# Patient Record
Sex: FEMALE | Race: WHITE | Employment: OTHER | URBAN - METROPOLITAN AREA
[De-identification: names, ages, dates, MRNs, and addresses within clinical notes are randomized per-mention and may not be internally consistent; named-entity substitution may affect disease eponyms.]

---

## 2017-01-17 ENCOUNTER — HOSPITAL ENCOUNTER (OUTPATIENT)
Age: 47
Discharge: HOME | End: 2017-01-17
Attending: FAMILY MEDICINE
Payer: MEDICAID

## 2017-01-17 ENCOUNTER — APPOINTMENT (OUTPATIENT)
Dept: GENERAL RADIOLOGY | Age: 47
End: 2017-01-17
Attending: NURSE PRACTITIONER
Payer: MEDICAID

## 2017-01-17 VITALS
TEMPERATURE: 98 F | DIASTOLIC BLOOD PRESSURE: 84 MMHG | HEART RATE: 88 BPM | OXYGEN SATURATION: 96 % | SYSTOLIC BLOOD PRESSURE: 123 MMHG | RESPIRATION RATE: 16 BRPM

## 2017-01-17 DIAGNOSIS — S20.211A RIB CONTUSION, RIGHT, INITIAL ENCOUNTER: Primary | ICD-10-CM

## 2017-01-17 PROCEDURE — 99214 OFFICE O/P EST MOD 30 MIN: CPT

## 2017-01-17 PROCEDURE — 99213 OFFICE O/P EST LOW 20 MIN: CPT

## 2017-01-17 PROCEDURE — 71101 X-RAY EXAM UNILAT RIBS/CHEST: CPT

## 2017-01-17 RX ORDER — HYDROCODONE BITARTRATE AND IBUPROFEN 7.5; 2 MG/1; MG/1
1 TABLET, FILM COATED ORAL EVERY 8 HOURS PRN
Qty: 15 TABLET | Refills: 0 | Status: SHIPPED | OUTPATIENT
Start: 2017-01-17 | End: 2017-04-04

## 2017-01-17 NOTE — ED PROVIDER NOTES
Patient Seen in: [facility]    History   Patient presents with:  Fall    Stated Complaint: 5 DAYS RIB PAIN S/P FALL    HPI  40 female who presents to the IC with complaints of right rib pain after falling 5 days ago on the ice. OTHER SURGICAL HISTORY      Comment bunions bilateral    OTHER SURGICAL HISTORY      Comment nodule lymph nodes neck    COLPOSCOPY, CERVIX W/UPPER ADJACENT VAGINA; W/BIOPSY(S), CERVIX  2011    CRYOCAUTERY OF CERVIX            Comment X2 OVARIAN CA   • Cancer Maternal Aunt      LUNG CA 46   • Other[other] [OTHER] Son      anxiety   • Other[other] [OTHER] Son      behavioral problems   • Other[other] [OTHER] Sister      Back problems         Smoking Status: Former Smoker Mouth/Throat: Oropharynx is clear and moist. No oropharyngeal exudate. Eyes: Conjunctivae and EOM are normal. Pupils are equal, round, and reactive to light. Neck: Normal range of motion. Neck supple.    Cardiovascular: Normal rate, regular rhythm, norm I discussed the radiology results with the patient. I discussed the diagnosis and need for followup with their primary care physician for further evaluation and care.  Patient states they understand diagnosis, followup plan and agree with and understand  di

## 2017-01-17 NOTE — ED INITIAL ASSESSMENT (HPI)
The patient has right rib pain from a fall that occurred on 1/13/17 when she slipped on ice. The patient has been using ice PRN and yesterday was also alternating with heat. She has been using ibuprofen and Aleve.   Last dose of ibuprofen 800mg was last n

## 2017-01-26 ENCOUNTER — HOSPITAL ENCOUNTER (OUTPATIENT)
Dept: MRI IMAGING | Age: 47
Discharge: HOME | End: 2017-01-26
Attending: ORTHOPAEDIC SURGERY
Payer: MEDICAID

## 2017-01-26 DIAGNOSIS — M25.531 PAIN IN RIGHT WRIST: ICD-10-CM

## 2017-01-26 PROCEDURE — 73221 MRI JOINT UPR EXTREM W/O DYE: CPT

## 2017-02-04 PROBLEM — M18.11 PRIMARY OSTEOARTHRITIS OF FIRST CARPOMETACARPAL JOINT OF RIGHT HAND: Status: ACTIVE | Noted: 2017-02-04

## 2017-04-26 PROCEDURE — 87591 N.GONORRHOEAE DNA AMP PROB: CPT | Performed by: FAMILY MEDICINE

## 2017-04-26 PROCEDURE — 84146 ASSAY OF PROLACTIN: CPT | Performed by: OBSTETRICS & GYNECOLOGY

## 2017-04-26 PROCEDURE — 81003 URINALYSIS AUTO W/O SCOPE: CPT | Performed by: FAMILY MEDICINE

## 2017-04-26 PROCEDURE — 87086 URINE CULTURE/COLONY COUNT: CPT | Performed by: FAMILY MEDICINE

## 2017-04-26 PROCEDURE — 82670 ASSAY OF TOTAL ESTRADIOL: CPT | Performed by: OBSTETRICS & GYNECOLOGY

## 2017-04-26 PROCEDURE — 83001 ASSAY OF GONADOTROPIN (FSH): CPT | Performed by: OBSTETRICS & GYNECOLOGY

## 2017-04-26 PROCEDURE — 83002 ASSAY OF GONADOTROPIN (LH): CPT | Performed by: OBSTETRICS & GYNECOLOGY

## 2017-04-26 PROCEDURE — 87491 CHLMYD TRACH DNA AMP PROBE: CPT | Performed by: FAMILY MEDICINE

## 2017-04-26 PROCEDURE — 84144 ASSAY OF PROGESTERONE: CPT | Performed by: OBSTETRICS & GYNECOLOGY

## 2017-04-27 ENCOUNTER — HOSPITAL ENCOUNTER (OUTPATIENT)
Dept: CT IMAGING | Age: 47
End: 2017-04-27
Attending: FAMILY MEDICINE
Payer: MEDICAID

## 2017-04-27 ENCOUNTER — HOSPITAL ENCOUNTER (OUTPATIENT)
Dept: CT IMAGING | Age: 47
Discharge: HOME | End: 2017-04-27
Attending: FAMILY MEDICINE
Payer: MEDICAID

## 2017-04-27 DIAGNOSIS — R10.32 LLQ PAIN: ICD-10-CM

## 2017-04-27 PROCEDURE — 74177 CT ABD & PELVIS W/CONTRAST: CPT

## 2017-04-27 NOTE — PROGRESS NOTES
Quick Note:    Informed patient of abnormal results and MD's recommendations regarding results to follow up with gastro.  Patient verbalized understanding, however stated that she wants to follow up with her gyne first because she believes that the pain is

## 2017-05-03 PROCEDURE — 88305 TISSUE EXAM BY PATHOLOGIST: CPT | Performed by: OBSTETRICS & GYNECOLOGY

## 2017-05-12 PROCEDURE — 82105 ALPHA-FETOPROTEIN SERUM: CPT | Performed by: INTERNAL MEDICINE

## 2017-05-12 PROCEDURE — 82378 CARCINOEMBRYONIC ANTIGEN: CPT | Performed by: INTERNAL MEDICINE

## 2017-05-12 PROCEDURE — 86301 IMMUNOASSAY TUMOR CA 19-9: CPT | Performed by: INTERNAL MEDICINE

## 2017-05-12 PROCEDURE — 36415 COLL VENOUS BLD VENIPUNCTURE: CPT | Performed by: INTERNAL MEDICINE

## 2017-05-22 ENCOUNTER — HOSPITAL ENCOUNTER (OUTPATIENT)
Dept: MRI IMAGING | Age: 47
Discharge: HOME | End: 2017-05-22
Attending: INTERNAL MEDICINE
Payer: MEDICAID

## 2017-05-22 DIAGNOSIS — R93.2 ABNORMAL LIVER DIAGNOSTIC IMAGING: ICD-10-CM

## 2017-05-22 PROCEDURE — 74183 MRI ABD W/O CNTR FLWD CNTR: CPT | Performed by: INTERNAL MEDICINE

## 2017-05-22 PROCEDURE — A9581 GADOXETATE DISODIUM INJ: HCPCS | Performed by: INTERNAL MEDICINE

## 2017-05-24 NOTE — PROGRESS NOTES
Quick Note:    5/24/2017  Keeley Andres  [address] [address]    Dear Lissy Myers,       Here are your results from your recent visit with Gastroenterology. MRI of liver reveals benign lesions, likely focal nodular hyperplasia.  This i

## 2017-06-26 ENCOUNTER — OFFICE VISIT (OUTPATIENT)
Dept: SLEEP CENTER | Facility: HOSPITAL | Age: 47
End: 2017-06-26
Attending: INTERNAL MEDICINE
Payer: MEDICAID

## 2017-06-26 PROCEDURE — 95810 POLYSOM 6/> YRS 4/> PARAM: CPT

## 2017-07-05 NOTE — PROCEDURES
[address]       Accredited by the [facility] ([facility])    PATIENT'S NAME:        Joellen Pleitez  ATTENDING PHYSICIAN:   Candy Powers M.D.   REFERRING PHYSICIAN:   CLIFFORD Leahy significant obstructive apneas or hypopneas or any central apneas. The total apnea-hypopnea index was 1 event per hour. Throughout the study, the patient maintained an oxyhemoglobin saturation above 90%. Supine REM sleep was witnessed during this study.

## 2017-08-11 ENCOUNTER — OFFICE VISIT (OUTPATIENT)
Dept: FAMILY MEDICINE CLINIC | Facility: CLINIC | Age: 47
End: 2017-08-11

## 2017-08-11 VITALS
WEIGHT: 190 LBS | SYSTOLIC BLOOD PRESSURE: 128 MMHG | OXYGEN SATURATION: 98 % | HEART RATE: 104 BPM | RESPIRATION RATE: 16 BRPM | DIASTOLIC BLOOD PRESSURE: 82 MMHG | BODY MASS INDEX: 30.53 KG/M2 | HEIGHT: 66 IN | TEMPERATURE: 99 F

## 2017-08-11 DIAGNOSIS — J02.9 SORE THROAT: Primary | ICD-10-CM

## 2017-08-11 DIAGNOSIS — J01.40 ACUTE PANSINUSITIS, RECURRENCE NOT SPECIFIED: ICD-10-CM

## 2017-08-11 LAB — CONTROL LINE PRESENT WITH A CLEAR BACKGROUND (YES/NO): YES YES/NO

## 2017-08-11 PROCEDURE — 99213 OFFICE O/P EST LOW 20 MIN: CPT | Performed by: NURSE PRACTITIONER

## 2017-08-11 PROCEDURE — 87880 STREP A ASSAY W/OPTIC: CPT | Performed by: NURSE PRACTITIONER

## 2017-08-11 RX ORDER — AMOXICILLIN AND CLAVULANATE POTASSIUM 875; 125 MG/1; MG/1
1 TABLET, FILM COATED ORAL 2 TIMES DAILY
Qty: 20 TABLET | Refills: 0 | Status: SHIPPED | OUTPATIENT
Start: 2017-08-11 | End: 2017-08-21

## 2017-08-11 RX ORDER — FENTANYL 75 UG/H
PATCH TRANSDERMAL
Refills: 0 | COMMUNITY
Start: 2017-06-05 | End: 2017-09-19 | Stop reason: ALTCHOICE

## 2017-08-11 RX ORDER — FENTANYL 50 UG/H
PATCH TRANSDERMAL
Refills: 0 | COMMUNITY
Start: 2017-07-31 | End: 2018-01-30

## 2017-08-11 NOTE — PROGRESS NOTES
CHIEF COMPLAINT:   Patient presents with:  Sinus Problem: sweaty, fevers, sore throat, congestion, pain when breathing, body aches      HPI:   Marsha Alaniz is a 56year old female who presents for cold symptoms for  2  days.  Symptoms have progressed int • Visual impairment       Past Surgical History:  1980: APPENDECTOMY  2009: BACK SURGERY      Comment: fusion L5-S1  03/21/11: COLPOSCOPY,BX CERVIX/ENDOCERV CURR      Comment: MARCIO 1  2000: LAPAROSCOPY PROCEDURE UNLISTED      Comment: Ovarian cyst removed SKIN: no rashes,no suspicious lesions  HEAD: atraumatic, normocephalic, + tenderness on palpation of maxillary sinuses  EYES: conjunctiva clear, EOM intact  EARS: TM's grey, no bulging, no retraction, no fluid, bony landmarks visible  NOSE: nostrils patent Drinking extra fluids helps thin your mucus. This lets it drain from your sinuses more easily. Have a glass of water every hour or two. A humidifier helps in much the same way. Fluids can also offset the drying effects of certain medicines.  If you use a hu

## 2018-01-11 ENCOUNTER — HOSPITAL ENCOUNTER (EMERGENCY)
Facility: HOSPITAL | Age: 48
Discharge: HOME | End: 2018-01-11
Attending: EMERGENCY MEDICINE
Payer: MEDICAID

## 2018-01-11 ENCOUNTER — APPOINTMENT (OUTPATIENT)
Dept: MRI IMAGING | Facility: HOSPITAL | Age: 48
End: 2018-01-11
Attending: EMERGENCY MEDICINE
Payer: MEDICAID

## 2018-01-11 VITALS
BODY MASS INDEX: 28.32 KG/M2 | SYSTOLIC BLOOD PRESSURE: 104 MMHG | TEMPERATURE: 97 F | WEIGHT: 170 LBS | OXYGEN SATURATION: 96 % | DIASTOLIC BLOOD PRESSURE: 74 MMHG | RESPIRATION RATE: 14 BRPM | HEIGHT: 65 IN | HEART RATE: 80 BPM

## 2018-01-11 DIAGNOSIS — R27.0 ATAXIA: ICD-10-CM

## 2018-01-11 DIAGNOSIS — T50.905A ADVERSE EFFECT OF DRUG, INITIAL ENCOUNTER: Primary | ICD-10-CM

## 2018-01-11 LAB
ALBUMIN SERPL-MCNC: 3.5 G/DL (ref 3.5–4.8)
ALP LIVER SERPL-CCNC: 61 U/L (ref 39–100)
ALT SERPL-CCNC: 24 U/L (ref 14–54)
AST SERPL-CCNC: 22 U/L (ref 15–41)
ATRIAL RATE: 74 BPM
BASOPHILS # BLD AUTO: 0.03 X10(3) UL (ref 0–0.1)
BASOPHILS NFR BLD AUTO: 0.6 %
BILIRUB SERPL-MCNC: 0.3 MG/DL (ref 0.1–2)
BILIRUB UR QL STRIP.AUTO: NEGATIVE
BUN BLD-MCNC: 10 MG/DL (ref 8–20)
CALCIUM BLD-MCNC: 8.8 MG/DL (ref 8.3–10.3)
CHLORIDE: 108 MMOL/L (ref 101–111)
CO2: 24 MMOL/L (ref 22–32)
COLOR UR AUTO: YELLOW
CREAT BLD-MCNC: 0.49 MG/DL (ref 0.55–1.02)
EOSINOPHIL # BLD AUTO: 0.14 X10(3) UL (ref 0–0.3)
EOSINOPHIL NFR BLD AUTO: 3 %
ERYTHROCYTE [DISTWIDTH] IN BLOOD BY AUTOMATED COUNT: 12.5 % (ref 11.5–16)
GLUCOSE BLD-MCNC: 99 MG/DL (ref 70–99)
GLUCOSE UR STRIP.AUTO-MCNC: NEGATIVE MG/DL
HCT VFR BLD AUTO: 38.3 % (ref 34–50)
HGB BLD-MCNC: 13.1 G/DL (ref 12–16)
IMMATURE GRANULOCYTE COUNT: 0.01 X10(3) UL (ref 0–1)
IMMATURE GRANULOCYTE RATIO %: 0.2 %
LYMPHOCYTES # BLD AUTO: 1.67 X10(3) UL (ref 0.9–4)
LYMPHOCYTES NFR BLD AUTO: 35.6 %
M PROTEIN MFR SERPL ELPH: 6.5 G/DL (ref 6.1–8.3)
MCH RBC QN AUTO: 29.7 PG (ref 27–33.2)
MCHC RBC AUTO-ENTMCNC: 34.2 G/DL (ref 31–37)
MCV RBC AUTO: 86.8 FL (ref 81–100)
MONOCYTES # BLD AUTO: 0.55 X10(3) UL (ref 0.1–0.6)
MONOCYTES NFR BLD AUTO: 11.7 %
NEUTROPHIL ABS PRELIM: 2.29 X10 (3) UL (ref 1.3–6.7)
NEUTROPHILS # BLD AUTO: 2.29 X10(3) UL (ref 1.3–6.7)
NEUTROPHILS NFR BLD AUTO: 48.9 %
NITRITE UR QL STRIP.AUTO: NEGATIVE
P AXIS: 72 DEGREES
P-R INTERVAL: 166 MS
PH UR STRIP.AUTO: 5 [PH] (ref 4.5–8)
PLATELET # BLD AUTO: 281 10(3)UL (ref 150–450)
POCT URINE PREGNANCY: NEGATIVE
POTASSIUM SERPL-SCNC: 4 MMOL/L (ref 3.6–5.1)
PROT UR STRIP.AUTO-MCNC: NEGATIVE MG/DL
Q-T INTERVAL: 406 MS
QRS DURATION: 90 MS
QTC CALCULATION (BEZET): 450 MS
R AXIS: 46 DEGREES
RBC # BLD AUTO: 4.41 X10(6)UL (ref 3.8–5.1)
RBC UR QL AUTO: NEGATIVE
RED CELL DISTRIBUTION WIDTH-SD: 39.8 FL (ref 35.1–46.3)
SODIUM SERPL-SCNC: 139 MMOL/L (ref 136–144)
SP GR UR STRIP.AUTO: 1.01 (ref 1–1.03)
T AXIS: 45 DEGREES
UROBILINOGEN UR STRIP.AUTO-MCNC: <2 MG/DL
VENTRICULAR RATE: 74 BPM
WBC # BLD AUTO: 4.7 X10(3) UL (ref 4–13)

## 2018-01-11 PROCEDURE — 81001 URINALYSIS AUTO W/SCOPE: CPT | Performed by: EMERGENCY MEDICINE

## 2018-01-11 PROCEDURE — 93005 ELECTROCARDIOGRAM TRACING: CPT

## 2018-01-11 PROCEDURE — 87086 URINE CULTURE/COLONY COUNT: CPT | Performed by: EMERGENCY MEDICINE

## 2018-01-11 PROCEDURE — 99285 EMERGENCY DEPT VISIT HI MDM: CPT

## 2018-01-11 PROCEDURE — 81025 URINE PREGNANCY TEST: CPT

## 2018-01-11 PROCEDURE — 93010 ELECTROCARDIOGRAM REPORT: CPT

## 2018-01-11 PROCEDURE — A9575 INJ GADOTERATE MEGLUMI 0.1ML: HCPCS | Performed by: EMERGENCY MEDICINE

## 2018-01-11 PROCEDURE — 80053 COMPREHEN METABOLIC PANEL: CPT | Performed by: EMERGENCY MEDICINE

## 2018-01-11 PROCEDURE — 36415 COLL VENOUS BLD VENIPUNCTURE: CPT

## 2018-01-11 PROCEDURE — 85025 COMPLETE CBC W/AUTO DIFF WBC: CPT | Performed by: EMERGENCY MEDICINE

## 2018-01-11 PROCEDURE — 70553 MRI BRAIN STEM W/O & W/DYE: CPT | Performed by: EMERGENCY MEDICINE

## 2018-01-11 RX ORDER — GABAPENTIN 300 MG/1
300 CAPSULE ORAL 2 TIMES DAILY
COMMUNITY
End: 2018-01-15

## 2018-01-11 NOTE — ED PROVIDER NOTES
Patient Seen in: [facility] Emergency Department    History   Patient presents with:  Medication Reaction  Dizziness (neurologic)    Stated Complaint: medication reaction    HPI    Patient is a 43-year-old female presents with multiple complaints. SURGICAL HISTORY      Comment: Bladder sling        Smoking status: Former Smoker                                                              Packs/day: 0.00      Years: 20.00        Quit date: 5/12/2011  Smokeless tobacco: Current User 0.49 (*)     All other components within normal limits   CBC WITH DIFFERENTIAL WITH PLATELET    Narrative: The following orders were created for panel order CBC WITH DIFFERENTIAL WITH PLATELET.   Procedure                               Abnormality pm    Follow-up:  Jovana Haque [place] [id number] [phone]    In 2 days          Medications Prescribed:  Current Discharge Medication List

## 2018-01-11 NOTE — ED INITIAL ASSESSMENT (HPI)
Complaint of lightheadedness and tingling to the fingers of the left hand since yesterday. Patient said she started taking gabapentin 5 days ago for her chronic back pain that radiates down her left leg. Denies cough or fevers.

## 2018-01-15 PROBLEM — N80.03 ADENOMYOSIS: Status: RESOLVED | Noted: 2018-01-15 | Resolved: 2018-01-15

## 2018-01-15 PROBLEM — N80.0 ADENOMYOSIS: Status: RESOLVED | Noted: 2018-01-15 | Resolved: 2018-01-15

## 2018-01-15 PROBLEM — N80.0 ADENOMYOSIS: Status: ACTIVE | Noted: 2018-01-15

## 2018-01-15 PROBLEM — N80.03 ADENOMYOSIS: Status: ACTIVE | Noted: 2018-01-15

## 2018-01-15 PROCEDURE — 88175 CYTOPATH C/V AUTO FLUID REDO: CPT | Performed by: FAMILY MEDICINE

## 2018-01-15 PROCEDURE — 87624 HPV HI-RISK TYP POOLED RSLT: CPT | Performed by: FAMILY MEDICINE

## 2018-01-28 ENCOUNTER — HOSPITAL ENCOUNTER (EMERGENCY)
Facility: HOSPITAL | Age: 48
Discharge: HOME | End: 2018-01-28
Attending: EMERGENCY MEDICINE
Payer: MEDICAID

## 2018-01-28 ENCOUNTER — APPOINTMENT (OUTPATIENT)
Dept: MRI IMAGING | Facility: HOSPITAL | Age: 48
End: 2018-01-28
Attending: EMERGENCY MEDICINE
Payer: MEDICAID

## 2018-01-28 VITALS
TEMPERATURE: 97 F | HEART RATE: 72 BPM | WEIGHT: 175 LBS | DIASTOLIC BLOOD PRESSURE: 92 MMHG | RESPIRATION RATE: 16 BRPM | OXYGEN SATURATION: 98 % | HEIGHT: 65 IN | BODY MASS INDEX: 29.16 KG/M2 | SYSTOLIC BLOOD PRESSURE: 121 MMHG

## 2018-01-28 DIAGNOSIS — M54.12 CERVICAL RADICULOPATHY: Primary | ICD-10-CM

## 2018-01-28 LAB
BUN BLD-MCNC: 12 MG/DL (ref 8–20)
CALCIUM BLD-MCNC: 8.4 MG/DL (ref 8.3–10.3)
CHLORIDE: 109 MMOL/L (ref 101–111)
CO2: 27 MMOL/L (ref 22–32)
CREAT BLD-MCNC: 0.58 MG/DL (ref 0.55–1.02)
GLUCOSE BLD-MCNC: 82 MG/DL (ref 70–99)
POTASSIUM SERPL-SCNC: 3.9 MMOL/L (ref 3.6–5.1)
SODIUM SERPL-SCNC: 141 MMOL/L (ref 136–144)

## 2018-01-28 PROCEDURE — 96374 THER/PROPH/DIAG INJ IV PUSH: CPT

## 2018-01-28 PROCEDURE — 80048 BASIC METABOLIC PNL TOTAL CA: CPT | Performed by: EMERGENCY MEDICINE

## 2018-01-28 PROCEDURE — 96375 TX/PRO/DX INJ NEW DRUG ADDON: CPT

## 2018-01-28 PROCEDURE — 99285 EMERGENCY DEPT VISIT HI MDM: CPT

## 2018-01-28 PROCEDURE — 72141 MRI NECK SPINE W/O DYE: CPT | Performed by: EMERGENCY MEDICINE

## 2018-01-28 RX ORDER — HYDROMORPHONE HYDROCHLORIDE 1 MG/ML
1 INJECTION, SOLUTION INTRAMUSCULAR; INTRAVENOUS; SUBCUTANEOUS ONCE
Status: COMPLETED | OUTPATIENT
Start: 2018-01-28 | End: 2018-01-28

## 2018-01-28 RX ORDER — PREDNISONE 20 MG/1
40 TABLET ORAL DAILY
Qty: 10 TABLET | Refills: 0 | Status: SHIPPED | OUTPATIENT
Start: 2018-01-28 | End: 2018-02-02

## 2018-01-28 RX ORDER — ONDANSETRON 2 MG/ML
4 INJECTION INTRAMUSCULAR; INTRAVENOUS ONCE
Status: COMPLETED | OUTPATIENT
Start: 2018-01-28 | End: 2018-01-28

## 2018-01-28 NOTE — ED INITIAL ASSESSMENT (HPI)
Pt with chronic neck issues c/o pain she woke up with at 4AM today that radiates down her left arm with numbness of the hand.

## 2018-01-28 NOTE — ED PROVIDER NOTES
Patient Seen in: [facility] Emergency Department    History   Patient presents with:  Pain (neurologic)    Stated Complaint: pain     HPI    50-year-old white female who presents emergency room today for complaint of pain.   Patient states she woke u APPENDECTOMY  2009: BACK SURGERY      Comment: fusion L5-S1  11: COLPOSCOPY,BX CERVIX/ENDOCERV CURR      Comment: MARCIO 1  : LAPAROSCOPY PROCEDURE UNLISTED      Comment: Ovarian cyst removed  2011: LEEP      Comment: MARCIO 2  No date:       Com extremity motor strength biceps triceps and wrist extension are 5/5 on the right. Patient has some weak hand grasp on the left. Biceps and triceps are intact on the left.   Patient complained of pain in her left hand when we were doing muscle strength melissa canal stenosis with minimal flattening of the ventral spinal cord.    There is moderate bilateral neural foraminal stenosis.  No significant interval change.     C5-6: There is a posterior disk osteophyte complex with mild uncovertebral and facet joint dege in to see the neurosurgeons as well. After long discussion about MRI patient was sent for MRI of the cervical spine. MRI of the cervical spine shows no significant interval change from previous. Patient is feeling better after pain medications.   I spoke

## 2018-01-28 NOTE — ED NOTES
DC instructions and RX handed to pt. No distress noted. Pt denies any needs prior to DC. Pt thanks staff for care. Friend at bedside. No distress noted.

## 2018-01-28 NOTE — ED NOTES
Round on pt from MRI. Pt sleeping. Pt requests water. Informed to remain NPO. Pt choose to drink water in front of RN. Friend at bedside. Pt updated on eta for results.

## 2018-01-29 ENCOUNTER — TELEPHONE (OUTPATIENT)
Dept: SURGERY | Facility: CLINIC | Age: 48
End: 2018-01-29

## 2018-01-30 ENCOUNTER — OFFICE VISIT (OUTPATIENT)
Dept: SURGERY | Facility: CLINIC | Age: 48
End: 2018-01-30

## 2018-01-30 ENCOUNTER — APPOINTMENT (OUTPATIENT)
Dept: LAB | Age: 48
End: 2018-01-30
Attending: ANESTHESIOLOGY
Payer: MEDICAID

## 2018-01-30 VITALS
DIASTOLIC BLOOD PRESSURE: 82 MMHG | HEART RATE: 72 BPM | BODY MASS INDEX: 29.66 KG/M2 | WEIGHT: 178 LBS | SYSTOLIC BLOOD PRESSURE: 138 MMHG | HEIGHT: 65 IN

## 2018-01-30 VITALS
DIASTOLIC BLOOD PRESSURE: 86 MMHG | SYSTOLIC BLOOD PRESSURE: 138 MMHG | WEIGHT: 178 LBS | BODY MASS INDEX: 29.66 KG/M2 | HEIGHT: 65 IN | HEART RATE: 68 BPM

## 2018-01-30 DIAGNOSIS — M54.12 CERVICAL RADICULOPATHY: ICD-10-CM

## 2018-01-30 DIAGNOSIS — M54.12 CERVICAL RADICULOPATHY: Primary | ICD-10-CM

## 2018-01-30 DIAGNOSIS — R20.0 NUMBNESS AND TINGLING IN LEFT HAND: ICD-10-CM

## 2018-01-30 DIAGNOSIS — R20.0 NUMBNESS OF RIGHT HAND: ICD-10-CM

## 2018-01-30 DIAGNOSIS — Z98.1 S/P LUMBAR FUSION: ICD-10-CM

## 2018-01-30 DIAGNOSIS — F11.90 CHRONIC NARCOTIC USE: ICD-10-CM

## 2018-01-30 DIAGNOSIS — R20.2 NUMBNESS AND TINGLING IN LEFT HAND: ICD-10-CM

## 2018-01-30 DIAGNOSIS — M79.604 BILATERAL LEG PAIN: ICD-10-CM

## 2018-01-30 DIAGNOSIS — M79.605 BILATERAL LEG PAIN: ICD-10-CM

## 2018-01-30 DIAGNOSIS — M47.22 OSTEOARTHRITIS OF SPINE WITH RADICULOPATHY, CERVICAL REGION: Primary | ICD-10-CM

## 2018-01-30 DIAGNOSIS — G89.29 CHRONIC BILATERAL LOW BACK PAIN WITHOUT SCIATICA: ICD-10-CM

## 2018-01-30 DIAGNOSIS — M54.50 CHRONIC BILATERAL LOW BACK PAIN WITHOUT SCIATICA: ICD-10-CM

## 2018-01-30 PROCEDURE — 80307 DRUG TEST PRSMV CHEM ANLYZR: CPT

## 2018-01-30 PROCEDURE — 99204 OFFICE O/P NEW MOD 45 MIN: CPT | Performed by: PHYSICIAN ASSISTANT

## 2018-01-30 PROCEDURE — 99205 OFFICE O/P NEW HI 60 MIN: CPT | Performed by: ANESTHESIOLOGY

## 2018-01-30 RX ORDER — ALPRAZOLAM 0.5 MG/1
0.5 TABLET ORAL AS NEEDED
Refills: 2 | COMMUNITY
Start: 2018-01-19 | End: 2019-07-24

## 2018-01-30 RX ORDER — MEDROXYPROGESTERONE ACETATE 10 MG/1
TABLET ORAL
Refills: 6 | COMMUNITY
Start: 2017-12-01 | End: 2018-05-04

## 2018-01-30 RX ORDER — FENTANYL 75 UG/H
1 PATCH TRANSDERMAL
Refills: 0 | COMMUNITY
Start: 2018-01-25 | End: 2018-02-15

## 2018-01-30 RX ORDER — IBUPROFEN 800 MG/1
TABLET ORAL
Refills: 0 | COMMUNITY
Start: 2018-01-28 | End: 2018-04-16

## 2018-01-30 RX ORDER — SERTRALINE HYDROCHLORIDE 100 MG/1
TABLET, FILM COATED ORAL
Refills: 0 | COMMUNITY
Start: 2017-11-07 | End: 2018-01-30 | Stop reason: ALTCHOICE

## 2018-01-30 NOTE — PROGRESS NOTES
HPI:    Patient ID: Kiran Schmitt is a 56year old female.     HPI    Review of Systems         Current Outpatient Prescriptions:  ALPRAZolam 0.5 MG Oral Tab  Disp:  Rfl: 2   fentaNYL 75 MCG/HR Transdermal Patch 72 Hr Place 1 patch onto the skin every thi Past Treatments for Current Pain Condition:   NSAIDS, Surgery and Other injections    Prior diagnostic testing for your pain:  MRI

## 2018-01-30 NOTE — PATIENT INSTRUCTIONS
Refill policies:    • Allow 2-3 business days for refills; controlled substances may take longer.   • Contact your pharmacy at least 5 days prior to running out of medication and have them send an electronic request or submit request through the [facility] recommended that you have a procedure or additional testing performed. [facility]) will contact your insurance carrier to obtain pre-certification or prior authorization.     Unfortunately, [facility] has seen an increase in denial of paym

## 2018-01-30 NOTE — H&P
Name: Gideon Gosselin   : 1970   DOS: 2018     Chief complaint: Low back  and neck pain    History of present illness:  Gideon Gosselin is a 56year old right-hand-dominant female with a history of anxiety, hypertension, hypercholesterolemia wh Substance abuse [phone]    cocaine   • Urinary incontinence    • Visual impairment         Current Outpatient Prescriptions:  ALPRAZolam 0.5 MG Oral Tab  Disp:  Rfl: 2   fentaNYL 75 MCG/HR Transdermal Patch 72 Hr Place 1 patch onto the skin every third d Packs/day: 0.00      Years: 20.00        Quit date: 5/12/2011  Smokeless tobacco: Never Used                      Comment: USES E-CIG  Alcohol use:  No                Review of  other systems:  A 10 point ROS was conducte diagnosis). Plan[de-identified]    The patient is a 60-year-old female with a history of lumbar fusion and chronic pain. She is currently being treated by a pain specialist in [facility]. She is here to establish care given changing her insurance situation.   Neno Marsh

## 2018-01-30 NOTE — PATIENT INSTRUCTIONS
Refill policies:    • Allow 2-3 business days for refills; controlled substances may take longer.   • Contact your pharmacy at least 5 days prior to running out of medication and have them send an electronic request or submit request through the [facility] recommended that you have a procedure or additional testing performed. [facility]) will contact your insurance carrier to obtain pre-certification or prior authorization.     Unfortunately, [facility] has seen an increase in denial of paym day of procedure. If you require a refill of medications, please contact the office 48 hours prior to your procedure.   • If you have an implanted Spinal Cord or Peripheral Nerve Stimulator: Please remember to turn device off for procedure      Medication: date   Please call our office with any questions or concerns before or after your procedure at [phone].   If you are a diabetic, please increase the frequency of your glucose monitoring after the procedure as this may cause a temporary increase in your

## 2018-01-30 NOTE — H&P
Neurosurgery Clinic Visit  2018    Svenwilliam Asencio PCP:  Luz Long MD    1970 MRN [id number]       CHIEF COMPLAINT:  Patient presents with:  New Patient  Neck Pain      HISTORY OF PRESENT ILLNESS:  Sven Asencio is a(n) 56year old had an EMG. She has not done any recent PT and has never done PT for her neck. She is here today for neurosurgical evaluation. REVIEW OF SYSTEMS:  The 12 point checklist was reviewed. Pertinent positives and negatives are noted in HPI.      ALLERGIE removed  2011: LEEP      Comment: MARCIO 2  No date:       Comment: X2  : OTHER SURGICAL HISTORY      Comment: bunions bilateral  2006: OTHER SURGICAL HISTORY      Comment: nodule lymph nodes neck  2016 : OTHER SURGICAL HISTORY      Comment: Bladder place, and situation. Memory, attention span, language findings, and knowledge and insight into the problem appear intact and appropriate. Negative Tinel's. Negative Phalen's. Cranial nerve Exam:  The pupils are equal, round, and reactive to light.   Rosanne Her vs carpal tunnel syndrome. 3.  Chronic low back pain with bilateral leg pain. 4.  S/p L5-S1 fusion in 2009 with Dr. Kaley Carrasco    Reviewed imaging with the patient. She has some degenerative changes in her cervical spine.   There is righ sided disc bulge at

## 2018-01-30 NOTE — PROGRESS NOTES
Location of Pain: Neck pain with left shoulder pain, with radiating left arm pain. Has N/T to all fingers, progressively getting worse. Subjectively feels weak in left arm.      Date Pain Began: About 1 year ago          Work Related:   No        Receiving

## 2018-01-31 ENCOUNTER — TELEPHONE (OUTPATIENT)
Dept: NEUROLOGY | Facility: CLINIC | Age: 48
End: 2018-01-31

## 2018-02-01 LAB
6-ACETYLMORHINE CUTOFF 20 NG/ML: NOT DETECTED
7-AMINOCLONAZEPAM 40 NG/ML: NOT DETECTED
A-OH-ALPRAZOLM CUTOFF 20 NG/ML: NOT DETECTED
ALPRAZOLAM CUTOFF 40 NG/ML: NOT DETECTED
AMPHETAMINE CUTOFF 10 NG/ML: PRESENT
BARBITURATES CUTOFF 200 NG/ML: NOT DETECTED
BENZOYLECGONINE  150 NG/ML: NOT DETECTED
BUPRENORPHINE CUTOFF 5 NG/ML: NOT DETECTED
CARISOPRODOL CUTOFF 100 NG/ML: NOT DETECTED
CODINE CUTOFF 40 NG/ML: NOT DETECTED
COLNAZEPAM CUTOFF 20 NG/ML: NOT DETECTED
CREATININE,URINE: 239.1 MG/DL
DIAZEPAM CUTOFF 50 NG/ML: NOT DETECTED
ETHYL-GLUCURONIDE 500 NG/ML: NOT DETECTED
FENTANYL CUTOFF 2 NG/ML: PRESENT
HYDROCODONE CUTOFF 40 NG/ML: NOT DETECTED
HYDROMORPHONE CUTOFF 40 NG/ML: NOT DETECTED
LORAZEPAM CUTOFF 60 NG/ML: NOT DETECTED
MARIJUANA CUTOFF 20 NG/ML: NOT DETECTED
MDA CUTOFF 200 NG/ML: NOT DETECTED
MDEA EVE CUTOFF 200 NG/ML: NOT DETECTED
MDMA-ECSTASY CUTOFF 200 NG/ML: NOT DETECTED
MEPERIDINE MET CUTOFF 50 NG/ML: NOT DETECTED
METHADONE CUTOFF 150 NG/ML: NOT DETECTED
METHAMPHETMN CUTOFF 400 NG/ML: NOT DETECTED
METHYLPHENIDATE (CUTOFF 100 NG/ML): NOT DETECTED
MIDAZOLAM CUTOFF 20 NG/ML: NOT DETECTED
MORHINE CUTOFF 20 NG/ML: NOT DETECTED
NORBUPRENORPHINE  20 NG/ML: NOT DETECTED
NORDIAZEPAM CUTOFF 50 NG/ML: NOT DETECTED
NORFENTANYL CUTOFF 2 NG/ML: PRESENT
NORHYDRCODONE CUTOFF 100 NG/ML: NOT DETECTED
NOROXYCODONE CUTOFF 100 NG/ML: NOT DETECTED
NOROXYMORPHNE CUTOFF 100 NG/ML: NOT DETECTED
OXAZEPAM CUTOFF 50 NG/ML: NOT DETECTED
OXYCODONE CUTOFF 40 NG/ML: NOT DETECTED
OXYMORPHONE CUTOFF 40 NG/ML: NOT DETECTED
PCP CUTOFF 25 NG/ML: NOT DETECTED
PHENTERMINE CUTOFF 100 NG/ML: NOT DETECTED
PROPOXYPHENE CUTOFF 300 NG/ML: NOT DETECTED
TAPENTADOL CUTOFF 100 NG/ML: NOT DETECTED
TAPENTADOL-O SULF 200 NG/ML: NOT DETECTED
TEMAZEPAM CUTOFF 50 NG/ML: NOT DETECTED
TRAMADOL CUTOFF 200 NG/ML: NOT DETECTED
ZOLPIDEM CUTOFF 20 NG/ML: NOT DETECTED

## 2018-02-05 ENCOUNTER — TELEPHONE (OUTPATIENT)
Dept: NEUROLOGY | Facility: CLINIC | Age: 48
End: 2018-02-05

## 2018-02-05 NOTE — TELEPHONE ENCOUNTER
Spoke w/ pt and reviewed instructions, procedure date 2/7/18 and arrival time 8:30am.  Pt verbalized understanding and appreciation; pt confirmed understanding of holding Ibuprofen and any other NSAID for 24 hrs prior procedure.   Pt denied s/s of infection ? Trental 7 days  ? Eliquis (Apixaban) 3 days  ? Xarelto (Rivaroxaban) 3 days  ? Lovenox (Enoxaparin) 24 hours  ? Aspirin  ? 81mg 24 hours  ? Greater than 81 mg (325mg) 7 days  ? Coumadin Procedure may be cancelled if INR is elevated. ?  Epidural ____ - 7 It is normal to have increased pain at injection site for up to 3-5 days after procedure, you can use heat for the first 24 hours (20 minutes on 20 minutes off) after the first 24 hours you can use heat or cold.

## 2018-02-07 ENCOUNTER — HOSPITAL ENCOUNTER (OUTPATIENT)
Facility: HOSPITAL | Age: 48
Setting detail: HOSPITAL OUTPATIENT SURGERY
Discharge: HOME | End: 2018-02-07
Attending: ANESTHESIOLOGY | Admitting: ANESTHESIOLOGY
Payer: MEDICAID

## 2018-02-07 ENCOUNTER — APPOINTMENT (OUTPATIENT)
Dept: GENERAL RADIOLOGY | Facility: HOSPITAL | Age: 48
End: 2018-02-07
Attending: ANESTHESIOLOGY
Payer: MEDICAID

## 2018-02-07 ENCOUNTER — SURGERY (OUTPATIENT)
Age: 48
End: 2018-02-07

## 2018-02-07 VITALS
BODY MASS INDEX: 29.16 KG/M2 | WEIGHT: 175 LBS | RESPIRATION RATE: 22 BRPM | HEIGHT: 65 IN | TEMPERATURE: 99 F | DIASTOLIC BLOOD PRESSURE: 75 MMHG | OXYGEN SATURATION: 97 % | SYSTOLIC BLOOD PRESSURE: 109 MMHG | HEART RATE: 88 BPM

## 2018-02-07 DIAGNOSIS — M54.12 CERVICAL RADICULOPATHY: ICD-10-CM

## 2018-02-07 LAB
POCT LOT NUMBER: NORMAL
POCT URINE PREGNANCY: NEGATIVE

## 2018-02-07 PROCEDURE — B01B1ZZ FLUOROSCOPY OF SPINAL CORD USING LOW OSMOLAR CONTRAST: ICD-10-PCS | Performed by: ANESTHESIOLOGY

## 2018-02-07 PROCEDURE — 81025 URINE PREGNANCY TEST: CPT | Performed by: ANESTHESIOLOGY

## 2018-02-07 PROCEDURE — 3E0R33Z INTRODUCTION OF ANTI-INFLAMMATORY INTO SPINAL CANAL, PERCUTANEOUS APPROACH: ICD-10-PCS | Performed by: ANESTHESIOLOGY

## 2018-02-07 PROCEDURE — 99152 MOD SED SAME PHYS/QHP 5/>YRS: CPT | Performed by: ANESTHESIOLOGY

## 2018-02-07 RX ORDER — MIDAZOLAM HYDROCHLORIDE 1 MG/ML
INJECTION INTRAMUSCULAR; INTRAVENOUS AS NEEDED
Status: DISCONTINUED | OUTPATIENT
Start: 2018-02-07 | End: 2018-02-07 | Stop reason: HOSPADM

## 2018-02-07 RX ORDER — 0.9 % SODIUM CHLORIDE 0.9 %
VIAL (ML) INJECTION AS NEEDED
Status: DISCONTINUED | OUTPATIENT
Start: 2018-02-07 | End: 2018-02-07 | Stop reason: HOSPADM

## 2018-02-07 RX ORDER — DEXAMETHASONE SODIUM PHOSPHATE 10 MG/ML
INJECTION, SOLUTION INTRAMUSCULAR; INTRAVENOUS AS NEEDED
Status: DISCONTINUED | OUTPATIENT
Start: 2018-02-07 | End: 2018-02-07 | Stop reason: HOSPADM

## 2018-02-07 RX ORDER — ONDANSETRON 2 MG/ML
4 INJECTION INTRAMUSCULAR; INTRAVENOUS ONCE AS NEEDED
Status: CANCELLED | OUTPATIENT
Start: 2018-02-07 | End: 2018-02-07

## 2018-02-07 RX ORDER — LIDOCAINE HYDROCHLORIDE 10 MG/ML
INJECTION, SOLUTION EPIDURAL; INFILTRATION; INTRACAUDAL; PERINEURAL AS NEEDED
Status: DISCONTINUED | OUTPATIENT
Start: 2018-02-07 | End: 2018-02-07 | Stop reason: HOSPADM

## 2018-02-07 RX ORDER — SODIUM CHLORIDE, SODIUM LACTATE, POTASSIUM CHLORIDE, CALCIUM CHLORIDE 600; 310; 30; 20 MG/100ML; MG/100ML; MG/100ML; MG/100ML
100 INJECTION, SOLUTION INTRAVENOUS CONTINUOUS
Status: DISCONTINUED | OUTPATIENT
Start: 2018-02-07 | End: 2018-02-07

## 2018-02-07 NOTE — H&P
History & Physical Examination    Patient Name: Alexx Coyle  MRN: [id number]  CSN: [id number]  YOB: 1970    Pre-Operative Diagnosis:  Cervical radiculopathy [M54.12]    Present Illness: A 53year old female with neck pain is here for cervic cocaine   • Urinary incontinence    • Visual impairment      Past Surgical History:  1980: APPENDECTOMY  2009: BACK SURGERY      Comment: fusion L5-S1  03/21/11: COLPOSCOPY,BX CERVIX/ENDOCERV CURR      Comment: MARCIO 1  2000: LAPAROSCOPY PROCEDURE UNLISTED

## 2018-02-07 NOTE — OPERATIVE REPORT
[facility]  Operative Report  2018     Sin Cathy Patient Status:  Hospital Outpatient Surgery    1970 MRN [id number]   [facility] SURGERY Attending Lance Saldana MD   Hosp Day # 0 PCP Yolande Humphries MD     Indication: by injecting Omnipaque 180 1 mL, a combination of normal saline and dexamethasone 10 mg in total volume of 4 mL was injected. The needle was then flushed with normal saline 1 mL. The stylet re-applied. The needle was withdrawn with the tip intact.   The

## 2018-02-08 ENCOUNTER — HOSPITAL ENCOUNTER (OUTPATIENT)
Dept: MAMMOGRAPHY | Age: 48
Discharge: HOME | End: 2018-02-08
Attending: FAMILY MEDICINE
Payer: MEDICAID

## 2018-02-08 ENCOUNTER — HOSPITAL ENCOUNTER (OUTPATIENT)
Dept: GENERAL RADIOLOGY | Age: 48
Discharge: HOME | End: 2018-02-08
Attending: PHYSICIAN ASSISTANT
Payer: MEDICAID

## 2018-02-08 DIAGNOSIS — R20.0 NUMBNESS AND TINGLING IN LEFT HAND: ICD-10-CM

## 2018-02-08 DIAGNOSIS — G89.29 CHRONIC BILATERAL LOW BACK PAIN WITHOUT SCIATICA: ICD-10-CM

## 2018-02-08 DIAGNOSIS — Z12.31 ENCOUNTER FOR SCREENING MAMMOGRAM FOR MALIGNANT NEOPLASM OF BREAST: ICD-10-CM

## 2018-02-08 DIAGNOSIS — M54.50 CHRONIC BILATERAL LOW BACK PAIN WITHOUT SCIATICA: ICD-10-CM

## 2018-02-08 DIAGNOSIS — R20.2 NUMBNESS AND TINGLING IN LEFT HAND: ICD-10-CM

## 2018-02-08 DIAGNOSIS — R20.0 NUMBNESS OF RIGHT HAND: ICD-10-CM

## 2018-02-08 DIAGNOSIS — Z98.1 S/P LUMBAR FUSION: ICD-10-CM

## 2018-02-08 DIAGNOSIS — M47.22 OSTEOARTHRITIS OF SPINE WITH RADICULOPATHY, CERVICAL REGION: ICD-10-CM

## 2018-02-08 PROCEDURE — 77063 BREAST TOMOSYNTHESIS BI: CPT | Performed by: FAMILY MEDICINE

## 2018-02-08 PROCEDURE — 77067 SCR MAMMO BI INCL CAD: CPT | Performed by: FAMILY MEDICINE

## 2018-02-08 PROCEDURE — 72052 X-RAY EXAM NECK SPINE 6/>VWS: CPT | Performed by: PHYSICIAN ASSISTANT

## 2018-02-13 ENCOUNTER — OFFICE VISIT (OUTPATIENT)
Dept: FAMILY MEDICINE CLINIC | Facility: CLINIC | Age: 48
End: 2018-02-13

## 2018-02-13 VITALS
HEIGHT: 65 IN | BODY MASS INDEX: 29.82 KG/M2 | OXYGEN SATURATION: 98 % | WEIGHT: 179 LBS | SYSTOLIC BLOOD PRESSURE: 132 MMHG | RESPIRATION RATE: 16 BRPM | TEMPERATURE: 99 F | DIASTOLIC BLOOD PRESSURE: 84 MMHG | HEART RATE: 84 BPM

## 2018-02-13 DIAGNOSIS — J02.9 SORE THROAT: Primary | ICD-10-CM

## 2018-02-13 DIAGNOSIS — J01.00 ACUTE MAXILLARY SINUSITIS, RECURRENCE NOT SPECIFIED: ICD-10-CM

## 2018-02-13 LAB
CONTROL LINE PRESENT WITH A CLEAR BACKGROUND (YES/NO): YES YES/NO
STREP GRP A CUL-SCR: NEGATIVE

## 2018-02-13 PROCEDURE — 99213 OFFICE O/P EST LOW 20 MIN: CPT | Performed by: PHYSICIAN ASSISTANT

## 2018-02-13 PROCEDURE — 87880 STREP A ASSAY W/OPTIC: CPT | Performed by: PHYSICIAN ASSISTANT

## 2018-02-13 RX ORDER — AMOXICILLIN AND CLAVULANATE POTASSIUM 875; 125 MG/1; MG/1
1 TABLET, FILM COATED ORAL 2 TIMES DAILY
Qty: 20 TABLET | Refills: 0 | Status: SHIPPED | OUTPATIENT
Start: 2018-02-13 | End: 2018-02-23

## 2018-02-13 NOTE — PATIENT INSTRUCTIONS
Sinusitis (Antibiotic Treatment)    The sinuses are air-filled spaces within the bones of the face. They connect to the inside of the nose. Sinusitis is an inflammation of the tissue lining the sinus cavity. Sinus inflammation can occur during a cold.  It · Do not use nasal rinses or irrigation during an acute sinus infection, unless told to by your health care provider. Rinsing may spread the infection to other sinuses.   · Use acetaminophen or ibuprofen to control pain, unless another pain medicine was pre Drinking extra fluids helps thin your mucus. This lets it drain from your sinuses more easily. Have a glass of water every hour or two. A humidifier helps in much the same way. Fluids can also offset the drying effects of certain medicines.  If you use a hu When traveling on an airplane, use saline nasal spray to keep your sinuses moist. Drink plenty of fluids. You may also want to take a decongestant before you get on the plane. Prevent colds  Do what you can to avoid being exposed to colds and flu.  When p © 1027-1737 [employer] [address]. [address] [facility], [facility] [place]. All rights reserved. This information is not intended as a substitute for professional medical care. Always follow your healthcare professional's instructions.         When to · Most ear infections. An ear infection may be caused by a virus or bacteria. It causes pain in the ear. Antibiotics usually don’t help, and the infection goes away on its own. · Most sinus infections (sinusitis).  This kind of infection causes sinus pain If your infection can’t be treated with antibiotics, you can take other steps to feel better. Try the remedies below. In general:   · Rest and sleep as much as needed. · Drink water and other clear fluids.   · Don’t smoke, and avoid smoke from other people

## 2018-02-13 NOTE — PROGRESS NOTES
CHIEF COMPLAINT:   Patient presents with:  Cold: cough,congestion,loss of voice,sore throat and post nasal drip sx x 3-4 days. HPI:   Nellie Ma is a 53year old female who presents for sinus congestion for  4  days.  Symptoms have been worsening s pt states normal   • Papanicolaou smear of cervix with high grade squamous intraepithelial lesion (HGSIL) 03/21/11   • Sexually transmitted disease     HPV   • Substance abuse [phone]    cocaine   • Urinary incontinence    • Visual impairment       Past LUNGS: denies shortness of breath or wheezing, See HPI  CARDIOVASCULAR: denies chest pain or palpitations   GI: denies constipation or abdominal pain  NEURO: No numbness or tingling in face.     EXAM:   /84 (BP Location: Right arm, Patient Position: S Patient Instructions       Sinusitis (Antibiotic Treatment)    The sinuses are air-filled spaces within the bones of the face. They connect to the inside of the nose. Sinusitis is an inflammation of the tissue lining the sinus cavity.  Sinus inflammation ca · Do not use nasal rinses or irrigation during an acute sinus infection, unless told to by your health care provider. Rinsing may spread the infection to other sinuses.   · Use acetaminophen or ibuprofen to control pain, unless another pain medicine was pre Drinking extra fluids helps thin your mucus. This lets it drain from your sinuses more easily. Have a glass of water every hour or two. A humidifier helps in much the same way. Fluids can also offset the drying effects of certain medicines.  If you use a hu When traveling on an airplane, use saline nasal spray to keep your sinuses moist. Drink plenty of fluids. You may also want to take a decongestant before you get on the plane. Prevent colds  Do what you can to avoid being exposed to colds and flu.  When p © 5236-5510 [employer] [address]. [address] [facility], [facility] [place]. All rights reserved. This information is not intended as a substitute for professional medical care. Always follow your healthcare professional's instructions.         When to · Most ear infections. An ear infection may be caused by a virus or bacteria. It causes pain in the ear. Antibiotics usually don’t help, and the infection goes away on its own. · Most sinus infections (sinusitis).  This kind of infection causes sinus pain If your infection can’t be treated with antibiotics, you can take other steps to feel better. Try the remedies below. In general:   · Rest and sleep as much as needed. · Drink water and other clear fluids.   · Don’t smoke, and avoid smoke from other people

## 2018-02-15 ENCOUNTER — TELEPHONE (OUTPATIENT)
Dept: SURGERY | Facility: CLINIC | Age: 48
End: 2018-02-15

## 2018-02-15 ENCOUNTER — APPOINTMENT (OUTPATIENT)
Dept: PHYSICAL THERAPY | Age: 48
End: 2018-02-15
Attending: PHYSICIAN ASSISTANT
Payer: MEDICAID

## 2018-02-15 ENCOUNTER — OFFICE VISIT (OUTPATIENT)
Dept: SURGERY | Facility: CLINIC | Age: 48
End: 2018-02-15

## 2018-02-15 VITALS — SYSTOLIC BLOOD PRESSURE: 132 MMHG | RESPIRATION RATE: 16 BRPM | DIASTOLIC BLOOD PRESSURE: 72 MMHG | HEART RATE: 82 BPM

## 2018-02-15 DIAGNOSIS — F11.90 CHRONIC NARCOTIC USE: ICD-10-CM

## 2018-02-15 DIAGNOSIS — M54.12 CERVICAL RADICULOPATHY: Primary | ICD-10-CM

## 2018-02-15 DIAGNOSIS — M54.2 NECK PAIN: ICD-10-CM

## 2018-02-15 PROCEDURE — 99214 OFFICE O/P EST MOD 30 MIN: CPT | Performed by: ANESTHESIOLOGY

## 2018-02-15 RX ORDER — NALOXONE HYDROCHLORIDE 4 MG/.1ML
4 SPRAY, METERED NASAL AS NEEDED
Qty: 1 EACH | Refills: 0 | Status: SHIPPED | OUTPATIENT
Start: 2018-02-15 | End: 2019-07-24

## 2018-02-15 RX ORDER — FENTANYL 50 UG/H
1 PATCH TRANSDERMAL
Qty: 10 PATCH | Refills: 0 | Status: SHIPPED | OUTPATIENT
Start: 2018-02-15 | End: 2018-03-15

## 2018-02-15 NOTE — TELEPHONE ENCOUNTER
Reviewed pain agreement with pt. Pt verbalized understanding and signed. Copy provided. Document sent to scanning. Snapshot and FYI updated.

## 2018-02-15 NOTE — PROGRESS NOTES
Name: Sin Morgan   : 1970   DOS: 2/15/2018     Pain Clinic Follow Up Visit:   Patient presents with:   Other: follow up after cervical epidural       Sin Morgan is a 53year old female with a history of cervical degenerative disc disease an Oral Tab Take 1 tablet by mouth 2 (two) times daily.  Disp: 20 tablet Rfl: 0   ALPRAZolam 0.5 MG Oral Tab  Disp:  Rfl: 2   ibuprofen 800 MG Oral Tab TK 1 T PO TID WF Disp:  Rfl: 0   MedroxyPROGESTERone Acetate 10 MG Oral Tab TK 1 T PO D FOR 10 DAYS EACH MON naloxone as a rescue medication given her chronic opiate use. She will follow-up in clinic in 2 months time.     Medications filled today:  Signed Prescriptions Disp Refills    fentaNYL 50 MCG/HR Transdermal Patch 72 Hr 10 patch 0      Sig: Place 1 patch o

## 2018-02-15 NOTE — PATIENT INSTRUCTIONS
Refill policies:    • Allow 2-3 business days for refills; controlled substances may take longer.   • Contact your pharmacy at least 5 days prior to running out of medication and have them send an electronic request or submit request through the [facility] recommended that you have a procedure or additional testing performed. [facility]) will contact your insurance carrier to obtain pre-certification or prior authorization.     Unfortunately, [facility] has seen an increase in denial of paym

## 2018-02-21 ENCOUNTER — HOSPITAL ENCOUNTER (OUTPATIENT)
Dept: MAMMOGRAPHY | Facility: HOSPITAL | Age: 48
Discharge: HOME | End: 2018-02-21
Attending: FAMILY MEDICINE
Payer: MEDICAID

## 2018-02-21 ENCOUNTER — APPOINTMENT (OUTPATIENT)
Dept: PHYSICAL THERAPY | Age: 48
End: 2018-02-21
Attending: PHYSICIAN ASSISTANT
Payer: MEDICAID

## 2018-02-21 DIAGNOSIS — R92.2 INCONCLUSIVE MAMMOGRAM: ICD-10-CM

## 2018-02-21 PROCEDURE — 77061 BREAST TOMOSYNTHESIS UNI: CPT | Performed by: FAMILY MEDICINE

## 2018-02-21 PROCEDURE — 77065 DX MAMMO INCL CAD UNI: CPT | Performed by: FAMILY MEDICINE

## 2018-02-21 PROCEDURE — 76642 ULTRASOUND BREAST LIMITED: CPT | Performed by: FAMILY MEDICINE

## 2018-02-23 ENCOUNTER — APPOINTMENT (OUTPATIENT)
Dept: PHYSICAL THERAPY | Age: 48
End: 2018-02-23
Attending: PHYSICIAN ASSISTANT
Payer: MEDICAID

## 2018-02-23 ENCOUNTER — TELEPHONE (OUTPATIENT)
Dept: SURGERY | Facility: CLINIC | Age: 48
End: 2018-02-23

## 2018-02-23 DIAGNOSIS — M54.12 CERVICAL RADICULOPATHY: Primary | ICD-10-CM

## 2018-02-27 NOTE — TELEPHONE ENCOUNTER
Emely Ceja MD   You 7 minutes ago (10:42 AM)      She has a PMH of fibromyalgia for which Lyrica is an approved medication     Prior authorization request resubmitted with updated diagnosis of Fibromyalgia.  Pending Cover My Meds key: [id number]

## 2018-02-27 NOTE — TELEPHONE ENCOUNTER
Letter received from [employer], coverage for Lyrica has been denied, patient does not have a coverable diagnosis. Alternative medications include: Amitriptyline, Duloxetine, Venlafaxine IR or ER, Cyclobenzaprine or Tramadol.

## 2018-02-28 ENCOUNTER — OFFICE VISIT (OUTPATIENT)
Dept: ELECTROPHYSIOLOGY | Facility: HOSPITAL | Age: 48
End: 2018-02-28
Attending: PHYSICIAN ASSISTANT
Payer: MEDICAID

## 2018-02-28 ENCOUNTER — APPOINTMENT (OUTPATIENT)
Dept: PHYSICAL THERAPY | Age: 48
End: 2018-02-28
Attending: PHYSICIAN ASSISTANT
Payer: MEDICAID

## 2018-02-28 DIAGNOSIS — M47.22 CERVICAL SPONDYLOSIS WITH RADICULOPATHY: ICD-10-CM

## 2018-02-28 PROCEDURE — 95913 NRV CNDJ TEST 13/> STUDIES: CPT | Performed by: OTHER

## 2018-02-28 PROCEDURE — 95886 MUSC TEST DONE W/N TEST COMP: CPT | Performed by: OTHER

## 2018-02-28 NOTE — TELEPHONE ENCOUNTER
Per Dr. Iraida Lin, will discuss alternative medication options at patient's next office visit. Left detailed message on confidential Atlee Ana will not be covered.

## 2018-02-28 NOTE — PROCEDURES
[facility], [address] [place] [address]      PATIENT'S NAME: Zach Sargent EKATERINA   REFERRING PHYSICIAN:  Alphonso Caldwell   PATIENT ACCOUNT #: [de-identified] LOCATION: [facility]   MEDICAL RECORD #: [id number] DATE OF BIRTH: [birth year] extremities, axonal in nature. The underlying etiology needs to be explored in this patient. There is no active evidence of bilateral cervical radiculopathy. The lumbar paraspinal muscles are not able to be sampled due to fibrotic change.   Clinical brea

## 2018-02-28 NOTE — TELEPHONE ENCOUNTER
2nd denial received from [facility], Lyrica 100mg not covered. Per patient's health plan, patient must have documented trial and failure of 5 medications, including: Amitriptyline, Cyclobenzaprine 5mg or 10mg, or duloxetine 20mg 30mg or 60mg.

## 2018-03-02 ENCOUNTER — APPOINTMENT (OUTPATIENT)
Dept: PHYSICAL THERAPY | Age: 48
End: 2018-03-02
Attending: PHYSICIAN ASSISTANT
Payer: MEDICAID

## 2018-03-07 ENCOUNTER — APPOINTMENT (OUTPATIENT)
Dept: PHYSICAL THERAPY | Age: 48
End: 2018-03-07
Attending: PHYSICIAN ASSISTANT
Payer: MEDICAID

## 2018-03-09 ENCOUNTER — APPOINTMENT (OUTPATIENT)
Dept: PHYSICAL THERAPY | Age: 48
End: 2018-03-09
Attending: PHYSICIAN ASSISTANT
Payer: MEDICAID

## 2018-03-14 ENCOUNTER — APPOINTMENT (OUTPATIENT)
Dept: PHYSICAL THERAPY | Age: 48
End: 2018-03-14
Attending: PHYSICIAN ASSISTANT
Payer: MEDICAID

## 2018-03-15 ENCOUNTER — OFFICE VISIT (OUTPATIENT)
Dept: SURGERY | Facility: CLINIC | Age: 48
End: 2018-03-15

## 2018-03-15 VITALS — SYSTOLIC BLOOD PRESSURE: 146 MMHG | DIASTOLIC BLOOD PRESSURE: 90 MMHG | RESPIRATION RATE: 18 BRPM | HEART RATE: 92 BPM

## 2018-03-15 DIAGNOSIS — M54.50 CHRONIC BILATERAL LOW BACK PAIN WITHOUT SCIATICA: ICD-10-CM

## 2018-03-15 DIAGNOSIS — G62.9 NEUROPATHY: ICD-10-CM

## 2018-03-15 DIAGNOSIS — M54.12 CERVICAL RADICULOPATHY: ICD-10-CM

## 2018-03-15 DIAGNOSIS — Z98.1 S/P LUMBAR FUSION: ICD-10-CM

## 2018-03-15 DIAGNOSIS — M47.22 OSTEOARTHRITIS OF SPINE WITH RADICULOPATHY, CERVICAL REGION: Primary | ICD-10-CM

## 2018-03-15 DIAGNOSIS — G89.29 CHRONIC BILATERAL LOW BACK PAIN WITHOUT SCIATICA: ICD-10-CM

## 2018-03-15 PROCEDURE — 99213 OFFICE O/P EST LOW 20 MIN: CPT | Performed by: PHYSICIAN ASSISTANT

## 2018-03-15 RX ORDER — FENTANYL 50 UG/H
1 PATCH TRANSDERMAL
Qty: 10 PATCH | Refills: 0 | Status: SHIPPED | OUTPATIENT
Start: 2018-03-22 | End: 2018-04-16

## 2018-03-15 NOTE — TELEPHONE ENCOUNTER
Medication: Fentanyl 50 mcg    Date of last refill: 2-15-18  [place] 7[place]50-[place]    Last office visit: 2-15-18  Due back to clinic per last office note:  2 months from 2-15-18  Date next office visit scheduled:  4-12-18    L encounter.           Radiology orders and consultations:None  The patient indicates understanding of these issues and agrees to the plan.   No Follow-up on file.     yTson Appiah MD, 2/15/2018, 9:04 AM

## 2018-03-15 NOTE — PROGRESS NOTES
Neurosurgery Clinic Visit  3/15/2018    Bambi Edwins PCP:  Jane Mancilla MD    1970 MRN [id number]     HISTORY OF PRESENT ILLNESS:  Bambi Reed is a(n) 53year old female who is here for follow-up for cervical radiculopathy.   Since her advised that she should try PT, as I think she would benefit from this. She will RTC in 2 months. Pt very appreciative. Total time spent: 15 Minutes  Greater than 50% of the time was spent on counseling/coordination of care.   Marah Bourgeois of education

## 2018-03-15 NOTE — PATIENT INSTRUCTIONS
Refill policies:    • Allow 2-3 business days for refills; controlled substances may take longer.   • Contact your pharmacy at least 5 days prior to running out of medication and have them send an electronic request or submit request through the [facility] for the entire amount billed. Precertification and Prior Authorizations  If your physician has recommended that you have a procedure or additional testing performed.   [employer] will contact your insurance carrier to obtain pr

## 2018-03-20 ENCOUNTER — TELEPHONE (OUTPATIENT)
Dept: SURGERY | Facility: CLINIC | Age: 48
End: 2018-03-20

## 2018-03-20 NOTE — TELEPHONE ENCOUNTER
Left message for pt to notify her that per Dr. Judye Duverney she doesn't need to come in today since she has an OV tomorrow. Dr. Judye Duverney will discuss medication with her then and provide her with the additional Fentanyl 12 mcg Rx.

## 2018-03-21 ENCOUNTER — OFFICE VISIT (OUTPATIENT)
Dept: SURGERY | Facility: CLINIC | Age: 48
End: 2018-03-21

## 2018-03-21 VITALS
HEIGHT: 65 IN | SYSTOLIC BLOOD PRESSURE: 120 MMHG | BODY MASS INDEX: 29.82 KG/M2 | HEART RATE: 88 BPM | WEIGHT: 179 LBS | DIASTOLIC BLOOD PRESSURE: 88 MMHG

## 2018-03-21 DIAGNOSIS — M54.16 LUMBAR RADICULOPATHY: Primary | ICD-10-CM

## 2018-03-21 DIAGNOSIS — F11.90 CHRONIC NARCOTIC USE: ICD-10-CM

## 2018-03-21 DIAGNOSIS — M54.12 CERVICAL RADICULOPATHY: ICD-10-CM

## 2018-03-21 PROCEDURE — 99214 OFFICE O/P EST MOD 30 MIN: CPT | Performed by: ANESTHESIOLOGY

## 2018-03-21 RX ORDER — FENTANYL 12 UG/H
1 PATCH TRANSDERMAL
Qty: 10 PATCH | Refills: 0 | Status: SHIPPED | OUTPATIENT
Start: 2018-03-21 | End: 2018-04-16

## 2018-03-21 NOTE — PROGRESS NOTES
Name: Dylan Garcia   : 1970   DOS: 3/21/2018     Pain Clinic Follow Up Visit:   Patient presents with: Follow - Up:  Would like lyrica, discuss injection      Dylan Garcia is a 53year old female with a history of lumbar fusion and cervical ra Rfl: 2   ibuprofen 800 MG Oral Tab TK 1 T PO TID WF Disp:  Rfl: 0   MedroxyPROGESTERone Acetate 10 MG Oral Tab TK 1 T PO D FOR 10 DAYS EACH MONTH Disp:  Rfl: 6   FLUoxetine HCl 20 MG Oral Cap Take 1 capsule (20 mg total) by mouth daily.  Disp: 90 capsule Rf pain was controlled with 75 mcg fentanyl Duragesic. When she was seen by me in the clinic we discussed reducing the dose to 50 mcg. Currently, her pain is not optimally controlled. I will add a 12 mcg an hour patch for total dose of 62 mcg/hr.       Pain

## 2018-03-21 NOTE — PATIENT INSTRUCTIONS
Refill policies:    • Allow 2-3 business days for refills; controlled substances may take longer.   • Contact your pharmacy at least 5 days prior to running out of medication and have them send an electronic request or submit request through the [facility] for the entire amount billed. Precertification and Prior Authorizations  If your physician has recommended that you have a procedure or additional testing performed.   [facility] ([facility]) will contact your insurance carrier to obtain pr days  • Others 5 days  • Excedrin (with aspirin) 7 days  • Plavix (Clopidogrel)  • Epidural ____ - 10 days  • Others 7 days   NSAIDs: 24 hours    • Ibuprofen (Motrin, Advil, Vicoprofen), Naproxen (Naprosyn, Aleve), Piroxcam  (Feldene), Meloxicam (Mobic), O

## 2018-03-22 ENCOUNTER — TELEPHONE (OUTPATIENT)
Dept: SURGERY | Facility: CLINIC | Age: 48
End: 2018-03-22

## 2018-03-22 NOTE — TELEPHONE ENCOUNTER
Berenice from [facility] contacted office, request for wrist injection approved, authorization #[id number], authorization valid 3/22/18-5/22/18.

## 2018-03-22 NOTE — TELEPHONE ENCOUNTER
Spoke to Leila at [facility], initiated prior authorization request for in-office Wrist Injection ([id number]). Provided physician and procedure information.  Requesting pending clinical review, requests notes be faxed to [phone], call reference #[id number]

## 2018-03-26 ENCOUNTER — TELEPHONE (OUTPATIENT)
Dept: NEUROLOGY | Facility: CLINIC | Age: 48
End: 2018-03-26

## 2018-03-26 DIAGNOSIS — M54.16 LUMBAR RADICULOPATHY: Primary | ICD-10-CM

## 2018-03-26 NOTE — TELEPHONE ENCOUNTER
Per insurance denial below, insurance will approval MRI Lumbar Spine with and without contrast. Dr. Angelia Velarde in agreement, please update insurance.

## 2018-03-26 NOTE — TELEPHONE ENCOUNTER
Received a fax from [phone] [employer] MRI Lumbar it has been denied      Reason for decision and Criteria/guidelines used to make our decision: Based on [employer] Guidelines, we cannot approve this request. Your records show that your doctor requested im

## 2018-03-26 NOTE — TELEPHONE ENCOUNTER
Spoke to Mónica at [facility] and explained to her Dr has agreed to the change of the MRI to w/wo. She went ahead and approved it. Call time 33:15      [facility]#[id number] 3.26.18-5.10.18    Called patient and left a detailed message.     Please

## 2018-03-27 ENCOUNTER — OFFICE VISIT (OUTPATIENT)
Dept: SURGERY | Facility: CLINIC | Age: 48
End: 2018-03-27

## 2018-03-27 VITALS — WEIGHT: 179 LBS | HEART RATE: 88 BPM | BODY MASS INDEX: 29.82 KG/M2 | HEIGHT: 65 IN | RESPIRATION RATE: 16 BRPM

## 2018-03-27 DIAGNOSIS — M25.531 RIGHT WRIST PAIN: Primary | ICD-10-CM

## 2018-03-27 PROCEDURE — 20605 DRAIN/INJ JOINT/BURSA W/O US: CPT | Performed by: ANESTHESIOLOGY

## 2018-03-27 RX ORDER — METHYLPREDNISOLONE ACETATE 40 MG/ML
40 INJECTION, SUSPENSION INTRA-ARTICULAR; INTRALESIONAL; INTRAMUSCULAR; SOFT TISSUE ONCE
Status: COMPLETED | OUTPATIENT
Start: 2018-03-27 | End: 2018-03-27

## 2018-03-27 RX ORDER — BUPIVACAINE HYDROCHLORIDE 5 MG/ML
10 INJECTION, SOLUTION EPIDURAL; INTRACAUDAL ONCE
Status: COMPLETED | OUTPATIENT
Start: 2018-03-27 | End: 2018-03-27

## 2018-03-27 RX ORDER — LIDOCAINE HYDROCHLORIDE 10 MG/ML
5 INJECTION, SOLUTION INFILTRATION; PERINEURAL ONCE
Status: COMPLETED | OUTPATIENT
Start: 2018-03-27 | End: 2018-03-27

## 2018-03-27 RX ORDER — DULOXETIN HYDROCHLORIDE 30 MG/1
30 CAPSULE, DELAYED RELEASE ORAL DAILY
COMMUNITY
Start: 2018-01-21 | End: 2018-05-04

## 2018-03-27 NOTE — PROCEDURES
Date of procedure: March 27, 2018    Preop diagnosis: Right wrist pain    Postop diagnosis: Same next    Procedure: Right wrist injection    Surgeon: Sandra Caldwell    Anesthesia: Local    EBL: 0    Indication: The patient is a 28-year-old female with right-si

## 2018-03-27 NOTE — PATIENT INSTRUCTIONS
Refill policies:    • Allow 2-3 business days for refills; controlled substances may take longer.   • Contact your pharmacy at least 5 days prior to running out of medication and have them send an electronic request or submit request through the [facility] for the entire amount billed. Precertification and Prior Authorizations  If your physician has recommended that you have a procedure or additional testing performed.   [facility]) will contact your insurance carrier to obtain pr

## 2018-04-04 ENCOUNTER — OFFICE VISIT (OUTPATIENT)
Dept: NEUROLOGY | Facility: CLINIC | Age: 48
End: 2018-04-04

## 2018-04-04 VITALS
WEIGHT: 178 LBS | SYSTOLIC BLOOD PRESSURE: 130 MMHG | DIASTOLIC BLOOD PRESSURE: 80 MMHG | BODY MASS INDEX: 30 KG/M2 | HEART RATE: 80 BPM

## 2018-04-04 DIAGNOSIS — M79.605 BILATERAL LEG PAIN: ICD-10-CM

## 2018-04-04 DIAGNOSIS — M79.604 BILATERAL LEG PAIN: ICD-10-CM

## 2018-04-04 DIAGNOSIS — M47.812 DEGENERATIVE JOINT DISEASE OF CERVICAL AND LUMBAR SPINE: ICD-10-CM

## 2018-04-04 DIAGNOSIS — R94.131 ABNORMAL EMG: Primary | ICD-10-CM

## 2018-04-04 DIAGNOSIS — M47.816 DEGENERATIVE JOINT DISEASE OF CERVICAL AND LUMBAR SPINE: ICD-10-CM

## 2018-04-04 PROCEDURE — 99215 OFFICE O/P EST HI 40 MIN: CPT | Performed by: OTHER

## 2018-04-04 NOTE — PATIENT INSTRUCTIONS
Refill policies:    • Allow 2-3 business days for refills; controlled substances may take longer.   • Contact your pharmacy at least 5 days prior to running out of medication and have them send an electronic request or submit request through the [facility] for the entire amount billed. Precertification and Prior Authorizations  If your physician has recommended that you have a procedure or additional testing performed.   [employer] [facility] will contact your insurance carrier to obtain pr

## 2018-04-04 NOTE — PROGRESS NOTES
HPI:    Patient ID: Chava Munroe is a 53year old female.     HPI    Clarissa Montez is a 53year old female with history of lumbar fusion surgery, cervical spondylosis with left cervical radiculopathy, fibromyalgia who presented for evaluation of peripheral neurop SURGICAL HISTORY      Comment: Bladder sling   Family History   Problem Relation Age of Onset   • Heart Disease Father    • lung cancer [OTHER] Father      Lung Cancer   • Hypertension Mother    • Diabetes Mother    • Heart Disease Mother    • pancreatic c Transdermal Patch 72 Hr Place 1 patch onto the skin every third day. DO NOT FILL UNTIL 3-22-18 Disp: 10 patch Rfl: 0   Naloxone HCl 4 MG/0.1ML Nasal Liquid 4 mg by Nasal route as needed.  (Patient taking differently: 4 mg by Nasal route as needed.  ) Disp: vibration and proprioception  Motor: Normal tone and bulk in all extremities. Strength is 5/5 in all muscle groups  Reflexes: brisk throughout, patellar 3-4+ and achilles 3+. No clonus seen.   Coordination: Intact finger to nose test. Normal rapid alternati

## 2018-04-04 NOTE — PROGRESS NOTES
Patient here to evaluated for Neuropathy, neck pain started 14 months ago, left arm pain started two months ago, Chronic low back pain with bilateral leg pain. S/p L5-S1 fusion in 2009 with Dr. Hua Marie  Referred by Neurosurgery, [place], [place].

## 2018-04-12 ENCOUNTER — TELEPHONE (OUTPATIENT)
Dept: SURGERY | Facility: CLINIC | Age: 48
End: 2018-04-12

## 2018-04-14 ENCOUNTER — LABORATORY ENCOUNTER (OUTPATIENT)
Dept: LAB | Age: 48
End: 2018-04-14
Attending: Other
Payer: MEDICAID

## 2018-04-14 DIAGNOSIS — M79.605 BILATERAL LEG PAIN: ICD-10-CM

## 2018-04-14 DIAGNOSIS — R94.131 ABNORMAL EMG: ICD-10-CM

## 2018-04-14 DIAGNOSIS — E78.2 MIXED HYPERLIPIDEMIA: ICD-10-CM

## 2018-04-14 DIAGNOSIS — M79.604 BILATERAL LEG PAIN: ICD-10-CM

## 2018-04-14 PROCEDURE — 36415 COLL VENOUS BLD VENIPUNCTURE: CPT | Performed by: OTHER

## 2018-04-14 PROCEDURE — 84443 ASSAY THYROID STIM HORMONE: CPT | Performed by: OTHER

## 2018-04-14 PROCEDURE — 84425 ASSAY OF VITAMIN B-1: CPT | Performed by: OTHER

## 2018-04-14 PROCEDURE — 82607 VITAMIN B-12: CPT | Performed by: OTHER

## 2018-04-14 PROCEDURE — 86038 ANTINUCLEAR ANTIBODIES: CPT | Performed by: OTHER

## 2018-04-14 PROCEDURE — 84446 ASSAY OF VITAMIN E: CPT | Performed by: OTHER

## 2018-04-14 PROCEDURE — 83883 ASSAY NEPHELOMETRY NOT SPEC: CPT | Performed by: OTHER

## 2018-04-14 PROCEDURE — 83036 HEMOGLOBIN GLYCOSYLATED A1C: CPT | Performed by: OTHER

## 2018-04-14 PROCEDURE — 86334 IMMUNOFIX E-PHORESIS SERUM: CPT | Performed by: OTHER

## 2018-04-14 PROCEDURE — 84165 PROTEIN E-PHORESIS SERUM: CPT | Performed by: OTHER

## 2018-04-16 DIAGNOSIS — Z79.891 LONG TERM (CURRENT) USE OF OPIATE ANALGESIC: Primary | ICD-10-CM

## 2018-04-16 NOTE — TELEPHONE ENCOUNTER
Medication: Fentanyl 50, Fentanyl 12, ibuprofen 800    Date of last refill: 3/22/18- fentanyl 50, 3/21/18 fentanyl 12, 1/28/18 ibuprofen  [place] 2/99/17 fentanyl 50, 3/21/18 fentanyl 12    Last office visit: 3/21/18  Du mixing narcotic with alcohol.  Pt verbalized understanding.      Medications filled today:  Signed Prescriptions Disp Refills    fentaNYL 12 MCG/HR Transdermal Patch 72 Hr 10 patch 0      Sig: Place 1 patch onto the skin every third day.           Orders:No

## 2018-04-17 ENCOUNTER — HOSPITAL ENCOUNTER (OUTPATIENT)
Dept: MRI IMAGING | Facility: HOSPITAL | Age: 48
Discharge: HOME | End: 2018-04-17
Attending: ANESTHESIOLOGY
Payer: MEDICAID

## 2018-04-17 DIAGNOSIS — M54.16 LUMBAR RADICULOPATHY: ICD-10-CM

## 2018-04-17 PROCEDURE — 72158 MRI LUMBAR SPINE W/O & W/DYE: CPT | Performed by: ANESTHESIOLOGY

## 2018-04-17 PROCEDURE — A9576 INJ PROHANCE MULTIPACK: HCPCS

## 2018-04-18 ENCOUNTER — APPOINTMENT (OUTPATIENT)
Dept: LAB | Age: 48
End: 2018-04-18
Attending: NURSE PRACTITIONER
Payer: MEDICAID

## 2018-04-18 DIAGNOSIS — Z79.891 LONG TERM (CURRENT) USE OF OPIATE ANALGESIC: ICD-10-CM

## 2018-04-18 PROCEDURE — 80307 DRUG TEST PRSMV CHEM ANLYZR: CPT | Performed by: NURSE PRACTITIONER

## 2018-04-18 RX ORDER — IBUPROFEN 800 MG/1
TABLET ORAL
Qty: 90 TABLET | Refills: 0 | Status: SHIPPED | OUTPATIENT
Start: 2018-04-18 | End: 2018-05-10

## 2018-04-18 RX ORDER — FENTANYL 12 UG/H
1 PATCH TRANSDERMAL
Qty: 10 PATCH | Refills: 0 | Status: SHIPPED | OUTPATIENT
Start: 2018-04-19 | End: 2018-05-10

## 2018-04-18 RX ORDER — FENTANYL 50 UG/H
1 PATCH TRANSDERMAL
Qty: 10 PATCH | Refills: 0 | Status: SHIPPED | OUTPATIENT
Start: 2018-04-20 | End: 2018-04-19

## 2018-04-18 NOTE — TELEPHONE ENCOUNTER
Pt here in office for Rx pickup. Narcotic agreement already on file. States last Fentanyl patch is due to be changed tomorrow, pt changes every three days. Pt given Rx and sent to lab for urine specimen.  Pt verbalized understanding she must stop in the l

## 2018-04-19 RX ORDER — FENTANYL 50 UG/H
1 PATCH TRANSDERMAL
Qty: 10 PATCH | Refills: 0 | Status: SHIPPED | OUTPATIENT
Start: 2018-04-19 | End: 2018-05-10

## 2018-05-02 NOTE — PROGRESS NOTES
Your lab results are normal.  Please email/call for follow up with us if you have any other concerns.    Thank you,    Dr. Sana Porras

## 2018-05-04 ENCOUNTER — OFFICE VISIT (OUTPATIENT)
Dept: NEUROLOGY | Facility: CLINIC | Age: 48
End: 2018-05-04

## 2018-05-04 ENCOUNTER — TELEPHONE (OUTPATIENT)
Dept: NEUROLOGY | Facility: CLINIC | Age: 48
End: 2018-05-04

## 2018-05-04 VITALS
BODY MASS INDEX: 30 KG/M2 | DIASTOLIC BLOOD PRESSURE: 70 MMHG | WEIGHT: 179 LBS | SYSTOLIC BLOOD PRESSURE: 120 MMHG | HEART RATE: 72 BPM

## 2018-05-04 DIAGNOSIS — G62.9 PERIPHERAL POLYNEUROPATHY: Primary | ICD-10-CM

## 2018-05-04 DIAGNOSIS — M79.604 BILATERAL LEG PAIN: ICD-10-CM

## 2018-05-04 DIAGNOSIS — M48.062 SPINAL STENOSIS, LUMBAR REGION WITH NEUROGENIC CLAUDICATION: ICD-10-CM

## 2018-05-04 DIAGNOSIS — M79.605 BILATERAL LEG PAIN: ICD-10-CM

## 2018-05-04 PROCEDURE — 99213 OFFICE O/P EST LOW 20 MIN: CPT | Performed by: OTHER

## 2018-05-04 RX ORDER — DULOXETIN HYDROCHLORIDE 30 MG/1
30 CAPSULE, DELAYED RELEASE ORAL 2 TIMES DAILY
Qty: 60 CAPSULE | Refills: 2 | Status: SHIPPED | OUTPATIENT
Start: 2018-05-04 | End: 2019-12-17 | Stop reason: ALTCHOICE

## 2018-05-04 NOTE — PATIENT INSTRUCTIONS
Refill policies:    • Allow 2-3 business days for refills; controlled substances may take longer.   • Contact your pharmacy at least 5 days prior to running out of medication and have them send an electronic request or submit request through the “request re entire amount billed. Precertification and Prior Authorizations: If your physician has recommended that you have a procedure or additional testing performed.   [facility]) will contact your insurance carrier to obtain pre-certi

## 2018-05-04 NOTE — PROGRESS NOTES
HPI:    Patient ID: Patrick French is a 53year old female. HPI    Patient presents for follow up. States continues to have bilateral anterior thigh pain and worsening back pain with prolonged walking.  She had MRI L spine done which showed moderate to SURGERY      Comment: fusion L5-S1  11: COLPOSCOPY,BX CERVIX/ENDOCERV CURR      Comment: MARCIO 1  : LAPAROSCOPY PROCEDURE UNLISTED      Comment: Ovarian cyst removed  2011: LEEP      Comment: MARCIO 2  No date:       Comment: X2  : OTHER CRISTINA systems reviewed and are negative. Current Outpatient Prescriptions:  DULoxetine HCl (CYMBALTA) 30 MG Oral Cap DR Particles Take 1 capsule (30 mg total) by mouth 2 (two) times daily.  Disp: 60 capsule Rfl: 2   fentaNYL 50 MCG/HR Transdermal Patc intact  Sensory : Intact to all modalities including light touch, pinprick, vibration and proprioception  Motor: Normal tone and bulk in all extremities. Strength is 5/5 in all muscle groups  Reflexes: brisk throughout, patellar 3-4+ and achilles 3+.  No cl total) by mouth 2 (two) times daily.            Imaging & Referrals:  None     IT#[id number]

## 2018-05-04 NOTE — TELEPHONE ENCOUNTER
Referral placed under Dr. Colten Martinez. Per TE on 02/23/18, it was mentioned Dr. Colten Martinez will discuss alternative medication options with patient.     Per Dr. Balaji Zuñiga at office visit today:  Clinically no clear evidence of peripheral neuropathy on examination or

## 2018-05-10 ENCOUNTER — OFFICE VISIT (OUTPATIENT)
Dept: SURGERY | Facility: CLINIC | Age: 48
End: 2018-05-10

## 2018-05-10 VITALS
HEIGHT: 66 IN | WEIGHT: 178 LBS | BODY MASS INDEX: 28.61 KG/M2 | SYSTOLIC BLOOD PRESSURE: 142 MMHG | HEART RATE: 96 BPM | DIASTOLIC BLOOD PRESSURE: 84 MMHG

## 2018-05-10 VITALS
DIASTOLIC BLOOD PRESSURE: 88 MMHG | HEIGHT: 66 IN | SYSTOLIC BLOOD PRESSURE: 124 MMHG | HEART RATE: 76 BPM | WEIGHT: 178 LBS | BODY MASS INDEX: 28.61 KG/M2

## 2018-05-10 DIAGNOSIS — M47.22 CERVICAL SPONDYLOSIS WITH RADICULOPATHY: ICD-10-CM

## 2018-05-10 DIAGNOSIS — M54.12 CERVICAL RADICULOPATHY: Primary | ICD-10-CM

## 2018-05-10 DIAGNOSIS — M51.36 DDD (DEGENERATIVE DISC DISEASE), LUMBAR: ICD-10-CM

## 2018-05-10 DIAGNOSIS — M54.16 LUMBAR RADICULOPATHY: Primary | ICD-10-CM

## 2018-05-10 PROBLEM — M51.369 DDD (DEGENERATIVE DISC DISEASE), LUMBAR: Status: ACTIVE | Noted: 2018-05-10

## 2018-05-10 PROCEDURE — 99214 OFFICE O/P EST MOD 30 MIN: CPT | Performed by: ANESTHESIOLOGY

## 2018-05-10 PROCEDURE — 99213 OFFICE O/P EST LOW 20 MIN: CPT | Performed by: NEUROLOGICAL SURGERY

## 2018-05-10 RX ORDER — FENTANYL 12 UG/H
1 PATCH TRANSDERMAL
Qty: 10 PATCH | Refills: 0 | Status: SHIPPED | OUTPATIENT
Start: 2018-05-16 | End: 2018-06-05 | Stop reason: DRUGHIGH

## 2018-05-10 RX ORDER — FENTANYL 50 UG/H
1 PATCH TRANSDERMAL
Qty: 10 PATCH | Refills: 0 | Status: SHIPPED | OUTPATIENT
Start: 2018-05-16 | End: 2018-06-05 | Stop reason: DRUGHIGH

## 2018-05-10 RX ORDER — IBUPROFEN 800 MG/1
TABLET ORAL
Qty: 90 TABLET | Refills: 0 | Status: SHIPPED | OUTPATIENT
Start: 2018-05-10 | End: 2018-07-17

## 2018-05-10 NOTE — PROGRESS NOTES
Neurosurgery Clinic Visit  5/10/2018    Sven Asencio PCP:  Luz Long MD    1970 MRN [id number]     HISTORY OF PRESENT ILLNESS:  Sven Asencio is a(n) 53year old female who we have previously seen for cervical radiculopathy.  She is h upper and lower extremities, axonal in nature, no evidence of bilateral cervical radiculopathy, lumbar paraspinal muscles are not able to be sampled due to fibrotic change.   MRI lumbar spine 4/2018 - post-operative changes at L5-S1, stenosis at L3-4 (moder

## 2018-05-10 NOTE — PATIENT INSTRUCTIONS
Refill policies:    • Allow 2-3 business days for refills; controlled substances may take longer.   • Contact your pharmacy at least 5 days prior to running out of medication and have them send an electronic request or submit request through the “request re entire amount billed. Precertification and Prior Authorizations: If your physician has recommended that you have a procedure or additional testing performed.   [facility]) will contact your insurance carrier to obtain pre-certi

## 2018-05-10 NOTE — PATIENT INSTRUCTIONS
Refill policies:    • Allow 2-3 business days for refills; controlled substances may take longer.   • Contact your pharmacy at least 5 days prior to running out of medication and have them send an electronic request or submit request through the “request re entire amount billed. Precertification and Prior Authorizations: If your physician has recommended that you have a procedure or additional testing performed.   [facility]) will contact your insurance carrier to obtain pre-certi procedure if you are experiencing any symptoms of infection such as cough, fever, chills, urinary symptoms, or have recently been prescribed antibiotics, have open wounds, have recently had surgery or dental procedures.         Day of Procedure:   Do not ea days  • Procedure may be cancelled if INR is elevated.    • Excedrin (with aspirin) 7 days  • Plavix (Clopidogrel)  • Epidural ____ - 10 days  • Others 7 days   NSAIDs: 24 hours    • Ibuprofen (Motrin, Advil, Vicoprofen), Naproxen (Naprosyn, Aleve), Piroxca PROCEDURE**

## 2018-05-10 NOTE — PROGRESS NOTES
Patient here today to go over MRI lumbar results. Patient saw Dr. Mildred Montana and needs to repeat her EMG. Patient hasn't started the PT yet.

## 2018-05-10 NOTE — PROGRESS NOTES
Name: Belen Hall   : 1970   DOS: 5/10/2018     Pain Clinic Follow Up Visit:   Patient presents with:   Follow - Up: discuss injection      Belen Hall is a 53year old female with a history of cervical radiculopathy and chronic narcotic use (Patient taking differently: 4 mg by Nasal route as needed.  ) Disp: 1 each Rfl: 0   ALPRAZolam 0.5 MG Oral Tab 0.5 mg as needed. Disp:  Rfl: 2   FLUoxetine HCl 20 MG Oral Cap Take 1 capsule (20 mg total) by mouth daily. (Patient taking differently:  Take has also been somewhat depressed lately. Her 2 children are having difficulties at school. This prevents her from working and has financial stress. We talked about all these factors and how the psychosocial factor placed in with chronic pain.   She is sc

## 2018-05-11 ENCOUNTER — TELEPHONE (OUTPATIENT)
Dept: NEUROLOGY | Facility: CLINIC | Age: 48
End: 2018-05-11

## 2018-05-15 ENCOUNTER — TELEPHONE (OUTPATIENT)
Dept: SURGERY | Facility: CLINIC | Age: 48
End: 2018-05-15

## 2018-05-15 NOTE — TELEPHONE ENCOUNTER
Spoke with Meng Marin at [facility] and informed that per Dr. Matias Primrose, ok to release Rx today.

## 2018-05-15 NOTE — TELEPHONE ENCOUNTER
To provider for review. Pts patch script was written for 4 days out but it is usually written for 3 days out. Can the Pharmacy move the current date up by one day?

## 2018-05-16 ENCOUNTER — TELEPHONE (OUTPATIENT)
Dept: SURGERY | Facility: CLINIC | Age: 48
End: 2018-05-16

## 2018-05-16 NOTE — TELEPHONE ENCOUNTER
Left message for patient, confirmed procedure date of 5/21/18 and to be checked in at outpatient registration at 9:15 am. Patient instructed to call pre-procedure line before procedure at [phone].  Patient instructed to call office if there are additio

## 2018-05-21 ENCOUNTER — SURGERY (OUTPATIENT)
Age: 48
End: 2018-05-21

## 2018-05-21 ENCOUNTER — HOSPITAL ENCOUNTER (OUTPATIENT)
Facility: HOSPITAL | Age: 48
Setting detail: HOSPITAL OUTPATIENT SURGERY
Discharge: HOME | End: 2018-05-21
Attending: ANESTHESIOLOGY | Admitting: ANESTHESIOLOGY
Payer: MEDICAID

## 2018-05-21 ENCOUNTER — APPOINTMENT (OUTPATIENT)
Dept: GENERAL RADIOLOGY | Facility: HOSPITAL | Age: 48
End: 2018-05-21
Attending: ANESTHESIOLOGY
Payer: MEDICAID

## 2018-05-21 VITALS
DIASTOLIC BLOOD PRESSURE: 92 MMHG | HEART RATE: 82 BPM | TEMPERATURE: 98 F | RESPIRATION RATE: 16 BRPM | OXYGEN SATURATION: 97 % | SYSTOLIC BLOOD PRESSURE: 132 MMHG

## 2018-05-21 DIAGNOSIS — M51.36 DDD (DEGENERATIVE DISC DISEASE), LUMBAR: ICD-10-CM

## 2018-05-21 PROCEDURE — 81025 URINE PREGNANCY TEST: CPT | Performed by: ANESTHESIOLOGY

## 2018-05-21 PROCEDURE — 3E0R3BZ INTRODUCTION OF ANESTHETIC AGENT INTO SPINAL CANAL, PERCUTANEOUS APPROACH: ICD-10-PCS | Performed by: ANESTHESIOLOGY

## 2018-05-21 PROCEDURE — 99152 MOD SED SAME PHYS/QHP 5/>YRS: CPT | Performed by: ANESTHESIOLOGY

## 2018-05-21 PROCEDURE — 3E0R33Z INTRODUCTION OF ANTI-INFLAMMATORY INTO SPINAL CANAL, PERCUTANEOUS APPROACH: ICD-10-PCS | Performed by: ANESTHESIOLOGY

## 2018-05-21 RX ORDER — ONDANSETRON 2 MG/ML
4 INJECTION INTRAMUSCULAR; INTRAVENOUS ONCE AS NEEDED
Status: DISCONTINUED | OUTPATIENT
Start: 2018-05-21 | End: 2018-05-21

## 2018-05-21 RX ORDER — DEXAMETHASONE SODIUM PHOSPHATE 10 MG/ML
INJECTION, SOLUTION INTRAMUSCULAR; INTRAVENOUS AS NEEDED
Status: DISCONTINUED | OUTPATIENT
Start: 2018-05-21 | End: 2018-05-21 | Stop reason: HOSPADM

## 2018-05-21 RX ORDER — 0.9 % SODIUM CHLORIDE 0.9 %
VIAL (ML) INJECTION AS NEEDED
Status: DISCONTINUED | OUTPATIENT
Start: 2018-05-21 | End: 2018-05-21 | Stop reason: HOSPADM

## 2018-05-21 RX ORDER — MIDAZOLAM HYDROCHLORIDE 1 MG/ML
INJECTION INTRAMUSCULAR; INTRAVENOUS AS NEEDED
Status: DISCONTINUED | OUTPATIENT
Start: 2018-05-21 | End: 2018-05-21 | Stop reason: HOSPADM

## 2018-05-21 RX ORDER — SODIUM CHLORIDE, SODIUM LACTATE, POTASSIUM CHLORIDE, CALCIUM CHLORIDE 600; 310; 30; 20 MG/100ML; MG/100ML; MG/100ML; MG/100ML
100 INJECTION, SOLUTION INTRAVENOUS CONTINUOUS
Status: DISCONTINUED | OUTPATIENT
Start: 2018-05-21 | End: 2018-05-21

## 2018-05-21 NOTE — OPERATIVE REPORT
[facility]  Operative Report  2018     Sin Jonathantigre Patient Status:  Hospital Outpatient Surgery    1970 MRN [id number]   [facility] SURGERY Attending Lance Saldana MD   Hosp Day # 0 PCP Yolande Humphries MD     Indication 6 mL was injected through the Tuohy needle. The needle was flushed with 1 mL lidocaine. The needle was withdrawn with the stylet intact in situ. The needle's tip was intact.   The patient tolerated the procedure very well without significant immediate co

## 2018-05-21 NOTE — H&P
History & Physical Examination    Patient Name: Dylan Garcia  MRN: [id number]  CSN: [id number]  YOB: 1970    Pre-Operative Diagnosis:  DDD (degenerative disc disease), lumbar [M51.36]    Present Illness: She would lumbar degenerative disc di deficit hyperactivity disorder (ADHD)    • BACK PAIN    • Back problem    • MARCIO II (cervical intraepithelial neoplasia II) 4/2011   • Depression    • Dyspareunia    • Fibromyalgia    • Genital warts    • High blood pressure    • High cholesterol    • Hyper Quit date: 5/12/2011    Smokeless tobacco: Never Used    Comment: USES [employer]    Alcohol use Yes  1.8 oz/week    3 Cans of beer per week         Comment: rare       SYSTEM Check if Review is Normal Check if Physical Exam is Normal If not normal, please e

## 2018-05-27 ENCOUNTER — HOSPITAL ENCOUNTER (EMERGENCY)
Facility: HOSPITAL | Age: 48
Discharge: HOME | End: 2018-05-27
Attending: EMERGENCY MEDICINE
Payer: MEDICAID

## 2018-05-27 ENCOUNTER — APPOINTMENT (OUTPATIENT)
Dept: GENERAL RADIOLOGY | Facility: HOSPITAL | Age: 48
End: 2018-05-27
Attending: EMERGENCY MEDICINE
Payer: MEDICAID

## 2018-05-27 ENCOUNTER — APPOINTMENT (OUTPATIENT)
Dept: MRI IMAGING | Facility: HOSPITAL | Age: 48
End: 2018-05-27
Attending: EMERGENCY MEDICINE
Payer: MEDICAID

## 2018-05-27 VITALS
SYSTOLIC BLOOD PRESSURE: 116 MMHG | TEMPERATURE: 98 F | BODY MASS INDEX: 29.16 KG/M2 | OXYGEN SATURATION: 95 % | HEIGHT: 65 IN | HEART RATE: 78 BPM | WEIGHT: 175 LBS | DIASTOLIC BLOOD PRESSURE: 80 MMHG | RESPIRATION RATE: 18 BRPM

## 2018-05-27 DIAGNOSIS — M79.10 MYALGIA: Primary | ICD-10-CM

## 2018-05-27 DIAGNOSIS — G89.29 OTHER CHRONIC PAIN: ICD-10-CM

## 2018-05-27 DIAGNOSIS — R53.1 WEAKNESS GENERALIZED: ICD-10-CM

## 2018-05-27 PROCEDURE — 87081 CULTURE SCREEN ONLY: CPT | Performed by: EMERGENCY MEDICINE

## 2018-05-27 PROCEDURE — 71045 X-RAY EXAM CHEST 1 VIEW: CPT | Performed by: EMERGENCY MEDICINE

## 2018-05-27 PROCEDURE — 96376 TX/PRO/DX INJ SAME DRUG ADON: CPT

## 2018-05-27 PROCEDURE — 81001 URINALYSIS AUTO W/SCOPE: CPT | Performed by: EMERGENCY MEDICINE

## 2018-05-27 PROCEDURE — 72158 MRI LUMBAR SPINE W/O & W/DYE: CPT | Performed by: EMERGENCY MEDICINE

## 2018-05-27 PROCEDURE — 96361 HYDRATE IV INFUSION ADD-ON: CPT

## 2018-05-27 PROCEDURE — 99285 EMERGENCY DEPT VISIT HI MDM: CPT

## 2018-05-27 PROCEDURE — 85730 THROMBOPLASTIN TIME PARTIAL: CPT | Performed by: EMERGENCY MEDICINE

## 2018-05-27 PROCEDURE — 81025 URINE PREGNANCY TEST: CPT

## 2018-05-27 PROCEDURE — 87086 URINE CULTURE/COLONY COUNT: CPT | Performed by: EMERGENCY MEDICINE

## 2018-05-27 PROCEDURE — 83605 ASSAY OF LACTIC ACID: CPT | Performed by: EMERGENCY MEDICINE

## 2018-05-27 PROCEDURE — 85610 PROTHROMBIN TIME: CPT | Performed by: EMERGENCY MEDICINE

## 2018-05-27 PROCEDURE — 36415 COLL VENOUS BLD VENIPUNCTURE: CPT

## 2018-05-27 PROCEDURE — 87430 STREP A AG IA: CPT | Performed by: EMERGENCY MEDICINE

## 2018-05-27 PROCEDURE — A9576 INJ PROHANCE MULTIPACK: HCPCS | Performed by: EMERGENCY MEDICINE

## 2018-05-27 PROCEDURE — 72157 MRI CHEST SPINE W/O & W/DYE: CPT | Performed by: EMERGENCY MEDICINE

## 2018-05-27 PROCEDURE — 85025 COMPLETE CBC W/AUTO DIFF WBC: CPT | Performed by: EMERGENCY MEDICINE

## 2018-05-27 PROCEDURE — 99284 EMERGENCY DEPT VISIT MOD MDM: CPT

## 2018-05-27 PROCEDURE — 80053 COMPREHEN METABOLIC PANEL: CPT | Performed by: EMERGENCY MEDICINE

## 2018-05-27 PROCEDURE — 96374 THER/PROPH/DIAG INJ IV PUSH: CPT

## 2018-05-27 PROCEDURE — 85652 RBC SED RATE AUTOMATED: CPT | Performed by: EMERGENCY MEDICINE

## 2018-05-27 PROCEDURE — 87040 BLOOD CULTURE FOR BACTERIA: CPT | Performed by: EMERGENCY MEDICINE

## 2018-05-27 RX ORDER — PREDNISONE 20 MG/1
40 TABLET ORAL DAILY
Qty: 10 TABLET | Refills: 0 | Status: SHIPPED | OUTPATIENT
Start: 2018-05-27 | End: 2018-06-01

## 2018-05-27 RX ORDER — HYDROMORPHONE HYDROCHLORIDE 1 MG/ML
1 INJECTION, SOLUTION INTRAMUSCULAR; INTRAVENOUS; SUBCUTANEOUS EVERY 30 MIN PRN
Status: DISCONTINUED | OUTPATIENT
Start: 2018-05-27 | End: 2018-05-27

## 2018-05-27 RX ORDER — DIPHENHYDRAMINE HYDROCHLORIDE 50 MG/ML
50 INJECTION INTRAMUSCULAR; INTRAVENOUS ONCE
Status: DISCONTINUED | OUTPATIENT
Start: 2018-05-27 | End: 2018-05-27

## 2018-05-27 NOTE — ED PROVIDER NOTES
Patient Seen in: [facility] Emergency Department    History   Patient presents with:  Pain (neurologic)    Stated Complaint: body aches/ pain all over    Steroid shot on wednesday    HPI    Patient presents with multiple complaints.   Patient had a s HPV   • Substance abuse [phone]    cocaine   • Urinary incontinence    • Visual impairment        Past Surgical History:  1980: APPENDECTOMY  2009: BACK SURGERY      Comment: fusion L5-S1  03/21/11: COLPOSCOPY,BX CERVIX/ENDOCERV CURR      Comment: MARCIO 1 the puncture sites in the lumbar area, mildly tender in that area to touch. Abdomen: Soft, mild diffuse tenderness to palpation, no rebound or guarding, normal active bowel sounds, no CVA tenderness.   Extremities: No CCE generalized muscular tenderness in Abnormality         Status                     ---------                               -----------         ------                     CBC W/ DIFFERENTIAL[[id number]]          Abnormal            Final result                 Please vi narrowing with bilateral neural foraminal stenosis unchanged. PARASPINAL AREA:  Atelectasis in the lung bases. LUMBAR DISC LEVELS L1-L2:  Mild diffuse disc bulge. Bilateral facet arthropathy with mild to moderate neural foraminal narrowing bilaterally. 7.8 mm secondary to left paracentral disc bulge and facet arthropathy and ligamentous thickening. Stable bilateral neural foraminal stenosis at this level. Stable broad-based disc bulge at L4-5 with ligamentum thickening and facet arthropathy.   AP diamet a normal temperature and white blood cell count. Her inflammatory markers are negative which I think makes an autoimmune pathology also less likely. She may have a viral illness and a reactive arthritis due to that.   I will give her a prescription for pr

## 2018-06-05 ENCOUNTER — TELEPHONE (OUTPATIENT)
Dept: SURGERY | Facility: CLINIC | Age: 48
End: 2018-06-05

## 2018-06-05 NOTE — PROGRESS NOTES
Name: Kiran Schmitt   : 1970   DOS: 2018     Pain Clinic Follow Up Visit:   Patient presents with: Follow - Up: hospital follow up      Kiran Schmitt is a 53year old female presents for follow-up.   She recently had a lumbar epidural steroi mg total) by mouth 2 (two) times daily. Disp: 60 capsule Rfl: 2   MEDROXYPROGESTERONE ACETATE 10 MG Oral Tab TAKE 1 TABLET BY MOUTH DAILY FOR 10 DAYS EACH MONTH Disp: 30 tablet Rfl: 0   B Complex Vitamins (VITAMIN B COMPLEX OR) Take by mouth daily.  Disp: regarding management of pain and post fusion. She is on decent amount of narcotic medications and this would need to be weaned down prior to any surgical consideration to maximize her ability of getting good pain relief after the operation.   She voiced go

## 2018-06-05 NOTE — PATIENT INSTRUCTIONS
Refill policies:    • Allow 2-3 business days for refills; controlled substances may take longer.   • Contact your pharmacy at least 5 days prior to running out of medication and have them send an electronic request or submit request through the “request re entire amount billed. Precertification and Prior Authorizations: If your physician has recommended that you have a procedure or additional testing performed.   [facility]) will contact your insurance carrier to obtain pre-certi

## 2018-06-05 NOTE — TELEPHONE ENCOUNTER
[facility] calling with possible interactions of Cyclobenzaprine which was recently prescribed by Dr. Alex Wild. Pt currently takes Duloxetine and Fluoxetine. Spoke with Dr. Alex Wild who states it is ok for pt to have the Cyclobenzaprine.  Pharmacy notified

## 2018-06-11 ENCOUNTER — OFFICE VISIT (OUTPATIENT)
Dept: ELECTROPHYSIOLOGY | Facility: HOSPITAL | Age: 48
End: 2018-06-11
Attending: Other
Payer: MEDICAID

## 2018-06-11 DIAGNOSIS — G62.9 PERIPHERAL POLYNEUROPATHY: ICD-10-CM

## 2018-06-11 PROCEDURE — 95886 MUSC TEST DONE W/N TEST COMP: CPT | Performed by: OTHER

## 2018-06-11 PROCEDURE — 95913 NRV CNDJ TEST 13/> STUDIES: CPT | Performed by: OTHER

## 2018-06-11 NOTE — PROCEDURES
[address] [place] [address]  [place], [address] [place] [address]  [phone]            Patient: Neeraj Lai YOB: 1970  Patient ID: [id number] Age: 53 Years 4 Months  Sex: Female Referred By: [id number]  Chief Com Wrist APB 3.65 ?4.40 8.3 ?4.0 100  83.6 ?50.00 Wrist - APB 7        Elbow APB 7.71  8.2  99.3 ?115 81.7  Elbow - Wrist   ?49   R ULNAR - ADM      Wrist ADM 2.92 ?3.60 11.2 ?5.0 100  94.4 ?50.00 Wrist - ADM 7        B. Elbow ADM 5.31  10.4  92.1 ?70 96.2 L. VAST MEDIALIS N None None None None N N N N   L. LUMB PSP L3-L4 1+ None 1+ None None       L.  LUMB PSP L4-L5 N None None None None             Summary:   Bilateral sural, bilateral ulnar, bilateral median and bilateral radial SNAPs were normal.    Bilat

## 2018-06-29 NOTE — PROGRESS NOTES
Name: Taty Almaguer   : 1970   DOS: 2018     Pain Clinic Follow Up Visit:   Patient presents with: Follow - Up: 50 % improvement reported from increased Fentanyl patch . 3/10 current pain.        Taty Almaguer is a 53year old female with a mouth daily. Disp: 30 tablet Rfl: 0   Fexofenadine HCl 60 MG Oral Tab Take 1 tablet (60 mg total) by mouth daily. Disp: 30 tablet Rfl: 6   Cyclobenzaprine HCl 10 MG Oral Tab Take 1 tablet (10 mg total) by mouth 3 (three) times daily.  Disp: 90 tablet Rfl: 0 20-year-old female with a history of lumbar fusion and fibromyalgia. She has chronic opiate dependence and was recently increased from 50 mcg to 75 mcg fentanyl Duragesic. This is proven to be effective in controlling her pain.   She is a stable on this d

## 2018-06-29 NOTE — PATIENT INSTRUCTIONS
Refill policies:    • Allow 2-3 business days for refills; controlled substances may take longer.   • Contact your pharmacy at least 5 days prior to running out of medication and have them send an electronic request or submit request through the “request re entire amount billed. Precertification and Prior Authorizations: If your physician has recommended that you have a procedure or additional testing performed.   [facility]) will contact your insurance carrier to obtain pre-certi

## 2018-07-17 RX ORDER — IBUPROFEN 800 MG/1
TABLET ORAL
Qty: 90 TABLET | Refills: 0 | Status: SHIPPED | OUTPATIENT
Start: 2018-07-17 | End: 2018-08-20

## 2018-07-23 ENCOUNTER — TELEPHONE (OUTPATIENT)
Dept: SURGERY | Facility: CLINIC | Age: 48
End: 2018-07-23

## 2018-07-23 NOTE — TELEPHONE ENCOUNTER
fentaNYL 75 MCG/HR Transdermal Patch 72 Hr 10 patch 0 7/1/2018 7/31/2018   Sig :  Place 1 patch onto the skin every third day.      Route:   Transdermal     Class:   Print Script         Spoke with patient, reports she is missing 1-2 patches of above medica Sergio Lockwood. I placed a referral today. The patient can follow-up in 2 months time.     Total length of office visit was 25 minutes.   I spent greater than 50% of this time in face-to-face consultation with the patient discussing her medications, symptoms, an Home

## 2018-07-24 RX ORDER — FENTANYL 75 UG/H
1 PATCH TRANSDERMAL
Qty: 10 PATCH | Refills: 0 | Status: SHIPPED | OUTPATIENT
Start: 2018-07-24 | End: 2018-08-21

## 2018-07-24 NOTE — TELEPHONE ENCOUNTER
Pt called back regarding Rx for Fentanyl. Pt is asking if she can get a refill on 7-30-18, as she has talked with her pharmacy and they told her she could refill it one day early.   Explained that Dr. Brionna Pacheco is out of town, and a new Rx can't be obtained at t

## 2018-07-24 NOTE — TELEPHONE ENCOUNTER
Unfortunately our office does not approve early refill for lost medication. The patient is responsible for the narcotic medication once the prescription has been picked up. Please have pt complete Narcotic agreement. Thank you.

## 2018-07-24 NOTE — TELEPHONE ENCOUNTER
LMTCB with questions. Pt was informed via voicemail that the Rx is ready for pickup, but it has do not fill date of 7-31-18. Referred to message below. Phone number provided for patient call back.

## 2018-08-07 ENCOUNTER — APPOINTMENT (OUTPATIENT)
Dept: GENERAL RADIOLOGY | Age: 48
End: 2018-08-07
Attending: EMERGENCY MEDICINE
Payer: MEDICAID

## 2018-08-07 ENCOUNTER — HOSPITAL ENCOUNTER (OUTPATIENT)
Age: 48
Discharge: HOME | End: 2018-08-07
Attending: EMERGENCY MEDICINE
Payer: MEDICAID

## 2018-08-07 VITALS
OXYGEN SATURATION: 95 % | HEART RATE: 94 BPM | SYSTOLIC BLOOD PRESSURE: 147 MMHG | DIASTOLIC BLOOD PRESSURE: 98 MMHG | TEMPERATURE: 99 F | RESPIRATION RATE: 16 BRPM

## 2018-08-07 DIAGNOSIS — S40.022A CONTUSION OF LEFT UPPER EXTREMITY, INITIAL ENCOUNTER: Primary | ICD-10-CM

## 2018-08-07 PROCEDURE — 73060 X-RAY EXAM OF HUMERUS: CPT | Performed by: EMERGENCY MEDICINE

## 2018-08-07 PROCEDURE — 99213 OFFICE O/P EST LOW 20 MIN: CPT

## 2018-08-08 NOTE — ED PROVIDER NOTES
Patient Seen in: [facility]    History   Patient presents with:  Fall (musculoskeletal, neurologic)  Upper Extremity Injury (musculoskeletal)    Stated Complaint: left arm pain x fell    HPI    Patient had a fall around noontime Smoker                                                              Packs/day: 0.00      Years: 20.00        Quit date: 5/12/2011  Smokeless tobacco: Current User                    Comment: USES E-CIG  Alcohol use: Yes           1.8 oz/week     Cans of be pm    Follow-up:  Nita Arguello, [address] [id number]              Medications Prescribed:  Current Discharge Medication List

## 2018-08-08 NOTE — ED INITIAL ASSESSMENT (HPI)
Patient states at 12 pm today was standing on the toilet reaching in a cabinet when she slipped and fell onto her left hand side. Patient denies hitting her head or any LOC. Complains of left arm pain. Ecchymosis noted to the left upper arm .

## 2018-08-19 ENCOUNTER — APPOINTMENT (OUTPATIENT)
Dept: CT IMAGING | Facility: HOSPITAL | Age: 48
End: 2018-08-19
Attending: EMERGENCY MEDICINE
Payer: MEDICAID

## 2018-08-19 ENCOUNTER — HOSPITAL ENCOUNTER (EMERGENCY)
Facility: HOSPITAL | Age: 48
Discharge: HOME | End: 2018-08-19
Attending: EMERGENCY MEDICINE
Payer: MEDICAID

## 2018-08-19 VITALS
SYSTOLIC BLOOD PRESSURE: 137 MMHG | TEMPERATURE: 97 F | WEIGHT: 184.94 LBS | RESPIRATION RATE: 16 BRPM | HEART RATE: 75 BPM | HEIGHT: 65 IN | DIASTOLIC BLOOD PRESSURE: 86 MMHG | BODY MASS INDEX: 30.81 KG/M2 | OXYGEN SATURATION: 100 %

## 2018-08-19 DIAGNOSIS — M54.12 CERVICAL RADICULOPATHY: Primary | ICD-10-CM

## 2018-08-19 PROCEDURE — 99284 EMERGENCY DEPT VISIT MOD MDM: CPT

## 2018-08-19 PROCEDURE — 72125 CT NECK SPINE W/O DYE: CPT | Performed by: EMERGENCY MEDICINE

## 2018-08-19 RX ORDER — ACETAMINOPHEN 500 MG
500 TABLET ORAL ONCE
Status: COMPLETED | OUTPATIENT
Start: 2018-08-19 | End: 2018-08-19

## 2018-08-19 RX ORDER — DIAZEPAM 5 MG/1
5 TABLET ORAL ONCE
Status: COMPLETED | OUTPATIENT
Start: 2018-08-19 | End: 2018-08-19

## 2018-08-19 RX ORDER — CYCLOBENZAPRINE HCL 10 MG
10 TABLET ORAL 3 TIMES DAILY PRN
Qty: 20 TABLET | Refills: 0 | Status: SHIPPED | OUTPATIENT
Start: 2018-08-19 | End: 2018-08-26

## 2018-08-19 RX ORDER — IBUPROFEN 200 MG
200 TABLET ORAL ONCE
Status: COMPLETED | OUTPATIENT
Start: 2018-08-19 | End: 2018-08-19

## 2018-08-19 RX ORDER — METHYLPREDNISOLONE 4 MG/1
TABLET ORAL
Qty: 1 PACKAGE | Refills: 0 | Status: SHIPPED | OUTPATIENT
Start: 2018-08-19 | End: 2018-08-24

## 2018-08-19 NOTE — ED INITIAL ASSESSMENT (HPI)
Pt complaining of neck pain and stiff neck that is worst this last 2 days. States she fell last august 7, 2018. was complaining of neck back then but states she thinks it was just a contusion from the fall. Pt wants MRI. Pt has fentanyl patch.  Pt states pa

## 2018-08-19 NOTE — ED NOTES
More warm packs provided. Notified relief will be provided more once doctor is able to see pt and give medications. Pt voice understanding. pts son does not appear to understand and is upset that there is not unlimited supply of warm packs at this time.  Wa

## 2018-08-19 NOTE — ED NOTES
Pt presents to ER with sudden onset shooting pain down neck, right arm, and right side of back. Pt is unable to keep her neck straight since. No trauma today. Pt had original accident on aug 7th where she fell of toilet. Pt is a&ox3. Speaking clearly.  Skin

## 2018-08-19 NOTE — ED PROVIDER NOTES
Patient Seen in: [facility] Emergency Department    History   Patient presents with:  Neck Pain (musculoskeletal, neurologic)    Stated Complaint: fall 2 weeks ago and has neck pain now    HPI    45-year-old with a history of chronic back and neck p X2  1983: OTHER SURGICAL HISTORY      Comment: bunions bilateral  2006: OTHER SURGICAL HISTORY      Comment: nodule lymph nodes neck  2016 : OTHER SURGICAL HISTORY      Comment: Bladder sling        Smoking status: Former Smoker 1548  ------------------------------------------------------------    Valium ibuprofen and Tylenol were ordered  MDM       53year-old with cervical radiculopathy symptoms no overt muscle weakness or significant loss of sensation.   She can try a Medrol Dos

## 2018-08-21 RX ORDER — FENTANYL 75 UG/1
1 PATCH TRANSDERMAL
Qty: 10 PATCH | Refills: 0 | Status: SHIPPED | OUTPATIENT
Start: 2018-08-28 | End: 2018-08-23

## 2018-08-21 RX ORDER — IBUPROFEN 800 MG/1
TABLET ORAL
Qty: 90 TABLET | Refills: 0 | Status: SHIPPED | OUTPATIENT
Start: 2018-08-21 | End: 2018-09-20

## 2018-08-21 NOTE — TELEPHONE ENCOUNTER
From: Ruthy Degree  Sent: 8/21/2018 7:13 AM CDT  Subject: Medication Renewal Request    Hanna Buckner would like a refill of the following medications:     fentaNYL 75 MCG/HR Transdermal Patch [facility]]    Preferred pharmacy: [facility] [place] [address], [address] [place] [address] AT [address] [place] [address], [phone], [phone]

## 2018-08-22 NOTE — TELEPHONE ENCOUNTER
Pt here today to  Rx, stating it has the wrong \"Do Not Fill Date\" on it. Pt is anxious, energetic with bodily movements, but polite. [place] on 7-30-18. Pt given time to verbalize situation and states that she is always short, as they fall off. Pt states that with the do not fill date, her pharmacy will dispense it 3 days early. Attempted to explain to pt that if she consistently picks up the Rx early, she will be short patches. Pt states she has a f/u appointment with Dr. Armani Jackman tomorrow and will discuss with him at that time. Pt did not take the Rx with her today.

## 2018-08-22 NOTE — TELEPHONE ENCOUNTER
Pt viewed script and declined taking it with her due to the fill date being on 8/28/18 instead of 8/24/18

## 2018-08-23 ENCOUNTER — TELEPHONE (OUTPATIENT)
Dept: PAIN CLINIC | Facility: CLINIC | Age: 48
End: 2018-08-23

## 2018-08-23 ENCOUNTER — OFFICE VISIT (OUTPATIENT)
Dept: PAIN CLINIC | Facility: CLINIC | Age: 48
End: 2018-08-23
Payer: MEDICAID

## 2018-08-23 VITALS
BODY MASS INDEX: 28.64 KG/M2 | WEIGHT: 174 LBS | SYSTOLIC BLOOD PRESSURE: 134 MMHG | OXYGEN SATURATION: 96 % | HEIGHT: 65.5 IN | HEART RATE: 86 BPM | DIASTOLIC BLOOD PRESSURE: 88 MMHG

## 2018-08-23 DIAGNOSIS — M47.22 CERVICAL SPONDYLOSIS WITH RADICULOPATHY: Primary | ICD-10-CM

## 2018-08-23 DIAGNOSIS — M51.34 DDD (DEGENERATIVE DISC DISEASE), THORACIC: ICD-10-CM

## 2018-08-23 PROCEDURE — 99214 OFFICE O/P EST MOD 30 MIN: CPT | Performed by: ANESTHESIOLOGY

## 2018-08-23 RX ORDER — FENTANYL 75 UG/H
1 PATCH TRANSDERMAL
Qty: 10 PATCH | Refills: 0 | Status: SHIPPED | OUTPATIENT
Start: 2018-08-27 | End: 2018-08-23

## 2018-08-23 RX ORDER — FENTANYL 50 UG/H
1 PATCH TRANSDERMAL
Qty: 10 PATCH | Refills: 0 | Status: SHIPPED | OUTPATIENT
Start: 2018-08-23 | End: 2018-09-18 | Stop reason: ALTCHOICE

## 2018-08-23 NOTE — TELEPHONE ENCOUNTER
Started PA for code [id number] on [employer] online  Uploaded clinical notes  Case sent to pending   Case number [id number]

## 2018-08-23 NOTE — TELEPHONE ENCOUNTER
Pt is asking to be weaned off of her Fentanyl patches. Pt states that she is very sick from withdrawal and has decided she would like to stop using them.  Pt is willing to return the 75mcg Rx in exchange for a 50 mcg patch and would like to pick them up tod

## 2018-08-23 NOTE — TELEPHONE ENCOUNTER
Pt seen in [address] today by Dr. Yahaira Klein and recommended for thoracic epidural steroid injection (X 1). Please begin PA process for procedure(s).      Laterality: n/a  Level(s): n/a    Pt informed callback will be given when PA feedback received and procedure date

## 2018-08-23 NOTE — TELEPHONE ENCOUNTER
Pt's son here to exchange Rx. Fentanyl 75mcg patch destroyed and new Rx given to pt's son. Witnessed by Dr. Gonzalo Brown.

## 2018-08-23 NOTE — PROGRESS NOTES
Name: iSn Morgan   : 1970   DOS: 2018     Pain Clinic Follow Up Visit:   Patient presents with: Follow - Up: Neck pain due to a fall at the beginning of the month, right sided 3/10. Using heat to help.  Pt states that she is \"missing\" so Take 1 tablet (60 mg total) by mouth daily. Disp: 30 tablet Rfl: 6   Cyclobenzaprine HCl 10 MG Oral Tab Take 1 tablet (10 mg total) by mouth 3 (three) times daily.  Disp: 90 tablet Rfl: 0   DULoxetine HCl (CYMBALTA) 30 MG Oral Cap DR Particles Take 1 capsul along the rest of the neck is secondary to cervical myofascial strain. I reassured her that this should improve with time. She also complains of right-sided rib pain. She does have history of remote fractures which are healed based on her imaging.   He

## 2018-08-24 NOTE — TELEPHONE ENCOUNTER
Attempted to call pt. To check how they are doing. Unable to leave a message on either line. [phone] is not in service at this time.

## 2018-08-27 NOTE — TELEPHONE ENCOUNTER
Spoke with Amina Winters from [employer] she states appeal needs to be faxed over   Call time 9:29

## 2018-08-27 NOTE — TELEPHONE ENCOUNTER
Spoke with pt who states she is feeling much better today. Pt states she can't wait to be off of the Fentanyl patches and looks forward to her injections. No additional needs at this time.

## 2018-08-27 NOTE — TELEPHONE ENCOUNTER
Received fax from [place]- [id number]  CASE [id number]        Reason for decision and Criteria/guidelines used to make our decision: Based on [employer] Comprehensive Musculoskeletal Management Guideline (), we cannot approve this request. The requested ser

## 2018-09-06 NOTE — TELEPHONE ENCOUNTER
LM for patient to come in to complete the appt of representative form left by [facility].

## 2018-09-18 ENCOUNTER — OFFICE VISIT (OUTPATIENT)
Dept: PAIN CLINIC | Facility: CLINIC | Age: 48
End: 2018-09-18
Payer: MEDICAID

## 2018-09-18 VITALS
WEIGHT: 177 LBS | SYSTOLIC BLOOD PRESSURE: 118 MMHG | BODY MASS INDEX: 29.14 KG/M2 | HEIGHT: 65.5 IN | OXYGEN SATURATION: 98 % | HEART RATE: 85 BPM | DIASTOLIC BLOOD PRESSURE: 80 MMHG

## 2018-09-18 DIAGNOSIS — M51.36 DDD (DEGENERATIVE DISC DISEASE), LUMBAR: ICD-10-CM

## 2018-09-18 DIAGNOSIS — G62.9 PERIPHERAL POLYNEUROPATHY: ICD-10-CM

## 2018-09-18 DIAGNOSIS — M54.16 CHRONIC LUMBAR RADICULOPATHY: Primary | ICD-10-CM

## 2018-09-18 DIAGNOSIS — F11.90 CHRONIC NARCOTIC USE: ICD-10-CM

## 2018-09-18 PROCEDURE — 99214 OFFICE O/P EST MOD 30 MIN: CPT | Performed by: ANESTHESIOLOGY

## 2018-09-18 RX ORDER — LIDOCAINE 4 G/G
1 PATCH TOPICAL DAILY
Qty: 30 PATCH | Refills: 3 | Status: SHIPPED | OUTPATIENT
Start: 2018-09-18 | End: 2018-10-05

## 2018-09-18 RX ORDER — FENTANYL 75 UG/H
1 PATCH TRANSDERMAL
Qty: 15 PATCH | Refills: 0 | Status: SHIPPED | OUTPATIENT
Start: 2018-09-18 | End: 2018-10-11

## 2018-09-18 NOTE — PROGRESS NOTES
Name: Harvey Soler   : 1970   DOS: 2018     Pain Clinic Follow Up Visit:   Patient presents with: Follow - Up: medication discussion. 8/10 current pain. Bilateral legs burning and neck pain.        Harvey Soler is a 53year old female wit 0.5 mg as needed. Disp:  Rfl: 2   FLUoxetine HCl 20 MG Oral Cap Take 1 capsule (20 mg total) by mouth daily.  (Patient taking differently: Take 40 mg by mouth daily.  ) Disp: 90 capsule Rfl: 1   amphetamine-dextroamphetamine (ADDERALL) 20 MG Oral Tab Take polyneuropathy in the past.  It seems that her lower extremity polyneuropathy is not her #1 pain complaint. We discussed during this office visit possible spinal cord stimulation.   I had an extensive discussion with her regarding other medication manageme

## 2018-09-21 RX ORDER — IBUPROFEN 800 MG/1
TABLET ORAL
Qty: 90 TABLET | Refills: 0 | Status: SHIPPED | OUTPATIENT
Start: 2018-09-21 | End: 2018-10-11

## 2018-09-21 NOTE — TELEPHONE ENCOUNTER
Medication: IBUPROFEN 800 MG Oral Tab    Date of last refill: 8/21/18   [place]    Last office visit: 9/18/18  Due back to clinic per last office note:  Not indicated  Date next office visit scheduled:  none    Last including dependence, abuse, and death from overdose and mixing narcotic with alcohol.  Pt verbalized understanding.      Medications filled today:  Requested Prescriptions             Signed Prescriptions Disp Refills   • Lidocaine 4 % External Patch 30 pa

## 2018-10-05 ENCOUNTER — OFFICE VISIT (OUTPATIENT)
Dept: FAMILY MEDICINE CLINIC | Facility: CLINIC | Age: 48
End: 2018-10-05
Payer: MEDICAID

## 2018-10-05 VITALS
DIASTOLIC BLOOD PRESSURE: 76 MMHG | TEMPERATURE: 98 F | HEART RATE: 91 BPM | WEIGHT: 174 LBS | BODY MASS INDEX: 29 KG/M2 | SYSTOLIC BLOOD PRESSURE: 112 MMHG | RESPIRATION RATE: 20 BRPM | OXYGEN SATURATION: 97 %

## 2018-10-05 DIAGNOSIS — R35.0 FREQUENT URINATION: Primary | ICD-10-CM

## 2018-10-05 DIAGNOSIS — N30.01 ACUTE CYSTITIS WITH HEMATURIA: ICD-10-CM

## 2018-10-05 PROCEDURE — 87086 URINE CULTURE/COLONY COUNT: CPT | Performed by: NURSE PRACTITIONER

## 2018-10-05 PROCEDURE — 99213 OFFICE O/P EST LOW 20 MIN: CPT | Performed by: NURSE PRACTITIONER

## 2018-10-05 PROCEDURE — 87088 URINE BACTERIA CULTURE: CPT | Performed by: NURSE PRACTITIONER

## 2018-10-05 PROCEDURE — 81003 URINALYSIS AUTO W/O SCOPE: CPT | Performed by: NURSE PRACTITIONER

## 2018-10-05 PROCEDURE — 87186 SC STD MICRODIL/AGAR DIL: CPT | Performed by: NURSE PRACTITIONER

## 2018-10-05 RX ORDER — NITROFURANTOIN 25; 75 MG/1; MG/1
100 CAPSULE ORAL 2 TIMES DAILY
Qty: 10 CAPSULE | Refills: 0 | Status: SHIPPED | OUTPATIENT
Start: 2018-10-05 | End: 2018-10-10

## 2018-10-05 RX ORDER — PHENAZOPYRIDINE HYDROCHLORIDE 200 MG/1
200 TABLET, FILM COATED ORAL 3 TIMES DAILY PRN
Qty: 9 TABLET | Refills: 0 | Status: SHIPPED | OUTPATIENT
Start: 2018-10-05 | End: 2018-10-08

## 2018-10-05 NOTE — PROGRESS NOTES
CHIEF COMPLAINT:   Patient presents with:  UTI: burning, bleeding, frequent urination x1day      HPI:   Aissatou Prince is a 53year old female who presents with symptoms of UTI. Complaining of urinary frequency, urgency, dysuria for 1 days.   Symptoms ha amphetamine-dextroamphetamine (ADDERALL) 20 MG Oral Tab Take by mouth daily.    Disp:  Rfl: 0      Past Medical History:   Diagnosis Date   • Abnormal uterine bleeding     bleeds every 2 weeks   • Anxiety state    • Attention deficit hyperactivity disorder URINALYSIS, AUTO, W/O SCOPE    Collection Time: 10/05/18  6:34 PM   Result Value Ref Range    GLUCOSE (URINE DIPSTICK) negative mg/dL    BILIRUBIN negative Negative    KETONES (URINE DIPSTICK) negative Negative mg/dL    SPECIFIC GRAVITY 1.025 1.005 - 1.030 Urine is normally doesn't have any bacteria in it. But bacteria can get into the urinary tract from the skin around the rectum. Or they can travel in the blood from elsewhere in the body.  Once they are in your urinary tract, they can cause infection in the · Procedures such as having a catheter inserted  · Older age  · Not emptying your bladder. This can allow bacteria a chance to grow in your urine.   · Dehydration  · Constipation  · Sex  · Use of a diaphragm for birth control   Treatment  Bladder infections · Urinate right after intercourse to flush out your bladder. · If you use birth control pills and have frequent bladder infections, discuss it with your doctor.   Follow-up care  Call your healthcare provider if all symptoms are not gone after 3 days of tr

## 2018-10-09 ENCOUNTER — PATIENT MESSAGE (OUTPATIENT)
Dept: PAIN CLINIC | Facility: CLINIC | Age: 48
End: 2018-10-09

## 2018-10-09 DIAGNOSIS — M50.30 DDD (DEGENERATIVE DISC DISEASE), CERVICAL: Primary | ICD-10-CM

## 2018-10-09 DIAGNOSIS — M54.12 CERVICAL RADICULOPATHY: ICD-10-CM

## 2018-10-11 ENCOUNTER — TELEPHONE (OUTPATIENT)
Dept: PAIN CLINIC | Facility: CLINIC | Age: 48
End: 2018-10-11

## 2018-10-11 RX ORDER — FENTANYL 75 UG/H
1 PATCH TRANSDERMAL
Qty: 15 PATCH | Refills: 0 | Status: SHIPPED | OUTPATIENT
Start: 2018-10-11 | End: 2018-11-08

## 2018-10-11 RX ORDER — IBUPROFEN 800 MG/1
TABLET ORAL
Qty: 90 TABLET | Refills: 0 | Status: SHIPPED | OUTPATIENT
Start: 2018-10-11 | End: 2018-11-08

## 2018-10-11 NOTE — TELEPHONE ENCOUNTER
Dr. Tyson Romero, please review patient message below regarding request for injection. Would you like to see her first?  She is also requesting refills of her Fentanyl Patch, Ibuprofen, and Diclofenac Gel.  Do you have any routine suggestions on how to keep the pa discussed multiple times weaning this medication. So far her efforts to wean have been unsuccessful. From a interventional standpoint she does benefit from cervical and lumbar epidural steroid injections.   She has a history of peripheral neuropathy with options for chronic radiculopathy and addressing all her questions.   We also discussed management from a medication perspective.     Tanner Dougherty MD, 9/18/2018, 9:18 AM

## 2018-10-11 NOTE — TELEPHONE ENCOUNTER
Case request for cervical epidural steroid injection placed. Please start prior authorization process.

## 2018-10-11 NOTE — TELEPHONE ENCOUNTER
Surgical case request placed by Dr. Yahaira Klein in 10/9/18 Patient E-mail encounter.       Routed to Prior Authorization to begin approval process as follows for cervical epidural steroid injection    Lance Saldana MD   You 1 hour ago (10:30 AM)         Case reques

## 2018-10-11 NOTE — TELEPHONE ENCOUNTER
From: Kelby Menendez  To: Dru Blake MD  Sent: 10/9/2018 4:56 PM CDT  Subject: Prescription Question    Hello,    I was going to make an appointment with Dr. Priyanka Alvarez but I am sure he starting to get sick of my face!    Last month he had suggested we move fo

## 2018-10-12 NOTE — TELEPHONE ENCOUNTER
Dr. Shekhar Warren recommended SUZANNA (X 1). Please begin PA process for procedure(s).      Laterality: n/a  Level(s): n/a    Pt informed callback will be given when PA feedback received and procedure date to be scheduled after approval.  All procedural instructions r

## 2018-10-17 NOTE — TELEPHONE ENCOUNTER
Pt returning our call to schedule injection, please call back @ [phone] first, then try pt's cell

## 2018-10-18 NOTE — TELEPHONE ENCOUNTER
Spoke with pt and scheduled injection. Reviewed pre-procedure instructions with pt. Scheduled pt for injection on 10-22-18 with a 8:15 check in time. Case placed on schedule.

## 2018-10-22 ENCOUNTER — APPOINTMENT (OUTPATIENT)
Dept: GENERAL RADIOLOGY | Facility: HOSPITAL | Age: 48
End: 2018-10-22
Attending: ANESTHESIOLOGY
Payer: MEDICAID

## 2018-10-22 ENCOUNTER — HOSPITAL ENCOUNTER (OUTPATIENT)
Facility: HOSPITAL | Age: 48
Setting detail: HOSPITAL OUTPATIENT SURGERY
Discharge: HOME | End: 2018-10-22
Attending: ANESTHESIOLOGY | Admitting: ANESTHESIOLOGY
Payer: MEDICAID

## 2018-10-22 VITALS
HEART RATE: 87 BPM | RESPIRATION RATE: 18 BRPM | SYSTOLIC BLOOD PRESSURE: 120 MMHG | OXYGEN SATURATION: 99 % | TEMPERATURE: 97 F | DIASTOLIC BLOOD PRESSURE: 69 MMHG

## 2018-10-22 DIAGNOSIS — M54.12 CERVICAL RADICULOPATHY: ICD-10-CM

## 2018-10-22 DIAGNOSIS — M50.30 DDD (DEGENERATIVE DISC DISEASE), CERVICAL: ICD-10-CM

## 2018-10-22 PROCEDURE — 99152 MOD SED SAME PHYS/QHP 5/>YRS: CPT | Performed by: ANESTHESIOLOGY

## 2018-10-22 PROCEDURE — B01BYZZ FLUOROSCOPY OF SPINAL CORD USING OTHER CONTRAST: ICD-10-PCS | Performed by: ANESTHESIOLOGY

## 2018-10-22 PROCEDURE — 3E0R33Z INTRODUCTION OF ANTI-INFLAMMATORY INTO SPINAL CANAL, PERCUTANEOUS APPROACH: ICD-10-PCS | Performed by: ANESTHESIOLOGY

## 2018-10-22 RX ORDER — MIDAZOLAM HYDROCHLORIDE 1 MG/ML
INJECTION INTRAMUSCULAR; INTRAVENOUS AS NEEDED
Status: DISCONTINUED | OUTPATIENT
Start: 2018-10-22 | End: 2018-10-22 | Stop reason: HOSPADM

## 2018-10-22 RX ORDER — DEXAMETHASONE SODIUM PHOSPHATE 10 MG/ML
INJECTION, SOLUTION INTRAMUSCULAR; INTRAVENOUS AS NEEDED
Status: DISCONTINUED | OUTPATIENT
Start: 2018-10-22 | End: 2018-10-22 | Stop reason: HOSPADM

## 2018-10-22 RX ORDER — 0.9 % SODIUM CHLORIDE 0.9 %
VIAL (ML) INJECTION AS NEEDED
Status: DISCONTINUED | OUTPATIENT
Start: 2018-10-22 | End: 2018-10-22 | Stop reason: HOSPADM

## 2018-10-22 RX ORDER — ONDANSETRON 2 MG/ML
4 INJECTION INTRAMUSCULAR; INTRAVENOUS ONCE AS NEEDED
Status: DISCONTINUED | OUTPATIENT
Start: 2018-10-22 | End: 2018-10-22

## 2018-10-22 RX ORDER — ONDANSETRON 2 MG/ML
4 INJECTION INTRAMUSCULAR; INTRAVENOUS ONCE AS NEEDED
Status: DISCONTINUED | OUTPATIENT
Start: 2018-10-22 | End: 2018-10-22 | Stop reason: HOSPADM

## 2018-10-22 RX ORDER — SODIUM CHLORIDE, SODIUM LACTATE, POTASSIUM CHLORIDE, CALCIUM CHLORIDE 600; 310; 30; 20 MG/100ML; MG/100ML; MG/100ML; MG/100ML
100 INJECTION, SOLUTION INTRAVENOUS CONTINUOUS
Status: DISCONTINUED | OUTPATIENT
Start: 2018-10-22 | End: 2018-10-22

## 2018-10-22 RX ORDER — DIPHENHYDRAMINE HYDROCHLORIDE 50 MG/ML
50 INJECTION INTRAMUSCULAR; INTRAVENOUS ONCE AS NEEDED
Status: DISCONTINUED | OUTPATIENT
Start: 2018-10-22 | End: 2018-10-22

## 2018-10-22 RX ORDER — LIDOCAINE HYDROCHLORIDE 10 MG/ML
INJECTION, SOLUTION EPIDURAL; INFILTRATION; INTRACAUDAL; PERINEURAL AS NEEDED
Status: DISCONTINUED | OUTPATIENT
Start: 2018-10-22 | End: 2018-10-22 | Stop reason: HOSPADM

## 2018-10-22 NOTE — OPERATIVE REPORT
[facility]  Operative Report  10/22/2018     Aissatou Prince Patient Status:  Hospital Outpatient Surgery    1970 MRN [id number]   [facility] SURGERY Attending Marcello Day MD   Hosp Day # 0 PCP Dann Chauhan MD     Indicatio technique. There was no C. S.F. or blood through the needle. After obtaining a good epidurogram by injecting Omnipaque 180 1 mL, a combination of normal saline and dexamethasone 10 mg in total volume of 4 mL was injected.   The needle was then flushed with n

## 2018-10-22 NOTE — H&P
History & Physical Examination    Patient Name: Lois Borden  MRN: [id number]  CSN: [id number]  YOB: 1970    Pre-Operative Diagnosis:  DDD (degenerative disc disease), cervical [M50.30]  Cervical radiculopathy [M54.12]    Present Illness: Pa TREES, POLLEN, DUST MITES    Past Medical History:   Diagnosis Date   • Abnormal uterine bleeding     bleeds every 2 weeks   • Anxiety state    • Attention deficit hyperactivity disorder (ADHD)    • BACK PAIN    • Back problem    • MARCIO II (cervical intraep Grandmother 48        In her 52's.    • Heart Disorder Paternal Grandfather    • Cancer Maternal Cousin Female         OVARIAN CA   • Ovarian Cancer Maternal Cousin Female 45         of ovarian ca   • Cancer Maternal Aunt         LUNG CA 51   • Breast C

## 2018-10-22 NOTE — OR NURSING
PATIENT WAS INSTRUCTED TO OPEN BAY NILO WHEN DRESSED AND THE TRANSPORTER WOULD BRING THEN TO THE GARAGE. WHEN THE NURSE WENT IN TO CHECK IF THEY WERE READY THEY HAD LEFT ON THEIR OWN.

## 2018-11-08 DIAGNOSIS — M54.16 CHRONIC LUMBAR RADICULOPATHY: Primary | ICD-10-CM

## 2018-11-08 RX ORDER — FENTANYL 75 UG/H
1 PATCH TRANSDERMAL
Qty: 10 PATCH | Refills: 0 | Status: SHIPPED | OUTPATIENT
Start: 2018-11-08 | End: 2018-12-06

## 2018-11-08 RX ORDER — IBUPROFEN 800 MG/1
TABLET ORAL
Qty: 90 TABLET | Refills: 0 | Status: SHIPPED | OUTPATIENT
Start: 2018-11-08 | End: 2019-01-02

## 2018-11-08 NOTE — TELEPHONE ENCOUNTER
Regarding: FW: Prescription Question  Contact: [phone]      ----- Message -----  From: Catrachita Claire  Sent: 11/8/2018   6:25 AM  To: [facility]  Subject: Prescription Question                            ----- Message from Generic, Mychart

## 2018-11-08 NOTE — TELEPHONE ENCOUNTER
Medication: Fentanyl 75 mcg patch and Ibuprofen     Date of last refill: 10/11/18  [place] Fentanyl patch- 10/15/18    Last office visit: 9/18/18  Due back to clinic per last office note: None   Date next office visit sc medications, activity modification, and  PT. 1.Risks of long term narcotic use d/w pt including dependence, abuse, and death from overdose and mixing narcotic with alcohol.  Pt verbalized understanding.      Medications filled today:  Requested Prescriptio

## 2018-12-06 ENCOUNTER — OFFICE VISIT (OUTPATIENT)
Dept: SURGERY | Facility: CLINIC | Age: 48
End: 2018-12-06
Payer: MEDICAID

## 2018-12-06 ENCOUNTER — TELEPHONE (OUTPATIENT)
Dept: SURGERY | Facility: CLINIC | Age: 48
End: 2018-12-06

## 2018-12-06 ENCOUNTER — OFFICE VISIT (OUTPATIENT)
Dept: PAIN CLINIC | Facility: CLINIC | Age: 48
End: 2018-12-06
Payer: MEDICAID

## 2018-12-06 VITALS
HEART RATE: 80 BPM | WEIGHT: 165 LBS | BODY MASS INDEX: 26.52 KG/M2 | OXYGEN SATURATION: 98 % | SYSTOLIC BLOOD PRESSURE: 118 MMHG | DIASTOLIC BLOOD PRESSURE: 72 MMHG | HEIGHT: 66 IN

## 2018-12-06 VITALS — SYSTOLIC BLOOD PRESSURE: 116 MMHG | HEART RATE: 70 BPM | DIASTOLIC BLOOD PRESSURE: 68 MMHG | RESPIRATION RATE: 18 BRPM

## 2018-12-06 DIAGNOSIS — M54.16 CHRONIC LUMBAR RADICULOPATHY: ICD-10-CM

## 2018-12-06 DIAGNOSIS — F11.90 CHRONIC NARCOTIC USE: ICD-10-CM

## 2018-12-06 DIAGNOSIS — M79.18 CERVICAL MYOFASCIAL PAIN SYNDROME: Primary | ICD-10-CM

## 2018-12-06 DIAGNOSIS — M47.22 CERVICAL SPONDYLOSIS WITH RADICULOPATHY: ICD-10-CM

## 2018-12-06 DIAGNOSIS — M54.12 CERVICAL RADICULOPATHY: Primary | ICD-10-CM

## 2018-12-06 PROCEDURE — 99213 OFFICE O/P EST LOW 20 MIN: CPT | Performed by: ANESTHESIOLOGY

## 2018-12-06 PROCEDURE — 99213 OFFICE O/P EST LOW 20 MIN: CPT | Performed by: NEUROLOGICAL SURGERY

## 2018-12-06 RX ORDER — METHYLPREDNISOLONE 4 MG/1
TABLET ORAL
Qty: 1 PACKAGE | Refills: 0 | Status: SHIPPED | OUTPATIENT
Start: 2018-12-06 | End: 2019-01-08 | Stop reason: ALTCHOICE

## 2018-12-06 RX ORDER — FENTANYL 75 UG/H
1 PATCH TRANSDERMAL
Qty: 10 PATCH | Refills: 0 | Status: SHIPPED | OUTPATIENT
Start: 2018-12-09 | End: 2019-01-02

## 2018-12-06 RX ORDER — CARISOPRODOL 350 MG/1
350 TABLET ORAL 4 TIMES DAILY PRN
Qty: 60 TABLET | Refills: 1 | Status: SHIPPED | OUTPATIENT
Start: 2018-12-06 | End: 2020-02-25

## 2018-12-06 NOTE — PROGRESS NOTES
Name: Jonatan Martinez   : 1970   DOS: 2018     Pain Clinic Follow Up Visit:   Patient presents with: Follow - Up: post injection relief reported = 0%, Right sided neck pain, radiating into right arm to the elbow. Would like Fentanyl refill. by mouth as needed.  ) Disp: 30 tablet Rfl: 6   DULoxetine HCl (CYMBALTA) 30 MG Oral Cap DR Particles Take 1 capsule (30 mg total) by mouth 2 (two) times daily.  (Patient taking differently: Take 90 mg by mouth 2 (two) times daily.  ) Disp: 60 capsule Rfl: after MRI is completed. Pain is  limiting functional status. Failed conservative treatment consisting of medications, activity modification, and  PT.   1.Risks of long term narcotic use d/w pt including dependence, abuse, and death from overdose and mixi

## 2018-12-06 NOTE — PROGRESS NOTES
Neurosurgery Clinic Visit  2018    Joey Brannon PCP:  Marina Ortega MD    1970 MRN [id number]     HISTORY OF PRESENT ILLNESS:  Joey Brannon is a(n) 53year old female here status post a fall in August  Be seen in the past for back o

## 2018-12-06 NOTE — PATIENT INSTRUCTIONS
Refill policies:    • Allow 2-3 business days for refills; controlled substances may take longer.   • Contact your pharmacy at least 5 days prior to running out of medication and have them send an electronic request or submit request through the “request re entire amount billed. Precertification and Prior Authorizations: If your physician has recommended that you have a procedure or additional testing performed.   [facility]) will contact your insurance carrier to obtain pre-certi

## 2018-12-06 NOTE — PATIENT INSTRUCTIONS
Refill policies:    • Allow 2-3 business days for refills; controlled substances may take longer.   • Contact your pharmacy at least 5 days prior to running out of medication and have them send an electronic request or submit request through the “request re entire amount billed. Precertification and Prior Authorizations: If your physician has recommended that you have a procedure or additional testing performed.   [facility]) will contact your insurance carrier to obtain pre-certi [address] [facility], [address] [facility] [address], [address]      Prior to the procedure:  Please update us prior to the procedure if you are experiencing any symptoms of infection such as fever, chills, cough, and urinary symptoms.         Medication before or after your procedure at  [phone]. If you are a diabetic, please increase the frequency of your glucose monitoring after the procedure as this may cause a temporary increase in your blood sugar.   Contact your primary care physician if your b

## 2018-12-06 NOTE — PROGRESS NOTES
Patient here to follow up regarding neck pain. ahs been experiencing worsening right sided neck/arm pain. Had a fall 3 months ago, has had progressively worsening pain.

## 2018-12-06 NOTE — PROGRESS NOTES
Pt is here for follow up for the lumbar.  Pt had epidural injection for the cervical with Dr Sarah Plascencia 10/22/18   Pt was last seen in 5/10/18

## 2018-12-11 ENCOUNTER — TELEPHONE (OUTPATIENT)
Dept: PAIN CLINIC | Facility: CLINIC | Age: 48
End: 2018-12-11

## 2018-12-11 ENCOUNTER — TELEPHONE (OUTPATIENT)
Dept: NEUROLOGY | Facility: CLINIC | Age: 48
End: 2018-12-11

## 2018-12-11 NOTE — TELEPHONE ENCOUNTER
Spoke to Lesia Sinha at [employer], initiated prior authorization request for [employer]. Requesting coverage of procedure codes 47904+71151 for 1 unit each, provided dx: M79.18.  Request pending clinical review, pending reference #[id number]

## 2018-12-11 NOTE — TELEPHONE ENCOUNTER
Cervical MRI Denied     Denial Reason:    Based on [employer] Spine Imaging Guidelines, we cannot approve this request. Your records show that you have back and/or neck pain. They also show a request for a magnetic resonance imaging (MRI) scan of your spine. This is a detailed picture study. An MRI scan is supported for your type of pain if one of the following applies to you. One, you failed to improve following a recent (within 3 months) 6 week trial of doctor prescribed treatment and/or observation, and you had follow up contact with your doctor to look at your progress after the 6 weeks. Follow up contact may be done by phone, mail, or messaging. Two, you have severe weakness. Three, you had a tissue sample taken for lab testing (biopsy) result that was not normal. Four, you had an infection. Five, you have had cancer in the past. Six, you have damage to a bundle of nerve roots at the lower end of the spinal cord (cauda equina) causing loss of function of the nerve roots at the bottom of your spine. Your records do not show that one or more of these apply to you. No Peer to Peer can be done for [facility]/Medicaid. Case can be resubmitted within 45 days from denial letter.

## 2018-12-12 NOTE — TELEPHONE ENCOUNTER
Berenice from [facility] returned call, request for TPI approved, authorization #[id number], authorization covers 1 unit each procedure code [id number], authorization valid 12/13/18-1/13/19. Patient scheduled for procedure 12/13.

## 2018-12-13 ENCOUNTER — TELEPHONE (OUTPATIENT)
Dept: SURGERY | Facility: CLINIC | Age: 48
End: 2018-12-13

## 2018-12-13 NOTE — TELEPHONE ENCOUNTER
Sometimes the insurance companies allow for submitting additional information. This is separate from doing a peer to peer or reconsideration.

## 2018-12-13 NOTE — TELEPHONE ENCOUNTER
Pt was seen on 8/23/2018 by Dr. Anish Luna for neck pain and cervical issues following a fall. She underwent cervical SRAVANTHI on 10/22/2018. She then followed up with Dr. Rachael Sams on 12/6/2018. This involves conservative treatment with follow-up within the past 3 months. [facility]

## 2018-12-14 ENCOUNTER — TELEPHONE (OUTPATIENT)
Dept: SURGERY | Facility: CLINIC | Age: 48
End: 2018-12-14

## 2018-12-18 ENCOUNTER — TELEPHONE (OUTPATIENT)
Dept: SURGERY | Facility: CLINIC | Age: 48
End: 2018-12-18

## 2018-12-18 ENCOUNTER — OFFICE VISIT (OUTPATIENT)
Dept: PAIN CLINIC | Facility: CLINIC | Age: 48
End: 2018-12-18
Payer: MEDICAID

## 2018-12-18 VITALS
WEIGHT: 175 LBS | DIASTOLIC BLOOD PRESSURE: 74 MMHG | SYSTOLIC BLOOD PRESSURE: 126 MMHG | BODY MASS INDEX: 28.13 KG/M2 | HEIGHT: 66 IN

## 2018-12-18 DIAGNOSIS — M54.12 CERVICAL RADICULOPATHY: Primary | ICD-10-CM

## 2018-12-18 DIAGNOSIS — F11.90 CHRONIC NARCOTIC USE: ICD-10-CM

## 2018-12-18 PROCEDURE — 20553 NJX 1/MLT TRIGGER POINTS 3/>: CPT | Performed by: ANESTHESIOLOGY

## 2018-12-18 RX ORDER — BUPIVACAINE HYDROCHLORIDE 5 MG/ML
8 INJECTION, SOLUTION EPIDURAL; INTRACAUDAL ONCE
Status: COMPLETED | OUTPATIENT
Start: 2018-12-18 | End: 2018-12-18

## 2018-12-18 RX ORDER — METHYLPREDNISOLONE ACETATE 40 MG/ML
80 INJECTION, SUSPENSION INTRA-ARTICULAR; INTRALESIONAL; INTRAMUSCULAR; SOFT TISSUE ONCE
Status: COMPLETED | OUTPATIENT
Start: 2018-12-18 | End: 2018-12-18

## 2018-12-18 NOTE — PROCEDURES
Date of procedure: December 18, 2018    Preop diagnosis: Cervical myofascial pain syndrome    Postop diagnosis: Same    Procedure: Right cervical trigger point injection    Surgeon: Isaiah Amos    Anesthesia: Local    EBL: 0    Indication: The patient is a

## 2018-12-19 ENCOUNTER — TELEPHONE (OUTPATIENT)
Dept: SURGERY | Facility: CLINIC | Age: 48
End: 2018-12-19

## 2018-12-19 DIAGNOSIS — M54.12 CERVICAL RADICULOPATHY: Primary | ICD-10-CM

## 2018-12-19 RX ORDER — PREGABALIN 75 MG/1
75 CAPSULE ORAL 2 TIMES DAILY
Qty: 60 CAPSULE | Refills: 2 | Status: SHIPPED
Start: 2018-12-19 | End: 2018-12-31

## 2018-12-19 NOTE — TELEPHONE ENCOUNTER
Spoke with patient to answer questions regarding MRI denial.     Advised her of denial response and that we can re-submit with update regarding condition after next OV scheduled 1/8/2019 with Dr. Sakina Rodriguez.      Patient states she spoke with insurance and the

## 2018-12-19 NOTE — TELEPHONE ENCOUNTER
Spoke with patient and notified her of Leela's message below. Patient verbalized understanding. Will call with any further questions or concerns. Advised to keep scheduled appointment as below.      Future Appointments   Date Time Provider Department C

## 2018-12-19 NOTE — TELEPHONE ENCOUNTER
We need MRI prior to any interventions. We can try a nerve pain medication. She has been unable to take gabapentin in the past due to hand swelling. She has gotten excellent relief from Lyrica in the past.  Rx given for this.   She has noted difficulty w

## 2018-12-27 ENCOUNTER — TELEPHONE (OUTPATIENT)
Dept: SURGERY | Facility: CLINIC | Age: 48
End: 2018-12-27

## 2018-12-27 DIAGNOSIS — M54.12 CERVICAL RADICULOPATHY: Primary | ICD-10-CM

## 2018-12-31 NOTE — TELEPHONE ENCOUNTER
PA denied for Lyrice. Per insurance patient needs to try and fail 5 medications and current has failed 4. Per insurance covered drug is Amitriptyline. Please advise.

## 2019-01-02 DIAGNOSIS — M54.16 CHRONIC LUMBAR RADICULOPATHY: ICD-10-CM

## 2019-01-02 RX ORDER — AMITRIPTYLINE HYDROCHLORIDE 25 MG/1
25 TABLET, FILM COATED ORAL NIGHTLY
Qty: 30 TABLET | Refills: 0 | Status: SHIPPED | OUTPATIENT
Start: 2019-01-02 | End: 2019-01-21

## 2019-01-02 NOTE — TELEPHONE ENCOUNTER
2 RX REQUESTS:    Medication: fentaNYL 75 MCG/HR Transdermal Patch 72 Hr    And    ibuprofen 800 MG Oral Tab    Date of last refill:     Medication: fentaNYL 75 MCG/HR Transdermal Patch 72 Hr (12/9/18)    And    ibuprofen 800 MG Oral Tab (11/8/18)    Date placed in this encounter.           Radiology orders and consultations:OP REFERRAL TO [facility]  The patient indicates understanding of these issues and agrees to the plan.   No Follow-up on file.     Cammie Blanco MD, 12/6/2018, 11:52 A

## 2019-01-02 NOTE — TELEPHONE ENCOUNTER
Fentynol 75mg   also need Ibuprohen called into [facility]      Patient informed of 48 hour refill policy excluding weekends and holidays. Informed patient only patient can  the prescription effective April 1, 2017.   Further explained patient will no

## 2019-01-03 RX ORDER — FENTANYL 75 UG/H
1 PATCH TRANSDERMAL
Qty: 10 PATCH | Refills: 0 | Status: SHIPPED | OUTPATIENT
Start: 2019-01-07 | End: 2019-01-29

## 2019-01-03 RX ORDER — IBUPROFEN 800 MG/1
TABLET ORAL
Qty: 90 TABLET | Refills: 0 | Status: SHIPPED | OUTPATIENT
Start: 2019-01-03 | End: 2019-01-29

## 2019-01-08 ENCOUNTER — OFFICE VISIT (OUTPATIENT)
Dept: SURGERY | Facility: CLINIC | Age: 49
End: 2019-01-08
Payer: MEDICAID

## 2019-01-08 VITALS — DIASTOLIC BLOOD PRESSURE: 78 MMHG | HEART RATE: 80 BPM | SYSTOLIC BLOOD PRESSURE: 124 MMHG

## 2019-01-08 DIAGNOSIS — M54.12 CERVICAL RADICULOPATHY: Primary | ICD-10-CM

## 2019-01-08 PROCEDURE — 99213 OFFICE O/P EST LOW 20 MIN: CPT | Performed by: PHYSICIAN ASSISTANT

## 2019-01-08 RX ORDER — PREGABALIN 75 MG/1
75 CAPSULE ORAL 2 TIMES DAILY
Qty: 60 CAPSULE | Refills: 0 | Status: SHIPPED | OUTPATIENT
Start: 2019-01-08 | End: 2019-02-21

## 2019-01-08 NOTE — PROGRESS NOTES
Neurosurgery Clinic Visit  2019    Gali Barrientos PCP:  Kaylan Mathur MD    1970 MRN [id number]     HISTORY OF PRESENT ILLNESS:  Gali Barrientos is a(n) 53year old female who is here for follow-up for cervical radiculopathy.   She continu L5-S1, stenosis at L3-4 (moderate) and L4-5 (mild) with bilateral neural foraminal stenosis.       ASSESSMENT and PLAN:  1.  Chronic low back pain with bilateral leg pain. 2.  S/p L5-S1 fusion in 2009 with Dr. Willy Doss  3.  Neuropathy.   4.  Neck pain due to

## 2019-01-08 NOTE — PROGRESS NOTES
Pt is here for follow up to review imaging MRI of the cervical.     Pt states that since LOV the pain is going down the right arm. Pt states that she is having trouble using the bathroom.      Pain scale: 8.5/10

## 2019-01-09 NOTE — TELEPHONE ENCOUNTER
A new PA for the MRI cervical can not be started until 45 days after the denial which was 12/11/18 so it can not be started until after 1/25/19. At that time the new notes from the 1/8/19 visit will be included.     This is the insurance criteria and with t

## 2019-01-29 NOTE — PROGRESS NOTES
Patient here for follow-up, med check. Pain reported in neck, shoulder, back. Placed Fentanyl patch yesterday. Would like refills on Fentanyl patch and ibuprofen 800mg. 4/10 verbally reported pain.

## 2019-01-31 ENCOUNTER — TELEPHONE (OUTPATIENT)
Dept: SURGERY | Facility: CLINIC | Age: 49
End: 2019-01-31

## 2019-01-31 DIAGNOSIS — M54.12 CERVICAL RADICULOPATHY: Primary | ICD-10-CM

## 2019-01-31 NOTE — TELEPHONE ENCOUNTER
Spoke with pharmacy who stated part of the hard copy script was missing and wanted to verify the Lyrica 1/8/19 rx. Verified Rx. Pharmacy also stated PA will be needed and they will fax information to our office.

## 2019-01-31 NOTE — TELEPHONE ENCOUNTER
Michael Peabody calling regarding patient's fentanyl patch refill     Michael Peabody stated patient called pharmacy to state she is going out of town today and would like to  her script 4 days early.      Discussed with Dr Brionna Pacheco and ok to dispense Fentanyl patch 4 days

## 2019-01-31 NOTE — TELEPHONE ENCOUNTER
Denied     Denial Reason:    Cent[employer] for Medicare & Medicaid services (CMS) rules require you must have FDA approved condition for this drug. If not, use must be supported by CMS drug references.  A review of your records show you are using this for radicul

## 2019-02-04 ENCOUNTER — HOSPITAL ENCOUNTER (OUTPATIENT)
Dept: MRI IMAGING | Age: 49
Discharge: HOME | End: 2019-02-04
Attending: PHYSICIAN ASSISTANT
Payer: MEDICAID

## 2019-02-04 DIAGNOSIS — M54.12 CERVICAL RADICULOPATHY: ICD-10-CM

## 2019-02-04 PROCEDURE — 72141 MRI NECK SPINE W/O DYE: CPT | Performed by: NEUROLOGICAL SURGERY

## 2019-02-04 NOTE — TELEPHONE ENCOUNTER
Since insurance does not cover this, she can try to apply for a copay card by going to [facility].

## 2019-02-05 NOTE — TELEPHONE ENCOUNTER
Spoke with patient, She states her insurance should now cover Lyrica as she has tried and failed 5 of the medications she needed too. Patient will call back with list of medications she failed and number to send PA.

## 2019-02-06 ENCOUNTER — PATIENT MESSAGE (OUTPATIENT)
Dept: SURGERY | Facility: CLINIC | Age: 49
End: 2019-02-06

## 2019-02-06 NOTE — TELEPHONE ENCOUNTER
From: Marquise Hernández  To: Adán Hathaway MD  Sent: 2/6/2019 3:56 PM CST  Subject: Test Results Question    Hello,    I am sending this letter for two reasons.  First I got an email from Zara Bonner she is still waiting for my letter explaining what medic

## 2019-02-14 ENCOUNTER — TELEPHONE (OUTPATIENT)
Dept: SURGERY | Facility: CLINIC | Age: 49
End: 2019-02-14

## 2019-02-14 ENCOUNTER — OFFICE VISIT (OUTPATIENT)
Dept: SURGERY | Facility: CLINIC | Age: 49
End: 2019-02-14
Payer: MEDICAID

## 2019-02-14 VITALS — SYSTOLIC BLOOD PRESSURE: 114 MMHG | DIASTOLIC BLOOD PRESSURE: 76 MMHG | HEART RATE: 80 BPM

## 2019-02-14 DIAGNOSIS — M47.22 CERVICAL SPONDYLOSIS WITH RADICULOPATHY: Primary | ICD-10-CM

## 2019-02-14 PROCEDURE — 99213 OFFICE O/P EST LOW 20 MIN: CPT | Performed by: PHYSICIAN ASSISTANT

## 2019-02-14 NOTE — H&P
Neurosurgery Clinic Visit  2019    Gee Marie PCP:  Ian Zhao MD    1970 MRN [id number]     HISTORY OF PRESENT ILLNESS:  Gee Marie is a(n) 51year old female who is here for follow-up for cervical radiculopathy.   Since her Kasey  3.  Neuropathy. Reviewed imaging with the patient. She has not gotten significant improvement with conservative treatments. Her next step would be surgical intervention. She would need a 3 level ACDF. We discussed C4-5, C5-6, and C6-7 ACDF.

## 2019-02-14 NOTE — TELEPHONE ENCOUNTER
You are scheduled for CERVICAL 4 - CERVICAL 5, CERVICAL 5 - CERVICAL 6, CERVICAL 6 - CERVICAL 7 ANTERIOR CERVICAL DISCECTOMY AND FUSION WITH ALLOGRAFT AND ALL INDICATED PROCEDURES. on 3/20/2019 with .     Pre-op instructions discussed with safia office for appointment. · You may need an Aspen cervical collar. If ordered for your surgery, the vendors are [address] [place] [address], you will need to contact them for a fitting. We will provide you with the contact information for these vendors.   · You may nee

## 2019-02-14 NOTE — PATIENT INSTRUCTIONS
Refill policies:    • Allow 2-3 business days for refills; controlled substances may take longer.   • Contact your pharmacy at least 5 days prior to running out of medication and have them send an electronic request or submit request through the “request re been approved by your insurer. Depending on your insurance carrier, approval may take 3-10 days. It is highly recommended patients contact their insurance carrier directly to determine coverage.   If test is done without insurance authorization, patient ma instructions discussed with patient and surgical packet provided:    · You will need to contact the Pre-admission department at [phone] to schedule your pre-op testing. They will get you scheduled for all the blood work, chest xray and EKG if needed. for these vendors. · You may need an external bone growth stimulator. If ordered for your surgery the vendor, someone from [employer] or [employer] will contact you either before or after your surgery.  (If appropriate)  · If you were on blood thinners (such as Cou

## 2019-02-14 NOTE — PROGRESS NOTES
Pt states symptoms are getting worse. States she is getting numbness, tingling and weakness down both arms and into the hands. Pt had steroid injections from Dr. Aysha Rojas in December. Pt states she got zero relief from this.          Pain 7  Best 4  Worst 1

## 2019-02-21 ENCOUNTER — HOSPITAL ENCOUNTER (OUTPATIENT)
Dept: MAMMOGRAPHY | Age: 49
End: 2019-02-21
Attending: FAMILY MEDICINE
Payer: MEDICAID

## 2019-02-21 ENCOUNTER — HOSPITAL ENCOUNTER (OUTPATIENT)
Dept: MAMMOGRAPHY | Facility: HOSPITAL | Age: 49
Discharge: HOME | End: 2019-02-21
Attending: FAMILY MEDICINE
Payer: MEDICAID

## 2019-02-21 ENCOUNTER — OFFICE VISIT (OUTPATIENT)
Dept: PAIN CLINIC | Facility: CLINIC | Age: 49
End: 2019-02-21
Payer: MEDICAID

## 2019-02-21 ENCOUNTER — TELEPHONE (OUTPATIENT)
Dept: PAIN CLINIC | Facility: CLINIC | Age: 49
End: 2019-02-21

## 2019-02-21 VITALS
DIASTOLIC BLOOD PRESSURE: 72 MMHG | SYSTOLIC BLOOD PRESSURE: 118 MMHG | BODY MASS INDEX: 27.8 KG/M2 | OXYGEN SATURATION: 95 % | HEART RATE: 80 BPM | WEIGHT: 173 LBS | HEIGHT: 66 IN

## 2019-02-21 DIAGNOSIS — M51.36 DDD (DEGENERATIVE DISC DISEASE), LUMBAR: ICD-10-CM

## 2019-02-21 DIAGNOSIS — M50.30 DDD (DEGENERATIVE DISC DISEASE), CERVICAL: Primary | ICD-10-CM

## 2019-02-21 DIAGNOSIS — M54.16 CHRONIC LUMBAR RADICULOPATHY: ICD-10-CM

## 2019-02-21 DIAGNOSIS — F11.90 CHRONIC NARCOTIC USE: ICD-10-CM

## 2019-02-21 DIAGNOSIS — Z12.31 VISIT FOR SCREENING MAMMOGRAM: ICD-10-CM

## 2019-02-21 DIAGNOSIS — M47.22 CERVICAL SPONDYLOSIS WITH RADICULOPATHY: ICD-10-CM

## 2019-02-21 DIAGNOSIS — M54.12 CERVICAL RADICULOPATHY: Primary | ICD-10-CM

## 2019-02-21 PROCEDURE — 77063 BREAST TOMOSYNTHESIS BI: CPT | Performed by: FAMILY MEDICINE

## 2019-02-21 PROCEDURE — 77067 SCR MAMMO BI INCL CAD: CPT | Performed by: FAMILY MEDICINE

## 2019-02-21 PROCEDURE — 99214 OFFICE O/P EST MOD 30 MIN: CPT | Performed by: ANESTHESIOLOGY

## 2019-02-21 RX ORDER — FENTANYL 75 UG/H
1 PATCH TRANSDERMAL
Qty: 10 PATCH | Refills: 0 | Status: SHIPPED | OUTPATIENT
Start: 2019-03-01 | End: 2019-03-21

## 2019-02-21 RX ORDER — IBUPROFEN 800 MG/1
TABLET ORAL
Qty: 90 TABLET | Refills: 0 | Status: SHIPPED | OUTPATIENT
Start: 2019-02-21 | End: 2019-12-17

## 2019-02-21 NOTE — PROGRESS NOTES
Name: Misael Swan   : 1970   DOS: 2019     Pain Clinic Follow Up Visit:   Patient presents with: Follow - Up: med check, MRI review, has two fentanyl patches left. States she has felt unstable on her legs recently x 2 weeks. Neck pain = 7.5 20 MG Oral Cap Take 1 capsule (20 mg total) by mouth daily. Disp: 90 capsule Rfl: 1   amphetamine-dextroamphetamine (ADDERALL) 20 MG Oral Tab Take by mouth daily.    Disp:  Rfl: 0         EXAM:   /72 (BP Location: Right arm, Patient Position: Sitting, be scheduled for trigger point injections in the near future. She also had some subjective gait instability. I referred her to physical therapy for evaluation and gait training. Total length of office visit was greater than 25 minutes.   I spent 100% t

## 2019-02-21 NOTE — PATIENT INSTRUCTIONS
Refill policies:    • Allow 2-3 business days for refills; controlled substances may take longer.   • Contact your pharmacy at least 5 days prior to running out of medication and have them send an electronic request or submit request through the “request re been approved by your insurer. Depending on your insurance carrier, approval may take 3-10 days. It is highly recommended patients contact their insurance carrier directly to determine coverage.   If test is done without insurance authorization, patient ma PROCEDURES    Appointment Date: 2/28/19  Appointment Time: 9:00 am  Physician: Sarah Plascencia    ** TO AVOID CANCELLATION AND/OR RESCHEDULING: PLEASE CALL [facility] PRE-PROCEDURE LINE -[phone] FOR DETAILED INSTRUCTIONS FIVE TO SEVEN DAYS PRIOR TO PROCEDURE**      L the event you need to cancel or reschedule your appointment, you must notify the office 24 hours prior.     Post-procedure instructions:        Please schedule a follow up visit within 2 to 4 weeks after your last procedure date   Please call our office wit

## 2019-02-21 NOTE — TELEPHONE ENCOUNTER
Faxed Rx for compound cream with face sheet to [phone] [facility]. Confirmation received. CMPD Diclofenac 0.15%, Lidocaine 2.25%, Prilocaine 2.25%    Apply 4 grams to affected area three to four times daily for treatment of pain.   Dispense one 480 gram tu

## 2019-02-25 ENCOUNTER — TELEPHONE (OUTPATIENT)
Dept: PAIN CLINIC | Facility: CLINIC | Age: 49
End: 2019-02-25

## 2019-02-25 NOTE — TELEPHONE ENCOUNTER
Spoke to Eliane Trevino at [facility] to initiate prior authorization request for [employer]. Requesting coverage starting on 2/28 for 30 days, coverage of 1 unit of procedure code .  Request pending review, Eliane Trevino requests clinical notes

## 2019-02-28 ENCOUNTER — OFFICE VISIT (OUTPATIENT)
Dept: PAIN CLINIC | Facility: CLINIC | Age: 49
End: 2019-02-28
Payer: MEDICAID

## 2019-02-28 VITALS
DIASTOLIC BLOOD PRESSURE: 74 MMHG | SYSTOLIC BLOOD PRESSURE: 110 MMHG | OXYGEN SATURATION: 98 % | WEIGHT: 170 LBS | BODY MASS INDEX: 27.32 KG/M2 | HEART RATE: 100 BPM | HEIGHT: 66 IN

## 2019-02-28 DIAGNOSIS — M47.22 CERVICAL SPONDYLOSIS WITH RADICULOPATHY: ICD-10-CM

## 2019-02-28 DIAGNOSIS — M79.18 CERVICAL MYOFASCIAL PAIN SYNDROME: ICD-10-CM

## 2019-02-28 DIAGNOSIS — F11.90 CHRONIC NARCOTIC USE: ICD-10-CM

## 2019-02-28 DIAGNOSIS — M54.12 CERVICAL RADICULOPATHY: Primary | ICD-10-CM

## 2019-02-28 PROCEDURE — 20553 NJX 1/MLT TRIGGER POINTS 3/>: CPT | Performed by: ANESTHESIOLOGY

## 2019-02-28 PROCEDURE — 96372 THER/PROPH/DIAG INJ SC/IM: CPT | Performed by: ANESTHESIOLOGY

## 2019-02-28 PROCEDURE — S0020 INJECTION, BUPIVICAINE HYDRO: HCPCS | Performed by: ANESTHESIOLOGY

## 2019-02-28 RX ORDER — BUPIVACAINE HYDROCHLORIDE 5 MG/ML
18 INJECTION, SOLUTION PERINEURAL ONCE
Status: COMPLETED | OUTPATIENT
Start: 2019-02-28 | End: 2019-02-28

## 2019-02-28 RX ORDER — METHYLPREDNISOLONE ACETATE 40 MG/ML
80 INJECTION, SUSPENSION INTRA-ARTICULAR; INTRALESIONAL; INTRAMUSCULAR; SOFT TISSUE ONCE
Status: COMPLETED | OUTPATIENT
Start: 2019-02-28 | End: 2019-02-28

## 2019-02-28 NOTE — PROCEDURES
Procedure: February 20, 2019    Preop diagnosis: Cervical myofascial pain    Positive diagnosis: Same    Procedure: Bilateral cervical and trapezius trigger point injections    Surgeon: Alma Meeks    Anesthesia: Local    Indication: The patient is a 49-yea

## 2019-02-28 NOTE — TELEPHONE ENCOUNTER
Spoke with patient in the office who stated she will get back to our office later today with surgery r/s date.

## 2019-02-28 NOTE — TELEPHONE ENCOUNTER
Spoke to [facility] at [facility], request for [employer] has been approved, authorization [id number], call reference [id number], time on call: 7:19.

## 2019-03-11 NOTE — TELEPHONE ENCOUNTER
? Requested service or care: Spine Surgery ? Our decision: Denied ?  Reason for decision and Criteria/guidelines used to make our decision: Based on [employer] Spine Surgery Guidelines, we cannot approve this request. The requested service is supported when yo

## 2019-03-11 NOTE — TELEPHONE ENCOUNTER
Message below noted. When patient calls office back will need to notify her of the below denial as well. She will also need to sign designation form.

## 2019-03-12 DIAGNOSIS — M54.12 CERVICAL RADICULOPATHY: Primary | ICD-10-CM

## 2019-03-12 DIAGNOSIS — M47.22 CERVICAL SPONDYLOSIS WITH RADICULOPATHY: ICD-10-CM

## 2019-03-15 ENCOUNTER — TELEPHONE (OUTPATIENT)
Dept: SURGERY | Facility: CLINIC | Age: 49
End: 2019-03-15

## 2019-03-15 DIAGNOSIS — M47.22 CERVICAL SPONDYLOSIS WITH RADICULOPATHY: Primary | ICD-10-CM

## 2019-03-15 NOTE — TELEPHONE ENCOUNTER
Spoke with Emily phone #: [phone]. Trying to following up on 2nd level appeal that was mailed in Feb.     Call ref #: [id number]. Spent 33 minutes on phone.  Was then advised that they can not provide a status update as the 2nd level

## 2019-03-15 NOTE — TELEPHONE ENCOUNTER
This medication has been denied and now is in the processes of a 2nd level appeal see encounter dated 2/18/19

## 2019-03-19 NOTE — TELEPHONE ENCOUNTER
Still awaiting patient call back.    -When patient calls back need to see if she is ok with surgery date of 6/5/19.  -Will also need to inform her that per SHAWN Cardona: \"Yes, should start appeals process.  Also, she should get xrays since I do not se

## 2019-03-20 NOTE — TELEPHONE ENCOUNTER
Spoke with [employer]. She stated she wanted to let office know appeal request for Lyrica did not meet guidelines for the expedited appeal and physician reviewer has marked it as a standard review with a turnaround time of 15 business days.

## 2019-03-25 ENCOUNTER — TELEPHONE (OUTPATIENT)
Dept: SURGERY | Facility: CLINIC | Age: 49
End: 2019-03-25

## 2019-03-25 NOTE — TELEPHONE ENCOUNTER
Spoke to Dr Paola Tripathi via phone. Dr Paola Tripathi stated he is ok w/ pt picking up fentanyl script as she will be due on 3/27/19. LMTCB to let pt know.

## 2019-03-26 ENCOUNTER — APPOINTMENT (OUTPATIENT)
Dept: LAB | Age: 49
End: 2019-03-26
Attending: NURSE PRACTITIONER
Payer: MEDICAID

## 2019-03-26 DIAGNOSIS — F11.90 CHRONIC NARCOTIC USE: ICD-10-CM

## 2019-03-26 PROCEDURE — 80307 DRUG TEST PRSMV CHEM ANLYZR: CPT

## 2019-03-27 LAB
6-ACETYLMORHINE CUTOFF 20 NG/ML: NOT DETECTED
7-AMINOCLONAZEPAM 40 NG/ML: NOT DETECTED
A-OH-ALPRAZOLM CUTOFF 20 NG/ML: NOT DETECTED
ALPRAZOLAM CUTOFF 40 NG/ML: NOT DETECTED
AMPHETAMINE CUTOFF 10 NG/ML: PRESENT
BARBITURATES CUTOFF 200 NG/ML: NOT DETECTED
BENZOYLECGONINE  150 NG/ML: NOT DETECTED
BUPRENORPHINE CUTOFF 5 NG/ML: NOT DETECTED
CARISOPRODOL CUTOFF 100 NG/ML: PRESENT
CODINE CUTOFF 40 NG/ML: NOT DETECTED
COLNAZEPAM CUTOFF 20 NG/ML: NOT DETECTED
CREATININE,URINE: 367.2 MG/DL
DIAZEPAM CUTOFF 50 NG/ML: NOT DETECTED
ETHYL-GLUCURONIDE 500 NG/ML: NOT DETECTED
FENTANYL CUTOFF 2 NG/ML: PRESENT
HYDROCODONE CUTOFF 40 NG/ML: PRESENT
HYDROMORPHONE CUTOFF 40 NG/ML: NOT DETECTED
LORAZEPAM CUTOFF 60 NG/ML: NOT DETECTED
MARIJUANA CUTOFF 20 NG/ML: NOT DETECTED
MDA CUTOFF 200 NG/ML: NOT DETECTED
MDEA EVE CUTOFF 200 NG/ML: NOT DETECTED
MDMA-ECSTASY CUTOFF 200 NG/ML: NOT DETECTED
MEPERIDINE MET CUTOFF 50 NG/ML: NOT DETECTED
METHADONE CUTOFF 150 NG/ML: NOT DETECTED
METHAMPHETMN CUTOFF 400 NG/ML: NOT DETECTED
METHYLPHENIDATE (CUTOFF 100 NG/ML): NOT DETECTED
MIDAZOLAM CUTOFF 20 NG/ML: NOT DETECTED
MORHINE CUTOFF 20 NG/ML: NOT DETECTED
NORBUPRENORPHINE  20 NG/ML: NOT DETECTED
NORDIAZEPAM CUTOFF 50 NG/ML: NOT DETECTED
NORFENTANYL CUTOFF 2 NG/ML: PRESENT
NORHYDRCODONE CUTOFF 100 NG/ML: PRESENT
NOROXYCODONE CUTOFF 100 NG/ML: NOT DETECTED
NOROXYMORPHNE CUTOFF 100 NG/ML: NOT DETECTED
OXAZEPAM CUTOFF 50 NG/ML: NOT DETECTED
OXYCODONE CUTOFF 40 NG/ML: NOT DETECTED
OXYMORPHONE CUTOFF 40 NG/ML: NOT DETECTED
PCP CUTOFF 25 NG/ML: NOT DETECTED
PHENTERMINE CUTOFF 100 NG/ML: NOT DETECTED
PROPOXYPHENE CUTOFF 300 NG/ML: NOT DETECTED
TAPENTADOL CUTOFF 100 NG/ML: NOT DETECTED
TAPENTADOL-O SULF 200 NG/ML: NOT DETECTED
TEMAZEPAM CUTOFF 50 NG/ML: NOT DETECTED
TRAMADOL CUTOFF 200 NG/ML: NOT DETECTED
ZOLPIDEM CUTOFF 20 NG/ML: NOT DETECTED

## 2019-03-28 ENCOUNTER — TELEPHONE (OUTPATIENT)
Dept: PAIN CLINIC | Facility: CLINIC | Age: 49
End: 2019-03-28

## 2019-03-28 NOTE — TELEPHONE ENCOUNTER
Pt scheduled fup appt 4/11 with Dr Chloe Hollis to discuss UDS results and pt would also like to discuss trigger point injections at that time

## 2019-03-28 NOTE — TELEPHONE ENCOUNTER
Please call patient and have her make an office visit to discuss her urine drug screen with Dr. Rona South prior to next refill.

## 2019-04-03 NOTE — TELEPHONE ENCOUNTER
Reviewed message below. Patient understood and also stated she needed to reschedule her appointment as she cannot make it to appointment time tomorrow.

## 2019-04-09 ENCOUNTER — HOSPITAL ENCOUNTER (OUTPATIENT)
Dept: GENERAL RADIOLOGY | Age: 49
Discharge: HOME | End: 2019-04-09
Attending: PHYSICIAN ASSISTANT
Payer: MEDICAID

## 2019-04-09 ENCOUNTER — TELEPHONE (OUTPATIENT)
Dept: SURGERY | Facility: CLINIC | Age: 49
End: 2019-04-09

## 2019-04-09 DIAGNOSIS — M47.22 CERVICAL SPONDYLOSIS WITH RADICULOPATHY: ICD-10-CM

## 2019-04-09 DIAGNOSIS — M54.12 CERVICAL RADICULOPATHY: ICD-10-CM

## 2019-04-09 PROCEDURE — 72052 X-RAY EXAM NECK SPINE 6/>VWS: CPT | Performed by: PHYSICIAN ASSISTANT

## 2019-04-09 NOTE — TELEPHONE ENCOUNTER
Spoke with patient who stated she has shoulder and arm pain and would like to know why she is getting X ray of the cervical spine and not an Xray of her shoulder or arm.      Discussed with supervisor and informed patient Xray Cervical spine was ordered bas

## 2019-04-09 NOTE — TELEPHONE ENCOUNTER
Pt states she is currently at radiology for Xray of shoulder but state she was informed by Radiology that order was not placed.  It was placed for Xray cervical spine

## 2019-04-12 ENCOUNTER — OFFICE VISIT (OUTPATIENT)
Dept: SURGERY | Facility: CLINIC | Age: 49
End: 2019-04-12
Payer: MEDICAID

## 2019-04-12 ENCOUNTER — OFFICE VISIT (OUTPATIENT)
Dept: PAIN CLINIC | Facility: CLINIC | Age: 49
End: 2019-04-12
Payer: MEDICAID

## 2019-04-12 ENCOUNTER — TELEPHONE (OUTPATIENT)
Dept: SURGERY | Facility: CLINIC | Age: 49
End: 2019-04-12

## 2019-04-12 VITALS
HEART RATE: 88 BPM | DIASTOLIC BLOOD PRESSURE: 92 MMHG | OXYGEN SATURATION: 97 % | SYSTOLIC BLOOD PRESSURE: 148 MMHG | WEIGHT: 172 LBS | BODY MASS INDEX: 27.64 KG/M2 | HEIGHT: 66 IN

## 2019-04-12 VITALS — HEART RATE: 82 BPM | SYSTOLIC BLOOD PRESSURE: 132 MMHG | DIASTOLIC BLOOD PRESSURE: 74 MMHG

## 2019-04-12 DIAGNOSIS — M47.22 CERVICAL SPONDYLOSIS WITH RADICULOPATHY: Primary | ICD-10-CM

## 2019-04-12 DIAGNOSIS — M54.12 CERVICAL RADICULOPATHY: Primary | ICD-10-CM

## 2019-04-12 DIAGNOSIS — F11.90 CHRONIC NARCOTIC USE: ICD-10-CM

## 2019-04-12 DIAGNOSIS — M54.12 CERVICAL RADICULITIS: Primary | ICD-10-CM

## 2019-04-12 DIAGNOSIS — M79.18 CERVICAL MYOFASCIAL PAIN SYNDROME: ICD-10-CM

## 2019-04-12 PROCEDURE — 99213 OFFICE O/P EST LOW 20 MIN: CPT | Performed by: PHYSICIAN ASSISTANT

## 2019-04-12 PROCEDURE — 99214 OFFICE O/P EST MOD 30 MIN: CPT | Performed by: ANESTHESIOLOGY

## 2019-04-12 NOTE — TELEPHONE ENCOUNTER
Pt seen in office today states order PT was submitted. Pt states she would need ordered changed to be seen at [facility].  Please advise

## 2019-04-12 NOTE — PROGRESS NOTES
Ann  PRE-PROCEDURE INSTRUCTIONS FOR OFFICE PROCEDURES    Appointment Date: 4/23/19  Appointment Time: 1:00 PM  Physician: Bruno Zuleta    ** TO AVOID CANCELLATION AND/OR RESCHEDULING: PLEASE CALL [facility] PRE-PROCEDURE LINE -[phone] [facility] for the procedure(s). Cancellation/Rescheduling Appointment:   In the event you need to cancel or reschedule your appointment, you must notify the office 24 hours prior.     Post-procedure instructions:        Please schedule a follow up visit within 2 t

## 2019-04-12 NOTE — PROGRESS NOTES
Name: Jonatan Martinez   : 1970   DOS: 2019     Pain Clinic Follow Up Visit:   Patient presents with:   Follow - Up: Last Fentanyl placement 19      Jonatan Martinez is a 51year old female with a history of chronic narcotic use and chronic n by mouth daily. Disp: 90 capsule Rfl: 1   amphetamine-dextroamphetamine (ADDERALL) 20 MG Oral Tab Take by mouth daily.    Disp:  Rfl: 0   CUSTOM MEDICATION CMPD Diclofenac 0.15%, Lidocaine 2.25%, Prilocaine 2.25%  Apply 4 grams to affected area three to fou ary. I will make a one-time exception. I reviewed expectations of the pain agreement with her in detail again today. If any other operation occurs then in future urine drug testing, then she will be discharged.     From a medication perspective, I

## 2019-04-12 NOTE — PATIENT INSTRUCTIONS
Ann  PRE-PROCEDURE INSTRUCTIONS FOR OFFICE PROCEDURES    Appointment Date: 4/23/19  Appointment Time: 1:00 PM  Physician: Markos Jerry    ** TO AVOID CANCELLATION AND/OR RESCHEDULING: PLEASE CALL [facility] PRE-PROCEDURE LINE -[phone] [facility] for the procedure(s). Cancellation/Rescheduling Appointment:   In the event you need to cancel or reschedule your appointment, you must notify the office 24 hours prior.     Post-procedure instructions:        Please schedule a follow up visit within 2 t

## 2019-04-12 NOTE — PROGRESS NOTES
Neurosurgery Clinic Visit  2019    Lois Borden PCP:  Parminder López MD    1970 MRN [id number]     HISTORY OF PRESENT ILLNESS:  Lois Borden is a(n) 51year old female who is here for follow-up for cervical radiculopathy.  She contin conservative treatments. She will work with PT and get trigger point injections to the right trapezius muscle. She will RTC in 6 weeks for re-evaluation. Pt appreciative.       Total time spent: 15 Minutes  Greater than 50% of the time was spent on couns

## 2019-04-12 NOTE — PROGRESS NOTES
Pt has upcoming SX 6/5/19     CERVICAL 4 - CERVICAL 5, CERVICAL 5 - CERVICAL 6, CERVICAL 6 - CERVICAL 7   ANTERIOR CERVICAL DISCECTOMY AND FUSION WITH ALLOGRAFT AND ALL INDICATED PROCEDURES     Pt states that she would like to cancel SX.

## 2019-04-16 RX ORDER — DULOXETIN HYDROCHLORIDE 30 MG/1
CAPSULE, DELAYED RELEASE ORAL
Qty: 90 CAPSULE | Refills: 0 | OUTPATIENT
Start: 2019-04-16

## 2019-04-16 NOTE — TELEPHONE ENCOUNTER
Called and spoke to [employer]. Called and spoke to Pharmacy tech. Relayed message that we received refill request for medication duloxetine. Per [place]   Pt still had 2 additional refills for medication Duloxetine.    Pharmacy tech confirm

## 2019-04-18 ENCOUNTER — TELEPHONE (OUTPATIENT)
Dept: PAIN CLINIC | Facility: CLINIC | Age: 49
End: 2019-04-18

## 2019-04-18 NOTE — TELEPHONE ENCOUNTER
Spoke to Radha Bruce at [employer], prior authorization request for [employer] initiated. Requesting coverage of CPT: 37029 for date of service 4/23/19. Radha Bruce requesting clinicals be sent to [facility] by fax at [phone].  Call reference

## 2019-04-19 NOTE — TELEPHONE ENCOUNTER
Berenice from [address] [place] [address] with approval #[id number].  Also authorization valie 4/23 to 5/23/19 for one unit

## 2019-04-22 PROBLEM — M54.2 NECK AND SHOULDER PAIN: Status: ACTIVE | Noted: 2019-04-22

## 2019-04-22 PROBLEM — M25.519 NECK AND SHOULDER PAIN: Status: ACTIVE | Noted: 2019-04-22

## 2019-04-23 RX ORDER — IBUPROFEN 800 MG/1
TABLET ORAL
Qty: 90 TABLET | Refills: 0 | Status: SHIPPED | OUTPATIENT
Start: 2019-04-23 | End: 2019-05-20

## 2019-04-23 NOTE — PROGRESS NOTES
Patient here for trigger point injections. However, she is also due for narcotic refill. At the time of her last refill, we discussed reducing her fentanyl Duragesic to 50 mcg patch during this office visit. She does start physical therapy.   She would l

## 2019-04-23 NOTE — PROCEDURES
Procedure: April 23, 2019    Preop diagnosis: Cervical and trapezius myofascial pain    Postop diagnosis: Same    Procedure: Right scapular border trigger point injections    Surgeon: Ann Banda    Anesthesia: Local    EBL: 0    Indication: The patient is

## 2019-05-06 NOTE — TELEPHONE ENCOUNTER
Patient cancelled surgery. Surgical case change request sent to cancel surgery 6/5/19. Case cancelled on [place] .  Will update PA

## 2019-05-06 NOTE — TELEPHONE ENCOUNTER
Per OV notes     ASSESSMENT and PLAN:  1.  Cervical spondylosis with radiculopathy and early myelopathy.   2.  Chronic low back pain with bilateral leg pain.             --S/p L5-S1 fusion in 2009 with Dr. Cong Lindsey  3.  Neuropathy.     Reviewed imaging with t

## 2019-05-08 ENCOUNTER — PATIENT MESSAGE (OUTPATIENT)
Dept: PAIN CLINIC | Facility: CLINIC | Age: 49
End: 2019-05-08

## 2019-05-09 NOTE — TELEPHONE ENCOUNTER
Per Dr. Hodges Cleaves note will reduce the fentanyl to 50mcg. Please document last dose pain medication, have pt supply urine specimen prior to Rx pickup. Thank you.

## 2019-05-09 NOTE — TELEPHONE ENCOUNTER
Patient email 5/8/19:    Good morning- I had only gotten a two week supply because Dr. Yuliya Grayson wanted to try bringing me down to 50 mg. The physical therapy is going very good! I had a nice two weeks of reduced pain.  I really rather stay in the 75 mg juan diego in detail again today. If any other operation occurs then in future urine drug testing, then she will be discharged.     From a medication perspective, I also discussed with her opiate rotation. The patient has been on fentanyl Duragesic for a long time.

## 2019-05-10 ENCOUNTER — APPOINTMENT (OUTPATIENT)
Dept: LAB | Age: 49
End: 2019-05-10
Attending: NURSE PRACTITIONER
Payer: MEDICAID

## 2019-05-10 DIAGNOSIS — F11.90 CHRONIC NARCOTIC USE: ICD-10-CM

## 2019-05-10 PROCEDURE — 80307 DRUG TEST PRSMV CHEM ANLYZR: CPT

## 2019-05-10 NOTE — TELEPHONE ENCOUNTER
From: Sven Asencio  To: Sukhwinder Velázquez MD  Sent: 5/8/2019 7:03 AM CDT  Subject: Prescription Question    Good morning- I had only gotten a two week supply because Dr. Angelia Velarde wanted to try bringing me down to 50 mg. The physical therapy is going very good!

## 2019-05-10 NOTE — TELEPHONE ENCOUNTER
Pt here in office for Rx pickup. Narcotic agreement already on file. States last dose of Fentanyl was applied on 5/7/19. Pt sent to lab for urine specimen. Pt to return to pickup Rx.

## 2019-05-21 RX ORDER — IBUPROFEN 800 MG/1
TABLET ORAL
Qty: 90 TABLET | Refills: 0 | Status: SHIPPED | OUTPATIENT
Start: 2019-05-21 | End: 2019-06-21

## 2019-05-21 NOTE — TELEPHONE ENCOUNTER
"Medication: IBUPROFEN 800 MG Oral Tab    Date of last refill: 4/23/19  [place]    Last office visit: 4/12/19  Due back to clinic per last office note:  Not indicated  Date next office visit scheduled:  None scheduled I spent 100% this time in face-to-face consultation discussing her urine drug testing results, treatment of myofascial pain, and next steps with her.     Radiology orders and consultations:OP REFERRAL TO [facility]  The patient indicat calculations congruent. I have reviewed the above chemotherapy order and that my calculation of the final dose and BSA (when applicable) corroborate those calculations of the  pharmacist. Discrepancies of 5% or greater in the written dose have been addressed and documented within the EPIC Progress notes.    Jocelin Denise, PharmD

## 2019-06-03 ENCOUNTER — PATIENT MESSAGE (OUTPATIENT)
Dept: PAIN CLINIC | Facility: CLINIC | Age: 49
End: 2019-06-03

## 2019-06-03 DIAGNOSIS — M47.22 CERVICAL SPONDYLOSIS WITH RADICULOPATHY: ICD-10-CM

## 2019-06-03 DIAGNOSIS — M54.12 CERVICAL RADICULOPATHY: Primary | ICD-10-CM

## 2019-06-03 NOTE — TELEPHONE ENCOUNTER
Medication: fentaNYL 50 MCG/HR Transdermal Patch 72 Hr    Date of last refill: 5/10/19   [place] 3[place]36/77    Last office visit: 4/23/19 with Dr Eddie Key for Right scapular border trigger point injections    Due back to clinic

## 2019-06-04 ENCOUNTER — APPOINTMENT (OUTPATIENT)
Dept: LAB | Age: 49
End: 2019-06-04
Attending: NURSE PRACTITIONER
Payer: MEDICAID

## 2019-06-04 DIAGNOSIS — Z79.891 LONG TERM CURRENT USE OF OPIATE ANALGESIC: Primary | ICD-10-CM

## 2019-06-04 DIAGNOSIS — Z79.891 LONG TERM CURRENT USE OF OPIATE ANALGESIC: ICD-10-CM

## 2019-06-04 PROCEDURE — 80307 DRUG TEST PRSMV CHEM ANLYZR: CPT

## 2019-06-04 RX ORDER — FENTANYL 50 UG/H
1 PATCH TRANSDERMAL
Qty: 10 PATCH | Refills: 0 | Status: SHIPPED | OUTPATIENT
Start: 2019-06-05 | End: 2019-07-23

## 2019-06-04 RX ORDER — FENTANYL 50 UG/H
1 PATCH TRANSDERMAL
Qty: 10 PATCH | Refills: 0 | Status: SHIPPED | OUTPATIENT
Start: 2019-06-08 | End: 2019-07-23

## 2019-06-04 NOTE — TELEPHONE ENCOUNTER
Pt here in office for Rx pickup. States last dose of Fentanyl patch placed Sunday. Pt sent to lab for urine specimen. Pt to return to pickup Rx. Rx pended with 8/5/19 start date.

## 2019-06-21 ENCOUNTER — TELEPHONE (OUTPATIENT)
Dept: PAIN CLINIC | Facility: CLINIC | Age: 49
End: 2019-06-21

## 2019-06-24 RX ORDER — IBUPROFEN 800 MG/1
TABLET ORAL
Qty: 90 TABLET | Refills: 0 | Status: SHIPPED | OUTPATIENT
Start: 2019-06-24 | End: 2019-06-27

## 2019-06-26 NOTE — TELEPHONE ENCOUNTER
Spoke to pt who states she needs to see Dr Armani Jackman because she needs to get off her fentanyl script. Pt states \"somehow, the entire box got thrown out\" but she does not want to take anything to avoid withdrawals or continue using fentanyl patches. Pt states she is done using this medication and she is ready to get off of it. Pt requesting to see Dr Armani Jackman ASAP. Scheduled OV for 6/27/19 at 0800. Pt does not currently have a patch applied and does not have any left. Pt wants a non-opioid medication to assist w/ withdrawals. Discussed w/ APN Gail who stated pt needs to be made aware that Dr Armani Jackman will not be able to see her. Contacted pt again to discuss and pt asked what I would advise. I advised pt to be seen by APRN. Pt in agreement w/ this plan.

## 2019-06-27 ENCOUNTER — TELEPHONE (OUTPATIENT)
Dept: PAIN CLINIC | Facility: CLINIC | Age: 49
End: 2019-06-27

## 2019-06-27 NOTE — PROGRESS NOTES
Reporting pain in upper and lower back. 6/10    Was doing PT, doing PT exercises at home.     Last Fentanyl patch placement Sunday of this week, patient would like to discuss getting off of patch

## 2019-06-27 NOTE — PROGRESS NOTES
Name: Leonidas Mitchell   : 1970   DOS: 2019     Pain Clinic Follow Up Visit:   Leonidas Mitchell is a 51year old female who presents for medication check. Patient was on fentanyl 50 mcg every 3 days.  Per patient she lost her last 3 patches and la Tab TAKE 2 TABLETS BY MOUTH DAILY (Patient taking differently: Take 1 tablet by mouth daily.  ) Disp: 60 tablet Rfl: 0   Carisoprodol 350 MG Oral Tab Take 1 tablet (350 mg total) by mouth 4 (four) times daily as needed for Muscle spasms.  Disp: 60 tablet Rf plan.  Return if symptoms worsen or fail to improve.     Grecia Saldaña, APRN, 6/27/2019, 9:08 AM

## 2019-06-27 NOTE — TELEPHONE ENCOUNTER
Please call patient and let her know that I spoke with Jorden ESCOBAR with MAT clinic who states they are not able to except patient's insurance and she will have to call her insurance to get suboxone providers in her network.

## 2019-06-27 NOTE — TELEPHONE ENCOUNTER
Spoke to pt who states MAT clinic does not take her insurance. Providers in her network are in the city or an overnight facility w/ is not feasible since she is a single mom.  Pt asking for a weaning script for fentanyl (1 box of 25 mg patches, 1 box 12.5,

## 2019-06-27 NOTE — TELEPHONE ENCOUNTER
I reviewed her visit today with BLAKE Pena. The patient has a history of running out early on multiple occasions. The expectation of the pain contract has been reinforced with her multiple times.   I am not able to provide refill for her given that

## 2019-06-28 NOTE — TELEPHONE ENCOUNTER
Spoke with patient. She stated she went to the ED and was told they don't take Fentanyl patients ? ?     Patient was advised to see a [facility] provider and has an appt with Dr Myrna Niar on  7/12/19     Patient stated she called the MAT clinic and was advised she

## 2019-06-28 NOTE — TELEPHONE ENCOUNTER
Spoke to pt to relay info below. Advised pt to ensure that she is requesting the info for a provider who will prescribe suboxone w/in her ins network as opposed to a clinic or program. Pt upset as she feels Dr Aysha Rojas is not helping her.  Extensively explained

## 2019-07-01 NOTE — TELEPHONE ENCOUNTER
LMTCB prior to appt scheduled for 7/2/19 at 0900. Per Dr Iraida Lin, if pt's last dose/patch of Fentanyl was removed last Tuesday, 6/25/19, withdrawal process should essentially be over.  Therefore, Dr Iraida Lin would NOT be willing to write another script and appt fo

## 2019-07-01 NOTE — TELEPHONE ENCOUNTER
I will be willing to give her a 2-week supply until she sees Dr. Mamie Lee.  Please have her  script tomorrow

## 2019-07-22 RX ORDER — IBUPROFEN 800 MG/1
TABLET ORAL
Qty: 90 TABLET | Refills: 0 | Status: SHIPPED | OUTPATIENT
Start: 2019-07-22 | End: 2019-07-23

## 2019-07-30 ENCOUNTER — TELEPHONE (OUTPATIENT)
Dept: PAIN CLINIC | Facility: CLINIC | Age: 49
End: 2019-07-30

## 2019-07-30 ENCOUNTER — OFFICE VISIT (OUTPATIENT)
Dept: PAIN CLINIC | Facility: CLINIC | Age: 49
End: 2019-07-30
Payer: MEDICAID

## 2019-07-30 VITALS
SYSTOLIC BLOOD PRESSURE: 116 MMHG | DIASTOLIC BLOOD PRESSURE: 60 MMHG | OXYGEN SATURATION: 94 % | HEIGHT: 66 IN | HEART RATE: 108 BPM | WEIGHT: 165 LBS | BODY MASS INDEX: 26.52 KG/M2

## 2019-07-30 DIAGNOSIS — F11.23 OPIOID WITHDRAWAL (HCC): ICD-10-CM

## 2019-07-30 DIAGNOSIS — F11.90 CHRONIC NARCOTIC USE: ICD-10-CM

## 2019-07-30 DIAGNOSIS — G89.29 OTHER CHRONIC PAIN: Primary | ICD-10-CM

## 2019-07-30 DIAGNOSIS — M51.36 DDD (DEGENERATIVE DISC DISEASE), LUMBAR: ICD-10-CM

## 2019-07-30 PROBLEM — F11.93 OPIOID WITHDRAWAL (HCC): Status: ACTIVE | Noted: 2019-07-30

## 2019-07-30 PROCEDURE — 99214 OFFICE O/P EST MOD 30 MIN: CPT | Performed by: ANESTHESIOLOGY

## 2019-07-30 NOTE — TELEPHONE ENCOUNTER
Medical clearance needed- no    Pt seen in OV today by Dr. Kira Narayan and recommended for facet (X 1). Please begin PA process for procedure(s).      Laterality: bilateral  Level(s): L3-5      Implanted cardiac device/SCS/PNS: n/a

## 2019-07-30 NOTE — PROGRESS NOTES
Name: Francisca Bal   : 1970   DOS: 2019     Pain Clinic Follow Up Visit:   Patient presents with:   Follow - Up      Francisca Bal is a 51year old female with a history of opiate use, who was referred back by  due to to discuss further non-antalgic gait.   Gait is normal.  Neck: Full range of motion  Back: Facet loading positive      IMAGES:   No new imaging      ASSESSMENT AND PLAN:   Other chronic pain  (primary encounter diagnosis)  DDD (degenerative disc disease), lumbar  Chronic narc

## 2019-07-30 NOTE — PROGRESS NOTES
Patient presents in office today with reported pain in low back    Current pain level reported = 7/10

## 2019-07-31 NOTE — TELEPHONE ENCOUNTER
Prior authorization request completed for: Bilateral FACET   Authorization #[id number]     Authorization dates: 07/31/19 thru 09/29/19   CPT codes approved: 54656,33205,59450  Number of visits/dates of service approved: 1  Physician: Nelly Cruz     Patient can b

## 2019-08-01 NOTE — TELEPHONE ENCOUNTER
Call transferred by [facility]. Spoke with patient and scheduled injection(s). Reviewed prior auth process and pre-op instructions. Patient verbalized understanding. Agreeable to holding ibuprofen medications as indicated in instructions listed below.  Discussed m Please update us prior to the procedure if you are experiencing any symptoms of infection such as cough, fever, chills, urinary symptoms, or have recently been prescribed antibiotics, have open wounds, have recently had surgery or dental procedures. ? Eliquis (Apixaban) 3 days  ? Xarelto (Rivaroxaban) 3 days  ? Lovenox (Enoxaparin) 24 hours  ? Aspirin  ? 81mg 24 hours  ? Greater than 81 mg (325mg) 7 days  ? Coumadin       5 days  ? Procedure may be cancelled if INR is elevated. ?  Excedrin (with aspi It is normal to have increased pain at injection site for up to 3-5 days after procedure, you can        use heat or ice (20 minutes on 20 minutes off) for comfort.     ** TO AVOID CANCELLATION AND/OR RESCHEDULING: PLEASE CALL [facility] PRE-PROCEDURE LINE A The facet joint injection involves inserting a needle through skin and deeper tissues. There is some pain involved. However the skin and deeper tissues are numbed with a local anesthetic before inserting the injection needle.  Once numbed, placing the injec If the first facet joint injection does not relieve your symptoms within one week, a second injection maybe recommended.  Similarly, if the second facet joint injection does not completely relieve your symptoms in 1 to 2 weeks a third injection maybe recomm

## 2019-08-05 ENCOUNTER — HOSPITAL ENCOUNTER (OUTPATIENT)
Facility: HOSPITAL | Age: 49
Setting detail: HOSPITAL OUTPATIENT SURGERY
Discharge: HOME | End: 2019-08-05
Attending: ANESTHESIOLOGY | Admitting: ANESTHESIOLOGY
Payer: MEDICAID

## 2019-08-05 ENCOUNTER — APPOINTMENT (OUTPATIENT)
Dept: GENERAL RADIOLOGY | Facility: HOSPITAL | Age: 49
End: 2019-08-05
Attending: ANESTHESIOLOGY
Payer: MEDICAID

## 2019-08-05 VITALS
RESPIRATION RATE: 16 BRPM | OXYGEN SATURATION: 100 % | TEMPERATURE: 97 F | HEART RATE: 102 BPM | SYSTOLIC BLOOD PRESSURE: 131 MMHG | DIASTOLIC BLOOD PRESSURE: 93 MMHG

## 2019-08-05 DIAGNOSIS — F11.23 OPIOID WITHDRAWAL (HCC): ICD-10-CM

## 2019-08-05 DIAGNOSIS — M51.36 DDD (DEGENERATIVE DISC DISEASE), LUMBAR: ICD-10-CM

## 2019-08-05 DIAGNOSIS — F11.90 CHRONIC NARCOTIC USE: ICD-10-CM

## 2019-08-05 DIAGNOSIS — G89.29 OTHER CHRONIC PAIN: ICD-10-CM

## 2019-08-05 LAB
EXPIRATION DATE: NORMAL
POCT LOT NUMBER: NORMAL
POCT URINE PREGNANCY: NEGATIVE
PROCEDURE CONTROL: YES

## 2019-08-05 PROCEDURE — 81025 URINE PREGNANCY TEST: CPT | Performed by: ANESTHESIOLOGY

## 2019-08-05 PROCEDURE — 99152 MOD SED SAME PHYS/QHP 5/>YRS: CPT | Performed by: ANESTHESIOLOGY

## 2019-08-05 PROCEDURE — 3E0U3BZ INTRODUCTION OF ANESTHETIC AGENT INTO JOINTS, PERCUTANEOUS APPROACH: ICD-10-PCS | Performed by: ANESTHESIOLOGY

## 2019-08-05 PROCEDURE — 3E0U33Z INTRODUCTION OF ANTI-INFLAMMATORY INTO JOINTS, PERCUTANEOUS APPROACH: ICD-10-PCS | Performed by: ANESTHESIOLOGY

## 2019-08-05 PROCEDURE — BR161ZZ FLUOROSCOPY OF LUMBAR FACET JOINT(S) USING LOW OSMOLAR CONTRAST: ICD-10-PCS | Performed by: ANESTHESIOLOGY

## 2019-08-05 RX ORDER — MIDAZOLAM HYDROCHLORIDE 1 MG/ML
INJECTION INTRAMUSCULAR; INTRAVENOUS AS NEEDED
Status: DISCONTINUED | OUTPATIENT
Start: 2019-08-05 | End: 2019-08-05

## 2019-08-05 RX ORDER — LIDOCAINE HYDROCHLORIDE 10 MG/ML
INJECTION, SOLUTION EPIDURAL; INFILTRATION; INTRACAUDAL; PERINEURAL AS NEEDED
Status: DISCONTINUED | OUTPATIENT
Start: 2019-08-05 | End: 2019-08-05

## 2019-08-05 RX ORDER — BUPIVACAINE HYDROCHLORIDE 5 MG/ML
INJECTION, SOLUTION EPIDURAL; INTRACAUDAL AS NEEDED
Status: DISCONTINUED | OUTPATIENT
Start: 2019-08-05 | End: 2019-08-05

## 2019-08-05 RX ORDER — SODIUM CHLORIDE, SODIUM LACTATE, POTASSIUM CHLORIDE, CALCIUM CHLORIDE 600; 310; 30; 20 MG/100ML; MG/100ML; MG/100ML; MG/100ML
100 INJECTION, SOLUTION INTRAVENOUS CONTINUOUS
Status: DISCONTINUED | OUTPATIENT
Start: 2019-08-05 | End: 2019-08-05

## 2019-08-05 RX ORDER — METHYLPREDNISOLONE ACETATE 40 MG/ML
INJECTION, SUSPENSION INTRA-ARTICULAR; INTRALESIONAL; INTRAMUSCULAR; SOFT TISSUE AS NEEDED
Status: DISCONTINUED | OUTPATIENT
Start: 2019-08-05 | End: 2019-08-05

## 2019-08-05 RX ORDER — ONDANSETRON 2 MG/ML
4 INJECTION INTRAMUSCULAR; INTRAVENOUS ONCE AS NEEDED
Status: CANCELLED | OUTPATIENT
Start: 2019-08-05 | End: 2019-08-05

## 2019-08-05 RX ORDER — DIPHENHYDRAMINE HYDROCHLORIDE 50 MG/ML
50 INJECTION INTRAMUSCULAR; INTRAVENOUS ONCE AS NEEDED
Status: CANCELLED | OUTPATIENT
Start: 2019-08-05 | End: 2019-08-05

## 2019-08-05 RX ORDER — ONDANSETRON 2 MG/ML
INJECTION INTRAMUSCULAR; INTRAVENOUS
Status: COMPLETED
Start: 2019-08-05 | End: 2019-08-05

## 2019-08-05 RX ORDER — ONDANSETRON 2 MG/ML
4 INJECTION INTRAMUSCULAR; INTRAVENOUS ONCE AS NEEDED
Status: COMPLETED | OUTPATIENT
Start: 2019-08-05 | End: 2019-08-05

## 2019-08-05 NOTE — OPERATIVE REPORT
[facility]  Operative Report  2019     Chava Munroe Patient Status:  Hospital Outpatient Surgery    1970 MRN [id number]   [facility] ENDOSCOPY Attending Alex Nelson MD   Hosp Day # 0 PCP Whitney Laureano MD     Indicao atraumatically under fluoroscopic guidance. Following negative aspiration for CSF and blood, approximately 1 mL of 1% lidocaine with 10 mg of methylprednisolone was injected into each joint without complication.   The needle was withdrawn with stylet in si

## 2019-08-05 NOTE — H&P
History & Physical Examination    Patient Name: Kelby Menendez  MRN: [id number]  [facility]: [id number]  YOB: 1970    Pre-Operative Diagnosis:  Opioid withdrawal ([facility] [F11.23]  Chronic narcotic use [F11.90]  DDD (degenerative disc disease), lumbar [ cancer screening 6-    pt states normal   • Papanicolaou smear of cervix with high grade squamous intraepithelial lesion (HGSIL) 03/21/11   • Sexually transmitted disease     HPV   • Substance [facility] ([facility] [id number]    cocaine   • Urinary incontinenc Back problems     Social History    Tobacco Use      Smoking status: Former Smoker        Years: 20.00        Quit date: 2011        Years since quittin.2      Smokeless tobacco: Current User      Tobacco comment: USES E-CIG    Alcohol use:  Yes

## 2019-08-18 ENCOUNTER — HOSPITAL ENCOUNTER (EMERGENCY)
Facility: HOSPITAL | Age: 49
Discharge: HOME | End: 2019-08-18
Attending: EMERGENCY MEDICINE
Payer: MEDICAID

## 2019-08-18 VITALS
BODY MASS INDEX: 25.71 KG/M2 | HEIGHT: 66 IN | WEIGHT: 160 LBS | TEMPERATURE: 98 F | HEART RATE: 98 BPM | OXYGEN SATURATION: 98 % | DIASTOLIC BLOOD PRESSURE: 82 MMHG | RESPIRATION RATE: 18 BRPM | SYSTOLIC BLOOD PRESSURE: 122 MMHG

## 2019-08-18 DIAGNOSIS — K08.89 PAIN, DENTAL: Primary | ICD-10-CM

## 2019-08-18 PROCEDURE — 99283 EMERGENCY DEPT VISIT LOW MDM: CPT

## 2019-08-18 RX ORDER — HYDROCODONE BITARTRATE AND ACETAMINOPHEN 5; 325 MG/1; MG/1
1-2 TABLET ORAL EVERY 6 HOURS PRN
Qty: 10 TABLET | Refills: 0 | Status: SHIPPED | OUTPATIENT
Start: 2019-08-18 | End: 2019-08-25

## 2019-08-18 RX ORDER — PENICILLIN V POTASSIUM 500 MG/1
500 TABLET ORAL 4 TIMES DAILY
Qty: 40 TABLET | Refills: 0 | Status: SHIPPED | OUTPATIENT
Start: 2019-08-18 | End: 2019-08-28

## 2019-08-18 RX ORDER — HYDROCODONE BITARTRATE AND ACETAMINOPHEN 5; 325 MG/1; MG/1
1 TABLET ORAL ONCE
Status: COMPLETED | OUTPATIENT
Start: 2019-08-18 | End: 2019-08-18

## 2019-08-18 NOTE — ED INITIAL ASSESSMENT (HPI)
PT c/o infected tooth that got pulled on Wednesday, however, today Pt rpt that site is now swollen, painful and puss coming out.  Pt has appointment Monday for dentist to have it re-checked

## 2019-08-18 NOTE — ED PROVIDER NOTES
Patient Seen in: [facility] Emergency Department    History   Patient presents with:  Dental Problem (dental)    Stated Complaint: infected tooth, tooth pulled on wednesday, pain    HPI    This is a pleasant 71-year-old female presents emerged depar Performed by Nilay George MD at [facility] ENDOSCOPY   • LUMBAR INTERLAMINAR EPIDURAL INJECTION N/A 2018    Performed by Nilay George MD at [facility] MAIN OR   •       X2   • OTHER SURGICAL HISTORY      bunions bilateral   • OTHER SURGICAL HISTORY   Impression:  Pain, dental  (primary encounter diagnosis)    Disposition:  There is no disposition on file for this visit. There is no disposition time on file for this visit.     Follow-up:  MD Braulio De La Fuente [address] [place] [address]

## 2019-08-19 RX ORDER — IBUPROFEN 800 MG/1
TABLET ORAL
Qty: 90 TABLET | Refills: 0 | Status: SHIPPED | OUTPATIENT
Start: 2019-08-19 | End: 2019-09-13

## 2019-09-13 RX ORDER — IBUPROFEN 800 MG/1
TABLET ORAL
Qty: 90 TABLET | Refills: 0 | Status: SHIPPED | OUTPATIENT
Start: 2019-09-13 | End: 2019-10-12

## 2019-09-13 NOTE — TELEPHONE ENCOUNTER
Medication: IBUPROFEN 800 MG Oral Tab    Date of last refill: 8/19/19  [place]    Last office visit: 7/30/19  Due back to clinic per last office note:  Not indicated  Date next office visit scheduled:  None scheduled

## 2019-10-14 RX ORDER — IBUPROFEN 800 MG/1
TABLET ORAL
Qty: 90 TABLET | Refills: 0 | Status: SHIPPED | OUTPATIENT
Start: 2019-10-14 | End: 2019-11-23

## 2019-11-25 RX ORDER — IBUPROFEN 800 MG/1
TABLET ORAL
Qty: 90 TABLET | Refills: 0 | Status: SHIPPED | OUTPATIENT
Start: 2019-11-25 | End: 2019-12-30

## 2019-12-17 ENCOUNTER — TELEPHONE (OUTPATIENT)
Dept: PAIN CLINIC | Facility: CLINIC | Age: 49
End: 2019-12-17

## 2019-12-17 ENCOUNTER — OFFICE VISIT (OUTPATIENT)
Dept: PAIN CLINIC | Facility: CLINIC | Age: 49
End: 2019-12-17
Payer: MEDICAID

## 2019-12-17 VITALS
DIASTOLIC BLOOD PRESSURE: 80 MMHG | SYSTOLIC BLOOD PRESSURE: 118 MMHG | HEIGHT: 66 IN | HEART RATE: 87 BPM | BODY MASS INDEX: 25.39 KG/M2 | WEIGHT: 158 LBS | OXYGEN SATURATION: 97 %

## 2019-12-17 DIAGNOSIS — M51.34 DDD (DEGENERATIVE DISC DISEASE), THORACIC: ICD-10-CM

## 2019-12-17 DIAGNOSIS — M51.36 DDD (DEGENERATIVE DISC DISEASE), LUMBAR: Primary | ICD-10-CM

## 2019-12-17 PROCEDURE — 99213 OFFICE O/P EST LOW 20 MIN: CPT | Performed by: ANESTHESIOLOGY

## 2019-12-17 RX ORDER — ARIPIPRAZOLE 2 MG/1
TABLET ORAL
Refills: 2 | Status: ON HOLD | COMMUNITY
Start: 2019-11-23 | End: 2021-03-03

## 2019-12-17 RX ORDER — DULOXETIN HYDROCHLORIDE 60 MG/1
CAPSULE, DELAYED RELEASE ORAL
Refills: 2 | COMMUNITY
Start: 2019-11-19

## 2019-12-17 RX ORDER — LAMOTRIGINE 25 MG/1
TABLET ORAL
Refills: 1 | Status: ON HOLD | COMMUNITY
Start: 2019-11-23 | End: 2021-03-03

## 2019-12-17 RX ORDER — METHOCARBAMOL 750 MG/1
750 TABLET, FILM COATED ORAL 4 TIMES DAILY
Qty: 120 TABLET | Refills: 2 | Status: SHIPPED | OUTPATIENT
Start: 2019-12-17 | End: 2020-02-25

## 2019-12-17 NOTE — PROGRESS NOTES
Patient presents in office today with reported pain in lower back left leg, rib cage    Current pain level reported = 5/10

## 2019-12-17 NOTE — TELEPHONE ENCOUNTER
Medical clearance needed- no    Implanted cardiac device/SCS/PNS: n/a    Pt seen in OV today by Dr. Callie Ellis and recommended for Medial Branch Block and TESI (X 2). Please begin PA process for procedure(s).      Laterality: Bilateral  Level(s): L2-5    Lateral

## 2019-12-17 NOTE — TELEPHONE ENCOUNTER
Started PA through Steven Caicedo for Bilateral Lumbar MBB ([id number]) , Uploaded Clinicals     Case #:[id number]

## 2019-12-17 NOTE — PROGRESS NOTES
Name: Keeley Andres   : 1970   DOS: 2019     Pain Clinic Follow Up Visit:   Patient presents with: Follow - Up      Keeley Andres is a 51year old female with a history of thoracic and lumbar pain here for follow-up.   The patient was last female in no acute distress. Neurologic[de-identified] WNL-Orientation to time, place and person, normal mood & affect, concentration & attention span intact. Inspection:  Ambulates with well-coordinated, fluid, non-antalgic gait.   Gait is normal.  Neck: Full range

## 2019-12-23 NOTE — TELEPHONE ENCOUNTER
Bilateral Lumbar MBB ([id number]) Denied   Case Number: [id number]    Denial Reason:    Based on [facility] Comprehensive Musculoskeletal Management Guideline  - Facet  Joint Injections/Medial Branch Blocks (used to treat spine pain), we cannot

## 2019-12-30 RX ORDER — IBUPROFEN 800 MG/1
TABLET ORAL
Qty: 90 TABLET | Refills: 0 | Status: SHIPPED | OUTPATIENT
Start: 2019-12-30 | End: 2020-02-05

## 2019-12-30 NOTE — TELEPHONE ENCOUNTER
Appeal letter drafted and placed on Dr Eddie Singh desangela for review and signature. Need delegation form signed by patient. Left a detailed message for patient informing above.      When returns call please inform patient of Insurance delegation form requi

## 2020-01-20 ENCOUNTER — TELEPHONE (OUTPATIENT)
Dept: SURGERY | Facility: CLINIC | Age: 50
End: 2020-01-20

## 2020-01-23 NOTE — TELEPHONE ENCOUNTER
Received delegation form signed by patient. Faxed Appeal letter, [address] notes and delegation form to [address] [facility]. Confirmation received.

## 2020-01-24 NOTE — TELEPHONE ENCOUNTER
Received Fax from [facility] that Appeal for Lumbar FACET ([id number]) was received and is under review, written decision should be made before 02/13/20.

## 2020-01-27 NOTE — TELEPHONE ENCOUNTER
Appeal Overturned     Prior authorization request completed for: Bilateral Lumbar MBB  Authorization #[id number]     Authorization dates: 01/24/20 - 03/24/20  CPT codes approved: 16685,03448,29031  Number of visits/dates of service approved: 1  Physician:

## 2020-01-27 NOTE — TELEPHONE ENCOUNTER
Patient scheduled for procedure, pre-procedure instructions reviewed. Patient prefers conscious sedation, reviewed ride and fasting requirements. Patient advised to hold ibuprofen for 24 hours prior to procedure.  Patient verbalized understanding, no furthe ? Please note-No prescriptions will be written by Pain Clinic in OR on the day of procedure. If you require a refill of medications, please contact the office 48 hours prior to your procedure.   ? If you have an implanted Spinal Cord or Peripheral Nerve Sti Please contact your insurance carrier to determine what your financial  responsibility will be for the procedure(s).     Cancellation/Rescheduling Appointment:   In the event you need to cancel or reschedule your appointment, you must notify the office 24

## 2020-01-30 ENCOUNTER — TELEPHONE (OUTPATIENT)
Dept: PAIN CLINIC | Facility: CLINIC | Age: 50
End: 2020-01-30

## 2020-01-30 NOTE — TELEPHONE ENCOUNTER
Left message for patient, confirmed procedure date of 2/3/20 and to be checked in at outpatient registration at 1130 am with patient's . Encouraged patient to listen to the pre-procedure line at [phone].  Patient instructed to call office if ther

## 2020-02-03 ENCOUNTER — APPOINTMENT (OUTPATIENT)
Dept: GENERAL RADIOLOGY | Facility: HOSPITAL | Age: 50
End: 2020-02-03
Attending: ANESTHESIOLOGY
Payer: MEDICAID

## 2020-02-03 ENCOUNTER — HOSPITAL ENCOUNTER (OUTPATIENT)
Facility: HOSPITAL | Age: 50
Setting detail: HOSPITAL OUTPATIENT SURGERY
Discharge: HOME | End: 2020-02-03
Attending: ANESTHESIOLOGY | Admitting: ANESTHESIOLOGY
Payer: MEDICAID

## 2020-02-03 VITALS
SYSTOLIC BLOOD PRESSURE: 115 MMHG | RESPIRATION RATE: 16 BRPM | OXYGEN SATURATION: 100 % | HEART RATE: 82 BPM | DIASTOLIC BLOOD PRESSURE: 78 MMHG | TEMPERATURE: 98 F

## 2020-02-03 LAB
POCT LOT NUMBER: NORMAL
POCT URINE PREGNANCY: NEGATIVE

## 2020-02-03 PROCEDURE — 3E0T33Z INTRODUCTION OF ANTI-INFLAMMATORY INTO PERIPHERAL NERVES AND PLEXI, PERCUTANEOUS APPROACH: ICD-10-PCS | Performed by: ANESTHESIOLOGY

## 2020-02-03 PROCEDURE — 81025 URINE PREGNANCY TEST: CPT | Performed by: ANESTHESIOLOGY

## 2020-02-03 PROCEDURE — 3E0T3BZ INTRODUCTION OF ANESTHETIC AGENT INTO PERIPHERAL NERVES AND PLEXI, PERCUTANEOUS APPROACH: ICD-10-PCS | Performed by: ANESTHESIOLOGY

## 2020-02-03 PROCEDURE — 99152 MOD SED SAME PHYS/QHP 5/>YRS: CPT | Performed by: ANESTHESIOLOGY

## 2020-02-03 PROCEDURE — BR161ZZ FLUOROSCOPY OF LUMBAR FACET JOINT(S) USING LOW OSMOLAR CONTRAST: ICD-10-PCS | Performed by: ANESTHESIOLOGY

## 2020-02-03 RX ORDER — LIDOCAINE HYDROCHLORIDE 10 MG/ML
INJECTION, SOLUTION EPIDURAL; INFILTRATION; INTRACAUDAL; PERINEURAL AS NEEDED
Status: DISCONTINUED | OUTPATIENT
Start: 2020-02-03 | End: 2020-02-03

## 2020-02-03 RX ORDER — METHYLPREDNISOLONE ACETATE 40 MG/ML
INJECTION, SUSPENSION INTRA-ARTICULAR; INTRALESIONAL; INTRAMUSCULAR; SOFT TISSUE AS NEEDED
Status: DISCONTINUED | OUTPATIENT
Start: 2020-02-03 | End: 2020-02-03

## 2020-02-03 RX ORDER — SODIUM CHLORIDE, SODIUM LACTATE, POTASSIUM CHLORIDE, CALCIUM CHLORIDE 600; 310; 30; 20 MG/100ML; MG/100ML; MG/100ML; MG/100ML
100 INJECTION, SOLUTION INTRAVENOUS CONTINUOUS
Status: DISCONTINUED | OUTPATIENT
Start: 2020-02-03 | End: 2020-02-03

## 2020-02-03 RX ORDER — MIDAZOLAM HYDROCHLORIDE 1 MG/ML
INJECTION INTRAMUSCULAR; INTRAVENOUS AS NEEDED
Status: DISCONTINUED | OUTPATIENT
Start: 2020-02-03 | End: 2020-02-03

## 2020-02-03 RX ORDER — BUPIVACAINE HYDROCHLORIDE 5 MG/ML
INJECTION, SOLUTION EPIDURAL; INTRACAUDAL AS NEEDED
Status: DISCONTINUED | OUTPATIENT
Start: 2020-02-03 | End: 2020-02-03

## 2020-02-03 RX ORDER — ONDANSETRON 2 MG/ML
4 INJECTION INTRAMUSCULAR; INTRAVENOUS ONCE AS NEEDED
Status: DISCONTINUED | OUTPATIENT
Start: 2020-02-03 | End: 2020-02-03

## 2020-02-03 RX ORDER — ONDANSETRON 2 MG/ML
4 INJECTION INTRAMUSCULAR; INTRAVENOUS ONCE AS NEEDED
Status: COMPLETED | OUTPATIENT
Start: 2020-02-03 | End: 2020-02-03

## 2020-02-03 RX ORDER — ONDANSETRON 2 MG/ML
INJECTION INTRAMUSCULAR; INTRAVENOUS
Status: DISCONTINUED
Start: 2020-02-03 | End: 2020-02-03

## 2020-02-03 RX ORDER — DIPHENHYDRAMINE HYDROCHLORIDE 50 MG/ML
50 INJECTION INTRAMUSCULAR; INTRAVENOUS ONCE AS NEEDED
Status: DISCONTINUED | OUTPATIENT
Start: 2020-02-03 | End: 2020-02-03

## 2020-02-03 NOTE — OPERATIVE REPORT
[facility]  Operative Report  2/3/2020     Joey Brannon Patient Status:  Hospital Outpatient Surgery    1970 MRN [id number]   [facility] ENDOSCOPY Attending Emely Ceja MD   Hosp Day # 0 PCP Delta Garza MD     Indicatio Following negative aspiration for CSF and blood, approximately 1 mL of 1% lidocaine with 10 mg of methylprednisolone was injected into each joint without complication. The needle was withdrawn with stylet in situ after being flushed with 0.5 mL lidocaine.

## 2020-02-03 NOTE — H&P
History & Physical Examination    Patient Name: Kimberlee Peres  MRN: [id number]  [facility]: [id number]  YOB: 1970    Pre-Operative Diagnosis:  DDD (degenerative disc disease), lumbar [M51.36]  DDD (degenerative disc disease), thoracic [M51.34]    Pr of cervix 03/11/11   • Pap smear for cervical cancer screening 6-    pt states normal   • Papanicolaou smear of cervix with high grade squamous intraepithelial lesion (HGSIL) 03/21/11   • Sexually transmitted disease     HPV   • Substance abuse ([facility] Aunt 50        In her 52's.    • Other (Other) Son         anxiety   • Other (Other) Son         behavioral problems   • Other (Other) Sister         Back problems     Social History    Tobacco Use      Smoking status: Former Smoker        Years: 20.00

## 2020-02-05 RX ORDER — IBUPROFEN 800 MG/1
TABLET ORAL
Qty: 90 TABLET | Refills: 0 | Status: SHIPPED | OUTPATIENT
Start: 2020-02-05 | End: 2020-03-09

## 2020-02-05 NOTE — TELEPHONE ENCOUNTER
Medication: IBUPROFEN 800 MG Oral Tab    Date of last refill: 12/30/2019  [place]    Last office visit: 12/17/2019  Due back to clinic per last office note: Not indicated   Date next office visit scheduled: None    L

## 2020-02-25 PROBLEM — M54.50 CHRONIC BILATERAL LOW BACK PAIN WITHOUT SCIATICA: Status: ACTIVE | Noted: 2020-02-25

## 2020-02-25 PROBLEM — M79.7 FIBROMYALGIA: Status: ACTIVE | Noted: 2020-02-25

## 2020-02-25 PROBLEM — M25.50 ARTHRALGIA, UNSPECIFIED JOINT: Status: ACTIVE | Noted: 2020-02-25

## 2020-02-25 PROBLEM — G89.29 CHRONIC BILATERAL LOW BACK PAIN WITHOUT SCIATICA: Status: ACTIVE | Noted: 2020-02-25

## 2020-02-25 PROBLEM — Z87.891 HISTORY OF TOBACCO USE: Status: ACTIVE | Noted: 2020-02-25

## 2020-03-05 RX ORDER — AMITRIPTYLINE HYDROCHLORIDE 25 MG/1
TABLET, FILM COATED ORAL
Qty: 30 TABLET | Refills: 0 | OUTPATIENT
Start: 2020-03-05

## 2020-03-05 NOTE — TELEPHONE ENCOUNTER
Medication request for medication Amitriptyline 25 Mg     Per medication records this medication has been discontinued and was last refilled 1/2/19      Pt was last seen in the office 4/12/19 and was advised to follow up with in 6 weeks of last appointment

## 2020-03-09 RX ORDER — IBUPROFEN 800 MG/1
TABLET ORAL
Qty: 90 TABLET | Refills: 0 | Status: SHIPPED | OUTPATIENT
Start: 2020-03-09 | End: 2020-04-08

## 2020-03-09 NOTE — TELEPHONE ENCOUNTER
Medication: IBUPROFEN 800 MG Oral Tab    Date of last refill: 02/05/2020  [place]    Last office visit: 12/17/2019  Due back to clinic per last office note: not indicated   Date next office visit scheduled: None    L

## 2020-03-18 PROBLEM — G89.29 CHRONIC BILATERAL THORACIC BACK PAIN: Status: ACTIVE | Noted: 2020-03-18

## 2020-03-18 PROBLEM — K59.00 CONSTIPATION, UNSPECIFIED CONSTIPATION TYPE: Status: ACTIVE | Noted: 2020-03-18

## 2020-03-18 PROBLEM — M54.6 CHRONIC BILATERAL THORACIC BACK PAIN: Status: ACTIVE | Noted: 2020-03-18

## 2020-03-18 PROBLEM — K12.0 CANKER SORE: Status: ACTIVE | Noted: 2020-03-18

## 2020-04-08 RX ORDER — IBUPROFEN 800 MG/1
TABLET ORAL
Qty: 90 TABLET | Refills: 0 | Status: SHIPPED | OUTPATIENT
Start: 2020-04-08 | End: 2020-05-12

## 2020-04-23 PROBLEM — K59.09 CHRONIC CONSTIPATION: Status: ACTIVE | Noted: 2020-03-18

## 2020-05-12 RX ORDER — IBUPROFEN 800 MG/1
TABLET ORAL
Qty: 90 TABLET | Refills: 0 | Status: SHIPPED | OUTPATIENT
Start: 2020-05-12 | End: 2020-06-09

## 2020-05-12 NOTE — TELEPHONE ENCOUNTER
Medication: IBUPROFEN 800 MG Oral Tab    Date of last refill: 4/8/2020    Last office visit: 12/17/2019  Due back to clinic per last office note:  Not indicated  Date next office visit scheduled:  None      Last OV note recommendation:   ASSESSMENT AND JOVITA

## 2020-06-09 RX ORDER — IBUPROFEN 800 MG/1
TABLET ORAL
Qty: 90 TABLET | Refills: 0 | Status: SHIPPED | OUTPATIENT
Start: 2020-06-09 | End: 2020-07-10

## 2020-06-09 NOTE — TELEPHONE ENCOUNTER
Medication: IBUPROFEN 800 MG Oral Tab    Date of last refill: 05/12/20  [place]    Last office visit: 12/17/2019  Due back to clinic per last office note: Not indicated   Date next office visit scheduled: None    Las

## 2020-07-10 RX ORDER — IBUPROFEN 800 MG/1
TABLET ORAL
Qty: 90 TABLET | Refills: 0 | Status: SHIPPED | OUTPATIENT
Start: 2020-07-10 | End: 2020-08-06

## 2020-07-10 NOTE — TELEPHONE ENCOUNTER
Medication: IBUPROFEN 800 MG Oral Tab    Date of last refill: 06/09/2020  [place]    Last office visit: 12/17/2019  Due back to clinic per last office note:  Not indicated   Date next office visit scheduled:  None

## 2020-07-26 ENCOUNTER — HOSPITAL ENCOUNTER (EMERGENCY)
Facility: HOSPITAL | Age: 50
Discharge: HOME | End: 2020-07-26
Attending: EMERGENCY MEDICINE
Payer: MEDICAID

## 2020-07-26 VITALS
HEIGHT: 66 IN | WEIGHT: 168 LBS | OXYGEN SATURATION: 98 % | BODY MASS INDEX: 27 KG/M2 | SYSTOLIC BLOOD PRESSURE: 108 MMHG | HEART RATE: 98 BPM | DIASTOLIC BLOOD PRESSURE: 74 MMHG | RESPIRATION RATE: 20 BRPM | TEMPERATURE: 100 F

## 2020-07-26 DIAGNOSIS — N30.01 ACUTE CYSTITIS WITH HEMATURIA: Primary | ICD-10-CM

## 2020-07-26 LAB
ANION GAP SERPL CALC-SCNC: 4 MMOL/L (ref 0–18)
APTT PPP: 27.5 SECONDS (ref 25.4–36.1)
BASOPHILS # BLD AUTO: 0.04 X10(3) UL (ref 0–0.2)
BASOPHILS NFR BLD AUTO: 0.3 %
BUN BLD-MCNC: 16 MG/DL (ref 7–18)
BUN/CREAT SERPL: 17.4 (ref 10–20)
CALCIUM BLD-MCNC: 10.2 MG/DL (ref 8.5–10.1)
CHLORIDE SERPL-SCNC: 105 MMOL/L (ref 98–112)
CO2 SERPL-SCNC: 27 MMOL/L (ref 21–32)
CREAT BLD-MCNC: 0.92 MG/DL (ref 0.55–1.02)
DEPRECATED RDW RBC AUTO: 37.3 FL (ref 35.1–46.3)
EOSINOPHIL # BLD AUTO: 0.06 X10(3) UL (ref 0–0.7)
EOSINOPHIL NFR BLD AUTO: 0.5 %
ERYTHROCYTE [DISTWIDTH] IN BLOOD BY AUTOMATED COUNT: 12.2 % (ref 11–15)
GLUCOSE BLD-MCNC: 98 MG/DL (ref 70–99)
GLUCOSE UR STRIP.AUTO-MCNC: NEGATIVE MG/DL
HCT VFR BLD AUTO: 42.2 % (ref 35–48)
HGB BLD-MCNC: 14.8 G/DL (ref 12–16)
IMM GRANULOCYTES # BLD AUTO: 0.02 X10(3) UL (ref 0–1)
IMM GRANULOCYTES NFR BLD: 0.2 %
INR BLD: 0.88 (ref 0.89–1.11)
KETONES UR STRIP.AUTO-MCNC: NEGATIVE MG/DL
LYMPHOCYTES # BLD AUTO: 2.28 X10(3) UL (ref 1–4)
LYMPHOCYTES NFR BLD AUTO: 19 %
MCH RBC QN AUTO: 29.8 PG (ref 26–34)
MCHC RBC AUTO-ENTMCNC: 35.1 G/DL (ref 31–37)
MCV RBC AUTO: 85.1 FL (ref 80–100)
MONOCYTES # BLD AUTO: 0.72 X10(3) UL (ref 0.1–1)
MONOCYTES NFR BLD AUTO: 6 %
NEUTROPHILS # BLD AUTO: 8.85 X10 (3) UL (ref 1.5–7.7)
NEUTROPHILS # BLD AUTO: 8.85 X10(3) UL (ref 1.5–7.7)
NEUTROPHILS NFR BLD AUTO: 74 %
NITRITE UR QL STRIP.AUTO: NEGATIVE
OSMOLALITY SERPL CALC.SUM OF ELEC: 283 MOSM/KG (ref 275–295)
PH UR STRIP.AUTO: 7 [PH] (ref 4.5–8)
PLATELET # BLD AUTO: 239 10(3)UL (ref 150–450)
POTASSIUM SERPL-SCNC: 4.7 MMOL/L (ref 3.5–5.1)
PROT UR STRIP.AUTO-MCNC: >=300 MG/DL
PSA SERPL DL<=0.01 NG/ML-MCNC: 12.2 SECONDS (ref 12.4–14.6)
RBC # BLD AUTO: 4.96 X10(6)UL (ref 3.8–5.3)
RBC #/AREA URNS AUTO: >10 /HPF
SODIUM SERPL-SCNC: 136 MMOL/L (ref 136–145)
SP GR UR STRIP.AUTO: 1.01 (ref 1–1.03)
UROBILINOGEN UR STRIP.AUTO-MCNC: 1 MG/DL
WBC # BLD AUTO: 12 X10(3) UL (ref 4–11)
WBC CLUMPS UR QL AUTO: PRESENT

## 2020-07-26 PROCEDURE — 81001 URINALYSIS AUTO W/SCOPE: CPT | Performed by: EMERGENCY MEDICINE

## 2020-07-26 PROCEDURE — 87086 URINE CULTURE/COLONY COUNT: CPT | Performed by: EMERGENCY MEDICINE

## 2020-07-26 PROCEDURE — 99284 EMERGENCY DEPT VISIT MOD MDM: CPT

## 2020-07-26 PROCEDURE — 96360 HYDRATION IV INFUSION INIT: CPT

## 2020-07-26 PROCEDURE — 85025 COMPLETE CBC W/AUTO DIFF WBC: CPT | Performed by: EMERGENCY MEDICINE

## 2020-07-26 PROCEDURE — 80048 BASIC METABOLIC PNL TOTAL CA: CPT | Performed by: EMERGENCY MEDICINE

## 2020-07-26 PROCEDURE — 81015 MICROSCOPIC EXAM OF URINE: CPT | Performed by: EMERGENCY MEDICINE

## 2020-07-26 PROCEDURE — 85730 THROMBOPLASTIN TIME PARTIAL: CPT | Performed by: EMERGENCY MEDICINE

## 2020-07-26 PROCEDURE — 85610 PROTHROMBIN TIME: CPT | Performed by: EMERGENCY MEDICINE

## 2020-07-26 RX ORDER — PHENAZOPYRIDINE HYDROCHLORIDE 200 MG/1
200 TABLET, FILM COATED ORAL ONCE
Status: COMPLETED | OUTPATIENT
Start: 2020-07-26 | End: 2020-07-26

## 2020-07-26 RX ORDER — CEPHALEXIN 500 MG/1
500 CAPSULE ORAL 4 TIMES DAILY
Qty: 40 CAPSULE | Refills: 0 | Status: SHIPPED | OUTPATIENT
Start: 2020-07-26 | End: 2020-08-05

## 2020-07-26 RX ORDER — PHENAZOPYRIDINE HYDROCHLORIDE 200 MG/1
200 TABLET, FILM COATED ORAL 3 TIMES DAILY PRN
Qty: 6 TABLET | Refills: 0 | Status: SHIPPED | OUTPATIENT
Start: 2020-07-26 | End: 2020-08-02

## 2020-07-26 RX ORDER — HYDROCODONE BITARTRATE AND ACETAMINOPHEN 5; 325 MG/1; MG/1
1-2 TABLET ORAL EVERY 6 HOURS PRN
Qty: 10 TABLET | Refills: 0 | Status: SHIPPED | OUTPATIENT
Start: 2020-07-26 | End: 2020-08-02

## 2020-07-26 NOTE — ED PROVIDER NOTES
Patient Seen in: [facility] Emergency Department      History   Patient presents with:  Urinary Symptoms    Stated Complaint: hematuria, covid +    HPI    73-year-old female presents to ED with complaint of hematuria and symptoms consistent with a u Rate and Rhythm: Normal rate and regular rhythm. Pulses: Normal pulses. Heart sounds: Normal heart sounds. Pulmonary:      Effort: Pulmonary effort is normal.      Breath sounds: Normal breath sounds.    Abdominal:      General: Abdomen is flat normal limits   PTT, ACTIVATED - Normal   CBC WITH DIFFERENTIAL WITH PLATELET    Narrative: The following orders were created for panel order CBC WITH DIFFERENTIAL WITH PLATELET.   Procedure                               Abnormality         Status

## 2020-08-05 NOTE — TELEPHONE ENCOUNTER
Medication: IBUPROFEN 800 MG Oral Tab    Date of last refill: 7/10/2020  [place]    Last office visit: 12/17/2019  Due back to clinic per last office note:  Not Indicated  Date next office visit scheduled:  None    L

## 2020-08-06 RX ORDER — IBUPROFEN 800 MG/1
TABLET ORAL
Qty: 90 TABLET | Refills: 0 | Status: SHIPPED | OUTPATIENT
Start: 2020-08-06 | End: 2020-09-01

## 2020-09-01 RX ORDER — IBUPROFEN 800 MG/1
TABLET ORAL
Qty: 90 TABLET | Refills: 0 | Status: SHIPPED | OUTPATIENT
Start: 2020-09-01 | End: 2020-09-29

## 2020-09-01 NOTE — TELEPHONE ENCOUNTER
Medication: IBUPROFEN 800 MG Oral Tab    Date of last refill: 8/6/2020  [place]    Last office visit: 12/17/2019  Due back to clinic per last office note:  Not indicated   Date next office visit scheduled:  None

## 2020-09-08 ENCOUNTER — HOSPITAL ENCOUNTER (OUTPATIENT)
Dept: CT IMAGING | Age: 50
Discharge: HOME | End: 2020-09-08
Attending: UROLOGY
Payer: MEDICAID

## 2020-09-08 DIAGNOSIS — R31.0 GROSS HEMATURIA: ICD-10-CM

## 2020-09-08 LAB — CREAT BLD-MCNC: 0.8 MG/DL (ref 0.55–1.02)

## 2020-09-08 PROCEDURE — 82565 ASSAY OF CREATININE: CPT

## 2020-09-08 PROCEDURE — 74178 CT ABD&PLV WO CNTR FLWD CNTR: CPT | Performed by: UROLOGY

## 2020-09-08 PROCEDURE — 76377 3D RENDER W/INTRP POSTPROCES: CPT

## 2020-09-29 RX ORDER — IBUPROFEN 800 MG/1
TABLET ORAL
Qty: 90 TABLET | Refills: 0 | Status: SHIPPED | OUTPATIENT
Start: 2020-09-29 | End: 2020-10-27

## 2020-09-29 NOTE — TELEPHONE ENCOUNTER
Medication: IBUPROFEN 800 MG Oral Tab    Date of last refill: 9/1/2020  [place]    Last office visit: 12/17/2019  Due back to clinic per last office note:  Not indicated   Date next office visit scheduled:  None

## 2020-10-27 RX ORDER — IBUPROFEN 800 MG/1
TABLET ORAL
Qty: 90 TABLET | Refills: 0 | Status: SHIPPED | OUTPATIENT
Start: 2020-10-27 | End: 2020-11-23

## 2020-11-23 ENCOUNTER — PATIENT MESSAGE (OUTPATIENT)
Dept: PAIN CLINIC | Facility: CLINIC | Age: 50
End: 2020-11-23

## 2020-11-23 RX ORDER — IBUPROFEN 800 MG/1
TABLET ORAL
Qty: 90 TABLET | Refills: 0 | Status: SHIPPED | OUTPATIENT
Start: 2020-11-23 | End: 2021-01-04

## 2020-11-23 NOTE — TELEPHONE ENCOUNTER
From: Mirian Adams  To: Rony March MD  Sent: 11/23/2020 12:15 PM CST  Subject: Other    [facility] the pain in my left leg is getting intolerable. I desperately need a steroid/ Cortizone shot ASAP. Do I need to see you or can we just schedule it ?

## 2020-12-02 ENCOUNTER — TELEPHONE (OUTPATIENT)
Dept: PAIN CLINIC | Facility: CLINIC | Age: 50
End: 2020-12-02

## 2020-12-02 ENCOUNTER — OFFICE VISIT (OUTPATIENT)
Dept: PAIN CLINIC | Facility: CLINIC | Age: 50
End: 2020-12-02
Payer: MEDICAID

## 2020-12-02 VITALS
WEIGHT: 175 LBS | HEIGHT: 66 IN | HEART RATE: 98 BPM | DIASTOLIC BLOOD PRESSURE: 80 MMHG | RESPIRATION RATE: 16 BRPM | BODY MASS INDEX: 28.13 KG/M2 | SYSTOLIC BLOOD PRESSURE: 122 MMHG | OXYGEN SATURATION: 98 %

## 2020-12-02 DIAGNOSIS — M51.36 DDD (DEGENERATIVE DISC DISEASE), LUMBAR: Primary | ICD-10-CM

## 2020-12-02 DIAGNOSIS — M54.16 LUMBAR RADICULITIS: ICD-10-CM

## 2020-12-02 PROCEDURE — 3074F SYST BP LT 130 MM HG: CPT | Performed by: ANESTHESIOLOGY

## 2020-12-02 PROCEDURE — 99213 OFFICE O/P EST LOW 20 MIN: CPT | Performed by: ANESTHESIOLOGY

## 2020-12-02 PROCEDURE — 3008F BODY MASS INDEX DOCD: CPT | Performed by: ANESTHESIOLOGY

## 2020-12-02 PROCEDURE — 3079F DIAST BP 80-89 MM HG: CPT | Performed by: ANESTHESIOLOGY

## 2020-12-02 NOTE — TELEPHONE ENCOUNTER
Prior authorization request completed for: Left L3-4,L4-5 TLESI   Authorization #[id number]     Authorization dates: 12/02/20 - 05/31/21  CPT codes approved: 51692,50492  Number of visits/dates of service approved: 1  Physician: Iraida Lin     Patient can be timmy

## 2020-12-02 NOTE — PROGRESS NOTES
Patient presents in office today with reported pain in left lower back, left hip and left leg    Current pain level reported = 7/10    Last reported dose of IBUPROFEN 800 MG Oral Tab, this morning prior to the OV

## 2020-12-02 NOTE — PROGRESS NOTES
Name: Nellie Ma   : 1970   DOS: 2020     Pain Clinic Follow Up Visit:   Patient presents with: Follow - Up      Nellie Ma is a 53year old female with a history of low back pain last seen in the office in 2020.   She now pr attention span intact. Inspection:  Ambulates with well-coordinated, fluid, non-antalgic gait. Gait is normal.  Neck: Full range of motion  Back: Strength and sensation intact. Straight leg raise positive.       IMAGES:     Lumbar MRI reviewed and there

## 2020-12-02 NOTE — TELEPHONE ENCOUNTER
Medical clearance needed- No    Implanted cardiac device/SCS/PNS: NA    Pt seen in OV today by Dr. Eddie Key and recommended for TLESI (X 1). Please begin PA process for procedure(s).      Laterality: Left  Level(s): L3-4, L4-5    Pt informed callback will be g

## 2020-12-07 ENCOUNTER — LAB ENCOUNTER (OUTPATIENT)
Dept: LAB | Age: 50
End: 2020-12-07
Attending: ANESTHESIOLOGY
Payer: MEDICAID

## 2020-12-07 DIAGNOSIS — Z01.812 PRE-PROCEDURE LAB EXAM: Primary | ICD-10-CM

## 2020-12-08 ENCOUNTER — HOSPITAL ENCOUNTER (OUTPATIENT)
Facility: HOSPITAL | Age: 50
Setting detail: HOSPITAL OUTPATIENT SURGERY
Discharge: HOME | End: 2020-12-08
Attending: ANESTHESIOLOGY | Admitting: ANESTHESIOLOGY
Payer: MEDICAID

## 2020-12-08 ENCOUNTER — APPOINTMENT (OUTPATIENT)
Dept: GENERAL RADIOLOGY | Facility: HOSPITAL | Age: 50
End: 2020-12-08
Attending: ANESTHESIOLOGY
Payer: MEDICAID

## 2020-12-08 VITALS
DIASTOLIC BLOOD PRESSURE: 85 MMHG | TEMPERATURE: 97 F | OXYGEN SATURATION: 100 % | SYSTOLIC BLOOD PRESSURE: 116 MMHG | HEART RATE: 71 BPM | RESPIRATION RATE: 16 BRPM

## 2020-12-08 DIAGNOSIS — M54.16 LUMBAR RADICULITIS: ICD-10-CM

## 2020-12-08 DIAGNOSIS — M51.36 DDD (DEGENERATIVE DISC DISEASE), LUMBAR: ICD-10-CM

## 2020-12-08 PROCEDURE — 99152 MOD SED SAME PHYS/QHP 5/>YRS: CPT | Performed by: ANESTHESIOLOGY

## 2020-12-08 PROCEDURE — 3E0R3BZ INTRODUCTION OF ANESTHETIC AGENT INTO SPINAL CANAL, PERCUTANEOUS APPROACH: ICD-10-PCS | Performed by: ANESTHESIOLOGY

## 2020-12-08 PROCEDURE — 3E0R33Z INTRODUCTION OF ANTI-INFLAMMATORY INTO SPINAL CANAL, PERCUTANEOUS APPROACH: ICD-10-PCS | Performed by: ANESTHESIOLOGY

## 2020-12-08 RX ORDER — LIDOCAINE HYDROCHLORIDE 10 MG/ML
INJECTION, SOLUTION EPIDURAL; INFILTRATION; INTRACAUDAL; PERINEURAL AS NEEDED
Status: DISCONTINUED | OUTPATIENT
Start: 2020-12-08 | End: 2020-12-08

## 2020-12-08 RX ORDER — ONDANSETRON 2 MG/ML
4 INJECTION INTRAMUSCULAR; INTRAVENOUS ONCE AS NEEDED
Status: DISCONTINUED | OUTPATIENT
Start: 2020-12-08 | End: 2020-12-08

## 2020-12-08 RX ORDER — MIDAZOLAM HYDROCHLORIDE 1 MG/ML
INJECTION INTRAMUSCULAR; INTRAVENOUS AS NEEDED
Status: DISCONTINUED | OUTPATIENT
Start: 2020-12-08 | End: 2020-12-08

## 2020-12-08 RX ORDER — ONDANSETRON 2 MG/ML
INJECTION INTRAMUSCULAR; INTRAVENOUS AS NEEDED
Status: DISCONTINUED | OUTPATIENT
Start: 2020-12-08 | End: 2020-12-08

## 2020-12-08 RX ORDER — SODIUM CHLORIDE, SODIUM LACTATE, POTASSIUM CHLORIDE, CALCIUM CHLORIDE 600; 310; 30; 20 MG/100ML; MG/100ML; MG/100ML; MG/100ML
100 INJECTION, SOLUTION INTRAVENOUS CONTINUOUS
Status: DISCONTINUED | OUTPATIENT
Start: 2020-12-08 | End: 2020-12-08

## 2020-12-08 RX ORDER — SODIUM CHLORIDE 9 MG/ML
INJECTION INTRAVENOUS AS NEEDED
Status: DISCONTINUED | OUTPATIENT
Start: 2020-12-08 | End: 2020-12-08

## 2020-12-08 RX ORDER — DIPHENHYDRAMINE HYDROCHLORIDE 50 MG/ML
50 INJECTION INTRAMUSCULAR; INTRAVENOUS ONCE AS NEEDED
Status: DISCONTINUED | OUTPATIENT
Start: 2020-12-08 | End: 2020-12-08

## 2020-12-08 RX ORDER — DEXAMETHASONE SODIUM PHOSPHATE 10 MG/ML
INJECTION, SOLUTION INTRAMUSCULAR; INTRAVENOUS AS NEEDED
Status: DISCONTINUED | OUTPATIENT
Start: 2020-12-08 | End: 2020-12-08

## 2020-12-08 NOTE — OPERATIVE REPORT
[facility]  Operative Report  2020     Home Do Patient Status:  Hospital Outpatient Surgery    1970 MRN [id number]   [facility] [place] ENDOSCOPY Attending Brendan Fernando MD   Hosp Day # 0 PCP Boaz Zuleta DO     Indication: Deloria [place] under fluoroscopic guidance. The needle was advanced into the anterior epidural space at this level. The needle position was confirmed under AP and lateral fluoroscopic view. Needle position was then repeated in a similar fashion at the L4-5 level.   [facility]

## 2020-12-08 NOTE — H&P
History & Physical Examination    Patient Name: Cheyanne Pichardo  MRN: [id number]  [facility]: [id number]  YOB: 1970    Pre-Operative Diagnosis:  DDD (degenerative disc disease), lumbar [M51.36]  Lumbar radiculitis [M54.16]    Present Illness: Patient cervical cancer screening 6-    pt states normal   • Papanicolaou smear of cervix with high grade squamous intraepithelial lesion (HGSIL) 03/21/11   • Sexually transmitted disease     HPV   • Substance abuse ([facility] 1974-9127    cocaine   • Urinary in Ovarian Cancer Maternal Cousin Female 45         of ovarian ca   • Cancer Maternal Aunt         LUNG CA 51   • Breast Cancer Maternal Aunt 48        In her 52's.    • Other (Other) Son         anxiety   • Other (Other) Son         behavioral problems

## 2020-12-26 ENCOUNTER — PATIENT MESSAGE (OUTPATIENT)
Dept: PAIN CLINIC | Facility: CLINIC | Age: 50
End: 2020-12-26

## 2020-12-26 DIAGNOSIS — M54.16 LUMBAR RADICULOPATHY: Primary | ICD-10-CM

## 2020-12-28 NOTE — TELEPHONE ENCOUNTER
From: Nellie Ma  To: Keo Nazario MD  Sent: 12/26/2020 7:56 AM CST  Subject: Visit Follow-up Question    Good morning,    Dr. Ke Plummer I am very concerned. My Pain is getting worse each day.  I now have pain in both of my legs and I cannot feel my right bu

## 2020-12-31 ENCOUNTER — TELEPHONE (OUTPATIENT)
Dept: SURGERY | Facility: CLINIC | Age: 50
End: 2020-12-31

## 2020-12-31 NOTE — TELEPHONE ENCOUNTER
Lumbar MRI 93522 Denial reason: We cannot approve this request. Your records show that you have changes in the feeling in your leg(s) and/or feet.  The reason this request cannot be approved is because guidelines may support imaging in the evaluation of

## 2021-01-03 ENCOUNTER — PATIENT MESSAGE (OUTPATIENT)
Dept: PAIN CLINIC | Facility: CLINIC | Age: 51
End: 2021-01-03

## 2021-01-04 ENCOUNTER — PATIENT MESSAGE (OUTPATIENT)
Dept: PAIN CLINIC | Facility: CLINIC | Age: 51
End: 2021-01-04

## 2021-01-04 ENCOUNTER — TELEPHONE (OUTPATIENT)
Dept: SURGERY | Facility: CLINIC | Age: 51
End: 2021-01-04

## 2021-01-04 DIAGNOSIS — M54.12 CERVICAL RADICULOPATHY: Primary | ICD-10-CM

## 2021-01-04 DIAGNOSIS — M47.22 CERVICAL SPONDYLOSIS WITH RADICULOPATHY: ICD-10-CM

## 2021-01-04 RX ORDER — IBUPROFEN 800 MG/1
800 TABLET ORAL EVERY 8 HOURS PRN
Qty: 90 TABLET | Refills: 0 | Status: SHIPPED | OUTPATIENT
Start: 2021-01-04 | End: 2021-02-03

## 2021-01-04 RX ORDER — METHYLPREDNISOLONE 4 MG/1
TABLET ORAL
Qty: 1 PACKAGE | Refills: 0 | Status: SHIPPED | OUTPATIENT
Start: 2021-01-04 | End: 2021-01-28 | Stop reason: ALTCHOICE

## 2021-01-04 NOTE — TELEPHONE ENCOUNTER
Pt calling to speak w/RN stating her pain has increased and would like to go to ED; pt concerned this may interfere w/any surgeries that may be needed w/Dr Quintero, please call back

## 2021-01-04 NOTE — TELEPHONE ENCOUNTER
If pt calls back please route call back to nursing. Attempted to return patients call to all #s listed. LM on personalized VM informing patient to call us back in order for us to get more information.

## 2021-01-04 NOTE — TELEPHONE ENCOUNTER
Received a refill request via fax for IBUPROFEN 800 MG Oral Tab from [facility].     Medication: IBUPROFEN 800 MG Oral Tab    Date of last refill: 11/23/20   [place]     Last office visit: 12/2/2020  Due

## 2021-01-04 NOTE — TELEPHONE ENCOUNTER
From: Gali Barrientos  To: Kody Nixon MD  Sent: 1/4/2021 1:44 PM CST  Subject: Other    Oh your wonderful! Called [facility] scheduling. They had a cancellation! Tomorrow at 8:15 I go for MRI.  Thursday at 9 I see Dr. Giulia Puentes to review and determine next step

## 2021-01-04 NOTE — TELEPHONE ENCOUNTER
From: Amrik Gleason  To: Dania Madrigal MD  Sent: 1/3/2021 11:37 PM CST  Subject: Other    Hi Dr Tanner Alba- I am really scared. I have NEVER felt pain Iike this. I have actually vomited because the pain is so intense. I can not walk without tears.  The numbness i

## 2021-01-05 ENCOUNTER — TELEPHONE (OUTPATIENT)
Dept: SURGERY | Facility: CLINIC | Age: 51
End: 2021-01-05

## 2021-01-05 ENCOUNTER — HOSPITAL ENCOUNTER (OUTPATIENT)
Dept: MRI IMAGING | Age: 51
Discharge: HOME | End: 2021-01-05
Attending: ANESTHESIOLOGY
Payer: MEDICAID

## 2021-01-05 DIAGNOSIS — M54.16 LUMBAR RADICULOPATHY: ICD-10-CM

## 2021-01-05 PROCEDURE — 72148 MRI LUMBAR SPINE W/O DYE: CPT | Performed by: ANESTHESIOLOGY

## 2021-01-05 NOTE — TELEPHONE ENCOUNTER
Benny Ellington that MRI was ordered w/o contrast by provider and this has been authorized by [employer]. Unable to change to with contrast at this time when patient is on the table. Advised Dominique Loge to complete MRI as ordered by provider.  Dominique Loge verbalized Xiomy

## 2021-01-07 ENCOUNTER — OFFICE VISIT (OUTPATIENT)
Dept: SURGERY | Facility: CLINIC | Age: 51
End: 2021-01-07
Payer: MEDICAID

## 2021-01-07 ENCOUNTER — TELEPHONE (OUTPATIENT)
Dept: SURGERY | Facility: CLINIC | Age: 51
End: 2021-01-07

## 2021-01-07 VITALS
WEIGHT: 175 LBS | HEIGHT: 66 IN | HEART RATE: 65 BPM | BODY MASS INDEX: 28.13 KG/M2 | DIASTOLIC BLOOD PRESSURE: 76 MMHG | SYSTOLIC BLOOD PRESSURE: 114 MMHG

## 2021-01-07 DIAGNOSIS — M54.14 THORACIC RADICULOPATHY: ICD-10-CM

## 2021-01-07 DIAGNOSIS — M54.16 LUMBAR RADICULOPATHY: Primary | ICD-10-CM

## 2021-01-07 DIAGNOSIS — R20.0 LEG NUMBNESS: ICD-10-CM

## 2021-01-07 DIAGNOSIS — M48.02 FORAMINAL STENOSIS OF CERVICAL REGION: ICD-10-CM

## 2021-01-07 DIAGNOSIS — Z98.1 HISTORY OF LUMBAR FUSION: ICD-10-CM

## 2021-01-07 DIAGNOSIS — M51.36 DDD (DEGENERATIVE DISC DISEASE), LUMBAR: ICD-10-CM

## 2021-01-07 DIAGNOSIS — M51.24 BULGING OF THORACIC INTERVERTEBRAL DISC: ICD-10-CM

## 2021-01-07 DIAGNOSIS — M47.816 ARTHRITIS OF FACET JOINT OF LUMBAR SPINE: ICD-10-CM

## 2021-01-07 DIAGNOSIS — M51.26 PROTRUDED LUMBAR DISC: ICD-10-CM

## 2021-01-07 DIAGNOSIS — M48.061 SPINAL STENOSIS OF LUMBAR REGION WITHOUT NEUROGENIC CLAUDICATION: ICD-10-CM

## 2021-01-07 DIAGNOSIS — M48.02 CERVICAL SPINAL STENOSIS: ICD-10-CM

## 2021-01-07 DIAGNOSIS — M50.30 DDD (DEGENERATIVE DISC DISEASE), CERVICAL: ICD-10-CM

## 2021-01-07 PROCEDURE — 3074F SYST BP LT 130 MM HG: CPT | Performed by: NEUROLOGICAL SURGERY

## 2021-01-07 PROCEDURE — 99215 OFFICE O/P EST HI 40 MIN: CPT | Performed by: NEUROLOGICAL SURGERY

## 2021-01-07 PROCEDURE — 3078F DIAST BP <80 MM HG: CPT | Performed by: NEUROLOGICAL SURGERY

## 2021-01-07 PROCEDURE — 3008F BODY MASS INDEX DOCD: CPT | Performed by: NEUROLOGICAL SURGERY

## 2021-01-07 RX ORDER — HYDROCODONE BITARTRATE AND ACETAMINOPHEN 10; 325 MG/1; MG/1
1 TABLET ORAL EVERY 12 HOURS PRN
Qty: 30 TABLET | Refills: 0 | Status: SHIPPED | OUTPATIENT
Start: 2021-01-07 | End: 2021-02-26

## 2021-01-07 RX ORDER — HYDROCODONE BITARTRATE AND ACETAMINOPHEN 10; 325 MG/1; MG/1
1 TABLET ORAL EVERY 12 HOURS PRN
Qty: 14 TABLET | Refills: 0 | Status: SHIPPED | OUTPATIENT
Start: 2021-01-22 | End: 2021-01-28

## 2021-01-07 NOTE — TELEPHONE ENCOUNTER
Pt here in office scheduled sx discussion for 1/28/21, pt states she will be out of hydrocodone by then -FYI.

## 2021-01-07 NOTE — PROGRESS NOTES
Patient here for follow-up post imaging. States has been dealing with neck pain and leg pain for years. Neck pain still there but PT has helped. Low back pain has become unbearable, difficult to walk. Frequent numbness to both legs.     2 more days on me

## 2021-01-07 NOTE — PROGRESS NOTES
Patient: Jonatan Martinez  Medical Record Number: [id number]  YOB: 1970  PCP: Andre Phipps DO    Reason for visit: Cervical follow up, lumbar assessment    HISTORY OF CHIEF COMPLAINT:    Jonatan Martinez is a very pleasant 53year old female, state    • Attention deficit hyperactivity disorder (ADHD)    • BACK PAIN    • Back problem    • MARCIO II (cervical intraepithelial neoplasia II) 4/2011   • Depression    • Dyspareunia    • Fall    • Fibromyalgia    • Fibromyalgia 2/25/2020   • Genital warts Benny Zhang MD at [facility] ENDOSCOPY      Family History   Problem Relation Age of Onset   • Heart Disease Father    • Other (lung cancer) Father         Lung Cancer   • Hypertension Mother    • Diabetes Mother    • Heart Disease Mother    • Other (pancreatic cancer) Dispense Refill   • ibuprofen 800 MG Oral Tab Take 1 tablet (800 mg total) by mouth every 8 (eight) hours as needed for Pain.  90 tablet 0   • methylPREDNISolone (MEDROL) 4 MG Oral Tablet Therapy Pack Take as directed 1 Package 0   • lisinopril 10 MG Oral T Left *5 *5 *5 5 5 5 4+ 5   * Difficulty providing full effort on assessment of BUE, per patient aggravates her back pain. Appears to have good strength of BUE as outlined above.    Lower Extremity Strength:     Iliopsoas  Hamstrings   Quads    D-flexion P-f the descending left L3 nerve root within the lateral recess. There is mild to moderate left facet joint degenerative   changes. There is moderate left neural foraminal narrowing.   There is a small right posterior lateral disc protrusion measuring 5 x 3 m root.  There is also a small right posterior   lateral disc protrusion at this level without significant mass effect on the right L2 nerve root.      2. L3-4 moderate broad-based degenerative disc bulge, ligamentum flavum hypertrophy, and facet joint degene foraminal narrowing. No significant facet arthropathy. C4-C5:  There is an annular bulging disc with central protrusion effacing the anterior epidural space. There is mild uncinate arthritis and mild facet joint arthropathy.   The AP dimension of the can Isauro Stanley MD         MEDICAL DECISION MAKING:     ASSESSMENT and PLAN:  (M54.16) Lumbar radiculopathy  (primary encounter diagnosis)    (M54.14) Thoracic radiculopathy    (M51.24) Bulging of thoracic intervertebral disc    (M51.36) DDD (degenerati potential sx discussion or call or follow up sooner or go to the ED for any new, worsening or concerning signs or symptoms     Dr. Kati Alaniz and I reviewed imaging and discussed the plan. Dr. Kati Alaniz agrees with the plan.  Dr. Kati Alaniz and I reviewed melidain

## 2021-01-07 NOTE — TELEPHONE ENCOUNTER
Norco prescription provided for 1 week further. Begins 1/22 to fill. 14 tabs provided. This will get the patient to her follow up on 1/28 with Dr. Fortino Lara. To scripts to be provided sooner.  Please advise of the above, thank you

## 2021-01-08 NOTE — TELEPHONE ENCOUNTER
Received fax from 1/7 that states Donel Nett is not covered by insurance and need to do PA.   Is there another medication you might want to order or go ahead with PA?

## 2021-01-11 NOTE — TELEPHONE ENCOUNTER
Can we please call patient to clarify if she picked up the first [employer] prescription? As insurance did not approve it per fax? So I know how to proceed with medication therapy and prescribing?     Thank you

## 2021-01-13 ENCOUNTER — HOSPITAL ENCOUNTER (OUTPATIENT)
Dept: MRI IMAGING | Age: 51
Discharge: HOME | End: 2021-01-13
Attending: PHYSICIAN ASSISTANT
Payer: MEDICAID

## 2021-01-13 ENCOUNTER — HOSPITAL ENCOUNTER (OUTPATIENT)
Dept: GENERAL RADIOLOGY | Age: 51
Discharge: HOME | End: 2021-01-13
Attending: PHYSICIAN ASSISTANT
Payer: MEDICAID

## 2021-01-13 DIAGNOSIS — R20.0 LEG NUMBNESS: ICD-10-CM

## 2021-01-13 DIAGNOSIS — M48.061 SPINAL STENOSIS OF LUMBAR REGION WITHOUT NEUROGENIC CLAUDICATION: ICD-10-CM

## 2021-01-13 DIAGNOSIS — M54.16 LUMBAR RADICULOPATHY: ICD-10-CM

## 2021-01-13 DIAGNOSIS — M51.26 PROTRUDED LUMBAR DISC: ICD-10-CM

## 2021-01-13 DIAGNOSIS — M51.36 DDD (DEGENERATIVE DISC DISEASE), LUMBAR: ICD-10-CM

## 2021-01-13 DIAGNOSIS — Z98.1 HISTORY OF LUMBAR FUSION: ICD-10-CM

## 2021-01-13 DIAGNOSIS — M51.24 BULGING OF THORACIC INTERVERTEBRAL DISC: ICD-10-CM

## 2021-01-13 DIAGNOSIS — M54.14 THORACIC RADICULOPATHY: ICD-10-CM

## 2021-01-13 PROCEDURE — 72146 MRI CHEST SPINE W/O DYE: CPT | Performed by: PHYSICIAN ASSISTANT

## 2021-01-13 PROCEDURE — 72114 X-RAY EXAM L-S SPINE BENDING: CPT | Performed by: PHYSICIAN ASSISTANT

## 2021-01-13 NOTE — TELEPHONE ENCOUNTER
Patient read [employer] message, but did not reply. [place]ector  #14 tablets (7 day supply) was dispensed by patient's [address] on 1/8/2021.

## 2021-01-14 RX ORDER — PREGABALIN 75 MG/1
75 CAPSULE ORAL 2 TIMES DAILY
Qty: 60 CAPSULE | Refills: 0 | Status: SHIPPED | OUTPATIENT
Start: 2021-01-14 | End: 2021-03-17

## 2021-01-14 NOTE — TELEPHONE ENCOUNTER
Provider message noted. Awaiting response from Dr. Sandhya Honeycutt regarding surgical discussion appointment.

## 2021-01-14 NOTE — TELEPHONE ENCOUNTER
Patient called. She was able to  her script for 14 tabs, but not 30 tabs. Unable to take Gabapentin, states reaction and was hospitalized. Good response to Lyrica in the past. But issues with insurance approval prior.      Advised I'll try to

## 2021-01-14 NOTE — TELEPHONE ENCOUNTER
S-spoke with patient to determine if she has picked up her medication:  30 tablets of Norco  mg. B-Patient determined that she had not picked up the 30 tablets due to an insurance issue.    Patient also inquired about EMG and that she will not be ab

## 2021-01-15 ENCOUNTER — PATIENT MESSAGE (OUTPATIENT)
Dept: SURGERY | Facility: CLINIC | Age: 51
End: 2021-01-15

## 2021-01-15 NOTE — TELEPHONE ENCOUNTER
S-In routing comment by Dr. Kimberley Garcia today, patient may be seen on Tuesday, 1/19/21 if she prefers, however patient messaged in MyChart that she was able to move EMG date to 1/27/21.     B-patient mentioned in phone conversation yesterday, 12/14/21 that she

## 2021-01-19 NOTE — TELEPHONE ENCOUNTER
Pharmacist called stating she received a prescription for pregabalin which is a new med for patient. She has an allergy to gabapentin (pt was hospitalized for it) and she wants to make sure the provider is okay with her taking pregabalin.        Per note o

## 2021-01-19 NOTE — TELEPHONE ENCOUNTER
Discussed with patient. She had good response in past with Lyrica. Did not report any side effects. Unable to continue Lyrica at that time due to insurance issues.  We discussed prescribing Lyrica again to see if insurance approval    Called pharmacy and ad

## 2021-01-27 ENCOUNTER — OFFICE VISIT (OUTPATIENT)
Dept: ELECTROPHYSIOLOGY | Facility: HOSPITAL | Age: 51
End: 2021-01-27
Attending: PHYSICIAN ASSISTANT
Payer: MEDICAID

## 2021-01-27 DIAGNOSIS — Z98.1 HISTORY OF LUMBAR FUSION: ICD-10-CM

## 2021-01-27 DIAGNOSIS — M51.36 DDD (DEGENERATIVE DISC DISEASE), LUMBAR: ICD-10-CM

## 2021-01-27 DIAGNOSIS — M54.16 LUMBAR RADICULOPATHY: ICD-10-CM

## 2021-01-27 DIAGNOSIS — M51.24 BULGING OF THORACIC INTERVERTEBRAL DISC: ICD-10-CM

## 2021-01-27 DIAGNOSIS — M51.26 PROTRUDED LUMBAR DISC: ICD-10-CM

## 2021-01-27 DIAGNOSIS — M47.816 ARTHRITIS OF FACET JOINT OF LUMBAR SPINE: ICD-10-CM

## 2021-01-27 DIAGNOSIS — R20.0 LEG NUMBNESS: ICD-10-CM

## 2021-01-27 DIAGNOSIS — M54.14 THORACIC RADICULOPATHY: ICD-10-CM

## 2021-01-27 DIAGNOSIS — M48.061 SPINAL STENOSIS OF LUMBAR REGION WITHOUT NEUROGENIC CLAUDICATION: ICD-10-CM

## 2021-01-27 DIAGNOSIS — M50.30 DDD (DEGENERATIVE DISC DISEASE), CERVICAL: ICD-10-CM

## 2021-01-27 PROCEDURE — 95909 NRV CNDJ TST 5-6 STUDIES: CPT | Performed by: OTHER

## 2021-01-27 PROCEDURE — 95886 MUSC TEST DONE W/N TEST COMP: CPT | Performed by: OTHER

## 2021-01-28 ENCOUNTER — OFFICE VISIT (OUTPATIENT)
Dept: SURGERY | Facility: CLINIC | Age: 51
End: 2021-01-28
Payer: MEDICAID

## 2021-01-28 ENCOUNTER — TELEPHONE (OUTPATIENT)
Dept: SURGERY | Facility: CLINIC | Age: 51
End: 2021-01-28

## 2021-01-28 VITALS
WEIGHT: 175 LBS | SYSTOLIC BLOOD PRESSURE: 114 MMHG | DIASTOLIC BLOOD PRESSURE: 76 MMHG | BODY MASS INDEX: 28.13 KG/M2 | HEIGHT: 66 IN | HEART RATE: 68 BPM

## 2021-01-28 DIAGNOSIS — M51.36 DDD (DEGENERATIVE DISC DISEASE), LUMBAR: Primary | ICD-10-CM

## 2021-01-28 DIAGNOSIS — M54.14 THORACIC RADICULOPATHY: Primary | ICD-10-CM

## 2021-01-28 DIAGNOSIS — M54.16 LUMBAR RADICULOPATHY: ICD-10-CM

## 2021-01-28 PROCEDURE — 99215 OFFICE O/P EST HI 40 MIN: CPT | Performed by: NEUROLOGICAL SURGERY

## 2021-01-28 PROCEDURE — 3074F SYST BP LT 130 MM HG: CPT | Performed by: NEUROLOGICAL SURGERY

## 2021-01-28 PROCEDURE — 3078F DIAST BP <80 MM HG: CPT | Performed by: NEUROLOGICAL SURGERY

## 2021-01-28 PROCEDURE — 3008F BODY MASS INDEX DOCD: CPT | Performed by: NEUROLOGICAL SURGERY

## 2021-01-28 NOTE — TELEPHONE ENCOUNTER
Patient is scheduled for RIGHT LUMBAR 3/ LUMBAR 4, LUMBAR 4/ LUMBAR 5 HEMILAMINTOMIES, LEFT LUMBAR 2 / LUMBAR 3 MICRODISCECTOMY AND ALL INDICATED PROCEDURES on 2/15/21 with Dr Abdi Rush.     X Surgery order signed   X Placed sx on surgery sheet  X Placed on

## 2021-01-28 NOTE — TELEPHONE ENCOUNTER
Prior Authorization for outpatient surgery initiated with [employer]  Requesting coverage for: left L2-L3 and right L3-4,L4-5 surgery  Date of Service: 02/15/2021   Inpatient days requested: 0 outpatient  CPT codes: 34303,99154 x 2    Request for surge

## 2021-01-28 NOTE — TELEPHONE ENCOUNTER
You are scheduled for RIGHT LUMBAR 3/ LUMBAR 4, LUMBAR 4/ LUMBAR 5 HEMILAMINTOMIES, LEFT LUMBAR 2 / LUMBAR 3 MICRODISCECTOMY AND ALL INDICATED PROCEDURES on 2/15/21 with     · PCP clearance is needed.   We have faxed a request for pre-op clearanc etc) prior to surgery that we had you stop for surgery, please make sure you get instructions about when to resume the medication before you are discharged from the hospital.  · Post operative appointment to be made 2 and 6 weeks after surgery.   Your post 8 pm (no one will be allowed to enter the building after 7 pm). ·  -Upon entering building support persons will be asked questions related to Covid-19 exposure and a temperature will be taken.   ·  -Support persons will be given a wristband that should be

## 2021-01-28 NOTE — PROGRESS NOTES
Patient here for surgery discussion. Patient with continued pain to low back and bilateral legs. Rated 4/10. EMG done yesterday.

## 2021-01-28 NOTE — PROCEDURES
[address]      PATIENT'S NAME: Tala Kumar   REFERRING PHYSICIAN: María Elena Maciel DO   PATIENT ACCOUNT #: [de-identified] LOCATION:    MEDICAL RECORD #: [id number] YOB: 1970   DATE

## 2021-01-28 NOTE — PROGRESS NOTES
Neurosurgery Clinic Visit  2021    Qasim Gosselin PCP:  DO NITA Nguyen 1970 MRN [id number]     HISTORY OF PRESENT ILLNESS:  Gideon Gosselin is a(n) 53year old female here for follow-up regarding thoracic and lumbar pain  She got her EMG get PCP clearance  Reviewed films in detail  All questions answered  Patient very appreciative      Time spent on counseling/coordination of care:  45 Minutes    Total time spent with patient:  [address] [facility],[address], [address] [facility]

## 2021-01-29 NOTE — TELEPHONE ENCOUNTER
01/28/2021  2:22 PM Almita Geiger - -   Note    Prior Authorization for outpatient surgery initiated with [employer]  Requesting coverage for: left L2-L3 and right L3-4,L4-5 surgery  Date of Service: 02/15/2021               Inpatient days requested: 0

## 2021-02-02 DIAGNOSIS — M54.12 CERVICAL RADICULOPATHY: ICD-10-CM

## 2021-02-02 DIAGNOSIS — M47.22 CERVICAL SPONDYLOSIS WITH RADICULOPATHY: ICD-10-CM

## 2021-02-02 RX ORDER — SODIUM BICARBONATE 650 MG/1
TABLET ORAL
COMMUNITY
End: 2021-09-14 | Stop reason: ALTCHOICE

## 2021-02-03 RX ORDER — IBUPROFEN 800 MG/1
TABLET ORAL
Qty: 90 TABLET | Refills: 0 | Status: SHIPPED | OUTPATIENT
Start: 2021-02-03 | End: 2021-03-02

## 2021-02-03 NOTE — TELEPHONE ENCOUNTER
Medication: ibuprofen 800 MG Oral Tab    Date of last refill: 01/04/2021  [place]    Last office visit: 12/02/2020  Due back to clinic per last office note:  N/A  Date next office visit scheduled:  None    Last URINE

## 2021-02-04 NOTE — TELEPHONE ENCOUNTER
Per Shayy Caballero on 2/2/2021: \"Pre op exam normal today  Pre op ekg normal also  To get labs and chest xray  Letter of clearance to be sent to surgeon also\"    In response to labs Shayy Caballero on 2/2/2021: \"Blood tests ok for surgery thus far. \"     In

## 2021-02-05 NOTE — TELEPHONE ENCOUNTER
Message of surgery approval has been noted and patient was notified via [employer]now [employer] message at patient's request.

## 2021-02-05 NOTE — TELEPHONE ENCOUNTER
Prior authorization request completed for:  left L2-L3 and right L3-4,L4-5 surgery    Authorization #[id number]  Authorization dates: 2/15/21 (1/28/21-3/30/21)  CPT codes approved: 36004,49976X1  Number of visits/dates of service approved: outpatient  Phys

## 2021-02-09 ENCOUNTER — ANESTHESIA EVENT (OUTPATIENT)
Dept: SURGERY | Facility: HOSPITAL | Age: 51
End: 2021-02-09
Payer: MEDICAID

## 2021-02-11 ENCOUNTER — HOSPITAL ENCOUNTER (OUTPATIENT)
Dept: CT IMAGING | Facility: HOSPITAL | Age: 51
Discharge: HOME | End: 2021-02-11
Attending: FAMILY MEDICINE
Payer: MEDICAID

## 2021-02-11 DIAGNOSIS — Z01.818 PREOPERATIVE EXAMINATION: ICD-10-CM

## 2021-02-11 PROCEDURE — 71250 CT THORAX DX C-: CPT | Performed by: FAMILY MEDICINE

## 2021-02-12 ENCOUNTER — LAB ENCOUNTER (OUTPATIENT)
Dept: LAB | Age: 51
End: 2021-02-12
Attending: NEUROLOGICAL SURGERY
Payer: MEDICAID

## 2021-02-12 DIAGNOSIS — M51.34 DDD (DEGENERATIVE DISC DISEASE), THORACIC: ICD-10-CM

## 2021-02-12 NOTE — PROGRESS NOTES
Lung nodule noted and lung density  advse to consider pulmonary consult for these with [facility]

## 2021-02-12 NOTE — TELEPHONE ENCOUNTER
Spoke to radiology who has informed us that pt had a CXR done by PCP, this came back abnormal and PCP sent pt for Chest CT, this has come back abnormal as well.     PCP has requested pulmonology clearance, pt is not currently established with Pulmonology, u Cardiac

## 2021-02-12 NOTE — PAT NURSING NOTE
Chart reviewed by anesthesiologist, Dr. Evin Freedman  for abnormal CXR and Chest CT. Received an order for medical clearance. Faxed this request to the surgeon and  PCP. Received fax confirmation.  Telephoned PCP  office and spoke to Gunter and informed of above a

## 2021-02-12 NOTE — TELEPHONE ENCOUNTER
Spoke to patient. She did talk with her PCP and is aware that she needs pulmonary clearance. Patient was upset that we have to reschedule her surgery. Discussed how its for her safety. Rescheduled her for 3-3-21 at 10 am with Dr Fouzia Hassan.       Case alen

## 2021-02-13 LAB — SARS-COV-2 RNA RESP QL NAA+PROBE: NOT DETECTED

## 2021-02-15 ENCOUNTER — ANESTHESIA (OUTPATIENT)
Dept: SURGERY | Facility: HOSPITAL | Age: 51
End: 2021-02-15
Payer: MEDICAID

## 2021-02-15 DIAGNOSIS — M47.22 CERVICAL SPONDYLOSIS WITH RADICULOPATHY: Primary | ICD-10-CM

## 2021-02-15 RX ORDER — METHYLPREDNISOLONE 4 MG/1
TABLET ORAL
Qty: 21 EACH | Refills: 0 | OUTPATIENT
Start: 2021-02-15

## 2021-02-16 NOTE — TELEPHONE ENCOUNTER
Tess Velasquez [phone] and spoke with Sreekanth Morrisoner she updated this case to date of surgery 3/3/2021

## 2021-02-22 NOTE — TELEPHONE ENCOUNTER
Per pulmonologist- on 2/19/21: \"* Pre-op Pulmonary Risk Assessment:  patient is at low risk for postoperative pulmonary complications including atelectasis, pneumonia, and potential need for prolonged mechanical ventilation following lumbar daniel

## 2021-02-23 NOTE — PROGRESS NOTES
With abnormal findings on chest ct, would advise pulmonary consult for these results  Please place referral with our group

## 2021-02-26 DIAGNOSIS — Z98.1 HISTORY OF LUMBAR FUSION: ICD-10-CM

## 2021-02-26 DIAGNOSIS — M54.16 LUMBAR RADICULOPATHY: ICD-10-CM

## 2021-02-26 DIAGNOSIS — M48.061 SPINAL STENOSIS OF LUMBAR REGION WITHOUT NEUROGENIC CLAUDICATION: ICD-10-CM

## 2021-02-26 DIAGNOSIS — M54.14 THORACIC RADICULOPATHY: ICD-10-CM

## 2021-02-26 DIAGNOSIS — M48.02 CERVICAL SPINAL STENOSIS: ICD-10-CM

## 2021-02-26 DIAGNOSIS — M47.22 CERVICAL SPONDYLOSIS WITH RADICULOPATHY: ICD-10-CM

## 2021-02-26 DIAGNOSIS — M51.26 PROTRUDED LUMBAR DISC: ICD-10-CM

## 2021-02-26 DIAGNOSIS — M54.12 CERVICAL RADICULITIS: ICD-10-CM

## 2021-02-26 DIAGNOSIS — M51.24 BULGING OF THORACIC INTERVERTEBRAL DISC: ICD-10-CM

## 2021-02-26 DIAGNOSIS — M51.36 DDD (DEGENERATIVE DISC DISEASE), LUMBAR: Primary | ICD-10-CM

## 2021-02-26 DIAGNOSIS — M47.816 ARTHRITIS OF FACET JOINT OF LUMBAR SPINE: ICD-10-CM

## 2021-02-26 DIAGNOSIS — M48.02 FORAMINAL STENOSIS OF CERVICAL REGION: ICD-10-CM

## 2021-02-26 DIAGNOSIS — M50.30 DDD (DEGENERATIVE DISC DISEASE), CERVICAL: ICD-10-CM

## 2021-02-26 RX ORDER — HYDROCODONE BITARTRATE AND ACETAMINOPHEN 10; 325 MG/1; MG/1
1 TABLET ORAL EVERY 12 HOURS PRN
Qty: 30 TABLET | Refills: 0 | Status: ON HOLD | OUTPATIENT
Start: 2021-02-26 | End: 2021-03-04

## 2021-02-26 NOTE — TELEPHONE ENCOUNTER
Called pt to clarify to reschedule pre-op testing, gave pt number for pre-admit testing. Pt states she will be calling today to schedule     Pt requesting a refill on norco to get her to her sx date, will create new TE to address.

## 2021-03-01 ENCOUNTER — LAB ENCOUNTER (OUTPATIENT)
Dept: LAB | Age: 51
End: 2021-03-01
Attending: NEUROLOGICAL SURGERY
Payer: MEDICAID

## 2021-03-01 DIAGNOSIS — M51.34 DDD (DEGENERATIVE DISC DISEASE), THORACIC: ICD-10-CM

## 2021-03-02 DIAGNOSIS — M47.22 CERVICAL SPONDYLOSIS WITH RADICULOPATHY: ICD-10-CM

## 2021-03-02 DIAGNOSIS — M54.12 CERVICAL RADICULOPATHY: ICD-10-CM

## 2021-03-02 LAB — SARS-COV-2 RNA RESP QL NAA+PROBE: NOT DETECTED

## 2021-03-02 RX ORDER — IBUPROFEN 800 MG/1
TABLET ORAL
Qty: 90 TABLET | Refills: 0 | Status: ON HOLD | OUTPATIENT
Start: 2021-03-02 | End: 2021-03-04

## 2021-03-02 NOTE — TELEPHONE ENCOUNTER
Medication: IBUPROFEN 800 MG Oral Tab    Date of last refill: 2/3/2021    Last office visit: 12/2/2020  Due back to clinic per last office note:  Not indicated  Date next office visit scheduled:  None      Last OV note recommendation:   ASSESSMENT AND PLAN

## 2021-03-03 ENCOUNTER — HOSPITAL ENCOUNTER (OUTPATIENT)
Facility: HOSPITAL | Age: 51
Discharge: HOME | End: 2021-03-07
Attending: NEUROLOGICAL SURGERY | Admitting: NEUROLOGICAL SURGERY
Payer: MEDICAID

## 2021-03-03 ENCOUNTER — APPOINTMENT (OUTPATIENT)
Dept: GENERAL RADIOLOGY | Facility: HOSPITAL | Age: 51
End: 2021-03-03
Attending: NEUROLOGICAL SURGERY
Payer: MEDICAID

## 2021-03-03 DIAGNOSIS — M51.34 DDD (DEGENERATIVE DISC DISEASE), THORACIC: Primary | ICD-10-CM

## 2021-03-03 DIAGNOSIS — M51.36 DDD (DEGENERATIVE DISC DISEASE), LUMBAR: ICD-10-CM

## 2021-03-03 PROCEDURE — 0SB20ZZ EXCISION OF LUMBAR VERTEBRAL DISC, OPEN APPROACH: ICD-10-PCS | Performed by: NEUROLOGICAL SURGERY

## 2021-03-03 PROCEDURE — 01NB0ZZ RELEASE LUMBAR NERVE, OPEN APPROACH: ICD-10-PCS | Performed by: NEUROLOGICAL SURGERY

## 2021-03-03 PROCEDURE — 76000 FLUOROSCOPY <1 HR PHYS/QHP: CPT | Performed by: NEUROLOGICAL SURGERY

## 2021-03-03 DEVICE — KIT TISS CLOS TISSEEL 4ML: Type: IMPLANTABLE DEVICE | Site: BACK | Status: FUNCTIONAL

## 2021-03-03 DEVICE — PATCH DURAL DURAMATRIX 1X1: Type: IMPLANTABLE DEVICE | Site: BACK | Status: FUNCTIONAL

## 2021-03-03 RX ORDER — ONDANSETRON 2 MG/ML
INJECTION INTRAMUSCULAR; INTRAVENOUS AS NEEDED
Status: DISCONTINUED | OUTPATIENT
Start: 2021-03-03 | End: 2021-03-03 | Stop reason: SURG

## 2021-03-03 RX ORDER — SODIUM CHLORIDE, SODIUM LACTATE, POTASSIUM CHLORIDE, CALCIUM CHLORIDE 600; 310; 30; 20 MG/100ML; MG/100ML; MG/100ML; MG/100ML
INJECTION, SOLUTION INTRAVENOUS CONTINUOUS
Status: DISCONTINUED | OUTPATIENT
Start: 2021-03-03 | End: 2021-03-03 | Stop reason: HOSPADM

## 2021-03-03 RX ORDER — BUPIVACAINE HYDROCHLORIDE AND EPINEPHRINE 2.5; 5 MG/ML; UG/ML
INJECTION, SOLUTION EPIDURAL; INFILTRATION; INTRACAUDAL; PERINEURAL AS NEEDED
Status: DISCONTINUED | OUTPATIENT
Start: 2021-03-03 | End: 2021-03-03 | Stop reason: HOSPADM

## 2021-03-03 RX ORDER — METOCLOPRAMIDE HYDROCHLORIDE 5 MG/ML
INJECTION INTRAMUSCULAR; INTRAVENOUS AS NEEDED
Status: DISCONTINUED | OUTPATIENT
Start: 2021-03-03 | End: 2021-03-03 | Stop reason: SURG

## 2021-03-03 RX ORDER — ARIPIPRAZOLE 2 MG/1
2 TABLET ORAL DAILY
Status: DISCONTINUED | OUTPATIENT
Start: 2021-03-03 | End: 2021-03-07

## 2021-03-03 RX ORDER — BISACODYL 10 MG
10 SUPPOSITORY, RECTAL RECTAL
Status: DISCONTINUED | OUTPATIENT
Start: 2021-03-03 | End: 2021-03-07

## 2021-03-03 RX ORDER — SCOLOPAMINE TRANSDERMAL SYSTEM 1 MG/1
PATCH, EXTENDED RELEASE TRANSDERMAL AS NEEDED
Status: DISCONTINUED | OUTPATIENT
Start: 2021-03-03 | End: 2021-03-03 | Stop reason: SURG

## 2021-03-03 RX ORDER — DIPHENHYDRAMINE HCL 25 MG
25 CAPSULE ORAL EVERY 4 HOURS PRN
Status: DISCONTINUED | OUTPATIENT
Start: 2021-03-03 | End: 2021-03-07

## 2021-03-03 RX ORDER — ROCURONIUM BROMIDE 10 MG/ML
INJECTION, SOLUTION INTRAVENOUS AS NEEDED
Status: DISCONTINUED | OUTPATIENT
Start: 2021-03-03 | End: 2021-03-03 | Stop reason: SURG

## 2021-03-03 RX ORDER — CYCLOBENZAPRINE HCL 10 MG
10 TABLET ORAL 3 TIMES DAILY PRN
Status: DISCONTINUED | OUTPATIENT
Start: 2021-03-03 | End: 2021-03-07

## 2021-03-03 RX ORDER — LISINOPRIL 10 MG/1
10 TABLET ORAL DAILY
Status: DISCONTINUED | OUTPATIENT
Start: 2021-03-04 | End: 2021-03-04

## 2021-03-03 RX ORDER — MIDAZOLAM HYDROCHLORIDE 1 MG/ML
1 INJECTION INTRAMUSCULAR; INTRAVENOUS EVERY 5 MIN PRN
Status: DISCONTINUED | OUTPATIENT
Start: 2021-03-03 | End: 2021-03-03 | Stop reason: HOSPADM

## 2021-03-03 RX ORDER — ACETAMINOPHEN 500 MG
1000 TABLET ORAL ONCE AS NEEDED
Status: DISCONTINUED | OUTPATIENT
Start: 2021-03-03 | End: 2021-03-03 | Stop reason: HOSPADM

## 2021-03-03 RX ORDER — LAMOTRIGINE 25 MG/1
50 TABLET ORAL NIGHTLY
Status: DISCONTINUED | OUTPATIENT
Start: 2021-03-03 | End: 2021-03-03

## 2021-03-03 RX ORDER — ONDANSETRON 2 MG/ML
4 INJECTION INTRAMUSCULAR; INTRAVENOUS AS NEEDED
Status: DISCONTINUED | OUTPATIENT
Start: 2021-03-03 | End: 2021-03-03 | Stop reason: HOSPADM

## 2021-03-03 RX ORDER — SENNOSIDES 8.6 MG
17.2 TABLET ORAL NIGHTLY
Status: DISCONTINUED | OUTPATIENT
Start: 2021-03-03 | End: 2021-03-07

## 2021-03-03 RX ORDER — SCOLOPAMINE TRANSDERMAL SYSTEM 1 MG/1
PATCH, EXTENDED RELEASE TRANSDERMAL
Status: DISPENSED
Start: 2021-03-03 | End: 2021-03-03

## 2021-03-03 RX ORDER — ACETAMINOPHEN 500 MG
1000 TABLET ORAL ONCE
Status: COMPLETED | OUTPATIENT
Start: 2021-03-03 | End: 2021-03-03

## 2021-03-03 RX ORDER — PHENYLEPHRINE HCL 10 MG/ML
VIAL (ML) INJECTION AS NEEDED
Status: DISCONTINUED | OUTPATIENT
Start: 2021-03-03 | End: 2021-03-03 | Stop reason: SURG

## 2021-03-03 RX ORDER — MEPERIDINE HYDROCHLORIDE 25 MG/ML
INJECTION INTRAMUSCULAR; INTRAVENOUS; SUBCUTANEOUS
Status: COMPLETED
Start: 2021-03-03 | End: 2021-03-03

## 2021-03-03 RX ORDER — LIDOCAINE HYDROCHLORIDE 10 MG/ML
INJECTION, SOLUTION EPIDURAL; INFILTRATION; INTRACAUDAL; PERINEURAL AS NEEDED
Status: DISCONTINUED | OUTPATIENT
Start: 2021-03-03 | End: 2021-03-03 | Stop reason: SURG

## 2021-03-03 RX ORDER — DOCUSATE SODIUM 100 MG/1
100 CAPSULE, LIQUID FILLED ORAL 2 TIMES DAILY
Status: DISCONTINUED | OUTPATIENT
Start: 2021-03-03 | End: 2021-03-07

## 2021-03-03 RX ORDER — HYDROMORPHONE HYDROCHLORIDE 1 MG/ML
0.2 INJECTION, SOLUTION INTRAMUSCULAR; INTRAVENOUS; SUBCUTANEOUS EVERY 2 HOUR PRN
Status: DISCONTINUED | OUTPATIENT
Start: 2021-03-03 | End: 2021-03-07

## 2021-03-03 RX ORDER — GLYCOPYRROLATE 0.2 MG/ML
INJECTION, SOLUTION INTRAMUSCULAR; INTRAVENOUS AS NEEDED
Status: DISCONTINUED | OUTPATIENT
Start: 2021-03-03 | End: 2021-03-03 | Stop reason: SURG

## 2021-03-03 RX ORDER — MAGNESIUM OXIDE 400 MG (241.3 MG MAGNESIUM) TABLET
400 TABLET DAILY
Status: DISCONTINUED | OUTPATIENT
Start: 2021-03-04 | End: 2021-03-07

## 2021-03-03 RX ORDER — CEFAZOLIN SODIUM/WATER 2 G/20 ML
2 SYRINGE (ML) INTRAVENOUS EVERY 8 HOURS
Status: COMPLETED | OUTPATIENT
Start: 2021-03-03 | End: 2021-03-04

## 2021-03-03 RX ORDER — HYDROCHLOROTHIAZIDE 12.5 MG/1
12.5 CAPSULE, GELATIN COATED ORAL DAILY
Status: DISCONTINUED | OUTPATIENT
Start: 2021-03-04 | End: 2021-03-04

## 2021-03-03 RX ORDER — ONDANSETRON 2 MG/ML
4 INJECTION INTRAMUSCULAR; INTRAVENOUS EVERY 4 HOURS PRN
Status: ACTIVE | OUTPATIENT
Start: 2021-03-03 | End: 2021-03-04

## 2021-03-03 RX ORDER — MAGNESIUM OXIDE 400 MG (241.3 MG MAGNESIUM) TABLET
400 TABLET NIGHTLY
Status: DISCONTINUED | OUTPATIENT
Start: 2021-03-03 | End: 2021-03-03

## 2021-03-03 RX ORDER — HYDROMORPHONE HYDROCHLORIDE 1 MG/ML
0.4 INJECTION, SOLUTION INTRAMUSCULAR; INTRAVENOUS; SUBCUTANEOUS EVERY 2 HOUR PRN
Status: DISCONTINUED | OUTPATIENT
Start: 2021-03-03 | End: 2021-03-07

## 2021-03-03 RX ORDER — ALBUTEROL SULFATE 90 UG/1
2 AEROSOL, METERED RESPIRATORY (INHALATION) EVERY 4 HOURS PRN
Status: DISCONTINUED | OUTPATIENT
Start: 2021-03-03 | End: 2021-03-07

## 2021-03-03 RX ORDER — HYDROMORPHONE HYDROCHLORIDE 1 MG/ML
0.4 INJECTION, SOLUTION INTRAMUSCULAR; INTRAVENOUS; SUBCUTANEOUS EVERY 5 MIN PRN
Status: DISCONTINUED | OUTPATIENT
Start: 2021-03-03 | End: 2021-03-03 | Stop reason: HOSPADM

## 2021-03-03 RX ORDER — NEOSTIGMINE METHYLSULFATE 1 MG/ML
INJECTION INTRAVENOUS AS NEEDED
Status: DISCONTINUED | OUTPATIENT
Start: 2021-03-03 | End: 2021-03-03 | Stop reason: SURG

## 2021-03-03 RX ORDER — KETOROLAC TROMETHAMINE 30 MG/ML
INJECTION, SOLUTION INTRAMUSCULAR; INTRAVENOUS AS NEEDED
Status: DISCONTINUED | OUTPATIENT
Start: 2021-03-03 | End: 2021-03-03 | Stop reason: SURG

## 2021-03-03 RX ORDER — DIPHENHYDRAMINE HYDROCHLORIDE 50 MG/ML
25 INJECTION INTRAMUSCULAR; INTRAVENOUS EVERY 4 HOURS PRN
Status: DISCONTINUED | OUTPATIENT
Start: 2021-03-03 | End: 2021-03-07

## 2021-03-03 RX ORDER — PROCHLORPERAZINE EDISYLATE 5 MG/ML
10 INJECTION INTRAMUSCULAR; INTRAVENOUS EVERY 6 HOURS PRN
Status: ACTIVE | OUTPATIENT
Start: 2021-03-03 | End: 2021-03-05

## 2021-03-03 RX ORDER — NALOXONE HYDROCHLORIDE 0.4 MG/ML
80 INJECTION, SOLUTION INTRAMUSCULAR; INTRAVENOUS; SUBCUTANEOUS AS NEEDED
Status: DISCONTINUED | OUTPATIENT
Start: 2021-03-03 | End: 2021-03-03 | Stop reason: HOSPADM

## 2021-03-03 RX ORDER — MIDAZOLAM HYDROCHLORIDE 1 MG/ML
INJECTION INTRAMUSCULAR; INTRAVENOUS AS NEEDED
Status: DISCONTINUED | OUTPATIENT
Start: 2021-03-03 | End: 2021-03-03 | Stop reason: SURG

## 2021-03-03 RX ORDER — PREGABALIN 75 MG/1
75 CAPSULE ORAL 2 TIMES DAILY
Status: DISCONTINUED | OUTPATIENT
Start: 2021-03-03 | End: 2021-03-07

## 2021-03-03 RX ORDER — HYDROCODONE BITARTRATE AND ACETAMINOPHEN 5; 325 MG/1; MG/1
1 TABLET ORAL AS NEEDED
Status: DISCONTINUED | OUTPATIENT
Start: 2021-03-03 | End: 2021-03-03 | Stop reason: HOSPADM

## 2021-03-03 RX ORDER — CEFAZOLIN SODIUM/WATER 2 G/20 ML
2 SYRINGE (ML) INTRAVENOUS ONCE
Status: COMPLETED | OUTPATIENT
Start: 2021-03-03 | End: 2021-03-03

## 2021-03-03 RX ORDER — DEXTROAMPHETAMINE SACCHARATE, AMPHETAMINE ASPARTATE, DEXTROAMPHETAMINE SULFATE AND AMPHETAMINE SULFATE 2.5; 2.5; 2.5; 2.5 MG/1; MG/1; MG/1; MG/1
30 TABLET ORAL 2 TIMES DAILY
Status: DISCONTINUED | OUTPATIENT
Start: 2021-03-03 | End: 2021-03-03

## 2021-03-03 RX ORDER — HYDROCODONE BITARTRATE AND ACETAMINOPHEN 5; 325 MG/1; MG/1
2 TABLET ORAL AS NEEDED
Status: DISCONTINUED | OUTPATIENT
Start: 2021-03-03 | End: 2021-03-03 | Stop reason: HOSPADM

## 2021-03-03 RX ORDER — OXYCODONE HYDROCHLORIDE AND ACETAMINOPHEN 5; 325 MG/1; MG/1
1 TABLET ORAL EVERY 4 HOURS PRN
Status: DISCONTINUED | OUTPATIENT
Start: 2021-03-03 | End: 2021-03-07

## 2021-03-03 RX ORDER — POLYETHYLENE GLYCOL 3350 17 G/17G
17 POWDER, FOR SOLUTION ORAL DAILY PRN
Status: DISCONTINUED | OUTPATIENT
Start: 2021-03-03 | End: 2021-03-07

## 2021-03-03 RX ORDER — MIDAZOLAM HYDROCHLORIDE 1 MG/ML
INJECTION INTRAMUSCULAR; INTRAVENOUS
Status: COMPLETED
Start: 2021-03-03 | End: 2021-03-03

## 2021-03-03 RX ORDER — DEXAMETHASONE SODIUM PHOSPHATE 4 MG/ML
VIAL (ML) INJECTION AS NEEDED
Status: DISCONTINUED | OUTPATIENT
Start: 2021-03-03 | End: 2021-03-03 | Stop reason: SURG

## 2021-03-03 RX ORDER — DEXTROAMPHETAMINE SACCHARATE, AMPHETAMINE ASPARTATE, DEXTROAMPHETAMINE SULFATE AND AMPHETAMINE SULFATE 2.5; 2.5; 2.5; 2.5 MG/1; MG/1; MG/1; MG/1
30 TABLET ORAL 2 TIMES DAILY
Status: DISCONTINUED | OUTPATIENT
Start: 2021-03-04 | End: 2021-03-07

## 2021-03-03 RX ORDER — OXYCODONE HYDROCHLORIDE AND ACETAMINOPHEN 5; 325 MG/1; MG/1
2 TABLET ORAL EVERY 4 HOURS PRN
Status: DISCONTINUED | OUTPATIENT
Start: 2021-03-03 | End: 2021-03-07

## 2021-03-03 RX ORDER — DULOXETIN HYDROCHLORIDE 30 MG/1
60 CAPSULE, DELAYED RELEASE ORAL EVERY MORNING
Status: DISCONTINUED | OUTPATIENT
Start: 2021-03-04 | End: 2021-03-07

## 2021-03-03 RX ORDER — LAMOTRIGINE 25 MG/1
50 TABLET ORAL DAILY
Status: DISCONTINUED | OUTPATIENT
Start: 2021-03-04 | End: 2021-03-07

## 2021-03-03 RX ORDER — NICOTINE 21 MG/24HR
1 PATCH, TRANSDERMAL 24 HOURS TRANSDERMAL DAILY
Status: DISCONTINUED | OUTPATIENT
Start: 2021-03-03 | End: 2021-03-07

## 2021-03-03 RX ORDER — LABETALOL HYDROCHLORIDE 5 MG/ML
5 INJECTION, SOLUTION INTRAVENOUS EVERY 5 MIN PRN
Status: DISCONTINUED | OUTPATIENT
Start: 2021-03-03 | End: 2021-03-03 | Stop reason: HOSPADM

## 2021-03-03 RX ORDER — BACITRACIN 50000 [USP'U]/1
INJECTION, POWDER, LYOPHILIZED, FOR SOLUTION INTRAMUSCULAR AS NEEDED
Status: DISCONTINUED | OUTPATIENT
Start: 2021-03-03 | End: 2021-03-03 | Stop reason: HOSPADM

## 2021-03-03 RX ORDER — MEPERIDINE HYDROCHLORIDE 25 MG/ML
12.5 INJECTION INTRAMUSCULAR; INTRAVENOUS; SUBCUTANEOUS AS NEEDED
Status: DISCONTINUED | OUTPATIENT
Start: 2021-03-03 | End: 2021-03-03 | Stop reason: HOSPADM

## 2021-03-03 RX ORDER — LAMOTRIGINE 25 MG/1
50 TABLET ORAL DAILY
COMMUNITY

## 2021-03-03 RX ORDER — HYDROMORPHONE HYDROCHLORIDE 1 MG/ML
INJECTION, SOLUTION INTRAMUSCULAR; INTRAVENOUS; SUBCUTANEOUS
Status: COMPLETED
Start: 2021-03-03 | End: 2021-03-03

## 2021-03-03 RX ORDER — ACETAMINOPHEN 325 MG/1
650 TABLET ORAL EVERY 4 HOURS PRN
Status: DISCONTINUED | OUTPATIENT
Start: 2021-03-03 | End: 2021-03-07

## 2021-03-03 RX ORDER — HYDROCHLOROTHIAZIDE 25 MG/1
12.5 TABLET ORAL DAILY
Status: DISCONTINUED | OUTPATIENT
Start: 2021-03-04 | End: 2021-03-03 | Stop reason: SDUPTHER

## 2021-03-03 RX ORDER — HYDROMORPHONE HYDROCHLORIDE 1 MG/ML
0.8 INJECTION, SOLUTION INTRAMUSCULAR; INTRAVENOUS; SUBCUTANEOUS EVERY 2 HOUR PRN
Status: DISCONTINUED | OUTPATIENT
Start: 2021-03-03 | End: 2021-03-07

## 2021-03-03 RX ORDER — SODIUM PHOSPHATE, DIBASIC AND SODIUM PHOSPHATE, MONOBASIC 7; 19 G/133ML; G/133ML
1 ENEMA RECTAL ONCE AS NEEDED
Status: COMPLETED | OUTPATIENT
Start: 2021-03-03 | End: 2021-03-05

## 2021-03-03 RX ORDER — ARIPIPRAZOLE 2 MG/1
2 TABLET ORAL DAILY
COMMUNITY

## 2021-03-03 RX ORDER — SODIUM CHLORIDE AND POTASSIUM CHLORIDE .9; .15 G/100ML; G/100ML
75 SOLUTION INTRAVENOUS CONTINUOUS
Status: DISCONTINUED | OUTPATIENT
Start: 2021-03-03 | End: 2021-03-07

## 2021-03-03 RX ORDER — ACETAMINOPHEN 500 MG
TABLET ORAL
Status: COMPLETED
Start: 2021-03-03 | End: 2021-03-03

## 2021-03-03 RX ORDER — CEFAZOLIN SODIUM/WATER 2 G/20 ML
SYRINGE (ML) INTRAVENOUS
Status: DISPENSED
Start: 2021-03-03 | End: 2021-03-03

## 2021-03-03 RX ADMIN — CEFAZOLIN SODIUM/WATER 2 G: 2 G/20 ML SYRINGE (ML) INTRAVENOUS at 07:37:00

## 2021-03-03 RX ADMIN — DEXAMETHASONE SODIUM PHOSPHATE 8 MG: 4 MG/ML VIAL (ML) INJECTION at 07:37:00

## 2021-03-03 RX ADMIN — LIDOCAINE HYDROCHLORIDE 50 MG: 10 INJECTION, SOLUTION EPIDURAL; INFILTRATION; INTRACAUDAL; PERINEURAL at 07:33:00

## 2021-03-03 RX ADMIN — SODIUM CHLORIDE, SODIUM LACTATE, POTASSIUM CHLORIDE, CALCIUM CHLORIDE: 600; 310; 30; 20 INJECTION, SOLUTION INTRAVENOUS at 10:48:00

## 2021-03-03 RX ADMIN — ROCURONIUM BROMIDE 10 MG: 10 INJECTION, SOLUTION INTRAVENOUS at 10:00:00

## 2021-03-03 RX ADMIN — GLYCOPYRROLATE 0.4 MG: 0.2 INJECTION, SOLUTION INTRAMUSCULAR; INTRAVENOUS at 10:58:00

## 2021-03-03 RX ADMIN — ROCURONIUM BROMIDE 20 MG: 10 INJECTION, SOLUTION INTRAVENOUS at 08:35:00

## 2021-03-03 RX ADMIN — SCOLOPAMINE TRANSDERMAL SYSTEM 1 PATCH: 1 PATCH, EXTENDED RELEASE TRANSDERMAL at 07:09:00

## 2021-03-03 RX ADMIN — ROCURONIUM BROMIDE 10 MG: 10 INJECTION, SOLUTION INTRAVENOUS at 09:31:00

## 2021-03-03 RX ADMIN — ROCURONIUM BROMIDE 50 MG: 10 INJECTION, SOLUTION INTRAVENOUS at 07:33:00

## 2021-03-03 RX ADMIN — PHENYLEPHRINE HCL 100 MCG: 10 MG/ML VIAL (ML) INJECTION at 10:49:00

## 2021-03-03 RX ADMIN — ONDANSETRON 4 MG: 2 INJECTION INTRAMUSCULAR; INTRAVENOUS at 10:44:00

## 2021-03-03 RX ADMIN — METOCLOPRAMIDE HYDROCHLORIDE 10 MG: 5 INJECTION INTRAMUSCULAR; INTRAVENOUS at 07:37:00

## 2021-03-03 RX ADMIN — ROCURONIUM BROMIDE 10 MG: 10 INJECTION, SOLUTION INTRAVENOUS at 09:07:00

## 2021-03-03 RX ADMIN — KETOROLAC TROMETHAMINE 30 MG: 30 INJECTION, SOLUTION INTRAMUSCULAR; INTRAVENOUS at 11:05:00

## 2021-03-03 RX ADMIN — MIDAZOLAM HYDROCHLORIDE 2 MG: 1 INJECTION INTRAMUSCULAR; INTRAVENOUS at 07:30:00

## 2021-03-03 RX ADMIN — NEOSTIGMINE METHYLSULFATE 4 MG: 1 INJECTION INTRAVENOUS at 10:58:00

## 2021-03-03 RX ADMIN — SODIUM CHLORIDE, SODIUM LACTATE, POTASSIUM CHLORIDE, CALCIUM CHLORIDE: 600; 310; 30; 20 INJECTION, SOLUTION INTRAVENOUS at 09:28:00

## 2021-03-03 NOTE — OPERATIVE REPORT
Operative Note    Patient Name: Kelby Menendez    Preoperative Diagnosis: DDD (degenerative disc disease), lumbar [M51.36] lumbar radiculopathy    Postoperative Diagnosis: same    Primary Surgeon: Bria Ontiveros    Assistant: cristal Wylie nicely opened the dura was tracked and a bulging disc was seen. The disc base was opened. Herniated disc egressed. This was removed with pituitaries. We entered the disc base and remove further disc.   Once this was complete we further inspected the ero

## 2021-03-03 NOTE — ANESTHESIA PROCEDURE NOTES
Airway  Urgency: elective    Airway not difficult    General Information and Staff    Patient location during procedure: OR  Anesthesiologist: Polo Xiong MD  Resident/CRNA: Yesenia Soto CRNA  Performed: CRNA     Indications and Patient Condition  Gayle

## 2021-03-03 NOTE — BRIEF OP NOTE
Pre-Operative Diagnosis: DDD (degenerative disc disease), lumbar [M51.36]     Post-Operative Diagnosis: DDD (degenerative disc disease), lumbar [M51.36]      Procedure Performed:   Procedure(s):  RIGHT LUMBAR 3 - LUMBAR 4, LUMBAR 4 - LUMBAR 5 HEMILAMINOTOM

## 2021-03-03 NOTE — PROGRESS NOTES
NURSING ADMISSION NOTE      Patient admitted via bed from PACU. Oriented to room. Safety precautions initiated. Bed in low position. Call light in reach. LAUREN ABEL paged with new consult.

## 2021-03-03 NOTE — PLAN OF CARE
Moderate back pain controlled with IV pain medication. Skin glue to incision, approximated and open to air. Voiding freely. Ambulating x1 assist with walker. Spinal precautions maintained. TEDs/SCDs bilaterally, ankle pumps encouraged. IS encouraged.  Plan

## 2021-03-03 NOTE — ANESTHESIA PREPROCEDURE EVALUATION
PRE-OP EVALUATION    Patient Name: Sin Morgan    Pre-op Diagnosis: DDD (degenerative disc disease), lumbar [M51.36]    Procedure(s):  RIGHT LUMBAR 3 - LUMBAR 4, LUMBAR 4 - LUMBAR 5 HEMILAMINOTOMIES, LEFT LUMBAR 2 - LUMBAR 3 MICRODISCECTOMY AND ALL IND INTERLAMINAR EPIDURAL INJECTION N/A 2018    Performed by Dejuan Hill MD at [address] [facility] OR   •       X2   • OTHER SURGICAL HISTORY      bunions bilateral   • OTHER SURGICAL HISTORY      nodule lymph nodes neck   • OTHER SURGICAL HISTORY   respiratory complications. All questions answered.   Plan/risks discussed with: patient  Use of blood product(s) discussed with: patient              Present on Admission:  **None**

## 2021-03-03 NOTE — ANESTHESIA POSTPROCEDURE EVALUATION
[id number] [facility] Patient Status:  Outpatient in a Bed   Age/Gender 49year old female MRN [id number]   [facility] SURGERY Attending Adán Hathaway MD   Hosp Day # 0 PCP Nancy Vega DO       Anesthesia Post-op Note

## 2021-03-03 NOTE — H&P
Cristhian Buckner PCP:  Jules Kaplan DO    1970 MRN [id number]      HISTORY OF PRESENT ILLNESS:  Cristhian Buckner is a(n) 53year old female here for follow-up regarding thoracic and lumbar pain  She got her EMG yesterday  Complains of pain t clearance  Reviewed films in detail  All questions answered  Patient very appreciative          Pt seen and examined. No changes since last visit. All questions answered. Pt ready for surgery.

## 2021-03-03 NOTE — CONSULTS
[facility] Hospitalist Initial Consult       Reason for consult: Medical Management sp L3/4/5 microdiscectomy      History of Present Illness: Patient is a 49year old female with PMH sig for HTN, depression, and anxiety who presents sp L3/4/5 microdiscectomy. Back problem    • MARCIO II (cervical intraepithelial neoplasia II) 4/2011   • Depression    • Dyspareunia    • Fall    • Fibromyalgia    • Fibromyalgia 2/25/2020   • Genital warts    • High blood pressure    • High cholesterol    • History of COVID-19     CO 2020    Performed by Walker Sanders MD at [facility] ENDOSCOPY      Social History    Tobacco Use      Smoking status: Former Smoker        Years: 20.00        Quit date: 2011        Years since quittin.8      Smokeless tobacco: Never Used      Tobacco rashes or lesions  Neuro:  Grossly intact, no sensory deficits     Laboratory:  No results for input(s): WBC, HGB, MCV, PLT, BAND, INR in the last 168 hours.     Invalid input(s): LYM#, MONO#, BASOS#, EOSIN#    No results for input(s): NA, K, CL, CO2, BUN,

## 2021-03-04 PROCEDURE — 97530 THERAPEUTIC ACTIVITIES: CPT

## 2021-03-04 PROCEDURE — 97161 PT EVAL LOW COMPLEX 20 MIN: CPT

## 2021-03-04 PROCEDURE — 97165 OT EVAL LOW COMPLEX 30 MIN: CPT

## 2021-03-04 PROCEDURE — 97535 SELF CARE MNGMENT TRAINING: CPT

## 2021-03-04 RX ORDER — NALOXONE HYDROCHLORIDE 4 MG/.1ML
4 SPRAY, METERED NASAL AS NEEDED
Qty: 1 KIT | Refills: 0 | Status: SHIPPED | OUTPATIENT
Start: 2021-03-04 | End: 2021-03-17

## 2021-03-04 RX ORDER — CYCLOBENZAPRINE HCL 10 MG
TABLET ORAL 3 TIMES DAILY PRN
Qty: 45 TABLET | Refills: 0 | Status: SHIPPED | OUTPATIENT
Start: 2021-03-04 | End: 2021-03-18

## 2021-03-04 RX ORDER — OXYCODONE HYDROCHLORIDE AND ACETAMINOPHEN 5; 325 MG/1; MG/1
1-2 TABLET ORAL EVERY 4 HOURS PRN
Qty: 90 TABLET | Refills: 0 | Status: SHIPPED | OUTPATIENT
Start: 2021-03-04 | End: 2021-03-18 | Stop reason: ALTCHOICE

## 2021-03-04 RX ORDER — PSEUDOEPHEDRINE HCL 30 MG
100 TABLET ORAL 2 TIMES DAILY PRN
Qty: 30 CAPSULE | Refills: 0 | Status: SHIPPED | OUTPATIENT
Start: 2021-03-04 | End: 2021-12-08 | Stop reason: ALTCHOICE

## 2021-03-04 NOTE — PLAN OF CARE
PT A/O, 92% ON RA, USING IS, IVF INFUSING, TOLERATING REGULAR DIET, LOWER BACK INCISION DELMAR, D/I, SAME NUMBNESS TO RT LEG, C/O OF PAIN TO LT BACK/LEGS, GIVEN PERCOCET/FLEXERIL, UP TO BATHROOM VOIDING WITH SB ASSIST/WALKER, INSTRUCTED PT ON POC    Problem:

## 2021-03-04 NOTE — PROGRESS NOTES
INPATIENT PROGRESS NOTE    Assessment:   Hanna Barber in room 375/375-A, is a 49year old female, Hospital Day ( LOS: 0 days ) hospitalized for <principal problem not specified>.     Post-Op Day 1 Day Post-Op s/p Procedure(s):  LUMBAR LAMINECTOMY 2 LEVEL easy and even. Skin warm and dry. Moving bilateral upper extremities spontaneously to full resistance. 5/5 bilateral lower extremity strength distally and proximally   Significant tenderness to palpation over the left SI joint.      Imaging reviewed: No

## 2021-03-04 NOTE — PHYSICAL THERAPY NOTE
PHYSICAL THERAPY QUICK EVALUATION - INPATIENT    Room Number: 375/375-A  Evaluation Date: 3/4/2021  Presenting Problem: s/p right L3-5 hemilaminectomy, left L2-3 KATERYNA 3/3/21  Physician Order: PT Eval and Treat    Problem List  Active Problems:    * No act INJECTION BILATERAL Bilateral 2019    Performed by Iggy Ross MD at [facility] ENDOSCOPY   • LUMBAR INTERLAMINAR EPIDURAL INJECTION N/A 2018    Performed by Iggy Ross MD at [facility] MAIN OR   •       X2   • OTHER SURGICAL HISTORY      vu moeller None   -   Moving from lying on back to sitting on the side of the bed?: None   How much help from another person does the patient currently need. ..   -   Moving to and from a bed to a chair (including a wheelchair)?: None   -   Need to walk in hospital ro prevention, use of ice for surgical pain, and activity recommendations. Pt able to adhere to spine precautions with all activity, and demonstrates good understanding of above.     The AM-PAC '6-Clicks' Inpatient Basic Mobility Short Form was completed and th

## 2021-03-04 NOTE — PLAN OF CARE
Pt pain managed with percocet. VSS. Pt C/O intermittent dizziness. Hospitalist adjusting BP meds. Ambulating well. Voiding without difficulty. Plan: home tomorrow if cleared. Will monitor.

## 2021-03-04 NOTE — CM/SW NOTE
03/04/21 1000   CM/SW Screening   Referral [address] [facility] staff; Chart review;Nursing rounds   Patient's Mental Status Alert;Oriented   Patient lives with Children       Patient is a 48 y/o woman s/p KATERYNA. PT eval pending.   Rec

## 2021-03-04 NOTE — OCCUPATIONAL THERAPY NOTE
OCCUPATIONAL THERAPY QUICK EVALUATION - INPATIENT    Room Number: 375/375-A  Evaluation Date: 3/4/2021     Type of Evaluation: Initial & Quick Eval  Presenting Problem: R L2-3 microdiscectomy, R L3-5 hemilaminectomy    Physician Order: IP Consult to [facility] MAIN OR   • COLPOSCOPY,BX CERVIX/ENDOCERV CURR  03/21/11    MARCIO 1   • LAPAROSCOPY PROCEDURE UNLISTED  2000    Ovarian cyst removed   • LEEP  4/2011    MARCIO 2   • LUMBAR FACET INJECTION BILATERAL Bilateral 2/3/2020    Performed by Marcello Day MD at [address] [place] [address] toilet, bedpan or urinal? : A Little  -   Putting on and taking off regular upper body clothing?: A Little  -   Taking care of personal grooming such as brushing teeth?: A Little  -   Eating meals?: None    AM-PAC Score:  Score: 19  Approx Degree of Impair LOW     OT Discharge Recommendations: Home; Intermittent Supervision  OT Device Recommendations: Long-handled sponge    PLAN   Patient has been evaluated and presents with no skilled Occupational Therapy needs at this time.   Patient discharged from [address] [place] [address]

## 2021-03-04 NOTE — PROGRESS NOTES
Reviewed indications, side effects of pain medication/narcotics and constipation prevention.  Stressed importance of increased fluids/roughage in diet, continued use stool softeners along with laxatives and suppositories as needed while taking narcotics korey

## 2021-03-05 ENCOUNTER — APPOINTMENT (OUTPATIENT)
Dept: GENERAL RADIOLOGY | Facility: HOSPITAL | Age: 51
End: 2021-03-05
Attending: HOSPITALIST
Payer: MEDICAID

## 2021-03-05 PROCEDURE — 74019 RADEX ABDOMEN 2 VIEWS: CPT | Performed by: HOSPITALIST

## 2021-03-05 PROCEDURE — S0028 INJECTION, FAMOTIDINE, 20 MG: HCPCS | Performed by: NURSE PRACTITIONER

## 2021-03-05 RX ORDER — DEXAMETHASONE SODIUM PHOSPHATE 4 MG/ML
4 VIAL (ML) INJECTION EVERY 6 HOURS
Status: DISCONTINUED | OUTPATIENT
Start: 2021-03-05 | End: 2021-03-07

## 2021-03-05 RX ORDER — FAMOTIDINE 10 MG/ML
20 INJECTION, SOLUTION INTRAVENOUS 2 TIMES DAILY
Status: DISCONTINUED | OUTPATIENT
Start: 2021-03-05 | End: 2021-03-07

## 2021-03-05 NOTE — PROGRESS NOTES
[facility]  Neurosurgery Progress Note    Nellie Ma Patient Status:  Outpatient in a Bed    1970 MRN [id number]   [facility] 3SW-A Attending Izabel Canela MD   Hosp Day # 0 PCP Taniya Maynard DO     Chief Complaint:  Carol Madsen anatomical  Feels some weakness  Strength 5/5 on individual testing bilateral lower ext  Able to walk around the room  Denies back pain  Will start decadron  Needs bm  Will see how she is tomorrow    Paged re pt  Whole left leg was numb after imaging  C/o

## 2021-03-05 NOTE — PROGRESS NOTES
[facility] Hospitalist Progress Note                                                                   [address]  2/1/1970    SUBJECTIVE:  Feels bloated, constipated. No BM after suppository. remission  Generalized anxiety d/o  - resume duloxetine, adderall, abilify, and lamotrigine      Nicotine dependence   - Pt was counseled on smoking cessation discussing multiple strategies including nicotine replacement and pharmacologic management, a tot

## 2021-03-05 NOTE — PLAN OF CARE
Pt C/O feeling constipated, bloating, and abdominal pain. Last BM 3/2. Suppository given and not effective. Active bowel sounds to all quadrants. Denies flatus since yesterday. Pt requesting enema.  Paged Dr. Belen Lomax, she will see pt.    3476: Per Dr. Beard Agent

## 2021-03-05 NOTE — PLAN OF CARE
A/O VSS. Pain well managed with percocet. Back incision with skin glue harvey. Ambulating. Plan of care reviewed. Call light within reach.

## 2021-03-05 NOTE — PLAN OF CARE
Notified Dr. Rachel Engle of XR results. See new orders. Pt also C/O entire L leg numbness, notified [facility], they are enroute. Will monitor.

## 2021-03-06 ENCOUNTER — APPOINTMENT (OUTPATIENT)
Dept: MRI IMAGING | Facility: HOSPITAL | Age: 51
End: 2021-03-06
Attending: NURSE PRACTITIONER
Payer: MEDICAID

## 2021-03-06 PROCEDURE — 72148 MRI LUMBAR SPINE W/O DYE: CPT | Performed by: NURSE PRACTITIONER

## 2021-03-06 PROCEDURE — S0028 INJECTION, FAMOTIDINE, 20 MG: HCPCS | Performed by: NURSE PRACTITIONER

## 2021-03-06 RX ORDER — MAGNESIUM CARB/ALUMINUM HYDROX 105-160MG
296 TABLET,CHEWABLE ORAL ONCE
Status: COMPLETED | OUTPATIENT
Start: 2021-03-06 | End: 2021-03-06

## 2021-03-06 RX ORDER — METHYLPREDNISOLONE 4 MG/1
TABLET ORAL
Qty: 21 TABLET | Refills: 0 | Status: SHIPPED | OUTPATIENT
Start: 2021-03-06 | End: 2021-03-17

## 2021-03-06 RX ORDER — SODIUM PHOSPHATE, DIBASIC AND SODIUM PHOSPHATE, MONOBASIC 7; 19 G/133ML; G/133ML
1 ENEMA RECTAL ONCE AS NEEDED
Status: COMPLETED | OUTPATIENT
Start: 2021-03-06 | End: 2021-03-06

## 2021-03-06 NOTE — PLAN OF CARE
Pt AOx4. R.A. C/o moderate incisional pain, relieved by PO pain meds. Pain and numbness to BLE, left greater than right. VS remain stable. Voiding freely, last BM 3/2. Able to drink half the golytly jug, but stopped d/t nausea.   Bowel sounds hyperactiv

## 2021-03-06 NOTE — PROGRESS NOTES
[facility]  Neurosurgery Progress Note    Madina Lone Patient Status:  Outpatient in a Bed    1970 MRN [id number]   [facility] 3SW-A Attending Lizzie Mariee MD   Hosp Day # 0 PCP Donita Greenwood DO     Chief Complaint:  Rach Mcgarry diameter.  Thecal sac previously measured 7 mm in AP dimension preoperatively. Aniceto Watts    L3-L4:  There is diffuse disc bulge with endplate osteophytes.  There is narrowing of the central canal to 7 mm.  Bilateral facet arthropathy with ligamentum hypertrophy. [facility] fluid collection in the posterior paraspinal soft tissues at the surgical bed measuring 3.4 x 2.7 x 3.1 cm consistent with postsurgical   change/liquefying hematoma/seroma.  Clinical correlation to exclude superimposed infection recommended.       Stable p

## 2021-03-06 NOTE — PROGRESS NOTES
[facility] Hospitalist Progress Note                                                                   [address]  2/1/1970    SUBJECTIVE:    +flatus. Ambulating well. Still no BM.  Pt feeling post op     Major depression in partial remission  Generalized anxiety d/o  - resume duloxetine, adderall, abilify, and lamotrigine      Nicotine dependence   - Pt was counseled on smoking cessation discussing multiple strategies including nicotine replace

## 2021-03-06 NOTE — PLAN OF CARE
Pain to back controlled with PO prn meds. Tolerating ambulating without difficulty. States numbness to LLE is unchanged but improved on RLE. MRI will be done this AM, unable to complete last night d/t other STAT MRIs being done. Has not had a BM yet.  State

## 2021-03-07 ENCOUNTER — APPOINTMENT (OUTPATIENT)
Dept: CT IMAGING | Facility: HOSPITAL | Age: 51
End: 2021-03-07
Attending: HOSPITALIST
Payer: MEDICAID

## 2021-03-07 VITALS
DIASTOLIC BLOOD PRESSURE: 85 MMHG | WEIGHT: 178.13 LBS | HEART RATE: 79 BPM | RESPIRATION RATE: 16 BRPM | SYSTOLIC BLOOD PRESSURE: 134 MMHG | BODY MASS INDEX: 28.63 KG/M2 | HEIGHT: 66 IN | OXYGEN SATURATION: 100 % | TEMPERATURE: 98 F

## 2021-03-07 LAB
ANION GAP SERPL CALC-SCNC: 5 MMOL/L (ref 0–18)
BUN BLD-MCNC: 16 MG/DL (ref 7–18)
BUN/CREAT SERPL: 19 (ref 10–20)
CALCIUM BLD-MCNC: 9.6 MG/DL (ref 8.5–10.1)
CHLORIDE SERPL-SCNC: 106 MMOL/L (ref 98–112)
CO2 SERPL-SCNC: 27 MMOL/L (ref 21–32)
CREAT BLD-MCNC: 0.84 MG/DL
GLUCOSE BLD-MCNC: 229 MG/DL (ref 70–99)
OSMOLALITY SERPL CALC.SUM OF ELEC: 294 MOSM/KG (ref 275–295)
POTASSIUM SERPL-SCNC: 4.1 MMOL/L (ref 3.5–5.1)
SODIUM SERPL-SCNC: 138 MMOL/L (ref 136–145)

## 2021-03-07 PROCEDURE — S0028 INJECTION, FAMOTIDINE, 20 MG: HCPCS | Performed by: NURSE PRACTITIONER

## 2021-03-07 PROCEDURE — 74177 CT ABD & PELVIS W/CONTRAST: CPT | Performed by: HOSPITALIST

## 2021-03-07 PROCEDURE — 80048 BASIC METABOLIC PNL TOTAL CA: CPT | Performed by: HOSPITALIST

## 2021-03-07 RX ORDER — SODIUM PHOSPHATE, DIBASIC AND SODIUM PHOSPHATE, MONOBASIC 7; 19 G/133ML; G/133ML
1 ENEMA RECTAL ONCE AS NEEDED
Status: COMPLETED | OUTPATIENT
Start: 2021-03-07 | End: 2021-03-07

## 2021-03-07 NOTE — PLAN OF CARE
Ct abd confirmed significant constipation. Given fleets enema and prune juice with MOM, had successful LARGE and FREQUENT BMs this afternoon. Cleared from all services for discharge to home.

## 2021-03-07 NOTE — PLAN OF CARE
Post surgical lumbar microdiscectomy. AOX4, RA, VSS. Ambulates independently. Pain management with Prn pain meds. Neuro/cms intact. Still unable to have BM, colace and senna given. Abdomen, rounded, firm, passing gas.  Plan to discharge home when able to

## 2021-03-07 NOTE — PROGRESS NOTES
Pt had enema and mag citrate without result. Continues to be bloated, poor appetite but denies nausea or vomiting. IM MD aware, no further orders at this time. Will follow-up tomorrow.

## 2021-03-07 NOTE — PROGRESS NOTES
INPATIENT PROGRESS NOTE    Assessment:   Hanna Meyer Cheikh in room 375/375-A, is a 49year old female, Hospital Day ( LOS: 0 days ) hospitalized for <principal problem not specified>.     Post-Op Day 4 Days Post-Op s/p Procedure(s):  LUMBAR LAMINECTOMY 2 ZELDAE 12:06 PM

## 2021-03-07 NOTE — PROGRESS NOTES
Patient given MOM in prune juice. Waiting for fleets enema to arrive on unit from [employer]. [phone]- fleets enema given.

## 2021-03-07 NOTE — PROGRESS NOTES
[facility] Hospitalist Progress Note                                                                   [address]  2/1/1970    SUBJECTIVE:    Increasing abd distention. +flatus.  Finished mag c and lamotrigine      Nicotine dependence   - Pt was counseled on smoking cessation discussing multiple strategies including nicotine replacement and pharmacologic management  - nicotine patch ordered     Acute constipation  - No abd pain on exam. +BS  - no

## 2021-03-08 ENCOUNTER — TELEPHONE (OUTPATIENT)
Dept: SURGERY | Facility: CLINIC | Age: 51
End: 2021-03-08

## 2021-03-08 NOTE — DISCHARGE SUMMARY
[facility]  Discharge Summary    Kelby Menendez Patient Status:  Outpatient in a Bed    1970 MRN [id number]   [facility] 3SW-A Attending No att. providers found   Hosp Day # 0 PCP Anitha Brown DO     Date of Admission: 3/3/2021 her left leg she complains of pain, around her back through the buttock to the front of her thigh just to her knee on her right leg she has pain in the back of her thigh just to her knee and on the front of the thigh has an area of numbness  The pain in th MG Oral Tab  Take 2 mg by mouth daily. , Historical    lamoTRIgine 25 MG Oral Tab  Take 50 mg by mouth nightly., Historical    Albuterol Sulfate  (90 Base) MCG/ACT Inhalation Aero Soln  Inhale 2 puffs into the lungs every 4 (four) hours as needed for are asking patients not to bring anyone with them to their appointment, unless clinically required.             [address], [address] [place] [address]  [facility] [address]  [phone]

## 2021-03-08 NOTE — TELEPHONE ENCOUNTER
[id number]. Patient had RIGHT LUMBAR 3/ LUMBAR 4, LUMBAR 4/ LUMBAR 5 HEMILAMINTOMIES, LEFT LUMBAR 2 / LUMBAR 3 MICRODISCECTOMY AND ALL INDICATED PROCEDURES on 3/3/21 by Dr. Valentin Alford.     For Lumbar Sx:    -Post op day 5 (D/c'd from hospital on 3/7/21)    -How

## 2021-03-08 NOTE — PROGRESS NOTES
Patient discharged to home. Has all postop scripts at home, provided to patient prior to surgery. Reviewed discharge paperwork with patient. All questions answered at this time. Verbalizes understanding of all teaching.    Patient escorted to Eulogio liriano v

## 2021-03-09 NOTE — TELEPHONE ENCOUNTER
Spoke with patient who returned nursing outreach call.      Patient had RIGHT LUMBAR 3/ LUMBAR 4, LUMBAR 4/ LUMBAR 5 HEMILAMINTOMIES, LEFT LUMBAR 2 / LUMBAR 3 MICRODISCECTOMY AND ALL INDICATED PROCEDURES on 3/3/21 by Dr. Princess Temple.     For Lumbar Sx:     -P

## 2021-03-17 ENCOUNTER — PATIENT MESSAGE (OUTPATIENT)
Dept: SURGERY | Facility: CLINIC | Age: 51
End: 2021-03-17

## 2021-03-17 NOTE — TELEPHONE ENCOUNTER
S: Message from patient noted. B: Patient underwent surgery on 3/3/21 with Dr. Fouzia Hassan:    LUMBAR LAMINECTOMY 2 LEVEL N/A General   LUMBAR MICRODISCECTOMY 1 LEVEL           A:  Patient messaged reporting pain similar to pre-op symptoms.   At [facility] on 1/2

## 2021-03-17 NOTE — TELEPHONE ENCOUNTER
From: Joey Brannon  To: Danii Lima PA-C  Sent: 3/17/2021 5:02 AM CDT  Subject: Visit Follow-up Question    Hello- I am not doing so good. I am having those same symptoms I had after surgery. Everyday it gets worse.  I can't put weight on the leg, el

## 2021-03-18 ENCOUNTER — OFFICE VISIT (OUTPATIENT)
Dept: SURGERY | Facility: CLINIC | Age: 51
End: 2021-03-18
Payer: MEDICAID

## 2021-03-18 ENCOUNTER — TELEPHONE (OUTPATIENT)
Dept: PAIN CLINIC | Facility: CLINIC | Age: 51
End: 2021-03-18

## 2021-03-18 VITALS
HEIGHT: 66 IN | WEIGHT: 183 LBS | DIASTOLIC BLOOD PRESSURE: 74 MMHG | HEART RATE: 96 BPM | SYSTOLIC BLOOD PRESSURE: 118 MMHG | BODY MASS INDEX: 29.41 KG/M2

## 2021-03-18 DIAGNOSIS — M54.16 LUMBAR RADICULITIS: Primary | ICD-10-CM

## 2021-03-18 DIAGNOSIS — M51.36 DDD (DEGENERATIVE DISC DISEASE), LUMBAR: Primary | ICD-10-CM

## 2021-03-18 DIAGNOSIS — Z98.1 HISTORY OF LUMBAR FUSION: ICD-10-CM

## 2021-03-18 DIAGNOSIS — M54.16 LUMBAR RADICULOPATHY: ICD-10-CM

## 2021-03-18 PROCEDURE — 3074F SYST BP LT 130 MM HG: CPT | Performed by: PHYSICIAN ASSISTANT

## 2021-03-18 PROCEDURE — 3008F BODY MASS INDEX DOCD: CPT | Performed by: PHYSICIAN ASSISTANT

## 2021-03-18 PROCEDURE — 3078F DIAST BP <80 MM HG: CPT | Performed by: PHYSICIAN ASSISTANT

## 2021-03-18 PROCEDURE — 99024 POSTOP FOLLOW-UP VISIT: CPT | Performed by: PHYSICIAN ASSISTANT

## 2021-03-18 RX ORDER — METHYLPREDNISOLONE 4 MG/1
TABLET ORAL
Qty: 1 PACKAGE | Refills: 0 | Status: SHIPPED | OUTPATIENT
Start: 2021-03-18 | End: 2021-04-15 | Stop reason: ALTCHOICE

## 2021-03-18 RX ORDER — PREGABALIN 75 MG/1
75 CAPSULE ORAL 2 TIMES DAILY
Qty: 60 CAPSULE | Refills: 2 | Status: SHIPPED | OUTPATIENT
Start: 2021-03-18 | End: 2021-12-08 | Stop reason: ALTCHOICE

## 2021-03-18 RX ORDER — HYDROCODONE BITARTRATE AND ACETAMINOPHEN 10; 325 MG/1; MG/1
1 TABLET ORAL
Qty: 60 TABLET | Refills: 0 | Status: SHIPPED | OUTPATIENT
Start: 2021-03-18 | End: 2021-04-15

## 2021-03-18 RX ORDER — CYCLOBENZAPRINE HCL 10 MG
TABLET ORAL 3 TIMES DAILY PRN
Qty: 60 TABLET | Refills: 0 | Status: SHIPPED | OUTPATIENT
Start: 2021-03-18 | End: 2021-04-15

## 2021-03-18 NOTE — PROGRESS NOTES
Feeling really bad  Went to PCP yesterday due to pain  They think she has a hematoma   Bulge on her back  Pain is worse than prior to sx  States can't walk etc    Wasn't sure if Lyrica was giving her an issue so she stopped taking this  PCP said incision l

## 2021-03-18 NOTE — TELEPHONE ENCOUNTER
Leela Casillas, [id number]  P Aleksandra Pain Nurse  Can you please start authorization for right L2-3 TFESI?  Thank you.             [facility]

## 2021-03-18 NOTE — TELEPHONE ENCOUNTER
Prior authorization request completed for: Right L2-3 TLESI   Authorization #[id number]    Authorization dates: 03/18/21 - 05/17/21  CPT codes approved: 38403  Number of visits/dates of service approved: 1  Physician: Markos Jerry    Patient can be scheduled

## 2021-03-18 NOTE — PROGRESS NOTES
Neurosurgery Clinic Visit  3/18/2021    Alexx Barkleyitri PCP:  DO NITA Mueller 1970 MRN [id number]     HISTORY OF PRESENT ILLNESS:  Alexx Coyle is a(n) 49year old female who is here 2 weeks s/p lumbar surgery.   She did well initially after s L5-S1 radiculopathy.       ASSESSMENT and PLAN:  1. Lumbar spondylosis. 2.  S/p left L2-3 microdiscectomy, right L3-4 and L4-5 hemilaminotomies on 3/3/2021. Imaging reviewed with the patient. She is currently describing a right L2 radiculopathy.   Dis

## 2021-03-19 NOTE — TELEPHONE ENCOUNTER
Patient advised of insurance approval to proceed with injections and is agreeable to scheduling. Patient scheduled for procedure, pre-procedure instructions reviewed. Patient prefers conscious sedation.  Reviewed sedation instructions including need to be f pressure medication or oral diabetic medication, please take with a small sip of water. • You are required to have a responsible adult drive you home after your procedure. You may not take a cab unless you have a responsible adult with you in the cab.   • procedures require 3 days    Ibuprofen (Motrin, Advil, Vicoprofen), Naproxen (Naprosyn, Aleve), Piroxcam (Feldene), Meloxicam (Mobic)--(Cervical procedures will require 3 days), Oxaprozin (Daypro), Diclofenac (Voltaren), Indomethacin (Indocin), Etodolac (L

## 2021-03-19 NOTE — TELEPHONE ENCOUNTER
Question Answer Comment   Anesthesia Type Sedation    Provider [facility]    Procedure Transforaminal    Laterality/Level right L2-3    Medical clearance requested (will send to Pain Navigator) No    Patient has Medicare coverage?  No           As

## 2021-03-20 ENCOUNTER — LAB ENCOUNTER (OUTPATIENT)
Dept: LAB | Age: 51
End: 2021-03-20
Attending: ANESTHESIOLOGY
Payer: MEDICAID

## 2021-03-20 DIAGNOSIS — M54.16 LUMBAR RADICULITIS: ICD-10-CM

## 2021-03-21 LAB — SARS-COV-2 RNA RESP QL NAA+PROBE: NOT DETECTED

## 2021-03-23 ENCOUNTER — HOSPITAL ENCOUNTER (OUTPATIENT)
Facility: HOSPITAL | Age: 51
Setting detail: HOSPITAL OUTPATIENT SURGERY
Discharge: HOME | End: 2021-03-23
Attending: ANESTHESIOLOGY | Admitting: ANESTHESIOLOGY
Payer: MEDICAID

## 2021-03-23 ENCOUNTER — APPOINTMENT (OUTPATIENT)
Dept: GENERAL RADIOLOGY | Facility: HOSPITAL | Age: 51
End: 2021-03-23
Attending: ANESTHESIOLOGY
Payer: MEDICAID

## 2021-03-23 VITALS
TEMPERATURE: 98 F | OXYGEN SATURATION: 91 % | RESPIRATION RATE: 18 BRPM | SYSTOLIC BLOOD PRESSURE: 100 MMHG | HEART RATE: 94 BPM | DIASTOLIC BLOOD PRESSURE: 70 MMHG

## 2021-03-23 DIAGNOSIS — M54.16 LUMBAR RADICULITIS: ICD-10-CM

## 2021-03-23 PROCEDURE — 3E0R33Z INTRODUCTION OF ANTI-INFLAMMATORY INTO SPINAL CANAL, PERCUTANEOUS APPROACH: ICD-10-PCS | Performed by: ANESTHESIOLOGY

## 2021-03-23 PROCEDURE — 3E0R3BZ INTRODUCTION OF ANESTHETIC AGENT INTO SPINAL CANAL, PERCUTANEOUS APPROACH: ICD-10-PCS | Performed by: ANESTHESIOLOGY

## 2021-03-23 PROCEDURE — B01BYZZ FLUOROSCOPY OF SPINAL CORD USING OTHER CONTRAST: ICD-10-PCS | Performed by: ANESTHESIOLOGY

## 2021-03-23 PROCEDURE — 99152 MOD SED SAME PHYS/QHP 5/>YRS: CPT | Performed by: ANESTHESIOLOGY

## 2021-03-23 RX ORDER — ONDANSETRON 2 MG/ML
4 INJECTION INTRAMUSCULAR; INTRAVENOUS ONCE AS NEEDED
Status: CANCELLED | OUTPATIENT
Start: 2021-03-23 | End: 2021-03-23

## 2021-03-23 RX ORDER — LIDOCAINE HYDROCHLORIDE 10 MG/ML
INJECTION, SOLUTION EPIDURAL; INFILTRATION; INTRACAUDAL; PERINEURAL AS NEEDED
Status: DISCONTINUED | OUTPATIENT
Start: 2021-03-23 | End: 2021-03-23

## 2021-03-23 RX ORDER — IBUPROFEN 200 MG
200 TABLET ORAL EVERY 6 HOURS PRN
COMMUNITY
End: 2021-10-07

## 2021-03-23 RX ORDER — DIPHENHYDRAMINE HYDROCHLORIDE 50 MG/ML
50 INJECTION INTRAMUSCULAR; INTRAVENOUS ONCE AS NEEDED
Status: CANCELLED | OUTPATIENT
Start: 2021-03-23 | End: 2021-03-23

## 2021-03-23 RX ORDER — DEXAMETHASONE SODIUM PHOSPHATE 10 MG/ML
INJECTION, SOLUTION INTRAMUSCULAR; INTRAVENOUS AS NEEDED
Status: DISCONTINUED | OUTPATIENT
Start: 2021-03-23 | End: 2021-03-23

## 2021-03-23 RX ORDER — SODIUM CHLORIDE 9 MG/ML
INJECTION INTRAVENOUS AS NEEDED
Status: DISCONTINUED | OUTPATIENT
Start: 2021-03-23 | End: 2021-03-23

## 2021-03-23 RX ORDER — ONDANSETRON 2 MG/ML
4 INJECTION INTRAMUSCULAR; INTRAVENOUS ONCE AS NEEDED
Status: DISCONTINUED | OUTPATIENT
Start: 2021-03-23 | End: 2021-03-23

## 2021-03-23 RX ORDER — SODIUM CHLORIDE, SODIUM LACTATE, POTASSIUM CHLORIDE, CALCIUM CHLORIDE 600; 310; 30; 20 MG/100ML; MG/100ML; MG/100ML; MG/100ML
100 INJECTION, SOLUTION INTRAVENOUS CONTINUOUS
Status: DISCONTINUED | OUTPATIENT
Start: 2021-03-23 | End: 2021-03-23

## 2021-03-23 RX ORDER — MIDAZOLAM HYDROCHLORIDE 1 MG/ML
INJECTION INTRAMUSCULAR; INTRAVENOUS AS NEEDED
Status: DISCONTINUED | OUTPATIENT
Start: 2021-03-23 | End: 2021-03-23

## 2021-03-23 NOTE — OPERATIVE REPORT
[facility]  Operative Report  3/23/2021     Alexx Barkleyitri Patient Status:  Hospital Outpatient Surgery    1970 MRN [id number]   [facility] ENDOSCOPY Attending Dillon Byrne MD   Hosp Day # 0 PCP Roz Christianson, DO     Indication epidural space at this level. The needle position was confirmed under AP and lateral fluoroscopic view. Following negative aspiration for CSF and blood, approximately 1 cc of Omnipaque 240 was injected.   An excellent contrast spread along the epidural spa

## 2021-03-23 NOTE — H&P
History & Physical Examination    Patient Name: Aissatou Prince  MRN: [id number]  [facility]: [id number]  YOB: 1970    Pre-Operative Diagnosis:  Lumbar radiculitis [M54.16]    Present Illness: Patient with lumbar radiculopathy for transforaminal epidu facility-administered medications for this encounter.       Allergies:   Gabapentin              SWELLING  Clindamycin             RASH    Past Medical History:   Diagnosis Date   • Abnormal uterine bleeding     bleeds every 2 weeks   • Anxiety state    • A LUMBAR LAMINECTOMY 2 LEVEL N/A 3/3/2021    Performed by Rosita Pat MD at [facility] MAIN OR   • LUMBAR MICRODISCECTOMY 1 LEVEL N/A 3/3/2021    Performed by Rosita Pat MD at [facility] MAIN OR   •       X2   • OTHER SURGICAL HISTORY      bunions UROGENITAL [x ] [x ]    EXTREMITIES [x ] [x ]    OTHER        [ x ] I have discussed the risks and benefits and alternatives with the patient/family. They understand and agree to proceed with plan of care.   [ x ] I have reviewed the History and Physical

## 2021-04-15 ENCOUNTER — OFFICE VISIT (OUTPATIENT)
Dept: SURGERY | Facility: CLINIC | Age: 51
End: 2021-04-15
Payer: MEDICAID

## 2021-04-15 VITALS
SYSTOLIC BLOOD PRESSURE: 102 MMHG | WEIGHT: 185 LBS | BODY MASS INDEX: 29.73 KG/M2 | HEIGHT: 66 IN | HEART RATE: 72 BPM | DIASTOLIC BLOOD PRESSURE: 74 MMHG

## 2021-04-15 DIAGNOSIS — M51.36 DDD (DEGENERATIVE DISC DISEASE), LUMBAR: ICD-10-CM

## 2021-04-15 DIAGNOSIS — M54.16 LUMBAR RADICULOPATHY: Primary | ICD-10-CM

## 2021-04-15 PROCEDURE — 3078F DIAST BP <80 MM HG: CPT | Performed by: PHYSICIAN ASSISTANT

## 2021-04-15 PROCEDURE — 3074F SYST BP LT 130 MM HG: CPT | Performed by: PHYSICIAN ASSISTANT

## 2021-04-15 PROCEDURE — 3008F BODY MASS INDEX DOCD: CPT | Performed by: PHYSICIAN ASSISTANT

## 2021-04-15 PROCEDURE — 99024 POSTOP FOLLOW-UP VISIT: CPT | Performed by: PHYSICIAN ASSISTANT

## 2021-04-15 RX ORDER — CYCLOBENZAPRINE HCL 10 MG
TABLET ORAL 3 TIMES DAILY PRN
Qty: 60 TABLET | Refills: 2 | Status: SHIPPED | OUTPATIENT
Start: 2021-04-15 | End: 2021-09-03 | Stop reason: SDUPTHER

## 2021-04-15 RX ORDER — HYDROCODONE BITARTRATE AND ACETAMINOPHEN 10; 325 MG/1; MG/1
1 TABLET ORAL EVERY 6 HOURS PRN
Qty: 60 TABLET | Refills: 0 | Status: SHIPPED | OUTPATIENT
Start: 2021-04-15 | End: 2021-05-10

## 2021-04-15 NOTE — PROGRESS NOTES
Patient here for 6-week postop follow-up sx 03/03/21. Steroid shot with Dr. Aysha Rojas helped relieve the electric shocks. But having worsening leg pain. Pain goes across lower back and down both legs to front of thighs. Ice, heat and norco for relief.  Micaela Aggarwal

## 2021-04-15 NOTE — PROGRESS NOTES
Neurosurgery Clinic Visit  4/15/2021    Keeley Andres PCP:  DO NITA Nash 1970 MRN [id number]     HISTORY OF PRESENT ILLNESS:  Keeley Andres is a(n) 49year old female who is here 6 weeks s/p lumbar surgery.   She did well initially after s is nothing her MRI to explain this. Recommend repeating MRI - this was ordered. Refills given for norco and flexeril. She will RTC in 2 weeks. Pt appreciative.       Dr. Daily Nagy evaluated the patient and is in agreement with the plan.       Total time

## 2021-04-19 ENCOUNTER — IMMUNIZATION (OUTPATIENT)
Dept: LAB | Age: 51
End: 2021-04-19
Attending: HOSPITALIST
Payer: MEDICAID

## 2021-04-19 DIAGNOSIS — Z23 NEED FOR VACCINATION: Primary | ICD-10-CM

## 2021-04-19 PROCEDURE — 0001A SARSCOV2 VAC 30MCG/0.3ML IM: CPT

## 2021-04-29 ENCOUNTER — HOSPITAL ENCOUNTER (OUTPATIENT)
Dept: MRI IMAGING | Age: 51
Discharge: HOME | End: 2021-04-29
Attending: PHYSICIAN ASSISTANT
Payer: MEDICAID

## 2021-04-29 DIAGNOSIS — M51.36 DDD (DEGENERATIVE DISC DISEASE), LUMBAR: ICD-10-CM

## 2021-04-29 DIAGNOSIS — M54.16 LUMBAR RADICULOPATHY: ICD-10-CM

## 2021-04-29 PROCEDURE — A9575 INJ GADOTERATE MEGLUMI 0.1ML: HCPCS

## 2021-04-29 PROCEDURE — 72158 MRI LUMBAR SPINE W/O & W/DYE: CPT | Performed by: PHYSICIAN ASSISTANT

## 2021-05-04 ENCOUNTER — LAB ENCOUNTER (OUTPATIENT)
Dept: LAB | Age: 51
End: 2021-05-04
Attending: FAMILY MEDICINE
Payer: MEDICAID

## 2021-05-04 DIAGNOSIS — Z13.0 SCREENING FOR DISORDER OF BLOOD AND BLOOD-FORMING ORGANS: ICD-10-CM

## 2021-05-04 DIAGNOSIS — Z13.29 SCREENING FOR THYROID DISORDER: ICD-10-CM

## 2021-05-04 DIAGNOSIS — Z13.228 ENCOUNTER FOR SCREENING FOR METABOLIC DISORDER: ICD-10-CM

## 2021-05-04 DIAGNOSIS — Z13.29 ENCOUNTER FOR SCREENING FOR ENDOCRINE DISORDER: ICD-10-CM

## 2021-05-04 DIAGNOSIS — Z00.00 ROUTINE GENERAL MEDICAL EXAMINATION AT A HEALTH CARE FACILITY: ICD-10-CM

## 2021-05-04 DIAGNOSIS — Z13.220 ENCOUNTER FOR SCREENING FOR LIPID DISORDER: ICD-10-CM

## 2021-05-04 PROCEDURE — 84443 ASSAY THYROID STIM HORMONE: CPT

## 2021-05-04 PROCEDURE — 85025 COMPLETE CBC W/AUTO DIFF WBC: CPT

## 2021-05-04 PROCEDURE — 80061 LIPID PANEL: CPT

## 2021-05-04 PROCEDURE — 84439 ASSAY OF FREE THYROXINE: CPT

## 2021-05-04 PROCEDURE — 80053 COMPREHEN METABOLIC PANEL: CPT

## 2021-05-04 PROCEDURE — 82306 VITAMIN D 25 HYDROXY: CPT

## 2021-05-04 PROCEDURE — 36415 COLL VENOUS BLD VENIPUNCTURE: CPT

## 2021-05-10 ENCOUNTER — IMMUNIZATION (OUTPATIENT)
Dept: LAB | Age: 51
End: 2021-05-10
Attending: HOSPITALIST
Payer: MEDICAID

## 2021-05-10 DIAGNOSIS — Z23 NEED FOR VACCINATION: Primary | ICD-10-CM

## 2021-05-10 PROCEDURE — 0002A SARSCOV2 VAC 30MCG/0.3ML IM: CPT

## 2021-05-11 RX ORDER — HYDROCODONE BITARTRATE AND ACETAMINOPHEN 10; 325 MG/1; MG/1
1 TABLET ORAL EVERY 6 HOURS PRN
Qty: 60 TABLET | Refills: 0 | Status: SHIPPED | OUTPATIENT
Start: 2021-05-11 | End: 2021-06-11

## 2021-05-11 NOTE — TELEPHONE ENCOUNTER
Medication: Hydrocodone    Date of last refill: 4/15/21 (#60/0)  [place]      Last office visit: 4/15/21  Due back to clinic per last office note:  2 weeks after MRI  Date next office visit scheduled:    Future Appointme

## 2021-05-12 ENCOUNTER — HOSPITAL ENCOUNTER (OUTPATIENT)
Dept: CT IMAGING | Age: 51
Discharge: HOME | End: 2021-05-12
Attending: INTERNAL MEDICINE
Payer: MEDICAID

## 2021-05-12 DIAGNOSIS — R91.1 PULMONARY NODULE: ICD-10-CM

## 2021-05-12 PROCEDURE — 71250 CT THORAX DX C-: CPT | Performed by: INTERNAL MEDICINE

## 2021-05-27 ENCOUNTER — OFFICE VISIT (OUTPATIENT)
Dept: SURGERY | Facility: CLINIC | Age: 51
End: 2021-05-27
Payer: MEDICAID

## 2021-05-27 ENCOUNTER — TELEPHONE (OUTPATIENT)
Dept: NEUROLOGY | Facility: CLINIC | Age: 51
End: 2021-05-27

## 2021-05-27 VITALS
WEIGHT: 185 LBS | HEIGHT: 66 IN | BODY MASS INDEX: 29.73 KG/M2 | HEART RATE: 75 BPM | SYSTOLIC BLOOD PRESSURE: 102 MMHG | DIASTOLIC BLOOD PRESSURE: 68 MMHG

## 2021-05-27 DIAGNOSIS — Z98.1 HISTORY OF LUMBAR FUSION: ICD-10-CM

## 2021-05-27 DIAGNOSIS — M54.16 LUMBAR RADICULOPATHY: Primary | ICD-10-CM

## 2021-05-27 DIAGNOSIS — M51.36 DDD (DEGENERATIVE DISC DISEASE), LUMBAR: ICD-10-CM

## 2021-05-27 PROCEDURE — 99024 POSTOP FOLLOW-UP VISIT: CPT | Performed by: NEUROLOGICAL SURGERY

## 2021-05-27 PROCEDURE — 3078F DIAST BP <80 MM HG: CPT | Performed by: NEUROLOGICAL SURGERY

## 2021-05-27 PROCEDURE — 3074F SYST BP LT 130 MM HG: CPT | Performed by: NEUROLOGICAL SURGERY

## 2021-05-27 PROCEDURE — 3008F BODY MASS INDEX DOCD: CPT | Performed by: NEUROLOGICAL SURGERY

## 2021-05-27 NOTE — TELEPHONE ENCOUNTER
[employer] online to initiates authorization for BILATERAL L2 SELECTIVE NERVE ROOT BLOCKS CPT Code 13771.      Clinical notes sent for review, [facility]#[id number]

## 2021-05-27 NOTE — PROGRESS NOTES
Neurosurgery Clinic Visit  2021    Dylan Garcia PCP:  DO NITA Mota 1970 MRN [id number]     HISTORY OF PRESENT ILLNESS:  Dylan Garcia is a(n) 49year old female who is here 12 weeks s/p lumbar surgery.   She continues to have pain ac 3/3/2021. Reviewed imaging with the patient. Her back pain is partly related to the bilateral SI joints. She also has narrowing at bilateral L2-3 foramen.  Recommend selective nerve root blocks for bilateral L2 nerve roots.   If these do not give any

## 2021-05-27 NOTE — PROGRESS NOTES
Patient here for postop follow-up. States bilateral leg pain is worsening, rates it 8/10. Back pain is about same as last OV, rated 6/10.

## 2021-06-02 NOTE — TELEPHONE ENCOUNTER
MD Ana Castillo 40 minutes ago (10:05 AM)     Can we schedule peer to peer      Ref #: [id number]    Contacted [employer] to schedule. Aster Wren issued on 5/31/2021. Scheduled peer to peer today, 6/2, at 1300.  Dr. Margarita Grace

## 2021-06-02 NOTE — TELEPHONE ENCOUNTER
Spoke with Carol DIGGS and scheduled a peer to peer with Dr Gerardo Montemayor and Dr Letitia Reyes on 6/7/2021 at 9:30 AM CT.

## 2021-06-02 NOTE — TELEPHONE ENCOUNTER
BILATERAL L2 SELECTIVE NERVE ROOT BLOCKS CPT Code 27340-USNIVV    The reason is:  Epidural Steroid Injections (SRAVANTHI) (used for pain that travels from your spine to your armsor legs), we cannot approve this request.  The requested repeat service is supported

## 2021-06-07 ENCOUNTER — HOSPITAL ENCOUNTER (OUTPATIENT)
Dept: MAMMOGRAPHY | Age: 51
Discharge: HOME | End: 2021-06-07
Attending: FAMILY MEDICINE
Payer: MEDICAID

## 2021-06-07 DIAGNOSIS — Z12.31 VISIT FOR SCREENING MAMMOGRAM: ICD-10-CM

## 2021-06-07 PROCEDURE — 77067 SCR MAMMO BI INCL CAD: CPT | Performed by: FAMILY MEDICINE

## 2021-06-07 PROCEDURE — 77063 BREAST TOMOSYNTHESIS BI: CPT | Performed by: FAMILY MEDICINE

## 2021-06-07 NOTE — TELEPHONE ENCOUNTER
Question Answer Comment   Anesthesia Type Sedation    Provider Yuliya Grayson    Location Lab    Procedure Other (please add comment)    Medical clearance requested (will send to Pain Navigator) No    Patient has Medicare coverage?  No    Comments (Please list entire

## 2021-06-07 NOTE — TELEPHONE ENCOUNTER
Peer to peer completed between Dr Gerardo Montemayor and Dr Parrish Hawthorne. Authorization provided.      Auth #: [id number]

## 2021-06-07 NOTE — TELEPHONE ENCOUNTER
Peer to peer completed.    Verified [employer] online, request for  BILATERAL L2 SELECTIVE NERVE ROOT BLOCKS  CPT Code 20573-86- Approved     Prior authorization request completed for: BILATERAL L2 SELECTIVE NERVE ROOT BLOCKS    Authorization # [id number]  [employer]

## 2021-06-07 NOTE — TELEPHONE ENCOUNTER
Patient advised of insurance approval to proceed with injections and is agreeable to scheduling. Patient scheduled for procedure, pre-procedure instructions reviewed. Patient prefers conscious sedation.  Reviewed sedation instructions including need to be f

## 2021-06-11 ENCOUNTER — PATIENT MESSAGE (OUTPATIENT)
Dept: SURGERY | Facility: CLINIC | Age: 51
End: 2021-06-11

## 2021-06-11 PROBLEM — R92.8 ABNORMAL MAMMOGRAM: Status: ACTIVE | Noted: 2021-06-11

## 2021-06-11 RX ORDER — HYDROCODONE BITARTRATE AND ACETAMINOPHEN 10; 325 MG/1; MG/1
1 TABLET ORAL EVERY 6 HOURS PRN
Qty: 60 TABLET | Refills: 0 | Status: SHIPPED | OUTPATIENT
Start: 2021-06-11 | End: 2021-09-14 | Stop reason: DRUGHIGH

## 2021-06-11 NOTE — TELEPHONE ENCOUNTER
From: Sin Morgan  To: Rogers Escalante MD  Sent: 6/11/2021 9:12 AM CDT  Subject: Prescription Question    Hello,    When I saw Dr Berline Meigs I did not a refill on the Norco at that time . I only take when I really need to. I only have 2 left.  If you

## 2021-06-11 NOTE — TELEPHONE ENCOUNTER
Medication: Hydrocodone    Date of last refill: 5/11/21 (#60/0)  [place]      Last office visit: 5/27/21  Due back to clinic per last office note:     Date next office visit scheduled:    Future Appointments   Date Time

## 2021-06-12 ENCOUNTER — LAB ENCOUNTER (OUTPATIENT)
Dept: LAB | Age: 51
End: 2021-06-12
Attending: ANESTHESIOLOGY
Payer: MEDICAID

## 2021-06-12 DIAGNOSIS — M54.16 LUMBAR RADICULOPATHY: ICD-10-CM

## 2021-06-15 ENCOUNTER — HOSPITAL ENCOUNTER (OUTPATIENT)
Facility: HOSPITAL | Age: 51
Setting detail: HOSPITAL OUTPATIENT SURGERY
Discharge: HOME | End: 2021-06-15
Attending: ANESTHESIOLOGY | Admitting: ANESTHESIOLOGY
Payer: MEDICAID

## 2021-06-15 ENCOUNTER — APPOINTMENT (OUTPATIENT)
Dept: GENERAL RADIOLOGY | Facility: HOSPITAL | Age: 51
End: 2021-06-15
Attending: ANESTHESIOLOGY
Payer: MEDICAID

## 2021-06-15 VITALS
HEART RATE: 90 BPM | SYSTOLIC BLOOD PRESSURE: 101 MMHG | RESPIRATION RATE: 18 BRPM | DIASTOLIC BLOOD PRESSURE: 70 MMHG | TEMPERATURE: 98 F | OXYGEN SATURATION: 95 %

## 2021-06-15 DIAGNOSIS — M54.16 LUMBAR RADICULOPATHY: ICD-10-CM

## 2021-06-15 PROCEDURE — 3E0T33Z INTRODUCTION OF ANTI-INFLAMMATORY INTO PERIPHERAL NERVES AND PLEXI, PERCUTANEOUS APPROACH: ICD-10-PCS | Performed by: ANESTHESIOLOGY

## 2021-06-15 PROCEDURE — 99152 MOD SED SAME PHYS/QHP 5/>YRS: CPT | Performed by: ANESTHESIOLOGY

## 2021-06-15 PROCEDURE — 3E0T3BZ INTRODUCTION OF ANESTHETIC AGENT INTO PERIPHERAL NERVES AND PLEXI, PERCUTANEOUS APPROACH: ICD-10-PCS | Performed by: ANESTHESIOLOGY

## 2021-06-15 RX ORDER — ONDANSETRON 2 MG/ML
INJECTION INTRAMUSCULAR; INTRAVENOUS
Status: COMPLETED
Start: 2021-06-15 | End: 2021-06-15

## 2021-06-15 RX ORDER — SODIUM CHLORIDE, SODIUM LACTATE, POTASSIUM CHLORIDE, CALCIUM CHLORIDE 600; 310; 30; 20 MG/100ML; MG/100ML; MG/100ML; MG/100ML
100 INJECTION, SOLUTION INTRAVENOUS CONTINUOUS
Status: DISCONTINUED | OUTPATIENT
Start: 2021-06-15 | End: 2021-06-15

## 2021-06-15 RX ORDER — MIDAZOLAM HYDROCHLORIDE 1 MG/ML
INJECTION INTRAMUSCULAR; INTRAVENOUS
Status: DISCONTINUED | OUTPATIENT
Start: 2021-06-15 | End: 2021-06-15

## 2021-06-15 RX ORDER — ONDANSETRON 2 MG/ML
4 INJECTION INTRAMUSCULAR; INTRAVENOUS ONCE AS NEEDED
Status: DISCONTINUED | OUTPATIENT
Start: 2021-06-15 | End: 2021-06-15

## 2021-06-15 RX ORDER — DIPHENHYDRAMINE HYDROCHLORIDE 50 MG/ML
50 INJECTION INTRAMUSCULAR; INTRAVENOUS ONCE AS NEEDED
Status: DISCONTINUED | OUTPATIENT
Start: 2021-06-15 | End: 2021-06-15

## 2021-06-15 RX ORDER — SODIUM CHLORIDE 9 MG/ML
INJECTION INTRAVENOUS
Status: DISCONTINUED | OUTPATIENT
Start: 2021-06-15 | End: 2021-06-15

## 2021-06-15 RX ORDER — DEXAMETHASONE SODIUM PHOSPHATE 10 MG/ML
INJECTION, SOLUTION INTRAMUSCULAR; INTRAVENOUS
Status: DISCONTINUED | OUTPATIENT
Start: 2021-06-15 | End: 2021-06-15

## 2021-06-15 RX ORDER — ONDANSETRON 2 MG/ML
4 INJECTION INTRAMUSCULAR; INTRAVENOUS ONCE AS NEEDED
Status: COMPLETED | OUTPATIENT
Start: 2021-06-15 | End: 2021-06-15

## 2021-06-15 RX ORDER — LIDOCAINE HYDROCHLORIDE 10 MG/ML
INJECTION, SOLUTION EPIDURAL; INFILTRATION; INTRACAUDAL; PERINEURAL
Status: DISCONTINUED | OUTPATIENT
Start: 2021-06-15 | End: 2021-06-15

## 2021-06-15 NOTE — OPERATIVE REPORT
[facility]  Operative Report  6/15/2021     Leonidas Mitchell Patient Status:  Hospital Outpatient Surgery    1970 MRN [id number]   Location [address] [place] [address] Attending Shanelle Howe MD   [facility] Day # 0 PCP Luis Smith DO atraumatically under fluoroscopic guidance. The needle was advanced into the anterior epidural space at this level. The needle position was confirmed under AP and lateral fluoroscopic view.   Following negative aspiration for CSF and blood, approximately 1

## 2021-06-15 NOTE — H&P
History & Physical Examination    Patient Name: Taty Almaguer  MRN: [id number]  CSN: [id number]  YOB: 1970    Pre-Operative Diagnosis:  Lumbar radiculopathy [M54.16]    Present Illness: Patient with lumbar radiculopathy for selective nerve ro 2  cholecalciferol (VITAMIN D3) 125 MCG (5000 UT) Oral Cap, Take by mouth daily. , Disp: , Rfl: 0  amphetamine-dextroamphetamine (ADDERALL) 30 MG Oral Tab, Take 1 tablet (30 mg total) by mouth 2 (two) times daily. , Disp: 60 tablet, Rfl: 0  DULoxetine HCl 60 OTHER SURGICAL HISTORY  1983    bunions bilateral   • OTHER SURGICAL HISTORY  2006    nodule lymph nodes neck   • OTHER SURGICAL HISTORY  2016     Bladder sling   • OTHER SURGICAL HISTORY  09/11/2020    Cystoscopy, Dr Ezra Bhatia     Family History   Problem R above.     Luba Khanna MD

## 2021-06-16 ENCOUNTER — HOSPITAL ENCOUNTER (OUTPATIENT)
Dept: MAMMOGRAPHY | Facility: HOSPITAL | Age: 51
Discharge: HOME | End: 2021-06-16
Attending: FAMILY MEDICINE
Payer: MEDICAID

## 2021-06-16 DIAGNOSIS — R92.2 INCONCLUSIVE MAMMOGRAM: ICD-10-CM

## 2021-06-16 PROCEDURE — 77065 DX MAMMO INCL CAD UNI: CPT | Performed by: FAMILY MEDICINE

## 2021-06-16 PROCEDURE — 77061 BREAST TOMOSYNTHESIS UNI: CPT | Performed by: FAMILY MEDICINE

## 2021-06-16 PROCEDURE — 76642 ULTRASOUND BREAST LIMITED: CPT | Performed by: FAMILY MEDICINE

## 2021-08-03 ENCOUNTER — OFFICE VISIT (OUTPATIENT)
Dept: SURGERY | Facility: CLINIC | Age: 51
End: 2021-08-03
Payer: MEDICAID

## 2021-08-03 VITALS
RESPIRATION RATE: 16 BRPM | BODY MASS INDEX: 28.93 KG/M2 | DIASTOLIC BLOOD PRESSURE: 66 MMHG | HEIGHT: 66 IN | SYSTOLIC BLOOD PRESSURE: 102 MMHG | HEART RATE: 98 BPM | WEIGHT: 180 LBS | OXYGEN SATURATION: 99 %

## 2021-08-03 DIAGNOSIS — M54.16 LUMBAR RADICULOPATHY: Primary | ICD-10-CM

## 2021-08-03 DIAGNOSIS — M48.061 LUMBAR FORAMINAL STENOSIS: ICD-10-CM

## 2021-08-03 DIAGNOSIS — M51.36 DDD (DEGENERATIVE DISC DISEASE), LUMBAR: ICD-10-CM

## 2021-08-03 DIAGNOSIS — R20.0 LEG NUMBNESS: ICD-10-CM

## 2021-08-03 DIAGNOSIS — M47.816 ARTHRITIS OF FACET JOINT OF LUMBAR SPINE: ICD-10-CM

## 2021-08-03 PROCEDURE — 3074F SYST BP LT 130 MM HG: CPT | Performed by: PHYSICIAN ASSISTANT

## 2021-08-03 PROCEDURE — 3078F DIAST BP <80 MM HG: CPT | Performed by: PHYSICIAN ASSISTANT

## 2021-08-03 PROCEDURE — 99213 OFFICE O/P EST LOW 20 MIN: CPT | Performed by: PHYSICIAN ASSISTANT

## 2021-08-03 PROCEDURE — 3008F BODY MASS INDEX DOCD: CPT | Performed by: PHYSICIAN ASSISTANT

## 2021-08-03 RX ORDER — HYDROCODONE BITARTRATE AND ACETAMINOPHEN 10; 325 MG/1; MG/1
1 TABLET ORAL EVERY 8 HOURS PRN
Qty: 60 TABLET | Refills: 0 | Status: SHIPPED | OUTPATIENT
Start: 2021-08-03 | End: 2021-09-09

## 2021-08-03 NOTE — PROGRESS NOTES
Patient: Sin Morgan  Medical Record Number: [id number]  YOB: 1970  PCP: Alexandro Farmer DO    Reason for visit: Lumbar follow up, s/p injections     HISTORY OF CHIEF COMPLAINT:    Sin Morgan is a very pleasant 49year old female who p every 2 weeks   • Anxiety state    • Attention deficit hyperactivity disorder (ADHD)    • BACK PAIN    • Back problem    • MARCIO II (cervical intraepithelial neoplasia II) 4/2011   • DDD (degenerative disc disease), lumbar    • DDD (degenerative disc disease Diabetes Maternal Grandmother    • Breast Cancer Maternal Grandmother 48        In her 52's.    • Heart Disorder Paternal Grandfather    • Cancer Maternal Cousin Female         OVARIAN CA   • Ovarian Cancer Maternal Cousin Female 45         of ovarian c MCG Oral Cap Take 1 capsule (24 mcg total) by mouth 2 (two) times daily with meals. 60 capsule 5   • cyclobenzaprine 10 MG Oral Tab Take 0.5-1 tablets (5-10 mg total) by mouth 3 (three) times daily as needed for Muscle spasms.  60 tablet 2   • ibuprofen 200 5 5   Left       5         5       5         5 5     DATA:   None    IMAGING:   No recent imaging    MEDICAL DECISION MAKING:     ASSESSMENT and PLAN:  (M54.16) Lumbar radiculopathy  (primary encounter diagnosis)    (M51.36) DDD (degenerative disc dise

## 2021-08-04 ENCOUNTER — TELEPHONE (OUTPATIENT)
Dept: PAIN CLINIC | Facility: CLINIC | Age: 51
End: 2021-08-04

## 2021-08-04 ENCOUNTER — TELEPHONE (OUTPATIENT)
Dept: NEUROLOGY | Facility: CLINIC | Age: 51
End: 2021-08-04

## 2021-08-04 DIAGNOSIS — M51.36 DDD (DEGENERATIVE DISC DISEASE), LUMBAR: Primary | ICD-10-CM

## 2021-08-04 DIAGNOSIS — M54.16 LUMBAR RADICULOPATHY: ICD-10-CM

## 2021-08-04 DIAGNOSIS — M54.16 LUMBAR RADICULOPATHY: Primary | ICD-10-CM

## 2021-08-04 NOTE — TELEPHONE ENCOUNTER
----- Message from Leo Herring PA-C sent at 8/3/2021  3:11 PM CDT -----  Regarding: Bilateral L3 selective nerve root blocks  Hi Dr. Daniela Jarrett,      This is a mutual patient of ours.  You recently completed bilateral L2 selective nerve root blocks and she d

## 2021-08-06 NOTE — TELEPHONE ENCOUNTER
Per Liang ESCOBAR's notes 8/3/2021     Very pleasant 28-year-old female who presents today for follow-up after bilateral L2 selective nerve root blocks. She had 100% relief of the right leg and approximately 30 to 40% relief of the left leg.   She do

## 2021-08-06 NOTE — TELEPHONE ENCOUNTER
Spoke with Esme Shin and scheduled a peer to peer with Dr Sharon Acuña and Dr Hugo Sanchez on Monday 8/9 at 9:30 AM CT.

## 2021-08-06 NOTE — TELEPHONE ENCOUNTER
Received fax from [facility]    Bilateral L3 SNRB (21777) denial reason:     The requested repeat service if supported if last shot reduced the pain by at least 50% for two weeks and one of the following 1) level of function increased and/or 2) pa

## 2021-08-09 NOTE — TELEPHONE ENCOUNTER
Question Answer Comment   Anesthesia Type Sedation    Provider Gerardo Montemayor    Location Lab    Procedure Other (please add comment)    Medical clearance requested (will send to Pain Navigator) No    Patient has Medicare coverage?  No    Comments (Please list entire

## 2021-08-09 NOTE — TELEPHONE ENCOUNTER
P2P Authorized     Prior authorization request completed for: Bilateral L3 SNRB    Authorization #[id number]     Authorization dates: 08/09/21 - 09/18/21  CPT codes approved: 39714  Number of visits/dates of service approved: 1  Physician: Paola Tripathi   Location:

## 2021-08-10 NOTE — TELEPHONE ENCOUNTER
Patient advised of insurance approval to proceed with injections and is agreeable to scheduling. Patient scheduled for procedure, pre-procedure instructions reviewed. Patient prefers conscious sedation.  Reviewed sedation instructions including need to fast Check in at [facility] ( [facility]. [place] [address] [place] Ma) outpatient registration in the [employer]. • Please note-No prescriptions will be written by Pain Clinic in OR on the day of procedure.  If you require a refill of medications, please con later date. Please contact your insurance carrier to determine what your financial  responsibility will be for the procedure(s).     Cancellation/Rescheduling Appointment:   In the event you need to cancel or reschedule your appointment, you must notify t

## 2021-08-17 ENCOUNTER — LAB ENCOUNTER (OUTPATIENT)
Dept: LAB | Age: 51
End: 2021-08-17
Attending: ANESTHESIOLOGY
Payer: MEDICAID

## 2021-08-17 DIAGNOSIS — M51.36 DDD (DEGENERATIVE DISC DISEASE), LUMBAR: ICD-10-CM

## 2021-08-17 DIAGNOSIS — M54.16 LUMBAR RADICULOPATHY: ICD-10-CM

## 2021-08-18 LAB — SARS-COV-2 RNA RESP QL NAA+PROBE: NOT DETECTED

## 2021-08-19 ENCOUNTER — HOSPITAL ENCOUNTER (OUTPATIENT)
Facility: HOSPITAL | Age: 51
Setting detail: HOSPITAL OUTPATIENT SURGERY
Discharge: HOME | End: 2021-08-19
Attending: ANESTHESIOLOGY | Admitting: ANESTHESIOLOGY
Payer: MEDICAID

## 2021-08-19 ENCOUNTER — APPOINTMENT (OUTPATIENT)
Dept: GENERAL RADIOLOGY | Facility: HOSPITAL | Age: 51
End: 2021-08-19
Attending: ANESTHESIOLOGY
Payer: MEDICAID

## 2021-08-19 VITALS
OXYGEN SATURATION: 93 % | TEMPERATURE: 97 F | RESPIRATION RATE: 18 BRPM | HEART RATE: 77 BPM | DIASTOLIC BLOOD PRESSURE: 70 MMHG | SYSTOLIC BLOOD PRESSURE: 106 MMHG

## 2021-08-19 DIAGNOSIS — M51.36 DDD (DEGENERATIVE DISC DISEASE), LUMBAR: ICD-10-CM

## 2021-08-19 DIAGNOSIS — M54.16 LUMBAR RADICULOPATHY: ICD-10-CM

## 2021-08-19 PROCEDURE — 3E0R3BZ INTRODUCTION OF ANESTHETIC AGENT INTO SPINAL CANAL, PERCUTANEOUS APPROACH: ICD-10-PCS | Performed by: ANESTHESIOLOGY

## 2021-08-19 PROCEDURE — 64483 NJX AA&/STRD TFRM EPI L/S 1: CPT | Performed by: ANESTHESIOLOGY

## 2021-08-19 PROCEDURE — 3E0R33Z INTRODUCTION OF ANTI-INFLAMMATORY INTO SPINAL CANAL, PERCUTANEOUS APPROACH: ICD-10-PCS | Performed by: ANESTHESIOLOGY

## 2021-08-19 RX ORDER — ONDANSETRON 2 MG/ML
4 INJECTION INTRAMUSCULAR; INTRAVENOUS ONCE AS NEEDED
Status: DISCONTINUED | OUTPATIENT
Start: 2021-08-19 | End: 2021-08-19

## 2021-08-19 RX ORDER — DEXAMETHASONE SODIUM PHOSPHATE 10 MG/ML
INJECTION, SOLUTION INTRAMUSCULAR; INTRAVENOUS
Status: DISCONTINUED | OUTPATIENT
Start: 2021-08-19 | End: 2021-08-19

## 2021-08-19 RX ORDER — MIDAZOLAM HYDROCHLORIDE 1 MG/ML
INJECTION INTRAMUSCULAR; INTRAVENOUS
Status: DISCONTINUED | OUTPATIENT
Start: 2021-08-19 | End: 2021-08-19

## 2021-08-19 RX ORDER — DIPHENHYDRAMINE HYDROCHLORIDE 50 MG/ML
50 INJECTION INTRAMUSCULAR; INTRAVENOUS ONCE AS NEEDED
Status: DISCONTINUED | OUTPATIENT
Start: 2021-08-19 | End: 2021-08-19

## 2021-08-19 RX ORDER — LIDOCAINE HYDROCHLORIDE 10 MG/ML
INJECTION, SOLUTION EPIDURAL; INFILTRATION; INTRACAUDAL; PERINEURAL
Status: DISCONTINUED | OUTPATIENT
Start: 2021-08-19 | End: 2021-08-19

## 2021-08-19 RX ORDER — ONDANSETRON 2 MG/ML
4 INJECTION INTRAMUSCULAR; INTRAVENOUS ONCE AS NEEDED
Status: COMPLETED | OUTPATIENT
Start: 2021-08-19 | End: 2021-08-19

## 2021-08-19 RX ORDER — SODIUM CHLORIDE, SODIUM LACTATE, POTASSIUM CHLORIDE, CALCIUM CHLORIDE 600; 310; 30; 20 MG/100ML; MG/100ML; MG/100ML; MG/100ML
100 INJECTION, SOLUTION INTRAVENOUS CONTINUOUS
Status: DISCONTINUED | OUTPATIENT
Start: 2021-08-19 | End: 2021-08-19

## 2021-08-19 RX ORDER — SODIUM CHLORIDE 9 MG/ML
INJECTION INTRAVENOUS
Status: DISCONTINUED | OUTPATIENT
Start: 2021-08-19 | End: 2021-08-19

## 2021-08-19 NOTE — H&P
History & Physical Examination    Patient Name: Amrik Gleason  MRN: [id number]  CSN: [id number]  YOB: 1970    Pre-Operative Diagnosis:  DDD (degenerative disc disease), lumbar [M51.36]  Lumbar radiculopathy [M54.16]    Present Illness: Patien Take 1 tablet (30 mg total) by mouth 2 (two) times daily. , Disp: 60 tablet, Rfl: 0  DULoxetine HCl 60 MG Oral Cap DR Particles, TK ONE C PO QAM, Disp: , Rfl: 2      lactated ringers infusion, 100 mL/hr, Intravenous, Continuous  ondansetron (ZOFRAN) injecti nodule lymph nodes neck   • OTHER SURGICAL HISTORY  2016     Bladder sling   • OTHER SURGICAL HISTORY  09/11/2020    Cystoscopy, Dr Meade Self     Family History   Problem Relation Age of Onset   • Heart Disease Father    • Other (lung cancer) Father

## 2021-08-19 NOTE — OR NURSING
PATIENT WAS ABLE TO STAND AND AMBULATE UPON DISCHARGE. THE PATIENT INSISTED HER LEFT LEG WAS NUMB AND WHEN SHE RUBBED IT SHE DID NOT FEEL IT. THE NURSE HAD THE PATIENT STAND AGAIN WITH NURSE SUPPORT AND AGAIN THE PATIENT WAS ABLE TO STAND AND AMBULATE.  LAYNE

## 2021-08-19 NOTE — OPERATIVE REPORT
[facility]  Operative Report  2021     Keeley Mckenna Patient Status:  Hospital Outpatient Surgery    1970 MRN [id number]   Location [address] [place] [address] Attending Staci Kuhn MD   [facility] Day # 0 PCP Barbara Andersen DO process and pedicle of the L3 level atraumatically under fluoroscopic guidance. The needle was advanced into the anterior epidural space at this level. The needle position was confirmed under AP and lateral fluoroscopic view.   Following negative aspiration

## 2021-08-27 ENCOUNTER — HOSPITAL ENCOUNTER (OUTPATIENT)
Age: 51
Discharge: HOME | End: 2021-08-27
Payer: MEDICAID

## 2021-08-27 VITALS
HEART RATE: 102 BPM | WEIGHT: 170 LBS | DIASTOLIC BLOOD PRESSURE: 87 MMHG | TEMPERATURE: 98 F | RESPIRATION RATE: 20 BRPM | SYSTOLIC BLOOD PRESSURE: 142 MMHG | BODY MASS INDEX: 27.32 KG/M2 | OXYGEN SATURATION: 99 % | HEIGHT: 66 IN

## 2021-08-27 DIAGNOSIS — L24.3 IRRITANT CONTACT DERMATITIS DUE TO COSMETICS: Primary | ICD-10-CM

## 2021-08-27 DIAGNOSIS — H10.33 ACUTE CONJUNCTIVITIS OF BOTH EYES, UNSPECIFIED ACUTE CONJUNCTIVITIS TYPE: ICD-10-CM

## 2021-08-27 PROCEDURE — 99213 OFFICE O/P EST LOW 20 MIN: CPT

## 2021-08-27 RX ORDER — MOMETASONE FUROATE 1 MG/G
1 CREAM TOPICAL DAILY
Qty: 1 EACH | Refills: 0 | Status: SHIPPED | OUTPATIENT
Start: 2021-08-27 | End: 2021-09-09 | Stop reason: ALTCHOICE

## 2021-08-27 RX ORDER — DEXAMETHASONE 4 MG/1
16 TABLET ORAL ONCE
Status: COMPLETED | OUTPATIENT
Start: 2021-08-27 | End: 2021-08-27

## 2021-08-27 RX ORDER — OFLOXACIN 3 MG/ML
1 SOLUTION/ DROPS OPHTHALMIC 4 TIMES DAILY
Qty: 10 ML | Refills: 0 | Status: SHIPPED | OUTPATIENT
Start: 2021-08-27 | End: 2021-09-09 | Stop reason: ALTCHOICE

## 2021-08-27 RX ORDER — PREDNISONE 20 MG/1
40 TABLET ORAL DAILY
Qty: 6 TABLET | Refills: 0 | Status: SHIPPED | OUTPATIENT
Start: 2021-08-27 | End: 2021-08-30

## 2021-08-27 RX ORDER — FAMOTIDINE 20 MG/1
20 TABLET ORAL ONCE
Status: COMPLETED | OUTPATIENT
Start: 2021-08-27 | End: 2021-08-27

## 2021-08-28 NOTE — ED PROVIDER NOTES
Patient Seen in: [facility]      History   Patient presents with:   Allergies: Eyes and face red and burning - Entered by patient  Conjunctivitis    Stated Complaint: Allergies - Eyes and face red and burning    HPI/Subjective:   HPI    50 reviewed and is noncontributory to the presenting problem, except as indicated as above.     Past Surgical History:   Procedure Laterality Date   • APPENDECTOMY  1980   • BACK SURGERY  2009    fusion L5-S1   • BACK SURGERY  03/03/2021    RIGHT LUMBAR 3/ LUM Exam  Vitals and nursing note reviewed. Constitutional:       Appearance: She is well-developed. HENT:      Head: Normocephalic.       Right Ear: Tympanic membrane and external ear normal.      Left Ear: Tympanic membrane and external ear normal.      N Additional verbal discharge instructions are given and discussed. Discharge medications are discussed. The patient is in good condition throughout the visit today and remains so upon discharge.  I discuss the plan of care with the patient, who expresses und

## 2021-09-03 ENCOUNTER — TELEPHONE (OUTPATIENT)
Dept: SURGERY | Facility: CLINIC | Age: 51
End: 2021-09-03

## 2021-09-03 RX ORDER — CYCLOBENZAPRINE HCL 10 MG
TABLET ORAL 3 TIMES DAILY PRN
Qty: 60 TABLET | Refills: 0 | Status: SHIPPED | OUTPATIENT
Start: 2021-09-03 | End: 2021-10-07

## 2021-09-03 NOTE — TELEPHONE ENCOUNTER
Medication: Cyclobenzaprine    Date of last refill: 4/15/21 (#60/2)  [place]      Last office visit: 8/3/21  Due back to clinic per last office note:  with Dr. Princess Temple early September   Date next office visit scheduled:

## 2021-09-09 ENCOUNTER — OFFICE VISIT (OUTPATIENT)
Dept: SURGERY | Facility: CLINIC | Age: 51
End: 2021-09-09
Payer: MEDICAID

## 2021-09-09 VITALS
RESPIRATION RATE: 16 BRPM | WEIGHT: 175 LBS | HEART RATE: 93 BPM | SYSTOLIC BLOOD PRESSURE: 120 MMHG | DIASTOLIC BLOOD PRESSURE: 70 MMHG | BODY MASS INDEX: 28 KG/M2 | OXYGEN SATURATION: 98 %

## 2021-09-09 DIAGNOSIS — M54.16 LUMBAR RADICULOPATHY: Primary | ICD-10-CM

## 2021-09-09 PROCEDURE — 3078F DIAST BP <80 MM HG: CPT | Performed by: NEUROLOGICAL SURGERY

## 2021-09-09 PROCEDURE — 99214 OFFICE O/P EST MOD 30 MIN: CPT | Performed by: NEUROLOGICAL SURGERY

## 2021-09-09 PROCEDURE — 3074F SYST BP LT 130 MM HG: CPT | Performed by: NEUROLOGICAL SURGERY

## 2021-09-09 RX ORDER — HYDROCODONE BITARTRATE AND ACETAMINOPHEN 10; 325 MG/1; MG/1
1 TABLET ORAL EVERY 8 HOURS PRN
Qty: 60 TABLET | Refills: 0 | Status: SHIPPED | OUTPATIENT
Start: 2021-09-09 | End: 2021-10-07

## 2021-09-09 NOTE — PROGRESS NOTES
Neurosurgery Clinic Visit  2021    Osmin Gomez PCP:  DO NITA García 1970 MRN [id number]     HISTORY OF PRESENT ILLNESS:  Osmin Gomez is a(n) 49year old female here for follow-up regarding bilateral leg pain  She got her L3 nerv

## 2021-09-10 ENCOUNTER — TELEPHONE (OUTPATIENT)
Dept: SURGERY | Facility: CLINIC | Age: 51
End: 2021-09-10

## 2021-09-10 DIAGNOSIS — M54.16 LUMBAR RADICULOPATHY: Primary | ICD-10-CM

## 2021-09-10 NOTE — TELEPHONE ENCOUNTER
Called patient to schedule surgery who stated that she would like to undergo surgery between October 3rd and November 18th. Called OR scheduling to determine if there is an available spot for this case on a Monday in October.  No answer, called patient to time, Do not drink any other liquids (including water) before your surgery. Do not chew gum or eat candies before surgery. Take 1000 mg of Tylenol (Acetaminophen) 4 hours before your scheduled surgery time, take this with your scheduled Gatorade.   · In or Emeli.mayela. When speaking with Pre-admissions you will be told about a spine class as it relates to your surgery. Although the class is not mandatory, you are strongly encouraged to attend.  Make sure to bring your s completely and use only non-chlorine detergents.)    For Office Use Only:    Medical Clearances Needed:  PA: [employer]  CPT Codes: P2054132, 75994, 83865

## 2021-09-13 NOTE — TELEPHONE ENCOUNTER
Prior Authorization for outpatient surgery initiated with [employer]  Requesting coverage for:right L1-2,L2-3 hemilamiotomies reexploration  Date of Service:10/4/21   Inpatient days requested:0 outpatient  CPT codes: 55572,91007,92540    Request for obrien

## 2021-09-13 NOTE — TELEPHONE ENCOUNTER
Received call from OR scheduling. Patient may have surgery on 10/4/21. Called patient who agreed to new surgery date of 10/4/21 with Dr. Everette Kyle. Post op appointments made:    2 week post op on 10/19/21 at [phone] with SHAWN Castillo and 6 week post op

## 2021-09-13 NOTE — TELEPHONE ENCOUNTER
Called patient to inform her that OR scheduling has been contacted and anticipate finding a surgery date in October, however, until Oct. Date is confirmed, we will place her on November 3rd surgery date.  Patient agreed to the plan and thanked Nursing for t

## 2021-09-14 RX ORDER — LISINOPRIL 10 MG/1
10 TABLET ORAL DAILY
COMMUNITY
End: 2022-01-02

## 2021-09-14 RX ORDER — ACETAMINOPHEN 500 MG
1000 TABLET ORAL ONCE
Status: CANCELLED | OUTPATIENT
Start: 2021-09-14 | End: 2021-09-14

## 2021-09-14 RX ORDER — HYDROCHLOROTHIAZIDE 12.5 MG/1
12.5 CAPSULE, GELATIN COATED ORAL DAILY
COMMUNITY
End: 2022-01-02

## 2021-09-15 NOTE — TELEPHONE ENCOUNTER
Surgery for 10/4/21 has bee denied.  Codes 68630,59782,99055     Denial reason:    Lumbar Microdiscectomy (surgery to your low back), we cannot approve this request. The requested service is supported when you have had 6 weeks of treatment given by your doc

## 2021-09-17 NOTE — TELEPHONE ENCOUNTER
Called [employer] and spoke with Ariana Cunningham.  to schedule peer to peer     Peer to peer scheduled for 9/21/21 at 1:15pm    Dr. Warden Rosario will call clinic to speak with Dr. Shan Franco calendar updated     Call time: 3822    case #[id number]

## 2021-09-17 NOTE — TELEPHONE ENCOUNTER
Per Dr. Roby Courtney     \"Just pick a time when I am in clinic, usually on the hour or half hour, I can come out of a room if need be\"

## 2021-09-21 ENCOUNTER — TELEPHONE (OUTPATIENT)
Dept: SURGERY | Facility: CLINIC | Age: 51
End: 2021-09-21

## 2021-09-21 NOTE — TELEPHONE ENCOUNTER
Provider unable to complete P2P at scheduled time as he was taking care [facility]     Peer to peer reschedule for 9/21/21 at 4pm    Dr. Virgen Haywood will call Dr. Erica Haywood

## 2021-09-22 NOTE — TELEPHONE ENCOUNTER
Per PCP . Pt has no significant history of cardiac or pulmonary conditions. Patient is cleared for surgery with the risks of the procedure and anesthesia as discussed with those specific specialists. Nothing further needed for upcoming surgery.

## 2021-09-28 ENCOUNTER — LAB ENCOUNTER (OUTPATIENT)
Dept: LAB | Facility: HOSPITAL | Age: 51
End: 2021-09-28
Attending: FAMILY MEDICINE
Payer: MEDICAID

## 2021-09-28 ENCOUNTER — HOSPITAL ENCOUNTER (OUTPATIENT)
Dept: CT IMAGING | Facility: HOSPITAL | Age: 51
Discharge: HOME | End: 2021-09-28
Attending: NEUROLOGICAL SURGERY
Payer: MEDICAID

## 2021-09-28 DIAGNOSIS — Z87.891 HISTORY OF TOBACCO USE: ICD-10-CM

## 2021-09-28 DIAGNOSIS — Z01.818 PREOPERATIVE EXAMINATION: ICD-10-CM

## 2021-09-28 DIAGNOSIS — M54.6 CHRONIC BILATERAL THORACIC BACK PAIN: ICD-10-CM

## 2021-09-28 DIAGNOSIS — M54.16 LUMBAR RADICULOPATHY: ICD-10-CM

## 2021-09-28 DIAGNOSIS — G89.29 CHRONIC BILATERAL THORACIC BACK PAIN: ICD-10-CM

## 2021-09-28 DIAGNOSIS — I10 ESSENTIAL HYPERTENSION: ICD-10-CM

## 2021-09-28 DIAGNOSIS — E78.2 MIXED HYPERLIPIDEMIA: ICD-10-CM

## 2021-09-28 PROCEDURE — 72131 CT LUMBAR SPINE W/O DYE: CPT | Performed by: NEUROLOGICAL SURGERY

## 2021-09-28 PROCEDURE — 87081 CULTURE SCREEN ONLY: CPT

## 2021-10-01 ENCOUNTER — LAB ENCOUNTER (OUTPATIENT)
Dept: LAB | Age: 51
End: 2021-10-01
Attending: NEUROLOGICAL SURGERY
Payer: MEDICAID

## 2021-10-01 DIAGNOSIS — M54.16 LUMBAR RADICULOPATHY: ICD-10-CM

## 2021-10-03 ENCOUNTER — ANESTHESIA EVENT (OUTPATIENT)
Dept: SURGERY | Facility: HOSPITAL | Age: 51
DRG: 502 | End: 2021-10-03
Payer: MEDICAID

## 2021-10-04 ENCOUNTER — ANESTHESIA (OUTPATIENT)
Dept: SURGERY | Facility: HOSPITAL | Age: 51
DRG: 502 | End: 2021-10-04
Payer: MEDICAID

## 2021-10-04 ENCOUNTER — HOSPITAL ENCOUNTER (INPATIENT)
Facility: HOSPITAL | Age: 51
LOS: 3 days | Discharge: HOME HEALTH | DRG: 502 | End: 2021-10-07
Attending: NEUROLOGICAL SURGERY | Admitting: NEUROLOGICAL SURGERY
Payer: MEDICAID

## 2021-10-04 ENCOUNTER — APPOINTMENT (OUTPATIENT)
Dept: GENERAL RADIOLOGY | Facility: HOSPITAL | Age: 51
DRG: 502 | End: 2021-10-04
Attending: NEUROLOGICAL SURGERY
Payer: MEDICAID

## 2021-10-04 DIAGNOSIS — M54.16 LUMBAR RADICULOPATHY: Primary | ICD-10-CM

## 2021-10-04 PROCEDURE — 01NB0ZZ RELEASE LUMBAR NERVE, OPEN APPROACH: ICD-10-PCS | Performed by: NEUROLOGICAL SURGERY

## 2021-10-04 PROCEDURE — BR131ZZ FLUOROSCOPY OF LUMBAR DISC(S) USING LOW OSMOLAR CONTRAST: ICD-10-PCS | Performed by: NEUROLOGICAL SURGERY

## 2021-10-04 PROCEDURE — 0JN70ZZ RELEASE BACK SUBCUTANEOUS TISSUE AND FASCIA, OPEN APPROACH: ICD-10-PCS | Performed by: NEUROLOGICAL SURGERY

## 2021-10-04 PROCEDURE — 76000 FLUOROSCOPY <1 HR PHYS/QHP: CPT | Performed by: NEUROLOGICAL SURGERY

## 2021-10-04 DEVICE — KIT TISS CLOS TISSEEL 4ML: Type: IMPLANTABLE DEVICE | Site: BACK | Status: FUNCTIONAL

## 2021-10-04 DEVICE — COLLAGEN DURAL REGENERATION MEMBRANE 1IN X 1IN (2.5CM X 2.5CM)
Type: IMPLANTABLE DEVICE | Site: BACK | Status: FUNCTIONAL
Brand: DURAMATRIX-ONLAY PLUS

## 2021-10-04 RX ORDER — DIAZEPAM 5 MG/1
5 TABLET ORAL EVERY 8 HOURS PRN
Status: DISCONTINUED | OUTPATIENT
Start: 2021-10-04 | End: 2021-10-07

## 2021-10-04 RX ORDER — HYDROMORPHONE HYDROCHLORIDE 1 MG/ML
INJECTION, SOLUTION INTRAMUSCULAR; INTRAVENOUS; SUBCUTANEOUS AS NEEDED
Status: DISCONTINUED | OUTPATIENT
Start: 2021-10-04 | End: 2021-10-04 | Stop reason: SURG

## 2021-10-04 RX ORDER — CEFAZOLIN SODIUM/WATER 2 G/20 ML
2 SYRINGE (ML) INTRAVENOUS ONCE
Status: COMPLETED | OUTPATIENT
Start: 2021-10-04 | End: 2021-10-04

## 2021-10-04 RX ORDER — MIDAZOLAM HYDROCHLORIDE 1 MG/ML
1 INJECTION INTRAMUSCULAR; INTRAVENOUS ONCE
Status: COMPLETED | OUTPATIENT
Start: 2021-10-04 | End: 2021-10-04

## 2021-10-04 RX ORDER — NEOSTIGMINE METHYLSULFATE 1 MG/ML
INJECTION INTRAVENOUS AS NEEDED
Status: DISCONTINUED | OUTPATIENT
Start: 2021-10-04 | End: 2021-10-04 | Stop reason: SURG

## 2021-10-04 RX ORDER — POLYETHYLENE GLYCOL 3350 17 G/17G
17 POWDER, FOR SOLUTION ORAL DAILY PRN
Status: DISCONTINUED | OUTPATIENT
Start: 2021-10-04 | End: 2021-10-07

## 2021-10-04 RX ORDER — OXYCODONE HYDROCHLORIDE AND ACETAMINOPHEN 5; 325 MG/1; MG/1
1 TABLET ORAL EVERY 4 HOURS PRN
Status: DISCONTINUED | OUTPATIENT
Start: 2021-10-04 | End: 2021-10-05

## 2021-10-04 RX ORDER — BUPIVACAINE HYDROCHLORIDE AND EPINEPHRINE 2.5; 5 MG/ML; UG/ML
INJECTION, SOLUTION EPIDURAL; INFILTRATION; INTRACAUDAL; PERINEURAL AS NEEDED
Status: DISCONTINUED | OUTPATIENT
Start: 2021-10-04 | End: 2021-10-04 | Stop reason: HOSPADM

## 2021-10-04 RX ORDER — DOCUSATE SODIUM 100 MG/1
100 CAPSULE, LIQUID FILLED ORAL 2 TIMES DAILY
Status: DISCONTINUED | OUTPATIENT
Start: 2021-10-04 | End: 2021-10-07

## 2021-10-04 RX ORDER — ACETAMINOPHEN 500 MG
1000 TABLET ORAL ONCE
COMMUNITY
End: 2021-10-07

## 2021-10-04 RX ORDER — ROCURONIUM BROMIDE 10 MG/ML
INJECTION, SOLUTION INTRAVENOUS AS NEEDED
Status: DISCONTINUED | OUTPATIENT
Start: 2021-10-04 | End: 2021-10-04 | Stop reason: SURG

## 2021-10-04 RX ORDER — ACETAMINOPHEN 325 MG/1
650 TABLET ORAL EVERY 4 HOURS PRN
Status: DISCONTINUED | OUTPATIENT
Start: 2021-10-04 | End: 2021-10-07

## 2021-10-04 RX ORDER — PREGABALIN 75 MG/1
75 CAPSULE ORAL 2 TIMES DAILY
Status: DISCONTINUED | OUTPATIENT
Start: 2021-10-04 | End: 2021-10-07

## 2021-10-04 RX ORDER — SENNOSIDES 8.6 MG
17.2 TABLET ORAL NIGHTLY
Status: DISCONTINUED | OUTPATIENT
Start: 2021-10-04 | End: 2021-10-07

## 2021-10-04 RX ORDER — KETAMINE HYDROCHLORIDE 50 MG/ML
INJECTION, SOLUTION, CONCENTRATE INTRAMUSCULAR; INTRAVENOUS AS NEEDED
Status: DISCONTINUED | OUTPATIENT
Start: 2021-10-04 | End: 2021-10-04 | Stop reason: SURG

## 2021-10-04 RX ORDER — HYDROMORPHONE HYDROCHLORIDE 1 MG/ML
0.4 INJECTION, SOLUTION INTRAMUSCULAR; INTRAVENOUS; SUBCUTANEOUS EVERY 5 MIN PRN
Status: DISCONTINUED | OUTPATIENT
Start: 2021-10-04 | End: 2021-10-04 | Stop reason: HOSPADM

## 2021-10-04 RX ORDER — DEXAMETHASONE SODIUM PHOSPHATE 4 MG/ML
VIAL (ML) INJECTION AS NEEDED
Status: DISCONTINUED | OUTPATIENT
Start: 2021-10-04 | End: 2021-10-04 | Stop reason: SURG

## 2021-10-04 RX ORDER — SCOLOPAMINE TRANSDERMAL SYSTEM 1 MG/1
1 PATCH, EXTENDED RELEASE TRANSDERMAL ONCE
Status: COMPLETED | OUTPATIENT
Start: 2021-10-04 | End: 2021-10-07

## 2021-10-04 RX ORDER — MIDAZOLAM HYDROCHLORIDE 1 MG/ML
INJECTION INTRAMUSCULAR; INTRAVENOUS
Status: COMPLETED
Start: 2021-10-04 | End: 2021-10-04

## 2021-10-04 RX ORDER — HYDROMORPHONE HYDROCHLORIDE 1 MG/ML
INJECTION, SOLUTION INTRAMUSCULAR; INTRAVENOUS; SUBCUTANEOUS
Status: COMPLETED
Start: 2021-10-04 | End: 2021-10-04

## 2021-10-04 RX ORDER — ONDANSETRON 2 MG/ML
INJECTION INTRAMUSCULAR; INTRAVENOUS AS NEEDED
Status: DISCONTINUED | OUTPATIENT
Start: 2021-10-04 | End: 2021-10-04 | Stop reason: SURG

## 2021-10-04 RX ORDER — SODIUM CHLORIDE 9 MG/ML
INJECTION, SOLUTION INTRAVENOUS CONTINUOUS
Status: DISCONTINUED | OUTPATIENT
Start: 2021-10-04 | End: 2021-10-07

## 2021-10-04 RX ORDER — OXYCODONE HYDROCHLORIDE AND ACETAMINOPHEN 5; 325 MG/1; MG/1
2 TABLET ORAL EVERY 4 HOURS PRN
Status: DISCONTINUED | OUTPATIENT
Start: 2021-10-04 | End: 2021-10-05

## 2021-10-04 RX ORDER — DIPHENHYDRAMINE HYDROCHLORIDE 50 MG/ML
25 INJECTION INTRAMUSCULAR; INTRAVENOUS EVERY 4 HOURS PRN
Status: DISCONTINUED | OUTPATIENT
Start: 2021-10-04 | End: 2021-10-07

## 2021-10-04 RX ORDER — HYDROMORPHONE HYDROCHLORIDE 1 MG/ML
0.8 INJECTION, SOLUTION INTRAMUSCULAR; INTRAVENOUS; SUBCUTANEOUS EVERY 2 HOUR PRN
Status: DISCONTINUED | OUTPATIENT
Start: 2021-10-04 | End: 2021-10-07

## 2021-10-04 RX ORDER — BISACODYL 10 MG
10 SUPPOSITORY, RECTAL RECTAL
Status: DISCONTINUED | OUTPATIENT
Start: 2021-10-04 | End: 2021-10-07

## 2021-10-04 RX ORDER — HYDROMORPHONE HYDROCHLORIDE 1 MG/ML
0.4 INJECTION, SOLUTION INTRAMUSCULAR; INTRAVENOUS; SUBCUTANEOUS EVERY 2 HOUR PRN
Status: DISCONTINUED | OUTPATIENT
Start: 2021-10-04 | End: 2021-10-07

## 2021-10-04 RX ORDER — ONDANSETRON 2 MG/ML
4 INJECTION INTRAMUSCULAR; INTRAVENOUS EVERY 4 HOURS PRN
Status: ACTIVE | OUTPATIENT
Start: 2021-10-04 | End: 2021-10-05

## 2021-10-04 RX ORDER — MIDAZOLAM HYDROCHLORIDE 1 MG/ML
INJECTION INTRAMUSCULAR; INTRAVENOUS AS NEEDED
Status: DISCONTINUED | OUTPATIENT
Start: 2021-10-04 | End: 2021-10-04 | Stop reason: SURG

## 2021-10-04 RX ORDER — HYDROMORPHONE HYDROCHLORIDE 1 MG/ML
0.2 INJECTION, SOLUTION INTRAMUSCULAR; INTRAVENOUS; SUBCUTANEOUS EVERY 2 HOUR PRN
Status: DISCONTINUED | OUTPATIENT
Start: 2021-10-04 | End: 2021-10-07

## 2021-10-04 RX ORDER — DIPHENHYDRAMINE HCL 25 MG
25 CAPSULE ORAL EVERY 4 HOURS PRN
Status: DISCONTINUED | OUTPATIENT
Start: 2021-10-04 | End: 2021-10-07

## 2021-10-04 RX ORDER — SODIUM CHLORIDE, SODIUM LACTATE, POTASSIUM CHLORIDE, CALCIUM CHLORIDE 600; 310; 30; 20 MG/100ML; MG/100ML; MG/100ML; MG/100ML
INJECTION, SOLUTION INTRAVENOUS CONTINUOUS
Status: DISCONTINUED | OUTPATIENT
Start: 2021-10-04 | End: 2021-10-04

## 2021-10-04 RX ORDER — HYDROCODONE BITARTRATE AND ACETAMINOPHEN 5; 325 MG/1; MG/1
1 TABLET ORAL AS NEEDED
Status: DISCONTINUED | OUTPATIENT
Start: 2021-10-04 | End: 2021-10-04 | Stop reason: HOSPADM

## 2021-10-04 RX ORDER — PROCHLORPERAZINE EDISYLATE 5 MG/ML
10 INJECTION INTRAMUSCULAR; INTRAVENOUS EVERY 6 HOURS PRN
Status: DISPENSED | OUTPATIENT
Start: 2021-10-04 | End: 2021-10-06

## 2021-10-04 RX ORDER — SODIUM PHOSPHATE, DIBASIC AND SODIUM PHOSPHATE, MONOBASIC 7; 19 G/133ML; G/133ML
1 ENEMA RECTAL ONCE AS NEEDED
Status: COMPLETED | OUTPATIENT
Start: 2021-10-04 | End: 2021-10-06

## 2021-10-04 RX ORDER — MEPERIDINE HYDROCHLORIDE 25 MG/ML
12.5 INJECTION INTRAMUSCULAR; INTRAVENOUS; SUBCUTANEOUS AS NEEDED
Status: DISCONTINUED | OUTPATIENT
Start: 2021-10-04 | End: 2021-10-04 | Stop reason: HOSPADM

## 2021-10-04 RX ORDER — CEFAZOLIN SODIUM/WATER 2 G/20 ML
2 SYRINGE (ML) INTRAVENOUS EVERY 8 HOURS
Status: COMPLETED | OUTPATIENT
Start: 2021-10-04 | End: 2021-10-05

## 2021-10-04 RX ORDER — HYDROCODONE BITARTRATE AND ACETAMINOPHEN 5; 325 MG/1; MG/1
2 TABLET ORAL AS NEEDED
Status: DISCONTINUED | OUTPATIENT
Start: 2021-10-04 | End: 2021-10-04 | Stop reason: HOSPADM

## 2021-10-04 RX ORDER — GLYCOPYRROLATE 0.2 MG/ML
INJECTION, SOLUTION INTRAMUSCULAR; INTRAVENOUS AS NEEDED
Status: DISCONTINUED | OUTPATIENT
Start: 2021-10-04 | End: 2021-10-04 | Stop reason: SURG

## 2021-10-04 RX ORDER — CYCLOBENZAPRINE HCL 10 MG
10 TABLET ORAL 3 TIMES DAILY PRN
Status: DISCONTINUED | OUTPATIENT
Start: 2021-10-04 | End: 2021-10-07

## 2021-10-04 RX ORDER — LABETALOL HYDROCHLORIDE 5 MG/ML
INJECTION, SOLUTION INTRAVENOUS AS NEEDED
Status: DISCONTINUED | OUTPATIENT
Start: 2021-10-04 | End: 2021-10-04 | Stop reason: SURG

## 2021-10-04 RX ORDER — LIDOCAINE HYDROCHLORIDE 10 MG/ML
INJECTION, SOLUTION EPIDURAL; INFILTRATION; INTRACAUDAL; PERINEURAL AS NEEDED
Status: DISCONTINUED | OUTPATIENT
Start: 2021-10-04 | End: 2021-10-04 | Stop reason: SURG

## 2021-10-04 RX ORDER — SODIUM CHLORIDE, SODIUM LACTATE, POTASSIUM CHLORIDE, CALCIUM CHLORIDE 600; 310; 30; 20 MG/100ML; MG/100ML; MG/100ML; MG/100ML
INJECTION, SOLUTION INTRAVENOUS CONTINUOUS
Status: DISCONTINUED | OUTPATIENT
Start: 2021-10-04 | End: 2021-10-04 | Stop reason: HOSPADM

## 2021-10-04 RX ORDER — ONDANSETRON 2 MG/ML
4 INJECTION INTRAMUSCULAR; INTRAVENOUS AS NEEDED
Status: DISCONTINUED | OUTPATIENT
Start: 2021-10-04 | End: 2021-10-04 | Stop reason: HOSPADM

## 2021-10-04 RX ORDER — NALOXONE HYDROCHLORIDE 0.4 MG/ML
80 INJECTION, SOLUTION INTRAMUSCULAR; INTRAVENOUS; SUBCUTANEOUS AS NEEDED
Status: DISCONTINUED | OUTPATIENT
Start: 2021-10-04 | End: 2021-10-04 | Stop reason: HOSPADM

## 2021-10-04 RX ADMIN — SODIUM CHLORIDE, SODIUM LACTATE, POTASSIUM CHLORIDE, CALCIUM CHLORIDE: 600; 310; 30; 20 INJECTION, SOLUTION INTRAVENOUS at 17:55:00

## 2021-10-04 RX ADMIN — HYDROMORPHONE HYDROCHLORIDE 0.5 MG: 1 INJECTION, SOLUTION INTRAMUSCULAR; INTRAVENOUS; SUBCUTANEOUS at 16:35:00

## 2021-10-04 RX ADMIN — NEOSTIGMINE METHYLSULFATE 3 MG: 1 INJECTION INTRAVENOUS at 17:34:00

## 2021-10-04 RX ADMIN — MIDAZOLAM HYDROCHLORIDE 2 MG: 1 INJECTION INTRAMUSCULAR; INTRAVENOUS at 13:58:00

## 2021-10-04 RX ADMIN — ONDANSETRON 4 MG: 2 INJECTION INTRAMUSCULAR; INTRAVENOUS at 17:18:00

## 2021-10-04 RX ADMIN — LABETALOL HYDROCHLORIDE 5 MG: 5 INJECTION, SOLUTION INTRAVENOUS at 16:46:00

## 2021-10-04 RX ADMIN — GLYCOPYRROLATE 0.4 MG: 0.2 INJECTION, SOLUTION INTRAMUSCULAR; INTRAVENOUS at 17:34:00

## 2021-10-04 RX ADMIN — SODIUM CHLORIDE, SODIUM LACTATE, POTASSIUM CHLORIDE, CALCIUM CHLORIDE: 600; 310; 30; 20 INJECTION, SOLUTION INTRAVENOUS at 16:09:00

## 2021-10-04 RX ADMIN — DEXAMETHASONE SODIUM PHOSPHATE 8 MG: 4 MG/ML VIAL (ML) INJECTION at 14:13:00

## 2021-10-04 RX ADMIN — ROCURONIUM BROMIDE 50 MG: 10 INJECTION, SOLUTION INTRAVENOUS at 14:03:00

## 2021-10-04 RX ADMIN — HYDROMORPHONE HYDROCHLORIDE 0.5 MG: 1 INJECTION, SOLUTION INTRAMUSCULAR; INTRAVENOUS; SUBCUTANEOUS at 16:20:00

## 2021-10-04 RX ADMIN — LIDOCAINE HYDROCHLORIDE 110 MG: 10 INJECTION, SOLUTION EPIDURAL; INFILTRATION; INTRACAUDAL; PERINEURAL at 14:02:00

## 2021-10-04 RX ADMIN — KETAMINE HYDROCHLORIDE 20 MG: 50 INJECTION, SOLUTION, CONCENTRATE INTRAMUSCULAR; INTRAVENOUS at 14:35:00

## 2021-10-04 RX ADMIN — LABETALOL HYDROCHLORIDE 5 MG: 5 INJECTION, SOLUTION INTRAVENOUS at 16:55:00

## 2021-10-04 RX ADMIN — CEFAZOLIN SODIUM/WATER 2 G: 2 G/20 ML SYRINGE (ML) INTRAVENOUS at 14:18:00

## 2021-10-04 NOTE — OPERATIVE REPORT
Operative Note    Patient Name: Sherif Degroot    Preoperative Diagnosis: Lumbar radiculopathy [M54.16]    Postoperative Diagnosis: same    Primary Surgeon: Bhavna Esparza MD    Assistant: Osvaldo Escalona pa-c who was essential the case including drill Kerrisons and curettes were used to do the hemilaminotomy and medial facetectomy. Of note she had some scarring from her previous surgeries up around this level and a large amount of scar tissue was removed.   Once was done the dura was nicely decomp

## 2021-10-04 NOTE — H&P
Neurosurgery Clinic Visit  2021           Elaine Noriega PCP:  Juan Ballesteros DO    1970 MRN [id number]      HISTORY OF PRESENT ILLNESS:  Elaine Noriega is a(n) 49year old female here for follow-up regarding bilateral leg pain  She got her problem     lumbar radiculopathy   • MARCIO II (cervical intraepithelial neoplasia II) 4/2011   • DDD (degenerative disc disease), lumbar    • DDD (degenerative disc disease), thoracic    • Depression    • Disorder of thyroid    • Dyspareunia    • Fall    • F HYDROcodone-acetaminophen  MG Oral Tab, Take 1 tablet by mouth every 8 (eight) hours as needed for Pain., Disp: 60 tablet, Rfl: 0  LEVOTHYROXINE SODIUM 50 MCG Oral Tab, TAKE 1 TABLET(50 MCG) BY MOUTH DAILY, Disp: 90 tablet, Rfl: 1  pregabalin 75 MG related to not receiving this procedure. We will proceed with procedure as planned. Dr. Fragoso Monetta to follow. [facility] NOAH Leonard M.S., PA-C  [place] [id number] [facility], [address] [place] [address] Yvette Tam, [address]  [phone]  10/4/2021 1:40

## 2021-10-04 NOTE — ANESTHESIA PROCEDURE NOTES
Airway  Date/Time: 10/4/2021 2:06 PM  Urgency: elective    Airway not difficult    General Information and Staff    Patient location during procedure: OR  Anesthesiologist: Lorena Marquis MD  Performed: anesthesiologist     Indications and Patient

## 2021-10-04 NOTE — BRIEF OP NOTE
Pre-Operative Diagnosis: Lumbar radiculopathy [M54.16]     Post-Operative Diagnosis: Lumbar radiculopathy [M54.16]      Procedure Performed:   RIGHT LUMBAR 1-LUMBAR 2 AND RIGHT LUMBAR 2-LUMBAR 3 HEMILAMINOTOMIES, LEFT LUMBAR 2-LUMBAR 3 RE-EXPLORATION AND A

## 2021-10-04 NOTE — ANESTHESIA POSTPROCEDURE EVALUATION
[facility] Patient Status:  Outpatient in a Bed   Age/Gender 49year old female MRN [id number]   [facility] SURGERY Attending Abundio Werner MD   Hosp Day # 0 PCP Alexandro Farmer DO       Anesthesia Post-op Note

## 2021-10-04 NOTE — ANESTHESIA PREPROCEDURE EVALUATION
PRE-OP EVALUATION    Patient Name: Maritza Zuñiga    Admit Diagnosis: Lumbar radiculopathy [M54.16]    Pre-op Diagnosis: Lumbar radiculopathy [M54.16]    RIGHT LUMBAR 1-LUMBAR 2 AND RIGHT LUMBAR 2-LUMBAR 3 HEMILAMINOTOMIES, LEFT LUMBAR 2-LUMBAR 3 RE-EXP puffs into the lungs every 4 (four) hours as needed for Wheezing or Shortness of Breath., Disp: 1 Inhaler, Rfl: 3  Vitamin D3, Cholecalciferol, 125 MCG (5000 UT) Oral Cap, Take 5,000 Units by mouth daily. , Disp: , Rfl: 0  amphetamine-dextroamphetamine 27 M per week      Drug use:    Types: Cannabis   Comment: CBD 14/1 + THC 0.5% tablet once daily , USED COCAINE BETW [id number]     Available pre-op labs reviewed.   Lab Results   Component Value Date    WBC 7.43 09/21/2021    RBC 4.20 09/21/2021    HGB 12.2 09/2

## 2021-10-05 RX ORDER — ORPHENADRINE CITRATE 30 MG/ML
30 INJECTION INTRAMUSCULAR; INTRAVENOUS EVERY 6 HOURS
Status: DISCONTINUED | OUTPATIENT
Start: 2021-10-05 | End: 2021-10-06

## 2021-10-05 RX ORDER — LEVOTHYROXINE SODIUM 0.05 MG/1
50 TABLET ORAL
Status: DISCONTINUED | OUTPATIENT
Start: 2021-10-05 | End: 2021-10-07

## 2021-10-05 RX ORDER — HYDROCODONE BITARTRATE AND ACETAMINOPHEN 10; 325 MG/1; MG/1
2 TABLET ORAL EVERY 4 HOURS PRN
Status: DISCONTINUED | OUTPATIENT
Start: 2021-10-05 | End: 2021-10-06

## 2021-10-05 RX ORDER — DEXTROAMPHETAMINE SACCHARATE, AMPHETAMINE ASPARTATE, DEXTROAMPHETAMINE SULFATE AND AMPHETAMINE SULFATE 2.5; 2.5; 2.5; 2.5 MG/1; MG/1; MG/1; MG/1
30 TABLET ORAL
Status: DISCONTINUED | OUTPATIENT
Start: 2021-10-05 | End: 2021-10-07

## 2021-10-05 RX ORDER — HYDROCODONE BITARTRATE AND ACETAMINOPHEN 10; 325 MG/1; MG/1
1 TABLET ORAL EVERY 4 HOURS PRN
Status: DISCONTINUED | OUTPATIENT
Start: 2021-10-05 | End: 2021-10-06

## 2021-10-05 RX ORDER — BUTALBITAL, ACETAMINOPHEN AND CAFFEINE 50; 325; 40 MG/1; MG/1; MG/1
1 TABLET ORAL EVERY 6 HOURS PRN
Status: DISCONTINUED | OUTPATIENT
Start: 2021-10-05 | End: 2021-10-07

## 2021-10-05 RX ORDER — DULOXETIN HYDROCHLORIDE 30 MG/1
60 CAPSULE, DELAYED RELEASE ORAL EVERY MORNING
Status: DISCONTINUED | OUTPATIENT
Start: 2021-10-05 | End: 2021-10-07

## 2021-10-05 RX ORDER — LISINOPRIL 10 MG/1
10 TABLET ORAL DAILY
Status: DISCONTINUED | OUTPATIENT
Start: 2021-10-05 | End: 2021-10-07

## 2021-10-05 RX ORDER — ARIPIPRAZOLE 2 MG/1
2 TABLET ORAL DAILY
Status: DISCONTINUED | OUTPATIENT
Start: 2021-10-05 | End: 2021-10-07

## 2021-10-05 RX ORDER — LAMOTRIGINE 25 MG/1
50 TABLET ORAL DAILY
Status: DISCONTINUED | OUTPATIENT
Start: 2021-10-05 | End: 2021-10-07

## 2021-10-05 NOTE — CONSULTS
[facility] Hospitalist Initial Consult       Reason for consult: Medical Management sp lumbar microdisectomy      History of Present Illness: Patient is a 49year old female with PMH sig for anxiety/depression, ADHD, fibromylagia,  who presents sp above surgery. 4/ LUMBAR 5 HEMILAMINTOMIES, LEFT LUMBAR 2 / LUMBAR 3 MICRODISCECTOMY   • COLPOSCOPY,BX CERVIX/ENDOCERV CURR  11    MARCIO 1   • LAPAROSCOPY PROCEDURE UNLISTED      Ovarian cyst removed   • LEEP  2011    MARCIO 2   •       X2   • OTHER SURGICAL H Normocephalic, without obvious abnormality, atraumatic. Eyes:  Sclera anicteric, No conjunctival pallor, EOMs intact. Nose: Nares normal. Septum midline. Mucosa normal. No drainage.    Throat: Lips, mucosa, and tongue normal. Teeth and gums normal.   N

## 2021-10-05 NOTE — PHYSICAL THERAPY NOTE
Attempted to see pt. Pt is currently with neuro APN, Denae Noonan, who suspects a possible dura leak. This would possibly cause bedrest. Will hold inpt PT today and await further medical assessment.

## 2021-10-05 NOTE — PROGRESS NOTES
Patient's ex  at bedside. Reviewed indications, side effects of pain medication/narcotics and constipation prevention.  Stressed importance of increased fluids/roughage in diet, continued use stool softeners along with laxatives and suppositories as

## 2021-10-05 NOTE — PROGRESS NOTES
[address] [facility] Preliminary Note    Herbert Ayala Patient Status:  Outpatient in a Bed    1970 MRN [id number]   [facility] 3SW-A Attending Shailesh Szymanski MD   Hosp Day # 0 PCP Sabrina Harris DO     REASON FOR C History of COVID-19     COVID + 7/2020 Headache - NO Hospitalization needed    • History of lumbar fusion    • IBS (irritable bowel syndrome)    • Infertility, female    • Lumbar radiculopathy    • Mild dysplasia of cervix 03/11/11   • Pap smear for cervic use. She has never used smokeless tobacco. She reports current alcohol use. She reports current drug use. Drug: Cannabis.     ALLERGIES:    Gabapentin              SWELLING  Clindamycin             RASH    MEDICATIONS:  No current outpatient medications on PRN  HYDROmorphone HCl (DILAUDID) 1 MG/ML injection 0.2 mg, 0.2 mg, Intravenous, Q2H PRN   Or  HYDROmorphone HCl (DILAUDID) 1 MG/ML injection 0.4 mg, 0.4 mg, Intravenous, Q2H PRN   Or  HYDROmorphone HCl (DILAUDID) 1 MG/ML injection 0.8 mg, 0.8 mg, Intraven headache       Discussed above with [facility]  10/5/2021  1:41 PM    #[id number]

## 2021-10-05 NOTE — OCCUPATIONAL THERAPY NOTE
OCCUPATIONAL THERAPY EVALUATION - INPATIENT     Room Number: 368/368-A  Evaluation Date: 10/5/2021  Type of Evaluation: Initial  Presenting Problem: s/p R L1-3 hemilaminectomies, L L2-3 re-exploration on 10/4     Physician Order: IP Consult to Occupational Pap smear for cervical cancer screening 6-    pt states normal   • Papanicolaou smear of cervix with high grade squamous intraepithelial lesion (HGSIL) 03/21/11   • PONV (postoperative nausea and vomiting)    • Sexually transmitted disease     HPV OBJECTIVE  Precautions: Spine; Bed/chair alarm  Fall Risk: High fall risk    WEIGHT BEARING RESTRICTION  Weight Bearing Restriction: None                PAIN ASSESSMENT  Ratin  Location: lower back   Management Techniques:  Activity promotion; Relax placement. Pt c/o decreased strength in her L LE stating that she was unable to move it/step with it. Bedside commode was placed to pt's left side, pt was able to take steps to the commode with no noted weakness or buckling.  Pt performed all aspects of edgardo patient’s ability to return safely to her prior level of function. Discharge recommendation will be dependent upon pt's progress during this admission.  At this time, it is anticipated that pt will be able [facility] and intermittent obrien surgical mask

## 2021-10-05 NOTE — PROGRESS NOTES
[facility]  Neurosurgery Progress Note  10/5/2021    Cheryle Hernández Patient Status:  Outpatient in a Bed    1970 MRN [id number]   [facility] 3SW-A Attending Zachayr Mora MD   Hosp Day # 0 PCP Roz Christianson, DO     SUBJECT

## 2021-10-05 NOTE — PLAN OF CARE
Patient A&O X4 on RA. VSS, /IS/telemetry- NSR. SCDs encouraged. DTV post-op by Diane, ZINA 10/4. Surgical incision to lower back covered with dermabond, c/d/i. C/o tingling to right foot/hip and states she had this pre-op.  IV/PO pain medication given with

## 2021-10-06 PROCEDURE — 99254 IP/OBS CNSLTJ NEW/EST MOD 60: CPT | Performed by: OTHER

## 2021-10-06 RX ORDER — HYDROCODONE BITARTRATE AND ACETAMINOPHEN 10; 325 MG/1; MG/1
1 TABLET ORAL EVERY 4 HOURS PRN
Status: DISCONTINUED | OUTPATIENT
Start: 2021-10-06 | End: 2021-10-07

## 2021-10-06 RX ORDER — HYDROCODONE BITARTRATE AND ACETAMINOPHEN 10; 325 MG/1; MG/1
1 TABLET ORAL EVERY 4 HOURS PRN
Qty: 60 TABLET | Refills: 0 | Status: SHIPPED | OUTPATIENT
Start: 2021-10-06 | End: 2021-10-07

## 2021-10-06 RX ORDER — CYCLOBENZAPRINE HCL 10 MG
10 TABLET ORAL 3 TIMES DAILY PRN
Qty: 40 TABLET | Refills: 0 | Status: SHIPPED | OUTPATIENT
Start: 2021-10-06

## 2021-10-06 RX ORDER — SODIUM CHLORIDE 9 MG/ML
INJECTION, SOLUTION INTRAVENOUS CONTINUOUS
Status: ACTIVE | OUTPATIENT
Start: 2021-10-06 | End: 2021-10-06

## 2021-10-06 RX ORDER — HYDROCODONE BITARTRATE AND ACETAMINOPHEN 10; 325 MG/1; MG/1
2 TABLET ORAL EVERY 4 HOURS PRN
Status: DISCONTINUED | OUTPATIENT
Start: 2021-10-06 | End: 2021-10-07

## 2021-10-06 RX ORDER — HYDROCODONE BITARTRATE AND ACETAMINOPHEN 10; 325 MG/1; MG/1
1-2 TABLET ORAL EVERY 4 HOURS PRN
Status: DISCONTINUED | OUTPATIENT
Start: 2021-10-06 | End: 2021-10-06

## 2021-10-06 NOTE — OCCUPATIONAL THERAPY NOTE
OCCUPATIONAL THERAPY TREATMENT NOTE - INPATIENT     Room Number: 368/368-A  Session: 1   Number of Visits to Meet Established Goals: 2    Presenting Problem: s/p R L1-3 hemilaminectomies, L L2-3 re-exploration on 10/4       History related to current admis with high grade squamous intraepithelial lesion (HGSIL) 03/21/11   • PONV (postoperative nausea and vomiting)    • Sexually transmitted disease     HPV   • Substance abuse ([facility] [phone]    cocaine   • Urinary incontinence    • Visual impairment     glass 38.32%  Standardized Score (AM-PAC Scale): 42.03  CMS Modifier (G-Code): CJ    FUNCTIONAL TRANSFER ASSESSMENT  Supine to Sit : Supervision  Sit to Stand: Supervision    Skilled Therapy Provided: Pt was found in bed in a semi-supine position.  Pt performed s functional level and would benefit from skilled inpatient OT to address the above deficits, maximizing patient’s ability to return safely to her prior level of function.   At this time, it is anticipated that pt will be able to return home with [facility]

## 2021-10-06 NOTE — PROGRESS NOTES
[facility]     Herbert Ayala Patient Status:  Inpatient    1970 MRN [id number]   [facility] 3SW-A Attending Shailesh Szymanski MD   Hosp Day # 2 PCP Sabrina Harris DO     CC: follow up    [id number] [facility] Mejia Not Detected 10/01/2021    COVID19 Not Detected 08/17/2021    COVID19 Not Detected 06/12/2021          Assessment/Plan:     sp lumbar surgery  - Plan per nsgy. Continue PT/OT.   Pain is currently controlled.       Acute on chronic pain   - minim

## 2021-10-06 NOTE — CONSULTS
[facility]    Report of Consultation    Grassy [employer] [employer] Patient Status:  Inpatient    1970 MRN [id number]   [facility] 3SW-A Attending Chu Decker MD   Hosp Day # 2 PCP Diego Bales DO     Date of Admission:  10/4/202 Hospitalization needed    • History of lumbar fusion    • IBS (irritable bowel syndrome)    • Infertility, female    • Lumbar radiculopathy    • Mild dysplasia of cervix 03/11/11   • Pap smear for cervical cancer screening 6-    pt states normal   • behavioral problems   • Cancer Sister 46        lung CA   • Other (Other) Sister         Back problems      reports that she quit smoking about 10 years ago. She quit after 20.00 years of use.  She has never used smokeless tobacco. She reports current alcoh Prochlorperazine Edisylate (COMPAZINE) injection 10 mg, 10 mg, Intravenous, Q6H PRN  •  diphenhydrAMINE (BENADRYL) cap/tab 25 mg, 25 mg, Oral, Q4H PRN **OR** diphenhydrAMINE (BENADRYL) IV PUSH injection 25 mg, 25 mg, Intravenous, Q4H PRN  •  0.9% NaCl infu Finger-to-nose coordination is intact. Gait deferred. Diagnostic Data:        Prior pertinent laboratory work-up:  CT L spine 9/29/21  CONCLUSION:     1. No acute process is identified.    2. Stable postoperative changes of posterior hardware fusion and headache, that possibly has since healed. Gave depacon yesterday, and if recurs, can take fiorcet prn q 6 hours. Slowly advance upright position and if does not tolerate, increase time lying flat.  Give lots of hydration, and add muscle relaxant as needed,

## 2021-10-06 NOTE — PLAN OF CARE
Pt is alert and oriented X4 on room air. . Bilateral SCD. Unable to void per bedside commode X1 this afternoon, waited an hour, then voiding 800mL, PVR 1mL. Pt reports no burning when voiding. Last bowel movement 10/04.  Miralax given this AM. Pt report

## 2021-10-06 NOTE — PLAN OF CARE
Pt received Milk of magnesia and prune juice this afternoon. Pt requested enema after a few hours of having no bowel movement. Pt educated on giving suppository first then enema. Pt refused suppository and insisted on receiving enema.

## 2021-10-06 NOTE — CM/SW NOTE
10/06/21 1500   CM/SW Referral Data   Referral Source Social Work (self-referral)   Reason for Referral Discharge planning   Informant EMR   Patient Info   Patient's Current Mental Status at Time of Assessment Alert; Oriented   Patient's Home Environmen

## 2021-10-06 NOTE — PHYSICAL THERAPY NOTE
PHYSICAL THERAPY EVALUATION - INPATIENT     Room Number: 368/368-A  Evaluation Date: 10/6/2021  Type of Evaluation: Initial  Physician Order: PT Eval and Treat    Presenting Problem: s/p L1-3 hemilaminectomy, L2-3 re-exploration on 10/4/2021  Reason Procedure Laterality Date   • APPENDECTOMY  1980   • BACK SURGERY  2009    fusion L5-S1   • BACK SURGERY  03/03/2021    RIGHT LUMBAR 3/ LUMBAR 4, LUMBAR 4/ LUMBAR 5 HEMILAMINTOMIES, LEFT LUMBAR 2 / LUMBAR 3 MICRODISCECTOMY   • COLPOSCOPY,BX CERVIX/ENDOCE limits    Lower extremity strength is within functional limits: 4+/5 MMT BLE     BALANCE  Static Sitting: Fair  Dynamic Sitting: Fair  Static Standing: Poor +  Dynamic Standing: Poor +    ADDITIONAL TESTS                                  NEUROLOGICAL FINDI goals for the session, spine precautions, log roll for bed mobility, safety, fall prevention, and discharge planning. See cognition section above. Pt able to state 3/3 spine precautions without cuing.  Pt performed supine to sit transfer with HOB elevated, patient's clinical presentation is evolving and overall the evaluation complexity is considered moderate. These impairments and comorbidities manifest themselves as functional limitations in independent bed mobility, transfers, and gait.   The patient is b

## 2021-10-06 NOTE — PLAN OF CARE
Pt Aox4, drowsiness with pain medication. 2L of O2 needed at night w/ pain medication - pt educated on effects of narcotics and respiratory depression.  Pt requiring around the clock pain medication - headache mildly relieved with PRN Fioricet, Lower back p

## 2021-10-06 NOTE — PLAN OF CARE
Pt is drowsy this AM with 3L of oxygen via nasal cannula. . Bilateral SCD. Voiding without difficulty. Last bowel movement 10/04. Milk of magnesia and prune juice given. Headache improving. Pt reports there is no tingling to bilateral LE.  Pt is noncompl

## 2021-10-06 NOTE — PROGRESS NOTES
[facility]  Neurosurgery Progress Note    Cristobal Moore Patient Status:  Inpatient    1970 MRN [id number]   [facility] 3SW-A Attending Adán Hathaway MD   Hosp Day # 2 PCP Nancy Vega DO     Chief Complaint:  No chief c

## 2021-10-07 VITALS
BODY MASS INDEX: 29.76 KG/M2 | WEIGHT: 185.19 LBS | OXYGEN SATURATION: 92 % | HEART RATE: 98 BPM | TEMPERATURE: 99 F | HEIGHT: 66 IN | DIASTOLIC BLOOD PRESSURE: 64 MMHG | RESPIRATION RATE: 18 BRPM | SYSTOLIC BLOOD PRESSURE: 124 MMHG

## 2021-10-07 RX ORDER — HYDROCODONE BITARTRATE AND ACETAMINOPHEN 10; 325 MG/1; MG/1
1-2 TABLET ORAL EVERY 4 HOURS PRN
Qty: 60 TABLET | Refills: 0 | Status: SHIPPED | OUTPATIENT
Start: 2021-10-07 | End: 2021-12-08

## 2021-10-07 NOTE — PAYOR COMM NOTE
--------------  DISCHARGE REVIEW    Payor: Gavin Smith #:  [id number]  Authorization Number: [id number]    Admit date: 10/4/21  Admit time:   8:03 PM  Discharge Date: 10/7/2021  1:00 PM     Admitting Physician: Damaris Worthington planned. Her surgery was noted without incident. She was admitted to the ortho/spine unit. On POD #1, she had uncontrolled pain and positional headache. Her discharge was held. On POD #2, she was drowsy and so discharge was held.   On POD #3, she felt 2:15 PM CDT Post Op Visit with KRZYSZTOF Te[address]ada [address], [facility] ([facility])        Nov 17, 2021 11:40 AM CST Follow Up Visit with MD Yamilet Zapata Dr, Reji Farrell

## 2021-10-07 NOTE — PROGRESS NOTES
[facility]  Neurosurgery Progress Note    Alyx Rafitajohnny Patient Status:  Inpatient    1970 MRN [id number]   [facility] 3SW-A Attending Melissa Romero MD   Hosp Day # 3 PCP Boaz Zuleta DO     Chief Complaint:  No chief c

## 2021-10-07 NOTE — PLAN OF CARE
Pt a/ox4. Pain controlled with Po medications. Constipated, laxative and prune juice given this am. Passing flatus. Incision site with slight oozing, gauze dressing placed last night. Plan to home today with home health once cleared by all MDs.

## 2021-10-07 NOTE — CM/SW NOTE
Informed by OT that pt will need bedside commode for discharge. Order placed and MD paged for co-sign. Spoke with Mickie Blanchard from [facility] regarding new referral.  [facility] has accepted pt for [place] [address] services. Anticipate DC later today.   /

## 2021-10-07 NOTE — PROGRESS NOTES
[facility] Hospitalist Progress Note     Maritza Zuñiga Patient Status:  Inpatient    1970 MRN [id number]   [facility] 3SW-A Attending Domingo Andrew MD   Hosp Day # 3 PCP Andre Phipps DO     CC: follow up    [id number] [facility] Component Value Date    COVID19 Not Detected 10/01/2021    COVID19 Not Detected 08/17/2021    COVID19 Not Detected 06/12/2021          Assessment/Plan:     s/p lumbar surgery  - Plan per nsgy. Continue PT/OT.   Pain is currently controlled.       Acute o

## 2021-10-07 NOTE — HOME CARE LIAISON
Patient provided with list of [facility] providers from [address], patient choice is [address]. Agency reserved in [address] and contact information placed on AVS. Financial interest disclosure provided to patient. ARNOLD [place] updated.

## 2021-10-07 NOTE — DISCHARGE SUMMARY
[facility]  Discharge Summary    Yennifer Patient Status:  Inpatient    1970 MRN [id number]   [facility] 3SW-A Attending Lissy Steiner MD   Hosp Day # 3 PCP Sourav      Date of Admission: 10/4/2021    Date helped the right side about 90% mainly on the left  She is here to discuss her options  She is status post previous right L3-4 and 4 5 hemilaminotomies and left L2-3 microdiscectomy       Brief Summary of Hospital Course: Pt presented for procedure as plan Oral Cap  Take 5,000 Units by mouth daily. Refills: 0    amphetamine-dextroamphetamine 30 MG Oral Tab  Take 1 tablet (30 mg total) by mouth 2 (two) times daily.   Qty: 60 tablet Refills: 0    DULoxetine HCl 60 MG Oral Cap DR Particles  TK ONE C PO QAM  Ref swelling    BLAKE Queen  10/7/2021  12:31 PM

## 2021-10-07 NOTE — PAYOR COMM NOTE
--------------  ADMISSION REVIEW     Payor: Gavin Smith #:  [id number]  Authorization Number: [id number]    Admit date: 10/4/21  Admit time:  8:03 PM     REVIEW DOCUMENTATION:  H&P signed by Marianna Adrian PA-C H&P was reviewed on 10/04/21, the patient was examined and no significant changes have occurred in the patient's condition since the H&P was performed. ?I reviewed with the patient and/or legal representative the potential benefits, risks and side effects o sterilely prepped and draped in usual fashion. 10 mils a course of Marcaine with epinephrine was given as local anesthetic. Incision was made dissection down to fascia. A subperiosteal dissection was then performed along L1, 2 and 3 bilaterally.   We the seen.  Hemostasis was achieved. The wound was then closed in layers. Patient was taken off the OR table.   Patient awakened by anesthesia and taken recovery.  Daljit Isidro MD  10/4/2021  5:53 PM    Hospitalist Initial Consult   /61  Pulse 9 resolved     Suspect tiny csf leak and/or dural irritation leading to headache, that possibly has since healed. Gave depacon yesterday, and if recurs, can take fiorcet prn q 6 hours.  Slowly advance upright position and if does not tolerate, increase time l Cory Tomas RN      Fleet Enema (FLEET) 7-19 GM/118ML enema 133 mL     Date Action Dose Route User    Discharged on 10/7/2021    10/6/2021 1653 Given  Rectal Paul Rod RN      HYDROcodone-acetaminophen (NORCO)  MG per tab 1 tablet Discharged on 10/7/2021    10/6/2021 2030 New Bag  Intravenous Ruthie Jimenez RN     Vitals (last day) before discharge     Date/Time Temp Pulse Resp BP SpO2 Weight O2 Device O2 Flow Rate (L/min) [facility]    10/07/21 0803 99.4 °F (37.4 °C) 98 18 124/64 92 % —

## 2021-10-07 NOTE — PHYSICAL THERAPY NOTE
Attempted to see Pt this AM - RN aware of attempt. Pt reported poor night of sleep overnight - requesting to rest.  Will f/u later today if time permits, after all other patients are attempted per tentative schedule.

## 2021-10-07 NOTE — PROGRESS NOTES
Patient discharged to home this afternoon. Reviewed all discharge paperwork at bedside with patient. All questions answered at this time. Patient verbalized understanding of all teaching. All belongings collected and sent with patient at this time.    Pt in

## 2021-10-07 NOTE — PHYSICAL THERAPY NOTE
PHYSICAL THERAPY TREATMENT NOTE - INPATIENT    Room Number: 368/368-A     Session: 1     Number of Visits to Meet Established Goals: 4    Presenting Problem: s/p L1-3 hemilaminectomy, L2-3 re-exploration on 10/4/2021    ASSESSMENT     Pt eager to [employer] 4  Location: back and headache   Management Techniques: Activity promotion; Body mechanics;  Relaxation; Repositioning    BALANCE                                                                                                                       Static Si Barriers:  Stairs: NT - performed during IPTE  Stoop: NA    Patient educated - RW usage, spine precautions, activity recommendations, knee ext and ankle pumps for improved circulation.        THERAPEUTIC EXERCISES  Lower Extremity Ankle pumps  Knee extensio

## 2021-10-08 ENCOUNTER — TELEPHONE (OUTPATIENT)
Dept: SURGERY | Facility: CLINIC | Age: 51
End: 2021-10-08

## 2021-10-08 NOTE — TELEPHONE ENCOUNTER
Pt states she had surgery this last Monday. Pt states she was discharged from the hospital yesterday and is having excruciating pain. Pt rates pain at 9.5.

## 2021-10-08 NOTE — TELEPHONE ENCOUNTER
S:  Pt calling due to extreme pain     B: pt had surgery on 10/4/2021, was released yesterday       A:  Pt states she was offered percocet by Dr. Kati Alaniz yesterday before discharge but she declined because she thought it was causing her headache.  Shannan Huff

## 2021-10-08 NOTE — TELEPHONE ENCOUNTER
No provider in office to fill order. Advised pt go to ER for pain. Pt states she is disgusted with their service and does not want to go.  Informed patient maybe she should try an [facility] and inform them to go into chart and look at our notes to

## 2021-10-11 ENCOUNTER — TELEPHONE (OUTPATIENT)
Dept: PAIN CLINIC | Facility: CLINIC | Age: 51
End: 2021-10-11

## 2021-10-11 ENCOUNTER — TELEPHONE (OUTPATIENT)
Dept: SURGERY | Facility: CLINIC | Age: 51
End: 2021-10-11

## 2021-10-11 RX ORDER — OXYCODONE AND ACETAMINOPHEN 10; 325 MG/1; MG/1
1 TABLET ORAL EVERY 6 HOURS PRN
Qty: 30 TABLET | Refills: 0 | Status: SHIPPED | OUTPATIENT
Start: 2021-10-11 | End: 2021-12-08 | Stop reason: ALTCHOICE

## 2021-10-11 NOTE — TELEPHONE ENCOUNTER
Pt states pharmacy won't release her medication until they speak to someone at the office.   She would like to speak to a nurse

## 2021-10-11 NOTE — TELEPHONE ENCOUNTER
Called to inform pt rx was sent to pharmacy    Other will need to be determined by Dr. Marlene Trujillo.

## 2021-10-11 NOTE — TELEPHONE ENCOUNTER
Rx percocet sent to pharmacy as Dr. Lenka Pritchett offered this previously. Defer other treatment recs to Dr. Lenka Pritchett.

## 2021-10-11 NOTE — TELEPHONE ENCOUNTER
S:  Pt calling states she was unable to go to ED or walk in care due to pain    B: Pt had surgery last week and has been in severe pain since being released home.      RIGHT LUMBAR 1-LUMBAR 2 AND RIGHT LUMBAR 2-LUMBAR 3 HEMILAMINOTOMIES, LEFT LUMBAR 2-LUMBA

## 2021-10-14 ENCOUNTER — TELEPHONE (OUTPATIENT)
Dept: SURGERY | Facility: CLINIC | Age: 51
End: 2021-10-14

## 2021-10-14 NOTE — TELEPHONE ENCOUNTER
S:  Patient called with an update.      B:  Patient underwent surgery with Dr. Yessy Salinas on 10/4/21:    RIGHT LUMBAR 1-LUMBAR 2 AND RIGHT LUMBAR 2-LUMBAR 3 HEMILAMINOTOMIES, LEFT LUMBAR 2-LUMBAR 3 RE-EXPLORATION     A:  Spoke to patient who stated that:    -

## 2021-10-14 NOTE — TELEPHONE ENCOUNTER
Per \"Sick Call\" 10/8/21 TE routed to Dr. Bula Gottron to address if pt may have steroid pack or if he felt injections would help her post op pain as it had with previous surgery. Has not been address by doctor as of yet. Pt given Percocet on 10/8/21.

## 2021-10-18 NOTE — TELEPHONE ENCOUNTER
Called pt  doing better  May want to get injection  Has appt tomorrow  Started physical therapy    Pt appreciative of call

## 2021-10-20 ENCOUNTER — OFFICE VISIT (OUTPATIENT)
Dept: SURGERY | Facility: CLINIC | Age: 51
End: 2021-10-20
Payer: MEDICAID

## 2021-10-20 ENCOUNTER — LAB ENCOUNTER (OUTPATIENT)
Dept: LAB | Age: 51
End: 2021-10-20
Attending: PHYSICIAN ASSISTANT
Payer: MEDICAID

## 2021-10-20 VITALS
HEIGHT: 66 IN | DIASTOLIC BLOOD PRESSURE: 74 MMHG | WEIGHT: 185 LBS | BODY MASS INDEX: 29.73 KG/M2 | SYSTOLIC BLOOD PRESSURE: 126 MMHG

## 2021-10-20 DIAGNOSIS — M54.16 LUMBAR RADICULOPATHY: ICD-10-CM

## 2021-10-20 DIAGNOSIS — Z98.890 STATUS POST LUMBAR SURGERY: Primary | ICD-10-CM

## 2021-10-20 DIAGNOSIS — L76.82 INCISIONAL PAIN: ICD-10-CM

## 2021-10-20 PROCEDURE — 3074F SYST BP LT 130 MM HG: CPT | Performed by: PHYSICIAN ASSISTANT

## 2021-10-20 PROCEDURE — 85652 RBC SED RATE AUTOMATED: CPT

## 2021-10-20 PROCEDURE — 3008F BODY MASS INDEX DOCD: CPT | Performed by: PHYSICIAN ASSISTANT

## 2021-10-20 PROCEDURE — 86140 C-REACTIVE PROTEIN: CPT

## 2021-10-20 PROCEDURE — 36415 COLL VENOUS BLD VENIPUNCTURE: CPT

## 2021-10-20 PROCEDURE — 99024 POSTOP FOLLOW-UP VISIT: CPT | Performed by: PHYSICIAN ASSISTANT

## 2021-10-20 PROCEDURE — 85025 COMPLETE CBC W/AUTO DIFF WBC: CPT

## 2021-10-20 PROCEDURE — 3078F DIAST BP <80 MM HG: CPT | Performed by: PHYSICIAN ASSISTANT

## 2021-10-20 RX ORDER — OXYCODONE AND ACETAMINOPHEN 10; 325 MG/1; MG/1
1 TABLET ORAL EVERY 12 HOURS PRN
Qty: 45 TABLET | Refills: 0 | Status: SHIPPED | OUTPATIENT
Start: 2021-10-20 | End: 2021-12-08

## 2021-10-20 RX ORDER — HYDROCODONE BITARTRATE AND ACETAMINOPHEN 10; 325 MG/1; MG/1
1 TABLET ORAL EVERY 8 HOURS PRN
Qty: 45 TABLET | Refills: 0 | Status: SHIPPED | OUTPATIENT
Start: 2021-10-20

## 2021-10-20 RX ORDER — PREDNISONE 10 MG/1
TABLET ORAL
Qty: 30 TABLET | Refills: 0 | Status: SHIPPED | OUTPATIENT
Start: 2021-10-20 | End: 2021-10-30

## 2021-10-20 NOTE — PROGRESS NOTES
Some good days, some bad  Late afternoon is when the pain starts kicking in  Having pain in the legs, different areas  Taking meds will help  Laying down with heating pad will help  Sitting and standing are painful    Is feeling some relief from sx, some p

## 2021-10-20 NOTE — PROGRESS NOTES
Patient: Sruthi Glass  Medical Record Number: [id number]  YOB: 1970  PCP: Cameron Rabago DO    Reason for visit: Lumbar follow up, 2-week postop visit, status post lumbar surgery    HISTORY OF CHIEF COMPLAINT:    Sruthi Glass is a ve cocaine   • Urinary incontinence    • Visual impairment     glasses/contacts      Past Surgical History:   Procedure Laterality Date   • APPENDECTOMY  1980   • BACK SURGERY  2009    fusion L5-S1   • BACK SURGERY  03/03/2021    RIGHT LUMBAR 3/ LUMBAR 4, LUM pod    Substance and Sexual Activity      Alcohol use: Yes        Comment: three times per week      Drug use: Yes        Types: Cannabis        Comment: CBD 14/1 + THC 0.5% tablet once daily , USED COCAINE ZULMA 7501-1406      Sexual activity: Yes        P needed for constipation. 30 capsule 0   • ARIPiprazole 2 MG Oral Tab Take 2 mg by mouth daily. • lamoTRIgine 25 MG Oral Tab Take 50 mg by mouth daily.        • Albuterol Sulfate  (90 Base) MCG/ACT Inhalation Aero Soln Inhale 2 puffs into the lung provided. Side effects discussed, including but not limited to, GI upset. Patient offered famotidine, she declined. Advised do not begin the prednisone taper until labs are resulted.   2. Imaging:    - Reviewed today:    -No recent imaging   - Ordered to

## 2021-10-21 ENCOUNTER — TELEPHONE (OUTPATIENT)
Dept: SURGERY | Facility: CLINIC | Age: 51
End: 2021-10-21

## 2021-10-21 NOTE — TELEPHONE ENCOUNTER
Reviewed labs with Dr. Lenka Pritchett. Patient is status post surgery, not uncommon for elevations of CRP and sed rate. CRP mildly elevated. White count within normal limits. Patient without fevers. Incision without drainage, edema or warmth.   Patient has b

## 2021-10-22 NOTE — TELEPHONE ENCOUNTER
Received notification from [facility] inquiring about order for patient to take Norco and Percocet concurrently. Per SHAWN Castillo at [facility] on 10/20/21:    \"ASSESSMENT and PLAN:  1. Status post lumbar surgery    2. Lumbar radiculopathy    3.  Incisional the call and the call was ended.

## 2021-11-27 ENCOUNTER — HOSPITAL ENCOUNTER (OUTPATIENT)
Dept: CT IMAGING | Facility: HOSPITAL | Age: 51
Discharge: HOME | End: 2021-11-27
Attending: INTERNAL MEDICINE
Payer: MEDICAID

## 2021-11-27 DIAGNOSIS — R91.1 PULMONARY NODULE: ICD-10-CM

## 2021-11-27 PROCEDURE — 71250 CT THORAX DX C-: CPT | Performed by: INTERNAL MEDICINE

## 2021-12-08 RX ORDER — IBUPROFEN 800 MG/1
800 TABLET ORAL 2 TIMES DAILY PRN
COMMUNITY

## 2021-12-10 ENCOUNTER — LAB ENCOUNTER (OUTPATIENT)
Dept: LAB | Facility: HOSPITAL | Age: 51
End: 2021-12-10
Attending: INTERNAL MEDICINE
Payer: MEDICAID

## 2021-12-10 DIAGNOSIS — R94.8 ABNORMAL POSITRON EMISSION TOMOGRAPHY (PET) SCAN: ICD-10-CM

## 2021-12-12 ENCOUNTER — ANESTHESIA EVENT (OUTPATIENT)
Dept: ENDOSCOPY | Facility: HOSPITAL | Age: 51
End: 2021-12-12
Payer: MEDICAID

## 2021-12-13 ENCOUNTER — HOSPITAL ENCOUNTER (OUTPATIENT)
Facility: HOSPITAL | Age: 51
Setting detail: HOSPITAL OUTPATIENT SURGERY
Discharge: HOME | End: 2021-12-13
Attending: INTERNAL MEDICINE | Admitting: INTERNAL MEDICINE
Payer: MEDICAID

## 2021-12-13 ENCOUNTER — ANESTHESIA (OUTPATIENT)
Dept: ENDOSCOPY | Facility: HOSPITAL | Age: 51
End: 2021-12-13
Payer: MEDICAID

## 2021-12-13 VITALS
OXYGEN SATURATION: 97 % | HEIGHT: 66 IN | RESPIRATION RATE: 15 BRPM | DIASTOLIC BLOOD PRESSURE: 70 MMHG | TEMPERATURE: 98 F | BODY MASS INDEX: 28.93 KG/M2 | HEART RATE: 87 BPM | SYSTOLIC BLOOD PRESSURE: 135 MMHG | WEIGHT: 180 LBS

## 2021-12-13 DIAGNOSIS — C34.90 ADENOCARCINOMA OF LUNG (HCC): ICD-10-CM

## 2021-12-13 DIAGNOSIS — R94.8 ABNORMAL POSITRON EMISSION TOMOGRAPHY (PET) SCAN: Primary | ICD-10-CM

## 2021-12-13 PROCEDURE — 88342 IMHCHEM/IMCYTCHM 1ST ANTB: CPT | Performed by: INTERNAL MEDICINE

## 2021-12-13 PROCEDURE — 88172 CYTP DX EVAL FNA 1ST EA SITE: CPT | Performed by: INTERNAL MEDICINE

## 2021-12-13 PROCEDURE — 88305 TISSUE EXAM BY PATHOLOGIST: CPT | Performed by: INTERNAL MEDICINE

## 2021-12-13 PROCEDURE — 07D78ZX EXTRACTION OF THORAX LYMPHATIC, VIA NATURAL OR ARTIFICIAL OPENING ENDOSCOPIC, DIAGNOSTIC: ICD-10-PCS | Performed by: INTERNAL MEDICINE

## 2021-12-13 PROCEDURE — 81275 KRAS GENE VARIANTS EXON 2: CPT | Performed by: INTERNAL MEDICINE

## 2021-12-13 PROCEDURE — 88381 MICRODISSECTION MANUAL: CPT | Performed by: INTERNAL MEDICINE

## 2021-12-13 PROCEDURE — 81235 EGFR GENE COM VARIANTS: CPT | Performed by: INTERNAL MEDICINE

## 2021-12-13 PROCEDURE — 81403 MOPATH PROCEDURE LEVEL 4: CPT | Performed by: INTERNAL MEDICINE

## 2021-12-13 PROCEDURE — 88173 CYTOPATH EVAL FNA REPORT: CPT | Performed by: INTERNAL MEDICINE

## 2021-12-13 PROCEDURE — 88366 INSITU HYBRIDIZATION (FISH): CPT | Performed by: INTERNAL MEDICINE

## 2021-12-13 PROCEDURE — 88341 IMHCHEM/IMCYTCHM EA ADD ANTB: CPT | Performed by: INTERNAL MEDICINE

## 2021-12-13 PROCEDURE — 88360 TUMOR IMMUNOHISTOCHEM/MANUAL: CPT | Performed by: INTERNAL MEDICINE

## 2021-12-13 PROCEDURE — 81210 BRAF GENE: CPT | Performed by: INTERNAL MEDICINE

## 2021-12-13 RX ORDER — SODIUM CHLORIDE, SODIUM LACTATE, POTASSIUM CHLORIDE, CALCIUM CHLORIDE 600; 310; 30; 20 MG/100ML; MG/100ML; MG/100ML; MG/100ML
INJECTION, SOLUTION INTRAVENOUS CONTINUOUS
Status: DISCONTINUED | OUTPATIENT
Start: 2021-12-13 | End: 2021-12-13

## 2021-12-13 RX ORDER — SODIUM CHLORIDE, SODIUM LACTATE, POTASSIUM CHLORIDE, CALCIUM CHLORIDE 600; 310; 30; 20 MG/100ML; MG/100ML; MG/100ML; MG/100ML
INJECTION, SOLUTION INTRAVENOUS CONTINUOUS
Status: DISCONTINUED | OUTPATIENT
Start: 2021-12-13 | End: 2021-12-13 | Stop reason: HOSPADM

## 2021-12-13 RX ORDER — DEXAMETHASONE SODIUM PHOSPHATE 4 MG/ML
VIAL (ML) INJECTION AS NEEDED
Status: DISCONTINUED | OUTPATIENT
Start: 2021-12-13 | End: 2021-12-13 | Stop reason: SURG

## 2021-12-13 RX ORDER — ACETAMINOPHEN 500 MG
1000 TABLET ORAL ONCE AS NEEDED
Status: DISCONTINUED | OUTPATIENT
Start: 2021-12-13 | End: 2021-12-13 | Stop reason: HOSPADM

## 2021-12-13 RX ORDER — HYDROCODONE BITARTRATE AND ACETAMINOPHEN 5; 325 MG/1; MG/1
1 TABLET ORAL AS NEEDED
Status: DISCONTINUED | OUTPATIENT
Start: 2021-12-13 | End: 2021-12-13 | Stop reason: HOSPADM

## 2021-12-13 RX ORDER — SCOLOPAMINE TRANSDERMAL SYSTEM 1 MG/1
1 PATCH, EXTENDED RELEASE TRANSDERMAL
Status: DISCONTINUED | OUTPATIENT
Start: 2021-12-13 | End: 2021-12-13

## 2021-12-13 RX ORDER — HYDROMORPHONE HYDROCHLORIDE 1 MG/ML
0.4 INJECTION, SOLUTION INTRAMUSCULAR; INTRAVENOUS; SUBCUTANEOUS EVERY 5 MIN PRN
Status: DISCONTINUED | OUTPATIENT
Start: 2021-12-13 | End: 2021-12-13 | Stop reason: HOSPADM

## 2021-12-13 RX ORDER — LIDOCAINE HYDROCHLORIDE 10 MG/ML
INJECTION, SOLUTION EPIDURAL; INFILTRATION; INTRACAUDAL; PERINEURAL AS NEEDED
Status: DISCONTINUED | OUTPATIENT
Start: 2021-12-13 | End: 2021-12-13 | Stop reason: SURG

## 2021-12-13 RX ORDER — HYDROCODONE BITARTRATE AND ACETAMINOPHEN 5; 325 MG/1; MG/1
2 TABLET ORAL AS NEEDED
Status: DISCONTINUED | OUTPATIENT
Start: 2021-12-13 | End: 2021-12-13 | Stop reason: HOSPADM

## 2021-12-13 RX ORDER — ONDANSETRON 2 MG/ML
4 INJECTION INTRAMUSCULAR; INTRAVENOUS AS NEEDED
Status: DISCONTINUED | OUTPATIENT
Start: 2021-12-13 | End: 2021-12-13 | Stop reason: HOSPADM

## 2021-12-13 RX ORDER — NALOXONE HYDROCHLORIDE 0.4 MG/ML
80 INJECTION, SOLUTION INTRAMUSCULAR; INTRAVENOUS; SUBCUTANEOUS AS NEEDED
Status: DISCONTINUED | OUTPATIENT
Start: 2021-12-13 | End: 2021-12-13 | Stop reason: HOSPADM

## 2021-12-13 RX ORDER — ONDANSETRON 2 MG/ML
INJECTION INTRAMUSCULAR; INTRAVENOUS AS NEEDED
Status: DISCONTINUED | OUTPATIENT
Start: 2021-12-13 | End: 2021-12-13 | Stop reason: SURG

## 2021-12-13 RX ADMIN — SODIUM CHLORIDE, SODIUM LACTATE, POTASSIUM CHLORIDE, CALCIUM CHLORIDE: 600; 310; 30; 20 INJECTION, SOLUTION INTRAVENOUS at 08:29:00

## 2021-12-13 RX ADMIN — DEXAMETHASONE SODIUM PHOSPHATE 4 MG: 4 MG/ML VIAL (ML) INJECTION at 07:34:00

## 2021-12-13 RX ADMIN — ONDANSETRON 4 MG: 2 INJECTION INTRAMUSCULAR; INTRAVENOUS at 07:34:00

## 2021-12-13 RX ADMIN — LIDOCAINE HYDROCHLORIDE 50 MG: 10 INJECTION, SOLUTION EPIDURAL; INFILTRATION; INTRACAUDAL; PERINEURAL at 07:28:00

## 2021-12-13 NOTE — H&P (VIEW-ONLY)
Pulmonary H&P/Consult       NAME: Eric Hong - ROOM: [address] [place] [address]/ EN* - MRN: [id number] - Age: 49year old - :  1970    Date of Admission: 2021  6:11 AM  Admission Diagnosis: ABNORMAL PET SCAN  Reason for consult: bronchoscopy MICRODISCECTOMY   • COLPOSCOPY,BX CERVIX/ENDOCERV CURR  11    MARCIO 1   • LAPAROSCOPY PROCEDURE UNLISTED      Ovarian cyst removed   • LEEP  2011    MARCIO 2   •       X2   • OTHER SURGICAL HISTORY      bunions bilateral   • OTHER SURGICAL H Breath., Disp: 1 Inhaler, Rfl: 3, 12/11/2021        Gabapentin              SWELLING  Clindamycin             RASH    Social History:  Social History    Socioeconomic History      Marital status:       Spouse name: Not on file      Number of childr Problem Relation Age of Onset   • Heart Disease Father    • Other (lung cancer) Father         Lung Cancer   • Hypertension Mother    • Diabetes Mother    • Heart Disease Mother    • Other (pancreatic cancer) Sister         Pancreatic Cancer   • Cancer M Medication:  • lactated ringers 20 mL/hr at 12/13/21 0659     PRN Medication:     REVIEW OF SYSTEMS:   14 point ROS conducted, negative save for above      OBJECTIVE:   12/08/21  1307 12/13/21  0648   BP:  125/80   BP Location:  Left arm   Pulse:  98   Res uptake at 7, 4R, 2R, 10R    ASSESSMENT/PLAN:    1. abnl PET w/ family h/o lung CA  -here today for EBUS bronch for dx under general anesthesia  -risks/benefits/alternatives discussed with patient and she agrees to proceed with procedure.

## 2021-12-13 NOTE — ANESTHESIA PREPROCEDURE EVALUATION
PRE-OP EVALUATION    Patient Name: Ashli Carbone    Admit Diagnosis: ABNORMAL PET SCAN    Pre-op Diagnosis: ABNORMAL PET SCAN    ENDOBRONCHIAL ULTRASOUND (EBUS)    Anesthesia Procedure: ENDOBRONCHIAL ULTRASOUND (EBUS) (N/A )    Surgeon(s) and Role: PONV       GI/Hepatic/Renal                                 Cardiovascular      ECG reviewed.   Exercise tolerance: good     MET: >4      (+) hypertension                                     Endo/Other           (+) hypothyroidism                       Pulm 10/20/2021     09/21/2021     Lab Results   Component Value Date     09/21/2021    K 4.83 09/21/2021     09/21/2021    CO2 26.0 09/21/2021    BUN 21.0 (H) 09/21/2021    CREATSERUM 0.74 09/21/2021    GLU 93 09/21/2021    CA 10.3 09/21/202

## 2021-12-13 NOTE — ANESTHESIA POSTPROCEDURE EVALUATION
[facility] Patient Status:  Hospital Outpatient Surgery   Age/Gender 49year old female MRN [id number]   Location [id number] [place] [address] Attending Aleksander Chris MD   Hosp Day # 0 PCP DO Kelley Kline

## 2021-12-13 NOTE — H&P
Pulmonary H&P/Consult       NAME: Morgan Llanes - ROOM: [facility] ENDO POOL ROOMS/EH EN* - MRN: [id number] - Age: 49year old - :  1970    Date of Admission: 2021  6:11 AM  Admission Diagnosis: ABNORMAL PET SCAN  Reason for consult: bronchoscopy MICRODISCECTOMY   • COLPOSCOPY,BX CERVIX/ENDOCERV CURR  11    MARCIO 1   • LAPAROSCOPY PROCEDURE UNLISTED      Ovarian cyst removed   • LEEP  2011    MARCIO 2   •       X2   • OTHER SURGICAL HISTORY      bunions bilateral   • OTHER SURGICAL H Breath., Disp: 1 Inhaler, Rfl: 3, 12/11/2021        Gabapentin              SWELLING  Clindamycin             RASH    Social History:  Social History    Socioeconomic History      Marital status:       Spouse name: Not on file      Number of childr Problem Relation Age of Onset   • Heart Disease Father    • Other (lung cancer) Father         Lung Cancer   • Hypertension Mother    • Diabetes Mother    • Heart Disease Mother    • Other (pancreatic cancer) Sister         Pancreatic Cancer   • Cancer M Medication:  • lactated ringers 20 mL/hr at 12/13/21 0659     PRN Medication:     REVIEW OF SYSTEMS:   14 point ROS conducted, negative save for above      OBJECTIVE:   12/08/21  1307 12/13/21  0648   BP:  125/80   BP Location:  Left arm   Pulse:  98   Res uptake at 7, 4R, 2R, 10R    ASSESSMENT/PLAN:    1. abnl PET w/ family h/o lung CA  -here today for EBUS bronch for dx under general anesthesia  -risks/benefits/alternatives discussed with patient and she agrees to proceed with procedure.

## 2021-12-13 NOTE — OPERATIVE REPORT
Bronchoscopy procedure report    Preop diagnosis: abnl PET  Postop diagnosis:  abnl PET  Procedure performed: Bronchoscopy, Diagnostic    Sedation used: *Gneral Anesthesia- please see their documentation  Moderate Sedation Time: NA    Description of proced

## 2021-12-13 NOTE — ANESTHESIA PROCEDURE NOTES
Airway  Date/Time: 12/13/2021 7:30 AM  Urgency: elective      General Information and Staff    Patient location during procedure: OR  Anesthesiologist: Nolan Whalen DO  Performed: anesthesiologist     Indications and Patient Condition  Indications for ai

## 2021-12-20 PROBLEM — C34.91 PRIMARY ADENOCARCINOMA OF RIGHT LUNG (HCC): Status: ACTIVE | Noted: 2021-12-20

## 2021-12-20 PROBLEM — R59.0 MEDIASTINAL ADENOPATHY: Status: ACTIVE | Noted: 2021-12-20

## 2021-12-21 ENCOUNTER — ANESTHESIA EVENT (OUTPATIENT)
Dept: SURGERY | Facility: HOSPITAL | Age: 51
End: 2021-12-21
Payer: MEDICAID

## 2021-12-21 RX ORDER — ACETAMINOPHEN 500 MG
1000 TABLET ORAL ONCE
Status: CANCELLED | OUTPATIENT
Start: 2021-12-21 | End: 2021-12-21

## 2021-12-21 NOTE — TELEPHONE ENCOUNTER
Pt requesting refill on [place] so she may have coverage until her sx date 3/3/21     Medication:     HYDROcodone-acetaminophen  MG Oral Tab 30 tablet 0 1/7/2021     Sig - Route:  Take 1 tablet by mouth every 12 (twelve) hours as needed for Pain. - Oral [Negative] : Heme/Lymph

## 2021-12-22 ENCOUNTER — LAB ENCOUNTER (OUTPATIENT)
Dept: LAB | Facility: HOSPITAL | Age: 51
End: 2021-12-22
Attending: SURGERY
Payer: MEDICAID

## 2021-12-22 DIAGNOSIS — C34.91 PRIMARY ADENOCARCINOMA OF RIGHT LUNG (HCC): ICD-10-CM

## 2021-12-23 ENCOUNTER — APPOINTMENT (OUTPATIENT)
Dept: GENERAL RADIOLOGY | Facility: HOSPITAL | Age: 51
End: 2021-12-23
Attending: SURGERY
Payer: MEDICAID

## 2021-12-23 ENCOUNTER — ANESTHESIA (OUTPATIENT)
Dept: SURGERY | Facility: HOSPITAL | Age: 51
End: 2021-12-23
Payer: MEDICAID

## 2021-12-23 ENCOUNTER — HOSPITAL ENCOUNTER (OUTPATIENT)
Facility: HOSPITAL | Age: 51
Setting detail: HOSPITAL OUTPATIENT SURGERY
Discharge: HOME | End: 2021-12-23
Attending: SURGERY | Admitting: SURGERY
Payer: MEDICAID

## 2021-12-23 VITALS
OXYGEN SATURATION: 97 % | TEMPERATURE: 97 F | DIASTOLIC BLOOD PRESSURE: 81 MMHG | HEART RATE: 91 BPM | SYSTOLIC BLOOD PRESSURE: 132 MMHG | RESPIRATION RATE: 14 BRPM | HEIGHT: 65.5 IN | BODY MASS INDEX: 30.08 KG/M2 | WEIGHT: 182.75 LBS

## 2021-12-23 DIAGNOSIS — C34.90 PRIMARY ADENOCARCINOMA OF LUNG, UNSPECIFIED LATERALITY (HCC): ICD-10-CM

## 2021-12-23 DIAGNOSIS — C34.91 PRIMARY ADENOCARCINOMA OF RIGHT LUNG (HCC): Primary | ICD-10-CM

## 2021-12-23 PROCEDURE — 77001 FLUOROGUIDE FOR VEIN DEVICE: CPT | Performed by: SURGERY

## 2021-12-23 PROCEDURE — B5181ZA FLUOROSCOPY OF SUPERIOR VENA CAVA USING LOW OSMOLAR CONTRAST, GUIDANCE: ICD-10-PCS | Performed by: SURGERY

## 2021-12-23 PROCEDURE — 02HV33Z INSERTION OF INFUSION DEVICE INTO SUPERIOR VENA CAVA, PERCUTANEOUS APPROACH: ICD-10-PCS | Performed by: SURGERY

## 2021-12-23 DEVICE — POWERPORT CLEARVUE ISP WITH SMOOTH SEPTUM, 8F POLYURETHANE CATHETER, INTERMEDIATE KIT (STEALTH)
Type: IMPLANTABLE DEVICE | Site: CHEST | Status: FUNCTIONAL
Brand: POWERPORT CLEARVUE

## 2021-12-23 RX ORDER — LIDOCAINE HYDROCHLORIDE 10 MG/ML
INJECTION, SOLUTION EPIDURAL; INFILTRATION; INTRACAUDAL; PERINEURAL AS NEEDED
Status: DISCONTINUED | OUTPATIENT
Start: 2021-12-23 | End: 2021-12-23 | Stop reason: SURG

## 2021-12-23 RX ORDER — DEXAMETHASONE SODIUM PHOSPHATE 4 MG/ML
VIAL (ML) INJECTION AS NEEDED
Status: DISCONTINUED | OUTPATIENT
Start: 2021-12-23 | End: 2021-12-23 | Stop reason: SURG

## 2021-12-23 RX ORDER — HYDROMORPHONE HYDROCHLORIDE 1 MG/ML
0.4 INJECTION, SOLUTION INTRAMUSCULAR; INTRAVENOUS; SUBCUTANEOUS EVERY 5 MIN PRN
Status: DISCONTINUED | OUTPATIENT
Start: 2021-12-23 | End: 2021-12-23

## 2021-12-23 RX ORDER — ACETAMINOPHEN 500 MG
1000 TABLET ORAL EVERY 6 HOURS PRN
COMMUNITY

## 2021-12-23 RX ORDER — HYDROCODONE BITARTRATE AND ACETAMINOPHEN 5; 325 MG/1; MG/1
1 TABLET ORAL AS NEEDED
Status: COMPLETED | OUTPATIENT
Start: 2021-12-23 | End: 2021-12-23

## 2021-12-23 RX ORDER — BUPIVACAINE HYDROCHLORIDE 5 MG/ML
INJECTION, SOLUTION EPIDURAL; INTRACAUDAL AS NEEDED
Status: DISCONTINUED | OUTPATIENT
Start: 2021-12-23 | End: 2021-12-23 | Stop reason: HOSPADM

## 2021-12-23 RX ORDER — CEFAZOLIN SODIUM/WATER 2 G/20 ML
2 SYRINGE (ML) INTRAVENOUS ONCE
Status: COMPLETED | OUTPATIENT
Start: 2021-12-23 | End: 2021-12-23

## 2021-12-23 RX ORDER — MIDAZOLAM HYDROCHLORIDE 1 MG/ML
INJECTION INTRAMUSCULAR; INTRAVENOUS AS NEEDED
Status: DISCONTINUED | OUTPATIENT
Start: 2021-12-23 | End: 2021-12-23 | Stop reason: SURG

## 2021-12-23 RX ORDER — NALOXONE HYDROCHLORIDE 0.4 MG/ML
80 INJECTION, SOLUTION INTRAMUSCULAR; INTRAVENOUS; SUBCUTANEOUS AS NEEDED
Status: DISCONTINUED | OUTPATIENT
Start: 2021-12-23 | End: 2021-12-23

## 2021-12-23 RX ORDER — SODIUM CHLORIDE, SODIUM LACTATE, POTASSIUM CHLORIDE, CALCIUM CHLORIDE 600; 310; 30; 20 MG/100ML; MG/100ML; MG/100ML; MG/100ML
INJECTION, SOLUTION INTRAVENOUS CONTINUOUS
Status: DISCONTINUED | OUTPATIENT
Start: 2021-12-23 | End: 2021-12-23

## 2021-12-23 RX ORDER — LIDOCAINE HYDROCHLORIDE AND EPINEPHRINE 10; 10 MG/ML; UG/ML
INJECTION, SOLUTION INFILTRATION; PERINEURAL AS NEEDED
Status: DISCONTINUED | OUTPATIENT
Start: 2021-12-23 | End: 2021-12-23 | Stop reason: HOSPADM

## 2021-12-23 RX ORDER — HYDROCODONE BITARTRATE AND ACETAMINOPHEN 5; 325 MG/1; MG/1
2 TABLET ORAL AS NEEDED
Status: COMPLETED | OUTPATIENT
Start: 2021-12-23 | End: 2021-12-23

## 2021-12-23 RX ORDER — ONDANSETRON 2 MG/ML
4 INJECTION INTRAMUSCULAR; INTRAVENOUS AS NEEDED
Status: DISCONTINUED | OUTPATIENT
Start: 2021-12-23 | End: 2021-12-23

## 2021-12-23 RX ORDER — ACETAMINOPHEN 500 MG
1000 TABLET ORAL ONCE AS NEEDED
Status: DISCONTINUED | OUTPATIENT
Start: 2021-12-23 | End: 2021-12-23

## 2021-12-23 RX ORDER — ONDANSETRON 2 MG/ML
INJECTION INTRAMUSCULAR; INTRAVENOUS AS NEEDED
Status: DISCONTINUED | OUTPATIENT
Start: 2021-12-23 | End: 2021-12-23 | Stop reason: SURG

## 2021-12-23 RX ORDER — TRAMADOL HYDROCHLORIDE 50 MG/1
50 TABLET ORAL EVERY 6 HOURS PRN
Qty: 18 TABLET | Refills: 0 | Status: SHIPPED | OUTPATIENT
Start: 2021-12-23 | End: 2022-01-02

## 2021-12-23 RX ADMIN — ONDANSETRON 4 MG: 2 INJECTION INTRAMUSCULAR; INTRAVENOUS at 08:44:00

## 2021-12-23 RX ADMIN — DEXAMETHASONE SODIUM PHOSPHATE 4 MG: 4 MG/ML VIAL (ML) INJECTION at 08:31:00

## 2021-12-23 RX ADMIN — SODIUM CHLORIDE, SODIUM LACTATE, POTASSIUM CHLORIDE, CALCIUM CHLORIDE: 600; 310; 30; 20 INJECTION, SOLUTION INTRAVENOUS at 08:25:00

## 2021-12-23 RX ADMIN — LIDOCAINE HYDROCHLORIDE 50 MG: 10 INJECTION, SOLUTION EPIDURAL; INFILTRATION; INTRACAUDAL; PERINEURAL at 08:28:00

## 2021-12-23 RX ADMIN — MIDAZOLAM HYDROCHLORIDE 2 MG: 1 INJECTION INTRAMUSCULAR; INTRAVENOUS at 08:25:00

## 2021-12-23 RX ADMIN — CEFAZOLIN SODIUM/WATER 2 G: 2 G/20 ML SYRINGE (ML) INTRAVENOUS at 08:32:00

## 2021-12-23 NOTE — ANESTHESIA PREPROCEDURE EVALUATION
PRE-OP EVALUATION    Patient Name: Juarez Paz    Admit Diagnosis: Primary adenocarcinoma of lung, unspecified laterality ([facility] [C34.90]    Pre-op Diagnosis: Primary adenocarcinoma of lung, unspecified [facility] [C34.90]    INSERTION PORT-A-C tablet by mouth every 8 (eight) hours as needed for Pain., Disp: 45 tablet, Rfl: 0, 12/22/2021 at 0800  cyclobenzaprine 10 MG Oral Tab, Take 1 tablet (10 mg total) by mouth 3 (three) times daily as needed for Muscle spasms. , Disp: 40 tablet, Rfl: 0, Past M Improves with albuterol.        Past Surgical History:   Procedure Laterality Date   • APPENDECTOMY  1980   • BACK SURGERY  2009    fusion L5-S1   • BACK SURGERY  03/03/2021    RIGHT LUMBAR 3/ LUMBAR 4, LUMBAR 4/ LUMBAR 5 HEMILAMINTOMIES, LEFT LUMBAR 2 / YUNG status verified and patient meets guidelines. Post-procedure pain management plan discussed with surgeon and patient.       Plan/risks discussed with: patient and child/children                Present on Admission:  **None**

## 2021-12-23 NOTE — OPERATIVE REPORT
[address]                                                         Operative Note    Sahara Galloway Location: [place]  Perioperative   CSN [id number] MRN [id number]   Admission Date 12/23/2021 Procedure Date 12/23/2021   Attending Physician Jayant Jarvis condition          Estevan Mortimer, MD  12/23/2021  8:58 AM

## 2021-12-23 NOTE — ANESTHESIA PREPROCEDURE EVALUATION
PRE-OP EVALUATION    Patient Name: Keiry Patterson    Admit Diagnosis: Primary adenocarcinoma of lung, [facility] [C34.90]    Pre-op Diagnosis: Primary adenocarcinoma of lung, unspecified laterality ([facility] [C34.90]    INSERTION PORT-A-C Shortness of Breath., Disp: 1 Inhaler, Rfl: 3, 12/22/2021 at 1600  Vitamin D3, Cholecalciferol, 125 MCG (5000 UT) Oral Cap, Take 5,000 Units by mouth daily. , Disp: , Rfl: 0, 12/22/2021 at 0800  amphetamine-dextroamphetamine 30 MG Oral Tab, Take 1 tablet (3 wine per week      Comment: three times per week      Drug use:    Types: Cannabis   Comment: CBD 14/1 + THC 0.5% tablet once daily , USED COCAINE BETW 4889-9305     Available pre-op labs reviewed.   Lab Results   Component Value Date    WBC 7.50 12/20/2021

## 2021-12-23 NOTE — ANESTHESIA POSTPROCEDURE EVALUATION
[facility] [place]:  [facility]   Age/Gender 49year old female MRN [id number]   Location [address] [place] [address] Attending Mark Dubon MD   [facility] Day # 0 PCP DO Johnny Kline

## 2021-12-23 NOTE — INTERVAL H&P NOTE
Pre-op Diagnosis: Primary adenocarcinoma of lung, unspecified laterality ([facility] [C34.90]    The above referenced H&P was reviewed by Meggan Baumann MD on 12/23/2021, the patient was examined and no significant changes have occurred in the patient's conditio

## 2021-12-27 ENCOUNTER — PATIENT MESSAGE (OUTPATIENT)
Dept: SURGERY | Facility: CLINIC | Age: 51
End: 2021-12-27

## 2021-12-27 NOTE — TELEPHONE ENCOUNTER
From: Pernell De Luna  To: Doni Donnelly MD  Sent: 12/27/2021 1:01 PM CST  Subject: Follow up    Hello- I wanted to let you know that I have been diagnosed with advanced lung cancer. This is why I have not come in for my follow up visits.  I have b

## 2021-12-31 NOTE — TELEPHONE ENCOUNTER
Called pt    Discussed her cancer  She starts treatment soon  Wished her the best  She will call if she needs anything    She was appreciative of the call

## 2022-01-02 ENCOUNTER — HOSPITAL ENCOUNTER (EMERGENCY)
Facility: HOSPITAL | Age: 52
Discharge: LEFT BEFORE DISCHARGE | End: 2022-01-02
Payer: MEDICAID

## 2022-01-04 ENCOUNTER — TELEPHONE (OUTPATIENT)
Dept: HEMATOLOGY/ONCOLOGY | Facility: HOSPITAL | Age: 52
End: 2022-01-04

## 2022-01-04 NOTE — TELEPHONE ENCOUNTER
Spoke with [facility] Pharmacist Nathalia Poe) , who wants Shadia Tapia to know that we do not provide Aloxi and see if MD wants to substitute with Zofran 16 mg for this patient's treatment plan.  Routing to MD for approval

## 2022-01-18 ENCOUNTER — OFFICE VISIT (OUTPATIENT)
Dept: HEMATOLOGY/ONCOLOGY | Facility: HOSPITAL | Age: 52
End: 2022-01-18
Attending: INTERNAL MEDICINE
Payer: MEDICAID

## 2022-01-18 VITALS
WEIGHT: 185.38 LBS | OXYGEN SATURATION: 98 % | HEART RATE: 107 BPM | BODY MASS INDEX: 31.65 KG/M2 | TEMPERATURE: 97 F | RESPIRATION RATE: 18 BRPM | HEIGHT: 64.33 IN | SYSTOLIC BLOOD PRESSURE: 125 MMHG | DIASTOLIC BLOOD PRESSURE: 91 MMHG

## 2022-01-18 DIAGNOSIS — R59.0 MEDIASTINAL ADENOPATHY: ICD-10-CM

## 2022-01-18 DIAGNOSIS — C34.91 PRIMARY ADENOCARCINOMA OF RIGHT LUNG (HCC): Primary | ICD-10-CM

## 2022-01-18 PROCEDURE — 96372 THER/PROPH/DIAG INJ SC/IM: CPT

## 2022-01-18 PROCEDURE — 96376 TX/PRO/DX INJ SAME DRUG ADON: CPT

## 2022-01-18 PROCEDURE — 96413 CHEMO IV INFUSION 1 HR: CPT

## 2022-01-18 PROCEDURE — 96375 TX/PRO/DX INJ NEW DRUG ADDON: CPT

## 2022-01-18 PROCEDURE — 96367 TX/PROPH/DG ADDL SEQ IV INF: CPT

## 2022-01-18 PROCEDURE — 96366 THER/PROPH/DIAG IV INF ADDON: CPT

## 2022-01-18 PROCEDURE — 96411 CHEMO IV PUSH ADDL DRUG: CPT

## 2022-01-18 RX ORDER — CYANOCOBALAMIN 1000 UG/ML
1000 INJECTION INTRAMUSCULAR; SUBCUTANEOUS ONCE
Status: COMPLETED | OUTPATIENT
Start: 2022-01-18 | End: 2022-01-18

## 2022-01-18 RX ADMIN — CYANOCOBALAMIN 1000 MCG: 1000 INJECTION INTRAMUSCULAR; SUBCUTANEOUS at 10:33:00

## 2022-01-18 NOTE — PROGRESS NOTES
Pt here for C1D1 Cisplatin/Alimta for Lung CA.   Arrives Ambulating independently, accompanied by Self and Family member           Pregnancy screening: Not applicable    Modifications in dose or schedule: No     Frequency of blood return and site check thro

## 2022-01-19 ENCOUNTER — APPOINTMENT (OUTPATIENT)
Dept: GENERAL RADIOLOGY | Facility: HOSPITAL | Age: 52
End: 2022-01-19
Attending: EMERGENCY MEDICINE
Payer: MEDICAID

## 2022-01-19 ENCOUNTER — HOSPITAL ENCOUNTER (EMERGENCY)
Facility: HOSPITAL | Age: 52
Discharge: HOME | End: 2022-01-19
Attending: EMERGENCY MEDICINE
Payer: MEDICAID

## 2022-01-19 VITALS
OXYGEN SATURATION: 97 % | DIASTOLIC BLOOD PRESSURE: 66 MMHG | SYSTOLIC BLOOD PRESSURE: 107 MMHG | HEART RATE: 106 BPM | BODY MASS INDEX: 30.82 KG/M2 | HEIGHT: 65 IN | TEMPERATURE: 98 F | RESPIRATION RATE: 16 BRPM | WEIGHT: 185 LBS

## 2022-01-19 DIAGNOSIS — T50.905A ADVERSE EFFECT OF DRUG, INITIAL ENCOUNTER: Primary | ICD-10-CM

## 2022-01-19 LAB
ALBUMIN SERPL-MCNC: 3.6 G/DL (ref 3.4–5)
ALBUMIN/GLOB SERPL: 1.1 {RATIO} (ref 1–2)
ALP LIVER SERPL-CCNC: 74 U/L
ALT SERPL-CCNC: 30 U/L
ANION GAP SERPL CALC-SCNC: 7 MMOL/L (ref 0–18)
AST SERPL-CCNC: 17 U/L (ref 15–37)
BASOPHILS # BLD AUTO: 0.02 X10(3) UL (ref 0–0.2)
BASOPHILS NFR BLD AUTO: 0.1 %
BILIRUB SERPL-MCNC: 0.3 MG/DL (ref 0.1–2)
BUN BLD-MCNC: 18 MG/DL (ref 7–18)
CALCIUM BLD-MCNC: 9.3 MG/DL (ref 8.5–10.1)
CHLORIDE SERPL-SCNC: 105 MMOL/L (ref 98–112)
CO2 SERPL-SCNC: 25 MMOL/L (ref 21–32)
CREAT BLD-MCNC: 1.05 MG/DL
D DIMER PPP FEU-MCNC: 0.28 UG/ML FEU (ref ?–0.5)
EOSINOPHIL # BLD AUTO: 0 X10(3) UL (ref 0–0.7)
EOSINOPHIL NFR BLD AUTO: 0 %
ERYTHROCYTE [DISTWIDTH] IN BLOOD BY AUTOMATED COUNT: 14.5 %
GLOBULIN PLAS-MCNC: 3.3 G/DL (ref 2.8–4.4)
GLUCOSE BLD-MCNC: 209 MG/DL (ref 70–99)
HCT VFR BLD AUTO: 33.4 %
HGB BLD-MCNC: 10.8 G/DL
IMM GRANULOCYTES # BLD AUTO: 0.14 X10(3) UL (ref 0–1)
IMM GRANULOCYTES NFR BLD: 0.9 %
LYMPHOCYTES # BLD AUTO: 0.46 X10(3) UL (ref 1–4)
LYMPHOCYTES NFR BLD AUTO: 2.8 %
MCH RBC QN AUTO: 26.5 PG (ref 26–34)
MCHC RBC AUTO-ENTMCNC: 32.3 G/DL (ref 31–37)
MCV RBC AUTO: 82.1 FL
MONOCYTES # BLD AUTO: 0.52 X10(3) UL (ref 0.1–1)
MONOCYTES NFR BLD AUTO: 3.2 %
NEUTROPHILS # BLD AUTO: 15.01 X10 (3) UL (ref 1.5–7.7)
NEUTROPHILS # BLD AUTO: 15.01 X10(3) UL (ref 1.5–7.7)
NEUTROPHILS NFR BLD AUTO: 93 %
OSMOLALITY SERPL CALC.SUM OF ELEC: 292 MOSM/KG (ref 275–295)
PLATELET # BLD AUTO: 328 10(3)UL (ref 150–450)
POTASSIUM SERPL-SCNC: 4 MMOL/L (ref 3.5–5.1)
PROT SERPL-MCNC: 6.9 G/DL (ref 6.4–8.2)
RBC # BLD AUTO: 4.07 X10(6)UL
SARS-COV-2 RNA RESP QL NAA+PROBE: NOT DETECTED
SODIUM SERPL-SCNC: 137 MMOL/L (ref 136–145)
T3FREE SERPL-MCNC: 2.1 PG/ML (ref 2.4–4.2)
T4 FREE SERPL-MCNC: 1 NG/DL (ref 0.8–1.7)
TROPONIN I HIGH SENSITIVITY: 7 NG/L
TSI SER-ACNC: 0.09 MIU/ML (ref 0.36–3.74)
WBC # BLD AUTO: 16.2 X10(3) UL (ref 4–11)

## 2022-01-19 PROCEDURE — 93005 ELECTROCARDIOGRAM TRACING: CPT

## 2022-01-19 PROCEDURE — 99285 EMERGENCY DEPT VISIT HI MDM: CPT

## 2022-01-19 PROCEDURE — 71045 X-RAY EXAM CHEST 1 VIEW: CPT | Performed by: EMERGENCY MEDICINE

## 2022-01-19 PROCEDURE — 84481 FREE ASSAY (FT-3): CPT | Performed by: EMERGENCY MEDICINE

## 2022-01-19 PROCEDURE — 84443 ASSAY THYROID STIM HORMONE: CPT | Performed by: EMERGENCY MEDICINE

## 2022-01-19 PROCEDURE — 85379 FIBRIN DEGRADATION QUANT: CPT | Performed by: EMERGENCY MEDICINE

## 2022-01-19 PROCEDURE — 96361 HYDRATE IV INFUSION ADD-ON: CPT

## 2022-01-19 PROCEDURE — 84484 ASSAY OF TROPONIN QUANT: CPT | Performed by: EMERGENCY MEDICINE

## 2022-01-19 PROCEDURE — 96374 THER/PROPH/DIAG INJ IV PUSH: CPT

## 2022-01-19 PROCEDURE — 85025 COMPLETE CBC W/AUTO DIFF WBC: CPT | Performed by: EMERGENCY MEDICINE

## 2022-01-19 PROCEDURE — 80053 COMPREHEN METABOLIC PANEL: CPT | Performed by: EMERGENCY MEDICINE

## 2022-01-19 PROCEDURE — 84439 ASSAY OF FREE THYROXINE: CPT | Performed by: EMERGENCY MEDICINE

## 2022-01-19 PROCEDURE — 93010 ELECTROCARDIOGRAM REPORT: CPT

## 2022-01-19 RX ORDER — ONDANSETRON 2 MG/ML
4 INJECTION INTRAMUSCULAR; INTRAVENOUS ONCE
Status: COMPLETED | OUTPATIENT
Start: 2022-01-19 | End: 2022-01-19

## 2022-01-19 RX ORDER — SODIUM CHLORIDE 9 MG/ML
INJECTION, SOLUTION INTRAVENOUS CONTINUOUS
Status: DISCONTINUED | OUTPATIENT
Start: 2022-01-19 | End: 2022-01-19

## 2022-01-19 NOTE — ED PROVIDER NOTES
Patient Seen in: [facility] Emergency Department      History   Patient presents with:  Arrythmia/Palpitations  Difficulty Breathing    Stated Complaint: fast hr, charles    Subjective:   HPI    52-year-old female who has a history of having lung cancer noted without tenderness  Neuro: No focal deficits noted    ED Course     Labs Reviewed   COMP METABOLIC PANEL (14) - Abnormal; Notable for the following components:       Result Value    Glucose 209 (*)     Creatinine 1.05 (*)     All other components wit mL DISCARDED CONTRAST: 2 mL. COMPARISON: MR brain, 1/11/18. FINDINGS: DWI: There is no restricted diffusion to suggest acute ischemia/infarct. Extra axial spaces: Normal in size and morphology for the patient's age.  Hemorrhage: No MR evidence of hemorrhage territorial acute ischemia. Clinicians: If you have any questions or concerns regarding the study or report, contact the interpreting radiologist Tosin Bridges MD directly at extension [phone].   Speech recognition software was used for this report, which can XR OR FLUORO GUIDE FOR CV DEVICE (CPT=77001)    Result Date: 12/23/2021  PROCEDURE:  XR OR FLUORO GUIDE FOR CV DEVICE (CPT=77001)  INDICATIONS: X-ray imaging assistance, and static imaging provided by the technologist in the OR for the purposes of oper treatment options and Physician follow up plan was discussed. The patient is discharged in good condition.                                  Disposition and Plan     Clinical Impression:  Adverse effect of drug, initial encounter  (primary encounter di

## 2022-01-20 LAB
ATRIAL RATE: 102 BPM
P AXIS: 74 DEGREES
P-R INTERVAL: 160 MS
Q-T INTERVAL: 358 MS
QRS DURATION: 78 MS
QTC CALCULATION (BEZET): 466 MS
R AXIS: 63 DEGREES
T AXIS: 46 DEGREES
VENTRICULAR RATE: 102 BPM

## 2022-02-01 PROBLEM — C34.91 PRIMARY LUNG CANCER WITH METASTASIS FROM LUNG TO OTHER SITE, RIGHT (HCC): Status: ACTIVE | Noted: 2022-02-01

## 2022-02-01 PROBLEM — Z91.89 PERSONAL HISTORY OF POISONING, PRESENTING HAZARDS TO HEALTH: Status: ACTIVE | Noted: 2022-02-01

## 2022-02-08 ENCOUNTER — OFFICE VISIT (OUTPATIENT)
Dept: HEMATOLOGY/ONCOLOGY | Facility: HOSPITAL | Age: 52
End: 2022-02-08
Attending: INTERNAL MEDICINE
Payer: MEDICAID

## 2022-02-08 VITALS
HEART RATE: 109 BPM | RESPIRATION RATE: 18 BRPM | SYSTOLIC BLOOD PRESSURE: 116 MMHG | DIASTOLIC BLOOD PRESSURE: 78 MMHG | OXYGEN SATURATION: 97 % | BODY MASS INDEX: 31.21 KG/M2 | HEIGHT: 64.33 IN | WEIGHT: 182.81 LBS | TEMPERATURE: 98 F

## 2022-02-08 DIAGNOSIS — R59.0 MEDIASTINAL ADENOPATHY: ICD-10-CM

## 2022-02-08 DIAGNOSIS — C34.91 PRIMARY ADENOCARCINOMA OF RIGHT LUNG (HCC): Primary | ICD-10-CM

## 2022-02-08 LAB
BASOPHILS # BLD AUTO: 0.02 X10(3) UL (ref 0–0.2)
BASOPHILS NFR BLD AUTO: 0.8 %
EOSINOPHIL # BLD AUTO: 0.08 X10(3) UL (ref 0–0.7)
EOSINOPHIL NFR BLD AUTO: 3.2 %
ERYTHROCYTE [DISTWIDTH] IN BLOOD BY AUTOMATED COUNT: 14.6 %
HCT VFR BLD AUTO: 33.7 %
HGB BLD-MCNC: 11 G/DL
IMM GRANULOCYTES # BLD AUTO: 0.02 X10(3) UL (ref 0–1)
IMM GRANULOCYTES NFR BLD: 0.8 %
LYMPHOCYTES # BLD AUTO: 0.48 X10(3) UL (ref 1–4)
LYMPHOCYTES NFR BLD AUTO: 19 %
MCH RBC QN AUTO: 27.6 PG (ref 26–34)
MCHC RBC AUTO-ENTMCNC: 32.6 G/DL (ref 31–37)
MCV RBC AUTO: 84.7 FL
MONOCYTES # BLD AUTO: 0.52 X10(3) UL (ref 0.1–1)
MONOCYTES NFR BLD AUTO: 20.6 %
NEUTROPHILS # BLD AUTO: 1.4 X10 (3) UL (ref 1.5–7.7)
NEUTROPHILS # BLD AUTO: 1.4 X10(3) UL (ref 1.5–7.7)
NEUTROPHILS NFR BLD AUTO: 55.6 %
PLATELET # BLD AUTO: 395 10(3)UL (ref 150–450)
RBC # BLD AUTO: 3.98 X10(6)UL
WBC # BLD AUTO: 2.5 X10(3) UL (ref 4–11)

## 2022-02-08 PROCEDURE — 96411 CHEMO IV PUSH ADDL DRUG: CPT

## 2022-02-08 PROCEDURE — 96367 TX/PROPH/DG ADDL SEQ IV INF: CPT

## 2022-02-08 PROCEDURE — 85025 COMPLETE CBC W/AUTO DIFF WBC: CPT

## 2022-02-08 PROCEDURE — 96366 THER/PROPH/DIAG IV INF ADDON: CPT

## 2022-02-08 PROCEDURE — 96413 CHEMO IV INFUSION 1 HR: CPT

## 2022-02-08 PROCEDURE — 96375 TX/PRO/DX INJ NEW DRUG ADDON: CPT

## 2022-02-08 PROCEDURE — 96376 TX/PRO/DX INJ SAME DRUG ADON: CPT

## 2022-02-08 NOTE — PROGRESS NOTES
Cbc from 2-7-22 not completed. Repeat cbc drawn prior to treating today and labs within treatment parameters from dr schaffer's 2-1-22 office note. Pt here for C2D1.   Arrives Ambulating independently, accompanied by Self           Pregnancy screening: Denies possibility of pregnancy    Modifications in dose or schedule: No     Frequency of blood return and site check throughout administration: Prior to administration and At completion of therapy   Discharged to Home, Ambulating independently, accompanied by:Self    Outpatient Oncology Care Plan  Problem list:  fatigue  nausea and vomiting  Problems related to:    chemotherapy  disease/disease progression  side effect of treatment  Interventions:  monitor effect of therapy  monitor effect of nausea/vomiting management  monitor lab values  promoted rest  provided general teaching  Expected outcomes:  adequate sleep/rest  optimal lab values  symptoms relieved/minimized  understands plan of care  Progress towards outcome:  making progress    Education Record    Learner:  Patient  Barriers / Limitations:  None  Method:  Brief focused  Outcome:  Shows understanding  Comments:

## 2022-02-14 ENCOUNTER — HOSPITAL ENCOUNTER (EMERGENCY)
Facility: HOSPITAL | Age: 52
Discharge: LEFT BEFORE DISCHARGE | End: 2022-02-14
Payer: MEDICAID

## 2022-02-14 VITALS
BODY MASS INDEX: 29.16 KG/M2 | WEIGHT: 175 LBS | TEMPERATURE: 97 F | SYSTOLIC BLOOD PRESSURE: 110 MMHG | HEART RATE: 96 BPM | HEIGHT: 65 IN | DIASTOLIC BLOOD PRESSURE: 73 MMHG | RESPIRATION RATE: 16 BRPM | OXYGEN SATURATION: 99 %

## 2022-02-14 NOTE — ED INITIAL ASSESSMENT (HPI)
PT PRESENTS TO ED WITH NAUSEA, VOMITING AND DIARRHEA SINCE LAST CHEMO TREATMENT WHICH WAS 6 DAYS AGO, COMPLAINING OF DIZZINESS ALSO. PT DENIES FEVERS.

## 2022-02-15 PROBLEM — Z91.89 AT RISK FOR DEHYDRATION: Status: ACTIVE | Noted: 2022-02-15

## 2022-02-23 ENCOUNTER — GENETICS ENCOUNTER (OUTPATIENT)
Dept: GENETICS | Facility: HOSPITAL | Age: 52
End: 2022-02-23
Payer: MEDICAID

## 2022-02-23 ENCOUNTER — NURSE ONLY (OUTPATIENT)
Dept: HEMATOLOGY/ONCOLOGY | Facility: HOSPITAL | Age: 52
End: 2022-02-23
Payer: MEDICAID

## 2022-02-23 DIAGNOSIS — Z80.1 FAMILY HISTORY OF LUNG CANCER: ICD-10-CM

## 2022-02-23 DIAGNOSIS — C34.91 PRIMARY ADENOCARCINOMA OF RIGHT LUNG (HCC): Primary | ICD-10-CM

## 2022-02-23 DIAGNOSIS — Z80.3 FAMILY HISTORY OF BREAST CANCER: ICD-10-CM

## 2022-02-23 DIAGNOSIS — Z80.41 FAMILY HISTORY OF OVARIAN CANCER: ICD-10-CM

## 2022-02-23 DIAGNOSIS — Z80.0 FAMILY HISTORY OF PANCREATIC CANCER: ICD-10-CM

## 2022-02-23 DIAGNOSIS — Z80.9 FAMILY HISTORY OF CANCER: ICD-10-CM

## 2022-02-23 PROCEDURE — 96040 HC GENETIC COUNSELING EA 30 MIN: CPT

## 2022-02-23 PROCEDURE — 36415 COLL VENOUS BLD VENIPUNCTURE: CPT

## 2022-03-01 ENCOUNTER — APPOINTMENT (OUTPATIENT)
Dept: HEMATOLOGY/ONCOLOGY | Facility: HOSPITAL | Age: 52
End: 2022-03-01
Payer: MEDICAID

## 2022-03-03 ENCOUNTER — OFFICE VISIT (OUTPATIENT)
Dept: HEMATOLOGY/ONCOLOGY | Facility: HOSPITAL | Age: 52
End: 2022-03-03
Payer: MEDICAID

## 2022-03-03 VITALS
RESPIRATION RATE: 18 BRPM | DIASTOLIC BLOOD PRESSURE: 72 MMHG | HEART RATE: 118 BPM | HEIGHT: 64.33 IN | WEIGHT: 183.19 LBS | BODY MASS INDEX: 31.27 KG/M2 | SYSTOLIC BLOOD PRESSURE: 111 MMHG | TEMPERATURE: 98 F | OXYGEN SATURATION: 98 %

## 2022-03-03 DIAGNOSIS — Z91.89 PERSONAL HISTORY OF POISONING, PRESENTING HAZARDS TO HEALTH: ICD-10-CM

## 2022-03-03 DIAGNOSIS — C34.91 PRIMARY LUNG CANCER WITH METASTASIS FROM LUNG TO OTHER SITE, RIGHT (HCC): Primary | ICD-10-CM

## 2022-03-03 PROCEDURE — 96360 HYDRATION IV INFUSION INIT: CPT

## 2022-03-03 NOTE — PROGRESS NOTES
Education Record    Learner:  Patient    Disease / Diagnosis:IVF    Barriers / Limitations:  None   Comments:    Method:  Discussion   Comments:    General Topics:  Plan of care reviewed   Comments:    Outcome:  Shows understanding   Comments:

## 2022-03-07 ENCOUNTER — GENETICS ENCOUNTER (OUTPATIENT)
Dept: HEMATOLOGY/ONCOLOGY | Facility: HOSPITAL | Age: 52
End: 2022-03-07

## 2022-03-07 NOTE — PROGRESS NOTES
Patient Name: Hector Brown  YOB: 1970    Referring Provider:  Godwin Martini MD    Reason for Referral:  Ms. Tarun Vazquez had genetic testing performed on 2/23/2022 because of a diagnosis of lung cancer at age 47y and a family history of pancreatic, breast, ovarian, and lung cancer. Genetic Testing Result:  Negative for pathogenic variants. One variant of uncertain significance identified. No pathogenic variant was found in the following 93 genes on the breast and gyn cancers panel and multi-cancers panel: ABRAXAS1, AIP, AKT1, ALK, APC*, KENNETH*, AXIN2, BAP1, BARD1, BLM, BMPR1A, BRCA1, BRCA2, BRIP1, CASR, CDC73, CDH1, CDK4, CDKN1B, CDKN1C, CDKN2A (p14ARF), CDKN2A (p99OWE2c), CEBPA, CHEK2, CTNNA1, DICER1*, DIS3L2, EGFR, EPCAM*, FANCC, FANCM, FH*, FLCN, GATA2, GPC3*, GREM1*, HOXB13, HRAS, KIT, MAX*, MEN1*, MET*, MITF*, MLH1*, MRE11, MSH2*, MSH3*, MSH6*, MUTYH, NBN, NF1*, NF2, NTHL1, PALB2, PDGFRA, PHOX2B*, PIK3CA, PMS2*, POLD1*, POLE, POT1, MUVAJ6M, PTCH1, PTEN*, RAD50, RAD51C, RAD51D, RB1*, RECQL*, RECQL4*, RET, RINT1, RUNX1, SDHA*, SDHAF2, SDHB, SDHC*, SDHD, SMAD4, SMARCA4, SMARCB1, SMARCE1, STK11, SUFU, TERC, TERT, UDYD255, TP53, TSC1*, TSC2, VHL, WRN*, WT1, XRCC2. A variant of uncertain significance, EGFR c.1532C>A (p.Hkn349Qsn), was identified. Please refer to the report from [facility] for additional testing information. These results were discussed with Ms. Tarun Vazquez via telephone on 3/7/2022. Summary and Plan:   These results indicate it is unlikely that Ms. Tarun Vazquez has a pathogenic variant (harmful genetic mutation) in any of the genes listed above. No pathogenic variants associated with hereditary cancer syndromes were identified. The etiology Ms. Idell Goldmann personal and family history cancer remains unexplained. Ms. 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  A variant of uncertain significance (VUS) means that a mutation has been identified in a cancer susceptibility gene; however, it is currently uncertain if the mutation is pathogenic (harmful) or benign (harmless). With time, the mutation may be reclassified as either harmful or harmless. No changes in management or testing of family members is recommended based on a VUS result. The limitations of the testing were discussed with Ms. Latasha Buckley including the chance that a pathogenic variant in a gene other than those included in this analysis might be the cause of cancer in Ms. Latasha Buckley or in relatives. Management and surveillance for Ms. Latasha Buckley and other family members should be based on their personal and family history, with screening for cancers beginning 10 years younger than the earliest age at diagnosis if it would push screening to start at an earlier age than recommended for the general population. All medical management decisions should be made with a physician. Presently, there are multiple models available to calculate risks for an individual to develop breast cancer over their lifetime. Each model takes into consideration different factors. Based on the personal health and pedigree information provided at this point in time, Ms. Latasha Buckley would be assessed a lifetime risk of 14.3% (Tyrer-Cuzick OTTO V.8.b) to develop breast cancer (compared to a 9.2% lifetime risk for the average woman). NCCN and the [address] recommends high-risk individuals with a lifetime breast cancer risk >20% receive annual screening breast MRI in addition to annual screening mammography, regardless of breast density. If the patient is unable to receive MRI, then supplemental screening with ultrasound or MBI in addition to routine mammography may be appropriate. It is important to note that the estimation of breast cancer risk changes over time. It may increase or  over time based on age and changes in the personal and/or family history.      I encouraged Ms. Latasha Buckley to share her genetic test results with her children and other relatives so that they may discuss the implications of this information with their health providers. Ms. Joey Nava is also encouraged to contact me on an annual basis to learn if there have been any updates in genetic testing that would apply, changes in the personal and/or family history, or changes in the classification of the EGFR VUS. Please do not hesitate to contact my office if you have any questions or concerns, [phone].      Shahla Escamilla, MS, CGC

## 2022-03-08 ENCOUNTER — OFFICE VISIT (OUTPATIENT)
Dept: HEMATOLOGY/ONCOLOGY | Facility: HOSPITAL | Age: 52
End: 2022-03-08
Payer: MEDICAID

## 2022-03-08 VITALS
HEART RATE: 113 BPM | TEMPERATURE: 98 F | SYSTOLIC BLOOD PRESSURE: 117 MMHG | WEIGHT: 180.81 LBS | DIASTOLIC BLOOD PRESSURE: 77 MMHG | OXYGEN SATURATION: 100 % | HEIGHT: 64.33 IN | BODY MASS INDEX: 30.87 KG/M2 | RESPIRATION RATE: 16 BRPM

## 2022-03-08 DIAGNOSIS — R59.0 MEDIASTINAL ADENOPATHY: ICD-10-CM

## 2022-03-08 DIAGNOSIS — C34.91 PRIMARY ADENOCARCINOMA OF RIGHT LUNG (HCC): Primary | ICD-10-CM

## 2022-03-08 PROCEDURE — 96376 TX/PRO/DX INJ SAME DRUG ADON: CPT

## 2022-03-08 PROCEDURE — 96413 CHEMO IV INFUSION 1 HR: CPT

## 2022-03-08 PROCEDURE — 96411 CHEMO IV PUSH ADDL DRUG: CPT

## 2022-03-08 PROCEDURE — 96372 THER/PROPH/DIAG INJ SC/IM: CPT

## 2022-03-08 PROCEDURE — 96366 THER/PROPH/DIAG IV INF ADDON: CPT

## 2022-03-08 PROCEDURE — 96375 TX/PRO/DX INJ NEW DRUG ADDON: CPT

## 2022-03-08 PROCEDURE — 96367 TX/PROPH/DG ADDL SEQ IV INF: CPT

## 2022-03-08 PROCEDURE — 96377 APPLICATON ON-BODY INJECTOR: CPT

## 2022-03-08 RX ORDER — CYANOCOBALAMIN 1000 UG/ML
1000 INJECTION INTRAMUSCULAR; SUBCUTANEOUS ONCE
Status: COMPLETED | OUTPATIENT
Start: 2022-03-08 | End: 2022-03-08

## 2022-03-08 RX ADMIN — CYANOCOBALAMIN 1000 MCG: 1000 INJECTION INTRAMUSCULAR; SUBCUTANEOUS at 12:49:00

## 2022-03-08 NOTE — PROGRESS NOTES
03/08/22 1329   Clinical Encounter Type   Visited With Patient and family together  ( at bedside)   Routine Visit Introduction   Continue Visiting No  (Pt refused ministry)

## 2022-03-08 NOTE — PROGRESS NOTES
Pt here for C3D1. Arrives Ambulating independently, accompanied by Self and Family member           Pregnancy screening: Not applicable    Modifications in dose or schedule: No     Frequency of blood return and site check throughout administration: Prior to administration and At completion of therapy   Discharged to Home, Ambulating independently, accompanied by:Self and Family member    Outpatient Oncology Care Plan  Problem list:  fatigue  knowledge deficit  nausea and vomiting  Problems related to:    chemotherapy  Interventions:  patient/family supported  provided general teaching  Expected outcomes:  understands plan of care  Progress towards outcome:  making progress    Education Record    Learner:  Patient  Barriers / Limitations:  None  Method:  Discussion and Reinforcement  Outcome:  Shows understanding  Comments:  Pt tolerated infusion. Pt stated she did feel a little more nauseous than previous infusions. Pt did also report fatigue. Pt stated she was due for B12 injection. RN confirmed with Dr. Robin Duffy that it was okay to administer B12 today.

## 2022-03-10 ENCOUNTER — OFFICE VISIT (OUTPATIENT)
Dept: HEMATOLOGY/ONCOLOGY | Facility: HOSPITAL | Age: 52
End: 2022-03-10
Payer: MEDICAID

## 2022-03-10 VITALS
RESPIRATION RATE: 18 BRPM | TEMPERATURE: 99 F | WEIGHT: 186 LBS | DIASTOLIC BLOOD PRESSURE: 67 MMHG | SYSTOLIC BLOOD PRESSURE: 118 MMHG | HEART RATE: 107 BPM | OXYGEN SATURATION: 98 % | BODY MASS INDEX: 32 KG/M2

## 2022-03-10 DIAGNOSIS — C34.91 PRIMARY LUNG CANCER WITH METASTASIS FROM LUNG TO OTHER SITE, RIGHT (HCC): Primary | ICD-10-CM

## 2022-03-10 DIAGNOSIS — Z91.89 PERSONAL HISTORY OF POISONING, PRESENTING HAZARDS TO HEALTH: ICD-10-CM

## 2022-03-10 PROCEDURE — 96360 HYDRATION IV INFUSION INIT: CPT

## 2022-03-10 NOTE — PROGRESS NOTES
Education Record    Learner:  Patient    Disease / Diagnosis: Pt here for IVF. Barriers / Limitations:  None    Method:  Brief focused, printed material and  reinforcement    General Topics:  Plan of care reviewed    Outcome:  Shows understanding    Pt stated she had a rash on her back.  RN told her to report to oncologist.

## 2022-03-17 RX ORDER — METOCLOPRAMIDE 10 MG/1
10 TABLET ORAL 3 TIMES DAILY PRN
Qty: 20 TABLET | Refills: 0 | Status: SHIPPED | OUTPATIENT
Start: 2022-03-17

## 2022-03-17 RX ORDER — METOCLOPRAMIDE 10 MG/1
10 TABLET ORAL 3 TIMES DAILY PRN
Qty: 20 TABLET | Refills: 0 | Status: SHIPPED | OUTPATIENT
Start: 2022-03-17 | End: 2022-03-17 | Stop reason: SDUPTHER

## 2022-03-22 ENCOUNTER — HOSPITAL ENCOUNTER (OUTPATIENT)
Dept: CV DIAGNOSTICS | Facility: HOSPITAL | Age: 52
Discharge: HOME | End: 2022-03-22
Attending: INTERNAL MEDICINE
Payer: MEDICAID

## 2022-03-22 DIAGNOSIS — R00.0 TACHYCARDIA: ICD-10-CM

## 2022-03-22 PROCEDURE — 93306 TTE W/DOPPLER COMPLETE: CPT | Performed by: INTERNAL MEDICINE

## 2022-03-25 ENCOUNTER — HOSPITAL ENCOUNTER (OUTPATIENT)
Facility: HOSPITAL | Age: 52
Setting detail: OBSERVATION
Discharge: HOME | End: 2022-03-26
Attending: EMERGENCY MEDICINE | Admitting: HOSPITALIST
Payer: MEDICAID

## 2022-03-25 ENCOUNTER — APPOINTMENT (OUTPATIENT)
Dept: GENERAL RADIOLOGY | Facility: HOSPITAL | Age: 52
End: 2022-03-25
Attending: EMERGENCY MEDICINE
Payer: MEDICAID

## 2022-03-25 DIAGNOSIS — R06.00 DYSPNEA, UNSPECIFIED TYPE: Primary | ICD-10-CM

## 2022-03-25 LAB
ALBUMIN SERPL-MCNC: 3.6 G/DL (ref 3.4–5)
ALBUMIN/GLOB SERPL: 1.1 {RATIO} (ref 1–2)
ALP LIVER SERPL-CCNC: 94 U/L
ALT SERPL-CCNC: 21 U/L
ANION GAP SERPL CALC-SCNC: 5 MMOL/L (ref 0–18)
AST SERPL-CCNC: 17 U/L (ref 15–37)
BASOPHILS # BLD AUTO: 0.04 X10(3) UL (ref 0–0.2)
BASOPHILS NFR BLD AUTO: 0.7 %
BILIRUB SERPL-MCNC: 0.2 MG/DL (ref 0.1–2)
BILIRUB UR QL STRIP.AUTO: NEGATIVE
BUN BLD-MCNC: 19 MG/DL (ref 7–18)
CALCIUM BLD-MCNC: 9.4 MG/DL (ref 8.5–10.1)
CHLORIDE SERPL-SCNC: 105 MMOL/L (ref 98–112)
CO2 SERPL-SCNC: 27 MMOL/L (ref 21–32)
COLOR UR AUTO: YELLOW
CREAT BLD-MCNC: 0.79 MG/DL
EOSINOPHIL # BLD AUTO: 0.05 X10(3) UL (ref 0–0.7)
EOSINOPHIL NFR BLD AUTO: 0.9 %
ERYTHROCYTE [DISTWIDTH] IN BLOOD BY AUTOMATED COUNT: 17.2 %
GLOBULIN PLAS-MCNC: 3.2 G/DL (ref 2.8–4.4)
GLUCOSE BLD-MCNC: 107 MG/DL (ref 70–99)
GLUCOSE UR STRIP.AUTO-MCNC: NEGATIVE MG/DL
HCT VFR BLD AUTO: 29.8 %
HGB BLD-MCNC: 10.5 G/DL
IMM GRANULOCYTES # BLD AUTO: 0.05 X10(3) UL (ref 0–1)
IMM GRANULOCYTES NFR BLD: 0.9 %
KETONES UR STRIP.AUTO-MCNC: NEGATIVE MG/DL
LYMPHOCYTES # BLD AUTO: 0.52 X10(3) UL (ref 1–4)
LYMPHOCYTES NFR BLD AUTO: 9.6 %
MCH RBC QN AUTO: 31 PG (ref 26–34)
MCHC RBC AUTO-ENTMCNC: 35.2 G/DL (ref 31–37)
MCV RBC AUTO: 87.9 FL
MONOCYTES # BLD AUTO: 0.91 X10(3) UL (ref 0.1–1)
MONOCYTES NFR BLD AUTO: 16.8 %
NEUTROPHILS # BLD AUTO: 3.86 X10 (3) UL (ref 1.5–7.7)
NEUTROPHILS # BLD AUTO: 3.86 X10(3) UL (ref 1.5–7.7)
NEUTROPHILS NFR BLD AUTO: 71.1 %
NITRITE UR QL STRIP.AUTO: NEGATIVE
NT-PROBNP SERPL-MCNC: 44 PG/ML (ref ?–125)
OSMOLALITY SERPL CALC.SUM OF ELEC: 287 MOSM/KG (ref 275–295)
PH UR STRIP.AUTO: 5 [PH] (ref 5–8)
PLATELET # BLD AUTO: 310 10(3)UL (ref 150–450)
POTASSIUM SERPL-SCNC: 4 MMOL/L (ref 3.5–5.1)
PROT SERPL-MCNC: 6.8 G/DL (ref 6.4–8.2)
PROT UR STRIP.AUTO-MCNC: NEGATIVE MG/DL
RBC # BLD AUTO: 3.39 X10(6)UL
RBC UR QL AUTO: NEGATIVE
SARS-COV-2 RNA RESP QL NAA+PROBE: NOT DETECTED
SODIUM SERPL-SCNC: 137 MMOL/L (ref 136–145)
SP GR UR STRIP.AUTO: 1.02 (ref 1–1.03)
TROPONIN I HIGH SENSITIVITY: 6 NG/L
UROBILINOGEN UR STRIP.AUTO-MCNC: <2 MG/DL
WBC # BLD AUTO: 5.4 X10(3) UL (ref 4–11)

## 2022-03-25 PROCEDURE — 99285 EMERGENCY DEPT VISIT HI MDM: CPT

## 2022-03-25 PROCEDURE — 87147 CULTURE TYPE IMMUNOLOGIC: CPT | Performed by: EMERGENCY MEDICINE

## 2022-03-25 PROCEDURE — 71045 X-RAY EXAM CHEST 1 VIEW: CPT | Performed by: EMERGENCY MEDICINE

## 2022-03-25 PROCEDURE — 93005 ELECTROCARDIOGRAM TRACING: CPT

## 2022-03-25 PROCEDURE — 84484 ASSAY OF TROPONIN QUANT: CPT | Performed by: EMERGENCY MEDICINE

## 2022-03-25 PROCEDURE — 85025 COMPLETE CBC W/AUTO DIFF WBC: CPT | Performed by: EMERGENCY MEDICINE

## 2022-03-25 PROCEDURE — 36415 COLL VENOUS BLD VENIPUNCTURE: CPT

## 2022-03-25 PROCEDURE — 87086 URINE CULTURE/COLONY COUNT: CPT | Performed by: EMERGENCY MEDICINE

## 2022-03-25 PROCEDURE — 80053 COMPREHEN METABOLIC PANEL: CPT | Performed by: EMERGENCY MEDICINE

## 2022-03-25 PROCEDURE — 93010 ELECTROCARDIOGRAM REPORT: CPT

## 2022-03-25 PROCEDURE — 81001 URINALYSIS AUTO W/SCOPE: CPT | Performed by: EMERGENCY MEDICINE

## 2022-03-25 PROCEDURE — 83880 ASSAY OF NATRIURETIC PEPTIDE: CPT | Performed by: EMERGENCY MEDICINE

## 2022-03-25 RX ORDER — DEXTROAMPHETAMINE SACCHARATE, AMPHETAMINE ASPARTATE, DEXTROAMPHETAMINE SULFATE AND AMPHETAMINE SULFATE 2.5; 2.5; 2.5; 2.5 MG/1; MG/1; MG/1; MG/1
30 TABLET ORAL 2 TIMES DAILY
Status: DISCONTINUED | OUTPATIENT
Start: 2022-03-26 | End: 2022-03-26

## 2022-03-25 RX ORDER — HYDROCODONE BITARTRATE AND ACETAMINOPHEN 10; 325 MG/1; MG/1
1 TABLET ORAL EVERY 8 HOURS PRN
COMMUNITY

## 2022-03-25 RX ORDER — ONDANSETRON 2 MG/ML
4 INJECTION INTRAMUSCULAR; INTRAVENOUS EVERY 4 HOURS PRN
Status: DISCONTINUED | OUTPATIENT
Start: 2022-03-25 | End: 2022-03-25

## 2022-03-25 RX ORDER — ACETAMINOPHEN 325 MG/1
650 TABLET ORAL EVERY 6 HOURS PRN
Status: DISCONTINUED | OUTPATIENT
Start: 2022-03-25 | End: 2022-03-26

## 2022-03-25 RX ORDER — ALBUTEROL SULFATE 90 UG/1
2 AEROSOL, METERED RESPIRATORY (INHALATION) EVERY 4 HOURS PRN
Status: DISCONTINUED | OUTPATIENT
Start: 2022-03-25 | End: 2022-03-26

## 2022-03-25 RX ORDER — HEPARIN SODIUM 5000 [USP'U]/ML
5000 INJECTION, SOLUTION INTRAVENOUS; SUBCUTANEOUS EVERY 8 HOURS SCHEDULED
Status: DISCONTINUED | OUTPATIENT
Start: 2022-03-25 | End: 2022-03-26

## 2022-03-25 RX ORDER — ASPIRIN 81 MG/1
324 TABLET, CHEWABLE ORAL ONCE
Status: COMPLETED | OUTPATIENT
Start: 2022-03-25 | End: 2022-03-25

## 2022-03-25 RX ORDER — HYDROCHLOROTHIAZIDE 12.5 MG/1
12.5 CAPSULE, GELATIN COATED ORAL DAILY
Status: DISCONTINUED | OUTPATIENT
Start: 2022-03-26 | End: 2022-03-26

## 2022-03-25 RX ORDER — SENNOSIDES 8.6 MG
8.6 TABLET ORAL 2 TIMES DAILY
Status: DISCONTINUED | OUTPATIENT
Start: 2022-03-25 | End: 2022-03-26

## 2022-03-25 RX ORDER — DULOXETIN HYDROCHLORIDE 30 MG/1
60 CAPSULE, DELAYED RELEASE ORAL EVERY MORNING
Status: DISCONTINUED | OUTPATIENT
Start: 2022-03-26 | End: 2022-03-26

## 2022-03-25 RX ORDER — LEVOTHYROXINE SODIUM 0.05 MG/1
50 TABLET ORAL
Status: DISCONTINUED | OUTPATIENT
Start: 2022-03-26 | End: 2022-03-26

## 2022-03-25 RX ORDER — LAMOTRIGINE 25 MG/1
50 TABLET ORAL DAILY
Status: DISCONTINUED | OUTPATIENT
Start: 2022-03-26 | End: 2022-03-26

## 2022-03-25 RX ORDER — METOCLOPRAMIDE 10 MG/1
10 TABLET ORAL 3 TIMES DAILY PRN
Status: DISCONTINUED | OUTPATIENT
Start: 2022-03-25 | End: 2022-03-26

## 2022-03-25 RX ORDER — ONDANSETRON 2 MG/ML
4 INJECTION INTRAMUSCULAR; INTRAVENOUS EVERY 6 HOURS PRN
Status: DISCONTINUED | OUTPATIENT
Start: 2022-03-25 | End: 2022-03-26

## 2022-03-25 RX ORDER — MELATONIN
5000 DAILY
Status: DISCONTINUED | OUTPATIENT
Start: 2022-03-26 | End: 2022-03-26

## 2022-03-25 RX ORDER — CYCLOBENZAPRINE HCL 10 MG
10 TABLET ORAL 3 TIMES DAILY PRN
Status: DISCONTINUED | OUTPATIENT
Start: 2022-03-25 | End: 2022-03-26

## 2022-03-25 RX ORDER — FOLIC ACID 1 MG/1
1 TABLET ORAL DAILY
Status: DISCONTINUED | OUTPATIENT
Start: 2022-03-26 | End: 2022-03-26

## 2022-03-25 RX ORDER — LISINOPRIL 10 MG/1
10 TABLET ORAL DAILY
Status: DISCONTINUED | OUTPATIENT
Start: 2022-03-26 | End: 2022-03-26

## 2022-03-25 RX ORDER — LORAZEPAM 0.5 MG/1
0.5 TABLET ORAL EVERY 6 HOURS PRN
Status: DISCONTINUED | OUTPATIENT
Start: 2022-03-25 | End: 2022-03-26

## 2022-03-25 NOTE — ED QUICK NOTES
Orders for admission, patient is aware of plan and ready to go upstairs. Any questions, please call ED RN Kiko Liu at extension #[phone].      Patient Covid vaccination status: Fully vaccinated     COVID Test Ordered in ED: Rapid SARS-CoV-2 by PCR    COVID Suspicion at Admission: N/A    Running Infusions:  None    Mental Status/LOC at time of transport: A&Ox4    Other pertinent information:   CIWA score: N/A   NIH score:  N/A

## 2022-03-25 NOTE — ED INITIAL ASSESSMENT (HPI)
Patient had an echo the other day that showed fluid around her heart. Reports her doctor told her to come in if she felt worse. Today she reports worsening ALMA and an elevated HR.

## 2022-03-25 NOTE — ED QUICK NOTES
Pt with power port right chest wall. Pt has power port card confirming power port. Copy of card placed on record.

## 2022-03-26 ENCOUNTER — APPOINTMENT (OUTPATIENT)
Dept: CV DIAGNOSTICS | Facility: HOSPITAL | Age: 52
End: 2022-03-26
Attending: INTERNAL MEDICINE
Payer: MEDICAID

## 2022-03-26 VITALS
RESPIRATION RATE: 18 BRPM | DIASTOLIC BLOOD PRESSURE: 74 MMHG | OXYGEN SATURATION: 100 % | WEIGHT: 178.13 LBS | HEIGHT: 65 IN | SYSTOLIC BLOOD PRESSURE: 122 MMHG | BODY MASS INDEX: 29.68 KG/M2 | HEART RATE: 100 BPM | TEMPERATURE: 98 F

## 2022-03-26 LAB
ANION GAP SERPL CALC-SCNC: 4 MMOL/L (ref 0–18)
ATRIAL RATE: 99 BPM
BASOPHILS # BLD AUTO: 0.04 X10(3) UL (ref 0–0.2)
BASOPHILS NFR BLD AUTO: 1.1 %
BUN BLD-MCNC: 13 MG/DL (ref 7–18)
CALCIUM BLD-MCNC: 9.3 MG/DL (ref 8.5–10.1)
CHLORIDE SERPL-SCNC: 106 MMOL/L (ref 98–112)
CHOLEST SERPL-MCNC: 178 MG/DL (ref ?–200)
CO2 SERPL-SCNC: 29 MMOL/L (ref 21–32)
CREAT BLD-MCNC: 0.79 MG/DL
EOSINOPHIL # BLD AUTO: 0.08 X10(3) UL (ref 0–0.7)
EOSINOPHIL NFR BLD AUTO: 2.1 %
ERYTHROCYTE [DISTWIDTH] IN BLOOD BY AUTOMATED COUNT: 17.6 %
GLUCOSE BLD-MCNC: 109 MG/DL (ref 70–99)
HCT VFR BLD AUTO: 31 %
HDLC SERPL-MCNC: 36 MG/DL (ref 40–59)
HGB BLD-MCNC: 10.5 G/DL
IMM GRANULOCYTES # BLD AUTO: 0.06 X10(3) UL (ref 0–1)
IMM GRANULOCYTES NFR BLD: 1.6 %
LDLC SERPL CALC-MCNC: 71 MG/DL (ref ?–100)
LYMPHOCYTES # BLD AUTO: 0.72 X10(3) UL (ref 1–4)
LYMPHOCYTES NFR BLD AUTO: 19.3 %
MAGNESIUM SERPL-MCNC: 1.9 MG/DL (ref 1.6–2.6)
MCH RBC QN AUTO: 30.7 PG (ref 26–34)
MCV RBC AUTO: 90.6 FL
MONOCYTES # BLD AUTO: 0.72 X10(3) UL (ref 0.1–1)
MONOCYTES NFR BLD AUTO: 19.3 %
NEUTROPHILS # BLD AUTO: 2.11 X10 (3) UL (ref 1.5–7.7)
NEUTROPHILS # BLD AUTO: 2.11 X10(3) UL (ref 1.5–7.7)
NEUTROPHILS NFR BLD AUTO: 56.6 %
NONHDLC SERPL-MCNC: 142 MG/DL (ref ?–130)
OSMOLALITY SERPL CALC.SUM OF ELEC: 289 MOSM/KG (ref 275–295)
P AXIS: 66 DEGREES
P-R INTERVAL: 156 MS
PLATELET # BLD AUTO: 295 10(3)UL (ref 150–450)
POTASSIUM SERPL-SCNC: 4 MMOL/L (ref 3.5–5.1)
Q-T INTERVAL: 340 MS
QRS DURATION: 70 MS
QTC CALCULATION (BEZET): 436 MS
R AXIS: 35 DEGREES
RBC # BLD AUTO: 3.42 X10(6)UL
SODIUM SERPL-SCNC: 139 MMOL/L (ref 136–145)
T AXIS: 40 DEGREES
T3FREE SERPL-MCNC: 3.24 PG/ML (ref 2.4–4.2)
T4 FREE SERPL-MCNC: 1 NG/DL (ref 0.8–1.7)
TRIGL SERPL-MCNC: 453 MG/DL (ref 30–149)
TSI SER-ACNC: 0.22 MIU/ML (ref 0.36–3.74)
VENTRICULAR RATE: 99 BPM
VLDLC SERPL CALC-MCNC: 70 MG/DL (ref 0–30)
WBC # BLD AUTO: 3.7 X10(3) UL (ref 4–11)

## 2022-03-26 PROCEDURE — 93018 CV STRESS TEST I&R ONLY: CPT | Performed by: INTERNAL MEDICINE

## 2022-03-26 PROCEDURE — 85025 COMPLETE CBC W/AUTO DIFF WBC: CPT | Performed by: HOSPITALIST

## 2022-03-26 PROCEDURE — 83735 ASSAY OF MAGNESIUM: CPT | Performed by: HOSPITALIST

## 2022-03-26 PROCEDURE — 78452 HT MUSCLE IMAGE SPECT MULT: CPT | Performed by: INTERNAL MEDICINE

## 2022-03-26 PROCEDURE — 93017 CV STRESS TEST TRACING ONLY: CPT | Performed by: INTERNAL MEDICINE

## 2022-03-26 PROCEDURE — 84481 FREE ASSAY (FT-3): CPT | Performed by: INTERNAL MEDICINE

## 2022-03-26 PROCEDURE — 84439 ASSAY OF FREE THYROXINE: CPT | Performed by: INTERNAL MEDICINE

## 2022-03-26 PROCEDURE — 84443 ASSAY THYROID STIM HORMONE: CPT | Performed by: INTERNAL MEDICINE

## 2022-03-26 PROCEDURE — 80061 LIPID PANEL: CPT | Performed by: NURSE PRACTITIONER

## 2022-03-26 PROCEDURE — 80048 BASIC METABOLIC PNL TOTAL CA: CPT | Performed by: HOSPITALIST

## 2022-03-26 RX ORDER — LISINOPRIL 5 MG/1
5 TABLET ORAL DAILY
Status: DISCONTINUED | OUTPATIENT
Start: 2022-03-26 | End: 2022-03-26

## 2022-03-26 RX ORDER — LISINOPRIL 5 MG/1
5 TABLET ORAL DAILY
Qty: 30 TABLET | Refills: 1 | Status: SHIPPED | OUTPATIENT
Start: 2022-03-26

## 2022-03-26 NOTE — PLAN OF CARE
Discharge Instructions reviewed with patient and her son. Follow up appointments and home medications reviewed.   Verbalized understanding of information given

## 2022-03-26 NOTE — PROGRESS NOTES
Cardiac Diagnostic Note:    Pt injected with lexiscan for nuclear stress test  Pt denied cardiac symptoms  Rare pac, rare pvc  nuc pending.

## 2022-03-29 ENCOUNTER — OFFICE VISIT (OUTPATIENT)
Dept: HEMATOLOGY/ONCOLOGY | Facility: HOSPITAL | Age: 52
End: 2022-03-29
Payer: MEDICAID

## 2022-03-29 ENCOUNTER — TELEPHONE (OUTPATIENT)
Dept: HEMATOLOGY/ONCOLOGY | Facility: HOSPITAL | Age: 52
End: 2022-03-29

## 2022-03-29 VITALS
BODY MASS INDEX: 30.42 KG/M2 | OXYGEN SATURATION: 98 % | TEMPERATURE: 97 F | DIASTOLIC BLOOD PRESSURE: 70 MMHG | HEIGHT: 64.33 IN | RESPIRATION RATE: 18 BRPM | SYSTOLIC BLOOD PRESSURE: 104 MMHG | WEIGHT: 178.19 LBS | HEART RATE: 123 BPM

## 2022-03-29 DIAGNOSIS — C34.91 PRIMARY ADENOCARCINOMA OF RIGHT LUNG (HCC): Primary | ICD-10-CM

## 2022-03-29 DIAGNOSIS — R59.0 MEDIASTINAL ADENOPATHY: ICD-10-CM

## 2022-03-29 LAB
ALBUMIN SERPL-MCNC: 3.9 G/DL (ref 3.4–5)
ALBUMIN/GLOB SERPL: 1.2 {RATIO} (ref 1–2)
ALP LIVER SERPL-CCNC: 84 U/L
ALT SERPL-CCNC: 24 U/L
ANION GAP SERPL CALC-SCNC: 8 MMOL/L (ref 0–18)
AST SERPL-CCNC: 22 U/L (ref 15–37)
BASOPHILS # BLD AUTO: 0.03 X10(3) UL (ref 0–0.2)
BASOPHILS NFR BLD AUTO: 0.5 %
BILIRUB SERPL-MCNC: 0.4 MG/DL (ref 0.1–2)
BUN BLD-MCNC: 17 MG/DL (ref 7–18)
CALCIUM BLD-MCNC: 9.5 MG/DL (ref 8.5–10.1)
CHLORIDE SERPL-SCNC: 102 MMOL/L (ref 98–112)
CO2 SERPL-SCNC: 26 MMOL/L (ref 21–32)
EOSINOPHIL # BLD AUTO: 0.12 X10(3) UL (ref 0–0.7)
EOSINOPHIL NFR BLD AUTO: 2.1 %
ERYTHROCYTE [DISTWIDTH] IN BLOOD BY AUTOMATED COUNT: 17.5 %
GLOBULIN PLAS-MCNC: 3.2 G/DL (ref 2.8–4.4)
GLUCOSE BLD-MCNC: 213 MG/DL (ref 70–99)
HCT VFR BLD AUTO: 30.7 %
HGB BLD-MCNC: 10.2 G/DL
IMM GRANULOCYTES # BLD AUTO: 0.05 X10(3) UL (ref 0–1)
IMM GRANULOCYTES NFR BLD: 0.9 %
LYMPHOCYTES # BLD AUTO: 0.73 X10(3) UL (ref 1–4)
LYMPHOCYTES NFR BLD AUTO: 13 %
MCH RBC QN AUTO: 30.4 PG (ref 26–34)
MCHC RBC AUTO-ENTMCNC: 33.2 G/DL (ref 31–37)
MCV RBC AUTO: 91.4 FL
MONOCYTES # BLD AUTO: 0.84 X10(3) UL (ref 0.1–1)
NEUTROPHILS # BLD AUTO: 3.85 X10 (3) UL (ref 1.5–7.7)
NEUTROPHILS # BLD AUTO: 3.85 X10(3) UL (ref 1.5–7.7)
NEUTROPHILS NFR BLD AUTO: 68.6 %
OSMOLALITY SERPL CALC.SUM OF ELEC: 290 MOSM/KG (ref 275–295)
PLATELET # BLD AUTO: 334 10(3)UL (ref 150–450)
POTASSIUM SERPL-SCNC: 3.7 MMOL/L (ref 3.5–5.1)
PROT SERPL-MCNC: 7.1 G/DL (ref 6.4–8.2)
RBC # BLD AUTO: 3.36 X10(6)UL
SODIUM SERPL-SCNC: 136 MMOL/L (ref 136–145)
WBC # BLD AUTO: 5.6 X10(3) UL (ref 4–11)

## 2022-03-29 PROCEDURE — 96413 CHEMO IV INFUSION 1 HR: CPT

## 2022-03-29 PROCEDURE — 96377 APPLICATON ON-BODY INJECTOR: CPT

## 2022-03-29 PROCEDURE — 96372 THER/PROPH/DIAG INJ SC/IM: CPT

## 2022-03-29 PROCEDURE — 96366 THER/PROPH/DIAG IV INF ADDON: CPT

## 2022-03-29 PROCEDURE — 85025 COMPLETE CBC W/AUTO DIFF WBC: CPT

## 2022-03-29 PROCEDURE — 96376 TX/PRO/DX INJ SAME DRUG ADON: CPT

## 2022-03-29 PROCEDURE — 80053 COMPREHEN METABOLIC PANEL: CPT

## 2022-03-29 PROCEDURE — 96367 TX/PROPH/DG ADDL SEQ IV INF: CPT

## 2022-03-29 PROCEDURE — 96411 CHEMO IV PUSH ADDL DRUG: CPT

## 2022-03-29 PROCEDURE — 96375 TX/PRO/DX INJ NEW DRUG ADDON: CPT

## 2022-03-29 RX ORDER — CYANOCOBALAMIN 1000 UG/ML
1000 INJECTION INTRAMUSCULAR; SUBCUTANEOUS DAILY
Status: DISCONTINUED | OUTPATIENT
Start: 2022-03-29 | End: 2022-03-29

## 2022-03-29 RX ADMIN — CYANOCOBALAMIN 1000 MCG: 1000 INJECTION INTRAMUSCULAR; SUBCUTANEOUS at 12:20:00

## 2022-03-29 NOTE — PROGRESS NOTES
Pt here for C4D1 alimta/cisplatin. Arrives Ambulating independently, accompanied by Family member           Pregnancy screening: Denies possibility of pregnancy    Modifications in dose or schedule: No  Per MD, ok for treatment today.     Frequency of blood return and site check throughout administration: Prior to administration, Prior to each drug and At completion of therapy   Discharged to Home, Ambulating independently, accompanied by:Family member    Outpatient Oncology Care Plan  Problem list:  respiratory  fatigue  knowledge deficit   Shortness of breath per patient, improved mildly since hospital admission  Problems related to:    chemotherapy  Interventions:  promoted rest  provided general teaching  Expected outcomes:  patient supported/coping well  safe in environment  symptoms relieved/minimized  Progress towards outcome:  making progress    Education Record    Learner:  Patient and Family Member  Barriers / Limitations:  None  Method:  Discussion  Outcome:  Shows understanding  Comments:

## 2022-03-29 NOTE — PROGRESS NOTES
Pt canceled appt:  3/26/2022 5:16 PM By: Martínez Mckeon [[facility]] (Pt Web)  Cancel Rsn: Canceled via Patient Portal (Was in hospital and cleared of any heart issues)    \"

## 2022-04-03 ENCOUNTER — HOSPITAL ENCOUNTER (EMERGENCY)
Facility: HOSPITAL | Age: 52
Discharge: HOME | End: 2022-04-03
Attending: EMERGENCY MEDICINE
Payer: MEDICAID

## 2022-04-03 ENCOUNTER — APPOINTMENT (OUTPATIENT)
Dept: GENERAL RADIOLOGY | Facility: HOSPITAL | Age: 52
End: 2022-04-03
Attending: EMERGENCY MEDICINE
Payer: MEDICAID

## 2022-04-03 VITALS
WEIGHT: 175 LBS | HEIGHT: 65 IN | HEART RATE: 103 BPM | SYSTOLIC BLOOD PRESSURE: 122 MMHG | BODY MASS INDEX: 29.16 KG/M2 | RESPIRATION RATE: 26 BRPM | OXYGEN SATURATION: 98 % | DIASTOLIC BLOOD PRESSURE: 83 MMHG | TEMPERATURE: 99 F

## 2022-04-03 DIAGNOSIS — C34.90 PRIMARY ADENOCARCINOMA OF LUNG, UNSPECIFIED LATERALITY (HCC): Primary | ICD-10-CM

## 2022-04-03 DIAGNOSIS — F41.9 ANXIETY: ICD-10-CM

## 2022-04-03 LAB
ALBUMIN SERPL-MCNC: 3.7 G/DL (ref 3.4–5)
ALBUMIN/GLOB SERPL: 1.2 {RATIO} (ref 1–2)
ALP LIVER SERPL-CCNC: 143 U/L
ALT SERPL-CCNC: 24 U/L
ANION GAP SERPL CALC-SCNC: 11 MMOL/L (ref 0–18)
AST SERPL-CCNC: 21 U/L (ref 15–37)
BASOPHILS # BLD AUTO: 0.12 X10(3) UL (ref 0–0.2)
BASOPHILS # BLD: 0 X10(3) UL (ref 0–0.2)
BASOPHILS NFR BLD AUTO: 0.9 %
BASOPHILS NFR BLD: 0 %
BILIRUB SERPL-MCNC: 0.2 MG/DL (ref 0.1–2)
BILIRUB UR QL STRIP.AUTO: NEGATIVE
BUN BLD-MCNC: 24 MG/DL (ref 7–18)
CALCIUM BLD-MCNC: 9 MG/DL (ref 8.5–10.1)
CHLORIDE SERPL-SCNC: 103 MMOL/L (ref 98–112)
CLARITY UR REFRACT.AUTO: CLEAR
CO2 SERPL-SCNC: 24 MMOL/L (ref 21–32)
COLOR UR AUTO: YELLOW
CREAT BLD-MCNC: 1.06 MG/DL
EOSINOPHIL # BLD AUTO: 0.11 X10(3) UL (ref 0–0.7)
EOSINOPHIL # BLD: 0.55 X10(3) UL (ref 0–0.7)
EOSINOPHIL NFR BLD AUTO: 0.8 %
EOSINOPHIL NFR BLD: 4 %
ERYTHROCYTE [DISTWIDTH] IN BLOOD BY AUTOMATED COUNT: 16.6 %
GLOBULIN PLAS-MCNC: 3.1 G/DL (ref 2.8–4.4)
GLUCOSE BLD-MCNC: 115 MG/DL (ref 70–99)
GLUCOSE UR STRIP.AUTO-MCNC: NEGATIVE MG/DL
HCT VFR BLD AUTO: 29.7 %
HGB BLD-MCNC: 10.3 G/DL
IMM GRANULOCYTES # BLD AUTO: 0.08 X10(3) UL (ref 0–1)
IMM GRANULOCYTES NFR BLD: 0.6 %
KETONES UR STRIP.AUTO-MCNC: NEGATIVE MG/DL
LACTATE SERPL-SCNC: 2.6 MMOL/L (ref 0.4–2)
LYMPHOCYTES # BLD AUTO: 0.68 X10(3) UL (ref 1–4)
LYMPHOCYTES NFR BLD AUTO: 4.9 %
LYMPHOCYTES NFR BLD: 0.83 X10(3) UL (ref 1–4)
LYMPHOCYTES NFR BLD: 6 %
MCH RBC QN AUTO: 31.2 PG (ref 26–34)
MCHC RBC AUTO-ENTMCNC: 34.7 G/DL (ref 31–37)
MCV RBC AUTO: 90 FL
MONOCYTES # BLD AUTO: 1.43 X10(3) UL (ref 0.1–1)
MONOCYTES # BLD: 0.69 X10(3) UL (ref 0.1–1)
MONOCYTES NFR BLD AUTO: 10.4 %
MONOCYTES NFR BLD: 5 %
MORPHOLOGY: NORMAL
NEUTROPHILS # BLD AUTO: 11.37 X10 (3) UL (ref 1.5–7.7)
NEUTROPHILS # BLD AUTO: 11.37 X10(3) UL (ref 1.5–7.7)
NEUTROPHILS NFR BLD AUTO: 82.4 %
NEUTROPHILS NFR BLD: 73 %
NEUTS BAND NFR BLD: 12 %
NEUTS HYPERSEG # BLD: 11.73 X10(3) UL (ref 1.5–7.7)
NITRITE UR QL STRIP.AUTO: NEGATIVE
OSMOLALITY SERPL CALC.SUM OF ELEC: 291 MOSM/KG (ref 275–295)
PH UR STRIP.AUTO: 7 [PH] (ref 5–8)
PLATELET # BLD AUTO: 153 10(3)UL (ref 150–450)
PLATELET MORPHOLOGY: NORMAL
POTASSIUM SERPL-SCNC: 3.3 MMOL/L (ref 3.5–5.1)
PROT SERPL-MCNC: 6.8 G/DL (ref 6.4–8.2)
PROT UR STRIP.AUTO-MCNC: NEGATIVE MG/DL
RBC # BLD AUTO: 3.3 X10(6)UL
SODIUM SERPL-SCNC: 138 MMOL/L (ref 136–145)
SP GR UR STRIP.AUTO: 1.01 (ref 1–1.03)
TOTAL CELLS COUNTED BLD: 100
UROBILINOGEN UR STRIP.AUTO-MCNC: <2 MG/DL
WBC # BLD AUTO: 13.8 X10(3) UL (ref 4–11)

## 2022-04-03 PROCEDURE — 96361 HYDRATE IV INFUSION ADD-ON: CPT

## 2022-04-03 PROCEDURE — 71045 X-RAY EXAM CHEST 1 VIEW: CPT | Performed by: EMERGENCY MEDICINE

## 2022-04-03 PROCEDURE — 85025 COMPLETE CBC W/AUTO DIFF WBC: CPT | Performed by: EMERGENCY MEDICINE

## 2022-04-03 PROCEDURE — 85027 COMPLETE CBC AUTOMATED: CPT | Performed by: EMERGENCY MEDICINE

## 2022-04-03 PROCEDURE — 36415 COLL VENOUS BLD VENIPUNCTURE: CPT

## 2022-04-03 PROCEDURE — 96374 THER/PROPH/DIAG INJ IV PUSH: CPT

## 2022-04-03 PROCEDURE — 83605 ASSAY OF LACTIC ACID: CPT | Performed by: EMERGENCY MEDICINE

## 2022-04-03 PROCEDURE — 93005 ELECTROCARDIOGRAM TRACING: CPT

## 2022-04-03 PROCEDURE — 93010 ELECTROCARDIOGRAM REPORT: CPT

## 2022-04-03 PROCEDURE — 99284 EMERGENCY DEPT VISIT MOD MDM: CPT

## 2022-04-03 PROCEDURE — 87086 URINE CULTURE/COLONY COUNT: CPT | Performed by: EMERGENCY MEDICINE

## 2022-04-03 PROCEDURE — 81001 URINALYSIS AUTO W/SCOPE: CPT | Performed by: EMERGENCY MEDICINE

## 2022-04-03 PROCEDURE — 87040 BLOOD CULTURE FOR BACTERIA: CPT | Performed by: EMERGENCY MEDICINE

## 2022-04-03 PROCEDURE — 80053 COMPREHEN METABOLIC PANEL: CPT | Performed by: EMERGENCY MEDICINE

## 2022-04-03 PROCEDURE — 85007 BL SMEAR W/DIFF WBC COUNT: CPT | Performed by: EMERGENCY MEDICINE

## 2022-04-03 RX ORDER — LORAZEPAM 2 MG/ML
1 INJECTION INTRAMUSCULAR ONCE
Status: COMPLETED | OUTPATIENT
Start: 2022-04-03 | End: 2022-04-03

## 2022-04-03 RX ORDER — LORAZEPAM 0.5 MG/1
0.5 TABLET ORAL EVERY 6 HOURS PRN
Qty: 20 TABLET | Refills: 0 | Status: SHIPPED | OUTPATIENT
Start: 2022-04-03 | End: 2022-04-10

## 2022-04-04 LAB
ATRIAL RATE: 107 BPM
P AXIS: 64 DEGREES
P-R INTERVAL: 148 MS
Q-T INTERVAL: 326 MS
QRS DURATION: 70 MS
QTC CALCULATION (BEZET): 435 MS
R AXIS: 62 DEGREES
T AXIS: 52 DEGREES
VENTRICULAR RATE: 107 BPM

## 2022-04-04 NOTE — ED INITIAL ASSESSMENT (HPI)
Hx of lung CA, last chemo was Tuesday 3/29, reports worsening symptoms the last 2 days, \"I feel like I am going to explode. Something is not right\" pt reports pain to back, feels shaky, nausea, vomiting x 3 today, and diaphoretic.

## 2022-04-04 NOTE — ED QUICK NOTES
Received patient from triage. Patient is alert and oriented, restless on the cot, tearful, and states something is wrong. Sons at bedside. Patient given a warm blanket, hooked up to the monitor. Patient states she has a port and would like it accessed instead of an IV.

## 2022-04-13 ENCOUNTER — HOSPITAL ENCOUNTER (OUTPATIENT)
Dept: NUCLEAR MEDICINE | Facility: HOSPITAL | Age: 52
Discharge: HOME | End: 2022-04-13
Attending: INTERNAL MEDICINE
Payer: MEDICAID

## 2022-04-13 DIAGNOSIS — C34.91 PRIMARY ADENOCARCINOMA OF RIGHT LUNG (HCC): ICD-10-CM

## 2022-04-13 DIAGNOSIS — R06.02 SHORTNESS OF BREATH: ICD-10-CM

## 2022-04-13 DIAGNOSIS — R00.0 TACHYCARDIA: ICD-10-CM

## 2022-04-13 DIAGNOSIS — Z91.89 AT HIGH RISK FOR INFECTION DUE TO NEUTROPENIA: ICD-10-CM

## 2022-04-13 PROCEDURE — 82962 GLUCOSE BLOOD TEST: CPT

## 2022-04-13 PROCEDURE — 78815 PET IMAGE W/CT SKULL-THIGH: CPT | Performed by: INTERNAL MEDICINE

## 2022-04-14 LAB — GLUCOSE BLD-MCNC: 106 MG/DL (ref 70–99)

## 2022-04-25 ENCOUNTER — TELEPHONE (OUTPATIENT)
Dept: HEMATOLOGY/ONCOLOGY | Facility: HOSPITAL | Age: 52
End: 2022-04-25

## 2022-04-25 NOTE — TELEPHONE ENCOUNTER
Charge RN sent fax to Dr. Hilario Clemonser office ATT: Katina Diaz on 4/18/22,   1. requesting lab results and MD notes to be faxed to Luc Alicia at least the day prior to treatment w/ documentation \"okay to treat\"   2. Darvalumab orders will Dr. Nini Cueto be able to cosign in Epic? This RN called office back to fu as no returned fax or call was received. Message left to be called back.  Fax was also re-faxed to [phone] ATT: Katina Diaz

## 2022-04-28 ENCOUNTER — TELEPHONE (OUTPATIENT)
Dept: HEMATOLOGY/ONCOLOGY | Facility: HOSPITAL | Age: 52
End: 2022-04-28

## 2022-04-28 NOTE — TELEPHONE ENCOUNTER
Spoke with Dr. Dia Dubose and patient. Email sent to MD with pre- infusion requirements. Pt has small window of availability due to travelling back and forth to [place]. Arranged for appt on 5/3. Pt aware MD to arrange labs and f/u at least day prior. Given charge RN number for issues or questions.

## 2022-04-28 NOTE — TELEPHONE ENCOUNTER
LM for Dr. Malissa Sanchez on cell number as requested. Patient care manager arranging form for treatment days and POC with [employer] everywhere. Await call back.

## 2022-05-02 ENCOUNTER — TELEPHONE (OUTPATIENT)
Dept: HEMATOLOGY/ONCOLOGY | Facility: HOSPITAL | Age: 52
End: 2022-05-02

## 2022-05-02 NOTE — TELEPHONE ENCOUNTER
LM for patient. No updated orders received for Imfinzi or f/u with labs. Message to office also. Will cancel appt for infusion tomorrow and call patient once above are received.

## 2022-05-03 ENCOUNTER — TELEPHONE (OUTPATIENT)
Dept: HEMATOLOGY/ONCOLOGY | Facility: HOSPITAL | Age: 52
End: 2022-05-03

## 2022-05-03 ENCOUNTER — APPOINTMENT (OUTPATIENT)
Dept: HEMATOLOGY/ONCOLOGY | Facility: HOSPITAL | Age: 52
End: 2022-05-03
Payer: MEDICAID

## 2022-05-03 NOTE — TELEPHONE ENCOUNTER
Received call back from patient. Aware needing to see MD and have labs prior to infusion. Billing will work to initiate [facility] with information as it becomes available. Will tentatively schedule infusion for 5/13 at [facility]. Email sent to Dr. Ned Degroot.

## 2022-05-06 ENCOUNTER — APPOINTMENT (OUTPATIENT)
Dept: HEMATOLOGY/ONCOLOGY | Facility: HOSPITAL | Age: 52
End: 2022-05-06
Payer: MEDICAID

## 2022-05-12 ENCOUNTER — TELEPHONE (OUTPATIENT)
Dept: HEMATOLOGY/ONCOLOGY | Facility: HOSPITAL | Age: 52
End: 2022-05-12

## 2022-05-13 ENCOUNTER — OFFICE VISIT (OUTPATIENT)
Dept: HEMATOLOGY/ONCOLOGY | Facility: HOSPITAL | Age: 52
End: 2022-05-13
Payer: MEDICAID

## 2022-05-13 VITALS
HEART RATE: 109 BPM | BODY MASS INDEX: 30.16 KG/M2 | HEIGHT: 65 IN | DIASTOLIC BLOOD PRESSURE: 73 MMHG | TEMPERATURE: 98 F | OXYGEN SATURATION: 92 % | RESPIRATION RATE: 16 BRPM | SYSTOLIC BLOOD PRESSURE: 122 MMHG | WEIGHT: 181 LBS

## 2022-05-13 DIAGNOSIS — C34.91 PRIMARY ADENOCARCINOMA OF RIGHT LUNG (HCC): Primary | ICD-10-CM

## 2022-05-13 DIAGNOSIS — R59.0 MEDIASTINAL ADENOPATHY: ICD-10-CM

## 2022-05-13 PROCEDURE — 96413 CHEMO IV INFUSION 1 HR: CPT

## 2022-05-13 NOTE — PROGRESS NOTES
Pt here for [address]. Arrives Ambulating independently, accompanied by Self           Pregnancy screening: Not applicable    Modifications in dose or schedule: No     Frequency of blood return and site check throughout administration: Prior to administration   Discharged to Home, Ambulating independently, accompanied by:Self    Outpatient Oncology Care Plan  Problem list:  knowledge deficit  Problems related to:    chemotherapy  Interventions:  monitor lab values  Expected outcomes:  understands plan of care  Progress towards outcome:  making progress    Education Record    Learner:  Patient  Barriers / Limitations:  None  Method:  Brief focused  Outcome:  Shows understanding  Comments: Tolerated well.  Given AVS

## 2022-05-31 ENCOUNTER — APPOINTMENT (OUTPATIENT)
Dept: HEMATOLOGY/ONCOLOGY | Facility: HOSPITAL | Age: 52
End: 2022-05-31
Payer: MEDICAID

## 2022-05-31 ENCOUNTER — HOSPITAL ENCOUNTER (OUTPATIENT)
Dept: MRI IMAGING | Facility: HOSPITAL | Age: 52
Discharge: HOME | End: 2022-05-31
Attending: INTERNAL MEDICINE
Payer: MEDICAID

## 2022-05-31 ENCOUNTER — TELEPHONE (OUTPATIENT)
Dept: HEMATOLOGY/ONCOLOGY | Facility: HOSPITAL | Age: 52
End: 2022-05-31

## 2022-05-31 DIAGNOSIS — R41.0 CONFUSION: ICD-10-CM

## 2022-05-31 PROCEDURE — A9575 INJ GADOTERATE MEGLUMI 0.1ML: HCPCS | Performed by: INTERNAL MEDICINE

## 2022-05-31 PROCEDURE — 70553 MRI BRAIN STEM W/O & W/DYE: CPT | Performed by: INTERNAL MEDICINE

## 2022-05-31 NOTE — TELEPHONE ENCOUNTER
Called the office at approx 8:30am this morning. Spoke to nurse in regards to having  put in an order for treatment, her most recent lab results, and an \"okay to treat. \" Pt scheduled at 2:00pm today. Waiting for f/u.

## 2022-06-10 ENCOUNTER — TELEPHONE (OUTPATIENT)
Dept: HEMATOLOGY/ONCOLOGY | Facility: HOSPITAL | Age: 52
End: 2022-06-10

## 2022-06-10 NOTE — TELEPHONE ENCOUNTER
Called and spoke with Haven Perkins from Dr. Amy Barrios looking for orders for chemo/labs/MD notes as patient is scheduled for 6/14/22 for her next treatment. Haven Perkins to relay the message to the MD and RN. Received a call from Caitlin asking to fax over a copy of the recent chemo orders that we have for this patient. Most recent was on 5/12/22 for Durvalumab. Faxed copy of this order as requested by MIRACLE Tucker.

## 2022-06-14 ENCOUNTER — OFFICE VISIT (OUTPATIENT)
Dept: HEMATOLOGY/ONCOLOGY | Facility: HOSPITAL | Age: 52
End: 2022-06-14
Payer: MEDICAID

## 2022-06-14 VITALS
OXYGEN SATURATION: 96 % | DIASTOLIC BLOOD PRESSURE: 78 MMHG | RESPIRATION RATE: 16 BRPM | TEMPERATURE: 97 F | HEIGHT: 65 IN | WEIGHT: 180.63 LBS | HEART RATE: 125 BPM | SYSTOLIC BLOOD PRESSURE: 115 MMHG | BODY MASS INDEX: 30.09 KG/M2

## 2022-06-14 DIAGNOSIS — C34.91 PRIMARY ADENOCARCINOMA OF RIGHT LUNG (HCC): Primary | ICD-10-CM

## 2022-06-14 DIAGNOSIS — R59.0 MEDIASTINAL ADENOPATHY: ICD-10-CM

## 2022-06-14 PROCEDURE — 96413 CHEMO IV INFUSION 1 HR: CPT

## 2022-06-14 NOTE — PROGRESS NOTES
Pt here for Chanel Swann. Arrives Ambulating independently, accompanied by Self           Pregnancy screening: Denies possibility of pregnancy    Modifications in dose or schedule: No    Drugs/infusions dual verified for appearance and physical integrity: yes     Frequency of blood return and site check throughout administration: Prior to administration and At completion of therapy   Discharged to Home, Ambulating independently, accompanied by:Self    Outpatient Oncology Care Plan  Problem list:  pain  respiratory  fatigue  knowledge deficit  Problems related to:  chemotherapy  disease/disease progression  side effect of treatment  Interventions:  maintain safe environment  encourage activity as tolerated  promoted rest  provided general teaching  Expected outcomes:  adequate sleep/rest  safe in environment  symptoms relieved/minimized  understands plan of care  Progress towards outcome:  making progress    Education Record    Learner:  Patient  Barriers / Limitations:  None  Method:  Brief focused and Reinforcement  Outcome:  Shows understanding  Comments: Patient tolerated chemotherapy and discharged in stable condition.

## 2022-07-12 ENCOUNTER — OFFICE VISIT (OUTPATIENT)
Dept: HEMATOLOGY/ONCOLOGY | Facility: HOSPITAL | Age: 52
End: 2022-07-12
Payer: MEDICAID

## 2022-07-12 ENCOUNTER — TELEPHONE (OUTPATIENT)
Dept: HEMATOLOGY/ONCOLOGY | Facility: HOSPITAL | Age: 52
End: 2022-07-12

## 2022-07-12 ENCOUNTER — DOCUMENTATION ONLY (OUTPATIENT)
Dept: HEMATOLOGY/ONCOLOGY | Facility: HOSPITAL | Age: 52
End: 2022-07-12

## 2022-07-12 VITALS
OXYGEN SATURATION: 94 % | DIASTOLIC BLOOD PRESSURE: 69 MMHG | TEMPERATURE: 98 F | WEIGHT: 184.19 LBS | RESPIRATION RATE: 16 BRPM | BODY MASS INDEX: 31 KG/M2 | HEART RATE: 96 BPM | SYSTOLIC BLOOD PRESSURE: 108 MMHG

## 2022-07-12 DIAGNOSIS — C34.91 PRIMARY ADENOCARCINOMA OF RIGHT LUNG (HCC): Primary | ICD-10-CM

## 2022-07-12 DIAGNOSIS — R59.0 MEDIASTINAL ADENOPATHY: ICD-10-CM

## 2022-07-12 PROCEDURE — 96413 CHEMO IV INFUSION 1 HR: CPT

## 2022-07-12 NOTE — TELEPHONE ENCOUNTER
Called patient regarding 2pm appt scheduled today for treatment inquiring about recent lab results. No answer. Left voicemail message.

## 2022-07-12 NOTE — PROGRESS NOTES
Pt here for [address]. Arrives Ambulating independently, accompanied by Family member           Pregnancy screening: Denies possibility of pregnancy    Modifications in dose or schedule: No    Drugs/infusions dual verified for appearance and physical integrity: yes     Frequency of blood return and site check throughout administration: Prior to administration and At completion of therapy   Discharged to Home, Ambulating independently, accompanied by:Family member    Outpatient Oncology Care Plan  Problem list:  respiratory  fatigue  knowledge deficit  Problems related to:  chemotherapy  disease/disease progression  side effect of treatment  Interventions:  maintain safe environment  encourage activity as tolerated  promoted rest  provided general teaching  Expected outcomes:  adequate sleep/rest  safe in environment  symptoms relieved/minimized  understands plan of care  Progress towards outcome:  making progress    Education Record    Learner:  Patient  Barriers / Limitations:  None  Method:  Brief focused and Reinforcement  Outcome:  Shows understanding  Comments: Patient tolerated chemotherapy and discharged in stable condition. Patient made aware prior to leaving that the cancer center will need updated office note, labs, and chemotherapy order from Dr. Emil Means prior to next appt.

## 2022-07-12 NOTE — TELEPHONE ENCOUNTER
Called Dr. William Perdomo office and spoke with Sherin Ortiz regarding needing chemotherapy orders for pt's appt scheduled today at [facility] stated she will send message to Dr. Pippa Young for her to fax chemo orders. Fax number given. Awaiting orders at this time.

## 2022-07-18 NOTE — TELEPHONE ENCOUNTER
Message below regarding PA noted. Patient has appointment scheduled for 2/19 with pulmonologist. Will check back at that time for update on clearance. no

## 2022-07-20 ENCOUNTER — HOSPITAL ENCOUNTER (OUTPATIENT)
Dept: NUCLEAR MEDICINE | Facility: HOSPITAL | Age: 52
Discharge: HOME | End: 2022-07-20
Attending: INTERNAL MEDICINE
Payer: MEDICAID

## 2022-07-20 DIAGNOSIS — C34.92 ADENOCARCINOMA, LUNG, LEFT (HCC): ICD-10-CM

## 2022-07-20 DIAGNOSIS — C34.91 ADENOCARCINOMA, LUNG, RIGHT (HCC): ICD-10-CM

## 2022-07-20 DIAGNOSIS — R06.02 SOB (SHORTNESS OF BREATH): ICD-10-CM

## 2022-07-20 LAB — GLUCOSE BLD-MCNC: 116 MG/DL (ref 70–99)

## 2022-07-20 PROCEDURE — 82962 GLUCOSE BLOOD TEST: CPT

## 2022-07-20 PROCEDURE — 78815 PET IMAGE W/CT SKULL-THIGH: CPT | Performed by: INTERNAL MEDICINE

## 2022-07-27 ENCOUNTER — ORDER TRANSCRIPTION (OUTPATIENT)
Dept: ADMINISTRATIVE | Facility: HOSPITAL | Age: 52
End: 2022-07-27

## 2022-07-27 DIAGNOSIS — C34.91 PRIMARY MALIGNANT NEOPLASM OF RIGHT LUNG METASTATIC TO OTHER SITE (HCC): ICD-10-CM

## 2022-07-27 DIAGNOSIS — Z11.59 ENCOUNTER FOR SCREENING FOR OTHER VIRAL DISEASES: ICD-10-CM

## 2022-07-27 DIAGNOSIS — Z01.818 PREOP EXAMINATION: Primary | ICD-10-CM

## 2022-08-01 ENCOUNTER — LAB ENCOUNTER (OUTPATIENT)
Dept: LAB | Age: 52
End: 2022-08-01
Attending: INTERNAL MEDICINE
Payer: MEDICAID

## 2022-08-01 DIAGNOSIS — Z11.59 ENCOUNTER FOR SCREENING FOR OTHER VIRAL DISEASES: ICD-10-CM

## 2022-08-01 DIAGNOSIS — Z01.818 PREOP EXAMINATION: ICD-10-CM

## 2022-08-01 DIAGNOSIS — C34.91 PRIMARY MALIGNANT NEOPLASM OF RIGHT LUNG METASTATIC TO OTHER SITE (HCC): ICD-10-CM

## 2022-08-01 RX ORDER — FLUTICASONE FUROATE, UMECLIDINIUM BROMIDE AND VILANTEROL TRIFENATATE 200; 62.5; 25 UG/1; UG/1; UG/1
1 POWDER RESPIRATORY (INHALATION) DAILY
COMMUNITY

## 2022-08-02 LAB — SARS-COV-2 RNA RESP QL NAA+PROBE: NOT DETECTED

## 2022-08-04 ENCOUNTER — HOSPITAL ENCOUNTER (OUTPATIENT)
Dept: CT IMAGING | Facility: HOSPITAL | Age: 52
Discharge: HOME | End: 2022-08-04
Attending: INTERNAL MEDICINE
Payer: MEDICAID

## 2022-08-04 ENCOUNTER — NURSE ONLY (OUTPATIENT)
Dept: LAB | Facility: HOSPITAL | Age: 52
End: 2022-08-04
Attending: INTERNAL MEDICINE
Payer: MEDICAID

## 2022-08-04 VITALS
OXYGEN SATURATION: 95 % | BODY MASS INDEX: 31.65 KG/M2 | DIASTOLIC BLOOD PRESSURE: 76 MMHG | SYSTOLIC BLOOD PRESSURE: 108 MMHG | HEART RATE: 87 BPM | RESPIRATION RATE: 11 BRPM | HEIGHT: 65 IN | WEIGHT: 190 LBS | TEMPERATURE: 97 F

## 2022-08-04 DIAGNOSIS — C34.91 PRIMARY MALIGNANT NEOPLASM OF RIGHT LUNG METASTATIC TO OTHER SITE (HCC): ICD-10-CM

## 2022-08-04 DIAGNOSIS — C34.91 PRIMARY MALIGNANT NEOPLASM OF RIGHT LUNG METASTATIC TO OTHER SITE (HCC): Primary | ICD-10-CM

## 2022-08-04 LAB
INR BLD: 0.96 (ref 0.85–1.16)
PROTHROMBIN TIME: 12.8 SECONDS (ref 11.6–14.8)

## 2022-08-04 PROCEDURE — 85610 PROTHROMBIN TIME: CPT

## 2022-08-04 PROCEDURE — 36415 COLL VENOUS BLD VENIPUNCTURE: CPT

## 2022-08-04 RX ORDER — ONDANSETRON 2 MG/ML
4 INJECTION INTRAMUSCULAR; INTRAVENOUS ONCE
Status: DISCONTINUED | OUTPATIENT
Start: 2022-08-04 | End: 2022-08-04

## 2022-08-04 RX ORDER — MIDAZOLAM HYDROCHLORIDE 1 MG/ML
1 INJECTION INTRAMUSCULAR; INTRAVENOUS EVERY 5 MIN PRN
Status: ACTIVE | OUTPATIENT
Start: 2022-08-04 | End: 2022-08-04

## 2022-08-04 RX ORDER — NALOXONE HYDROCHLORIDE 0.4 MG/ML
80 INJECTION, SOLUTION INTRAMUSCULAR; INTRAVENOUS; SUBCUTANEOUS AS NEEDED
Status: DISCONTINUED | OUTPATIENT
Start: 2022-08-04 | End: 2022-08-06

## 2022-08-04 RX ORDER — MIDAZOLAM HYDROCHLORIDE 1 MG/ML
INJECTION INTRAMUSCULAR; INTRAVENOUS
Status: DISCONTINUED
Start: 2022-08-04 | End: 2022-08-04 | Stop reason: WASHOUT

## 2022-08-04 RX ORDER — SODIUM CHLORIDE 9 MG/ML
INJECTION, SOLUTION INTRAVENOUS CONTINUOUS
Status: DISCONTINUED | OUTPATIENT
Start: 2022-08-04 | End: 2022-08-06

## 2022-08-04 RX ORDER — FLUMAZENIL 0.1 MG/ML
0.2 INJECTION, SOLUTION INTRAVENOUS AS NEEDED
Status: DISCONTINUED | OUTPATIENT
Start: 2022-08-04 | End: 2022-08-06

## 2022-08-04 RX ORDER — ONDANSETRON 2 MG/ML
INJECTION INTRAMUSCULAR; INTRAVENOUS
Status: DISCONTINUED
Start: 2022-08-04 | End: 2022-08-04 | Stop reason: WASHOUT

## 2022-08-04 RX ADMIN — SODIUM CHLORIDE: 9 INJECTION, SOLUTION INTRAVENOUS at 08:03:00

## 2022-08-04 NOTE — PROCEDURES
[facility]  Pre-Procedure Note    Name: Rissa Cox  MRN#: [id number]  : 1970    Procedure:  CT Guided Right hilar mass Needle Core Biopsy    Indication: Metastatic lung cancer    Allergies:      Gabapentin              SWELLING  Clindamycin             RASH    Pertinent Medications:    Is patient on any Aspirin, Coumadin, or any other Anticoagulations/Antiplatelet medications? no    Adverse Reactions to Medication:  no    Mental Status:  Alert and Oriented      Health Status: Acceptable for Procedure    Impression and Plans:    PET CT reviewed. Consolidated right lung in right hilum surrounding areas of elevated fdg uptake. THe central location and surrounding consolidated lung obscuring mass borders makes this procedure extremely risky given obscuration of central right pulmonary arteries and veins. For this reason I did not perform the exam.  Consider follow up with interventional pulmonolgy to consider EBUS. Discussed with Dr. Monik Hall by phone on 22. I also discussed this with the patient.      Lexis Resendiz MD  2022  12:46 PM

## 2022-08-08 ENCOUNTER — TELEPHONE (OUTPATIENT)
Dept: HEMATOLOGY/ONCOLOGY | Facility: HOSPITAL | Age: 52
End: 2022-08-08

## 2022-08-08 NOTE — TELEPHONE ENCOUNTER
Spoke to patient. Seeing me on Friday. Had a referral for a CT guided biopsy which was deferred by IR due to concerns about risk. Needs bronch and perhaps Interventional pulmonology to do it.   I have a call in to Dr Damaris Tong - her pulmonologist - to discuss and arrange  A Hantel

## 2022-08-09 ENCOUNTER — APPOINTMENT (OUTPATIENT)
Dept: HEMATOLOGY/ONCOLOGY | Facility: HOSPITAL | Age: 52
End: 2022-08-09
Payer: MEDICAID

## 2022-08-12 ENCOUNTER — OFFICE VISIT (OUTPATIENT)
Dept: HEMATOLOGY/ONCOLOGY | Facility: HOSPITAL | Age: 52
End: 2022-08-12
Attending: INTERNAL MEDICINE
Payer: MEDICAID

## 2022-08-12 VITALS
TEMPERATURE: 97 F | SYSTOLIC BLOOD PRESSURE: 111 MMHG | HEART RATE: 105 BPM | OXYGEN SATURATION: 94 % | BODY MASS INDEX: 31.01 KG/M2 | WEIGHT: 188.38 LBS | HEIGHT: 65.35 IN | RESPIRATION RATE: 16 BRPM | DIASTOLIC BLOOD PRESSURE: 77 MMHG

## 2022-08-12 DIAGNOSIS — R59.0 MEDIASTINAL ADENOPATHY: ICD-10-CM

## 2022-08-12 DIAGNOSIS — C34.91 PRIMARY ADENOCARCINOMA OF RIGHT LUNG (HCC): Primary | ICD-10-CM

## 2022-08-12 PROCEDURE — 99211 OFF/OP EST MAY X REQ PHY/QHP: CPT

## 2022-08-12 PROCEDURE — 36415 COLL VENOUS BLD VENIPUNCTURE: CPT

## 2022-08-12 RX ORDER — LACTULOSE 20 G/30ML
20 SOLUTION ORAL 3 TIMES DAILY
Qty: 500 ML | Refills: 5 | Status: SHIPPED | OUTPATIENT
Start: 2022-08-12

## 2022-08-12 RX ORDER — PREGABALIN 75 MG/1
75 CAPSULE ORAL 2 TIMES DAILY
COMMUNITY
Start: 2022-07-18

## 2022-08-12 RX ORDER — CELECOXIB 200 MG/1
CAPSULE ORAL
COMMUNITY
Start: 2022-07-18

## 2022-08-12 RX ORDER — ARIPIPRAZOLE 2 MG/1
2 TABLET ORAL DAILY
COMMUNITY
Start: 2022-06-21

## 2022-08-12 NOTE — PROGRESS NOTES
Patient is here for MD consult for Lung Cancer. Patient was diagnosed with Lung cancer in December. Started chemotherapy/immunotherapy in January. She finished chemotherapy and is now on immunotherapy alone. Last infusion was 5 weeks ago. Patient had a PET scan on 7/20. Patient needs a biopsy but unable to complete this through IR. Will need follow up with pulmonologist for a bronchoscopy. Await call back from pulmonologist to schedule this. Here to discuss tx options.        Education Record    Learner:  Patient and Family Member    Disease / Diagnosis:  Lung Cancer     Barriers / Limitations:  None   Comments:    Method:  Discussion   Comments:    General Topics:  Plan of care reviewed   Comments:    Outcome:  Shows understanding   Comments:

## 2022-08-15 RX ORDER — IBUPROFEN 200 MG
200 TABLET ORAL EVERY 6 HOURS PRN
COMMUNITY

## 2022-08-16 ENCOUNTER — APPOINTMENT (OUTPATIENT)
Dept: GENERAL RADIOLOGY | Facility: HOSPITAL | Age: 52
End: 2022-08-16
Attending: INTERNAL MEDICINE
Payer: MEDICAID

## 2022-08-16 ENCOUNTER — ANESTHESIA (OUTPATIENT)
Dept: ENDOSCOPY | Facility: HOSPITAL | Age: 52
End: 2022-08-16
Payer: MEDICAID

## 2022-08-16 ENCOUNTER — HOSPITAL ENCOUNTER (OUTPATIENT)
Facility: HOSPITAL | Age: 52
Setting detail: HOSPITAL OUTPATIENT SURGERY
Discharge: HOME | End: 2022-08-16
Attending: INTERNAL MEDICINE | Admitting: INTERNAL MEDICINE
Payer: MEDICAID

## 2022-08-16 ENCOUNTER — ANESTHESIA EVENT (OUTPATIENT)
Dept: ENDOSCOPY | Facility: HOSPITAL | Age: 52
End: 2022-08-16
Payer: MEDICAID

## 2022-08-16 VITALS
RESPIRATION RATE: 16 BRPM | OXYGEN SATURATION: 94 % | DIASTOLIC BLOOD PRESSURE: 84 MMHG | WEIGHT: 190 LBS | HEART RATE: 85 BPM | HEIGHT: 65 IN | TEMPERATURE: 97 F | SYSTOLIC BLOOD PRESSURE: 131 MMHG | BODY MASS INDEX: 31.65 KG/M2

## 2022-08-16 LAB
BASOPHILS NFR BRONCH: 0 %
COLOR FLD: COLORLESS
EOSINOPHIL NFR BRONCH: 0 %
LYMPHOCYTES NFR BRONCH: 43 %
MONOS+MACROS NFR BRONCH: 41 %
NEUTROPHILS NFR BRONCH: 16 %
RBC BRONCH FOR MAN CT: 989 /MM3
SARS-COV-2 RNA RESP QL NAA+PROBE: NOT DETECTED
TOTAL CELLS COUNTED BRONCH: 139 /MM3
TOTAL CELLS COUNTED FLD: 100
WBC # BRONCH: 139 /MM3

## 2022-08-16 PROCEDURE — 88104 CYTOPATH FL NONGYN SMEARS: CPT | Performed by: INTERNAL MEDICINE

## 2022-08-16 PROCEDURE — 87102 FUNGUS ISOLATION CULTURE: CPT | Performed by: INTERNAL MEDICINE

## 2022-08-16 PROCEDURE — 87556 M.TUBERCULO DNA AMP PROBE: CPT | Performed by: INTERNAL MEDICINE

## 2022-08-16 PROCEDURE — 87116 MYCOBACTERIA CULTURE: CPT | Performed by: INTERNAL MEDICINE

## 2022-08-16 PROCEDURE — 87071 CULTURE AEROBIC QUANT OTHER: CPT | Performed by: INTERNAL MEDICINE

## 2022-08-16 PROCEDURE — 87798 DETECT AGENT NOS DNA AMP: CPT | Performed by: INTERNAL MEDICINE

## 2022-08-16 PROCEDURE — 88172 CYTP DX EVAL FNA 1ST EA SITE: CPT | Performed by: INTERNAL MEDICINE

## 2022-08-16 PROCEDURE — 88305 TISSUE EXAM BY PATHOLOGIST: CPT | Performed by: INTERNAL MEDICINE

## 2022-08-16 PROCEDURE — 87206 SMEAR FLUORESCENT/ACID STAI: CPT | Performed by: INTERNAL MEDICINE

## 2022-08-16 PROCEDURE — 87205 SMEAR GRAM STAIN: CPT | Performed by: INTERNAL MEDICINE

## 2022-08-16 PROCEDURE — 0BBF8ZX EXCISION OF RIGHT LOWER LUNG LOBE, VIA NATURAL OR ARTIFICIAL OPENING ENDOSCOPIC, DIAGNOSTIC: ICD-10-PCS | Performed by: INTERNAL MEDICINE

## 2022-08-16 PROCEDURE — 88177 CYTP FNA EVAL EA ADDL: CPT | Performed by: INTERNAL MEDICINE

## 2022-08-16 PROCEDURE — 71045 X-RAY EXAM CHEST 1 VIEW: CPT | Performed by: INTERNAL MEDICINE

## 2022-08-16 PROCEDURE — 88312 SPECIAL STAINS GROUP 1: CPT | Performed by: INTERNAL MEDICINE

## 2022-08-16 PROCEDURE — 87305 ASPERGILLUS AG IA: CPT | Performed by: INTERNAL MEDICINE

## 2022-08-16 PROCEDURE — 07D78ZX EXTRACTION OF THORAX LYMPHATIC, VIA NATURAL OR ARTIFICIAL OPENING ENDOSCOPIC, DIAGNOSTIC: ICD-10-PCS | Performed by: INTERNAL MEDICINE

## 2022-08-16 PROCEDURE — 88173 CYTOPATH EVAL FNA REPORT: CPT | Performed by: INTERNAL MEDICINE

## 2022-08-16 PROCEDURE — 0B9D8ZX DRAINAGE OF RIGHT MIDDLE LUNG LOBE, VIA NATURAL OR ARTIFICIAL OPENING ENDOSCOPIC, DIAGNOSTIC: ICD-10-PCS | Performed by: INTERNAL MEDICINE

## 2022-08-16 PROCEDURE — 89050 BODY FLUID CELL COUNT: CPT | Performed by: INTERNAL MEDICINE

## 2022-08-16 PROCEDURE — 89051 BODY FLUID CELL COUNT: CPT | Performed by: INTERNAL MEDICINE

## 2022-08-16 PROCEDURE — 88342 IMHCHEM/IMCYTCHM 1ST ANTB: CPT | Performed by: INTERNAL MEDICINE

## 2022-08-16 RX ORDER — HYDROMORPHONE HYDROCHLORIDE 1 MG/ML
0.6 INJECTION, SOLUTION INTRAMUSCULAR; INTRAVENOUS; SUBCUTANEOUS EVERY 5 MIN PRN
Status: DISCONTINUED | OUTPATIENT
Start: 2022-08-16 | End: 2022-08-16 | Stop reason: HOSPADM

## 2022-08-16 RX ORDER — HYDROCODONE BITARTRATE AND ACETAMINOPHEN 5; 325 MG/1; MG/1
2 TABLET ORAL ONCE AS NEEDED
Status: DISCONTINUED | OUTPATIENT
Start: 2022-08-16 | End: 2022-08-16 | Stop reason: HOSPADM

## 2022-08-16 RX ORDER — PROCHLORPERAZINE EDISYLATE 5 MG/ML
5 INJECTION INTRAMUSCULAR; INTRAVENOUS EVERY 8 HOURS PRN
Status: DISCONTINUED | OUTPATIENT
Start: 2022-08-16 | End: 2022-08-16 | Stop reason: HOSPADM

## 2022-08-16 RX ORDER — NALOXONE HYDROCHLORIDE 0.4 MG/ML
80 INJECTION, SOLUTION INTRAMUSCULAR; INTRAVENOUS; SUBCUTANEOUS AS NEEDED
Status: DISCONTINUED | OUTPATIENT
Start: 2022-08-16 | End: 2022-08-16 | Stop reason: HOSPADM

## 2022-08-16 RX ORDER — HYDROCODONE BITARTRATE AND ACETAMINOPHEN 5; 325 MG/1; MG/1
1 TABLET ORAL ONCE AS NEEDED
Status: DISCONTINUED | OUTPATIENT
Start: 2022-08-16 | End: 2022-08-16 | Stop reason: HOSPADM

## 2022-08-16 RX ORDER — ONDANSETRON 2 MG/ML
INJECTION INTRAMUSCULAR; INTRAVENOUS
Status: COMPLETED
Start: 2022-08-16 | End: 2022-08-16

## 2022-08-16 RX ORDER — SODIUM CHLORIDE, SODIUM LACTATE, POTASSIUM CHLORIDE, CALCIUM CHLORIDE 600; 310; 30; 20 MG/100ML; MG/100ML; MG/100ML; MG/100ML
INJECTION, SOLUTION INTRAVENOUS CONTINUOUS
Status: DISCONTINUED | OUTPATIENT
Start: 2022-08-16 | End: 2022-08-16

## 2022-08-16 RX ORDER — SODIUM CHLORIDE, SODIUM LACTATE, POTASSIUM CHLORIDE, CALCIUM CHLORIDE 600; 310; 30; 20 MG/100ML; MG/100ML; MG/100ML; MG/100ML
INJECTION, SOLUTION INTRAVENOUS CONTINUOUS
Status: DISCONTINUED | OUTPATIENT
Start: 2022-08-16 | End: 2022-08-16 | Stop reason: HOSPADM

## 2022-08-16 RX ORDER — HYDROMORPHONE HYDROCHLORIDE 1 MG/ML
0.4 INJECTION, SOLUTION INTRAMUSCULAR; INTRAVENOUS; SUBCUTANEOUS EVERY 5 MIN PRN
Status: DISCONTINUED | OUTPATIENT
Start: 2022-08-16 | End: 2022-08-16 | Stop reason: HOSPADM

## 2022-08-16 RX ORDER — ACETAMINOPHEN 500 MG
1000 TABLET ORAL ONCE AS NEEDED
Status: DISCONTINUED | OUTPATIENT
Start: 2022-08-16 | End: 2022-08-16 | Stop reason: HOSPADM

## 2022-08-16 RX ORDER — HYDROMORPHONE HYDROCHLORIDE 1 MG/ML
0.2 INJECTION, SOLUTION INTRAMUSCULAR; INTRAVENOUS; SUBCUTANEOUS EVERY 5 MIN PRN
Status: DISCONTINUED | OUTPATIENT
Start: 2022-08-16 | End: 2022-08-16 | Stop reason: HOSPADM

## 2022-08-16 RX ORDER — ROCURONIUM BROMIDE 10 MG/ML
INJECTION, SOLUTION INTRAVENOUS AS NEEDED
Status: DISCONTINUED | OUTPATIENT
Start: 2022-08-16 | End: 2022-08-16 | Stop reason: SURG

## 2022-08-16 RX ORDER — ONDANSETRON 2 MG/ML
INJECTION INTRAMUSCULAR; INTRAVENOUS AS NEEDED
Status: DISCONTINUED | OUTPATIENT
Start: 2022-08-16 | End: 2022-08-16 | Stop reason: SURG

## 2022-08-16 RX ORDER — DEXAMETHASONE SODIUM PHOSPHATE 4 MG/ML
VIAL (ML) INJECTION AS NEEDED
Status: DISCONTINUED | OUTPATIENT
Start: 2022-08-16 | End: 2022-08-16 | Stop reason: SURG

## 2022-08-16 RX ORDER — ONDANSETRON 2 MG/ML
4 INJECTION INTRAMUSCULAR; INTRAVENOUS EVERY 6 HOURS PRN
Status: DISCONTINUED | OUTPATIENT
Start: 2022-08-16 | End: 2022-08-16 | Stop reason: HOSPADM

## 2022-08-16 RX ADMIN — ROCURONIUM BROMIDE 20 MG: 10 INJECTION, SOLUTION INTRAVENOUS at 07:17:00

## 2022-08-16 RX ADMIN — ROCURONIUM BROMIDE 20 MG: 10 INJECTION, SOLUTION INTRAVENOUS at 07:40:00

## 2022-08-16 RX ADMIN — MIDAZOLAM HYDROCHLORIDE 2 MG: 1 INJECTION INTRAMUSCULAR; INTRAVENOUS at 07:12:00

## 2022-08-16 RX ADMIN — SODIUM CHLORIDE, SODIUM LACTATE, POTASSIUM CHLORIDE, CALCIUM CHLORIDE: 600; 310; 30; 20 INJECTION, SOLUTION INTRAVENOUS at 07:19:00

## 2022-08-16 RX ADMIN — ROCURONIUM BROMIDE 10 MG: 10 INJECTION, SOLUTION INTRAVENOUS at 07:15:00

## 2022-08-16 RX ADMIN — SODIUM CHLORIDE, SODIUM LACTATE, POTASSIUM CHLORIDE, CALCIUM CHLORIDE: 600; 310; 30; 20 INJECTION, SOLUTION INTRAVENOUS at 08:38:00

## 2022-08-16 RX ADMIN — DEXAMETHASONE SODIUM PHOSPHATE 4 MG: 4 MG/ML VIAL (ML) INJECTION at 07:19:00

## 2022-08-16 RX ADMIN — ONDANSETRON 4 MG: 2 INJECTION INTRAMUSCULAR; INTRAVENOUS at 07:19:00

## 2022-08-16 NOTE — OPERATIVE REPORT
Bronchoscopy Procedure Report     Preop diagnosis:       Lung cancer  Postop diagnosis:      same  Procedure performed: Bronchoscopy, Diagnostic  Bronchoalveolar lavage, BAL  Transbronchial biopsy, EBUS     Sedation used:       General anesthesia     Description of procedure: Informed consent was obtained. Oxygen applied via ETT. Bronchoscope was inserted via ETT route. Vocal cord movement was not evaluated due to presence of ETT. Trachea appeared normal. Carly appeared sharp and normal.  Mucous membranes appeared normal. There were scant secretions of a mucoid nature eminating from lower lobes. Left lung was inspected and appeared normal.  Right lung was inspected and appeared normal.     Lesions seen: none        Endobronchial ultrasound:  EBUS was used to localize the following lymph nodes: 10R and 7. It was used to sample the following lymph nodes via needle biopsy: 10R and 7. A pathologist was immediately present. Samples obtained:                   BAL of RML: 100cc sterile saline instilled, 38cc drawn back                  Transbronchial biopsy of RLL- 12 passes made; attempt to preferentially biopsy proximal mediolateral subsegmental bronchi was made. EBUS 10R- 7 passes made. Lymphocytes confirmed by bedside path       EBUS 7- 7 passes made, only blood and bronchoepithelial tissue was noted. LN was fairly small and surrounded by vasculature. No further passes were made. Post procedure CXR necessary? yes  Follow up: With Dr. Marianne Srinivasan and Dr. Xochilt Mohan     Patient tolerated the procedure well and was transferred to the recovery area. EBL was 15cc.     Rima Medrano MD  08/16/22

## 2022-08-16 NOTE — ANESTHESIA POSTPROCEDURE EVALUATION
[facility] Patient Status:  Hospital Outpatient Surgery   Age/Gender 47year old female MRN [id number]   Location [address] [facility] Attending Denise Gutierrez MD   Hosp Day # 0 PCP Jame Carrasco DO       Anesthesia Post-op Note    ENDOBRONCHIAL ULTRASOUND (EBUS) with BAL, TBNA and biopsies    Procedure Summary     Date: 08/16/22 Room / Location: [facility] ENDOSCOPY 04 / [facility] ENDOSCOPY    Anesthesia Start: 0710 Anesthesia Stop:     Procedure: ENDOBRONCHIAL ULTRASOUND (EBUS) with BAL, TBNA and biopsies (N/A ) Diagnosis: (ABNORMAL PET SCAN)    Surgeons: Denise Gutierrez MD Anesthesiologist: Pipe Almonte MD    Anesthesia Type: general ASA Status: 2          Anesthesia Type: general    Vitals Value Taken Time   /88 08/16/22 0838   Temp 97.6 08/16/22 0838   Pulse 94 08/16/22 0838   Resp 22 08/16/22 0838   SpO2 97 08/16/22 0838       Patient Location: PACU    Anesthesia Type: general    Airway Patency: patent    Postop Pain Control: adequate    Mental Status: mildly sedated but able to meaningfully participate in the post-anesthesia evaluation    Nausea/Vomiting: none    Cardiopulmonary/Hydration status: stable euvolemic    Complications: no apparent anesthesia related complications    Postop vital signs: stable    Dental Exam: Unchanged from Preop    Patient to be discharged from PACU when criteria met.

## 2022-08-16 NOTE — ANESTHESIA PROCEDURE NOTES
Airway  Date/Time: 8/16/2022 7:17 AM  Urgency: elective      General Information and Staff    Patient location during procedure: OR  Anesthesiologist: Jeremy Caballero MD  Performed: anesthesiologist     Indications and Patient Condition  Indications for airway management: anesthesia  Sedation level: deep  Preoxygenated: yes  Patient position: sniffing  Mask difficulty assessment: 1 - vent by mask    Final Airway Details  Final airway type: endotracheal airway      Successful airway: ETT  Cuffed: yes   Successful intubation technique: direct laryngoscopy  Endotracheal tube insertion site: oral  ETT size (mm): 8.5    Placement verified by: chest auscultation and capnometry   Measured from: lips  Number of attempts at approach: 1

## 2022-08-17 LAB — M TB CMPLX RRNA SPEC QL PROBE: NOT DETECTED

## 2022-08-18 ENCOUNTER — TELEPHONE (OUTPATIENT)
Dept: HEMATOLOGY/ONCOLOGY | Facility: HOSPITAL | Age: 52
End: 2022-08-18

## 2022-08-18 DIAGNOSIS — Z85.118 HISTORY OF LUNG CANCER: Primary | ICD-10-CM

## 2022-08-18 LAB
ASPERGILLUS GALACTOMANNAN AG, BAL: NEGATIVE
ASPERGILLUS GALACTOMANNAN INDX: 0.06

## 2022-08-18 NOTE — TELEPHONE ENCOUNTER
Mode Locke called to let Dr Jorge Sparks know that she had her biopsy today. She saw some results on MyChart. She would like him to call her to discuss the results and her cancer staging.

## 2022-08-18 NOTE — TELEPHONE ENCOUNTER
Spoke to patient. Bronch so far shows no malignancy. Cytology from node is pending. Will continue Durva and then repeat CT in about 4 weeks - around September 12-14. Orders placed.

## 2022-08-23 ENCOUNTER — OFFICE VISIT (OUTPATIENT)
Dept: HEMATOLOGY/ONCOLOGY | Facility: HOSPITAL | Age: 52
End: 2022-08-23
Payer: MEDICAID

## 2022-08-23 VITALS
OXYGEN SATURATION: 94 % | HEART RATE: 100 BPM | TEMPERATURE: 97 F | RESPIRATION RATE: 16 BRPM | DIASTOLIC BLOOD PRESSURE: 82 MMHG | BODY MASS INDEX: 32 KG/M2 | HEIGHT: 65.35 IN | WEIGHT: 194.38 LBS | SYSTOLIC BLOOD PRESSURE: 123 MMHG

## 2022-08-23 DIAGNOSIS — C34.91 PRIMARY ADENOCARCINOMA OF RIGHT LUNG (HCC): Primary | ICD-10-CM

## 2022-08-23 DIAGNOSIS — R59.0 MEDIASTINAL ADENOPATHY: ICD-10-CM

## 2022-08-23 LAB
ALBUMIN SERPL-MCNC: 3.6 G/DL (ref 3.4–5)
ALBUMIN/GLOB SERPL: 1.1 {RATIO} (ref 1–2)
ALP LIVER SERPL-CCNC: 97 U/L
ALT SERPL-CCNC: 34 U/L
ANION GAP SERPL CALC-SCNC: 6 MMOL/L (ref 0–18)
AST SERPL-CCNC: 24 U/L (ref 15–37)
BASOPHILS # BLD AUTO: 0.04 X10(3) UL (ref 0–0.2)
BASOPHILS NFR BLD AUTO: 0.7 %
BILIRUB SERPL-MCNC: 0.2 MG/DL (ref 0.1–2)
BUN BLD-MCNC: 17 MG/DL (ref 7–18)
CALCIUM BLD-MCNC: 9.4 MG/DL (ref 8.5–10.1)
CHLORIDE SERPL-SCNC: 105 MMOL/L (ref 98–112)
CO2 SERPL-SCNC: 27 MMOL/L (ref 21–32)
CREAT BLD-MCNC: 0.98 MG/DL
EOSINOPHIL # BLD AUTO: 0.17 X10(3) UL (ref 0–0.7)
EOSINOPHIL NFR BLD AUTO: 2.9 %
ERYTHROCYTE [DISTWIDTH] IN BLOOD BY AUTOMATED COUNT: 17.1 %
FASTING STATUS PATIENT QL REPORTED: NO
GFR SERPLBLD BASED ON 1.73 SQ M-ARVRAT: 70 ML/MIN/1.73M2 (ref 60–?)
GLOBULIN PLAS-MCNC: 3.3 G/DL (ref 2.8–4.4)
GLUCOSE BLD-MCNC: 144 MG/DL (ref 70–99)
HCT VFR BLD AUTO: 34.4 %
HGB BLD-MCNC: 11.1 G/DL
IMM GRANULOCYTES # BLD AUTO: 0.03 X10(3) UL (ref 0–1)
IMM GRANULOCYTES NFR BLD: 0.5 %
LYMPHOCYTES # BLD AUTO: 0.62 X10(3) UL (ref 1–4)
LYMPHOCYTES NFR BLD AUTO: 10.7 %
MCH RBC QN AUTO: 26.4 PG (ref 26–34)
MCHC RBC AUTO-ENTMCNC: 32.3 G/DL (ref 31–37)
MCV RBC AUTO: 81.9 FL
MONOCYTES # BLD AUTO: 0.66 X10(3) UL (ref 0.1–1)
MONOCYTES NFR BLD AUTO: 11.4 %
NEUTROPHILS # BLD AUTO: 4.27 X10 (3) UL (ref 1.5–7.7)
NEUTROPHILS # BLD AUTO: 4.27 X10(3) UL (ref 1.5–7.7)
NEUTROPHILS NFR BLD AUTO: 73.8 %
OSMOLALITY SERPL CALC.SUM OF ELEC: 290 MOSM/KG (ref 275–295)
P. JIROVECII DETECTION BY PCR: NOT DETECTED
PLATELET # BLD AUTO: 267 10(3)UL (ref 150–450)
POTASSIUM SERPL-SCNC: 4 MMOL/L (ref 3.5–5.1)
PROT SERPL-MCNC: 6.9 G/DL (ref 6.4–8.2)
RBC # BLD AUTO: 4.2 X10(6)UL
SODIUM SERPL-SCNC: 138 MMOL/L (ref 136–145)
WBC # BLD AUTO: 5.8 X10(3) UL (ref 4–11)

## 2022-08-23 PROCEDURE — 80053 COMPREHEN METABOLIC PANEL: CPT

## 2022-08-23 PROCEDURE — 96413 CHEMO IV INFUSION 1 HR: CPT

## 2022-08-23 PROCEDURE — 85025 COMPLETE CBC W/AUTO DIFF WBC: CPT

## 2022-08-23 NOTE — PROGRESS NOTES
Pt here for C4D1 Imfinzi. Arrives Ambulating independently, accompanied by Self           Pregnancy screening: Denies possibility of pregnancy    Modifications in dose or schedule: no    Drugs/infusions dual verified for appearance and physical integrity. IV pump settings were dual verified: yes     Frequency of blood return and site check throughout administration: Prior to administration and At completion of therapy   Discharged to Home, Ambulating independently, accompanied by:Self    Outpatient Oncology Care Plan  Problem list:  fatigue  Problems related to:  chemotherapy  Interventions:  encourage activity as tolerated  monitor lab values  Expected outcomes:  adequate sleep/rest  optimal lab values  understands plan of care  Progress towards outcome:  making progress    Education Record    Learner:  Patient  Barriers / Limitations:  None  Method:  Brief focused and Discussion  Outcome:  Shows understanding  Comments: Per Lani Castro RN w/ Dr. Martínez Welch, patient to follow up on the Friday after her CT scan (Sept 16th). Patient shows understanding for plan of care.

## 2022-09-09 ENCOUNTER — DOCUMENTATION ONLY (OUTPATIENT)
Dept: HEMATOLOGY/ONCOLOGY | Facility: HOSPITAL | Age: 52
End: 2022-09-09

## 2022-09-09 NOTE — PROGRESS NOTES
ONCOLOGY NUTRITION SCREEN complete as triggered by [facility] dx of NSCLC. Chart reviewed. Pt appears nutritionally stable at present. RD available on consult.

## 2022-09-14 ENCOUNTER — HOSPITAL ENCOUNTER (OUTPATIENT)
Dept: CT IMAGING | Facility: HOSPITAL | Age: 52
Discharge: HOME | End: 2022-09-14
Attending: INTERNAL MEDICINE
Payer: MEDICAID

## 2022-09-14 DIAGNOSIS — Z85.118 HISTORY OF LUNG CANCER: ICD-10-CM

## 2022-09-14 PROCEDURE — 71260 CT THORAX DX C+: CPT | Performed by: INTERNAL MEDICINE

## 2022-09-14 RX ORDER — IOHEXOL 350 MG/ML
75 INJECTION, SOLUTION INTRAVENOUS
Status: COMPLETED | OUTPATIENT
Start: 2022-09-14 | End: 2022-09-14

## 2022-09-14 RX ADMIN — IOHEXOL 75 ML: 350 INJECTION, SOLUTION INTRAVENOUS at 09:59:00

## 2022-09-16 ENCOUNTER — OFFICE VISIT (OUTPATIENT)
Dept: HEMATOLOGY/ONCOLOGY | Facility: HOSPITAL | Age: 52
End: 2022-09-16
Payer: MEDICAID

## 2022-09-16 VITALS
OXYGEN SATURATION: 96 % | HEIGHT: 65.35 IN | TEMPERATURE: 97 F | WEIGHT: 198.81 LBS | RESPIRATION RATE: 16 BRPM | DIASTOLIC BLOOD PRESSURE: 72 MMHG | BODY MASS INDEX: 32.73 KG/M2 | SYSTOLIC BLOOD PRESSURE: 109 MMHG | HEART RATE: 104 BPM

## 2022-09-16 DIAGNOSIS — C34.91 PRIMARY ADENOCARCINOMA OF RIGHT LUNG (HCC): Primary | ICD-10-CM

## 2022-09-16 DIAGNOSIS — R59.0 MEDIASTINAL ADENOPATHY: ICD-10-CM

## 2022-09-16 LAB
ALBUMIN SERPL-MCNC: 3.5 G/DL (ref 3.4–5)
ALBUMIN/GLOB SERPL: 1.1 {RATIO} (ref 1–2)
ALP LIVER SERPL-CCNC: 96 U/L
ALT SERPL-CCNC: 31 U/L
ANION GAP SERPL CALC-SCNC: 6 MMOL/L (ref 0–18)
AST SERPL-CCNC: 21 U/L (ref 15–37)
BASOPHILS # BLD AUTO: 0.04 X10(3) UL (ref 0–0.2)
BASOPHILS NFR BLD AUTO: 0.9 %
BILIRUB SERPL-MCNC: 0.2 MG/DL (ref 0.1–2)
BUN BLD-MCNC: 16 MG/DL (ref 7–18)
CALCIUM BLD-MCNC: 9 MG/DL (ref 8.5–10.1)
CHLORIDE SERPL-SCNC: 107 MMOL/L (ref 98–112)
CO2 SERPL-SCNC: 26 MMOL/L (ref 21–32)
CREAT BLD-MCNC: 1.05 MG/DL
EOSINOPHIL # BLD AUTO: 0.15 X10(3) UL (ref 0–0.7)
EOSINOPHIL NFR BLD AUTO: 3.3 %
ERYTHROCYTE [DISTWIDTH] IN BLOOD BY AUTOMATED COUNT: 16.1 %
GFR SERPLBLD BASED ON 1.73 SQ M-ARVRAT: 64 ML/MIN/1.73M2 (ref 60–?)
GLOBULIN PLAS-MCNC: 3.2 G/DL (ref 2.8–4.4)
GLUCOSE BLD-MCNC: 128 MG/DL (ref 70–99)
HCT VFR BLD AUTO: 35.3 %
HGB BLD-MCNC: 11.5 G/DL
IMM GRANULOCYTES # BLD AUTO: 0.03 X10(3) UL (ref 0–1)
IMM GRANULOCYTES NFR BLD: 0.7 %
LYMPHOCYTES # BLD AUTO: 0.56 X10(3) UL (ref 1–4)
LYMPHOCYTES NFR BLD AUTO: 12.4 %
MCH RBC QN AUTO: 27 PG (ref 26–34)
MCHC RBC AUTO-ENTMCNC: 32.6 G/DL (ref 31–37)
MCV RBC AUTO: 82.9 FL
MONOCYTES # BLD AUTO: 0.49 X10(3) UL (ref 0.1–1)
MONOCYTES NFR BLD AUTO: 10.9 %
NEUTROPHILS # BLD AUTO: 3.24 X10 (3) UL (ref 1.5–7.7)
NEUTROPHILS # BLD AUTO: 3.24 X10(3) UL (ref 1.5–7.7)
NEUTROPHILS NFR BLD AUTO: 71.8 %
OSMOLALITY SERPL CALC.SUM OF ELEC: 291 MOSM/KG (ref 275–295)
PLATELET # BLD AUTO: 263 10(3)UL (ref 150–450)
POTASSIUM SERPL-SCNC: 3.9 MMOL/L (ref 3.5–5.1)
PROT SERPL-MCNC: 6.7 G/DL (ref 6.4–8.2)
RBC # BLD AUTO: 4.26 X10(6)UL
SODIUM SERPL-SCNC: 139 MMOL/L (ref 136–145)
T4 FREE SERPL-MCNC: 0.7 NG/DL (ref 0.8–1.7)
TSI SER-ACNC: 2.9 MIU/ML (ref 0.36–3.74)
WBC # BLD AUTO: 4.5 X10(3) UL (ref 4–11)

## 2022-09-16 PROCEDURE — 85025 COMPLETE CBC W/AUTO DIFF WBC: CPT

## 2022-09-16 PROCEDURE — 84443 ASSAY THYROID STIM HORMONE: CPT

## 2022-09-16 PROCEDURE — 84439 ASSAY OF FREE THYROXINE: CPT

## 2022-09-16 PROCEDURE — 96413 CHEMO IV INFUSION 1 HR: CPT

## 2022-09-16 PROCEDURE — 80053 COMPREHEN METABOLIC PANEL: CPT

## 2022-09-16 PROCEDURE — 99214 OFFICE O/P EST MOD 30 MIN: CPT | Performed by: INTERNAL MEDICINE

## 2022-09-16 RX ORDER — MORPHINE SULFATE 15 MG/1
TABLET ORAL
COMMUNITY
Start: 2022-08-17

## 2022-09-16 RX ORDER — MORPHINE SULFATE 15 MG/1
15 TABLET, FILM COATED, EXTENDED RELEASE ORAL EVERY 12 HOURS
COMMUNITY
Start: 2022-08-17

## 2022-09-16 NOTE — PROGRESS NOTES
Pt here for Imfinzi Arrives Ambulating independently, accompanied by Self and Family member           Pregnancy screening: Not applicable    Modifications in dose or schedule: No    Drugs/infusions dual verified for appearance and physical integrity.  IV pump settings were dual verified: yes     Frequency of blood return and site check throughout administration: Prior to administration   Discharged to Home, Ambulating independently, accompanied by:Self    Outpatient Oncology Care Plan  Problem list:  fatigue  knowledge deficit  Problems related to:  side effect of treatment  Interventions:  promoted rest  provided general teaching  Expected outcomes:  safe in environment  symptoms relieved/minimized  understands plan of care  Progress towards outcome:  making progress    Education Record    Learner:  Patient  Barriers / Limitations:  None  Method:  Brief focused and Discussion  Outcome:  Shows understanding  Comments:

## 2022-09-16 NOTE — PROGRESS NOTES
Patient is here for MD f/u and cycle 5 of Imfinzi. Patient had a CT scan on 9/14. Patient has been on Morphine ER 15 mg. This helps her breathing and pain in her legs. She has not had to take Morphine iimmediate relief. Patient had an EBUS last month. She struggles with constipation. She is unable to tolerate Lactulose and has been doing enemas twice weekly.        Education Record    Learner:  Patient and Family Member    Disease / Diagnosis:  Lung cancer     Barriers / Limitations:  None   Comments:    Method:  Discussion   Comments:    General Topics:  Plan of care reviewed   Comments:    Outcome:  Shows understanding   Comments:

## 2022-09-18 NOTE — PROGRESS NOTES
[facility]    PATIENT'S NAME: Vinny Meyers [facility]   ATTENDING PHYSICIAN: Anila Park M.D. PATIENT ACCOUNT #: [de-identified] LOCATION: [address] [place] [address] RECORD #: [id number] YOB: 1970   DATE OF SERVICE: 09/16/2022       CANCER CENTER PROGRESS NOTE    CHIEF COMPLAINT:  Followup and treatment of breast cancer stage 3 and lung cancer. HISTORY OF PRESENT ILLNESS:  The patient is a 50-year-old female. She was found to have right middle lobe mass and breast cancer that was diagnosed in late 2021. She had adenocarcinoma. She was treated with concurrent chemoradiotherapy which she completed in April of this year. She has been taking immunotherapy with durvalumab since that time. She had imaging that was done in April that showed some question of uptake in right supraclavicular lymph node and some areas in the right hilum. She was initially referred for a CT-guided biopsy but was not felt to be tenable. She saw me in August and has been followed by Dr. Damaris Tong. She was sent to Dr. Nico Pizarro who proceeded with bronchoscopy and biopsy which was done on August 16. The right lung lesion was biopsied transbronchially and showed only inflammatory changes. The lymph nodes were sampled by EBUS in the 10R and 7 region and there was bronchoalveolar lavage. All of this is negative for malignancy. There was just an atypical cell cluster present which is of questionable certainty. Since then, she has had a followup CT scan done on September 14 which showed some improvement. Her right lower lobe area of consolidation is shrinking. The area of the previous FDG-avid supraclavicular node is less evident according to our radiologist but it was not mentioned in the CT scan. I contacted Dr. Kelsy Farley as he said that this area appears to have shrunk or disappeared. She returns today with ongoing issues with chronic pain.   She continues to take pain medication from pain specialist with morphine extended release 15 mg. It helps her breathing a bit. She is not taking any immediate-release morphine. She struggles with constipation. She has been unable to tolerate lactulose. She has been doing enemas twice weekly. Her weight is stable. Her appetite is good. She has breathlessness with exertion but less so with rest.    MEDICATIONS:  Her current medications include Tylenol p.r.n.; albuterol HFA inhaler 2 puffs q.6 h. p.r.n.; Adderall 30 mg twice daily; aripiprazole 2 mg daily; celecoxib 200 mg twice daily; cyclobenzaprine 10 mg t.i.d. p.r.n.; duloxetine 60 mg daily; Trelegy Ellipta 1 puff daily; hydrochlorothiazide 12.5 mg daily; hydrocodone 10/325 one tablet q.8 h. p.r.n.; hydromorphone 2 mg q.4 h. p.r.n.; ibuprofen 200 mg q.6 h. p.r.n.; lactulose 30 mL 3 times a day; lamotrigine 50 mg daily; levothyroxine 50 mcg daily; lisinopril 5 mg daily; morphine immediate release 7.5 mg q.4 h. p.r.n.; morphine extended release 15 mg q.12 h.; pregabalin 75 mg twice daily; vitamin D3, 5000 units daily. PHYSICAL EXAMINATION:    GENERAL:  She is a reasonably well-appearing female, in no acute distress. VITAL SIGNS:  Her performance status is 1. Her weight is 198 pounds which is up 4 pounds. Blood pressure is 109/72, pulse 104, respiratory rate is 20, temperature is 97.3. HEENT:  Unremarkable. LYMPHATICS:  She has no adenopathy. LUNGS:  Clear. HEART:  Normal.  ABDOMEN:  No hepatosplenomegaly or tenderness. EXTREMITIES:  She has no clubbing, cyanosis, or edema. NEUROLOGIC:  She is intact. LABORATORY DATA:  Labs include relatively normal blood chemistries. Her free T4 is borderline low at 0.7. Her TSH is normal.  Her hemoglobin is 11.5. Otherwise, her CBC is normal.    IMPRESSION:    1.   Stage III lung cancer. She has no signs of disease progression and as a result, we are keeping her on the durvalumab for now. She is getting this monthly.   She has not had any major autoimmune toxicities and tolerance has been good. Her weight has gone up a few pounds. Her CT scan looks better, and I am hopeful that her disease is controlled at least for now. 2.   Chronic pain. She continues to follow up with pain service. 3.   Breathlessness. She is using inhaler. She is no longer smoking. 4.   The final thing is she will be back in 4 weeks for her next treatment. We are planning on re-imaging her no later than 3 months from this last CT. Dictated By Clemetnina Ceballos M.D.  d: 09/17/2022 11:54:33  t: 09/18/2022 03:56:03  [facility] [id number]  /    cc: Jessica Fonseca DO   [facility].  MD Mau Russell M.D.

## 2022-10-14 ENCOUNTER — OFFICE VISIT (OUTPATIENT)
Dept: HEMATOLOGY/ONCOLOGY | Facility: HOSPITAL | Age: 52
End: 2022-10-14
Attending: GENETIC COUNSELOR, MS
Payer: MEDICAID

## 2022-10-14 VITALS
RESPIRATION RATE: 16 BRPM | HEART RATE: 112 BPM | WEIGHT: 202.19 LBS | TEMPERATURE: 97 F | SYSTOLIC BLOOD PRESSURE: 136 MMHG | OXYGEN SATURATION: 97 % | BODY MASS INDEX: 33.28 KG/M2 | HEIGHT: 65.35 IN | DIASTOLIC BLOOD PRESSURE: 86 MMHG

## 2022-10-14 DIAGNOSIS — E55.9 VITAMIN D DEFICIENCY: ICD-10-CM

## 2022-10-14 DIAGNOSIS — C34.91 PRIMARY ADENOCARCINOMA OF RIGHT LUNG (HCC): Primary | ICD-10-CM

## 2022-10-14 DIAGNOSIS — E55.9 VITAMIN D DEFICIENCY: Primary | ICD-10-CM

## 2022-10-14 DIAGNOSIS — R59.0 MEDIASTINAL ADENOPATHY: ICD-10-CM

## 2022-10-14 DIAGNOSIS — E03.9 HYPOTHYROIDISM, UNSPECIFIED TYPE: ICD-10-CM

## 2022-10-14 DIAGNOSIS — C34.91 MALIGNANT NEOPLASM OF RIGHT LUNG, UNSPECIFIED PART OF LUNG (HCC): ICD-10-CM

## 2022-10-14 LAB
ALBUMIN SERPL-MCNC: 3.6 G/DL (ref 3.4–5)
ALBUMIN/GLOB SERPL: 1.1 {RATIO} (ref 1–2)
ALP LIVER SERPL-CCNC: 80 U/L
ALT SERPL-CCNC: 30 U/L
ANION GAP SERPL CALC-SCNC: 6 MMOL/L (ref 0–18)
AST SERPL-CCNC: 17 U/L (ref 15–37)
BASOPHILS # BLD AUTO: 0.05 X10(3) UL (ref 0–0.2)
BASOPHILS NFR BLD AUTO: 1 %
BILIRUB SERPL-MCNC: 0.3 MG/DL (ref 0.1–2)
BUN BLD-MCNC: 21 MG/DL (ref 7–18)
CALCIUM BLD-MCNC: 9 MG/DL (ref 8.5–10.1)
CHLORIDE SERPL-SCNC: 106 MMOL/L (ref 98–112)
CO2 SERPL-SCNC: 26 MMOL/L (ref 21–32)
CREAT BLD-MCNC: 1.04 MG/DL
EOSINOPHIL # BLD AUTO: 0.16 X10(3) UL (ref 0–0.7)
EOSINOPHIL NFR BLD AUTO: 3.3 %
ERYTHROCYTE [DISTWIDTH] IN BLOOD BY AUTOMATED COUNT: 14.6 %
GFR SERPLBLD BASED ON 1.73 SQ M-ARVRAT: 65 ML/MIN/1.73M2 (ref 60–?)
GLOBULIN PLAS-MCNC: 3.3 G/DL (ref 2.8–4.4)
GLUCOSE BLD-MCNC: 194 MG/DL (ref 70–99)
HCT VFR BLD AUTO: 37.9 %
HGB BLD-MCNC: 12.7 G/DL
IMM GRANULOCYTES # BLD AUTO: 0.02 X10(3) UL (ref 0–1)
IMM GRANULOCYTES NFR BLD: 0.4 %
LYMPHOCYTES # BLD AUTO: 0.7 X10(3) UL (ref 1–4)
LYMPHOCYTES NFR BLD AUTO: 14.3 %
MCH RBC QN AUTO: 28.6 PG (ref 26–34)
MCHC RBC AUTO-ENTMCNC: 33.5 G/DL (ref 31–37)
MCV RBC AUTO: 85.4 FL
MONOCYTES # BLD AUTO: 0.54 X10(3) UL (ref 0.1–1)
MONOCYTES NFR BLD AUTO: 11 %
NEUTROPHILS # BLD AUTO: 3.43 X10 (3) UL (ref 1.5–7.7)
NEUTROPHILS # BLD AUTO: 3.43 X10(3) UL (ref 1.5–7.7)
NEUTROPHILS NFR BLD AUTO: 70 %
OSMOLALITY SERPL CALC.SUM OF ELEC: 294 MOSM/KG (ref 275–295)
PLATELET # BLD AUTO: 268 10(3)UL (ref 150–450)
POTASSIUM SERPL-SCNC: 3.8 MMOL/L (ref 3.5–5.1)
PROT SERPL-MCNC: 6.9 G/DL (ref 6.4–8.2)
RBC # BLD AUTO: 4.44 X10(6)UL
SODIUM SERPL-SCNC: 138 MMOL/L (ref 136–145)
T4 FREE SERPL-MCNC: 0.8 NG/DL (ref 0.8–1.7)
TSI SER-ACNC: 3.91 MIU/ML (ref 0.36–3.74)
VIT D+METAB SERPL-MCNC: 35.3 NG/ML (ref 30–100)
WBC # BLD AUTO: 4.9 X10(3) UL (ref 4–11)

## 2022-10-14 PROCEDURE — 80053 COMPREHEN METABOLIC PANEL: CPT

## 2022-10-14 PROCEDURE — 96413 CHEMO IV INFUSION 1 HR: CPT

## 2022-10-14 PROCEDURE — 99214 OFFICE O/P EST MOD 30 MIN: CPT | Performed by: INTERNAL MEDICINE

## 2022-10-14 PROCEDURE — 82306 VITAMIN D 25 HYDROXY: CPT

## 2022-10-14 PROCEDURE — 84443 ASSAY THYROID STIM HORMONE: CPT

## 2022-10-14 PROCEDURE — 84439 ASSAY OF FREE THYROXINE: CPT

## 2022-10-14 PROCEDURE — 85025 COMPLETE CBC W/AUTO DIFF WBC: CPT

## 2022-10-14 NOTE — PROGRESS NOTES
Pt here for Sebastian Cooper. Arrives Ambulating independently, accompanied by Family member           Pregnancy screening: Denies possibility of pregnancy    Modifications in dose or schedule: No    Drugs/infusions dual verified for appearance and physical integrity.  IV pump settings were dual verified: yes     Frequency of blood return and site check throughout administration: Prior to administration and Prior to each drug   Discharged to Home, Ambulating independently, accompanied by:Family member    Outpatient Oncology Care Plan  Problem list:  knowledge deficit  Problems related to:  chemotherapy  Interventions:  promoted rest  provided general teaching  Expected outcomes:  patient supported/coping well  understands plan of care  Progress towards outcome:  making progress    Education Record    Learner:  Patient and Family Member  Barriers / Limitations:  None  Method:  Discussion  Outcome:  Shows understanding  Comments:

## 2022-10-14 NOTE — PROGRESS NOTES
Patient is here for MD f/hue and cycle 6 of Imfinzi. Patient had a CT scan on 9/14. She is following up with palliative care APN at [facility]. Patient was advised to take Morphine 15 mg twice daily instead of as needed. This has controlled her pain and helped her breathing. Patient does feel tired all the time and mild SOB with exertion.        Education Record    Learner:  Patient and Family Member    Disease / Diagnosis:  Lung cancer     Barriers / Limitations:  None   Comments:    Method:  Discussion   Comments:    General Topics:  Plan of care reviewed   Comments:    Outcome:  Shows understanding   Comments:

## 2022-10-15 NOTE — PROGRESS NOTES
[facility]    PATIENT'S NAME: Jake Reddy [facility]   ATTENDING PHYSICIAN: Franco Santos M.D. PATIENT ACCOUNT #: [de-identified] LOCATION: [address] [place] [address] RECORD #: [id number] YOB: 1970   DATE OF SERVICE: 10/14/2022       CANCER CENTER PROGRESS NOTE    CHIEF COMPLAINT:  Treatment of stage III lung cancer. HISTORY OF PRESENT ILLNESS:  The patient is a 69-year-old female. She has a history of stage III lung cancer with a right middle lobe mass at presentation with mediastinal involvement. She had an adenocarcinoma. She was treated with concurrent chemoradiotherapy and completed this in April. She has been taking immunotherapy with durvalumab since that time. She had imaging in April that showed some question of uptake in her right subclavicular lymph nodes, some areas in the right hilum. She was sent for possible biopsy, but it was not felt to be safe. She ultimately went through a bronchoscopy, which showed only inflammatory changes. The lymph nodes were sampled by EBUS and there was bronchoalveolar lavage. All of this was negative for malignancy. This was thought to be posttreatment change. There was one atypical cell cluster that was of uncertain nature. She had a followup CT scan done September 14 which showed some improvement. She has continued on the durvalumab. She has ongoing issues with cough and shortness of breath, but, if anything, they are better than they were. She has chronic pain, which predated the lung cancer. She is seeing a pain specialist and she is also seeing a palliative care nurse practitioner through [facility]. She is on morphine and taking long-acting morphine more regularly. She is having issues with constipation and she is taking multiple agents for this. Her other complaint is ongoing fatigue.      MEDICATIONS:   Her current medications include Tylenol p.r.n.; albuterol HFA inhaler p.r.n.; amphetamine/dextroamphetamine 30 mg b.i.d.; aripiprazole 2 mg daily; celecoxib 200 mg twice daily; cyclobenzaprine 10 mg t.i.d. p.r.n. muscle spasms; duloxetine 60 mg daily; Trelegy Ellipta 1 puff daily; hydrochlorothiazide 12.5 mg daily; hydrocodone/acetaminophen 10/325 one tablet q.8 h. p.r.n.; hydromorphone 2 mg p.r.n.; ibuprofen 200 mg q.6 h. p.r.n.; lactulose 30 mL 3 times a day, but she is not tolerant to this; lamotrigine 50 mg daily; levothyroxine 50 mcg daily; lisinopril 5 mg daily; morphine extended release 15 mg q.12 h.; pregabalin 75 mg twice daily; vitamin D3 at 5000 units daily. PHYSICAL EXAMINATION:    GENERAL:  She is a well-appearing female, in no acute distress. VITAL SIGNS:  Her performance status is 1. Weight is 202 pounds, which is up. Blood pressure is 136/86, pulse 112, respiratory rate is 20, temperature is 96.7. HEENT:  Unremarkable. LYMPHATICS:  She has no adenopathy. LUNGS:  Clear. HEART:  Normal.  ABDOMEN:  No hepatosplenomegaly or tenderness. EXTREMITIES:  She has no clubbing, cyanosis, or edema. LABORATORY DATA:  CBC is normal.  Thyroid function is normal.  Chemistries are normal with the exception of a glucose of 194. IMPRESSION:    1.   Stage III lung cancer. SP CHemo/RT. She is continuing on durvalumab. She has had no definite evidence of progressive disease. I am asking her to get another PET scan around Thanksgiving. Her respiratory status appears to be stable. 2.   Fatigue. In looking at the medications that she is taking, including multiple pain medications and multiple psychoactive agents, it is very likely that all of this is related. Her thyroid function is normal.  She is not anemic. She has had no clearcut evidence of any other metabolic derangement. I suspect it is medication related. Dictated By Jim Herring M.D.  d: 10/15/2022 06:46:01  t: 10/15/2022 07:01:16  [facility] [id number]  GR/    cc: DO Kristan Contreras.  MD Cynthia Flaherty M.D.

## 2022-11-09 ENCOUNTER — TELEPHONE (OUTPATIENT)
Dept: CASE MANAGEMENT | Age: 52
End: 2022-11-09

## 2022-11-11 ENCOUNTER — TELEPHONE (OUTPATIENT)
Dept: HEMATOLOGY/ONCOLOGY | Facility: HOSPITAL | Age: 52
End: 2022-11-11

## 2022-11-11 ENCOUNTER — OFFICE VISIT (OUTPATIENT)
Dept: HEMATOLOGY/ONCOLOGY | Facility: HOSPITAL | Age: 52
End: 2022-11-11
Attending: GENETIC COUNSELOR, MS
Payer: MEDICAID

## 2022-11-11 VITALS
RESPIRATION RATE: 18 BRPM | DIASTOLIC BLOOD PRESSURE: 85 MMHG | OXYGEN SATURATION: 99 % | HEIGHT: 65.35 IN | BODY MASS INDEX: 33.58 KG/M2 | HEART RATE: 109 BPM | WEIGHT: 204 LBS | SYSTOLIC BLOOD PRESSURE: 136 MMHG | TEMPERATURE: 98 F

## 2022-11-11 DIAGNOSIS — R59.0 MEDIASTINAL ADENOPATHY: ICD-10-CM

## 2022-11-11 DIAGNOSIS — C34.91 PRIMARY ADENOCARCINOMA OF RIGHT LUNG (HCC): Primary | ICD-10-CM

## 2022-11-11 DIAGNOSIS — C34.91 PRIMARY LUNG CANCER WITH METASTASIS FROM LUNG TO OTHER SITE, RIGHT (HCC): Primary | ICD-10-CM

## 2022-11-11 LAB
ALBUMIN SERPL-MCNC: 3.5 G/DL (ref 3.4–5)
ALBUMIN/GLOB SERPL: 1.1 {RATIO} (ref 1–2)
ALP LIVER SERPL-CCNC: 90 U/L
ALT SERPL-CCNC: 33 U/L
ANION GAP SERPL CALC-SCNC: 4 MMOL/L (ref 0–18)
AST SERPL-CCNC: 18 U/L (ref 15–37)
BASOPHILS # BLD AUTO: 0.04 X10(3) UL (ref 0–0.2)
BASOPHILS NFR BLD AUTO: 0.8 %
BILIRUB SERPL-MCNC: 0.2 MG/DL (ref 0.1–2)
BUN BLD-MCNC: 19 MG/DL (ref 7–18)
CALCIUM BLD-MCNC: 9.1 MG/DL (ref 8.5–10.1)
CHLORIDE SERPL-SCNC: 110 MMOL/L (ref 98–112)
CO2 SERPL-SCNC: 28 MMOL/L (ref 21–32)
CREAT BLD-MCNC: 0.94 MG/DL
EOSINOPHIL # BLD AUTO: 0.13 X10(3) UL (ref 0–0.7)
EOSINOPHIL NFR BLD AUTO: 2.7 %
ERYTHROCYTE [DISTWIDTH] IN BLOOD BY AUTOMATED COUNT: 14.2 %
FASTING STATUS PATIENT QL REPORTED: NO
GFR SERPLBLD BASED ON 1.73 SQ M-ARVRAT: 73 ML/MIN/1.73M2 (ref 60–?)
GLOBULIN PLAS-MCNC: 3.1 G/DL (ref 2.8–4.4)
GLUCOSE BLD-MCNC: 108 MG/DL (ref 70–99)
HCT VFR BLD AUTO: 35.7 %
HGB BLD-MCNC: 12 G/DL
IMM GRANULOCYTES # BLD AUTO: 0.01 X10(3) UL (ref 0–1)
IMM GRANULOCYTES NFR BLD: 0.2 %
LYMPHOCYTES # BLD AUTO: 0.52 X10(3) UL (ref 1–4)
LYMPHOCYTES NFR BLD AUTO: 10.9 %
MCH RBC QN AUTO: 29 PG (ref 26–34)
MCHC RBC AUTO-ENTMCNC: 33.6 G/DL (ref 31–37)
MCV RBC AUTO: 86.2 FL
MONOCYTES # BLD AUTO: 0.67 X10(3) UL (ref 0.1–1)
MONOCYTES NFR BLD AUTO: 14.1 %
NEUTROPHILS # BLD AUTO: 3.38 X10 (3) UL (ref 1.5–7.7)
NEUTROPHILS # BLD AUTO: 3.38 X10(3) UL (ref 1.5–7.7)
NEUTROPHILS NFR BLD AUTO: 71.3 %
OSMOLALITY SERPL CALC.SUM OF ELEC: 297 MOSM/KG (ref 275–295)
PLATELET # BLD AUTO: 257 10(3)UL (ref 150–450)
POTASSIUM SERPL-SCNC: 3.8 MMOL/L (ref 3.5–5.1)
PROT SERPL-MCNC: 6.6 G/DL (ref 6.4–8.2)
RBC # BLD AUTO: 4.14 X10(6)UL
SODIUM SERPL-SCNC: 142 MMOL/L (ref 136–145)
WBC # BLD AUTO: 4.8 X10(3) UL (ref 4–11)

## 2022-11-11 PROCEDURE — 85025 COMPLETE CBC W/AUTO DIFF WBC: CPT

## 2022-11-11 PROCEDURE — 96413 CHEMO IV INFUSION 1 HR: CPT

## 2022-11-11 PROCEDURE — 80053 COMPREHEN METABOLIC PANEL: CPT

## 2022-11-11 NOTE — TELEPHONE ENCOUNTER
Spoke with Dr Sammie Mena at [facility] to discuss clinical details. Approval for PET received, auth #[id number] valid 11/11/22-5/10/23.

## 2022-11-11 NOTE — PROGRESS NOTES
Patient presents with:  Immunotherapy: APN assessment prior to treatment    Pt is here for treatment - C7 D1 imfinzi is expected. Continues to feel overall fatigued; constipation is improving with apricots and miralax. Denies N,V,D. Pain managed with morphine regimen.      Education Record    Learner:  Patient    Disease / Diagnosis: lung cancer    Barriers / Limitations:  None   Comments:    Method:  Brief focused   Comments:    General Topics:  Diet, Medication, Pain, Side effects and symptom management and Plan of care reviewed   Comments:    Outcome:  Shows understanding   Comments:

## 2022-11-11 NOTE — PROGRESS NOTES
Pt here for C7D1 of Imfinzi. Arrives Ambulating independently, accompanied by Self           Pregnancy screening: Not applicable    Modifications in dose or schedule: No    Drugs/infusions dual verified for appearance and physical integrity. IV pump settings were dual verified: yes     Frequency of blood return and site check throughout administration: Prior to administration   Discharged to Home, Ambulating independently, accompanied by:Self    Outpatient Oncology Care Plan  Problem list:  knowledge deficit  Problems related to:  chemotherapy  Interventions:  chemotherapy teaching  Expected outcomes:  understands plan of care  Progress towards outcome:  making progress    Education Record    Learner:  Patient  Barriers / Limitations:  None  Method:  Brief focused  Outcome:  Shows understanding  Comments: Tolerated well her infusion.  Given AVS

## 2022-11-14 ENCOUNTER — TELEPHONE (OUTPATIENT)
Dept: HEMATOLOGY/ONCOLOGY | Facility: HOSPITAL | Age: 52
End: 2022-11-14

## 2022-11-14 ENCOUNTER — HOSPITAL ENCOUNTER (OUTPATIENT)
Dept: NUCLEAR MEDICINE | Facility: HOSPITAL | Age: 52
Discharge: HOME | End: 2022-11-14
Attending: INTERNAL MEDICINE
Payer: MEDICAID

## 2022-11-14 DIAGNOSIS — C34.91 MALIGNANT NEOPLASM OF RIGHT LUNG, UNSPECIFIED PART OF LUNG (HCC): ICD-10-CM

## 2022-11-14 LAB — GLUCOSE BLD-MCNC: 99 MG/DL (ref 70–99)

## 2022-11-14 PROCEDURE — 78815 PET IMAGE W/CT SKULL-THIGH: CPT | Performed by: INTERNAL MEDICINE

## 2022-11-14 PROCEDURE — 82962 GLUCOSE BLOOD TEST: CPT

## 2022-11-18 ENCOUNTER — TELEPHONE (OUTPATIENT)
Dept: HEMATOLOGY/ONCOLOGY | Facility: HOSPITAL | Age: 52
End: 2022-11-18

## 2022-11-18 NOTE — TELEPHONE ENCOUNTER
Per Dr Xochilt Mohan, he recommends patient sees Dr Silvia Roldan, gastroenterologist. Called patient, left her a voicemail with Dr Teja Brooks information.

## 2022-11-18 NOTE — TELEPHONE ENCOUNTER
Mychal Dennis was told by Jaswant Chacon that he wants her to see a  Gastroenterologist that he would speak with them and they would give her a call and schedule the appointment. Hasn't heard from that Doctor would like to know who the doctor is and how to reach them.  Thanks Gwendolyn Recio

## 2022-12-09 ENCOUNTER — OFFICE VISIT (OUTPATIENT)
Dept: HEMATOLOGY/ONCOLOGY | Facility: HOSPITAL | Age: 52
End: 2022-12-09
Attending: GENETIC COUNSELOR, MS
Payer: MEDICAID

## 2022-12-09 VITALS
OXYGEN SATURATION: 96 % | DIASTOLIC BLOOD PRESSURE: 83 MMHG | HEIGHT: 65.35 IN | TEMPERATURE: 97 F | BODY MASS INDEX: 34.4 KG/M2 | SYSTOLIC BLOOD PRESSURE: 126 MMHG | WEIGHT: 209 LBS | HEART RATE: 115 BPM | RESPIRATION RATE: 18 BRPM

## 2022-12-09 DIAGNOSIS — R59.0 MEDIASTINAL ADENOPATHY: ICD-10-CM

## 2022-12-09 DIAGNOSIS — C34.91 PRIMARY ADENOCARCINOMA OF RIGHT LUNG (HCC): Primary | ICD-10-CM

## 2022-12-09 DIAGNOSIS — C34.91 PRIMARY LUNG CANCER WITH METASTASIS FROM LUNG TO OTHER SITE, RIGHT (HCC): Primary | ICD-10-CM

## 2022-12-09 LAB
ALBUMIN SERPL-MCNC: 3.5 G/DL (ref 3.4–5)
ALBUMIN/GLOB SERPL: 1.1 {RATIO} (ref 1–2)
ALP LIVER SERPL-CCNC: 76 U/L
ALT SERPL-CCNC: 31 U/L
ANION GAP SERPL CALC-SCNC: 5 MMOL/L (ref 0–18)
AST SERPL-CCNC: 19 U/L (ref 15–37)
BASOPHILS # BLD AUTO: 0.03 X10(3) UL (ref 0–0.2)
BASOPHILS NFR BLD AUTO: 0.8 %
BILIRUB SERPL-MCNC: 0.3 MG/DL (ref 0.1–2)
BUN BLD-MCNC: 18 MG/DL (ref 7–18)
CALCIUM BLD-MCNC: 9.3 MG/DL (ref 8.5–10.1)
CHLORIDE SERPL-SCNC: 108 MMOL/L (ref 98–112)
CO2 SERPL-SCNC: 27 MMOL/L (ref 21–32)
CREAT BLD-MCNC: 1.01 MG/DL
EOSINOPHIL # BLD AUTO: 0.22 X10(3) UL (ref 0–0.7)
EOSINOPHIL NFR BLD AUTO: 6 %
ERYTHROCYTE [DISTWIDTH] IN BLOOD BY AUTOMATED COUNT: 13.5 %
FASTING STATUS PATIENT QL REPORTED: NO
GFR SERPLBLD BASED ON 1.73 SQ M-ARVRAT: 67 ML/MIN/1.73M2 (ref 60–?)
GLOBULIN PLAS-MCNC: 3.2 G/DL (ref 2.8–4.4)
GLUCOSE BLD-MCNC: 120 MG/DL (ref 70–99)
HCT VFR BLD AUTO: 34.7 %
HGB BLD-MCNC: 11.6 G/DL
IMM GRANULOCYTES # BLD AUTO: 0.02 X10(3) UL (ref 0–1)
IMM GRANULOCYTES NFR BLD: 0.5 %
LYMPHOCYTES # BLD AUTO: 0.69 X10(3) UL (ref 1–4)
LYMPHOCYTES NFR BLD AUTO: 18.9 %
MCH RBC QN AUTO: 29.4 PG (ref 26–34)
MCHC RBC AUTO-ENTMCNC: 33.4 G/DL (ref 31–37)
MCV RBC AUTO: 87.8 FL
MONOCYTES # BLD AUTO: 0.57 X10(3) UL (ref 0.1–1)
MONOCYTES NFR BLD AUTO: 15.6 %
NEUTROPHILS # BLD AUTO: 2.13 X10 (3) UL (ref 1.5–7.7)
NEUTROPHILS # BLD AUTO: 2.13 X10(3) UL (ref 1.5–7.7)
NEUTROPHILS NFR BLD AUTO: 58.2 %
OSMOLALITY SERPL CALC.SUM OF ELEC: 293 MOSM/KG (ref 275–295)
PLATELET # BLD AUTO: 221 10(3)UL (ref 150–450)
POTASSIUM SERPL-SCNC: 4.1 MMOL/L (ref 3.5–5.1)
PROT SERPL-MCNC: 6.7 G/DL (ref 6.4–8.2)
RBC # BLD AUTO: 3.95 X10(6)UL
SODIUM SERPL-SCNC: 140 MMOL/L (ref 136–145)
WBC # BLD AUTO: 3.7 X10(3) UL (ref 4–11)

## 2022-12-09 PROCEDURE — 85025 COMPLETE CBC W/AUTO DIFF WBC: CPT

## 2022-12-09 PROCEDURE — 99214 OFFICE O/P EST MOD 30 MIN: CPT | Performed by: INTERNAL MEDICINE

## 2022-12-09 PROCEDURE — 80053 COMPREHEN METABOLIC PANEL: CPT

## 2022-12-09 PROCEDURE — 96413 CHEMO IV INFUSION 1 HR: CPT

## 2022-12-09 NOTE — PROGRESS NOTES
Pt here for C8D1 Imfinzi. Arrives Ambulating independently, accompanied by Self           Pregnancy screening: Not applicable    Modifications in dose or schedule: No    Drugs/infusions dual verified for appearance and physical integrity.  IV pump settings were dual verified: yes     Frequency of blood return and site check throughout administration: Prior to administration and At completion of therapy   Discharged to Home, Ambulating independently, accompanied by:Self    Outpatient Oncology Care Plan  Problem list:  respiratory  fatigue  Problems related to:  chemotherapy  disease/disease progression  side effect of treatment  Interventions:  monitor effect of therapy  monitor lab values  promoted rest  provided general teaching  Expected outcomes:  adequate sleep/rest  optimal lab values  symptoms relieved/minimized  understands plan of care  Progress towards outcome:  making progress    Education Record    Learner:  Patient  Barriers / Limitations:  None  Method:  Brief focused  Outcome:  Shows understanding  Comments:

## 2022-12-09 NOTE — PROGRESS NOTES
Patient is here for MD f/u and cycle 8 of Imfinzi. Patient is scheduled for an EGD in January. She is eating well. Ongoing chronic back pain. She c/o feeling tired all the time and SOB with exertion.        Education Record    Learner:  Patient    Disease / Diagnosis:  Lung cancer     Barriers / Limitations:  None   Comments:    Method:  Discussion   Comments:    General Topics:  Plan of care reviewed   Comments:    Outcome:  Shows understanding   Comments:

## 2023-01-06 ENCOUNTER — OFFICE VISIT (OUTPATIENT)
Dept: HEMATOLOGY/ONCOLOGY | Facility: HOSPITAL | Age: 53
End: 2023-01-06
Attending: GENETIC COUNSELOR, MS
Payer: MEDICAID

## 2023-01-06 VITALS
OXYGEN SATURATION: 99 % | BODY MASS INDEX: 32.92 KG/M2 | WEIGHT: 200 LBS | RESPIRATION RATE: 18 BRPM | HEART RATE: 65 BPM | HEIGHT: 65.35 IN | SYSTOLIC BLOOD PRESSURE: 131 MMHG | DIASTOLIC BLOOD PRESSURE: 90 MMHG | TEMPERATURE: 97 F

## 2023-01-06 DIAGNOSIS — R59.0 MEDIASTINAL ADENOPATHY: ICD-10-CM

## 2023-01-06 DIAGNOSIS — C34.91 PRIMARY ADENOCARCINOMA OF RIGHT LUNG (HCC): Primary | ICD-10-CM

## 2023-01-06 LAB
ALBUMIN SERPL-MCNC: 3.5 G/DL (ref 3.4–5)
ALBUMIN SERPL-MCNC: 3.6 G/DL (ref 3.4–5)
ALBUMIN/GLOB SERPL: 1 {RATIO} (ref 1–2)
ALBUMIN/GLOB SERPL: 1.2 {RATIO} (ref 1–2)
ALP LIVER SERPL-CCNC: 73 U/L
ALP LIVER SERPL-CCNC: 75 U/L
ALT SERPL-CCNC: 28 U/L
ALT SERPL-CCNC: 30 U/L
ANION GAP SERPL CALC-SCNC: 1 MMOL/L (ref 0–18)
ANION GAP SERPL CALC-SCNC: 3 MMOL/L (ref 0–18)
AST SERPL-CCNC: 22 U/L (ref 15–37)
BASOPHILS # BLD AUTO: 0.03 X10(3) UL (ref 0–0.2)
BASOPHILS NFR BLD AUTO: 0.7 %
BILIRUB SERPL-MCNC: 0.4 MG/DL (ref 0.1–2)
BILIRUB SERPL-MCNC: 0.6 MG/DL (ref 0.1–2)
BUN BLD-MCNC: 14 MG/DL (ref 7–18)
BUN BLD-MCNC: 14 MG/DL (ref 7–18)
CALCIUM BLD-MCNC: 9 MG/DL (ref 8.5–10.1)
CALCIUM BLD-MCNC: 9.3 MG/DL (ref 8.5–10.1)
CHLORIDE SERPL-SCNC: 109 MMOL/L (ref 98–112)
CHLORIDE SERPL-SCNC: 110 MMOL/L (ref 98–112)
CO2 SERPL-SCNC: 25 MMOL/L (ref 21–32)
CO2 SERPL-SCNC: 28 MMOL/L (ref 21–32)
CREAT BLD-MCNC: 0.84 MG/DL
CREAT BLD-MCNC: 0.97 MG/DL
EOSINOPHIL # BLD AUTO: 0.16 X10(3) UL (ref 0–0.7)
EOSINOPHIL NFR BLD AUTO: 3.8 %
ERYTHROCYTE [DISTWIDTH] IN BLOOD BY AUTOMATED COUNT: 12.9 %
FASTING STATUS PATIENT QL REPORTED: NO
FASTING STATUS PATIENT QL REPORTED: NO
GFR SERPLBLD BASED ON 1.73 SQ M-ARVRAT: 71 ML/MIN/1.73M2 (ref 60–?)
GFR SERPLBLD BASED ON 1.73 SQ M-ARVRAT: 84 ML/MIN/1.73M2 (ref 60–?)
GLOBULIN PLAS-MCNC: 3.1 G/DL (ref 2.8–4.4)
GLOBULIN PLAS-MCNC: 3.4 G/DL (ref 2.8–4.4)
GLUCOSE BLD-MCNC: 104 MG/DL (ref 70–99)
GLUCOSE BLD-MCNC: 143 MG/DL (ref 70–99)
HCT VFR BLD AUTO: 36.1 %
HGB BLD-MCNC: 12.5 G/DL
IMM GRANULOCYTES # BLD AUTO: 0.01 X10(3) UL (ref 0–1)
IMM GRANULOCYTES NFR BLD: 0.2 %
LYMPHOCYTES # BLD AUTO: 0.47 X10(3) UL (ref 1–4)
LYMPHOCYTES NFR BLD AUTO: 11.1 %
MCH RBC QN AUTO: 30 PG (ref 26–34)
MCHC RBC AUTO-ENTMCNC: 34.6 G/DL (ref 31–37)
MCV RBC AUTO: 86.6 FL
MONOCYTES # BLD AUTO: 0.45 X10(3) UL (ref 0.1–1)
MONOCYTES NFR BLD AUTO: 10.7 %
NEUTROPHILS # BLD AUTO: 3.1 X10 (3) UL (ref 1.5–7.7)
NEUTROPHILS # BLD AUTO: 3.1 X10(3) UL (ref 1.5–7.7)
NEUTROPHILS NFR BLD AUTO: 73.5 %
OSMOLALITY SERPL CALC.SUM OF ELEC: 287 MOSM/KG (ref 275–295)
OSMOLALITY SERPL CALC.SUM OF ELEC: 289 MOSM/KG (ref 275–295)
PLATELET # BLD AUTO: 241 10(3)UL (ref 150–450)
POTASSIUM SERPL-SCNC: 4.1 MMOL/L (ref 3.5–5.1)
PROT SERPL-MCNC: 6.7 G/DL (ref 6.4–8.2)
PROT SERPL-MCNC: 6.9 G/DL (ref 6.4–8.2)
RBC # BLD AUTO: 4.17 X10(6)UL
SODIUM SERPL-SCNC: 137 MMOL/L (ref 136–145)
SODIUM SERPL-SCNC: 139 MMOL/L (ref 136–145)
T4 FREE SERPL-MCNC: 0.8 NG/DL (ref 0.8–1.7)
TSI SER-ACNC: 1.26 MIU/ML (ref 0.36–3.74)
WBC # BLD AUTO: 4.2 X10(3) UL (ref 4–11)

## 2023-01-06 PROCEDURE — 85025 COMPLETE CBC W/AUTO DIFF WBC: CPT

## 2023-01-06 PROCEDURE — 80053 COMPREHEN METABOLIC PANEL: CPT

## 2023-01-06 PROCEDURE — 96413 CHEMO IV INFUSION 1 HR: CPT

## 2023-01-06 PROCEDURE — 84439 ASSAY OF FREE THYROXINE: CPT

## 2023-01-06 PROCEDURE — 84443 ASSAY THYROID STIM HORMONE: CPT

## 2023-01-06 PROCEDURE — 99214 OFFICE O/P EST MOD 30 MIN: CPT | Performed by: INTERNAL MEDICINE

## 2023-01-06 NOTE — PROGRESS NOTES
Pt here for C9D1. Arrives Ambulating independently, accompanied by Self           Pregnancy screening: Not applicable    Modifications in dose or schedule: No    Drugs/infusions dual verified for appearance and physical integrity. IV pump settings were dual verified: yes     Frequency of blood return and site check throughout administration: Prior to administration   Discharged to Home, Ambulating independently, accompanied by:Self    Outpatient Oncology Care Plan  Problem list:  knowledge deficit  Problems related to:  side effect of treatment  Interventions:  monitor effect of therapy  Expected outcomes:  understands plan of care  Progress towards outcome:  making progress    Education Record    Learner:  Patient  Barriers / Limitations:  None  Method:  Discussion  Outcome:  Shows understanding  Comments:    Here for treatment. Feeling well. CMP hemolyzed-AST and potassium not resulted. Per MD, okay to proceed with imfinzi with rest of CMP which was all WNL. Additional CMP drawn to recheck levels. Tolerated well. Next appt scheduled.

## 2023-01-06 NOTE — PROGRESS NOTES
[facility]    PATIENT'S NAME: [facility]   ATTENDING PHYSICIAN: Wilfrid Reed M.D. PATIENT ACCOUNT #: [de-identified] LOCATION: [address] [place] [address] RECORD #: [id number] YOB: 1970   DATE OF SERVICE: 01/06/2023       CANCER CENTER PROGRESS NOTE    CHIEF COMPLAINT:  Followup and treatment of stage III adenocarcinoma of the lung. HISTORY OF PRESENT ILLNESS:  The patient is a 20-year-old female. She had a stage III adenocarcinoma of the right middle lobe with mediastinal involvement. She was treated with concurrent chemoradiotherapy and completed it in April 2022. She has been started now on durvalumab. Her first dose was in May. She had imaging early in the year that questions whether she had progressive disease. She went through further imaging and bronchoscopy; this was all negative. This area subsequently settled down on followup CT scans and it appeared to be consistent with treatment effect. She also had a PET scan done in November that looked better, as well. She continues on the durvalumab with the presumption that she has not progressed. She is tolerating it well. She is having no major autoimmune toxicities. She has chronic back pain for which she takes stable doses of narcotic pain medication through a pain clinic. She is tired frequently. She is short of breath with exertion. She is planning an upper endoscopy with Dr. Sergey Messina; this is tentatively scheduled for later this month. She is still having constipation. I am not sure if they are also planning a colonoscopy. She has fatigue that is a little bit worse than her previous cycle.     MEDICATIONS:  Her current medications include albuterol HFA inhaler 2 puffs q.4 h. p.r.n., amphetamine/dextroamphetamine 30 mg twice daily, cyclobenzaprine 10 mg t.i.d. p.r.n. muscle spasms, duloxetine 60 mg daily, Trelegy Ellipta 1 puff daily, hydrochlorothiazide which she is not taking routinely, ibuprofen 200 mg q.6 h. p.r.n. pain, lamotrigine 50 mg daily, levothyroxine 50 mcg daily, lisinopril 5 mg daily, morphine 7.5 mg q.4 h. p.r.n. dyspnea or pain, morphine extended release 15 mg q.12 h., pregabalin 75 mg twice daily, and vitamin D3 at 5000 units daily. PHYSICAL EXAMINATION:    GENERAL:  She is a reasonably well-appearing female in no acute distress. VITAL SIGNS:  Her performance status is 0 to 1. Her weight is 200 pounds. Blood pressure is 131/90, pulse 65, respiratory rate is 20, temperature is 97.2. HEENT:  Unremarkable. She has pink conjunctivae, anicteric sclerae. Pharynx without lesions. LYMPHATICS:  She has no cervical, supraclavicular, or axillary adenopathy. LUNGS:  Resonant to percussion and clear to auscultation, with no wheezing, rales, or rhonchi. HEART:  Normal.  ABDOMEN:  No hepatosplenomegaly or tenderness. EXTREMITIES:  She has no clubbing, cyanosis, or edema. LABORATORY DATA:  Her chemistries are normal.  Her CBC is completely normal.    IMPRESSION:  Stage III lung cancer. She has no evidence of progressive disease. She is going to be due for her next surveillance CT scan in the middle of February. She is coming in every 4 weeks for the durvalumab, and her last dose will be in early April. So far, she has no signs of disease progression. Dictated By Clementina Ceballos M.D.  d: 01/06/2023 16:33:07  t: 01/06/2023 17:04:47  [facility] [id number]  /    cc: Jessica Fonseca DO   [id number]  Maryellen Ho MD no

## 2023-01-06 NOTE — PROGRESS NOTES
Outpatient Oncology Care Plan   Problem list:   fatigue   Problems related to:   chemotherapy   Interventions:   provided general teaching   Expected outcomes:   understands plan of care   Progress towards outcome: making progress     Education Record   Learner: Patient   Barriers / Limitations: None   Method: Discussion   Outcome: Shows understanding   Comments:  Patient here for follow-up and planned C9D1 treatment. States she is still having constipation. Will have scopes with GI on 1/17/23. Fatigue worse than previous cycle. Back pain remains unchanged.

## 2023-01-11 RX ORDER — MULTIVITAMIN
1 TABLET ORAL DAILY
COMMUNITY

## 2023-01-14 ENCOUNTER — LAB ENCOUNTER (OUTPATIENT)
Dept: LAB | Age: 53
End: 2023-01-14
Attending: INTERNAL MEDICINE
Payer: MEDICAID

## 2023-01-14 DIAGNOSIS — Z01.812 ENCOUNTER FOR PREOPERATIVE SCREENING LABORATORY TESTING FOR COVID-19 VIRUS: ICD-10-CM

## 2023-01-14 DIAGNOSIS — Z20.822 ENCOUNTER FOR PREOPERATIVE SCREENING LABORATORY TESTING FOR COVID-19 VIRUS: ICD-10-CM

## 2023-01-15 LAB — SARS-COV-2 RNA RESP QL NAA+PROBE: NOT DETECTED

## 2023-01-17 ENCOUNTER — ANESTHESIA (OUTPATIENT)
Dept: ENDOSCOPY | Facility: HOSPITAL | Age: 53
End: 2023-01-17
Payer: MEDICAID

## 2023-01-17 ENCOUNTER — ANESTHESIA EVENT (OUTPATIENT)
Dept: ENDOSCOPY | Facility: HOSPITAL | Age: 53
End: 2023-01-17
Payer: MEDICAID

## 2023-01-17 ENCOUNTER — HOSPITAL ENCOUNTER (OUTPATIENT)
Facility: HOSPITAL | Age: 53
Setting detail: HOSPITAL OUTPATIENT SURGERY
Discharge: HOME | End: 2023-01-17
Attending: INTERNAL MEDICINE | Admitting: INTERNAL MEDICINE
Payer: MEDICAID

## 2023-01-17 VITALS
OXYGEN SATURATION: 95 % | BODY MASS INDEX: 33.32 KG/M2 | WEIGHT: 200 LBS | HEART RATE: 96 BPM | RESPIRATION RATE: 18 BRPM | SYSTOLIC BLOOD PRESSURE: 110 MMHG | TEMPERATURE: 97 F | DIASTOLIC BLOOD PRESSURE: 84 MMHG | HEIGHT: 65 IN

## 2023-01-17 DIAGNOSIS — Z01.812 ENCOUNTER FOR PREOPERATIVE SCREENING LABORATORY TESTING FOR COVID-19 VIRUS: Primary | ICD-10-CM

## 2023-01-17 DIAGNOSIS — Z20.822 ENCOUNTER FOR PREOPERATIVE SCREENING LABORATORY TESTING FOR COVID-19 VIRUS: Primary | ICD-10-CM

## 2023-01-17 PROCEDURE — 88305 TISSUE EXAM BY PATHOLOGIST: CPT | Performed by: INTERNAL MEDICINE

## 2023-01-17 PROCEDURE — 0DJD8ZZ INSPECTION OF LOWER INTESTINAL TRACT, VIA NATURAL OR ARTIFICIAL OPENING ENDOSCOPIC: ICD-10-PCS | Performed by: INTERNAL MEDICINE

## 2023-01-17 PROCEDURE — 0DB58ZX EXCISION OF ESOPHAGUS, VIA NATURAL OR ARTIFICIAL OPENING ENDOSCOPIC, DIAGNOSTIC: ICD-10-PCS | Performed by: INTERNAL MEDICINE

## 2023-01-17 RX ORDER — SODIUM CHLORIDE, SODIUM LACTATE, POTASSIUM CHLORIDE, CALCIUM CHLORIDE 600; 310; 30; 20 MG/100ML; MG/100ML; MG/100ML; MG/100ML
INJECTION, SOLUTION INTRAVENOUS CONTINUOUS
Status: DISCONTINUED | OUTPATIENT
Start: 2023-01-17 | End: 2023-01-17

## 2023-01-17 RX ORDER — NALOXONE HYDROCHLORIDE 0.4 MG/ML
80 INJECTION, SOLUTION INTRAMUSCULAR; INTRAVENOUS; SUBCUTANEOUS AS NEEDED
Status: DISCONTINUED | OUTPATIENT
Start: 2023-01-17 | End: 2023-01-17

## 2023-01-17 RX ADMIN — SODIUM CHLORIDE, SODIUM LACTATE, POTASSIUM CHLORIDE, CALCIUM CHLORIDE: 600; 310; 30; 20 INJECTION, SOLUTION INTRAVENOUS at 08:23:00

## 2023-01-17 NOTE — ANESTHESIA POSTPROCEDURE EVALUATION
[facility] Patient Status:  Hospital Outpatient Surgery   Age/Gender 47year old female MRN [id number]   Location [address] [address] Attending Manjit Cannon MD   [facility] Day # 0 PCP Calos Amin DO       Anesthesia Post-op Note    ESOPHAGOGASTRODUODENOSCOPY (EGD) WITH BIOPSY AND COLONOSCOPY    Procedure Summary     Date: 01/17/23 Room / Location: [employer] ENDOSCOPY 04 / 1404 [facility] ENDOSCOPY    Anesthesia Start: 7129 Anesthesia Stop: 1992    Procedures:       ESOPHAGOGASTRODUODENOSCOPY (EGD) WITH BIOPSY AND COLONOSCOPY      COLONOSCOPY Diagnosis: (egd: normal, Colon: NORMAL)    Surgeons: Manjit Cannon MD Anesthesiologist: Robin Saint., MD    Anesthesia Type: MAC ASA Status: 3          Anesthesia Type: MAC    Vitals Value Taken Time   /87 01/17/23 0851   Temp  01/17/23 0851   Pulse 110 01/17/23 0851   Resp 108 01/17/23 0851   SpO2 96 01/17/23 0851       Patient Location: Endoscopy    Anesthesia Type: MAC    Airway Patency: patent    Postop Pain Control: adequate    Mental Status: preanesthetic baseline    Nausea/Vomiting: none    Cardiopulmonary/Hydration status: stable euvolemic    Complications: no apparent anesthesia related complications    Postop vital signs: stable    Dental Exam: Unchanged from Preop    Patient to be discharged from PACU when criteria met.

## 2023-01-17 NOTE — DISCHARGE INSTRUCTIONS
ENDOSCOPY DISCHARGE INSTRUCTIONS    Procedure Performed:   Gastroscopy and Colonoscopy    Endoscopist: No name on file. FINDINGS:   Normal EGD and Normal colon    MEDICATIONS:  You may resume all other medications today    DIET:  Resume Normal Diet    BIOPSIES:  Biopsy results will be released to you as soon as they are available as is now the law. This will not allow your physician the opportunity to go over these before they are released to you. It is not necessary to contact the office for explanation of these results. Your physician will contact you within a few business days of their release to review the findings with you    X-RAYS/LABS:   No X-rays/Labs were ordered today    ADDITIONAL RECOMMENDATIONS:    colonoscopy in 10 years    Activity for remainder of today:    REST TODAY  DO NOT drive or operate heavy machinery  DO NOT drink any alcoholic beverages  DO NOT sign any legal documents or make any important decisions    After your procedure(s): It is not unusual to feel bloated or gassy . Passing gas and belching is encouraged. Lying on your left side with your knees flexed may relieve the discomfort. A hot pack to the abdomen may also help. After your gastroscopy (upper endoscopy): You may experience a slight sore throat which will subside. Throat lozenges or salt water gargle can be used. FOLLOW-UP:  Contact the office at [phone] for follow-up appointment is needed or if you develop any of the following:    Severe abdominal pain/discomfort     Excessive bleeding                     Black tarry stool    Difficulty breathing/swallowing      Persistent nausea/vomiting  Fever above 100 degrees or chills          Home Care Instructions for Colonoscopy and Gastroscopy with Sedation    Diet:  - Resume your regular diet as tolerated. - Start with light meals to minimize bloating.  - Do not drink alcohol today.     Medication:  - If you have questions about resuming your normal medications, please contact your Primary Care Physician. Activities:  - Take it easy today. Do not return to work today. - Do not drive today. - Do not operate any machinery today (including kitchen equipment). Colonoscopy:  - You may notice some rectal \"spotting\" (a little blood on the toilet tissue) for a day or two after the exam. This is normal.  - If you experience any rectal bleeding (not spotting), persistent tenderness or sharp severe abdominal pains, oral temperature over 100 degrees Fahrenheit, light-headedness or dizziness, or any other problems, contact your doctor. Gastroscopy:  - You may have a sore throat for 2-3 days following the exam. This is normal. Gargling with warm salt water (1/2 tsp salt to 1 glass warm water) or using throat lozenges will help. - If you experience any sharp pain in your neck, abdomen or chest, vomiting of blood, oral temperature over 100 degrees Fahrenheit, light-headedness or dizziness, or any other problems, contact your doctor. **If unable to reach your doctor, please go to the [facility] Emergency Room**    - Your referring physician will receive a full report of your examination.  - If you do not hear from your doctor's office within two weeks of your biopsy, please call them for your results. You may be able to see your laboratory results in [facility] between 4 and 7 business days. In some cases, your physician may not have viewed the results before they are released to [address] If you have questions regarding your results contact the physician who ordered the test/exam by phone or via [address] by choosing \"Ask a Medical Question. \"

## 2023-01-31 ENCOUNTER — PATIENT MESSAGE (OUTPATIENT)
Dept: HEMATOLOGY/ONCOLOGY | Facility: HOSPITAL | Age: 53
End: 2023-01-31

## 2023-02-03 ENCOUNTER — OFFICE VISIT (OUTPATIENT)
Dept: HEMATOLOGY/ONCOLOGY | Facility: HOSPITAL | Age: 53
End: 2023-02-03
Attending: GENETIC COUNSELOR, MS
Payer: MEDICAID

## 2023-02-03 VITALS
DIASTOLIC BLOOD PRESSURE: 91 MMHG | WEIGHT: 195 LBS | SYSTOLIC BLOOD PRESSURE: 148 MMHG | HEIGHT: 65.35 IN | HEART RATE: 98 BPM | TEMPERATURE: 97 F | OXYGEN SATURATION: 99 % | RESPIRATION RATE: 18 BRPM | BODY MASS INDEX: 32.1 KG/M2

## 2023-02-03 DIAGNOSIS — C34.91 PRIMARY ADENOCARCINOMA OF RIGHT LUNG (HCC): Primary | ICD-10-CM

## 2023-02-03 DIAGNOSIS — R59.0 MEDIASTINAL ADENOPATHY: ICD-10-CM

## 2023-02-03 LAB
ALBUMIN SERPL-MCNC: 3.7 G/DL (ref 3.4–5)
ALBUMIN/GLOB SERPL: 1.3 {RATIO} (ref 1–2)
ALP LIVER SERPL-CCNC: 84 U/L
ALT SERPL-CCNC: 27 U/L
ANION GAP SERPL CALC-SCNC: 5 MMOL/L (ref 0–18)
AST SERPL-CCNC: 26 U/L (ref 15–37)
BASOPHILS # BLD AUTO: 0.03 X10(3) UL (ref 0–0.2)
BASOPHILS NFR BLD AUTO: 0.6 %
BILIRUB SERPL-MCNC: 0.3 MG/DL (ref 0.1–2)
BUN BLD-MCNC: 16 MG/DL (ref 7–18)
CALCIUM BLD-MCNC: 9.2 MG/DL (ref 8.5–10.1)
CHLORIDE SERPL-SCNC: 108 MMOL/L (ref 98–112)
CO2 SERPL-SCNC: 27 MMOL/L (ref 21–32)
CREAT BLD-MCNC: 0.87 MG/DL
EOSINOPHIL # BLD AUTO: 0.21 X10(3) UL (ref 0–0.7)
EOSINOPHIL NFR BLD AUTO: 4.2 %
ERYTHROCYTE [DISTWIDTH] IN BLOOD BY AUTOMATED COUNT: 12.7 %
GFR SERPLBLD BASED ON 1.73 SQ M-ARVRAT: 80 ML/MIN/1.73M2 (ref 60–?)
GLOBULIN PLAS-MCNC: 2.9 G/DL (ref 2.8–4.4)
GLUCOSE BLD-MCNC: 140 MG/DL (ref 70–99)
HCT VFR BLD AUTO: 34.9 %
HGB BLD-MCNC: 12 G/DL
IMM GRANULOCYTES # BLD AUTO: 0.01 X10(3) UL (ref 0–1)
IMM GRANULOCYTES NFR BLD: 0.2 %
LYMPHOCYTES # BLD AUTO: 0.5 X10(3) UL (ref 1–4)
LYMPHOCYTES NFR BLD AUTO: 10 %
MCH RBC QN AUTO: 29.4 PG (ref 26–34)
MCHC RBC AUTO-ENTMCNC: 34.4 G/DL (ref 31–37)
MCV RBC AUTO: 85.5 FL
MONOCYTES # BLD AUTO: 0.5 X10(3) UL (ref 0.1–1)
MONOCYTES NFR BLD AUTO: 10 %
NEUTROPHILS # BLD AUTO: 3.74 X10 (3) UL (ref 1.5–7.7)
NEUTROPHILS # BLD AUTO: 3.74 X10(3) UL (ref 1.5–7.7)
NEUTROPHILS NFR BLD AUTO: 75 %
OSMOLALITY SERPL CALC.SUM OF ELEC: 293 MOSM/KG (ref 275–295)
PLATELET # BLD AUTO: 229 10(3)UL (ref 150–450)
POTASSIUM SERPL-SCNC: 3.7 MMOL/L (ref 3.5–5.1)
PROT SERPL-MCNC: 6.6 G/DL (ref 6.4–8.2)
RBC # BLD AUTO: 4.08 X10(6)UL
SODIUM SERPL-SCNC: 140 MMOL/L (ref 136–145)
WBC # BLD AUTO: 5 X10(3) UL (ref 4–11)

## 2023-02-03 PROCEDURE — 80053 COMPREHEN METABOLIC PANEL: CPT

## 2023-02-03 PROCEDURE — 85025 COMPLETE CBC W/AUTO DIFF WBC: CPT

## 2023-02-03 PROCEDURE — 96413 CHEMO IV INFUSION 1 HR: CPT

## 2023-02-03 PROCEDURE — 99214 OFFICE O/P EST MOD 30 MIN: CPT | Performed by: INTERNAL MEDICINE

## 2023-02-03 RX ORDER — FLUTICASONE PROPIONATE AND SALMETEROL XINAFOATE 115; 21 UG/1; UG/1
AEROSOL, METERED RESPIRATORY (INHALATION)
COMMUNITY
Start: 2023-01-22

## 2023-02-03 RX ORDER — ARIPIPRAZOLE 2 MG/1
TABLET ORAL
COMMUNITY
Start: 2023-01-19

## 2023-02-03 NOTE — PROGRESS NOTES
Pt here for C 10 D 1 Imfinzi. Arrives Ambulating independently, accompanied by Self           Pregnancy screening: Denies possibility of pregnancy    Modifications in dose or schedule: No    Drugs/infusions dual verified for appearance and physical integrity.  IV pump settings were dual verified: yes     Frequency of blood return and site check throughout administration: Prior to administration and At completion of therapy   Discharged to Home, Ambulating independently, accompanied by:Self    Outpatient Oncology Care Plan  Problem list:  fatigue  knowledge deficit  Problems related to:  chemotherapy  Interventions:  monitor effect of therapy  monitor lab values  promoted rest  Expected outcomes:  safe in environment  symptoms relieved/minimized  understands plan of care  verbalize how to care for self  Progress towards outcome:  unchanged    Education Record    Learner:  Patient  Barriers / Limitations:  None  Method:  Discussion  Outcome:  Shows understanding  Comments:

## 2023-02-03 NOTE — PROGRESS NOTES
Patient is here for cycle 10 of Imfinzi. Patient is scheduled for a CT scan on 2/17. Appetite is good. Mild fatigue with occasional SOB. Chronic back pain.        Education Record    Learner:  Patient    Disease / Diagnosis:  Lung cancer     Barriers / Limitations:  None   Comments:    Method:  Discussion   Comments:    General Topics:  Plan of care reviewed   Comments:    Outcome:  Shows understanding   Comments:

## 2023-02-03 NOTE — PROGRESS NOTES
[facility]    PATIENT'S NAME: Félix Loya [facility]   ATTENDING PHYSICIAN: Sandra Barba M.D. PATIENT ACCOUNT #: [de-identified] LOCATION: [address] [place] [address] RECORD #: [id number] YOB: 1970   DATE OF SERVICE: 02/03/2023       CANCER CENTER PROGRESS NOTE    CHIEF COMPLAINT:  Followup for a history of stage III lung cancer. HISTORY OF PRESENT ILLNESS:  The patient is a 56-year-old female. 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 She was treated with concurrent chemoradiotherapy elsewhere and completed it in April 2022. She has now been started on durvalumab her first dose was in May. She had imaging earlier in the year that questioned whether she had progressive disease. Further imaging and bronchoscopy showed that this was not the case. She has had subsequent imaging which shows continued improvement. A PET scan done November 14 showed further decrease in airspace opacity with no major abnormal uptake in the region. She did have uptake in her distal esophagus, and as a result, we asked her to get an endoscopy. She was seen by Dr. Yanick Moser, and she had a relatively normal-appearing esophagus. Biopsies showed no significant pathologic change. She feels quite at present. She is tolerating the immunotherapy. She has chronic back issues and sees a pain doctor. She has had no issue with her appetite. She has been deliberately trying to lose some weight, and she is down about 14 pounds in the last 3 months. She has mild exertional dyspnea. She is set up for a followup CT scan February 17, and this is going through insurance at present.     MEDICATIONS:  Her current medications include Advair HFA inhaler, albuterol 2 puffs q.4 h. p.r.n., amphetamine/dextroamphetamine 30 mg b.i.d., aripiprazole 2 mg daily, cyclobenzaprine 10 t.i.d. p.r.n., duloxetine 60 mg daily, hydrochlorothiazide 12.5 mg daily, ibuprofen 200 mg q.6 h. p.r.n., lamotrigine 50 mg daily, levothyroxine 50 mcg daily, lisinopril 5 mg daily, morphine immediate release 7.5 mg q.4 h. p.r.n., morphine extended release 15 mg q.12 h., multivitamin daily, pregabalin 75 mg twice daily, and vitamin D3 at 5000 units daily. PHYSICAL EXAMINATION:    GENERAL:  She is a well-appearing female in no acute distress. VITAL SIGNS:  Her performance status is 0. Her weight is 195 pounds. Blood pressure is 148/91, pulse 98, respiratory rate 20, temperature is 97.3. HEENT:  Unremarkable. LYMPHATICS:  She has no adenopathy. LUNGS:  Clear. HEART:  Normal.  ABDOMEN:  No hepatosplenomegaly or tenderness. EXTREMITIES:  She has no clubbing, cyanosis or edema. LABORATORY DATA:  Her chemistries are normal.  Her CBCs are essentially normal.      IMPRESSION:  Adjuvant immunotherapy for stage III lung cancer. She is completing durvalumab. She has today's and 2 more monthly doses left, and she will be done. Her next imaging is due later this month. An order has been placed. She is waiting for insurance approval.  I will communicate with her once the result is back. She is having no major untoward autoimmune toxicities from the immunotherapy. She will continue to be watched carefully. Dictated By Evonne Mooney M.D.  d: 02/03/2023 14:04:25  t: 02/03/2023 15:55:21  [facility] [id number]  DV/    cc: DO Shamika Bryant.  Yadira Lowry MD

## 2023-02-17 ENCOUNTER — HOSPITAL ENCOUNTER (OUTPATIENT)
Dept: CT IMAGING | Facility: HOSPITAL | Age: 53
Discharge: HOME | End: 2023-02-17
Attending: INTERNAL MEDICINE
Payer: MEDICAID

## 2023-02-17 DIAGNOSIS — C34.91 PRIMARY ADENOCARCINOMA OF RIGHT LUNG (HCC): ICD-10-CM

## 2023-02-17 PROCEDURE — 71260 CT THORAX DX C+: CPT | Performed by: INTERNAL MEDICINE

## 2023-03-03 ENCOUNTER — OFFICE VISIT (OUTPATIENT)
Dept: HEMATOLOGY/ONCOLOGY | Facility: HOSPITAL | Age: 53
End: 2023-03-03
Attending: GENETIC COUNSELOR, MS
Payer: MEDICAID

## 2023-03-03 VITALS
WEIGHT: 203.63 LBS | BODY MASS INDEX: 33.52 KG/M2 | HEIGHT: 65.35 IN | DIASTOLIC BLOOD PRESSURE: 74 MMHG | TEMPERATURE: 98 F | OXYGEN SATURATION: 100 % | RESPIRATION RATE: 18 BRPM | SYSTOLIC BLOOD PRESSURE: 116 MMHG | HEART RATE: 100 BPM

## 2023-03-03 DIAGNOSIS — D64.9 ANEMIA, NORMOCYTIC NORMOCHROMIC: Primary | ICD-10-CM

## 2023-03-03 DIAGNOSIS — Z80.0 FAMILY HISTORY OF PANCREATIC CANCER: Primary | ICD-10-CM

## 2023-03-03 DIAGNOSIS — C34.91 PRIMARY ADENOCARCINOMA OF RIGHT LUNG (HCC): Primary | ICD-10-CM

## 2023-03-03 DIAGNOSIS — D64.9 ANEMIA, NORMOCYTIC NORMOCHROMIC: ICD-10-CM

## 2023-03-03 DIAGNOSIS — R59.0 MEDIASTINAL ADENOPATHY: ICD-10-CM

## 2023-03-03 DIAGNOSIS — C34.91 PRIMARY ADENOCARCINOMA OF RIGHT LUNG (HCC): ICD-10-CM

## 2023-03-03 LAB
ALBUMIN SERPL-MCNC: 3.3 G/DL (ref 3.4–5)
ALBUMIN/GLOB SERPL: 1.1 {RATIO} (ref 1–2)
ALP LIVER SERPL-CCNC: 81 U/L
ALT SERPL-CCNC: 38 U/L
ANION GAP SERPL CALC-SCNC: 1 MMOL/L (ref 0–18)
AST SERPL-CCNC: 30 U/L (ref 15–37)
BASOPHILS # BLD AUTO: 0.03 X10(3) UL (ref 0–0.2)
BASOPHILS NFR BLD AUTO: 0.7 %
BILIRUB SERPL-MCNC: 0.2 MG/DL (ref 0.1–2)
BUN BLD-MCNC: 16 MG/DL (ref 7–18)
CALCIUM BLD-MCNC: 9.1 MG/DL (ref 8.5–10.1)
CHLORIDE SERPL-SCNC: 109 MMOL/L (ref 98–112)
CO2 SERPL-SCNC: 30 MMOL/L (ref 21–32)
CREAT BLD-MCNC: 0.82 MG/DL
DEPRECATED HBV CORE AB SER IA-ACNC: 60.2 NG/ML
EOSINOPHIL # BLD AUTO: 0.19 X10(3) UL (ref 0–0.7)
EOSINOPHIL NFR BLD AUTO: 4.6 %
ERYTHROCYTE [DISTWIDTH] IN BLOOD BY AUTOMATED COUNT: 12.8 %
GFR SERPLBLD BASED ON 1.73 SQ M-ARVRAT: 86 ML/MIN/1.73M2 (ref 60–?)
GLOBULIN PLAS-MCNC: 3.1 G/DL (ref 2.8–4.4)
GLUCOSE BLD-MCNC: 114 MG/DL (ref 70–99)
HCT VFR BLD AUTO: 33 %
HGB BLD-MCNC: 11.2 G/DL
IMM GRANULOCYTES # BLD AUTO: 0.02 X10(3) UL (ref 0–1)
IMM GRANULOCYTES NFR BLD: 0.5 %
IRON SATN MFR SERPL: 19 %
IRON SERPL-MCNC: 84 UG/DL
LDH SERPL L TO P-CCNC: 144 U/L
LYMPHOCYTES # BLD AUTO: 0.52 X10(3) UL (ref 1–4)
LYMPHOCYTES NFR BLD AUTO: 12.6 %
MCH RBC QN AUTO: 29.5 PG (ref 26–34)
MCHC RBC AUTO-ENTMCNC: 33.9 G/DL (ref 31–37)
MCV RBC AUTO: 86.8 FL
MONOCYTES # BLD AUTO: 0.48 X10(3) UL (ref 0.1–1)
MONOCYTES NFR BLD AUTO: 11.7 %
NEUTROPHILS # BLD AUTO: 2.88 X10 (3) UL (ref 1.5–7.7)
NEUTROPHILS # BLD AUTO: 2.88 X10(3) UL (ref 1.5–7.7)
NEUTROPHILS NFR BLD AUTO: 69.9 %
OSMOLALITY SERPL CALC.SUM OF ELEC: 292 MOSM/KG (ref 275–295)
PLATELET # BLD AUTO: 191 10(3)UL (ref 150–450)
POTASSIUM SERPL-SCNC: 3.9 MMOL/L (ref 3.5–5.1)
PROT SERPL-MCNC: 6.4 G/DL (ref 6.4–8.2)
RBC # BLD AUTO: 3.8 X10(6)UL
SODIUM SERPL-SCNC: 140 MMOL/L (ref 136–145)
TIBC SERPL-MCNC: 441 UG/DL (ref 240–450)
TRANSFERRIN SERPL-MCNC: 296 MG/DL (ref 200–360)
WBC # BLD AUTO: 4.1 X10(3) UL (ref 4–11)

## 2023-03-03 PROCEDURE — 85025 COMPLETE CBC W/AUTO DIFF WBC: CPT

## 2023-03-03 PROCEDURE — 80053 COMPREHEN METABOLIC PANEL: CPT

## 2023-03-03 PROCEDURE — 96413 CHEMO IV INFUSION 1 HR: CPT

## 2023-03-03 PROCEDURE — 99214 OFFICE O/P EST MOD 30 MIN: CPT | Performed by: INTERNAL MEDICINE

## 2023-03-03 PROCEDURE — 83540 ASSAY OF IRON: CPT

## 2023-03-03 PROCEDURE — 83615 LACTATE (LD) (LDH) ENZYME: CPT

## 2023-03-03 PROCEDURE — 83550 IRON BINDING TEST: CPT

## 2023-03-03 PROCEDURE — 82728 ASSAY OF FERRITIN: CPT

## 2023-03-03 NOTE — PROGRESS NOTES
Patient is here for MD f/u and cycle 11 of Imfinzi. Patient reports she has difficulty concentrating and frequent episodes of brain fog. She has difficulty finding words to describe things. She has been forgetful. Patient c/o feeling tired all the time. Chronic back pain. She is on Morphine ER 15 mg twice daily.      Education Record    Learner:  Patient    Disease / Diagnosis:  Lung cancer     Barriers / Limitations:  None   Comments:    Method:  Discussion   Comments:    General Topics:  Plan of care reviewed   Comments:    Outcome:  Shows understanding   Comments:

## 2023-03-03 NOTE — PROGRESS NOTES
Pt here for [address]. Arrives Ambulating independently, accompanied by Self           Pregnancy screening: Denies possibility of pregnancy    Modifications in dose or schedule: No    Drugs/infusions dual verified for appearance and physical integrity. IV pump settings were dual verified: N/A    Frequency of blood return and site check throughout administration: Prior to administration   Discharged to Home, Ambulating independently, accompanied by:Self    Outpatient Oncology Care Plan  Problem list:  No complaints  Problems related to: No complaints  Interventions:  promoted rest  Expected outcomes:  understands plan of care  Progress towards outcome:  making progress    Education Record    Learner:  Patient  Barriers / Limitations:  None  Method:  Discussion  Outcome:  Shows understanding  Comments: Pt tolerated treatment without incident. Left in stable condition. Appts requested for next visit. Left in stable condition.

## 2023-03-07 NOTE — PROGRESS NOTES
[facility]    PATIENT'S NAME: [facility]   ATTENDING PHYSICIAN: Kali Aguilera M.D. PATIENT ACCOUNT #: [de-identified] LOCATION: [address] [place] [address] RECORD #: [id number] YOB: 1970   DATE OF SERVICE: 03/03/2023       CANCER CENTER PROGRESS NOTE    CHIEF COMPLAINT:  Treatment of stage III lung cancer. HISTORY OF PRESENT ILLNESS:  The patient is a 60-year-old female. 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 She was treated with concurrent chemoradiotherapy and completed this in April 2022. She was then started on durvalumab. Her first dose was in May. She had imaging earlier that year with a question of whether she had progressive disease. Further imaging and bronchoscopy showed that this was not the case, and most of it was just postinflammatory change. She has had subsequent imaging which shows continued improvement. A PET scan done November 14 showed further decrease in airspace opacity with no major abnormal uptake in the area. She had some uptake in the distal esophagus. She had an upper endoscopy done by Dr. Peri Major, and there was no abnormal finding. She has continued on monthly durvalumab. She has chronic back issues and sees a pain specialist.  She denies any other new complaints. She occasionally has a feeling of \"brain fog\" and has difficulty with word finding. This is transient. She has been a little bit more forgetful. She has had no focal neurologic symptoms whatsoever.   She continues on her chronic doses of narcotics, which she gets through her pain specialist.    MEDICATIONS:  Current medications include Advair 1 puff daily, albuterol HFA inhaler 2 puffs q.4 h. p.r.n., Adderall 30 mg twice daily, aripiprazole 2 mg daily, cyclobenzaprine 10 mg t.i.d. p.r.n. muscle spasms, duloxetine 60 mg daily, hydrochlorothiazide 12.5 mg daily, ibuprofen 200 mg q.6 h. p.r.n., lamotrigine 50 mg daily, levothyroxine 50 mcg daily, lisinopril 5 mg daily, morphine immediate release 7.5 mg q.4 h. p.r.n., morphine extended release 15 mg q.12 h., multivitamin daily, pregabalin 75 mg twice daily, and vitamin D3 at 5000 units daily. PHYSICAL EXAMINATION:    GENERAL:  She is a well-appearing female in no acute distress. VITAL SIGNS:  Her performance status is 0 to 1. Her weight is 203 pounds, which is up. Blood pressure is 116/74, pulse 100, respiratory rate 20, temperature is 98.0. HEENT:  Unremarkable. LYMPHATICS:  She has no adenopathy. LUNGS:  Clear. HEART:  Normal.  ABDOMEN:  No hepatosplenomegaly or tenderness. EXTREMITIES:  She has no clubbing, cyanosis or edema. LABORATORY DATA:  Her chemistries are essentially normal.  Her CBC is remarkable for a hemoglobin of 11.2. IMPRESSION:  Stage III lung cancer. She just had a CT scan done February 17 which shows no signs of disease progression. She continues on durvalumab, and she will be back at the end of March for her last treatment. I will see her at this point. I have continued to ask her to get CT scans at approximately 3- to 4-month intervals moving forward. We talked about followup in terms of other physicians upon my intermediate, and she will probably be seeing Dr. Suresh Partida. Dictated By Saira Matthew M.D.  d: 03/06/2023 13:42:45  t: 03/06/2023 19:22:23  Angelique Molina [id number]  /    cc: DO Suresh Hernandez MD Mahala Knapp.  Julia Smith MD

## 2023-03-20 ENCOUNTER — DOCUMENTATION ONLY (OUTPATIENT)
Dept: HEMATOLOGY/ONCOLOGY | Facility: HOSPITAL | Age: 53
End: 2023-03-20

## 2023-03-20 NOTE — PROGRESS NOTES
ONCOLOGY NUTRITION RESCREEN complete as triggered by [facility] dx of NSCLC. Chart reviewed. Pt appears nutritionally stable at present. RD available on consult.

## 2023-03-31 ENCOUNTER — OFFICE VISIT (OUTPATIENT)
Dept: HEMATOLOGY/ONCOLOGY | Facility: HOSPITAL | Age: 53
End: 2023-03-31
Attending: GENETIC COUNSELOR, MS
Payer: MEDICAID

## 2023-03-31 VITALS
DIASTOLIC BLOOD PRESSURE: 80 MMHG | WEIGHT: 198.19 LBS | RESPIRATION RATE: 18 BRPM | HEIGHT: 65.35 IN | OXYGEN SATURATION: 98 % | BODY MASS INDEX: 32.62 KG/M2 | SYSTOLIC BLOOD PRESSURE: 124 MMHG | HEART RATE: 123 BPM | TEMPERATURE: 97 F

## 2023-03-31 DIAGNOSIS — R04.2 HEMOPTYSIS: ICD-10-CM

## 2023-03-31 DIAGNOSIS — C34.91 PRIMARY ADENOCARCINOMA OF RIGHT LUNG (HCC): Primary | ICD-10-CM

## 2023-03-31 DIAGNOSIS — C34.2 MALIGNANT NEOPLASM OF MIDDLE LOBE OF RIGHT LUNG (HCC): Primary | ICD-10-CM

## 2023-03-31 DIAGNOSIS — R59.0 MEDIASTINAL ADENOPATHY: ICD-10-CM

## 2023-03-31 LAB
ALBUMIN SERPL-MCNC: 3.6 G/DL (ref 3.4–5)
ALBUMIN/GLOB SERPL: 1.2 {RATIO} (ref 1–2)
ALP LIVER SERPL-CCNC: 79 U/L
ALT SERPL-CCNC: 30 U/L
ANION GAP SERPL CALC-SCNC: 7 MMOL/L (ref 0–18)
AST SERPL-CCNC: 17 U/L (ref 15–37)
BASOPHILS # BLD AUTO: 0.03 X10(3) UL (ref 0–0.2)
BASOPHILS NFR BLD AUTO: 0.7 %
BILIRUB SERPL-MCNC: 0.3 MG/DL (ref 0.1–2)
BUN BLD-MCNC: 14 MG/DL (ref 7–18)
CALCIUM BLD-MCNC: 9.8 MG/DL (ref 8.5–10.1)
CHLORIDE SERPL-SCNC: 105 MMOL/L (ref 98–112)
CO2 SERPL-SCNC: 28 MMOL/L (ref 21–32)
CREAT BLD-MCNC: 0.96 MG/DL
EOSINOPHIL # BLD AUTO: 0.22 X10(3) UL (ref 0–0.7)
EOSINOPHIL NFR BLD AUTO: 4.8 %
ERYTHROCYTE [DISTWIDTH] IN BLOOD BY AUTOMATED COUNT: 13.2 %
GFR SERPLBLD BASED ON 1.73 SQ M-ARVRAT: 71 ML/MIN/1.73M2 (ref 60–?)
GLOBULIN PLAS-MCNC: 3.1 G/DL (ref 2.8–4.4)
GLUCOSE BLD-MCNC: 166 MG/DL (ref 70–99)
HCT VFR BLD AUTO: 36.6 %
HGB BLD-MCNC: 12.4 G/DL
IMM GRANULOCYTES # BLD AUTO: 0.01 X10(3) UL (ref 0–1)
IMM GRANULOCYTES NFR BLD: 0.2 %
LDH SERPL L TO P-CCNC: 145 U/L
LYMPHOCYTES # BLD AUTO: 0.6 X10(3) UL (ref 1–4)
LYMPHOCYTES NFR BLD AUTO: 13.1 %
MCH RBC QN AUTO: 29.7 PG (ref 26–34)
MCHC RBC AUTO-ENTMCNC: 33.9 G/DL (ref 31–37)
MCV RBC AUTO: 87.8 FL
MONOCYTES # BLD AUTO: 0.39 X10(3) UL (ref 0.1–1)
MONOCYTES NFR BLD AUTO: 8.5 %
NEUTROPHILS # BLD AUTO: 3.33 X10 (3) UL (ref 1.5–7.7)
NEUTROPHILS # BLD AUTO: 3.33 X10(3) UL (ref 1.5–7.7)
NEUTROPHILS NFR BLD AUTO: 72.7 %
OSMOLALITY SERPL CALC.SUM OF ELEC: 294 MOSM/KG (ref 275–295)
PLATELET # BLD AUTO: 247 10(3)UL (ref 150–450)
POTASSIUM SERPL-SCNC: 3.4 MMOL/L (ref 3.5–5.1)
PROT SERPL-MCNC: 6.7 G/DL (ref 6.4–8.2)
RBC # BLD AUTO: 4.17 X10(6)UL
SODIUM SERPL-SCNC: 140 MMOL/L (ref 136–145)
WBC # BLD AUTO: 4.6 X10(3) UL (ref 4–11)

## 2023-03-31 PROCEDURE — 85025 COMPLETE CBC W/AUTO DIFF WBC: CPT

## 2023-03-31 PROCEDURE — 80053 COMPREHEN METABOLIC PANEL: CPT

## 2023-03-31 PROCEDURE — 99214 OFFICE O/P EST MOD 30 MIN: CPT | Performed by: INTERNAL MEDICINE

## 2023-03-31 PROCEDURE — 96413 CHEMO IV INFUSION 1 HR: CPT

## 2023-03-31 PROCEDURE — 83615 LACTATE (LD) (LDH) ENZYME: CPT

## 2023-03-31 NOTE — PROGRESS NOTES
Patient is here for MD f/u and cycle 12 of Imfinzi. Patient c/o hemoptysis x 3 weeks. She was on an antibiotic for a short time for a sinus infection. C/o sore throat and fatigue. Ongoing SOB. No blood in the stools.        Education Record    Learner:  Patient    Disease / Diagnosis:  Lung cancer     Barriers / Limitations:  None   Comments:    Method:  Discussion   Comments:    General Topics:  Plan of care reviewed   Comments:    Outcome:  Shows understanding   Comments:

## 2023-03-31 NOTE — PROGRESS NOTES
Pt here for C12D1 imfinzi. Arrives Ambulating independently, accompanied by Self           Pregnancy screening: Denies possibility of pregnancy    Modifications in dose or schedule: No    Drugs/infusions dual verified for appearance and physical integrity. IV pump settings were dual verified: yes     Frequency of blood return and site check throughout administration: Prior to administration   Discharged to Home, Ambulating independently, accompanied by:Self    Outpatient Oncology Care Plan  Problem list:  fatigue  Problems related to:  side effect of treatment  Interventions:  promoted rest  provided general teaching  Expected outcomes:  patient supported/coping well  safe in environment  symptoms relieved/minimized  understands plan of care  Progress towards outcome:  making progress    Education Record    Learner:  Patient  Barriers / Limitations:  None  Method:  Brief focused and Discussion  Outcome:  Shows understanding  Comments: last imfinzi - will now be following up with Dr. Yoana Hernández in May after her scan that she is to schedule.

## 2023-04-04 ENCOUNTER — TELEPHONE (OUTPATIENT)
Facility: CLINIC | Age: 53
End: 2023-04-04

## 2023-04-04 ENCOUNTER — TELEPHONE (OUTPATIENT)
Dept: HEMATOLOGY/ONCOLOGY | Facility: HOSPITAL | Age: 53
End: 2023-04-04

## 2023-04-04 NOTE — TELEPHONE ENCOUNTER
At patients last visit with Dr Denver Schmid  He recommended a Pulmonologist to do a bronchial scope. She can not remember who he recommended. .    Please have Dr Hitesh Bajwa call with a Referred for Pulmonologist  With the [facility]

## 2023-04-04 NOTE — TELEPHONE ENCOUNTER
Pt called to request appt with Dr. Madeleine Beltran. Has been coughing up blood daily about 1 tsp. Pt was referred by Dr. Arias Pierce. Per Dr. Madeleine Beltran, pt to go to ER if blood > 1 tbsp. Dr. Arias Pierce had given pt the same instructions. States understanding.    Appt scheduled with Dr. Madeleine Beltran for tomorrow at [address] [address] for pre bronch eval.

## 2023-04-04 NOTE — TELEPHONE ENCOUNTER
Spoke with patient. She wants to transfer to a pulmonologist at [facility]. Recommended Dr Jose Eduardo Valverde and his group. Gave patient the phone number to schedule an appointment.

## 2023-04-05 ENCOUNTER — TELEPHONE (OUTPATIENT)
Facility: CLINIC | Age: 53
End: 2023-04-05

## 2023-04-05 ENCOUNTER — OFFICE VISIT (OUTPATIENT)
Facility: CLINIC | Age: 53
End: 2023-04-05
Payer: MEDICAID

## 2023-04-05 VITALS
SYSTOLIC BLOOD PRESSURE: 126 MMHG | DIASTOLIC BLOOD PRESSURE: 80 MMHG | HEART RATE: 91 BPM | OXYGEN SATURATION: 98 % | RESPIRATION RATE: 16 BRPM

## 2023-04-05 DIAGNOSIS — C34.91 PRIMARY LUNG CANCER WITH METASTASIS FROM LUNG TO OTHER SITE, RIGHT (HCC): ICD-10-CM

## 2023-04-05 DIAGNOSIS — R04.2 HEMOPTYSIS: Primary | ICD-10-CM

## 2023-04-05 DIAGNOSIS — C34.90 MALIGNANT NEOPLASM OF LUNG, UNSPECIFIED LATERALITY, UNSPECIFIED PART OF LUNG (HCC): ICD-10-CM

## 2023-04-05 DIAGNOSIS — J47.9 BRONCHIECTASIS WITHOUT COMPLICATION (HCC): ICD-10-CM

## 2023-04-05 DIAGNOSIS — J45.909 UNCOMPLICATED ASTHMA, UNSPECIFIED ASTHMA SEVERITY, UNSPECIFIED WHETHER PERSISTENT: ICD-10-CM

## 2023-04-05 PROBLEM — Z86.16 HISTORY OF COVID-19: Status: ACTIVE | Noted: 2023-04-05

## 2023-04-05 PROCEDURE — 3074F SYST BP LT 130 MM HG: CPT | Performed by: INTERNAL MEDICINE

## 2023-04-05 PROCEDURE — 3079F DIAST BP 80-89 MM HG: CPT | Performed by: INTERNAL MEDICINE

## 2023-04-05 PROCEDURE — 99204 OFFICE O/P NEW MOD 45 MIN: CPT | Performed by: INTERNAL MEDICINE

## 2023-04-05 RX ORDER — BUDESONIDE AND FORMOTEROL FUMARATE DIHYDRATE 160; 4.5 UG/1; UG/1
2 AEROSOL RESPIRATORY (INHALATION) 2 TIMES DAILY
Qty: 1 EACH | Refills: 1 | Status: SHIPPED | OUTPATIENT
Start: 2023-04-05

## 2023-04-05 RX ORDER — FLUTICASONE FUROATE AND VILANTEROL 200; 25 UG/1; UG/1
1 POWDER RESPIRATORY (INHALATION) DAILY
Qty: 1 EACH | Refills: 2 | Status: SHIPPED | OUTPATIENT
Start: 2023-04-05 | End: 2023-04-07

## 2023-04-05 NOTE — PATIENT INSTRUCTIONS
We will plan on a bronchoscopy hopefully this Friday or early next week. Stop advair. Start breo inhaler - one puff once a day.  Rinse your mouth out after using inhaler  Be sure to price inhalers with goal for medications to be less than $50 per month

## 2023-04-05 NOTE — PROGRESS NOTES
[facility]    PATIENT'S NAME: Kinga Diaz [facility]   ATTENDING PHYSICIAN: Jennifer Osorio M.D. PATIENT ACCOUNT #: [de-identified] LOCATION: [address] [place] [address] RECORD #: [id number] YOB: 1970   DATE OF SERVICE: 03/31/2023       CANCER CENTER PROGRESS NOTE    CHIEF COMPLAINT:  Followup and treatment of stage III lung cancer. HISTORY OF PRESENT ILLNESS:  The patient is a 61-year-old female. She has a history of stage III adenocarcinoma of the right middle lobe, showed mediastinal involvement. She was treated with concurrent chemoradiotherapy and completed this in April 2022. She was then started on durvalumab. Early in 2022, there was a question of whether or not she had progressive disease, but further imaging and bronchoscopy showed it was not the case and most of the changes on imaging were postinflammatory, posttreatment. A followup PET scan in November showed further decrease in air space opacity and no abnormal uptake. She had a little bit of uptake in the distal esophagus, and an upper endoscopy showed no abnormal findings. She has continued on a monthly durvalumab without any autoimmune toxicities. She has chronic pain and sees a pain specialist.  The only new issue has been that she has developed increasing frequency of hemoptysis. She was on antibiotics briefly for a sinus infection. She complained of some sore throat and fatigue. This is stable overall. She has had ongoing mild shortness of breath with exertion but none at rest.  She had no blood in the stool or melanotic stool. The hemoptysis was very occasional, then became daily and yesterday, she had it twice. She had seen the nurse practitioner for Dr. Zara Dunham office a week ago and at that time, she was felt to not have an indication for bronchoscopy but with the increased frequency, I am going to reach out to her pulmonologist myself.     MEDICATIONS:  Her current medications include Advair 1 puff daily, albuterol 2 puffs q.4 h. p.r.n., amphetamine/dextroamphetamine 30 mg twice daily, aripiprazole 2 mg daily, cyclobenzaprine 10 mg t.i.d. p.r.n. muscle spasms, duloxetine 60 mg daily, hydrochlorothiazide 12.5 mg daily, ibuprofen 200 mg q.6 h. p.r.n., lamotrigine 50 mg daily, levothyroxine 50 mcg daily, lisinopril 5 mg daily, morphine 7.5 mg q.4 h. p.r.n., morphine extended release 15 mg q.12 h., multivitamin daily, pregabalin 75 mg twice daily, and vitamin D3 at 5000 units daily. PHYSICAL EXAMINATION:  GENERAL:  She is a reasonably well-appearing female in no acute distress. VITAL SIGNS:  Her performance status is 0 to 1. Her weight is 198 pounds. Blood pressure is 124/80, pulse is 123, respiratory rate is 20, temperature is 97.3. HEENT:  Unremarkable. LYMPHATICS:  She has no adenopathy. LUNGS:  Clear. HEART:  Normal.  ABDOMEN:  No hepatosplenomegaly or tenderness. EXTREMITIES:  She has no clubbing, cyanosis, or edema. NEUROLOGIC:  She is intact. LABORATORY DATA:  Her CBC is quite normal.  Her chemistries are normal with the exception of potassium of 3.4 and a mildly elevated glucose. IMPRESSION:  Stage III lung cancer. She has no clear-cut evidence of disease progression. Of some concern is the fact that she is having hemoptysis. This may represent post radiation damage to her bronchial tree or it certainly could represent recurrent tumor. The latter hopefully is less likely. I am reaching out to Dr. Baron Dugan in order to have her assessed again and considered for bronchoscopy. I will call Dr Yadira Lowry later today. She is completing 1 year of durvalumab with today's final dose. I am retiring at the end of the week and she is going to be transitioning her care to Dr. Claire Bhatia, who will see her in May after her next CT scan.   In the meantime, I have told her to back off on some of the ibuprofen while she is having this amount of hemoptysis, and I told her that if the hemoptysis becomes more dramatic, to come to the emergency room. Dictated By Ana Richardson M.D.  d: 04/04/2023 08:43:14  t: 04/04/2023 83:94:96  Job [id number]  RE/    cc: Asher Corona.  Harding Bard, MD Lilia Epley, MD Arneta Cedars,

## 2023-04-07 ENCOUNTER — ANESTHESIA (OUTPATIENT)
Dept: ENDOSCOPY | Facility: HOSPITAL | Age: 53
End: 2023-04-07
Payer: MEDICAID

## 2023-04-07 ENCOUNTER — HOSPITAL ENCOUNTER (OUTPATIENT)
Facility: HOSPITAL | Age: 53
Setting detail: HOSPITAL OUTPATIENT SURGERY
Discharge: HOME | End: 2023-04-07
Attending: INTERNAL MEDICINE | Admitting: INTERNAL MEDICINE
Payer: MEDICAID

## 2023-04-07 ENCOUNTER — ANESTHESIA EVENT (OUTPATIENT)
Dept: ENDOSCOPY | Facility: HOSPITAL | Age: 53
End: 2023-04-07
Payer: MEDICAID

## 2023-04-07 VITALS
TEMPERATURE: 98 F | DIASTOLIC BLOOD PRESSURE: 57 MMHG | HEART RATE: 88 BPM | SYSTOLIC BLOOD PRESSURE: 96 MMHG | WEIGHT: 192 LBS | RESPIRATION RATE: 16 BRPM | BODY MASS INDEX: 30.86 KG/M2 | HEIGHT: 66 IN | OXYGEN SATURATION: 94 %

## 2023-04-07 DIAGNOSIS — C34.90 MALIGNANT NEOPLASM OF LUNG, UNSPECIFIED LATERALITY, UNSPECIFIED PART OF LUNG (HCC): ICD-10-CM

## 2023-04-07 DIAGNOSIS — R04.2 HEMOPTYSIS: ICD-10-CM

## 2023-04-07 LAB
BASOPHILS NFR BRONCH: 0 %
COLOR FLD: COLORLESS
EOSINOPHIL NFR BRONCH: 1 %
LYMPHOCYTES NFR BRONCH: 19 %
MONOS+MACROS NFR BRONCH: 16 %
NEUTROPHILS NFR BRONCH: 64 %
TOTAL CELLS COUNTED FLD: 100

## 2023-04-07 PROCEDURE — 31624 DX BRONCHOSCOPE/LAVAGE: CPT | Performed by: INTERNAL MEDICINE

## 2023-04-07 PROCEDURE — 0B9D8ZX DRAINAGE OF RIGHT MIDDLE LUNG LOBE, VIA NATURAL OR ARTIFICIAL OPENING ENDOSCOPIC, DIAGNOSTIC: ICD-10-PCS | Performed by: INTERNAL MEDICINE

## 2023-04-07 RX ORDER — SODIUM CHLORIDE, SODIUM LACTATE, POTASSIUM CHLORIDE, CALCIUM CHLORIDE 600; 310; 30; 20 MG/100ML; MG/100ML; MG/100ML; MG/100ML
INJECTION, SOLUTION INTRAVENOUS CONTINUOUS
Status: DISCONTINUED | OUTPATIENT
Start: 2023-04-07 | End: 2023-04-07

## 2023-04-07 RX ORDER — DEXAMETHASONE SODIUM PHOSPHATE 4 MG/ML
VIAL (ML) INJECTION AS NEEDED
Status: DISCONTINUED | OUTPATIENT
Start: 2023-04-07 | End: 2023-04-07 | Stop reason: SURG

## 2023-04-07 RX ORDER — NALOXONE HYDROCHLORIDE 0.4 MG/ML
80 INJECTION, SOLUTION INTRAMUSCULAR; INTRAVENOUS; SUBCUTANEOUS AS NEEDED
Status: DISCONTINUED | OUTPATIENT
Start: 2023-04-07 | End: 2023-04-07

## 2023-04-07 RX ORDER — DOXYCYCLINE HYCLATE 100 MG/1
100 CAPSULE ORAL 2 TIMES DAILY
Qty: 14 CAPSULE | Refills: 0 | Status: SHIPPED | OUTPATIENT
Start: 2023-04-07 | End: 2023-04-14

## 2023-04-07 RX ORDER — ONDANSETRON 2 MG/ML
INJECTION INTRAMUSCULAR; INTRAVENOUS AS NEEDED
Status: DISCONTINUED | OUTPATIENT
Start: 2023-04-07 | End: 2023-04-07 | Stop reason: SURG

## 2023-04-07 RX ORDER — DOXYCYCLINE HYCLATE 100 MG/1
100 CAPSULE ORAL 2 TIMES DAILY
Qty: 14 CAPSULE | Refills: 0 | Status: SHIPPED | OUTPATIENT
Start: 2023-04-07 | End: 2023-04-07

## 2023-04-07 RX ADMIN — SODIUM CHLORIDE, SODIUM LACTATE, POTASSIUM CHLORIDE, CALCIUM CHLORIDE: 600; 310; 30; 20 INJECTION, SOLUTION INTRAVENOUS at 14:54:00

## 2023-04-07 RX ADMIN — DEXAMETHASONE SODIUM PHOSPHATE 4 MG: 4 MG/ML VIAL (ML) INJECTION at 14:37:00

## 2023-04-07 RX ADMIN — ONDANSETRON 4 MG: 2 INJECTION INTRAMUSCULAR; INTRAVENOUS at 14:37:00

## 2023-04-07 RX ADMIN — SODIUM CHLORIDE, SODIUM LACTATE, POTASSIUM CHLORIDE, CALCIUM CHLORIDE: 600; 310; 30; 20 INJECTION, SOLUTION INTRAVENOUS at 14:37:00

## 2023-04-07 NOTE — ANESTHESIA POSTPROCEDURE EVALUATION
[facility] Patient Status:  Hospital Outpatient Surgery   Age/Gender 47year old female MRN [id number]   Location [address] [address] Attending Kaylyn Echeverria MD   [address] Day # 0 PCP Reji Epps DO       Anesthesia Post-op Note    BRONCHOSCOPY WITH BRONCHOALVEOLAR LAVAGE    Procedure Summary     Date: 04/07/23 Room / Location: [facility] ENDOSCOPY 04 / [facility] ENDOSCOPY    Anesthesia Start: 4587 Anesthesia Stop:     Procedure: BRONCHOSCOPY WITH BRONCHOALVEOLAR LAVAGE Diagnosis:       Hemoptysis      Malignant neoplasm of lung, unspecified laterality, unspecified part of lung ([address]      (Hemoptysis, Malignant neoplasm of lung)    Surgeons: Kaylyn Echeverria MD Anesthesiologist: Ladonna Gutierrez MD    Anesthesia Type: MAC ASA Status: 2          Anesthesia Type: MAC    Vitals Value Taken Time   BP 92/70 04/07/23 1454   Temp 98 04/07/23 1454   Pulse 103 04/07/23 1454   Resp 16 04/07/23 1454   SpO2 98 04/07/23 1454   Vitals shown include unvalidated device data. Patient Location: Endoscopy    Anesthesia Type: MAC    Airway Patency: patent    Postop Pain Control: adequate    Mental Status: mildly sedated but able to meaningfully participate in the post-anesthesia evaluation    Nausea/Vomiting: none    Cardiopulmonary/Hydration status: stable euvolemic    Complications: no apparent anesthesia related complications    Postop vital signs: stable    Dental Exam: Unchanged from Preop    Patient to be discharged home when criteria met.

## 2023-04-07 NOTE — DISCHARGE SUMMARY
Outpatient Surgery Brief Discharge Summary         Patient ID:  Lorelei Ulloa  [id number]  47year old  2/1/1970    Discharge Diagnoses: normal endobronchial examination    Procedures: bronchoscopy    Discharged Condition: stable    Disposition: home    Patient Instructions: Follow-up with Adama Pena MD in 1-2 weeks.     Diet: regular diet  Activity: as tolerated    Adama Pena MD  4/7/2023  3:01 PM

## 2023-04-07 NOTE — INTERVAL H&P NOTE
Pre-op Diagnosis: Hemoptysis [R04.2]  Malignant neoplasm of lung, unspecified laterality, unspecified part of lung ([facility] [C34.90]    The above referenced H&P was reviewed by Noe Tam MD on 4/7/2023, the patient was examined and no significant changes have occurred in the patient's condition since the H&P was performed. I discussed with the patient and/or legal representative the potential benefits, risks and side effects of this procedure; the likelihood of the patient achieving goals; and potential problems that might occur during recuperation. I discussed reasonable alternatives to the procedure, including risks, benefits and side effects related to the alternatives and risks related to not receiving this procedure. We will proceed with procedure as planned.

## 2023-04-07 NOTE — OPERATIVE REPORT
[facility] Patient Status:  Hospital Outpatient Surgery    1970   MRN [id number]     [facility] ENDOSCOPY Attending Rip Almanza MD   Hosp Day # 0 PCP Jessica Fonseca DO     OPERATIVE REPORT:     DATE OF OPERATION: 23     PREOPERATIVE DIAGNOSIS(ES): hemoptysis     POSTOPERATIVE DIAGNOSIS(ES): normal endobronchial examination     OPERATION(S) PERFORMED: Bronchoscopy with bronchoalveolar lavage of the right middle lobe. SURGEON: Devendra Lorenzo MD    ANESTHESIA: MAC sedation; Please see separate flow sheet. EQUIPMENT:   Olympus adult videobronchoscope. Standard pulmonary biopsy forceps    INDICATION: The patient is a 47year old female with history of lung cancer who has had small volume non-lifethreatening hemoptysis despite recent antibiotic treatment. The procedure is being undertaken for diagnostic and possible therapeutic purposes. CONSENT:  Risks and benefits were reviewed with the patient in detail regarding the procedure as well as anesthesia prior to the procedure. All questions were answered, and the patient was agreeable. PROCEDURE:  After informed consent was obtained, the patient was brought to the bronchoscopy suite. Time out performed. Patient was placed in a supine position, anesthesia administered, and topical lidocaine given for local anesthesia. First, the videobronchoscope was used and inserted orally through the bite block. The upper airways appeared normal. The vocal cords were insufficiently evaluated due to the level of sedation but appeared normal and approximated well. The bronchoscope was then advanced into the trachea. Evaluation of the trachea and main stem airways demonstrated normal endobronchial examination bilaterally to the subsegmental level with no evidence of endobronchial lesion, recent or active bleeding.    A bronchoalveolar lavage was then performed in the right middle lobe with the instillation of 130 mL sterile saline and return of 25 mL which was sent for microbiological studies including cell count/diffferential, aerobic smear/culture, AFB smear/culture, fungal smear/culture and cytology. There was no evidence of any bleeding and the bronchoscope was then withdrawn. The patient tolerated the procedure well without immediate complications. EBL:  None     IMPRESSION:   Normal endobronchial examination     PLAN:  Will treat empirically for possible bronchitis with doxycycline x 7 day course. Should she continue to have hemoptysis, I would recommend she be evaluated by ENT and/or GI to exclude other sources of bleeding given normal bronchoscopic examination.       Ezio Elizondo MD

## 2023-04-09 LAB
ATRIAL RATE: 86 BPM
P AXIS: 63 DEGREES
P-R INTERVAL: 168 MS
Q-T INTERVAL: 374 MS
QRS DURATION: 84 MS
QTC CALCULATION (BEZET): 447 MS
R AXIS: 47 DEGREES
T AXIS: 43 DEGREES
VENTRICULAR RATE: 86 BPM

## 2023-04-10 ENCOUNTER — OFFICE VISIT (OUTPATIENT)
Dept: FAMILY MEDICINE CLINIC | Facility: CLINIC | Age: 53
End: 2023-04-10
Payer: MEDICAID

## 2023-04-10 ENCOUNTER — PATIENT MESSAGE (OUTPATIENT)
Facility: CLINIC | Age: 53
End: 2023-04-10

## 2023-04-10 VITALS
BODY MASS INDEX: 31 KG/M2 | OXYGEN SATURATION: 96 % | DIASTOLIC BLOOD PRESSURE: 72 MMHG | WEIGHT: 194 LBS | RESPIRATION RATE: 18 BRPM | SYSTOLIC BLOOD PRESSURE: 132 MMHG | TEMPERATURE: 97 F | HEART RATE: 120 BPM

## 2023-04-10 DIAGNOSIS — U07.1 POSITIVE SELF-ADMINISTERED ANTIGEN TEST FOR COVID-19: Primary | ICD-10-CM

## 2023-04-10 PROBLEM — G62.9 POLYNEUROPATHY, UNSPECIFIED: Status: ACTIVE | Noted: 2021-10-04

## 2023-04-10 PROBLEM — K59.00 CONSTIPATION: Status: ACTIVE | Noted: 2020-03-18

## 2023-04-10 PROBLEM — M47.22 OTHER SPONDYLOSIS WITH RADICULOPATHY, CERVICAL REGION: Status: ACTIVE | Noted: 2021-10-04

## 2023-04-10 PROBLEM — E07.9 DISORDER OF THYROID, UNSPECIFIED: Status: ACTIVE | Noted: 2021-10-04

## 2023-04-10 PROBLEM — F41.1 GENERALIZED ANXIETY DISORDER: Status: ACTIVE | Noted: 2021-10-04

## 2023-04-10 PROBLEM — F32.9 MAJOR DEPRESSIVE DISORDER, SINGLE EPISODE, UNSPECIFIED: Status: ACTIVE | Noted: 2021-10-04

## 2023-04-10 PROBLEM — M79.18 MYALGIA, OTHER SITE: Status: ACTIVE | Noted: 2020-02-25

## 2023-04-10 PROBLEM — Z47.89 ENCOUNTER FOR OTHER ORTHOPEDIC AFTERCARE: Status: ACTIVE | Noted: 2021-10-04

## 2023-04-10 PROBLEM — N39.498 OTHER SPECIFIED URINARY INCONTINENCE: Status: ACTIVE | Noted: 2021-10-04

## 2023-04-10 PROBLEM — Z91.81 HISTORY OF FALLING: Status: ACTIVE | Noted: 2021-10-04

## 2023-04-10 PROBLEM — M51.369 OTHER INTERVERTEBRAL DISC DEGENERATION, LUMBAR REGION: Status: ACTIVE | Noted: 2018-08-23

## 2023-04-10 PROBLEM — R41.3 MEMORY CHANGES: Status: ACTIVE | Noted: 2022-05-18

## 2023-04-10 PROBLEM — F11.93: Status: ACTIVE | Noted: 2019-07-30

## 2023-04-10 PROBLEM — Z87.891 PERSONAL HISTORY OF NICOTINE DEPENDENCE: Status: ACTIVE | Noted: 2021-10-04

## 2023-04-10 PROBLEM — H54.7 UNSPECIFIED VISUAL LOSS: Status: ACTIVE | Noted: 2021-10-04

## 2023-04-10 PROBLEM — M51.36 OTHER INTERVERTEBRAL DISC DEGENERATION, LUMBAR REGION: Status: ACTIVE | Noted: 2018-08-23

## 2023-04-10 PROCEDURE — 3075F SYST BP GE 130 - 139MM HG: CPT

## 2023-04-10 PROCEDURE — 87637 SARSCOV2&INF A&B&RSV AMP PRB: CPT

## 2023-04-10 PROCEDURE — 99213 OFFICE O/P EST LOW 20 MIN: CPT

## 2023-04-10 PROCEDURE — 3078F DIAST BP <80 MM HG: CPT

## 2023-04-11 LAB
FLUAV + FLUBV RNA SPEC NAA+PROBE: NOT DETECTED
FLUAV + FLUBV RNA SPEC NAA+PROBE: NOT DETECTED
RSV RNA SPEC NAA+PROBE: NOT DETECTED
SARS-COV-2 RNA RESP QL NAA+PROBE: DETECTED

## 2023-04-11 NOTE — TELEPHONE ENCOUNTER
From: Chelsey Og  To: Jessica Alberto MD  Sent: 4/10/2023 8:08 PM CDT  Subject: Test results     Hello, I see the test results came in. Just wanted to check that all looks good. Thank you !

## 2023-04-11 NOTE — TELEPHONE ENCOUNTER
Left message for pt to update us on how she is doing post bronchoscopy. Pt also requesting results from Sarah Veronica. Notified pt via [employer] that not all results are back yet. Message forwarded to Dr. Tyler Mireles.

## 2023-04-11 NOTE — TELEPHONE ENCOUNTER
Called/reviewed results thus far with patient - culture and cytology pending but nothing overt from sample.    She notes she has not had any hemoptysis since procedure and we reviewed that bronchoscopy did NOT show any signs of bleeding from the airway, if bleeding recurred, she is advised to see ENT and GI for further evdenis Simon MD

## 2023-04-12 LAB — NON GYNE INTERPRETATION: NEGATIVE

## 2023-04-18 ENCOUNTER — PATIENT MESSAGE (OUTPATIENT)
Facility: CLINIC | Age: 53
End: 2023-04-18

## 2023-04-18 ENCOUNTER — TELEMEDICINE (OUTPATIENT)
Facility: CLINIC | Age: 53
End: 2023-04-18
Payer: MEDICAID

## 2023-04-18 DIAGNOSIS — J47.9 BRONCHIECTASIS WITHOUT COMPLICATION (HCC): ICD-10-CM

## 2023-04-18 DIAGNOSIS — C34.91 PRIMARY LUNG CANCER WITH METASTASIS FROM LUNG TO OTHER SITE, RIGHT (HCC): ICD-10-CM

## 2023-04-18 DIAGNOSIS — R04.2 HEMOPTYSIS: Primary | ICD-10-CM

## 2023-04-18 DIAGNOSIS — J45.909 UNCOMPLICATED ASTHMA, UNSPECIFIED ASTHMA SEVERITY, UNSPECIFIED WHETHER PERSISTENT: ICD-10-CM

## 2023-04-18 PROCEDURE — 99214 OFFICE O/P EST MOD 30 MIN: CPT | Performed by: INTERNAL MEDICINE

## 2023-04-18 RX ORDER — BUDESONIDE, GLYCOPYRROLATE, AND FORMOTEROL FUMARATE 160; 9; 4.8 UG/1; UG/1; UG/1
2 AEROSOL, METERED RESPIRATORY (INHALATION) 2 TIMES DAILY
Qty: 1 EACH | Refills: 11 | Status: SHIPPED | OUTPATIENT
Start: 2023-04-18

## 2023-04-18 NOTE — TELEPHONE ENCOUNTER
From: Mayra Richardson  To: Bernie Wong MD  Sent: 4/18/2023 9:44 AM CDT  Subject: Appointment    Hel  When I spoke to Dr. Kimberli Sears he said we could do a virtual appointment. I wanted to make sure a link was sent it to I simply use the [employer] ?    Thank you

## 2023-04-18 NOTE — PATIENT INSTRUCTIONS
For now, continue the symbicort two puffs in the morning and again in evening. Rinse your mouth out after using. Price breztri which is two puffs in the morning and again in evening. Rinse your mouth out after using. The website for financial assistance is:https://AOTMP/  I'll review the sleep issues with Dr. Tamar Tinsley our sleep doctor.    Plan to follow up in six months unless you develop concerns or change in breathing

## 2023-04-20 ENCOUNTER — TELEPHONE (OUTPATIENT)
Facility: CLINIC | Age: 53
End: 2023-04-20

## 2023-04-20 NOTE — TELEPHONE ENCOUNTER
Per Dr. Buell Crigler:  Please let her know if breztri is not covered, to resume using symbicort BID   Detailed message left for pt.

## 2023-05-04 ENCOUNTER — HOSPITAL ENCOUNTER (OUTPATIENT)
Dept: MAMMOGRAPHY | Age: 53
End: 2023-05-04
Attending: FAMILY MEDICINE
Payer: MEDICAID

## 2023-05-04 ENCOUNTER — HOSPITAL ENCOUNTER (OUTPATIENT)
Dept: MAMMOGRAPHY | Age: 53
Discharge: HOME | End: 2023-05-04
Attending: FAMILY MEDICINE
Payer: MEDICAID

## 2023-05-04 DIAGNOSIS — Z00.00 ROUTINE GENERAL MEDICAL EXAMINATION AT A HEALTH CARE FACILITY: ICD-10-CM

## 2023-05-04 DIAGNOSIS — Z12.31 ENCOUNTER FOR SCREENING MAMMOGRAM FOR MALIGNANT NEOPLASM OF BREAST: ICD-10-CM

## 2023-05-04 PROCEDURE — 77063 BREAST TOMOSYNTHESIS BI: CPT | Performed by: FAMILY MEDICINE

## 2023-05-04 PROCEDURE — 77067 SCR MAMMO BI INCL CAD: CPT | Performed by: FAMILY MEDICINE

## 2023-05-10 ENCOUNTER — LAB ENCOUNTER (OUTPATIENT)
Dept: LAB | Age: 53
End: 2023-05-10
Attending: INTERNAL MEDICINE
Payer: MEDICAID

## 2023-05-10 DIAGNOSIS — C34.2 MALIGNANT NEOPLASM OF MIDDLE LOBE OF RIGHT LUNG (HCC): ICD-10-CM

## 2023-05-10 LAB
ALBUMIN SERPL-MCNC: 4.2 G/DL (ref 3.4–5)
ALBUMIN/GLOB SERPL: 1.2 {RATIO} (ref 1–2)
ALP LIVER SERPL-CCNC: 90 U/L
ALT SERPL-CCNC: 41 U/L
ANION GAP SERPL CALC-SCNC: 3 MMOL/L (ref 0–18)
AST SERPL-CCNC: 29 U/L (ref 15–37)
BASOPHILS # BLD AUTO: 0.06 X10(3) UL (ref 0–0.2)
BASOPHILS NFR BLD AUTO: 1.4 %
BILIRUB SERPL-MCNC: 0.6 MG/DL (ref 0.1–2)
BUN BLD-MCNC: 22 MG/DL (ref 7–18)
CALCIUM BLD-MCNC: 9.8 MG/DL (ref 8.5–10.1)
CHLORIDE SERPL-SCNC: 103 MMOL/L (ref 98–112)
CO2 SERPL-SCNC: 29 MMOL/L (ref 21–32)
CREAT BLD-MCNC: 1.07 MG/DL
EOSINOPHIL # BLD AUTO: 0.21 X10(3) UL (ref 0–0.7)
EOSINOPHIL NFR BLD AUTO: 4.9 %
ERYTHROCYTE [DISTWIDTH] IN BLOOD BY AUTOMATED COUNT: 13.7 %
FASTING STATUS PATIENT QL REPORTED: YES
GFR SERPLBLD BASED ON 1.73 SQ M-ARVRAT: 62 ML/MIN/1.73M2 (ref 60–?)
GLOBULIN PLAS-MCNC: 3.4 G/DL (ref 2.8–4.4)
GLUCOSE BLD-MCNC: 135 MG/DL (ref 70–99)
HCT VFR BLD AUTO: 41 %
HGB BLD-MCNC: 13.7 G/DL
IMM GRANULOCYTES # BLD AUTO: 0.02 X10(3) UL (ref 0–1)
IMM GRANULOCYTES NFR BLD: 0.5 %
LDH SERPL L TO P-CCNC: 208 U/L
LYMPHOCYTES # BLD AUTO: 0.93 X10(3) UL (ref 1–4)
LYMPHOCYTES NFR BLD AUTO: 21.8 %
MCH RBC QN AUTO: 30.9 PG (ref 26–34)
MCHC RBC AUTO-ENTMCNC: 33.4 G/DL (ref 31–37)
MCV RBC AUTO: 92.6 FL
MONOCYTES # BLD AUTO: 0.78 X10(3) UL (ref 0.1–1)
MONOCYTES NFR BLD AUTO: 18.3 %
NEUTROPHILS # BLD AUTO: 2.26 X10 (3) UL (ref 1.5–7.7)
NEUTROPHILS # BLD AUTO: 2.26 X10(3) UL (ref 1.5–7.7)
NEUTROPHILS NFR BLD AUTO: 53.1 %
OSMOLALITY SERPL CALC.SUM OF ELEC: 285 MOSM/KG (ref 275–295)
PLATELET # BLD AUTO: 333 10(3)UL (ref 150–450)
POTASSIUM SERPL-SCNC: 4.1 MMOL/L (ref 3.5–5.1)
PROT SERPL-MCNC: 7.6 G/DL (ref 6.4–8.2)
RBC # BLD AUTO: 4.43 X10(6)UL
SODIUM SERPL-SCNC: 135 MMOL/L (ref 136–145)
WBC # BLD AUTO: 4.3 X10(3) UL (ref 4–11)

## 2023-05-10 PROCEDURE — 83615 LACTATE (LD) (LDH) ENZYME: CPT

## 2023-05-10 PROCEDURE — 36415 COLL VENOUS BLD VENIPUNCTURE: CPT

## 2023-05-10 PROCEDURE — 85025 COMPLETE CBC W/AUTO DIFF WBC: CPT

## 2023-05-10 PROCEDURE — 80053 COMPREHEN METABOLIC PANEL: CPT

## 2023-05-11 ENCOUNTER — HOSPITAL ENCOUNTER (OUTPATIENT)
Dept: MRI IMAGING | Facility: HOSPITAL | Age: 53
Discharge: HOME | End: 2023-05-11
Attending: FAMILY MEDICINE
Payer: MEDICAID

## 2023-05-11 DIAGNOSIS — M50.90 CERVICAL DISC DISEASE: ICD-10-CM

## 2023-05-11 DIAGNOSIS — M54.16 LUMBAR RADICULOPATHY: ICD-10-CM

## 2023-05-11 PROCEDURE — 72148 MRI LUMBAR SPINE W/O DYE: CPT | Performed by: FAMILY MEDICINE

## 2023-05-11 PROCEDURE — 72141 MRI NECK SPINE W/O DYE: CPT | Performed by: FAMILY MEDICINE

## 2023-05-17 ENCOUNTER — HOSPITAL ENCOUNTER (OUTPATIENT)
Dept: CT IMAGING | Facility: HOSPITAL | Age: 53
Discharge: HOME | End: 2023-05-17
Attending: INTERNAL MEDICINE
Payer: MEDICAID

## 2023-05-17 DIAGNOSIS — C34.2 MALIGNANT NEOPLASM OF MIDDLE LOBE OF RIGHT LUNG (HCC): ICD-10-CM

## 2023-05-17 PROCEDURE — 71260 CT THORAX DX C+: CPT | Performed by: INTERNAL MEDICINE

## 2023-05-19 ENCOUNTER — NURSE ONLY (OUTPATIENT)
Dept: HEMATOLOGY/ONCOLOGY | Facility: HOSPITAL | Age: 53
End: 2023-05-19
Attending: GENETIC COUNSELOR, MS
Payer: MEDICAID

## 2023-05-19 VITALS
HEIGHT: 65.35 IN | RESPIRATION RATE: 20 BRPM | SYSTOLIC BLOOD PRESSURE: 124 MMHG | WEIGHT: 192 LBS | HEART RATE: 103 BPM | BODY MASS INDEX: 31.6 KG/M2 | TEMPERATURE: 98 F | DIASTOLIC BLOOD PRESSURE: 83 MMHG | OXYGEN SATURATION: 96 %

## 2023-05-19 DIAGNOSIS — Z00.00 WELLNESS EXAMINATION: ICD-10-CM

## 2023-05-19 DIAGNOSIS — C77.1 SECONDARY MALIGNANT NEOPLASM OF MEDIASTINAL LYMPH NODE (HCC): ICD-10-CM

## 2023-05-19 DIAGNOSIS — C34.2 MALIGNANT NEOPLASM OF MIDDLE LOBE OF RIGHT LUNG (HCC): ICD-10-CM

## 2023-05-19 DIAGNOSIS — C34.2 MALIGNANT NEOPLASM OF MIDDLE LOBE OF RIGHT LUNG (HCC): Primary | ICD-10-CM

## 2023-05-19 LAB
ALBUMIN SERPL-MCNC: 3.9 G/DL (ref 3.4–5)
ALBUMIN/GLOB SERPL: 1.3 {RATIO} (ref 1–2)
ALP LIVER SERPL-CCNC: 72 U/L
ALT SERPL-CCNC: 30 U/L
ANION GAP SERPL CALC-SCNC: 5 MMOL/L (ref 0–18)
AST SERPL-CCNC: 25 U/L (ref 15–37)
BASOPHILS # BLD AUTO: 0.04 X10(3) UL (ref 0–0.2)
BASOPHILS NFR BLD AUTO: 0.9 %
BILIRUB SERPL-MCNC: 0.4 MG/DL (ref 0.1–2)
BUN BLD-MCNC: 18 MG/DL (ref 7–18)
CALCIUM BLD-MCNC: 9.5 MG/DL (ref 8.5–10.1)
CHLORIDE SERPL-SCNC: 107 MMOL/L (ref 98–112)
CO2 SERPL-SCNC: 26 MMOL/L (ref 21–32)
CREAT BLD-MCNC: 0.91 MG/DL
EOSINOPHIL # BLD AUTO: 0.21 X10(3) UL (ref 0–0.7)
EOSINOPHIL NFR BLD AUTO: 4.7 %
ERYTHROCYTE [DISTWIDTH] IN BLOOD BY AUTOMATED COUNT: 13.2 %
FASTING STATUS PATIENT QL REPORTED: NO
GFR SERPLBLD BASED ON 1.73 SQ M-ARVRAT: 76 ML/MIN/1.73M2 (ref 60–?)
GLOBULIN PLAS-MCNC: 3 G/DL (ref 2.8–4.4)
GLUCOSE BLD-MCNC: 154 MG/DL (ref 70–99)
HCT VFR BLD AUTO: 34 %
HGB BLD-MCNC: 11.9 G/DL
IMM GRANULOCYTES # BLD AUTO: 0.01 X10(3) UL (ref 0–1)
IMM GRANULOCYTES NFR BLD: 0.2 %
LDH SERPL L TO P-CCNC: 183 U/L
LYMPHOCYTES # BLD AUTO: 0.69 X10(3) UL (ref 1–4)
LYMPHOCYTES NFR BLD AUTO: 15.5 %
MCH RBC QN AUTO: 30.8 PG (ref 26–34)
MCHC RBC AUTO-ENTMCNC: 35 G/DL (ref 31–37)
MCV RBC AUTO: 88.1 FL
MONOCYTES # BLD AUTO: 0.53 X10(3) UL (ref 0.1–1)
MONOCYTES NFR BLD AUTO: 11.9 %
NEUTROPHILS # BLD AUTO: 2.96 X10 (3) UL (ref 1.5–7.7)
NEUTROPHILS # BLD AUTO: 2.96 X10(3) UL (ref 1.5–7.7)
NEUTROPHILS NFR BLD AUTO: 66.8 %
OSMOLALITY SERPL CALC.SUM OF ELEC: 291 MOSM/KG (ref 275–295)
PLATELET # BLD AUTO: 232 10(3)UL (ref 150–450)
POTASSIUM SERPL-SCNC: 3.6 MMOL/L (ref 3.5–5.1)
PROT SERPL-MCNC: 6.9 G/DL (ref 6.4–8.2)
RBC # BLD AUTO: 3.86 X10(6)UL
SODIUM SERPL-SCNC: 138 MMOL/L (ref 136–145)
WBC # BLD AUTO: 4.4 X10(3) UL (ref 4–11)

## 2023-05-19 PROCEDURE — 99214 OFFICE O/P EST MOD 30 MIN: CPT | Performed by: INTERNAL MEDICINE

## 2023-05-19 PROCEDURE — 80053 COMPREHEN METABOLIC PANEL: CPT

## 2023-05-19 PROCEDURE — 83615 LACTATE (LD) (LDH) ENZYME: CPT

## 2023-05-19 PROCEDURE — 36591 DRAW BLOOD OFF VENOUS DEVICE: CPT

## 2023-05-19 PROCEDURE — 85025 COMPLETE CBC W/AUTO DIFF WBC: CPT

## 2023-05-19 NOTE — PROGRESS NOTES
Education Record    Learner:  Patient    Disease / Diagnosis: CLL    Barriers / Limitations:  None    Method:  Brief focused, printed material and  reinforcement    General Topics:  Plan of care reviewed    Outcome:  Shows understanding    CLL for MD appt

## 2023-05-19 NOTE — PROGRESS NOTES
Patient is here for MD f/u for Lung cancer. Patient had a Ct chest on 5/17 and MRI of the spine on 5/11. She has issues with ongoing SOB. Patient states being on Morphine for pain also helps her breathing. Chronic fatigue. Appetite is ok. Denies any dysphagia.        Education Record    Learner:  Patient    Disease / Diagnosis:   Lung Cancer     Barriers / Limitations:  None   Comments:    Method:  Discussion   Comments:    General Topics:  Plan of care reviewed   Comments:    Outcome:  Shows understanding   Comments:

## 2023-05-23 RX ORDER — BUDESONIDE AND FORMOTEROL FUMARATE DIHYDRATE 160; 4.5 UG/1; UG/1
AEROSOL RESPIRATORY (INHALATION)
Qty: 10.2 G | Refills: 2 | Status: SHIPPED | OUTPATIENT
Start: 2023-05-23

## 2023-05-24 ENCOUNTER — MED REC SCAN ONLY (OUTPATIENT)
Facility: CLINIC | Age: 53
End: 2023-05-24

## 2023-05-24 RX ORDER — BUDESONIDE AND FORMOTEROL FUMARATE DIHYDRATE 160; 4.5 UG/1; UG/1
AEROSOL RESPIRATORY (INHALATION)
Qty: 10.2 G | Refills: 4 | OUTPATIENT
Start: 2023-05-24

## 2023-06-22 NOTE — PROGRESS NOTES
[facility] Hospitalist Progress Note                                                                   [address]  2/1/1970    SUBJECTIVE:  Pt seen and examined.   C/o low back pain post op but 0300)     PRN: Albuterol Sulfate HFA, sodium chloride 0.9%, HYDROmorphone HCl **OR** HYDROmorphone HCl **OR** HYDROmorphone HCl, PEG 3350, magnesium hydroxide, bisacodyl, Fleet Enema, ondansetron HCl, Prochlorperazine Edisylate, cyclobenzaprine, diphenhydr English

## 2023-07-19 ENCOUNTER — SOCIAL WORK SERVICES (OUTPATIENT)
Dept: HEMATOLOGY/ONCOLOGY | Facility: HOSPITAL | Age: 53
End: 2023-07-19

## 2023-07-19 NOTE — PROGRESS NOTES
ARNOLD received a request from pt to assist with a letter to the [address] [place] [address] due to issues with her electricity. ARNOLD faxed completed letter to [facility] fax [phone]. ARNOLD updated pt via [employer]. Letter:    Karina Everett ( 1970) is a patient under my care at [facility] for her diagnosis of Lung Cancer. Due to her diagnosis, she requires electricity to maintain her health. It is important that she remain in her home and in cool conditions due to her risk for serious illness. Her health and wellness require her to have electricity. Please accommodate Hanna by keeping her electricity on at this time. If you have any further questions, please do not hesitate to contact my office. Galen Dubois LCSW   at [facility]    [facility], [address] [place] [address], orsie, [address] Dar[address]ont [address] [place] [address] [facility], [facility] Jm Castaneda  Ph: [phone]. [email or web address]  Fax: [phone]

## 2023-07-28 DIAGNOSIS — C34.91 PRIMARY LUNG CANCER WITH METASTASIS FROM LUNG TO OTHER SITE, RIGHT (HCC): Primary | ICD-10-CM

## 2023-08-04 ENCOUNTER — OFFICE VISIT (OUTPATIENT)
Dept: PAIN CLINIC | Facility: CLINIC | Age: 53
End: 2023-08-04
Payer: MEDICAID

## 2023-08-04 ENCOUNTER — HOSPITAL ENCOUNTER (OUTPATIENT)
Dept: GENERAL RADIOLOGY | Facility: HOSPITAL | Age: 53
Discharge: HOME | End: 2023-08-04
Attending: ANESTHESIOLOGY
Payer: MEDICAID

## 2023-08-04 ENCOUNTER — HOSPITAL ENCOUNTER (OUTPATIENT)
Dept: CT IMAGING | Facility: HOSPITAL | Age: 53
Discharge: HOME | End: 2023-08-04
Attending: INTERNAL MEDICINE
Payer: MEDICAID

## 2023-08-04 VITALS
BODY MASS INDEX: 28 KG/M2 | SYSTOLIC BLOOD PRESSURE: 108 MMHG | HEART RATE: 119 BPM | DIASTOLIC BLOOD PRESSURE: 60 MMHG | OXYGEN SATURATION: 93 % | WEIGHT: 172 LBS

## 2023-08-04 DIAGNOSIS — C34.91 PRIMARY LUNG CANCER WITH METASTASIS FROM LUNG TO OTHER SITE, RIGHT (HCC): ICD-10-CM

## 2023-08-04 DIAGNOSIS — M96.1 FAILED BACK SURGICAL SYNDROME: ICD-10-CM

## 2023-08-04 DIAGNOSIS — M96.1 FAILED BACK SURGICAL SYNDROME: Primary | ICD-10-CM

## 2023-08-04 PROBLEM — M54.16 LUMBAR RADICULOPATHY: Status: RESOLVED | Noted: 2021-10-04 | Resolved: 2023-08-04

## 2023-08-04 LAB
CREAT BLD-MCNC: 1 MG/DL
EGFRCR SERPLBLD CKD-EPI 2021: 68 ML/MIN/1.73M2 (ref 60–?)

## 2023-08-04 PROCEDURE — 72110 X-RAY EXAM L-2 SPINE 4/>VWS: CPT | Performed by: ANESTHESIOLOGY

## 2023-08-04 PROCEDURE — 99215 OFFICE O/P EST HI 40 MIN: CPT | Performed by: ANESTHESIOLOGY

## 2023-08-04 PROCEDURE — 71260 CT THORAX DX C+: CPT | Performed by: INTERNAL MEDICINE

## 2023-08-04 PROCEDURE — 82565 ASSAY OF CREATININE: CPT

## 2023-08-04 PROCEDURE — 3074F SYST BP LT 130 MM HG: CPT | Performed by: ANESTHESIOLOGY

## 2023-08-04 PROCEDURE — 3078F DIAST BP <80 MM HG: CPT | Performed by: ANESTHESIOLOGY

## 2023-08-04 NOTE — PROGRESS NOTES
Patient presents in office today with reported pain in lumbar thoracic spine    Current pain level reported = 8/10    Last reported dose of ibuprofen this morning      Narcotic Contract renewal NA    Urine Drug screen NA

## 2023-08-18 ENCOUNTER — APPOINTMENT (OUTPATIENT)
Dept: HEMATOLOGY/ONCOLOGY | Facility: HOSPITAL | Age: 53
End: 2023-08-18
Attending: GENETIC COUNSELOR, MS
Payer: MEDICAID

## 2023-08-21 ENCOUNTER — NURSE ONLY (OUTPATIENT)
Dept: HEMATOLOGY/ONCOLOGY | Facility: HOSPITAL | Age: 53
End: 2023-08-21
Attending: GENETIC COUNSELOR, MS
Payer: MEDICAID

## 2023-08-21 VITALS
BODY MASS INDEX: 29.1 KG/M2 | DIASTOLIC BLOOD PRESSURE: 84 MMHG | OXYGEN SATURATION: 98 % | TEMPERATURE: 97 F | SYSTOLIC BLOOD PRESSURE: 124 MMHG | WEIGHT: 176.81 LBS | RESPIRATION RATE: 18 BRPM | HEIGHT: 65.35 IN | HEART RATE: 107 BPM

## 2023-08-21 DIAGNOSIS — C77.1 SECONDARY MALIGNANT NEOPLASM OF MEDIASTINAL LYMPH NODE (HCC): ICD-10-CM

## 2023-08-21 DIAGNOSIS — C34.2 MALIGNANT NEOPLASM OF MIDDLE LOBE OF RIGHT LUNG (HCC): ICD-10-CM

## 2023-08-21 DIAGNOSIS — C34.2 MALIGNANT NEOPLASM OF MIDDLE LOBE OF RIGHT LUNG (HCC): Primary | ICD-10-CM

## 2023-08-21 DIAGNOSIS — J47.9 BRONCHIECTASIS WITHOUT COMPLICATION (HCC): ICD-10-CM

## 2023-08-21 DIAGNOSIS — Z00.00 WELLNESS EXAMINATION: ICD-10-CM

## 2023-08-21 DIAGNOSIS — D64.9 ANEMIA, NORMOCYTIC NORMOCHROMIC: ICD-10-CM

## 2023-08-21 LAB
BASOPHILS # BLD AUTO: 0.04 X10(3) UL (ref 0–0.2)
BASOPHILS NFR BLD AUTO: 1 %
CHOLEST SERPL-MCNC: 152 MG/DL (ref ?–200)
EOSINOPHIL # BLD AUTO: 0.21 X10(3) UL (ref 0–0.7)
EOSINOPHIL NFR BLD AUTO: 5.1 %
ERYTHROCYTE [DISTWIDTH] IN BLOOD BY AUTOMATED COUNT: 12.6 %
EST. AVERAGE GLUCOSE BLD GHB EST-MCNC: 126 MG/DL (ref 68–126)
HBA1C MFR BLD: 6 % (ref ?–5.7)
HCT VFR BLD AUTO: 33.6 %
HDLC SERPL-MCNC: 47 MG/DL (ref 40–59)
HGB BLD-MCNC: 11.6 G/DL
IMM GRANULOCYTES # BLD AUTO: 0.01 X10(3) UL (ref 0–1)
IMM GRANULOCYTES NFR BLD: 0.2 %
LDLC SERPL CALC-MCNC: 71 MG/DL (ref ?–100)
LYMPHOCYTES # BLD AUTO: 0.57 X10(3) UL (ref 1–4)
LYMPHOCYTES NFR BLD AUTO: 14 %
MCH RBC QN AUTO: 30.2 PG (ref 26–34)
MCHC RBC AUTO-ENTMCNC: 34.5 G/DL (ref 31–37)
MCV RBC AUTO: 87.5 FL
MONOCYTES # BLD AUTO: 0.52 X10(3) UL (ref 0.1–1)
MONOCYTES NFR BLD AUTO: 12.7 %
NEUTROPHILS # BLD AUTO: 2.73 X10 (3) UL (ref 1.5–7.7)
NEUTROPHILS # BLD AUTO: 2.73 X10(3) UL (ref 1.5–7.7)
NEUTROPHILS NFR BLD AUTO: 67 %
NONHDLC SERPL-MCNC: 105 MG/DL (ref ?–130)
PLATELET # BLD AUTO: 269 10(3)UL (ref 150–450)
RBC # BLD AUTO: 3.84 X10(6)UL
TRIGL SERPL-MCNC: 204 MG/DL (ref 30–149)
TSI SER-ACNC: 1.74 MIU/ML (ref 0.36–3.74)
VIT D+METAB SERPL-MCNC: 61.5 NG/ML (ref 30–100)
VLDLC SERPL CALC-MCNC: 31 MG/DL (ref 0–30)
WBC # BLD AUTO: 4.1 X10(3) UL (ref 4–11)

## 2023-08-21 PROCEDURE — 83036 HEMOGLOBIN GLYCOSYLATED A1C: CPT

## 2023-08-21 PROCEDURE — 85025 COMPLETE CBC W/AUTO DIFF WBC: CPT

## 2023-08-21 PROCEDURE — 80061 LIPID PANEL: CPT

## 2023-08-21 PROCEDURE — 99211 OFF/OP EST MAY X REQ PHY/QHP: CPT

## 2023-08-21 PROCEDURE — 36591 DRAW BLOOD OFF VENOUS DEVICE: CPT

## 2023-08-21 PROCEDURE — 84443 ASSAY THYROID STIM HORMONE: CPT

## 2023-08-21 PROCEDURE — 82306 VITAMIN D 25 HYDROXY: CPT

## 2023-08-21 NOTE — PROGRESS NOTES
Patient is here for 3 month f/u. Patient c/o of SOB upon exertion. Patient states that she is out of breath while waling up stairs or walking around the block. Patient has no other complaints at this time.      Education Record    Learner:  Patient    Disease / Diagnosis: malignant neoplasm of lung     Barriers / Limitations:  None   Comments:    Method:  Discussion   Comments:    General Topics:  Medication, Side effects and symptom management, and Plan of care reviewed   Comments:    Outcome:  Shows understanding   Comments:

## 2023-08-21 NOTE — PROGRESS NOTES
Education Record    Learner:  Patient    Disease / Diagnosis: adenocarcinoma of right lung    Barriers / Limitations:  None   Comments:    Method:  Discussion   Comments:    General Topics:  Plan of care reviewed   Comments:    Outcome:  Shows understanding   Comments:    Pt here for central line lab prior to MD visit. Discharged in stable condition.

## 2023-08-23 ENCOUNTER — OFFICE VISIT (OUTPATIENT)
Facility: CLINIC | Age: 53
End: 2023-08-23
Payer: MEDICAID

## 2023-08-23 VITALS
BODY MASS INDEX: 28.93 KG/M2 | HEIGHT: 66 IN | WEIGHT: 180 LBS | SYSTOLIC BLOOD PRESSURE: 100 MMHG | OXYGEN SATURATION: 97 % | DIASTOLIC BLOOD PRESSURE: 70 MMHG | RESPIRATION RATE: 16 BRPM | HEART RATE: 107 BPM

## 2023-08-23 DIAGNOSIS — C34.91 PRIMARY LUNG CANCER WITH METASTASIS FROM LUNG TO OTHER SITE, RIGHT (HCC): ICD-10-CM

## 2023-08-23 DIAGNOSIS — J47.9 BRONCHIECTASIS WITHOUT COMPLICATION (HCC): ICD-10-CM

## 2023-08-23 DIAGNOSIS — J45.909 UNCOMPLICATED ASTHMA, UNSPECIFIED ASTHMA SEVERITY, UNSPECIFIED WHETHER PERSISTENT: Primary | ICD-10-CM

## 2023-08-23 DIAGNOSIS — R04.2 HEMOPTYSIS: ICD-10-CM

## 2023-08-23 PROCEDURE — 99214 OFFICE O/P EST MOD 30 MIN: CPT | Performed by: NURSE PRACTITIONER

## 2023-08-23 PROCEDURE — 3074F SYST BP LT 130 MM HG: CPT | Performed by: NURSE PRACTITIONER

## 2023-08-23 PROCEDURE — 3078F DIAST BP <80 MM HG: CPT | Performed by: NURSE PRACTITIONER

## 2023-08-23 PROCEDURE — 3008F BODY MASS INDEX DOCD: CPT | Performed by: NURSE PRACTITIONER

## 2023-08-23 RX ORDER — BUDESONIDE AND FORMOTEROL FUMARATE DIHYDRATE 160; 4.5 UG/1; UG/1
2 AEROSOL RESPIRATORY (INHALATION) 2 TIMES DAILY
Qty: 3 EACH | Refills: 3 | Status: SHIPPED | OUTPATIENT
Start: 2023-08-23 | End: 2023-11-21

## 2023-08-23 RX ORDER — ALBUTEROL SULFATE 2.5 MG/3ML
2.5 SOLUTION RESPIRATORY (INHALATION) EVERY 6 HOURS PRN
COMMUNITY
Start: 2023-08-18

## 2023-08-23 RX ORDER — ALBUTEROL SULFATE 90 UG/1
2 AEROSOL, METERED RESPIRATORY (INHALATION)
Qty: 1 EACH | Refills: 1 | Status: SHIPPED | OUTPATIENT
Start: 2023-08-23

## 2023-08-23 RX ORDER — ALBUTEROL SULFATE 90 UG/1
2 AEROSOL, METERED RESPIRATORY (INHALATION)
COMMUNITY
End: 2023-08-23

## 2023-08-23 RX ORDER — UMECLIDINIUM 62.5 UG/1
1 AEROSOL, POWDER ORAL DAILY
Qty: 3 EACH | Refills: 3 | Status: SHIPPED | OUTPATIENT
Start: 2023-08-23

## 2023-08-23 NOTE — PATIENT INSTRUCTIONS
Start Incruse daily  Continue Symbicort twice a day and albuterol as directed  Start Pulmonary rehab   Followup in 2 months

## 2023-09-18 ENCOUNTER — TELEPHONE (OUTPATIENT)
Dept: HEMATOLOGY/ONCOLOGY | Facility: HOSPITAL | Age: 53
End: 2023-09-18

## 2023-09-18 ENCOUNTER — PATIENT MESSAGE (OUTPATIENT)
Dept: HEMATOLOGY/ONCOLOGY | Facility: HOSPITAL | Age: 53
End: 2023-09-18

## 2023-10-11 ENCOUNTER — OFFICE VISIT (OUTPATIENT)
Dept: FAMILY MEDICINE CLINIC | Facility: CLINIC | Age: 53
End: 2023-10-11
Payer: MEDICAID

## 2023-10-11 VITALS
DIASTOLIC BLOOD PRESSURE: 82 MMHG | WEIGHT: 175 LBS | OXYGEN SATURATION: 100 % | SYSTOLIC BLOOD PRESSURE: 106 MMHG | HEIGHT: 65 IN | BODY MASS INDEX: 29.16 KG/M2 | HEART RATE: 70 BPM | TEMPERATURE: 98 F

## 2023-10-11 DIAGNOSIS — J01.00 ACUTE NON-RECURRENT MAXILLARY SINUSITIS: Primary | ICD-10-CM

## 2023-10-11 PROCEDURE — 99213 OFFICE O/P EST LOW 20 MIN: CPT | Performed by: FAMILY MEDICINE

## 2023-10-11 PROCEDURE — 3008F BODY MASS INDEX DOCD: CPT | Performed by: FAMILY MEDICINE

## 2023-10-11 PROCEDURE — 3079F DIAST BP 80-89 MM HG: CPT | Performed by: FAMILY MEDICINE

## 2023-10-11 PROCEDURE — 3074F SYST BP LT 130 MM HG: CPT | Performed by: FAMILY MEDICINE

## 2023-10-11 RX ORDER — AMOXICILLIN AND CLAVULANATE POTASSIUM 875; 125 MG/1; MG/1
1 TABLET, FILM COATED ORAL 2 TIMES DAILY
Qty: 20 TABLET | Refills: 0 | Status: SHIPPED | OUTPATIENT
Start: 2023-10-11 | End: 2023-10-21

## 2023-10-18 NOTE — TELEPHONE ENCOUNTER
Physical Therapy    Visit Type: initial evaluation    Relevant History/Co-morbidities: Dx: epigastric pain  PMHx: hip surgery , acute DVT chronic anemia, COPD,     SUBJECTIVE  Patient agreed to participate in therapy this date.  RN in agreement to work with patient for therapy session.  Patient agreed with physical therapy     Pain   RN informed on pain level.     OBJECTIVE     Cognitive Status   Orientation    - Oriented to: person, place and time     Strength  (out of 5 unless noted, standard test position unless noted)   Hip:    - Flexion:        • Left: 3-        • Right: 3-    - Abduction:        • Left: 3-        • Right: 3-    - Adduction:        • Left: 3-        • Right: 3-    - Internal Rotation:        • Left: 3-        • Right: 3-    - External Rotation:        • Left: 3-        • Right: 3-  Knee:    - Flexion:        • Right: 3-    - Extension:        • Right: 3-  Ankle:    - Dorsiflexion:        • Left: 3-        • Right: 3-    - Plantar Flexion:        • Left: 3-        • Right: 3-    - Inversion:        • Left: 3-         • Right: 3-    - Eversion:        • Left: 3-         • Right: 3-         Interventions     Training provided: activity tolerance, transfer training, gait training, functional ambulation and safety training    Skilled input: Verbal instruction/cues         Education:   - Present and ready to learn: patient    ASSESSMENT   Patient will benefit from skilled therapy to address listed impairments and functional limitations.    Discharge needs based on today's assessment:   - Current level of function: significantly below baseline level of function   - Therapy needs at discharge: therapy 1-3 times per week   - Activities of daily living (ADLs) requiring support at discharge: bed mobility, transfers and ambulation    AM-PAC  - Generalized Prior Level of Function: IND/MOD I ([facility] 22-24)       Key: MOD A=moderate assistance, IND/MOD I=independent/modified independent  - Generalized Current  Spoke with patient and notified her insurance denied Lyrica and an alternative has been sent to pharmacy. Advised patient to call back with any further questions or concerns. Level of Function     - Current Mobility Score: 18       AM-PAC Scoring Key= >21 Modified Independent; 20-21 Supervision; 18-19 Minimal assist; 13-17 Moderate assist; 9-12 Max assist; <9 Total assist       • Predicted patient presentation: Low (stable) - Patient comorbidities and complexities, as defined above, will have little effect on progress for prescribed plan of care.    PLAN (while hospitalized)  Suggestions for next session as indicated: Gait and transfer training   PT Frequency: 6-7 x per week         Interventions: balance, bed mobility, strengthening, safety education, functional transfer training and gait training  Agreement to plan and goals: patient agrees with goals and treatment plan        GOALS  Review Date: 10/25/2023  Long Term Goals: (to be met by time of discharge from hospital)  Sit to supine: Patient will complete sit to supine independent.  Supine to sit: Patient will complete supine to sit independent.  Sidelying to sit: Patient will complete bed mobility for sidelying to sit independent.  Sit to stand: Patient will complete sit to stand transfer with gait belt and least restrictive device, independent.   Ambulation (even): Patient will ambulate on even surface for 200 feet with gait belt and least restrictive device, modified independent.   Flight of stairs: Patient will ambulate a flight of stairs with least restrictive device modified independent.     Documented in the chart in the following areas: Prior Level of Function. Assessment/Plan.      Patient at End of Session:   Location: in bed  Safety measures: alarm system in place/re-engaged, equipment intact and lines intact  Handoff to: nurse      Therapy procedure time and total treatment time can be found documented on the Time Entry flowsheet

## 2023-10-24 ENCOUNTER — OFFICE VISIT (OUTPATIENT)
Facility: CLINIC | Age: 53
End: 2023-10-24

## 2023-10-24 VITALS
DIASTOLIC BLOOD PRESSURE: 70 MMHG | SYSTOLIC BLOOD PRESSURE: 108 MMHG | HEART RATE: 105 BPM | RESPIRATION RATE: 16 BRPM | BODY MASS INDEX: 30.82 KG/M2 | OXYGEN SATURATION: 98 % | WEIGHT: 185 LBS | HEIGHT: 65 IN

## 2023-10-24 DIAGNOSIS — J47.9 BRONCHIECTASIS WITHOUT COMPLICATION (HCC): ICD-10-CM

## 2023-10-24 DIAGNOSIS — J45.909 UNCOMPLICATED ASTHMA, UNSPECIFIED ASTHMA SEVERITY, UNSPECIFIED WHETHER PERSISTENT: Primary | ICD-10-CM

## 2023-10-24 DIAGNOSIS — R04.2 HEMOPTYSIS: ICD-10-CM

## 2023-10-24 DIAGNOSIS — C34.91 PRIMARY LUNG CANCER WITH METASTASIS FROM LUNG TO OTHER SITE, RIGHT (HCC): ICD-10-CM

## 2023-10-24 PROCEDURE — 3008F BODY MASS INDEX DOCD: CPT | Performed by: NURSE PRACTITIONER

## 2023-10-24 PROCEDURE — 3078F DIAST BP <80 MM HG: CPT | Performed by: NURSE PRACTITIONER

## 2023-10-24 PROCEDURE — 3074F SYST BP LT 130 MM HG: CPT | Performed by: NURSE PRACTITIONER

## 2023-10-24 PROCEDURE — 99214 OFFICE O/P EST MOD 30 MIN: CPT | Performed by: NURSE PRACTITIONER

## 2023-10-24 RX ORDER — MELATONIN
325
COMMUNITY

## 2023-10-24 NOTE — PATIENT INSTRUCTIONS
Continue inhalers as directed  Call if cough worsens or persists  Obtain CT chest as scheduled and return in 6 weeks to review with Dr Corrina Miller  Please contact office at [phone] with any decline in respiratory status, questions or concerns

## 2023-11-20 ENCOUNTER — APPOINTMENT (OUTPATIENT)
Dept: HEMATOLOGY/ONCOLOGY | Facility: HOSPITAL | Age: 53
End: 2023-11-20
Attending: GENETIC COUNSELOR, MS
Payer: MEDICAID

## 2023-11-21 ENCOUNTER — HOSPITAL ENCOUNTER (OUTPATIENT)
Dept: CT IMAGING | Facility: HOSPITAL | Age: 53
Discharge: HOME | End: 2023-11-21
Attending: INTERNAL MEDICINE
Payer: MEDICAID

## 2023-11-21 DIAGNOSIS — C34.2 MALIGNANT NEOPLASM OF MIDDLE LOBE OF RIGHT LUNG (HCC): ICD-10-CM

## 2023-11-21 LAB
CREAT BLD-MCNC: 1 MG/DL
EGFRCR SERPLBLD CKD-EPI 2021: 68 ML/MIN/1.73M2 (ref 60–?)

## 2023-11-21 PROCEDURE — 71260 CT THORAX DX C+: CPT | Performed by: INTERNAL MEDICINE

## 2023-11-21 PROCEDURE — 82565 ASSAY OF CREATININE: CPT

## 2023-11-27 ENCOUNTER — NURSE ONLY (OUTPATIENT)
Dept: HEMATOLOGY/ONCOLOGY | Facility: HOSPITAL | Age: 53
End: 2023-11-27
Attending: GENETIC COUNSELOR, MS
Payer: MEDICAID

## 2023-11-27 VITALS
WEIGHT: 184 LBS | HEIGHT: 65 IN | SYSTOLIC BLOOD PRESSURE: 105 MMHG | OXYGEN SATURATION: 98 % | HEART RATE: 107 BPM | BODY MASS INDEX: 30.66 KG/M2 | TEMPERATURE: 97 F | DIASTOLIC BLOOD PRESSURE: 75 MMHG

## 2023-11-27 DIAGNOSIS — C34.91 PRIMARY ADENOCARCINOMA OF RIGHT LUNG (HCC): ICD-10-CM

## 2023-11-27 DIAGNOSIS — M25.519 NECK AND SHOULDER PAIN: Primary | ICD-10-CM

## 2023-11-27 DIAGNOSIS — R10.32 ABDOMINAL PAIN, LEFT LOWER QUADRANT: ICD-10-CM

## 2023-11-27 DIAGNOSIS — M54.2 NECK AND SHOULDER PAIN: Primary | ICD-10-CM

## 2023-11-27 DIAGNOSIS — M79.622 LEFT UPPER ARM PAIN: ICD-10-CM

## 2023-11-27 DIAGNOSIS — C77.1 SECONDARY MALIGNANT NEOPLASM OF MEDIASTINAL LYMPH NODE (HCC): ICD-10-CM

## 2023-11-27 DIAGNOSIS — C34.91 PRIMARY ADENOCARCINOMA OF RIGHT LUNG (HCC): Primary | ICD-10-CM

## 2023-11-27 LAB
ALBUMIN SERPL-MCNC: 3.8 G/DL (ref 3.4–5)
ALBUMIN/GLOB SERPL: 1.2 {RATIO} (ref 1–2)
ALP LIVER SERPL-CCNC: 85 U/L
ALT SERPL-CCNC: 28 U/L
ANION GAP SERPL CALC-SCNC: 7 MMOL/L (ref 0–18)
AST SERPL-CCNC: 16 U/L (ref 15–37)
BASOPHILS # BLD AUTO: 0.05 X10(3) UL (ref 0–0.2)
BASOPHILS NFR BLD AUTO: 0.8 %
BILIRUB SERPL-MCNC: 0.4 MG/DL (ref 0.1–2)
BUN BLD-MCNC: 19 MG/DL (ref 9–23)
CALCIUM BLD-MCNC: 9.3 MG/DL (ref 8.5–10.1)
CHLORIDE SERPL-SCNC: 104 MMOL/L (ref 98–112)
CO2 SERPL-SCNC: 27 MMOL/L (ref 21–32)
CREAT BLD-MCNC: 1.1 MG/DL
EGFRCR SERPLBLD CKD-EPI 2021: 60 ML/MIN/1.73M2 (ref 60–?)
EOSINOPHIL # BLD AUTO: 0.26 X10(3) UL (ref 0–0.7)
EOSINOPHIL NFR BLD AUTO: 4 %
ERYTHROCYTE [DISTWIDTH] IN BLOOD BY AUTOMATED COUNT: 12.4 %
FASTING STATUS PATIENT QL REPORTED: NO
GLOBULIN PLAS-MCNC: 3.2 G/DL (ref 2.8–4.4)
GLUCOSE BLD-MCNC: 109 MG/DL (ref 70–99)
HCT VFR BLD AUTO: 36.4 %
HGB BLD-MCNC: 12.4 G/DL
IMM GRANULOCYTES # BLD AUTO: 0.02 X10(3) UL (ref 0–1)
IMM GRANULOCYTES NFR BLD: 0.3 %
LDH SERPL L TO P-CCNC: 145 U/L
LYMPHOCYTES # BLD AUTO: 0.86 X10(3) UL (ref 1–4)
LYMPHOCYTES NFR BLD AUTO: 13.3 %
MCH RBC QN AUTO: 30 PG (ref 26–34)
MCHC RBC AUTO-ENTMCNC: 34.1 G/DL (ref 31–37)
MCV RBC AUTO: 88.1 FL
MONOCYTES # BLD AUTO: 0.85 X10(3) UL (ref 0.1–1)
MONOCYTES NFR BLD AUTO: 13.1 %
NEUTROPHILS # BLD AUTO: 4.43 X10 (3) UL (ref 1.5–7.7)
NEUTROPHILS # BLD AUTO: 4.43 X10(3) UL (ref 1.5–7.7)
NEUTROPHILS NFR BLD AUTO: 68.5 %
OSMOLALITY SERPL CALC.SUM OF ELEC: 289 MOSM/KG (ref 275–295)
PLATELET # BLD AUTO: 274 10(3)UL (ref 150–450)
POTASSIUM SERPL-SCNC: 3.7 MMOL/L (ref 3.5–5.1)
PROT SERPL-MCNC: 7 G/DL (ref 6.4–8.2)
RBC # BLD AUTO: 4.13 X10(6)UL
SODIUM SERPL-SCNC: 138 MMOL/L (ref 136–145)
WBC # BLD AUTO: 6.5 X10(3) UL (ref 4–11)

## 2023-11-27 PROCEDURE — 80053 COMPREHEN METABOLIC PANEL: CPT

## 2023-11-27 PROCEDURE — 83615 LACTATE (LD) (LDH) ENZYME: CPT | Performed by: INTERNAL MEDICINE

## 2023-11-27 PROCEDURE — 85025 COMPLETE CBC W/AUTO DIFF WBC: CPT | Performed by: INTERNAL MEDICINE

## 2023-11-27 PROCEDURE — 36591 DRAW BLOOD OFF VENOUS DEVICE: CPT

## 2023-11-27 PROCEDURE — 99215 OFFICE O/P EST HI 40 MIN: CPT | Performed by: INTERNAL MEDICINE

## 2023-11-27 NOTE — PATIENT INSTRUCTIONS
For triage nurse: [phone] Monday through Friday 7:30-5:00.  *Please note this is a new phone number*    After hours or weekends for emergent needs:  [phone].      To schedule diagnostic testing: Central [phone]    For Medical Records: [phone]

## 2023-12-05 ENCOUNTER — TELEMEDICINE (OUTPATIENT)
Facility: CLINIC | Age: 53
End: 2023-12-05
Payer: MEDICAID

## 2023-12-05 DIAGNOSIS — R04.2 HEMOPTYSIS: ICD-10-CM

## 2023-12-05 DIAGNOSIS — J47.9 BRONCHIECTASIS WITHOUT COMPLICATION (HCC): ICD-10-CM

## 2023-12-05 DIAGNOSIS — J45.909 UNCOMPLICATED ASTHMA, UNSPECIFIED ASTHMA SEVERITY, UNSPECIFIED WHETHER PERSISTENT: Primary | ICD-10-CM

## 2023-12-05 DIAGNOSIS — C34.91 PRIMARY LUNG CANCER WITH METASTASIS FROM LUNG TO OTHER SITE, RIGHT (HCC): ICD-10-CM

## 2023-12-05 PROCEDURE — 99214 OFFICE O/P EST MOD 30 MIN: CPT | Performed by: INTERNAL MEDICINE

## 2023-12-05 NOTE — PROGRESS NOTES
[facility] General Pulmonary Progress Note    History of Present Illness:  Dorthey Ahumada is a 47year old female former smoker (quit 2011, 20 pack years) with significant PMH of asthma, stage 3 RUL NSCLC s/p chemoRT who presents today for follow up. She denies acute concerns today. Does continue to have nasal congestion/drainage leading to cough and throat clearing. No blood. Dyspnea on exertion overall better on symbicort BID and incruse daily. Has albuterol but not using often. October 2023 previously BLAKE Ceron is a 47year old female who presents for 2 month asthma followup. Breathing has been better with Incruse and Symbicort. Had Sinus infection about 2  weeks ago; blood mixed in phlegm about a quarter size. No further hemoptysis;  still coughing. Feels like a vice around her chest at times; mostly after coughing. Was on 2 courses of antibiotics; PCN and Augmentin. Reports overall feeling much better; still with cough with white-yellow tinged sputum. No f/c/ns. Worried about cancer returning. Asthma score is 14     Previously [id number]  a 47year old female who presents for c/o worsening dyspnea with exertion. Notices this mostly with stairs and worsens with stairs when carrying anything. Unable to exercise due to back pain. No f/c/ns. Denies hx of COPD; quit smoking in 2000 and had a couple here and there; vaps here and there. Reports hx of Asthma and using Symbicort with minimal improvement. PMH of stage III lung cancer s/p chemo RT completed 4/2022; recent  CT chest.      Previously 4/2023 Dr Kimberli Sears  a 47year old female former smoker (quit 2011, 20 pack years) with significant PMH of asthma, stage III adenocarcinoma s/p chemoRT  who presents today for evaluation of hemoptysis. The patient explains she has had increasing episodes hemoptysis since around late February 2023. Thinks hemoptysis began first then about a week later developed nasal congestion/drainage sinus pressure.  She was seen at [facility] pulmonary two weeks ago and treated for sinus infection and given augmentin. Her sinus pressure and congestion improved, however her hemoptysis persists. Also c/o sore throat/pain which has been ongoing for the past month as well. Describes hemoptysis as red blood, about pencil eraser in size, may have 2-3 episodes a day. None today, but did have two episodes yesterday. No epistaxis. No GERD, abdominal pain. No fevers. No chest pain, pressure or pleurisy. Feels overall her breathing has been worse since her radiation treatment since last year. Using advair BID and albuterol. Previously on trelegy but too pricey. Works in insurance sales. No known exposures.      Past Medical History:   Past Medical History:   Diagnosis Date    Abnormal uterine bleeding     bleeds every 2 weeks    Anxiety state     Asthma     Attention deficit hyperactivity disorder (ADHD)     BACK PAIN     Back problem     lumbar radiculopathy    Cancer ([facility] [address] 12/2021    adenocarcinoma of right lung    MARCIO II (cervical intraepithelial neoplasia II) 04/2011    DDD (degenerative disc disease), lumbar     DDD (degenerative disc disease), thoracic     Depression     Disorder of thyroid     Dyspareunia     Exposure to medical diagnostic radiation 01/17/2022    On hold since 4/2022    Fall     Fibromyalgia     Fibromyalgia 02/25/2020    Genital warts     High blood pressure     History of COVID-19     COVID + 7/2020 Headache - NO Hospitalization needed     History of lumbar fusion     Hx of motion sickness     IBS (irritable bowel syndrome)     Per patient - Just constipation    Infertility, female     Lumbar radiculopathy     Mild dysplasia of cervix 03/11/2011    Pap smear for cervical cancer screening 06/12/2012    pt states normal    Papanicolaou smear of cervix with high grade squamous intraepithelial lesion (HGSIL) 03/21/2011    Personal history of antineoplastic chemotherapy 01/18/2022    Last chemo 7/12/22 prior to lung bx 8/4/22 Post covid-19 condition, unspecified 2020    symptoms: HA; s/s resolved-yes; not hospitalized    Problems with swallowing     with radiation    Sexually transmitted disease     HPV    Substance abuse ([facility] [address] [id number]    cocaine    Urinary incontinence     Visual impairment     glasses/contacts bifocals        Past Surgical History:   Past Surgical History:   Procedure Laterality Date    [address]      fusion L5-S1    BACK SURGERY  2021    RIGHT LUMBAR 3/ LUMBAR 4, LUMBAR 4/ LUMBAR 5 HEMILAMINTOMIES, LEFT LUMBAR 2 / LUMBAR 3 MICRODISCECTOMY    COLONOSCOPY N/A 2023    Procedure: COLONOSCOPY;  Surgeon: Grisel Xiong MD;  Location: [address] [place] [address] ENDOSCOPY    COLPOSCOPY,BX CERVIX/ENDOCERV CURR  11    MARCIO 1    LAPAROSCOPY PROCEDURE UNLISTED      Ovarian cyst removed    LEEP  2011    MARCIO 2          X2    OTHER SURGICAL HISTORY      bunions bilateral    OTHER SURGICAL HISTORY      nodule lymph nodes neck    OTHER SURGICAL HISTORY  2016     Bladder sling    OTHER SURGICAL HISTORY  2020    Cystoscopy, Dr Johnathon Mcdaniel         Family Medical History:   Family History   Problem Relation Age of Onset    Other (lung CA) Self     Hypertension Mother     Diabetes Mother     Heart Disease Mother     Heart Disease Father     Other (lung cancer) Father 54        Lung Cancer    Other (pancreatic cancer) Sister 52        Pancreatic Cancer    Cancer Sister 46        lung CA    Other (Other) Sister         Back problems    Other (Other) Son         anxiety    Other (Other) Son         behavioral problems    Diabetes Maternal Grandmother     Breast Cancer Maternal Grandmother         dx late-60s    Stroke Maternal Grandfather 80    Dementia Paternal Grandmother     Heart Disorder Paternal Grandfather     Cancer Maternal Aunt 48        LUNG CA 46    Breast Cancer Maternal Aunt         dx 46-47y    Ovarian Cancer Maternal Cousin Female 35         of ovarian ca at 28y Social History:   Social History     Socioeconomic History    Marital status:      Spouse name: Not on file    Number of children: Not on file    Years of education: Not on file    Highest education level: Not on file   Occupational History    Not on file   Tobacco Use    Smoking status: Former     Packs/day: 0.50     Years: 20.00     Additional pack years: 0.00     Total pack years: 10.00     Types: Cigarettes     Quit date: 2011     Years since quittin.5     Passive exposure: Past    Smokeless tobacco: Current   Vaping Use    Vaping Use: Every day    Substances: Nicotine, daily    Devices: Pre-filled pod   Substance and Sexual Activity    Alcohol use:  Yes     Alcohol/week: 5.0 - 6.0 standard drinks of alcohol     Types: 3 Glasses of wine, 2 - 3 Standard drinks or equivalent per week     Comment: glass of wine each night    Drug use: Not Currently     Types: Cocaine     Comment: USED COCAINE BETW [id number]    Sexual activity: Yes     Partners: Male   Other Topics Concern     Service Not Asked    Blood Transfusions Not Asked    Caffeine Concern Yes     Comment: 3-4 daily of pop    Occupational Exposure Not Asked    Hobby Hazards Not Asked    Sleep Concern Not Asked    Stress Concern Not Asked    Weight Concern Not Asked    Special Diet Not Asked    Back Care Not Asked    Exercise No     Comment: not tolerated right now    Bike Helmet Not Asked    Seat Belt Not Asked    Self-Exams Not Asked   Social History Narrative    Not on file     Social Determinants of Health     Financial Resource Strain: Not on file   Food Insecurity: Not on file   Transportation Needs: Not on file   Physical Activity: Not on file   Stress: Not on file   Social Connections: Not on file   Housing Stability: Not on file        Medications:   Current Outpatient Medications   Medication Sig Dispense Refill    ferrous sulfate 325 (65 FE) MG Oral Tab EC Take 1 tablet (325 mg total) by mouth daily with breakfast. umeclidinium bromide (INCRUSE ELLIPTA) 62.5 MCG/ACT Inhalation Aerosol Powder, Breath Activated Inhale 1 puff into the lungs daily. 3 each 3    albuterol 108 (90 Base) MCG/ACT Inhalation Aero Soln Inhale 2 puffs into the lungs every 4 to 6 hours as needed for Wheezing. 1 each 1    ARIPiprazole 2 MG Oral Tab       Multiple Vitamin Oral Tab Take 1 tablet by mouth daily. ibuprofen 200 MG Oral Tab Take 1 tablet (200 mg total) by mouth every 6 (six) hours as needed for Pain. lisinopril 5 MG Oral Tab Take 1 tablet (5 mg total) by mouth daily. 30 tablet 1    LEVOTHYROXINE 50 MCG Oral Tab TAKE 1 TABLET(50 MCG) BY MOUTH DAILY 90 tablet 0    hydroCHLOROthiazide 12.5 MG Oral Cap Take 1 capsule (12.5 mg total) by mouth daily. 90 capsule 0    lamoTRIgine 25 MG Oral Tab Take 2 tablets (50 mg total) by mouth daily. Vitamin D3, Cholecalciferol, 125 MCG (5000 UT) Oral Cap Take 1 capsule (5,000 Units total) by mouth daily. 0    amphetamine-dextroamphetamine 30 MG Oral Tab Take 1 tablet (30 mg total) by mouth 2 (two) times daily. 60 tablet 0    DULoxetine HCl 60 MG Oral Cap DR Particles Take 1 capsule (60 mg total) by mouth daily. 2       Review of Systems: Review of Systems   Constitutional: Negative. HENT:  Positive for congestion, postnasal drip, rhinorrhea and sinus pressure. Respiratory:  Positive for cough and shortness of breath. Cardiovascular: Negative. All other systems reviewed and are negative. Physical Exam:  [facility] 02/14/2019 (Exact Date)      Constitutional: alert, cooperative. No acute distress. HEENT: Head NC/AT. Mask in place  Cardio: Regular rate and rhythm. Normal S1 and S2. No murmurs. Respiratory: Thorax symmetrical with no labored breathing. Clear to ausculation bilaterally with symmetrical breath sounds. No wheezing, rhonchi, rales, or crackles. GI: NABS. Abd soft, non-tender. Extremities: No clubbing or cyanosis. No BLE edema. Neurologic: A&Ox3.  No gross motor deficits. Skin: Warm, dry  Psych: Calm, cooperative. Pleasant affect. Results:  Personally reviewed    WBC: 6.5, done on 2023. HGB: 12.4, done on 2023. PLT: 274, done on 2023. Glucose: 109, done on 2023. Cr: 1.1, done on 2023. GFR(AA): 70, done on 4/3/2022. GFR (non-AA): 61, done on 4/3/2022. CA: 9.3, done on 2023. Na: 138, done on 2023. K: 3.7, done on 2023. Cl: 104, done on 2023. CO2: 27, done on 2023. Last ALB was 3.8% done on 2023. CT CHEST(CONTRAST ONLY) (CPT=71260)    Result Date: 2023  CONCLUSION:  No significant interval change since 2023. Treatment change is again seen within the right perihilar region. No recurrent or metastatic disease is identified. LOCATION:  [place]   Dictated by (CST): Timothy Sofia MD on 2023 at 11:37 AM     Finalized by (CST): Timothy Sofia MD on 2023 at 11:52 AM             PFT (22): FVC: 3.44 (136%)  FEV1: 2.71 (132%)  Ratio: 79  T.44 (151%)  DLCO: 14.44 (92%)   FeNO (22): 12  PFT (21): FVC: 4.18 (113%)  FEV1: 2.86 (97%)  Ratio: 68  T.51 (104%)  DLCO: 21.10 (94%)  +BD response     Assessment/Plan:   #1. Hemotysis, non-lifethreatening  Previously developed late 2023  Typically related to infectious/inflammatory etiologies and does have underlying bronchiectasis (?post radiation changes)  Given ongoing symptoms despite abx treatment, she should undergo bronchoscopy to evaluate for airway source of hemoptysis. The procedure was reviewed in detail including benefits and risks including but not limited to infection, bleeding, pneumothorax and death. She agrees and willing to proceed with procedure.   2023 s/p bronchoscopy with BAL 23 shows normal endobronchial exam. No further hemoptysis  Presently remains resolved  Should have ENT eval if returns    #2. asthma  Reported hx of asthma but feels symptoms slowly worsening post radiation in 2022  Will need to obtain [facility] PFTs  Escalate from advair  to breo 200. She was on trelegy in past but too expensive. Will have her price breo. Use albuterol prn  8/2023 CPFT values from [facility] provider, essentially normal; hx of asthma  Continue on symbicort 160 BID and incruse daily  Albuterol PRN  Advised to use nebs PRN     #3. bronchiectasis  Noted bronchiectasis on imaging in [facility], suggestive of post radiation changes. However I am not able to view her previous CTs prior to treatment to confirm this developed post radiation treatment in 2022  Obtain duly PFTs  CT chest 8/2023 when compared to 5/2023 shows mild bronchiectasis and chronic cough; recommend to use flutter valve daily, inhalers and nebs as above  Can use guaifenesin as well    #4. Lung cancer  Dx with stage III NSCLC (adenoca) 12/2021  S/p chemoRT 4/2022, durvalumab x 1 yr completed 3/2023 and following with Dr. Dominick Fleming and soon to be Dr. Saw Mora  2/2023 CT chest with stable post treatment changes  8/2023 CT chest as noted above and personally reviewed with Dr Cici Kevin; no e/o recurrence  11/2023 CT chest with new findings. Planning Pet/CT per Dr. Crissy Chauhan MD  12/5/2023    This visit is conducted using Telemedicine with live, interactive video and audio. Patient has been referred to the [facility] website at [email or web address] to review the yearly Consent to Treat document. Patient understands and accepts financial responsibility for any deductible, co-insurance and/or co-pays associated with this service.

## 2023-12-05 NOTE — PATIENT INSTRUCTIONS
Continue the symbicort two puffs in the morning and again in evening. Rinse your mouth out  Continue the incruse inhaler - one puff once a day  I would suggest using the albuterol inhaler (2 puffs) or nebulizer (one vial) 10-15minutes before exercise or exertional activities. You can also use albuterol every 6 hours as needed for your breathing or cough. Try using guaifenesin for the sinus congestion/drainage  continue using nasal rinses (neilmed rinse, netipot or similar) with distilled water at least once a day but can use twice a day if needed. After using the nasal rinse, then use a nasal steroid such as flonase, nasocort, nasonex or similar medication. You can get generic nasal steroids.      Follow up in 3-4months  Call/message with questions/concerns

## 2023-12-09 NOTE — TELEPHONE ENCOUNTER
Spoke with Mikel Teague from Dr. Radha Andre office - will fax lab results from 7/7 for today's treatment. Yes

## 2023-12-27 ENCOUNTER — HOSPITAL ENCOUNTER (OUTPATIENT)
Dept: NUCLEAR MEDICINE | Facility: HOSPITAL | Age: 53
Discharge: HOME | End: 2023-12-27
Attending: INTERNAL MEDICINE
Payer: MEDICAID

## 2023-12-27 DIAGNOSIS — M79.622 LEFT UPPER ARM PAIN: ICD-10-CM

## 2023-12-27 DIAGNOSIS — C77.1 SECONDARY MALIGNANT NEOPLASM OF MEDIASTINAL LYMPH NODE (HCC): ICD-10-CM

## 2023-12-27 DIAGNOSIS — R10.32 ABDOMINAL PAIN, LEFT LOWER QUADRANT: ICD-10-CM

## 2023-12-27 DIAGNOSIS — C34.91 PRIMARY ADENOCARCINOMA OF RIGHT LUNG (HCC): ICD-10-CM

## 2023-12-27 DIAGNOSIS — M54.2 NECK AND SHOULDER PAIN: ICD-10-CM

## 2023-12-27 DIAGNOSIS — M25.519 NECK AND SHOULDER PAIN: ICD-10-CM

## 2023-12-27 LAB — GLUCOSE BLD-MCNC: 106 MG/DL (ref 70–99)

## 2023-12-27 PROCEDURE — 78815 PET IMAGE W/CT SKULL-THIGH: CPT | Performed by: INTERNAL MEDICINE

## 2023-12-27 PROCEDURE — 82962 GLUCOSE BLOOD TEST: CPT

## 2024-01-24 ENCOUNTER — OFFICE VISIT (OUTPATIENT)
Dept: FAMILY MEDICINE CLINIC | Facility: CLINIC | Age: 54
End: 2024-01-24
Payer: MEDICAID

## 2024-01-24 VITALS
WEIGHT: 185 LBS | HEART RATE: 86 BPM | SYSTOLIC BLOOD PRESSURE: 122 MMHG | DIASTOLIC BLOOD PRESSURE: 82 MMHG | HEIGHT: 65 IN | OXYGEN SATURATION: 97 % | BODY MASS INDEX: 30.82 KG/M2 | RESPIRATION RATE: 16 BRPM

## 2024-01-24 DIAGNOSIS — Z13.0 SCREENING FOR DEFICIENCY ANEMIA: ICD-10-CM

## 2024-01-24 DIAGNOSIS — G89.29 CHRONIC BILATERAL LOW BACK PAIN WITHOUT SCIATICA: ICD-10-CM

## 2024-01-24 DIAGNOSIS — M54.50 CHRONIC BILATERAL LOW BACK PAIN WITHOUT SCIATICA: ICD-10-CM

## 2024-01-24 DIAGNOSIS — Z00.00 WELLNESS EXAMINATION: Primary | ICD-10-CM

## 2024-01-24 DIAGNOSIS — E55.9 VITAMIN D DEFICIENCY: ICD-10-CM

## 2024-01-24 DIAGNOSIS — M79.18 CERVICAL MYOFASCIAL PAIN SYNDROME: ICD-10-CM

## 2024-01-24 DIAGNOSIS — Z13.220 LIPID SCREENING: ICD-10-CM

## 2024-01-24 DIAGNOSIS — Z13.29 THYROID DISORDER SCREEN: ICD-10-CM

## 2024-01-24 DIAGNOSIS — J30.9 ALLERGIC RHINITIS, UNSPECIFIED SEASONALITY, UNSPECIFIED TRIGGER: ICD-10-CM

## 2024-01-24 DIAGNOSIS — R73.09 ELEVATED GLUCOSE: ICD-10-CM

## 2024-01-24 PROCEDURE — 90471 IMMUNIZATION ADMIN: CPT | Performed by: FAMILY MEDICINE

## 2024-01-24 PROCEDURE — 3074F SYST BP LT 130 MM HG: CPT | Performed by: FAMILY MEDICINE

## 2024-01-24 PROCEDURE — 3008F BODY MASS INDEX DOCD: CPT | Performed by: FAMILY MEDICINE

## 2024-01-24 PROCEDURE — 90686 IIV4 VACC NO PRSV 0.5 ML IM: CPT | Performed by: FAMILY MEDICINE

## 2024-01-24 PROCEDURE — 3079F DIAST BP 80-89 MM HG: CPT | Performed by: FAMILY MEDICINE

## 2024-01-24 PROCEDURE — 99386 PREV VISIT NEW AGE 40-64: CPT | Performed by: FAMILY MEDICINE

## 2024-01-24 RX ORDER — CETIRIZINE HYDROCHLORIDE 10 MG/1
10 TABLET ORAL DAILY
Qty: 90 TABLET | Refills: 1 | Status: SHIPPED | OUTPATIENT
Start: 2024-01-24

## 2024-01-24 RX ORDER — MONTELUKAST SODIUM 10 MG/1
10 TABLET ORAL NIGHTLY
Qty: 30 TABLET | Refills: 0 | Status: SHIPPED | OUTPATIENT
Start: 2024-01-24

## 2024-01-24 RX ORDER — DEXTROAMPHETAMINE SACCHARATE, AMPHETAMINE ASPARTATE MONOHYDRATE, DEXTROAMPHETAMINE SULFATE AND AMPHETAMINE SULFATE 7.5; 7.5; 7.5; 7.5 MG/1; MG/1; MG/1; MG/1
30 CAPSULE, EXTENDED RELEASE ORAL 2 TIMES DAILY
COMMUNITY
Start: 2024-01-05

## 2024-01-24 NOTE — PROGRESS NOTES
HPI:     Hanna Barrett is a 53 year old female who presents for an Annual Health Visit.      Hanna, is here to establish care, and looking to get a new primary.   Recently has been going trough cancer treatment. Follows with Dr. Corrigan, for primary lung cancer s/p cheom and radiation therapy. Currently in remission    Has many health issues that are mostly controlled/stable.     Back is a chronic issue as well.    Her weight has been up and down, gained weight, even while dealing with cancer.    Has family hx of diabetes, and worries.March will beone year with immunotherapy will be completed. Has substantial family history of cancer.    Found the cancer when getting a chest xray for back surgery preoperative evaluation.     Has followed with Dr. Laughlin and Dr. Minoo Burt.    Had been on fentanyl patch for pain for years. She took herself off of the opioids pain medications.    Takes a large amount of ibuprofen, and works well for her. Helps with pain better than most medications for her. Had possibility of spinal stimulator and never progressed with it due to insurance issues.    Has hx of fibromyalgia. So pain can be worrisome due to hx of cancer, worry about recurrence.    Has done a great deal of PT, and steroid injections, chiropractor care, never did acupuncture., hadn't done stimulator.    Essetnially all levels of back. Sister has similar issues.    Has lost 4 inches in height.    Has been told maybe close to narcolepsy. She can go to sleep essentially nay time of day, doesn't find herself falling asleep randomly. Won't drive long distances.    Had a sleep study, was going to do a day study, but cancer struck and put many issue on hold.    Does take a stimulant.    Follows with psychiatry    Social:  ,  2 sons, 21 and 18 year old, oldest at [facility], came home while she was dealing with cancer. They are very supportive.  Older son hasn't gone back yet, deals with depression  Her  youngest deals with mental health issues, has been doing better, and started [employer].    She works was [employer] , self employed..  Very involved in her Christian.  Lives nearby to this clinic.  Gets along well with ex  at this time, he was supportive while she dealt with cancer.    Her sister lives in [place] (also diagnosed with lung cancer, though treated).    Originally from [place].      Allergies:     Allergies   Allergen Reactions    Gabapentin SWELLING and UNKNOWN    Erythromycin UNKNOWN    Clindamycin RASH       CURRENT MEDICATIONS:   Current Outpatient Medications   Medication Sig Dispense Refill    amphetamine-dextroamphetamine ER 30 MG Oral Capsule SR 24 Hr Take 1 capsule (30 mg total) by mouth in the morning and 1 capsule (30 mg total) before bedtime.      ferrous sulfate 325 (65 FE) MG Oral Tab EC Take 1 tablet (325 mg total) by mouth daily with breakfast.      umeclidinium bromide (INCRUSE ELLIPTA) 62.5 MCG/ACT Inhalation Aerosol Powder, Breath Activated Inhale 1 puff into the lungs daily. 3 each 3    albuterol 108 (90 Base) MCG/ACT Inhalation Aero Soln Inhale 2 puffs into the lungs every 4 to 6 hours as needed for Wheezing. 1 each 1    ARIPiprazole 2 MG Oral Tab       Multiple Vitamin Oral Tab Take 1 tablet by mouth daily.      ibuprofen 200 MG Oral Tab Take 1 tablet (200 mg total) by mouth every 6 (six) hours as needed for Pain.      lisinopril 5 MG Oral Tab Take 1 tablet (5 mg total) by mouth daily. 30 tablet 1    LEVOTHYROXINE 50 MCG Oral Tab TAKE 1 TABLET(50 MCG) BY MOUTH DAILY 90 tablet 0    hydroCHLOROthiazide 12.5 MG Oral Cap Take 1 capsule (12.5 mg total) by mouth daily. 90 capsule 0    Vitamin D3, Cholecalciferol, 125 MCG (5000 UT) Oral Cap Take 1 capsule (5,000 Units total) by mouth daily.  0    DULoxetine HCl 60 MG Oral Cap DR Particles Take 1 capsule (60 mg total) by mouth daily.  2      MEDICAL INFORMATION:   Past Medical History:   Diagnosis Date    Abnormal  uterine bleeding     bleeds every 2 weeks    Anxiety state     Asthma     Attention deficit hyperactivity disorder (ADHD)     BACK PAIN     Back problem     lumbar radiculopathy    Cancer ([facility]) 12/2021    adenocarcinoma of right lung    MARCIO II (cervical intraepithelial neoplasia II) 04/2011    DDD (degenerative disc disease), lumbar     DDD (degenerative disc disease), thoracic     Depression     Disorder of thyroid     Dyspareunia     Exposure to medical diagnostic radiation 01/17/2022    On hold since 4/2022    Fall     Fibromyalgia     Fibromyalgia 02/25/2020    Genital warts     High blood pressure     History of COVID-19     COVID + 7/2020 Headache - NO Hospitalization needed     History of lumbar fusion     Hx of motion sickness     IBS (irritable bowel syndrome)     Per patient - Just constipation    Infertility, female     Lumbar radiculopathy     Mild dysplasia of cervix 03/11/2011    Pap smear for cervical cancer screening 06/12/2012    pt states normal    Papanicolaou smear of cervix with high grade squamous intraepithelial lesion (HGSIL) 03/21/2011    Personal history of antineoplastic chemotherapy 01/18/2022    Last chemo 7/12/22 prior to lung bx 8/4/22    Post covid-19 condition, unspecified 07/2020    symptoms: HA; s/s resolved-yes; not hospitalized    Problems with swallowing     with radiation    Sexually transmitted disease     HPV    Substance abuse ([facility]) [id number]    cocaine    Urinary incontinence     Visual impairment     glasses/contacts bifocals      Past Surgical History:   Procedure Laterality Date    APPENDECTOMY  1980    BACK SURGERY  2009    fusion L5-S1    BACK SURGERY  03/03/2021    RIGHT LUMBAR 3/ LUMBAR 4, LUMBAR 4/ LUMBAR 5 HEMILAMINTOMIES, LEFT LUMBAR 2 / LUMBAR 3 MICRODISCECTOMY    COLONOSCOPY N/A 1/17/2023    Procedure: COLONOSCOPY;  Surgeon: Devante Kern MD;  Location:  ENDOSCOPY    COLPOSCOPY,BX CERVIX/ENDOCERV CURR  03/21/11    MARCIO 1    LAPAROSCOPY PROCEDURE UNLISTED  2000     Ovarian cyst removed    LEEP  2011    MARCIO 2          X2    OTHER SURGICAL HISTORY      bunions bilateral    OTHER SURGICAL HISTORY      nodule lymph nodes neck    OTHER SURGICAL HISTORY  2016     Bladder sling    OTHER SURGICAL HISTORY  2020    Cystoscopy, Dr Beach      Family History   Problem Relation Age of Onset    Other (lung CA) Self     Hypertension Mother     Diabetes Mother     Heart Disease Mother     Heart Disease Father     Other (lung cancer) Father 55        Lung Cancer    Other (pancreatic cancer) Sister 47        Pancreatic Cancer    Cancer Sister 51        lung CA    Other (Other) Sister         Back problems    Other (Other) Son         anxiety    Other (Other) Son         behavioral problems    Diabetes Maternal Grandmother     Breast Cancer Maternal Grandmother         dx late-60s    Stroke Maternal Grandfather 86    Dementia Paternal Grandmother     Heart Disorder Paternal Grandfather     Cancer Maternal Aunt 50        LUNG CA 51    Breast Cancer Maternal Aunt         dx 46-47y    Ovarian Cancer Maternal Cousin Female 33         of ovarian ca at 35y      SOCIAL HISTORY:   Social History     Socioeconomic History    Marital status:    Tobacco Use    Smoking status: Former     Packs/day: 0.50     Years: 20.00     Additional pack years: 0.00     Total pack years: 10.00     Types: Cigarettes     Quit date: 2011     Years since quittin.7     Passive exposure: Past    Smokeless tobacco: Current    Tobacco comments:     Vaps,    Vaping Use    Vaping Use: Every day    Substances: Nicotine, daily    Devices: Pre-filled pod   Substance and Sexual Activity    Alcohol use: Yes     Alcohol/week: 5.0 - 6.0 standard drinks of alcohol     Types: 3 Glasses of wine, 2 - 3 Standard drinks or equivalent per week     Comment: glass of wine each night    Drug use: Not Currently     Types: Cocaine     Comment: USED COCAINE BETW [id number]    Sexual activity: Yes      Partners: Male   Other Topics Concern    Caffeine Concern Yes     Comment: 3-4 daily of pop    Exercise No     Comment: not tolerated right now     Social History     Social History Narrative    Not on file        REVIEW OF SYSTEMS:     Constitutional: negative  Eyes: negative  ENT: negative  Respiratory: negative  Cardiovascular: negative  Gastrointestinal: negative  Integument/Breast: negative  Genitourinary: negative  Heme/Lymph: negative  Musculoskeletal: negative  Neurological: negative  Psych: negative  Endocrine: negative  Allergic/Immune: negative  Other than as noted in HPI      EXAM:   /82   Pulse 86   Resp 16   Ht 5' 5\" (1.651 m)   Wt 185 lb (83.9 kg)   LMP 02/14/2019 (Exact Date)   SpO2 97%   BMI 30.79 kg/m²    Wt Readings from Last 6 Encounters:   01/24/24 185 lb (83.9 kg)   11/27/23 184 lb (83.5 kg)   10/24/23 185 lb (83.9 kg)   10/11/23 175 lb (79.4 kg)   08/23/23 180 lb (81.6 kg)   08/21/23 176 lb 12.8 oz (80.2 kg)     Body mass index is 30.79 kg/m².    General: alert, appears stated age, and cooperative  Head: Normocephalic, without obvious abnormality, atraumatic  Eyes: conjunctivae/corneas clear. PERRL, EOM's intact. Fundi benign.  Ears: normal TM's and external ear canals both ears  Nose: Nares normal. Septum midline. Mucosa normal. No drainage or sinus tenderness.  Throat: lips, mucosa, and tongue normal; teeth and gums normal  Neck: no adenopathy, no carotid bruit, no JVD, supple, symmetrical, trachea midline, and thyroid not enlarged, symmetric, no tenderness/mass/nodules  Heart: S1, S2 normal, no murmur, click, rub or gallop, regular rate and rhythm  Lungs: clear to auscultation bilaterally  Breast:  deferred  Abdomen: soft, non-tender; bowel sounds normal; no masses,  no organomegaly  Pelvic: deferred  Back: symmetric, no curvature. ROM normal. No CVA tenderness.  Extremities: extremities normal, atraumatic, no cyanosis or edema  Pulses: 2+ and symmetric  Skin: Skin color,  texture, turgor normal. No rashes or lesions  Lymph Nodes: Cervical, supraclavicular, and axillary nodes normal.  Neurologic: Grossly normal      ASSESSMENT AND PLAN:   Hanna was seen today for physical.    Diagnoses and all orders for this visit:    Wellness examination  -     Comp Metabolic Panel (14); Future  -     CBC With Differential With Platelet; Future  -     Hemoglobin A1C; Future  -     Lipid Panel; Future  -     TSH W Reflex To Free T4; Future    Elevated glucose  -     Hemoglobin A1C; Future    Lipid screening  -     Lipid Panel; Future    Screening for deficiency anemia  -     CBC With Differential With Platelet; Future    Thyroid disorder screen  -     TSH W Reflex To Free T4; Future    [facility] get labs monitoring health conditions and screening.    Could consider seeing pain clinic again in the future,  Pap smear at future visit.    I reviewed speciailst visit notes.  There are no Patient Instructions on file for this visit.    The patient indicates understanding of these issues and agrees to the plan.    Problem List:  Patient Active Problem List   Diagnosis    Essential hypertension    Family history of breast cancer    Family history of pancreatic cancer    Family history of ovarian cancer    Stress incontinence    Hyperlipidemia    Vitamin D deficiency    Primary osteoarthritis of first carpometacarpal joint of right hand    Cervical radiculopathy    Chronic narcotic use    Cervical spondylosis with radiculopathy    DDD (degenerative disc disease), lumbar    Polyneuropathy, unspecified    Other intervertebral disc degeneration, lumbar region    Cervical myofascial pain syndrome    Neck and shoulder pain    Opioid use, unspecified with withdrawal (HCC)    Chronic bilateral low back pain without sciatica    History of tobacco use    Myalgia, other site    Fibromyalgia    Chronic bilateral thoracic back pain    Constipation    Canker sore    Other spondylosis with radiculopathy, cervical region     Abnormal mammogram    Primary adenocarcinoma of right lung (HCC)    Mediastinal adenopathy    Primary lung cancer with metastasis from lung to other site, right (HCC)    Personal history of nicotine dependence    At risk for dehydration    Dyspnea    Dyspnea, unspecified type    History of COVID-19    Hemoptysis    Disorder of thyroid, unspecified    Encounter for other orthopedic aftercare    History of falling    Generalized anxiety disorder    Major depressive disorder, single episode, unspecified    Other specified urinary incontinence    Unspecified visual loss    Memory changes    Failed back surgical syndrome       Guillermo Curry MD  1/24/2024  9:42 AM

## 2024-02-12 ENCOUNTER — HOSPITAL ENCOUNTER (OUTPATIENT)
Dept: GENERAL RADIOLOGY | Age: 54
Discharge: HOME | End: 2024-02-12
Attending: FAMILY MEDICINE
Payer: MEDICAID

## 2024-02-12 DIAGNOSIS — M54.2 NECK AND SHOULDER PAIN: ICD-10-CM

## 2024-02-12 DIAGNOSIS — M25.519 NECK AND SHOULDER PAIN: ICD-10-CM

## 2024-02-12 PROCEDURE — 73030 X-RAY EXAM OF SHOULDER: CPT | Performed by: FAMILY MEDICINE

## 2024-02-12 PROCEDURE — 72040 X-RAY EXAM NECK SPINE 2-3 VW: CPT | Performed by: FAMILY MEDICINE

## 2024-02-26 ENCOUNTER — APPOINTMENT (OUTPATIENT)
Dept: HEMATOLOGY/ONCOLOGY | Facility: HOSPITAL | Age: 54
End: 2024-02-26
Attending: GENETIC COUNSELOR, MS
Payer: MEDICAID

## 2024-03-08 DIAGNOSIS — C34.91 PRIMARY ADENOCARCINOMA OF RIGHT LUNG (HCC): Primary | ICD-10-CM

## 2024-03-11 ENCOUNTER — NURSE ONLY (OUTPATIENT)
Dept: HEMATOLOGY/ONCOLOGY | Facility: HOSPITAL | Age: 54
End: 2024-03-11
Attending: GENETIC COUNSELOR, MS
Payer: MEDICAID

## 2024-03-11 VITALS
SYSTOLIC BLOOD PRESSURE: 152 MMHG | HEART RATE: 107 BPM | WEIGHT: 193 LBS | TEMPERATURE: 97 F | BODY MASS INDEX: 32.15 KG/M2 | DIASTOLIC BLOOD PRESSURE: 95 MMHG | OXYGEN SATURATION: 100 % | HEIGHT: 65 IN | RESPIRATION RATE: 18 BRPM

## 2024-03-11 DIAGNOSIS — D64.9 ANEMIA, NORMOCYTIC NORMOCHROMIC: ICD-10-CM

## 2024-03-11 DIAGNOSIS — M25.519 NECK AND SHOULDER PAIN: ICD-10-CM

## 2024-03-11 DIAGNOSIS — C34.91 PRIMARY ADENOCARCINOMA OF RIGHT LUNG (HCC): Primary | ICD-10-CM

## 2024-03-11 DIAGNOSIS — C34.91 PRIMARY ADENOCARCINOMA OF RIGHT LUNG (HCC): ICD-10-CM

## 2024-03-11 DIAGNOSIS — M54.2 NECK AND SHOULDER PAIN: ICD-10-CM

## 2024-03-11 DIAGNOSIS — M79.622 LEFT UPPER ARM PAIN: ICD-10-CM

## 2024-03-11 LAB
ALBUMIN SERPL-MCNC: 3.4 G/DL (ref 3.4–5)
ALBUMIN/GLOB SERPL: 1.2 {RATIO} (ref 1–2)
ALP LIVER SERPL-CCNC: 74 U/L
ALT SERPL-CCNC: 25 U/L
ANION GAP SERPL CALC-SCNC: 4 MMOL/L (ref 0–18)
AST SERPL-CCNC: 21 U/L (ref 15–37)
BASOPHILS # BLD AUTO: 0.03 X10(3) UL (ref 0–0.2)
BASOPHILS NFR BLD AUTO: 0.7 %
BILIRUB SERPL-MCNC: 0.3 MG/DL (ref 0.1–2)
BUN BLD-MCNC: 10 MG/DL (ref 9–23)
CALCIUM BLD-MCNC: 9.2 MG/DL (ref 8.5–10.1)
CHLORIDE SERPL-SCNC: 113 MMOL/L (ref 98–112)
CO2 SERPL-SCNC: 26 MMOL/L (ref 21–32)
CREAT BLD-MCNC: 0.78 MG/DL
DEPRECATED HBV CORE AB SER IA-ACNC: 40.6 NG/ML
EGFRCR SERPLBLD CKD-EPI 2021: 91 ML/MIN/1.73M2 (ref 60–?)
EOSINOPHIL # BLD AUTO: 0.21 X10(3) UL (ref 0–0.7)
EOSINOPHIL NFR BLD AUTO: 5.2 %
ERYTHROCYTE [DISTWIDTH] IN BLOOD BY AUTOMATED COUNT: 12.8 %
GLOBULIN PLAS-MCNC: 2.9 G/DL (ref 2.8–4.4)
GLUCOSE BLD-MCNC: 115 MG/DL (ref 70–99)
HCT VFR BLD AUTO: 33.6 %
HGB BLD-MCNC: 11.6 G/DL
IMM GRANULOCYTES # BLD AUTO: 0.01 X10(3) UL (ref 0–1)
IMM GRANULOCYTES NFR BLD: 0.2 %
IRON SATN MFR SERPL: 17 %
IRON SERPL-MCNC: 70 UG/DL
LDH SERPL L TO P-CCNC: 167 U/L
LYMPHOCYTES # BLD AUTO: 0.51 X10(3) UL (ref 1–4)
LYMPHOCYTES NFR BLD AUTO: 12.6 %
MCH RBC QN AUTO: 30.3 PG (ref 26–34)
MCHC RBC AUTO-ENTMCNC: 34.5 G/DL (ref 31–37)
MCV RBC AUTO: 87.7 FL
MONOCYTES # BLD AUTO: 0.42 X10(3) UL (ref 0.1–1)
MONOCYTES NFR BLD AUTO: 10.3 %
NEUTROPHILS # BLD AUTO: 2.88 X10 (3) UL (ref 1.5–7.7)
NEUTROPHILS # BLD AUTO: 2.88 X10(3) UL (ref 1.5–7.7)
NEUTROPHILS NFR BLD AUTO: 71 %
OSMOLALITY SERPL CALC.SUM OF ELEC: 296 MOSM/KG (ref 275–295)
PLATELET # BLD AUTO: 238 10(3)UL (ref 150–450)
POTASSIUM SERPL-SCNC: 3.9 MMOL/L (ref 3.5–5.1)
PROT SERPL-MCNC: 6.3 G/DL (ref 6.4–8.2)
RBC # BLD AUTO: 3.83 X10(6)UL
SODIUM SERPL-SCNC: 143 MMOL/L (ref 136–145)
TIBC SERPL-MCNC: 410 UG/DL (ref 240–450)
TRANSFERRIN SERPL-MCNC: 275 MG/DL (ref 200–360)
WBC # BLD AUTO: 4.1 X10(3) UL (ref 4–11)

## 2024-03-11 PROCEDURE — 83540 ASSAY OF IRON: CPT

## 2024-03-11 PROCEDURE — 99214 OFFICE O/P EST MOD 30 MIN: CPT | Performed by: INTERNAL MEDICINE

## 2024-03-11 PROCEDURE — 82728 ASSAY OF FERRITIN: CPT

## 2024-03-11 PROCEDURE — 83615 LACTATE (LD) (LDH) ENZYME: CPT

## 2024-03-11 PROCEDURE — 85025 COMPLETE CBC W/AUTO DIFF WBC: CPT

## 2024-03-11 PROCEDURE — 36591 DRAW BLOOD OFF VENOUS DEVICE: CPT

## 2024-03-11 PROCEDURE — 83550 IRON BINDING TEST: CPT

## 2024-03-11 PROCEDURE — 80053 COMPREHEN METABOLIC PANEL: CPT

## 2024-03-11 NOTE — PROGRESS NOTES
Cancer Center Progress Note    Patient Name: Hanna Barrett   YOB: 1970   Medical Record Number: [id number]   CSN: [id number]   Attending Physician: Shoshana Gordon M.D.   Referring Physician: Abbie Lai DO      Date of Visit: 3/11/2024     Chief Complaint:  Chief Complaint   Patient presents with    Follow - Up     Lung cancer pt here for 3 month f/u. Energy is low, states cough/SOB are her normal, eating well.           Hem/Onc History:  Stage IIIC NSCLC adenocarcinoma of the right lung diagnosed 12/2021    Oncologic History:  5/2021: patient presented with abnormal preoperative CXR; left apical 8 mm nodule with irregular margins, and a right middle lobe soft tissue density with associated bronchiectasis medially and mild adjacent nonspecific ground glass density, overall stable since 2/2021.     11/2021: CT scan of the chest demonstrated a spiculated airspace density within the right middle lobe worrisome for primary lung malignancy along with mediastinal lymph nodes.  12/7/2021: PET/CT scan showed a hypermetabolic RML and RUL mass with enlargement of mediastinal lymph nodes.    12/13/2021: bronchoscopy and transbronchial needle aspiration to subcarinal lymph node was positive for metastatic adenocarcinoma. PD-L1 <1%, EGFR, ROS1, ALK, KRAS, RET, BRAF all negative.     1/17/2022: concurrent chemoradiation with cisplatin + pemetrexed at [facility] (Dr. Subramanian) completed in 4/2022 5/13/2022: started consolidative durvalumab q4 weeks    7/2022: PET concerning for progression of disease     8/12/2022: transferred care to Dr. Sanchez at [facility]    8/16/2022: bronchoscopy and biopsies were negative for malignancy.     9/14/2022: CT scan showed some improvement     11/14/2022: PET scan continues to show improvement with interval decrease in the right lung opacities with decreased uptake, may represent scarring and posttreatment change. Increased uptake distal esophagus.     1/17/2023: EGD with normal  esophagus and negative biopsies.     2/17/2023: CT chest stable.      3/31/2023: completed 12 cycles of durvalumab.     - care transferred to Dr. Baker when Dr. Sanchez retired    4/7/2023: bronchoscopy for hemoptysis was unremarkable.   5/17/2023: CT chest stable.      - care transferred to me 11/2023 from Dr. Baker as was to practice primarily in [place]      History of Present Illness:  Continues to c/o neck and left arm pain. Imaging done showed DDD with narrowing, osteophyte and sclerosis. MRI ordered. Had PET scan which showed no overt evidence of progression. She has MARTINS and fatigue. She denies any recent trauma. Denies chest or abdominal pain, change in bowel or urinary habits, headaches, dizziness or visual symptoms.      Current Medications:    Current Outpatient Medications:     HYDROcodone-acetaminophen (NORCO) 5-325 MG Oral Tab, Take 1 tablet by mouth every 6 (six) hours as needed for Pain., Disp: 20 tablet, Rfl: 0    amphetamine-dextroamphetamine ER 30 MG Oral Capsule SR 24 Hr, Take 1 capsule (30 mg total) by mouth in the morning and 1 capsule (30 mg total) before bedtime., Disp: , Rfl:     montelukast 10 MG Oral Tab, Take 1 tablet (10 mg total) by mouth nightly., Disp: 30 tablet, Rfl: 0    cetirizine 10 MG Oral Tab, Take 1 tablet (10 mg total) by mouth daily., Disp: 90 tablet, Rfl: 1    ferrous sulfate 325 (65 FE) MG Oral Tab EC, Take 1 tablet (325 mg total) by mouth daily with breakfast., Disp: , Rfl:     umeclidinium bromide (INCRUSE ELLIPTA) 62.5 MCG/ACT Inhalation Aerosol Powder, Breath Activated, Inhale 1 puff into the lungs daily., Disp: 3 each, Rfl: 3    albuterol 108 (90 Base) MCG/ACT Inhalation Aero Soln, Inhale 2 puffs into the lungs every 4 to 6 hours as needed for Wheezing., Disp: 1 each, Rfl: 1    ARIPiprazole 2 MG Oral Tab, , Disp: , Rfl:     Multiple Vitamin Oral Tab, Take 1 tablet by mouth daily., Disp: , Rfl:     ibuprofen 200 MG Oral Tab, Take 1 tablet (200 mg total) by mouth  every 6 (six) hours as needed for Pain., Disp: , Rfl:     lisinopril 5 MG Oral Tab, Take 1 tablet (5 mg total) by mouth daily., Disp: 30 tablet, Rfl: 1    LEVOTHYROXINE 50 MCG Oral Tab, TAKE 1 TABLET(50 MCG) BY MOUTH DAILY, Disp: 90 tablet, Rfl: 0    hydroCHLOROthiazide 12.5 MG Oral Cap, Take 1 capsule (12.5 mg total) by mouth daily., Disp: 90 capsule, Rfl: 0    Vitamin D3, Cholecalciferol, 125 MCG (5000 UT) Oral Cap, Take 1 capsule (5,000 Units total) by mouth daily., Disp: , Rfl: 0    DULoxetine HCl 60 MG Oral Cap DR Particles, Take 1 capsule (60 mg total) by mouth daily., Disp: , Rfl: 2    Past Medical History:  Past Medical History:   Diagnosis Date    Abnormal uterine bleeding     bleeds every 2 weeks    Anxiety state     Asthma (HCC)     Attention deficit hyperactivity disorder (ADHD)     BACK PAIN     Back problem     lumbar radiculopathy    Cancer (HCC) 12/2021    adenocarcinoma of right lung    MARCIO II (cervical intraepithelial neoplasia II) 04/2011    DDD (degenerative disc disease), lumbar     DDD (degenerative disc disease), thoracic     Depression     Disorder of thyroid     Dyspareunia     Exposure to medical diagnostic radiation 01/17/2022    On hold since 4/2022    Fall     Fibromyalgia     Fibromyalgia 02/25/2020    Genital warts     High blood pressure     History of COVID-19     COVID + 7/2020 Headache - NO Hospitalization needed     History of lumbar fusion     Hx of motion sickness     IBS (irritable bowel syndrome)     Per patient - Just constipation    Infertility, female     Lumbar radiculopathy     Mild dysplasia of cervix 03/11/2011    Pap smear for cervical cancer screening 06/12/2012    pt states normal    Papanicolaou smear of cervix with high grade squamous intraepithelial lesion (HGSIL) 03/21/2011    Personal history of antineoplastic chemotherapy 01/18/2022    Last chemo 7/12/22 prior to lung bx 8/4/22    Post covid-19 condition, unspecified 07/2020    symptoms: HA; s/s resolved-yes;  not hospitalized    Problems with swallowing     with radiation    Sexually transmitted disease     HPV    Substance abuse (HCC) [id number]    cocaine    Urinary incontinence     Visual impairment     glasses/contacts bifocals       Past Surgical History:  Past Surgical History:   Procedure Laterality Date    APPENDECTOMY  1980    BACK SURGERY  2009    fusion L5-S1    BACK SURGERY  2021    RIGHT LUMBAR 3/ LUMBAR 4, LUMBAR 4/ LUMBAR 5 HEMILAMINTOMIES, LEFT LUMBAR 2 / LUMBAR 3 MICRODISCECTOMY    COLONOSCOPY N/A 2023    Procedure: COLONOSCOPY;  Surgeon: Devante Kern MD;  Location:  ENDOSCOPY    COLPOSCOPY,BX CERVIX/ENDOCERV CURR  11    MARCIO 1    LAPAROSCOPY PROCEDURE UNLISTED      Ovarian cyst removed    LEEP  2011    MARCIO 2          X2    OTHER SURGICAL HISTORY      bunions bilateral    OTHER SURGICAL HISTORY      nodule lymph nodes neck    OTHER SURGICAL HISTORY       Bladder sling    OTHER SURGICAL HISTORY  2020    Cystoscopy, Dr Beach       Family Medical History:  Family History   Problem Relation Age of Onset    Other (lung CA) Self     Hypertension Mother     Diabetes Mother     Heart Disease Mother     Heart Disease Father     Other (lung cancer) Father 55        Lung Cancer    Other (pancreatic cancer) Sister 47        Pancreatic Cancer    Cancer Sister 51        lung CA    Other (Other) Sister         Back problems    Other (Other) Son         anxiety    Other (Other) Son         behavioral problems    Diabetes Maternal Grandmother     Breast Cancer Maternal Grandmother         dx late-60s    Stroke Maternal Grandfather 86    Dementia Paternal Grandmother     Heart Disorder Paternal Grandfather     Cancer Maternal Aunt 50        LUNG CA 51    Breast Cancer Maternal Aunt         dx 46-47y    Ovarian Cancer Maternal Cousin Female 33         of ovarian ca at 35y       Gyne History:  OB History    Para Term  AB Living   2 2 2 0 0 2   SAB IAB  Ectopic Multiple Live Births   0 0 0 0 2       Psychosocial History:  Social History     Socioeconomic History    Marital status:      Spouse name: Not on file    Number of children: Not on file    Years of education: Not on file    Highest education level: Not on file   Occupational History    Not on file   Tobacco Use    Smoking status: Former     Packs/day: 0.50     Years: 20.00     Additional pack years: 0.00     Total pack years: 10.00     Types: Cigarettes     Quit date: 2011     Years since quittin.8     Passive exposure: Past    Smokeless tobacco: Current    Tobacco comments:     Vaps,    Vaping Use    Vaping Use: Every day    Substances: Nicotine, daily    Devices: Pre-filled pod   Substance and Sexual Activity    Alcohol use: Yes     Alcohol/week: 5.0 - 6.0 standard drinks of alcohol     Types: 3 Glasses of wine, 2 - 3 Standard drinks or equivalent per week     Comment: glass of wine each night    Drug use: Not Currently     Types: Cocaine     Comment: USED COCAINE BETW [id number]    Sexual activity: Yes     Partners: Male   Other Topics Concern     Service Not Asked    Blood Transfusions Not Asked    Caffeine Concern Yes     Comment: 3-4 daily of pop    Occupational Exposure Not Asked    Hobby Hazards Not Asked    Sleep Concern Not Asked    Stress Concern Not Asked    Weight Concern Not Asked    Special Diet Not Asked    Back Care Not Asked    Exercise No     Comment: not tolerated right now    Bike Helmet Not Asked    Seat Belt Not Asked    Self-Exams Not Asked   Social History Narrative    Not on file     Social Determinants of Health     Financial Resource Strain: Not on file   Food Insecurity: Not on file   Transportation Needs: Not on file   Physical Activity: Not on file   Stress: Not on file   Social Connections: Not on file   Housing Stability: Not on file         Allergies:  Allergies   Allergen Reactions    Gabapentin SWELLING and UNKNOWN    Erythromycin UNKNOWN     Clindamycin RASH        Review of Systems:  A 14-point ROS was done with pertinent positives and negative per the HPI    Vital Signs:  Height: 165.1 cm (5' 5\") (03/11 1009)  Weight: 87.5 kg (193 lb) (03/11 1009)  BSA (Calculated - sq m): 1.95 sq meters (03/11 1009)  Pulse: 107 (03/11 1009)  BP: 152/95 (03/11 1009)  Temp: 97.2 °F (36.2 °C) (03/11 1009)  Do Not Use - Resp Rate: --  SpO2: 100 % (03/11 1009)      Physical Examination:  General: Patient is alert and oriented x 3, not in acute distress.  Psych:  Mood and affect appropriate  HEENT: EOMs intact. PERRL. Oropharynx is clear.   Neck: No JVD. No palpable lymphadenopathy. Neck is supple.  Lymphatics: There is no palpable lymphadenopathy   Chest: Diminished BS  Heart: Regular rate and rhythm.   Abdomen: Soft, non tender with good bowel sounds.  No hepatosplenomegaly.  No palpable mass.  Extremities: Pedal pulses are present. No BLE edema.  Neurological: Grossly intact.       Laboratory:  Recent Results (from the past 24 hour(s))   COMP METABOLIC PANEL [E]    Collection Time: 03/11/24 10:01 AM   Result Value Ref Range    Glucose 115 (H) 70 - 99 mg/dL    Sodium 143 136 - 145 mmol/L    Potassium 3.9 3.5 - 5.1 mmol/L    Chloride 113 (H) 98 - 112 mmol/L    CO2 26.0 21.0 - 32.0 mmol/L    Anion Gap 4 0 - 18 mmol/L    BUN 10 9 - 23 mg/dL    Creatinine 0.78 0.55 - 1.02 mg/dL    Calcium, Total 9.2 8.5 - 10.1 mg/dL    Calculated Osmolality 296 (H) 275 - 295 mOsm/kg    eGFR-Cr 91 >=60 mL/min/1.73m2    AST 21 15 - 37 U/L    ALT 25 13 - 56 U/L    Alkaline Phosphatase 74 41 - 108 U/L    Bilirubin, Total 0.3 0.1 - 2.0 mg/dL    Total Protein 6.3 (L) 6.4 - 8.2 g/dL    Albumin 3.4 3.4 - 5.0 g/dL    Globulin  2.9 2.8 - 4.4 g/dL    A/G Ratio 1.2 1.0 - 2.0    Patient Fasting for CMP? Patient not present    LDH [E]    Collection Time: 03/11/24 10:01 AM   Result Value Ref Range     84 - 246 U/L   CBC W/ DIFFERENTIAL    Collection Time: 03/11/24 10:01 AM   Result Value Ref Range     WBC 4.1 4.0 - 11.0 x10(3) uL    RBC 3.83 3.80 - 5.30 x10(6)uL    HGB 11.6 (L) 12.0 - 16.0 g/dL    HCT 33.6 (L) 35.0 - 48.0 %    .0 150.0 - 450.0 10(3)uL    MCV 87.7 80.0 - 100.0 fL    MCH 30.3 26.0 - 34.0 pg    MCHC 34.5 31.0 - 37.0 g/dL    RDW 12.8 %    Neutrophil Absolute Prelim 2.88 1.50 - 7.70 x10 (3) uL    Neutrophil Absolute 2.88 1.50 - 7.70 x10(3) uL    Lymphocyte Absolute 0.51 (L) 1.00 - 4.00 x10(3) uL    Monocyte Absolute 0.42 0.10 - 1.00 x10(3) uL    Eosinophil Absolute 0.21 0.00 - 0.70 x10(3) uL    Basophil Absolute 0.03 0.00 - 0.20 x10(3) uL    Immature Granulocyte Absolute 0.01 0.00 - 1.00 x10(3) uL    Neutrophil % 71.0 %    Lymphocyte % 12.6 %    Monocyte % 10.3 %    Eosinophil % 5.2 %    Basophil % 0.7 %    Immature Granulocyte % 0.2 %       Radiology:  PROCEDURE:  PET/CT STANDARD BODY SCAN (ONCOLOGY) (CPT=78815)     COMPARISON:  Chest CT 11/21/2023, PET-CT 11/14/2022     INDICATIONS:  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 F-18 FDG was injected IV, and whole-body images from vertex to mid-thigh were obtained with concurrent CT scan for both anatomic localization as well as attenuation correction.     RADIOPHARMACEUTICAL:    9.5 mCi F-18 FDG  FASTING GLUCOSE LEVEL:  106 mg/dl  INJECTION TIME:         0 800  SCAN TIME:              0911     FINDINGS:     Mean SUV, mediastinal blood pool:  2.2     Mean SUV, liver:  2.8         HEAD/NECK:  Physiologic activity in all regions.     CHEST:  Consolidation with air bronchograms identified within the superior segment right lower lobe with corresponding low-grade radiotracer uptake, SUV max of 2.5 (previously 3.1).  This is favored to represent post-therapeutic inflammation/fibrosis.    No enlarged or hypermetabolic regional lymph nodes.     ABDOMEN/PELVIS:  Physiologic  activity in all regions.       MUSCULOSKELETAL:  Scattered areas of degenerative uptake noted within the spine.  No hypermetabolic osseous metastatic disease.  Mild exertional/inflammatory uptake of bilateral shoulder girdles and peritrochanteric regions.                     Impression  CONCLUSION:       Low-grade hypermetabolism corresponds to consolidative opacity of the superior segment right lower lobe favoring treatment-related inflammation/fibrosis.  No imaging evidence of residual/recurrent malignancy.          LOCATION:  [facility]           Dictated by (CST): Umm Hodges MD on 12/27/2023 at 1:25 PM      Finalized by (CST): Umm Hodges MD on 12/27/2023 at 1:38 PM      PROCEDURE:  CT CHEST(CONTRAST ONLY) (CPT=71260)     COMPARISON:  [facility], , MRI LIVER (W+WO) (CPT=74183), 5/22/2017, 7:11 PM.  [facility] , CT, CT CHEST(CONTRAST ONLY) (CPT=71260), 2/17/2023, 10:15 AM.  [facility], CT, CT UROGRAM(W+WO)SH(CPT=74178), 9/08/2020, 10:29 PM.  [facility] , CT, CT CHEST(CONTRAST  ONLY) (CPT=71260), 8/04/2023, 11:19 AM.     INDICATIONS:  C34.2 Malignant neoplasm of middle lobe of right lung (HCC)     TECHNIQUE:  CT images were obtained with non-ionic intravenous contrast material. Dose reduction techniques were used. Dose information is transmitted to the ACR (American College of Radiology) NRDR (National Radiology Data Registry) which includes the  Dose Index Registry.     PATIENT STATED HISTORY:(As transcribed by Technologist)  Lung cancer. The patient doesn't have complaints.      CONTRAST USED:  75cc of Isovue 370     FINDINGS:    LUNGS:  There are right perihilar fibrotic changes which likely represent treatment change.  There is right perihilar consolidation and bronchiectasis, not significantly changed.  No new enhancing nodule is seen.  Scattered atelectasis and/or scarring.    No pulmonary mass is seen.  VASCULATURE:  Unremarkable.  THORACIC AORTA:  Unremarkable.  MEDIASTINUM/MICHAEL:  Unremarkable.  CARDIAC:   Unremarkable.  PLEURA:  Unremarkable.  CHEST WALL:  Unremarkable.  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  BONES:  Degenerative changes in the spine.  Mild scoliosis.  OTHER:  Right chest port noted.                  Impression  CONCLUSION:  No significant interval change since 8/4/2023.  Treatment change is again seen within the right perihilar region.  No recurrent or metastatic disease is identified.        LOCATION:  [place]        Dictated by (CST): Stromberg, LeRoy, MD on 11/21/2023 at 11:37 AM      Finalized by (CST): Stromberg, LeRoy, MD on 11/21/2023 at 11:52 AM        Impression and Plan:  1. H/o locally advanced NSCLC  - PET last done showed no overt evidence of recurrence/progression    2. Increased creatinine  - back to normal     3. Chronic LBP, neck and left arm main  - [facility]  - xrays showed DDD, MRI C spine ordered     4. H/o asthma, bronchiectasis, former smoker, cough  - follows with pulmonary continue inhalers   - flutter valve    5. Anemia  - added iron and ferritin. Could not add B12 and folic acid. Will see what iron studies show then decide if needs to come in for additional draw    Labs reviewed     RTC 3 months with CT- ordered        Shoshana Gordon MD  [facility]

## 2024-03-11 NOTE — PROGRESS NOTES
Education Record    Learner:  Patient    Disease / Diagnosis: here for labs and MD visit    Barriers / Limitations:  None   Comments:    Method:  Brief focused and Discussion   Comments:    General Topics:  Plan of care reviewed   Comments:    Outcome:  Shows understanding   Comments:    Labs drawn via PAC without incident. Pt aware to have PAC flushed at least every 12 weeks if not being used. Discharged to MD appointment in stable condition, no new complaints.

## 2024-03-27 ENCOUNTER — HOSPITAL ENCOUNTER (OUTPATIENT)
Dept: CT IMAGING | Facility: HOSPITAL | Age: 54
Discharge: HOME | End: 2024-03-27
Attending: INTERNAL MEDICINE
Payer: MEDICAID

## 2024-03-27 DIAGNOSIS — C34.91 PRIMARY ADENOCARCINOMA OF RIGHT LUNG (HCC): ICD-10-CM

## 2024-03-27 PROCEDURE — 74177 CT ABD & PELVIS W/CONTRAST: CPT | Performed by: INTERNAL MEDICINE

## 2024-03-27 PROCEDURE — 71260 CT THORAX DX C+: CPT | Performed by: INTERNAL MEDICINE

## 2024-03-28 ENCOUNTER — HOSPITAL ENCOUNTER (OUTPATIENT)
Dept: MRI IMAGING | Facility: HOSPITAL | Age: 54
Discharge: HOME | End: 2024-03-28
Attending: FAMILY MEDICINE
Payer: MEDICAID

## 2024-03-28 DIAGNOSIS — M47.22 CERVICAL SPONDYLOSIS WITH RADICULOPATHY: ICD-10-CM

## 2024-03-28 DIAGNOSIS — C34.91 PRIMARY LUNG CANCER WITH METASTASIS FROM LUNG TO OTHER SITE, RIGHT (HCC): ICD-10-CM

## 2024-03-28 PROCEDURE — 72156 MRI NECK SPINE W/O & W/DYE: CPT | Performed by: FAMILY MEDICINE

## 2024-03-28 PROCEDURE — A9575 INJ GADOTERATE MEGLUMI 0.1ML: HCPCS | Performed by: FAMILY MEDICINE

## 2024-03-28 RX ORDER — GADOTERATE MEGLUMINE 376.9 MG/ML
18 INJECTION INTRAVENOUS
Status: COMPLETED | OUTPATIENT
Start: 2024-03-28 | End: 2024-03-28

## 2024-03-28 RX ADMIN — GADOTERATE MEGLUMINE 18 ML: 376.9 INJECTION INTRAVENOUS at 13:01:00

## 2024-05-06 ENCOUNTER — OFFICE VISIT (OUTPATIENT)
Dept: FAMILY MEDICINE CLINIC | Facility: CLINIC | Age: 54
End: 2024-05-06
Payer: MEDICAID

## 2024-05-06 VITALS
OXYGEN SATURATION: 98 % | BODY MASS INDEX: 31.49 KG/M2 | HEART RATE: 110 BPM | RESPIRATION RATE: 16 BRPM | DIASTOLIC BLOOD PRESSURE: 72 MMHG | HEIGHT: 65 IN | SYSTOLIC BLOOD PRESSURE: 130 MMHG | WEIGHT: 189 LBS

## 2024-05-06 DIAGNOSIS — N62 LARGE BREASTS: ICD-10-CM

## 2024-05-06 DIAGNOSIS — I10 ESSENTIAL HYPERTENSION: Primary | ICD-10-CM

## 2024-05-06 DIAGNOSIS — M54.9 UPPER BACK PAIN: ICD-10-CM

## 2024-05-06 PROCEDURE — 99213 OFFICE O/P EST LOW 20 MIN: CPT | Performed by: FAMILY MEDICINE

## 2024-05-06 RX ORDER — DEXTROAMPHETAMINE SACCHARATE, AMPHETAMINE ASPARTATE, DEXTROAMPHETAMINE SULFATE AND AMPHETAMINE SULFATE 3.75; 3.75; 3.75; 3.75 MG/1; MG/1; MG/1; MG/1
TABLET ORAL
COMMUNITY
Start: 2024-04-17 | End: 2024-05-06 | Stop reason: DRUGHIGH

## 2024-05-06 RX ORDER — LISINOPRIL AND HYDROCHLOROTHIAZIDE 20; 12.5 MG/1; MG/1
1 TABLET ORAL DAILY
Qty: 30 TABLET | Refills: 1 | Status: SHIPPED | OUTPATIENT
Start: 2024-05-06

## 2024-05-06 RX ORDER — BUDESONIDE AND FORMOTEROL FUMARATE DIHYDRATE 160; 4.5 UG/1; UG/1
2 AEROSOL RESPIRATORY (INHALATION) 2 TIMES DAILY
COMMUNITY
Start: 2024-01-26

## 2024-05-06 RX ORDER — LISDEXAMFETAMINE DIMESYLATE 70 MG/1
CAPSULE ORAL
COMMUNITY
Start: 2024-04-11

## 2024-05-06 NOTE — PROGRESS NOTES
[facility] Progress Note    SUBJECTIVE: Hanna Berenice Barrett 54 year old female is here today for   Chief Complaint   Patient presents with    Hypertension       Blood pressure has been high, and getting headaches and vision changes. The visio changes gave wavy vision on peripheral vision.    When the headaches are happening feels it in her temples. Like head is going to explode.    Takes her BP and is high at home as well. Averaging probably in 140s/80s.  But will have higher. Heartrate will be high most of the time.    Is hoping to talk to a plastic surgeon to consider a breast reduction as she deal with shoulder and upper back pain on a regular basis        PMH  Past Medical History:    Abnormal uterine bleeding    bleeds every 2 weeks    Anxiety state    Asthma (HCC)    Attention deficit hyperactivity disorder (ADHD)    BACK PAIN    Back problem    lumbar radiculopathy    Cancer (HCC)    adenocarcinoma of right lung    MARCIO II (cervical intraepithelial neoplasia II)    DDD (degenerative disc disease), lumbar    DDD (degenerative disc disease), thoracic    Depression    Disorder of thyroid    Dyspareunia    Exposure to medical diagnostic radiation    On hold since 4/2022    Fall    Fibromyalgia    Fibromyalgia    Genital warts    High blood pressure    History of COVID-19    COVID + 7/2020 Headache - NO Hospitalization needed     History of lumbar fusion    Hx of motion sickness    IBS (irritable bowel syndrome)    Per patient - Just constipation    Infertility, female    Lumbar radiculopathy    Mild dysplasia of cervix    Pap smear for cervical cancer screening    pt states normal    Papanicolaou smear of cervix with high grade squamous intraepithelial lesion (HGSIL)    Personal history of antineoplastic chemotherapy    Last chemo 7/12/22 prior to lung bx 8/4/22    Post covid-19 condition, unspecified    symptoms: HA; s/s resolved-yes; not hospitalized    Problems with swallowing    with radiation     Sexually transmitted disease    HPV    Substance abuse (HCC)    cocaine    Urinary incontinence    Visual impairment    glasses/contacts bifocals        PSH  Past Surgical History:   Procedure Laterality Date    Appendectomy      Back surgery      fusion L5-S1    Back surgery  2021    RIGHT LUMBAR 3/ LUMBAR 4, LUMBAR 4/ LUMBAR 5 HEMILAMINTOMIES, LEFT LUMBAR 2 / LUMBAR 3 MICRODISCECTOMY    Colonoscopy N/A 2023    Procedure: COLONOSCOPY;  Surgeon: Devante Kern MD;  Location:  ENDOSCOPY    Colposcopy,bx cervix/endocerv curr  11    MARCIO 1    Laparoscopy procedure unlisted      Ovarian cyst removed    [place]  2011    MARCIO 2          X2    Other surgical history      bunions bilateral    Other surgical history      nodule lymph nodes neck    Other surgical history       Bladder sling    Other surgical history  2020    Cystoscopy, Dr Beach        Social Hx:  No recent major changes    ROS  See HPI    OBJECTIVE:  /72   Pulse 110   Resp 16   Ht 5' 5\" (1.651 m)   Wt 189 lb (85.7 kg)   LMP 2019 (Exact Date)   SpO2 98%   BMI 31.45 kg/m²       CV: RRR, s1 and s2 present, no murmurs clicks or rubs        Labs:          Meds:   Current Outpatient Medications   Medication Sig Dispense Refill    VYVANSE 70 MG Oral Cap       SYMBICORT 160-4.5 MCG/ACT Inhalation Aerosol Inhale 2 puffs into the lungs 2 (two) times daily.      lisinopril-hydroCHLOROthiazide 20-12.5 MG Oral Tab Take 1 tablet by mouth daily. 30 tablet 1    HYDROcodone-acetaminophen (NORCO) 5-325 MG Oral Tab Take 1 tablet by mouth every 6 (six) hours as needed for Pain. 20 tablet 0    amphetamine-dextroamphetamine ER 30 MG Oral Capsule SR 24 Hr Take 1 capsule (30 mg total) by mouth in the morning and 1 capsule (30 mg total) before bedtime.      montelukast 10 MG Oral Tab Take 1 tablet (10 mg total) by mouth nightly. 30 tablet 0    cetirizine 10 MG Oral Tab Take 1 tablet (10 mg total) by mouth daily.  90 tablet 1    ferrous sulfate 325 (65 FE) MG Oral Tab EC Take 1 tablet (325 mg total) by mouth daily with breakfast.      umeclidinium bromide (INCRUSE ELLIPTA) 62.5 MCG/ACT Inhalation Aerosol Powder, Breath Activated Inhale 1 puff into the lungs daily. 3 each 3    albuterol 108 (90 Base) MCG/ACT Inhalation Aero Soln Inhale 2 puffs into the lungs every 4 to 6 hours as needed for Wheezing. 1 each 1    Multiple Vitamin Oral Tab Take 1 tablet by mouth daily.      ibuprofen 200 MG Oral Tab Take 1 tablet (200 mg total) by mouth every 6 (six) hours as needed for Pain.      LEVOTHYROXINE 50 MCG Oral Tab TAKE 1 TABLET(50 MCG) BY MOUTH DAILY 90 tablet 0    Vitamin D3, Cholecalciferol, 125 MCG (5000 UT) Oral Cap Take 1 capsule (5,000 Units total) by mouth daily.  0    DULoxetine HCl 60 MG Oral Cap DR Particles Take 1 capsule (60 mg total) by mouth daily.  2         Assessment/Plan  Hanna was seen today for hypertension.    Diagnoses and all orders for this visit:    Essential hypertension  -     lisinopril-hydroCHLOROthiazide 20-12.5 MG Oral Tab; Take 1 tablet by mouth daily.  -     TSH W Reflex To Free T4 [E]; Future  -     Basic Metabolic Panel (8); Future    Upper back pain  -     Plastic Surgery Referral - In Network    Large breasts  -     Plastic Surgery Referral - In Network         Will increase antihypertensive, and check labs, plan close follow up    Referral for plastic surgery to consider breast reduction given.         Total Time spent with patient and coordinating care:  25 minutes.    Follow up: as needed      Guillermo Curry MD

## 2024-05-07 DIAGNOSIS — J30.9 ALLERGIC RHINITIS, UNSPECIFIED SEASONALITY, UNSPECIFIED TRIGGER: ICD-10-CM

## 2024-05-07 DIAGNOSIS — J45.909 UNCOMPLICATED ASTHMA, UNSPECIFIED ASTHMA SEVERITY, UNSPECIFIED WHETHER PERSISTENT (HCC): ICD-10-CM

## 2024-05-08 RX ORDER — MONTELUKAST SODIUM 10 MG/1
10 TABLET ORAL NIGHTLY
Qty: 30 TABLET | Refills: 0 | Status: SHIPPED | OUTPATIENT
Start: 2024-05-08

## 2024-05-08 RX ORDER — ALBUTEROL SULFATE 90 UG/1
2 AEROSOL, METERED RESPIRATORY (INHALATION)
Qty: 8.5 G | Refills: 0 | Status: SHIPPED | OUTPATIENT
Start: 2024-05-08

## 2024-05-08 NOTE — TELEPHONE ENCOUNTER
Ok to refill x 1 and please have pt schedule for followup in office ( should be now as noted in last visit summary). Thnx

## 2024-05-08 NOTE — TELEPHONE ENCOUNTER
LOV: 12/05/2023       Next office visit: no apt      Last filled: 08/23/2023       Order pended and routed to provider

## 2024-06-10 ENCOUNTER — OFFICE VISIT (OUTPATIENT)
Dept: HEMATOLOGY/ONCOLOGY | Facility: HOSPITAL | Age: 54
End: 2024-06-10
Attending: GENETIC COUNSELOR, MS
Payer: MEDICAID

## 2024-06-10 VITALS
OXYGEN SATURATION: 96 % | HEIGHT: 65 IN | SYSTOLIC BLOOD PRESSURE: 114 MMHG | DIASTOLIC BLOOD PRESSURE: 79 MMHG | RESPIRATION RATE: 18 BRPM | BODY MASS INDEX: 30.82 KG/M2 | WEIGHT: 185 LBS | HEART RATE: 118 BPM | TEMPERATURE: 96 F

## 2024-06-10 DIAGNOSIS — M51.36 DDD (DEGENERATIVE DISC DISEASE), LUMBAR: ICD-10-CM

## 2024-06-10 DIAGNOSIS — H57.9 VISUAL SYMPTOMS: ICD-10-CM

## 2024-06-10 DIAGNOSIS — C34.2 MALIGNANT NEOPLASM OF MIDDLE LOBE OF RIGHT LUNG (HCC): ICD-10-CM

## 2024-06-10 DIAGNOSIS — M54.12 CERVICAL RADICULOPATHY: ICD-10-CM

## 2024-06-10 DIAGNOSIS — C34.91 PRIMARY ADENOCARCINOMA OF RIGHT LUNG (HCC): ICD-10-CM

## 2024-06-10 DIAGNOSIS — C34.91 PRIMARY ADENOCARCINOMA OF RIGHT LUNG (HCC): Primary | ICD-10-CM

## 2024-06-10 DIAGNOSIS — D64.9 ANEMIA, NORMOCYTIC NORMOCHROMIC: ICD-10-CM

## 2024-06-10 LAB
ALBUMIN SERPL-MCNC: 4 G/DL (ref 3.4–5)
ALBUMIN/GLOB SERPL: 1.2 {RATIO} (ref 1–2)
ALP LIVER SERPL-CCNC: 88 U/L
ALT SERPL-CCNC: 28 U/L
ANION GAP SERPL CALC-SCNC: 9 MMOL/L (ref 0–18)
AST SERPL-CCNC: 23 U/L (ref 15–37)
BASOPHILS # BLD AUTO: 0.07 X10(3) UL (ref 0–0.2)
BASOPHILS NFR BLD AUTO: 1.1 %
BILIRUB SERPL-MCNC: 0.4 MG/DL (ref 0.1–2)
BUN BLD-MCNC: 23 MG/DL (ref 9–23)
CALCIUM BLD-MCNC: 9.4 MG/DL (ref 8.5–10.1)
CHLORIDE SERPL-SCNC: 104 MMOL/L (ref 98–112)
CO2 SERPL-SCNC: 25 MMOL/L (ref 21–32)
CREAT BLD-MCNC: 1.14 MG/DL
DEPRECATED HBV CORE AB SER IA-ACNC: 86.3 NG/ML
EGFRCR SERPLBLD CKD-EPI 2021: 58 ML/MIN/1.73M2 (ref 60–?)
EOSINOPHIL # BLD AUTO: 0.21 X10(3) UL (ref 0–0.7)
EOSINOPHIL NFR BLD AUTO: 3.2 %
ERYTHROCYTE [DISTWIDTH] IN BLOOD BY AUTOMATED COUNT: 12.8 %
FASTING STATUS PATIENT QL REPORTED: NO
FOLATE SERPL-MCNC: 45.7 NG/ML (ref 8.7–?)
GLOBULIN PLAS-MCNC: 3.3 G/DL (ref 2.8–4.4)
GLUCOSE BLD-MCNC: 152 MG/DL (ref 70–99)
HCT VFR BLD AUTO: 36.2 %
HGB BLD-MCNC: 13.2 G/DL
IMM GRANULOCYTES # BLD AUTO: 0.02 X10(3) UL (ref 0–1)
IMM GRANULOCYTES NFR BLD: 0.3 %
IRON SATN MFR SERPL: 15 %
IRON SERPL-MCNC: 70 UG/DL
LDH SERPL L TO P-CCNC: 183 U/L
LYMPHOCYTES # BLD AUTO: 0.86 X10(3) UL (ref 1–4)
LYMPHOCYTES NFR BLD AUTO: 13.1 %
MCH RBC QN AUTO: 31.4 PG (ref 26–34)
MCHC RBC AUTO-ENTMCNC: 36.5 G/DL (ref 31–37)
MCV RBC AUTO: 86.2 FL
MONOCYTES # BLD AUTO: 0.85 X10(3) UL (ref 0.1–1)
MONOCYTES NFR BLD AUTO: 13 %
NEUTROPHILS # BLD AUTO: 4.55 X10 (3) UL (ref 1.5–7.7)
NEUTROPHILS # BLD AUTO: 4.55 X10(3) UL (ref 1.5–7.7)
NEUTROPHILS NFR BLD AUTO: 69.3 %
OSMOLALITY SERPL CALC.SUM OF ELEC: 293 MOSM/KG (ref 275–295)
PLATELET # BLD AUTO: 316 10(3)UL (ref 150–450)
POTASSIUM SERPL-SCNC: 3.6 MMOL/L (ref 3.5–5.1)
PROT SERPL-MCNC: 7.3 G/DL (ref 6.4–8.2)
RBC # BLD AUTO: 4.2 X10(6)UL
SODIUM SERPL-SCNC: 138 MMOL/L (ref 136–145)
TIBC SERPL-MCNC: 456 UG/DL (ref 240–450)
TRANSFERRIN SERPL-MCNC: 306 MG/DL (ref 200–360)
VIT B12 SERPL-MCNC: 913 PG/ML (ref 193–986)
WBC # BLD AUTO: 6.6 X10(3) UL (ref 4–11)

## 2024-06-10 PROCEDURE — 99214 OFFICE O/P EST MOD 30 MIN: CPT | Performed by: INTERNAL MEDICINE

## 2024-06-10 PROCEDURE — 83550 IRON BINDING TEST: CPT

## 2024-06-10 PROCEDURE — 80053 COMPREHEN METABOLIC PANEL: CPT

## 2024-06-10 PROCEDURE — 82746 ASSAY OF FOLIC ACID SERUM: CPT

## 2024-06-10 PROCEDURE — 85025 COMPLETE CBC W/AUTO DIFF WBC: CPT

## 2024-06-10 PROCEDURE — 82728 ASSAY OF FERRITIN: CPT

## 2024-06-10 PROCEDURE — 36591 DRAW BLOOD OFF VENOUS DEVICE: CPT

## 2024-06-10 PROCEDURE — 83615 LACTATE (LD) (LDH) ENZYME: CPT

## 2024-06-10 PROCEDURE — 83540 ASSAY OF IRON: CPT

## 2024-06-10 PROCEDURE — 82607 VITAMIN B-12: CPT

## 2024-06-10 NOTE — PROGRESS NOTES
Cancer Center Progress Note    Patient Name: Hanna Barrett   YOB: 1970   Medical Record Number: [id number]   CSN: [id number]   Attending Physician: Shoshana Gordon M.D.   Referring Physician: Abbie Lai DO       Date of Visit: 6/10/2024     Chief Complaint:  Chief Complaint   Patient presents with    Follow - Up     Lung cancer pt here for 3 month f/u. She c/o fatigue, SOB/cough. Appetite is good. Has chronic constipation.  Taking oral iron without difficulty.           Hem/Onc History:  Stage IIIC NSCLC adenocarcinoma of the right lung diagnosed 12/2021    Oncologic History:  5/2021: patient presented with abnormal preoperative CXR; left apical 8 mm nodule with irregular margins, and a right middle lobe soft tissue density with associated bronchiectasis medially and mild adjacent nonspecific ground glass density, overall stable since 2/2021.     11/2021: CT scan of the chest demonstrated a spiculated airspace density within the right middle lobe worrisome for primary lung malignancy along with mediastinal lymph nodes.  12/7/2021: PET/CT scan showed a hypermetabolic RML and RUL mass with enlargement of mediastinal lymph nodes.    12/13/2021: bronchoscopy and transbronchial needle aspiration to subcarinal lymph node was positive for metastatic adenocarcinoma. PD-L1 <1%, EGFR, ROS1, ALK, KRAS, RET, BRAF all negative.     1/17/2022: concurrent chemoradiation with cisplatin + pemetrexed at [facility] (Dr. Subramanian) completed in 4/2022 5/13/2022: started consolidative durvalumab q4 weeks    7/2022: PET concerning for progression of disease     8/12/2022: transferred care to Dr. Sanchez at [place]    8/16/2022: bronchoscopy and biopsies were negative for malignancy.     9/14/2022: CT scan showed some improvement     11/14/2022: PET scan continues to show improvement with interval decrease in the right lung opacities with decreased uptake, may represent scarring and posttreatment change. Increased uptake  distal esophagus.     1/17/2023: EGD with normal esophagus and negative biopsies.     2/17/2023: CT chest stable.      3/31/2023: completed 12 cycles of durvalumab.     - care transferred to Dr. Baker when Dr. Sanchez retired    4/7/2023: bronchoscopy for hemoptysis was unremarkable.   5/17/2023: CT chest stable.      - care transferred to me 11/2023 from Dr. Baker as was to practice primarily in [place]      History of Present Illness:  Tired, SOB and cough as always. Appetite good. C/o chronic constipation. On oral iron but has not made constipation worse and tolerating ok. Last couple of months sees \"waves\". Denies chest or abdominal pain, change in bowel or urinary habits, headaches, dizziness. Only sees optometry and not ophth.       Current Medications:    Current Outpatient Medications:     ALBUTEROL 108 (90 Base) MCG/ACT Inhalation Aero Soln, Inhale 2 puffs into the lungs every 4 to 6 hours as needed for Wheezing., Disp: 8.5 g, Rfl: 0    MONTELUKAST 10 MG Oral Tab, Take 1 tablet (10 mg total) by mouth nightly., Disp: 30 tablet, Rfl: 0    VYVANSE 70 MG Oral Cap, , Disp: , Rfl:     SYMBICORT 160-4.5 MCG/ACT Inhalation Aerosol, Inhale 2 puffs into the lungs 2 (two) times daily., Disp: , Rfl:     lisinopril-hydroCHLOROthiazide 20-12.5 MG Oral Tab, Take 1 tablet by mouth daily., Disp: 30 tablet, Rfl: 1    HYDROcodone-acetaminophen (NORCO) 5-325 MG Oral Tab, Take 1 tablet by mouth every 6 (six) hours as needed for Pain., Disp: 20 tablet, Rfl: 0    amphetamine-dextroamphetamine ER 30 MG Oral Capsule SR 24 Hr, Take 1 capsule (30 mg total) by mouth in the morning and 1 capsule (30 mg total) before bedtime., Disp: , Rfl:     cetirizine 10 MG Oral Tab, Take 1 tablet (10 mg total) by mouth daily., Disp: 90 tablet, Rfl: 1    ferrous sulfate 325 (65 FE) MG Oral Tab EC, Take 1 tablet (325 mg total) by mouth daily with breakfast., Disp: , Rfl:     umeclidinium bromide (INCRUSE ELLIPTA) 62.5 MCG/ACT Inhalation Aerosol  Powder, Breath Activated, Inhale 1 puff into the lungs daily., Disp: 3 each, Rfl: 3    Multiple Vitamin Oral Tab, Take 1 tablet by mouth daily., Disp: , Rfl:     ibuprofen 200 MG Oral Tab, Take 1 tablet (200 mg total) by mouth every 6 (six) hours as needed for Pain., Disp: , Rfl:     LEVOTHYROXINE 50 MCG Oral Tab, TAKE 1 TABLET(50 MCG) BY MOUTH DAILY, Disp: 90 tablet, Rfl: 0    Vitamin D3, Cholecalciferol, 125 MCG (5000 UT) Oral Cap, Take 1 capsule (5,000 Units total) by mouth daily., Disp: , Rfl: 0    DULoxetine HCl 60 MG Oral Cap DR Particles, Take 1 capsule (60 mg total) by mouth daily., Disp: , Rfl: 2    Past Medical History:  Past Medical History:    Abnormal uterine bleeding    bleeds every 2 weeks    Anxiety state    Asthma (HCC)    Attention deficit hyperactivity disorder (ADHD)    BACK PAIN    Back problem    lumbar radiculopathy    Cancer (HCC)    adenocarcinoma of right lung    MARCIO II (cervical intraepithelial neoplasia II)    DDD (degenerative disc disease), lumbar    DDD (degenerative disc disease), thoracic    Depression    Disorder of thyroid    Dyspareunia    Exposure to medical diagnostic radiation    On hold since 4/2022    Fall    Fibromyalgia    Fibromyalgia    Genital warts    High blood pressure    History of COVID-19    COVID + 7/2020 Headache - NO Hospitalization needed     History of lumbar fusion    Hx of motion sickness    IBS (irritable bowel syndrome)    Per patient - Just constipation    Infertility, female    Lumbar radiculopathy    Mild dysplasia of cervix    Pap smear for cervical cancer screening    pt states normal    Papanicolaou smear of cervix with high grade squamous intraepithelial lesion (HGSIL)    Personal history of antineoplastic chemotherapy    Last chemo 7/12/22 prior to lung bx 8/4/22    Post covid-19 condition, unspecified    symptoms: HA; s/s resolved-yes; not hospitalized    Problems with swallowing    with radiation    Sexually transmitted disease    HPV     Substance abuse (HCC)    cocaine    Urinary incontinence    Visual impairment    glasses/contacts bifocals       Past Surgical History:  Past Surgical History:   Procedure Laterality Date    Appendectomy  1980    Back surgery  2009    fusion L5-S1    Back surgery  2021    RIGHT LUMBAR 3/ LUMBAR 4, LUMBAR 4/ LUMBAR 5 HEMILAMINTOMIES, LEFT LUMBAR 2 / LUMBAR 3 MICRODISCECTOMY    Colonoscopy N/A 2023    Procedure: COLONOSCOPY;  Surgeon: Devante Kern MD;  Location:  ENDOSCOPY    Colposcopy,bx cervix/endocerv curr  11    MARCIO 1    Laparoscopy procedure unlisted      Ovarian cyst removed    Leep  2011    MARCIO 2          X2    Other surgical history      bunions bilateral    Other surgical history      nodule lymph nodes neck    Other surgical history       Bladder sling    Other surgical history  2020    Cystoscopy, Dr Beach       Family Medical History:  Family History   Problem Relation Age of Onset    Other (lung CA) Self     Hypertension Mother     Diabetes Mother     Heart Disease Mother     Heart Disease Father     Other (lung cancer) Father 55        Lung Cancer    Other (pancreatic cancer) Sister 47        Pancreatic Cancer    Cancer Sister 51        lung CA    Other (Other) Sister         Back problems    Other (Other) Son         anxiety    Other (Other) Son         behavioral problems    Diabetes Maternal Grandmother     Breast Cancer Maternal Grandmother         dx late-60s    Stroke Maternal Grandfather 86    Dementia Paternal Grandmother     Heart Disorder Paternal Grandfather     Cancer Maternal Aunt 50        LUNG CA 51    Breast Cancer Maternal Aunt         dx 46-47y    Ovarian Cancer Maternal Cousin Female 33         of ovarian ca at 35y       Gyne History:  OB History    Para Term  AB Living   2 2 2 0 0 2   SAB IAB Ectopic Multiple Live Births   0 0 0 0 2       Psychosocial History:  Social History     Socioeconomic History    Marital  status:      Spouse name: Not on file    Number of children: Not on file    Years of education: Not on file    Highest education level: Not on file   Occupational History    Not on file   Tobacco Use    Smoking status: Former     Current packs/day: 0.00     Average packs/day: 0.5 packs/day for 20.0 years (10.0 ttl pk-yrs)     Types: Cigarettes     Start date: 1991     Quit date: 2011     Years since quittin.0     Passive exposure: Past    Smokeless tobacco: Current    Tobacco comments:     Vaps,    Vaping Use    Vaping status: Every Day    Substances: Nicotine, daily    Devices: Pre-filled pod   Substance and Sexual Activity    Alcohol use: Yes     Alcohol/week: 5.0 - 6.0 standard drinks of alcohol     Types: 3 Glasses of wine, 2 - 3 Standard drinks or equivalent per week     Comment: glass of wine each night    Drug use: Not Currently     Types: Cocaine     Comment: USED COCAINE BETW 1750-9021    Sexual activity: Yes     Partners: Male   Other Topics Concern     Service Not Asked    Blood Transfusions Not Asked    Caffeine Concern Yes     Comment: 3-4 daily of pop    Occupational Exposure Not Asked    Hobby Hazards Not Asked    Sleep Concern Not Asked    Stress Concern Not Asked    Weight Concern Not Asked    Special Diet Not Asked    Back Care Not Asked    Exercise No     Comment: not tolerated right now    Bike Helmet Not Asked    Seat Belt Not Asked    Self-Exams Not Asked   Social History Narrative    Not on file     Social Determinants of Health     Financial Resource Strain: Not on file   Food Insecurity: Not on file   Transportation Needs: Not on file   Physical Activity: Not on file   Stress: Not on file   Social Connections: Not on file   Housing Stability: Not on file         Allergies:  Allergies   Allergen Reactions    Gabapentin SWELLING and UNKNOWN    Erythromycin UNKNOWN    Clindamycin RASH        Review of Systems:  A 14-point ROS was done with pertinent positives and  negative per the HPI    Vital Signs:  Height: 165.1 cm (5' 5\") (06/10 1042)  Weight: 83.9 kg (185 lb) (06/10 1042)  BSA (Calculated - sq m): 1.91 sq meters (06/10 1042)  Pulse: 118 (06/10 1042)  BP: 114/79 (06/10 1042)  Temp: 96.4 °F (35.8 °C) (06/10 1042)  Do Not Use - Resp Rate: --  SpO2: 96 % (06/10 1042)      Physical Examination:  General: Patient is alert and oriented x 3, not in acute distress.  Psych:  Mood and affect appropriate  HEENT: EOMs intact. PERRL. Oropharynx is clear.   Neck: No JVD. No palpable lymphadenopathy. Neck is supple.  Lymphatics: There is no palpable lymphadenopathy   Chest: Diminished BS  Heart: Regular rate and rhythm.   Abdomen: Soft, non tender with good bowel sounds.  No hepatosplenomegaly.  No palpable mass.  Extremities: Pedal pulses are present. No BLE edema.  Neurological: Grossly intact.       Laboratory:  Recent Results (from the past 24 hour(s))   FOLIC ACID SERUM [E]    Collection Time: 06/10/24 10:40 AM   Result Value Ref Range    Folate (Folic Acid) 45.7 >=8.7 ng/mL   VITAMIN B12 [E]    Collection Time: 06/10/24 10:40 AM   Result Value Ref Range    Vitamin B12 913 193 - 986 pg/mL   CBC W/ DIFFERENTIAL    Collection Time: 06/10/24 10:40 AM   Result Value Ref Range    WBC 6.6 4.0 - 11.0 x10(3) uL    RBC 4.20 3.80 - 5.30 x10(6)uL    HGB 13.2 12.0 - 16.0 g/dL    HCT 36.2 35.0 - 48.0 %    .0 150.0 - 450.0 10(3)uL    MCV 86.2 80.0 - 100.0 fL    MCH 31.4 26.0 - 34.0 pg    MCHC 36.5 31.0 - 37.0 g/dL    RDW 12.8 %    Neutrophil Absolute Prelim 4.55 1.50 - 7.70 x10 (3) uL    Neutrophil Absolute 4.55 1.50 - 7.70 x10(3) uL    Lymphocyte Absolute 0.86 (L) 1.00 - 4.00 x10(3) uL    Monocyte Absolute 0.85 0.10 - 1.00 x10(3) uL    Eosinophil Absolute 0.21 0.00 - 0.70 x10(3) uL    Basophil Absolute 0.07 0.00 - 0.20 x10(3) uL    Immature Granulocyte Absolute 0.02 0.00 - 1.00 x10(3) uL    Neutrophil % 69.3 %    Lymphocyte % 13.1 %    Monocyte % 13.0 %    Eosinophil % 3.2 %    Basophil  % 1.1 %    Immature Granulocyte % 0.3 %   LDH [E]    Collection Time: 06/10/24 11:10 AM   Result Value Ref Range     84 - 246 U/L   COMP METABOLIC PANEL [E]    Collection Time: 06/10/24 11:10 AM   Result Value Ref Range    Glucose 152 (H) 70 - 99 mg/dL    Sodium 138 136 - 145 mmol/L    Potassium 3.6 3.5 - 5.1 mmol/L    Chloride 104 98 - 112 mmol/L    CO2 25.0 21.0 - 32.0 mmol/L    Anion Gap 9 0 - 18 mmol/L    BUN 23 9 - 23 mg/dL    Creatinine 1.14 (H) 0.55 - 1.02 mg/dL    Calcium, Total 9.4 8.5 - 10.1 mg/dL    Calculated Osmolality 293 275 - 295 mOsm/kg    eGFR-Cr 58 (L) >=60 mL/min/1.73m2    AST 23 15 - 37 U/L    ALT 28 13 - 56 U/L    Alkaline Phosphatase 88 41 - 108 U/L    Bilirubin, Total 0.4 0.1 - 2.0 mg/dL    Total Protein 7.3 6.4 - 8.2 g/dL    Albumin 4.0 3.4 - 5.0 g/dL    Globulin  3.3 2.8 - 4.4 g/dL    A/G Ratio 1.2 1.0 - 2.0    Patient Fasting for CMP? No    FERRITIN [E]    Collection Time: 06/10/24 11:10 AM   Result Value Ref Range    Ferritin 86.3 18.0 - 340.0 ng/mL   IRON AND TIBC [E]    Collection Time: 06/10/24 11:10 AM   Result Value Ref Range    Iron 70 50 - 170 ug/dL    Transferrin 306 200 - 360 mg/dL    Total Iron Binding Capacity 456 (H) 240 - 450 ug/dL    % Saturation 15 15 - 50 %         Radiology:  PROCEDURE:  CT CHEST+ABDOMEN+PELVIS(ALL CNTRST ONLY)(CPT=71260/49635)     COMPARISON:  [facility] , [place], PET STANDARD BODY SCAN (ONCOLOGY) (CPT=78815), 12/27/2023, 9:02 AM.  [facility] , CT, CT CHEST(CONTRAST ONLY) (CPT=71260), 11/21/2023, 9:59 AM.     INDICATIONS:  C34.91 Primary adenocarcinoma of right lung (HCC)     TECHNIQUE:  IV contrast-enhanced scanning through the chest, abdomen, and pelvis was performed.  Dose reduction techniques were used. Dose information is transmitted to the ACR (American College of Radiology) NRDR (National Radiology Data Registry) which   includes the Dose Index Registry.     PATIENT STATED HISTORY:(As transcribed by Technologist)  Routine scan for a previous history of  right lung cancer. Patient reports no complications at time of exam      CONTRAST USED:  100cc of Isovue 370     FINDINGS:       CHEST:    LUNGS:  Right perihilar consolidation with angular, concave contours and associated bronchiectasis centered at the superior segment lower lobe has decreased in size.  Sample measurement, 7.5 x 3.2 cm versus prior 8.7 x 4.7 cm.    Mild scattered scarring elsewhere in both lungs.  No new bronchiectasis or cystic lung change.  No new infiltrate.  MEDIASTINUM:  Stable.  No adenopathy.  MICHAEL:  Stable.  No adenopathy.  CARDIAC:  Stable.  Mild anterior pericardial thickening.  Thickness measures 0.7 cm.  Coronary calcifications are present.    PLEURA:  Stable.  No pleural effusion.  CHEST WALL:  Stable.  No axillary adenopathy.  Right chest port.  AORTA:  Stable.  Normal caliber.  VASCULATURE:  Stable.  Smooth tapering.     ABDOMEN/PELVIS:  LIVER:  The visualized portions of liver are stable.  There is an early enhancing focus in segment 7. It is measured 2.0 x 1.4 cm, prior 1.8 x 1.6 cm.  It may be a flash filling hemangioma or vascular focus.  It appears benign.  In the now visualized  remainder of the liver, no abnormality.    BILIARY:  Nondistended gallbladder.  No biliary dilatation.  PANCREAS:  Uniform parenchyma.  No ductal dilatation.  SPLEEN:  Not enlarged.  KIDNEYS:  Normal anatomic positions.  No hydronephrosis or perinephric stranding.  1.9 cm left cortical cyst is unchanged.  ADRENALS:  Not enlarged.  AORTA:  Smooth tapering.  No abdominal aortic aneurysm.  Patent celiac artery, SMA and KATIE.  Patent splenic vein and portal vein.  RETROPERITONEUM:  No adenopathy.  BOWEL/MESENTERY:  Normal bowel caliber.  No colonic inflammation.  Moderate to large amount of stool scattered throughout the colon.  No ascites.  Right lower quadrant clip consistent with prior appendectomy.  ABDOMINAL WALL:  Tiny fat containing umbilical hernia.  URINARY BLADDER:  Nondistended.  Smooth contour.   No calculus within.  PELVIC NODES:  None enlarged  PELVIC ORGANS:  No uterine or ovarian abnormality.  BONES:  Severe degenerative changes.  At least moderate central canal stenosis at L2-L3.  Normal vertebral body heights.  No subluxation.                      Impression   CONCLUSION:    1. Decrease in size of right perihilar consolidation.  This may be related to treated cancer/treatment changes.  2. No new metastatic findings in the chest.  3. No metastatic findings in the abdomen or pelvis.  4. Details as above.             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 F-18 FDG was injected IV, and whole-body images from vertex to mid-thigh were obtained with concurrent CT scan for both anatomic localization as well as attenuation correction.     RADIOPHARMACEUTICAL:    9.5 mCi F-18 FDG  FASTING GLUCOSE LEVEL:  106 mg/dl  INJECTION TIME:         0 800  SCAN TIME:              0911     FINDINGS:     Mean SUV, mediastinal blood pool:  2.2     Mean SUV, liver:  2.8         HEAD/NECK:  Physiologic activity in all regions.     CHEST:  Consolidation with air bronchograms identified within the superior segment right lower lobe with corresponding low-grade radiotracer uptake, SUV max of 2.5 (previously 3.1).  This is favored to represent post-therapeutic inflammation/fibrosis.    No enlarged or hypermetabolic regional lymph nodes.     ABDOMEN/PELVIS:  Physiologic activity in all regions.       MUSCULOSKELETAL:  Scattered areas of degenerative uptake noted within  the spine.  No hypermetabolic osseous metastatic disease.  Mild exertional/inflammatory uptake of bilateral shoulder girdles and peritrochanteric regions.                     Impression  CONCLUSION:       Low-grade hypermetabolism corresponds to consolidative opacity of the superior segment right lower lobe favoring treatment-related inflammation/fibrosis.  No imaging evidence of residual/recurrent malignancy.          LOCATION:  [facility]           Dictated by (CST): Umm Hodges MD on 12/27/2023 at 1:25 PM      Finalized by (CST): Umm Hodges MD on 12/27/2023 at 1:38 PM      PROCEDURE:  CT CHEST(CONTRAST ONLY) (CPT=71260)     COMPARISON:  [facility], , MRI LIVER (W+WO) (CPT=74183), 5/22/2017, 7:11 PM.  [facility] , CT, CT CHEST(CONTRAST ONLY) (CPT=71260), 2/17/2023, 10:15 AM.  [facility], CT, CT UROGRAM(W+WO)(CPT=74178), 9/08/2020, 10:29 PM.  [facility] , CT, CT CHEST(CONTRAST  ONLY) (CPT=71260), 8/04/2023, 11:19 AM.     INDICATIONS:  C34.2 Malignant neoplasm of middle lobe of right lung (HCC)     TECHNIQUE:  CT images were obtained with non-ionic intravenous contrast material. Dose reduction techniques were used. Dose information is transmitted to the ACR (American College of Radiology) NRDR (National Radiology Data Registry) which includes the  Dose Index Registry.     PATIENT STATED HISTORY:(As transcribed by Technologist)  Lung cancer. The patient doesn't have complaints.      CONTRAST USED:  75cc of Isovue 370     FINDINGS:    LUNGS:  There are right perihilar fibrotic changes which likely represent treatment change.  There is right perihilar consolidation and bronchiectasis, not significantly changed.  No new enhancing nodule is seen.  Scattered atelectasis and/or scarring.    No pulmonary mass is seen.  VASCULATURE:  Unremarkable.  THORACIC AORTA:  Unremarkable.  MEDIASTINUM/MICHAEL:  Unremarkable.  CARDIAC:  Unremarkable.  PLEURA:  Unremarkable.  CHEST WALL:  Unremarkable.  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  BONES:  Degenerative changes in the spine.  Mild scoliosis.  OTHER:  Right chest port noted.                  Impression  CONCLUSION:  No significant interval change since 8/4/2023.  Treatment change is again seen within the right perihilar region.  No recurrent or metastatic disease is identified.        LOCATION:  [facility]        Dictated by (CST): Stromberg, LeRoy, MD on 11/21/2023 at 11:37 AM      Finalized by (CST): Stromberg, LeRoy, MD on 11/21/2023 at 11:52 AM        Impression and Plan:  1. H/o locally advanced NSCLC  - Last CT showed no overt evidence of recurrence/progression. Most recent PET showed low grade uptake RLL favoring treatment related inflammation/fibrosis.     2. Sees \"waves\"  - new for the past 2 months she said  - given her history recommended she see ophth and have MRI brain    3. Chronic LBP, neck and left arm main  - [facility]  - xrays showed DDD, MRI C spine showed DDD with slight increase compared to exam in 2019    4. H/o asthma, bronchiectasis, former smoker, cough  - follows with pulmonary continue inhalers   - flutter valve    5. Anemia  - noted last visit.  Added iron and ferritin to labs drawn and could not add B12 and folic acid. Ferritin was <50. She started taking oral iron and Hgb today up to 13.2. Eventual B12 and folate normal. Ferritin today above 86.3.  - h/o previous hemoptysis. Has not had coughed up any since treatment for (1) but still tastes metal every so often.      Labs and imaging reviewed     RTC 3 months with CT- ordered        Shoshana Gordon MD  [facility]

## 2024-06-13 ENCOUNTER — OFFICE VISIT (OUTPATIENT)
Dept: SURGERY | Facility: CLINIC | Age: 54
End: 2024-06-13
Payer: MEDICAID

## 2024-06-13 VITALS
BODY MASS INDEX: 30.82 KG/M2 | HEIGHT: 65 IN | SYSTOLIC BLOOD PRESSURE: 118 MMHG | WEIGHT: 185 LBS | DIASTOLIC BLOOD PRESSURE: 72 MMHG

## 2024-06-13 DIAGNOSIS — M48.062 LUMBAR STENOSIS WITH NEUROGENIC CLAUDICATION: ICD-10-CM

## 2024-06-13 DIAGNOSIS — Z98.1 S/P LUMBAR FUSION: Primary | ICD-10-CM

## 2024-06-13 DIAGNOSIS — M96.1 FAILED BACK SURGICAL SYNDROME: ICD-10-CM

## 2024-06-13 PROCEDURE — 99214 OFFICE O/P EST MOD 30 MIN: CPT | Performed by: NEUROLOGICAL SURGERY

## 2024-06-13 RX ORDER — DEXTROAMPHETAMINE SACCHARATE, AMPHETAMINE ASPARTATE, DEXTROAMPHETAMINE SULFATE AND AMPHETAMINE SULFATE 3.75; 3.75; 3.75; 3.75 MG/1; MG/1; MG/1; MG/1
TABLET ORAL
COMMUNITY
Start: 2024-05-22

## 2024-06-13 RX ORDER — ARIPIPRAZOLE 2 MG/1
TABLET ORAL
COMMUNITY
Start: 2024-06-02

## 2024-06-13 NOTE — PROGRESS NOTES
Established patient:  Reason for follow up: cervical spine     Numeric Rating Scale: (     Pain at Present:  ***/10       Distribution of Pain:    {RIGHT / LEFT / BILATERAL:3789}    Most recent treatments for Current Pain Condition:   {EEH AMB KAZ PAST TREATMENTS FOR CURRENT PAIN CONDITION:6426}  Response to treatment: {pain relief:16202}    New imaging or testing since your last office visit:      MRI cervical spine

## 2024-06-13 NOTE — PROGRESS NOTES
[facility]   Outpatient Neurological Surgery Follow Up    Hanna Barrett  : 1970  PCP: Abbie Lai DO  Referring Provider: No ref. provider found    REASON FOR VISIT:  Chief Complaint   Patient presents with    Follow - Up    Neck Pain       HISTORY OF PRESENT ILLNESS:  Hanna Barrett is a 54 year old female who presents today for follow-up.  Patient is here to follow-up for her lumbar and her cervical issues.  Patient states she has had ongoing bilateral leg weakness with numbness in the anterior thighs.  Patient states she has difficulty ambulating any sort of distance due to this.  Patient states she occasionally has pain down the back of the legs bilaterally however this takes about 30 minutes before the onset.  Patient reports a charley horse sensation in bilateral heels.  She has tried changing her shoes thinking it was for feet.  She is taking quite a bit of Motrin with some relief.  She occasionally takes Norco.  Patient is a previous lumbar fusion at L5-S1 and hemilaminotomies.  She was previously noted to have severe scarring.  Pt had a lumbar MRI in  and saw Dr. Minoo Burt.  Pt was recommended to try a spinal cord stimulator however she states she was unable to have the psych eval due to insurance.  Pt has had several falls and states she \"walks like she is drunk\".      Pt also is here to discuss her cervical issues.  She has numbness of the left side of her neck and upper shoulder with certain movements of her neck.  She reports tingling of all her fingers.  Pt states she feels clumsy in her hands.  Pt states she has arthritis in her hands so she attributes this to her difficulty opening jars.  Pt reports pain along her bra line but denies thoracic radiculopathy.  Pt had a MRI cervical in 3/2024 and is here for review        PAST MEDICAL HISTORY:  Past Medical History:    Abnormal uterine bleeding    bleeds every 2 weeks    Anxiety state    Asthma  (HCC)    Attention deficit hyperactivity disorder (ADHD)    BACK PAIN    Back problem    lumbar radiculopathy    Cancer (HCC)    adenocarcinoma of right lung    MARCIO II (cervical intraepithelial neoplasia II)    DDD (degenerative disc disease), lumbar    DDD (degenerative disc disease), thoracic    Depression    Disorder of thyroid    Dyspareunia    Exposure to medical diagnostic radiation    On hold since 2022    Fall    Fibromyalgia    Fibromyalgia    Genital warts    High blood pressure    History of COVID-19    COVID + 2020 Headache - NO Hospitalization needed     History of lumbar fusion    Hx of motion sickness    IBS (irritable bowel syndrome)    Per patient - Just constipation    Infertility, female    Lumbar radiculopathy    Mild dysplasia of cervix    Pap smear for cervical cancer screening    pt states normal    Papanicolaou smear of cervix with high grade squamous intraepithelial lesion (HGSIL)    Personal history of antineoplastic chemotherapy    Last chemo 22 prior to lung bx 22    Post covid-19 condition, unspecified    symptoms: HA; s/s resolved-yes; not hospitalized    Problems with swallowing    with radiation    Sexually transmitted disease    HPV    Substance abuse (HCC)    cocaine    Urinary incontinence    Visual impairment    glasses/contacts bifocals       PAST SURGICAL HISTORY:  Past Surgical History:   Procedure Laterality Date    Appendectomy      Back surgery  2009    fusion L5-S1    Back surgery  2021    RIGHT LUMBAR 3/ LUMBAR 4, LUMBAR 4/ LUMBAR 5 HEMILAMINTOMIES, LEFT LUMBAR 2 / LUMBAR 3 MICRODISCECTOMY    Colonoscopy N/A 2023    Procedure: COLONOSCOPY;  Surgeon: Devante Kern MD;  Location:  ENDOSCOPY    Colposcopy,bx cervix/endocerv curr  11    MRACIO 1    Laparoscopy procedure unlisted      Ovarian cyst removed    Leep  2011    MARCIO 2          X2    Other surgical history      bunions bilateral    Other surgical history      nodule  lymph nodes neck    Other surgical history  2016     Bladder sling    Other surgical history  09/11/2020    Cystoscopy, Dr Beach       SOCIAL HISTORY:   reports that she quit smoking about 13 years ago. Her smoking use included cigarettes. She started smoking about 33 years ago. She has a 10 pack-year smoking history. She has been exposed to tobacco smoke. She uses smokeless tobacco. She reports current alcohol use of about 5.0 - 6.0 standard drinks of alcohol per week. She reports that she does not currently use drugs after having used the following drugs: Cocaine.      ALLERGIES:  Allergies   Allergen Reactions    Gabapentin SWELLING and UNKNOWN    Erythromycin UNKNOWN    Clindamycin RASH       MEDICATIONS:  Current Outpatient Medications   Medication Sig Dispense Refill    amphetamine-dextroamphetamine 15 MG Oral Tab TAKE ONE TABLET BY MOUTH ONCE DAILY 8 TO 9 HOURS AFTER VYVANSE DOSE      ARIPiprazole 2 MG Oral Tab Take one (1) tablet by mouth every morning      ALBUTEROL 108 (90 Base) MCG/ACT Inhalation Aero Soln Inhale 2 puffs into the lungs every 4 to 6 hours as needed for Wheezing. 8.5 g 0    MONTELUKAST 10 MG Oral Tab Take 1 tablet (10 mg total) by mouth nightly. 30 tablet 0    VYVANSE 70 MG Oral Cap       SYMBICORT 160-4.5 MCG/ACT Inhalation Aerosol Inhale 2 puffs into the lungs 2 (two) times daily.      lisinopril-hydroCHLOROthiazide 20-12.5 MG Oral Tab Take 1 tablet by mouth daily. 30 tablet 1    cetirizine 10 MG Oral Tab Take 1 tablet (10 mg total) by mouth daily. 90 tablet 1    ferrous sulfate 325 (65 FE) MG Oral Tab EC Take 1 tablet (325 mg total) by mouth daily with breakfast.      umeclidinium bromide (INCRUSE ELLIPTA) 62.5 MCG/ACT Inhalation Aerosol Powder, Breath Activated Inhale 1 puff into the lungs daily. 3 each 3    Multiple Vitamin Oral Tab Take 1 tablet by mouth daily.      ibuprofen 200 MG Oral Tab Take 1 tablet (200 mg total) by mouth every 6 (six) hours as needed for Pain.       LEVOTHYROXINE 50 MCG Oral Tab TAKE 1 TABLET(50 MCG) BY MOUTH DAILY 90 tablet 0    Vitamin D3, Cholecalciferol, 125 MCG (5000 UT) Oral Cap Take 1 capsule (5,000 Units total) by mouth daily.  0    DULoxetine HCl 60 MG Oral Cap DR Particles Take 1 capsule (60 mg total) by mouth daily.  2    HYDROcodone-acetaminophen (NORCO) 5-325 MG Oral Tab Take 1 tablet by mouth every 6 (six) hours as needed for Pain. (Patient not taking: Reported on 6/13/2024) 20 tablet 0       REVIEW OF SYSTEMS:  Pertinent positives and negatives are noted in HPI.      PHYSICAL EXAMINATION:  VITAL SIGNS: /72 (BP Location: Right arm, Patient Position: Sitting, Cuff Size: adult)   Ht 65\"   Wt 185 lb (83.9 kg)   LMP 02/14/2019 (Exact Date)   BMI 30.79 kg/m²   GENERAL:  Patient is a 54 year old female in no apparent .  HEENT:  Normocephalic, atraumatic.  NEUROLOGICAL:  This patient is alert and orientated x 3.  Speech fluent. Able to follow simple commands.  CN II - XII intact.  Face symmetric.   Sensory: intact.   Gait steady, non-antalgic, able to toe and heel balance  Upper extremity strength:      Deltoid  Biceps  Triceps   W.flexion  W.extension    Finger abduction     Right 5 5 5 5  5 5 5     Left 5 5 5 5 5 5 5     Lower extremity strength:      Iliospoas  Hamstrings  Quads  D-flexion  P-flexion EHL     Right 5 5 5 5 5 5     Left 5 5 5 5 5 5     Negative SLR, Negative Patricks  No Hoffmans, No clonus    IMAGING:  MRI cervical reviewed, show cervical spondylosis without significant stenosis    MRI lumbar reviewed from 2023, shows stenosis at L2-3, R L1-2 lateral recess stenosis, previous fusion at L5-S1    ASSESSMENT:  S/p lumbar fusion  Lumbar stenosis with neurogenic claudication  Failed back syndrome    PLAN:  Reviewed previous imaging with pt  -Repeat MRI lumbar with and w/o contrast  -Lumbar flexion extension xrays ordered  -CT lumbar to evaluate bone anatomy  2.   F/u after imaging to discuss next steps for pain control          Dr. Quintero evaluated the patient and is in agreement with the plan.        Total time spent:  30 minutes  Greater than 50% of the time was spent on counseling/coordination of care.  Nature of education / counseling: Pathology, treatment options, and expected outcomes    BLAKE De Dios  6/13/2024, 2:53 PM

## 2024-06-13 NOTE — PATIENT INSTRUCTIONS
Refill policies:    Allow 2-3 business days for refills; controlled substances may take longer.  Contact your pharmacy at least 5 days prior to running out of medication and have them send an electronic request or submit request through the “request refill” option in your [employer] account.  Refills are not addressed on weekends; covering physicians do not authorize routine medications on weekends.  No narcotics or controlled substances are refilled after noon on Fridays or by on call physicians.  By law, narcotics must be electronically prescribed.  A 30 day supply with no refills is the maximum allowed.  If your prescription is due for a refill, you may be due for a follow up appointment.  To best provide you care, patients receiving routine medications need to be seen at least once a year.  Patients receiving narcotic/controlled substance medications need to be seen at least once every 3 months.  In the event that your preferred pharmacy does not have the requested medication in stock (e.g. Backordered), it is your responsibility to find another pharmacy that has the requested medication available.  We will gladly send a new prescription to that pharmacy at your request.    Scheduling Tests:    If your physician has ordered radiology tests such as MRI or CT scans, please contact Central Scheduling at [phone] right away to schedule the test.  Once scheduled, the [facility] Centralized Referral Team will work with your insurance carrier to obtain pre-certification or prior authorization.  Depending on your insurance carrier, approval may take 3-10 days.  It is highly recommended patients assure they have received an authorization before having a test performed.  If test is done without insurance authorization, patient may be responsible for the entire amount billed.      Precertification and Prior Authorizations:  If your physician has recommended that you have a procedure or additional testing performed the [facility]  Centralized Referral Team will contact your insurance carrier to obtain pre-certification or prior authorization.    You are strongly encouraged to contact your insurance carrier to verify that your procedure/test has been approved and is a COVERED benefit.  Although the [facility] Centralized Referral Team does its due diligence, the insurance carrier gives the disclaimer that \"Although the procedure is authorized, this does not guarantee payment.\"    Ultimately the patient is responsible for payment.   Thank you for your understanding in this matter.  Paperwork Completion:  If you require FMLA or disability paperwork for your recovery, please make sure to either drop it off or have it faxed to our office at [phone]. Be sure the form has your name and date of birth on it.  The form will be faxed to our Forms Department and they will complete it for you.  There is a 25$ fee for all forms that need to be filled out.  Please be aware there is a 10-14 day turnaround time.  You will need to sign a release of information (RORO) form if your paperwork does not come with one.  You may call the Forms Department with any questions at [phone].  Their fax number is [phone].

## 2024-06-18 ENCOUNTER — DIETICIAN VISIT (OUTPATIENT)
Dept: HEMATOLOGY/ONCOLOGY | Facility: HOSPITAL | Age: 54
End: 2024-06-18

## 2024-06-20 ENCOUNTER — OFFICE VISIT (OUTPATIENT)
Facility: CLINIC | Age: 54
End: 2024-06-20

## 2024-06-20 VITALS
HEIGHT: 65 IN | WEIGHT: 187 LBS | RESPIRATION RATE: 16 BRPM | DIASTOLIC BLOOD PRESSURE: 60 MMHG | BODY MASS INDEX: 31.16 KG/M2 | SYSTOLIC BLOOD PRESSURE: 90 MMHG | OXYGEN SATURATION: 95 % | HEART RATE: 93 BPM

## 2024-06-20 DIAGNOSIS — J45.909 UNCOMPLICATED ASTHMA, UNSPECIFIED ASTHMA SEVERITY, UNSPECIFIED WHETHER PERSISTENT (HCC): ICD-10-CM

## 2024-06-20 DIAGNOSIS — J30.9 ALLERGIC RHINITIS, UNSPECIFIED SEASONALITY, UNSPECIFIED TRIGGER: ICD-10-CM

## 2024-06-20 DIAGNOSIS — J45.51 SEVERE PERSISTENT ASTHMA WITH EXACERBATION (HCC): Primary | ICD-10-CM

## 2024-06-20 PROCEDURE — 99214 OFFICE O/P EST MOD 30 MIN: CPT | Performed by: INTERNAL MEDICINE

## 2024-06-20 RX ORDER — PREDNISONE 10 MG/1
TABLET ORAL
Qty: 30 TABLET | Refills: 0 | Status: SHIPPED | OUTPATIENT
Start: 2024-06-20

## 2024-06-20 RX ORDER — MONTELUKAST SODIUM 10 MG/1
10 TABLET ORAL NIGHTLY
Qty: 90 TABLET | Refills: 3 | Status: SHIPPED | OUTPATIENT
Start: 2024-06-20

## 2024-06-20 RX ORDER — ALBUTEROL SULFATE 90 UG/1
2 AEROSOL, METERED RESPIRATORY (INHALATION) EVERY 6 HOURS PRN
Qty: 3 EACH | Refills: 3 | Status: SHIPPED | OUTPATIENT
Start: 2024-06-20

## 2024-06-20 RX ORDER — AZITHROMYCIN 250 MG/1
TABLET, FILM COATED ORAL
Qty: 6 TABLET | Refills: 0 | Status: SHIPPED | OUTPATIENT
Start: 2024-06-20

## 2024-06-20 RX ORDER — FLUTICASONE FUROATE, UMECLIDINIUM BROMIDE AND VILANTEROL TRIFENATATE 200; 62.5; 25 UG/1; UG/1; UG/1
1 POWDER RESPIRATORY (INHALATION) DAILY
Qty: 3 EACH | Refills: 3 | Status: SHIPPED | OUTPATIENT
Start: 2024-06-20

## 2024-06-20 NOTE — PATIENT INSTRUCTIONS
Start prednisone regimen - 12 day taper. Start at 40mg once a day and wean by 10mg every 3 days.  Start azithromycin (zpak)  Price trelegy - hopefully it is less than $35/month  Otherwise continue on the symbicort and incruse  Continue to use albuterol 2 puffs every 6 hours as needed for your breathing or cough  Let me know if you do not get better

## 2024-06-20 NOTE — PROGRESS NOTES
[facility] General Pulmonary Progress Note    History of Present Illness:  Hanna Barrett is a 54 year old female female former smoker (quit 2011, 20 pack years) with significant PMH of asthma, stage 3 RUL NSCLC s/p chemoRT who presents today for follow up. Since last visit she reports she had been well until the past week with the hot weather she has had worsening dyspnea, cough and wheeze.  Has needed albuterol much more frequently. No pain. No fevers    Dec 2023 previously  Hanna Barrett is a 53 year old female former smoker (quit 2011, 20 pack years) with significant PMH of asthma, stage 3 RUL NSCLC s/p chemoRT who presents today for follow up. She denies acute concerns today.  Does continue to have nasal congestion/drainage leading to cough and throat clearing. No blood. Dyspnea on exertion overall better on symbicort BID and incruse daily. Has albuterol but not using often.    October 2023 previously BLAKE acuña  Hanna Barrett is a 53 year old female who presents for 2 month asthma followup. Breathing has been better with Incruse and Symbicort. Had Sinus infection about 2  weeks ago; blood mixed in phlegm about a quarter size. No further hemoptysis;  still coughing. Feels like a vice around her chest at times; mostly after coughing. Was on 2 courses of antibiotics; PCN and Augmentin. Reports overall feeling much better; still with cough with white-yellow tinged sputum. No f/c/ns. Worried about cancer returning. Asthma score is 14     Previously 8/2023  a 53 year old female who presents for c/o worsening dyspnea with exertion. Notices this mostly with stairs and worsens with stairs when carrying anything. Unable to exercise due to back pain. No f/c/ns. Denies hx of COPD; quit smoking in 2000 and had a couple here and there; vaps here and there. Reports hx of Asthma and using Symbicort with minimal improvement.  PMH of stage III lung cancer s/p chemo RT completed 4/2022; recent  CT chest.      Previously 4/2023   Shekhar  a 53 year old female former smoker (quit 2011, 20 pack years) with significant PMH of asthma, stage III adenocarcinoma s/p chemoRT  who presents today for evaluation of hemoptysis. The patient explains she has had increasing episodes hemoptysis since around late February 2023. Thinks hemoptysis began first then about a week later developed nasal congestion/drainage sinus pressure. She was seen at [facility] two weeks ago and treated for sinus infection and given augmentin.  Her sinus pressure and congestion improved, however her hemoptysis persists. Also c/o sore throat/pain which has been ongoing for the past month as well.  Describes hemoptysis as red blood, about pencil eraser in size, may have 2-3 episodes a day. None today, but did have two episodes yesterday.    No epistaxis. No GERD, abdominal pain. No fevers. No chest pain, pressure or pleurisy.   Feels overall her breathing has been worse since her radiation treatment since last year. Using advair BID and albuterol. Previously on trelegy but too pricey.      Works in insurance sales. No known exposures.   Past Medical History:   Past Medical History:    Abnormal uterine bleeding    bleeds every 2 weeks    Anxiety state    Asthma (HCC)    Attention deficit hyperactivity disorder (ADHD)    BACK PAIN    Back problem    lumbar radiculopathy    Cancer (HCC)    adenocarcinoma of right lung    MARCIO II (cervical intraepithelial neoplasia II)    DDD (degenerative disc disease), lumbar    DDD (degenerative disc disease), thoracic    Depression    Disorder of thyroid    Dyspareunia    Exposure to medical diagnostic radiation    On hold since 4/2022    Fall    Fibromyalgia    Fibromyalgia    Genital warts    High blood pressure    History of COVID-19    COVID + 7/2020 Headache - NO Hospitalization needed     History of lumbar fusion    Hx of motion sickness    IBS (irritable bowel syndrome)    Per patient - Just constipation    Infertility, female    Lumbar  radiculopathy    Mild dysplasia of cervix    Pap smear for cervical cancer screening    pt states normal    Papanicolaou smear of cervix with high grade squamous intraepithelial lesion (HGSIL)    Personal history of antineoplastic chemotherapy    Last chemo 22 prior to lung bx 22    Post covid-19 condition, unspecified    symptoms: HA; s/s resolved-yes; not hospitalized    Problems with swallowing    with radiation    Sexually transmitted disease    HPV    Substance abuse (HCC)    cocaine    Urinary incontinence    Visual impairment    glasses/contacts bifocals        Past Surgical History:   Past Surgical History:   Procedure Laterality Date    Appendectomy      Back surgery      fusion L5-S1    Back surgery  2021    RIGHT LUMBAR 3/ LUMBAR 4, LUMBAR 4/ LUMBAR 5 HEMILAMINTOMIES, LEFT LUMBAR 2 / LUMBAR 3 MICRODISCECTOMY    Colonoscopy N/A 2023    Procedure: COLONOSCOPY;  Surgeon: Devante Kern MD;  Location:  ENDOSCOPY    Colposcopy,bx cervix/endocerv curr  11    MARCIO 1    Laparoscopy procedure unlisted      Ovarian cyst removed    Leep  2011    MARCIO 2          X2    Other surgical history      bunions bilateral    Other surgical history      nodule lymph nodes neck    Other surgical history  2016     Bladder sling    Other surgical history  2020    Cystoscopy, Dr Beach       Family Medical History:   Family History   Problem Relation Age of Onset    Other (lung CA) Self     Hypertension Mother     Diabetes Mother     Heart Disease Mother     Heart Disease Father     Other (lung cancer) Father 55        Lung Cancer    Other (pancreatic cancer) Sister 47        Pancreatic Cancer    Cancer Sister 51        lung CA    Other (Other) Sister         Back problems    Other (Other) Son         anxiety    Other (Other) Son         behavioral problems    Diabetes Maternal Grandmother     Breast Cancer Maternal Grandmother         dx late-60s    Stroke Maternal  Grandfather 86    Dementia Paternal Grandmother     Heart Disorder Paternal Grandfather     Cancer Maternal Aunt 50        LUNG CA 51    Breast Cancer Maternal Aunt         dx 46-47y    Ovarian Cancer Maternal Cousin Female 33         of ovarian ca at 35y        Social History:   Social History     Socioeconomic History    Marital status:      Spouse name: Not on file    Number of children: Not on file    Years of education: Not on file    Highest education level: Not on file   Occupational History    Not on file   Tobacco Use    Smoking status: Former     Current packs/day: 0.00     Average packs/day: 0.5 packs/day for 20.0 years (10.0 ttl pk-yrs)     Types: Cigarettes     Start date: 1991     Quit date: 2011     Years since quittin.1     Passive exposure: Past    Smokeless tobacco: Former     Quit date: 2024    Tobacco comments:     Has not vaped since 2024   Vaping Use    Vaping status: Every Day    Substances: Nicotine, daily    Devices: Pre-filled pod   Substance and Sexual Activity    Alcohol use: Yes     Alcohol/week: 5.0 - 6.0 standard drinks of alcohol     Types: 3 Glasses of wine, 2 - 3 Standard drinks or equivalent per week     Comment: glass of wine each night    Drug use: Not Currently     Types: Cocaine     Comment: USED COCAINE BETW [id number]    Sexual activity: Yes     Partners: Male   Other Topics Concern     Service Not Asked    Blood Transfusions Not Asked    Caffeine Concern Yes     Comment: 3-4 daily of pop    Occupational Exposure Not Asked    Hobby Hazards Not Asked    Sleep Concern Not Asked    Stress Concern Not Asked    Weight Concern Not Asked    Special Diet Not Asked    Back Care Not Asked    Exercise No     Comment: not tolerated right now    Bike Helmet Not Asked    Seat Belt Not Asked    Self-Exams Not Asked   Social History Narrative    Not on file     Social Determinants of Health     Financial Resource Strain: Not on file   Food  Insecurity: Not on file   Transportation Needs: Not on file   Physical Activity: Not on file   Stress: Not on file   Social Connections: Not on file   Housing Stability: Not on file        Medications:   Current Outpatient Medications   Medication Sig Dispense Refill    predniSONE 10 MG Oral Tab take 4 tablets daily for 3 days then 3 tablets daily for 3 days then 2 tablets daily for 3 days then 1 tablets daily for 3 days then stop. 30 tablet 0    azithromycin 250 MG Oral Tab take 2 tablet (500MG)  by ORAL route  every day for 1 day then 1 tablet (250 mg) by oral route once daily for 4 days 6 tablet 0    fluticasone-umeclidin-vilant (TRELEGY ELLIPTA) 200-62.5-25 MCG/ACT Inhalation Aerosol Powder, Breath Activated Inhale 1 puff into the lungs daily. 3 each 3    montelukast 10 MG Oral Tab Take 1 tablet (10 mg total) by mouth nightly. 90 tablet 3    albuterol 108 (90 Base) MCG/ACT Inhalation Aero Soln Inhale 2 puffs into the lungs every 6 (six) hours as needed for Wheezing or Shortness of Breath. 3 each 3    amphetamine-dextroamphetamine 15 MG Oral Tab TAKE ONE TABLET BY MOUTH ONCE DAILY 8 TO 9 HOURS AFTER VYVANSE DOSE      ARIPiprazole 2 MG Oral Tab Take one (1) tablet by mouth every morning      VYVANSE 70 MG Oral Cap       SYMBICORT 160-4.5 MCG/ACT Inhalation Aerosol Inhale 2 puffs into the lungs 2 (two) times daily.      lisinopril-hydroCHLOROthiazide 20-12.5 MG Oral Tab Take 1 tablet by mouth daily. 30 tablet 1    cetirizine 10 MG Oral Tab Take 1 tablet (10 mg total) by mouth daily. 90 tablet 1    ferrous sulfate 325 (65 FE) MG Oral Tab EC Take 1 tablet (325 mg total) by mouth daily with breakfast.      umeclidinium bromide (INCRUSE ELLIPTA) 62.5 MCG/ACT Inhalation Aerosol Powder, Breath Activated Inhale 1 puff into the lungs daily. 3 each 3    Multiple Vitamin Oral Tab Take 1 tablet by mouth daily.      ibuprofen 200 MG Oral Tab Take 1 tablet (200 mg total) by mouth every 6 (six) hours as needed for Pain.       LEVOTHYROXINE 50 MCG Oral Tab TAKE 1 TABLET(50 MCG) BY MOUTH DAILY 90 tablet 0    Vitamin D3, Cholecalciferol, 125 MCG (5000 UT) Oral Cap Take 1 capsule (5,000 Units total) by mouth daily.  0    DULoxetine HCl 60 MG Oral Cap DR Particles Take 1 capsule (60 mg total) by mouth daily.  2    HYDROcodone-acetaminophen (NORCO) 5-325 MG Oral Tab Take 1 tablet by mouth every 6 (six) hours as needed for Pain. (Patient not taking: Reported on 6/13/2024) 20 tablet 0       Review of Systems: Review of Systems   Constitutional: Negative.    HENT: Negative.     Respiratory:  Positive for cough, chest tightness, shortness of breath and wheezing. Negative for stridor.    Cardiovascular: Negative.    All other systems reviewed and are negative.       Physical Exam:  BP 90/60 (BP Location: Left arm, Patient Position: Sitting, Cuff Size: adult)   Pulse 93   Resp 16   Ht 5' 5\" (1.651 m)   Wt 187 lb (84.8 kg)   LMP 02/14/2019 (Exact Date)   SpO2 95%   BMI 31.12 kg/m²      Constitutional: alert, cooperative. No acute distress.  HEENT: Head NC/AT. Mask in place  Cardio: Regular rate and rhythm. Normal S1 and S2. No murmurs.   Respiratory: Thorax symmetrical with no labored breathing. Clear to ausculation bilaterally with symmetrical breath sounds. No wheezing, rhonchi, rales, or crackles.   GI: NABS. Abd soft, non-tender.  Extremities: No clubbing or cyanosis. No BLE edema.    Neurologic: A&Ox3. No gross motor deficits.  Skin: Warm, dry  Psych: Calm, cooperative. Pleasant affect.    Results:  Personally reviewed    WBC: 6.6, done on 6/10/2024.  HGB: 13.2, done on 6/10/2024.  PLT: 316, done on 6/10/2024.     Glucose: 152, done on 6/10/2024.  Cr: 1.14, done on 6/10/2024.  GFR(AA): 70, done on 4/3/2022.  GFR (non-AA): 61, done on 4/3/2022.  CA: 9.4, done on 6/10/2024.  Na: 138, done on 6/10/2024.  K: 3.6, done on 6/10/2024.  Cl: 104, done on 6/10/2024.  CO2: 25, done on 6/10/2024.  Last ALB was 4% done on 6/10/2024.     MRI SPINE  CERVICAL (W+WO) (CPT=72156)    Result Date: 3/28/2024  CONCLUSION:  Cervical spondylosis, mostly similar to the prior although at the C4-5 level there is greater disc osteophyte complex and disc bulge along the posterior disc margin increasing bilateral foraminal stenosis.  Similar central canal stenosis C5-6.  Similar foraminal stenosis.  No abnormal enhancement.  No fracture or subluxation.   LOCATION:  [facility]   Dictated by (CST): Dat Alvarez MD on 3/28/2024 at 1:48 PM     Finalized by (CST): Dat Alvarez MD on 3/28/2024 at 2:20 PM       CT CHEST+ABDOMEN+PELVIS(ALL CNTRST ONLY)(CPT=71260/17189)    Result Date: 3/27/2024  CONCLUSION:  1. Decrease in size of right perihilar consolidation.  This may be related to treated cancer/treatment changes. 2. No new metastatic findings in the chest. 3. No metastatic findings in the abdomen or pelvis. 4. Details as above.     LOCATION:  [facility]    Dictated by (CST): Fabien Wadsworth MD on 3/27/2024 at 2:56 PM     Finalized by (CST): Fabien Wadsworth MD on 3/27/2024 at 3:11 PM         Assessment/Plan:   #1. asthma exacerbation  Reported hx of asthma but feels symptoms slowly worsening post radiation in 2022  Now with exacerbation likely viral vs weather  8/2023 CPFT values from [facility] provider, essentially normal; hx of asthma  Presently on symbicort + incruse as unable to afford trelegy. However with recent biden/inhaler plan, will assess if able to afford trelegy now  Start pred regimen and zpak  If no better, then will need chest imaging    #2. Hemoptysis, non-lifethreatening  Previously developed late Feb 2023 4/2024 s/p bronchoscopy with BAL shows normal endobronchial exam. No further hemoptysis  Should have ENT eval if returns    #3. bronchiectasis  Noted bronchiectasis on imaging in [facility], suggestive of post radiation changes. However I am not able to view her previous CTs prior to treatment to confirm this developed post radiation treatment in 2022 8/2023 CT with mild  bronchiectasis and chronic cough; recommend to use flutter valve daily, inhalers and nebs as above  Can use guaifenesin as well    #4. Lung cancer  Dx with stage III NSCLC (adenoca) 12/2021  S/p chemoRT 4/2022, durvalumab x 1 yr completed 3/2023 and following with Dr. Sanchez and soon to be Dr. Baker  2/2023 CT chest with stable post treatment changes  8/2023 CT chest as noted above and personally reviewed with Dr Corrigan; no e/o recurrence  11/2023 CT chest with new findings.   12/2023 PET/CT with low uptake in RLL  3/2024 CT chest with decrease in right sided opacities  Following with serial imaging with Dr. Heidi Corrigan MD  6/20/2024

## 2024-07-11 DIAGNOSIS — I10 ESSENTIAL HYPERTENSION: ICD-10-CM

## 2024-07-11 RX ORDER — LISINOPRIL AND HYDROCHLOROTHIAZIDE 20; 12.5 MG/1; MG/1
1 TABLET ORAL DAILY
Qty: 30 TABLET | Refills: 0 | Status: SHIPPED | OUTPATIENT
Start: 2024-07-11

## 2024-07-12 ENCOUNTER — APPOINTMENT (OUTPATIENT)
Dept: CT IMAGING | Facility: HOSPITAL | Age: 54
End: 2024-07-12
Attending: STUDENT IN AN ORGANIZED HEALTH CARE EDUCATION/TRAINING PROGRAM
Payer: MEDICAID

## 2024-07-12 ENCOUNTER — OFFICE VISIT (OUTPATIENT)
Dept: FAMILY MEDICINE CLINIC | Facility: CLINIC | Age: 54
End: 2024-07-12
Payer: MEDICAID

## 2024-07-12 ENCOUNTER — HOSPITAL ENCOUNTER (INPATIENT)
Facility: HOSPITAL | Age: 54
LOS: 1 days | Discharge: HOME | End: 2024-07-13
Attending: STUDENT IN AN ORGANIZED HEALTH CARE EDUCATION/TRAINING PROGRAM | Admitting: INTERNAL MEDICINE
Payer: MEDICAID

## 2024-07-12 VITALS
HEIGHT: 65 IN | RESPIRATION RATE: 18 BRPM | OXYGEN SATURATION: 98 % | WEIGHT: 182 LBS | HEART RATE: 115 BPM | BODY MASS INDEX: 30.32 KG/M2 | DIASTOLIC BLOOD PRESSURE: 80 MMHG | SYSTOLIC BLOOD PRESSURE: 134 MMHG

## 2024-07-12 DIAGNOSIS — R68.89 DECREASED EXERCISE TOLERANCE: ICD-10-CM

## 2024-07-12 DIAGNOSIS — R07.9 EXERTIONAL CHEST PAIN: Primary | ICD-10-CM

## 2024-07-12 DIAGNOSIS — R06.00 DYSPNEA, UNSPECIFIED TYPE: Primary | ICD-10-CM

## 2024-07-12 DIAGNOSIS — R06.09 DOE (DYSPNEA ON EXERTION): ICD-10-CM

## 2024-07-12 DIAGNOSIS — R07.89 CHEST DISCOMFORT: ICD-10-CM

## 2024-07-12 DIAGNOSIS — J84.10 LUNG FIBROSIS (HCC): ICD-10-CM

## 2024-07-12 LAB
ALBUMIN SERPL-MCNC: 4.1 G/DL (ref 3.4–5)
ALBUMIN/GLOB SERPL: 1.3 {RATIO} (ref 1–2)
ALP LIVER SERPL-CCNC: 68 U/L
ALT SERPL-CCNC: 34 U/L
ANION GAP SERPL CALC-SCNC: 8 MMOL/L (ref 0–18)
AST SERPL-CCNC: 20 U/L (ref 15–37)
ATRIAL RATE: 107 BPM
BASOPHILS # BLD AUTO: 0.05 X10(3) UL (ref 0–0.2)
BASOPHILS NFR BLD AUTO: 0.9 %
BILIRUB SERPL-MCNC: 0.3 MG/DL (ref 0.1–2)
BUN BLD-MCNC: 28 MG/DL (ref 9–23)
CALCIUM BLD-MCNC: 9.9 MG/DL (ref 8.5–10.1)
CHLORIDE SERPL-SCNC: 103 MMOL/L (ref 98–112)
CO2 SERPL-SCNC: 25 MMOL/L (ref 21–32)
CREAT BLD-MCNC: 1.06 MG/DL
EGFRCR SERPLBLD CKD-EPI 2021: 63 ML/MIN/1.73M2 (ref 60–?)
EOSINOPHIL # BLD AUTO: 0.16 X10(3) UL (ref 0–0.7)
EOSINOPHIL NFR BLD AUTO: 3 %
ERYTHROCYTE [DISTWIDTH] IN BLOOD BY AUTOMATED COUNT: 12.1 %
GLOBULIN PLAS-MCNC: 3.2 G/DL (ref 2.8–4.4)
GLUCOSE BLD-MCNC: 103 MG/DL (ref 70–99)
HCT VFR BLD AUTO: 32.1 %
HGB BLD-MCNC: 11.8 G/DL
IMM GRANULOCYTES # BLD AUTO: 0.02 X10(3) UL (ref 0–1)
IMM GRANULOCYTES NFR BLD: 0.4 %
LYMPHOCYTES # BLD AUTO: 0.84 X10(3) UL (ref 1–4)
LYMPHOCYTES NFR BLD AUTO: 15.6 %
MAGNESIUM SERPL-MCNC: 1.9 MG/DL (ref 1.6–2.6)
MCH RBC QN AUTO: 31 PG (ref 26–34)
MCHC RBC AUTO-ENTMCNC: 36.8 G/DL (ref 31–37)
MCV RBC AUTO: 84.3 FL
MONOCYTES # BLD AUTO: 0.71 X10(3) UL (ref 0.1–1)
MONOCYTES NFR BLD AUTO: 13.2 %
NEUTROPHILS # BLD AUTO: 3.6 X10 (3) UL (ref 1.5–7.7)
NEUTROPHILS # BLD AUTO: 3.6 X10(3) UL (ref 1.5–7.7)
NEUTROPHILS NFR BLD AUTO: 66.9 %
OSMOLALITY SERPL CALC.SUM OF ELEC: 288 MOSM/KG (ref 275–295)
P AXIS: 70 DEGREES
P-R INTERVAL: 166 MS
PLATELET # BLD AUTO: 258 10(3)UL (ref 150–450)
POTASSIUM SERPL-SCNC: 3.4 MMOL/L (ref 3.5–5.1)
PROCALCITONIN SERPL-MCNC: <0.05 NG/ML (ref ?–0.16)
PROT SERPL-MCNC: 7.3 G/DL (ref 6.4–8.2)
Q-T INTERVAL: 334 MS
QRS DURATION: 72 MS
QTC CALCULATION (BEZET): 445 MS
R AXIS: 65 DEGREES
RBC # BLD AUTO: 3.81 X10(6)UL
SODIUM SERPL-SCNC: 136 MMOL/L (ref 136–145)
T AXIS: 65 DEGREES
TROPONIN I SERPL HS-MCNC: 8 NG/L
TROPONIN I SERPL HS-MCNC: 8 NG/L
VENTRICULAR RATE: 107 BPM
WBC # BLD AUTO: 5.4 X10(3) UL (ref 4–11)

## 2024-07-12 PROCEDURE — 71260 CT THORAX DX C+: CPT | Performed by: STUDENT IN AN ORGANIZED HEALTH CARE EDUCATION/TRAINING PROGRAM

## 2024-07-12 RX ORDER — ENEMA 19; 7 G/133ML; G/133ML
1 ENEMA RECTAL ONCE AS NEEDED
Status: DISCONTINUED | OUTPATIENT
Start: 2024-07-12 | End: 2024-07-13

## 2024-07-12 RX ORDER — ENOXAPARIN SODIUM 100 MG/ML
40 INJECTION SUBCUTANEOUS DAILY
Status: DISCONTINUED | OUTPATIENT
Start: 2024-07-13 | End: 2024-07-13

## 2024-07-12 RX ORDER — MELATONIN
3 NIGHTLY PRN
Status: DISCONTINUED | OUTPATIENT
Start: 2024-07-12 | End: 2024-07-13

## 2024-07-12 RX ORDER — FERROUS SULFATE 325(65) MG
325 TABLET, DELAYED RELEASE (ENTERIC COATED) ORAL
Status: DISCONTINUED | OUTPATIENT
Start: 2024-07-13 | End: 2024-07-13

## 2024-07-12 RX ORDER — IBUPROFEN 800 MG/1
800 TABLET ORAL ONCE
Status: COMPLETED | OUTPATIENT
Start: 2024-07-12 | End: 2024-07-12

## 2024-07-12 RX ORDER — HYDROCODONE BITARTRATE AND ACETAMINOPHEN 5; 325 MG/1; MG/1
1 TABLET ORAL EVERY 6 HOURS PRN
Status: DISCONTINUED | OUTPATIENT
Start: 2024-07-12 | End: 2024-07-13

## 2024-07-12 RX ORDER — BISACODYL 10 MG
10 SUPPOSITORY, RECTAL RECTAL
Status: DISCONTINUED | OUTPATIENT
Start: 2024-07-12 | End: 2024-07-13

## 2024-07-12 RX ORDER — ACETAMINOPHEN 500 MG
500 TABLET ORAL EVERY 4 HOURS PRN
Status: DISCONTINUED | OUTPATIENT
Start: 2024-07-12 | End: 2024-07-13

## 2024-07-12 RX ORDER — CETIRIZINE HYDROCHLORIDE 10 MG/1
10 TABLET ORAL DAILY
Status: DISCONTINUED | OUTPATIENT
Start: 2024-07-13 | End: 2024-07-13

## 2024-07-12 RX ORDER — ONDANSETRON 2 MG/ML
4 INJECTION INTRAMUSCULAR; INTRAVENOUS EVERY 6 HOURS PRN
Status: DISCONTINUED | OUTPATIENT
Start: 2024-07-12 | End: 2024-07-13

## 2024-07-12 RX ORDER — DULOXETIN HYDROCHLORIDE 30 MG/1
60 CAPSULE, DELAYED RELEASE ORAL DAILY
Status: DISCONTINUED | OUTPATIENT
Start: 2024-07-13 | End: 2024-07-13

## 2024-07-12 RX ORDER — MONTELUKAST SODIUM 10 MG/1
10 TABLET ORAL NIGHTLY
Status: DISCONTINUED | OUTPATIENT
Start: 2024-07-13 | End: 2024-07-13

## 2024-07-12 RX ORDER — FLUTICASONE FUROATE AND VILANTEROL 100; 25 UG/1; UG/1
1 POWDER RESPIRATORY (INHALATION) DAILY
Status: DISCONTINUED | OUTPATIENT
Start: 2024-07-12 | End: 2024-07-13

## 2024-07-12 RX ORDER — MAGNESIUM 200 MG
2000 TABLET ORAL 2 TIMES DAILY
COMMUNITY

## 2024-07-12 RX ORDER — PROCHLORPERAZINE EDISYLATE 5 MG/ML
5 INJECTION INTRAMUSCULAR; INTRAVENOUS EVERY 8 HOURS PRN
Status: DISCONTINUED | OUTPATIENT
Start: 2024-07-12 | End: 2024-07-13

## 2024-07-12 RX ORDER — POLYETHYLENE GLYCOL 3350 17 G/17G
17 POWDER, FOR SOLUTION ORAL DAILY PRN
Status: DISCONTINUED | OUTPATIENT
Start: 2024-07-12 | End: 2024-07-13

## 2024-07-12 RX ORDER — ARIPIPRAZOLE 2 MG/1
2 TABLET ORAL DAILY
Status: DISCONTINUED | OUTPATIENT
Start: 2024-07-13 | End: 2024-07-13

## 2024-07-12 RX ORDER — LISINOPRIL AND HYDROCHLOROTHIAZIDE 20; 12.5 MG/1; MG/1
1 TABLET ORAL DAILY
Status: DISCONTINUED | OUTPATIENT
Start: 2024-07-12 | End: 2024-07-12 | Stop reason: SDUPTHER

## 2024-07-12 RX ORDER — SEMAGLUTIDE 1.34 MG/ML
INJECTION, SOLUTION SUBCUTANEOUS WEEKLY
COMMUNITY

## 2024-07-12 RX ORDER — SENNOSIDES 8.6 MG
17.2 TABLET ORAL NIGHTLY PRN
Status: DISCONTINUED | OUTPATIENT
Start: 2024-07-12 | End: 2024-07-13

## 2024-07-12 NOTE — ED QUICK NOTES
PT RETURNED FROM CT WITH THIS RN PLACED BACK ON MONITOR, SONS ARE AT BEDSIDE, SIDE RAILS IN PLACE, CALL LIGHT WITHIN REACH

## 2024-07-12 NOTE — ED PROVIDER NOTES
History     Chief Complaint   Patient presents with    Difficulty Breathing    Abnormal Result       HPI    54 year old female sent in by primary care physician for evaluation of dyspnea.  For about 6 weeks now patient has been having progressively worsening dyspnea worse with exertion now with chest pain, left arm pain, sweating and palpitations.  She states she can only take a couple steps now without feeling winded which is not typical for her.  She has a history of lung adenocarcinoma status post chemoradiation, prior longtime smoker and asthmatic.  She saw her pulmonologist at the end of June who prescribed a course of steroids and antibiotics which did not help her symptoms.  Has an occasional cough, no fevers.          Past Medical History:    Abnormal uterine bleeding    bleeds every 2 weeks    Anxiety state    Asthma (HCC)    Attention deficit hyperactivity disorder (ADHD)    BACK PAIN    Back problem    lumbar radiculopathy    Cancer (HCC)    adenocarcinoma of right lung    MARCIO II (cervical intraepithelial neoplasia II)    DDD (degenerative disc disease), lumbar    DDD (degenerative disc disease), thoracic    Depression    Disorder of thyroid    Dyspareunia    Exposure to medical diagnostic radiation    On hold since 4/2022    Fall    Fibromyalgia    Fibromyalgia    Genital warts    High blood pressure    History of COVID-19    COVID + 7/2020 Headache - NO Hospitalization needed     History of lumbar fusion    Hx of motion sickness    IBS (irritable bowel syndrome)    Per patient - Just constipation    Infertility, female    Lumbar radiculopathy    Mild dysplasia of cervix    Pap smear for cervical cancer screening    pt states normal    Papanicolaou smear of cervix with high grade squamous intraepithelial lesion (HGSIL)    Personal history of antineoplastic chemotherapy    Last chemo 7/12/22 prior to lung bx 8/4/22    Post covid-19 condition, unspecified    symptoms: HA; s/s resolved-yes; not  hospitalized    Problems with swallowing    with radiation    Sexually transmitted disease    HPV    Substance abuse (HCC)    cocaine    Urinary incontinence    Visual impairment    glasses/contacts bifocals       Past Surgical History:   Procedure Laterality Date    Appendectomy  1980    Back surgery  2009    fusion L5-S1    Back surgery  2021    RIGHT LUMBAR 3/ LUMBAR 4, LUMBAR 4/ LUMBAR 5 HEMILAMINTOMIES, LEFT LUMBAR 2 / LUMBAR 3 MICRODISCECTOMY    Colonoscopy N/A 2023    Procedure: COLONOSCOPY;  Surgeon: Devante Kern MD;  Location:  ENDOSCOPY    Colposcopy,bx cervix/endocerv curr  11    MARCIO 1    Laparoscopy procedure unlisted      Ovarian cyst removed    Leep  2011    MARCIO 2          X2    Other surgical history      bunions bilateral    Other surgical history      nodule lymph nodes neck    Other surgical history       Bladder sling    Other surgical history  2020    Cystoscopy, Dr Beach       Social History     Socioeconomic History    Marital status:    Tobacco Use    Smoking status: Former     Current packs/day: 0.00     Average packs/day: 0.5 packs/day for 20.0 years (10.0 ttl pk-yrs)     Types: Cigarettes     Start date: 1991     Quit date: 2011     Years since quittin.1     Passive exposure: Past    Smokeless tobacco: Former     Quit date: 2024    Tobacco comments:     Has not vaped since 2024   Vaping Use    Vaping status: Every Day    Substances: Nicotine, daily    Devices: Pre-filled pod   Substance and Sexual Activity    Alcohol use: Yes     Alcohol/week: 5.0 - 6.0 standard drinks of alcohol     Types: 3 Glasses of wine, 2 - 3 Standard drinks or equivalent per week     Comment: glass of wine each night    Drug use: Not Currently     Types: Cocaine     Comment: USED COCAINE BETW [id number]    Sexual activity: Yes     Partners: Male   Other Topics Concern    Caffeine Concern Yes     Comment: 3-4 daily of pop    Exercise No      Comment: not tolerated right now                   Physical Exam     ED Triage Vitals   BP 07/12/24 1613 112/72   Pulse 07/12/24 1613 104   Resp 07/12/24 1613 22   Temp 07/12/24 1613 97.5 °F (36.4 °C)   Temp src 07/12/24 1613 Temporal   SpO2 07/12/24 1613 97 %   O2 Device 07/12/24 1659 None (Room air)       Physical Exam  Constitutional:       General: She is not in acute distress.  Eyes:      Extraocular Movements: Extraocular movements intact.   Cardiovascular:      Rate and Rhythm: Normal rate and regular rhythm.      Pulses: Normal pulses.   Pulmonary:      Effort: Pulmonary effort is normal. No respiratory distress.      Breath sounds: Normal breath sounds.   Musculoskeletal:         General: No swelling or deformity.      Cervical back: Normal range of motion.   Skin:     General: Skin is warm and dry.   Neurological:      General: No focal deficit present.      Mental Status: She is alert.              ED Course     Labs Reviewed   COMP METABOLIC PANEL (14) - Abnormal; Notable for the following components:       Result Value    Glucose 103 (*)     Potassium 3.4 (*)     BUN 28 (*)     Creatinine 1.06 (*)     All other components within normal limits   CBC W/ DIFFERENTIAL - Abnormal; Notable for the following components:    HGB 11.8 (*)     HCT 32.1 (*)     Lymphocyte Absolute 0.84 (*)     All other components within normal limits   TROPONIN I HIGH SENSITIVITY - Normal   MAGNESIUM - Normal   PROCALCITONIN - Normal    Narrative:     Resulted by: batch: TROP, K, CO2, CL, NA, MG, ALB, TBIL, ALT, AST, ALP, CA, CREA, BUN, GLUC,    TROPONIN I HIGH SENSITIVITY - Normal   CBC WITH DIFFERENTIAL WITH PLATELET    Narrative:     The following orders were created for panel order CBC With Differential With Platelet.  Procedure                               Abnormality         Status                     ---------                               -----------         ------                     CBC W/ DIFFERENTIAL[[id number]]           Abnormal            Final result                 Please view results for these tests on the individual orders.   RAINBOW DRAW LAVENDER   RAINBOW DRAW LIGHT GREEN   RAINBOW DRAW BLUE     CT CHEST PE AORTA (IV ONLY) (CPT=71260)    Result Date: 7/12/2024  CONCLUSION:   1. Negative for pulmonary embolism.  2. Treatment related paramediastinal atelectasis/fibrosis with enlarging consolidative opacity of the superior segment right lower lobe (compared to 03/27/2024).  This could represent increasing treatment related fibrosis versus superimposed pneumonia or  aspiration.  Correlate with clinical evidence of active pulmonary infection.    LOCATION:  [id number]   Dictated by (CST): Umm Hodges MD on 7/12/2024 at 6:58 PM     Finalized by (CST): Umm Hodges MD on 7/12/2024 at 7:07 PM            MDM     Vitals:    07/12/24 1613 07/12/24 1830 07/12/24 1915 07/12/24 2000   BP: 112/72 105/70 113/71 108/80   Pulse: 104 95 99 103   Resp: 22 22 11 23   Temp: 97.5 °F (36.4 °C)      TempSrc: Temporal      SpO2: 97% 100% 91% 99%   Weight: 81.2 kg      Height: 165.1 cm (5' 5\")          Exertional dyspnea and chest pain.  No prior cardiac disease.  Blood pressure here is reassuring.  Lungs are clear, no evidence of bronchospasm.  PE is on the differential as well as ACS versus metabolic.    ED Course as of 07/12/24 2044  ------------------------------------------------------------  Time: 07/12 1640  Comment: EKG interpretation by me: EKG sinus rhythm at a rate of 97, axis nomal, no concerning acute ischemic ST changes, low voltage appears similar to prior EKGs    ------------------------------------------------------------  Time: 07/12 1910  Comment: My interpretation of CT without large central PE.  Radiology is noting increased opacity consistent with worsening fibrosis versus developing infiltrate.  Patient has no cough, fever, leukocytosis to suggest infection  ------------------------------------------------------------  Time:  07/12 1927  Comment: Discussed with cardiology, admitted for further care.         Disposition and Plan     Clinical Impression:  1. Exertional chest pain    2. Decreased exercise tolerance    3. Lung fibrosis (HCC)        Disposition:  Admit    Follow-up:  No follow-up provider specified.    Medications Prescribed:  Current Discharge Medication List          Hospital Problems       Present on Admission  Date Reviewed: 7/12/2024            ICD-10-CM Noted POA    * (Principal) Exertional chest pain R07.9 7/12/2024 Unknown

## 2024-07-12 NOTE — PROGRESS NOTES
[facility] Progress Note    SUBJECTIVE: Hanna Berenice Barrett 54 year old female is here today for   Chief Complaint   Patient presents with    Shortness Of Breath     Hx of lung cancer, Pt can't waLlk 4 or 5 steps without losing breathing    Irregular Heart Beat     Left arm pain       Is feeling very short of breath in the last 3 months, breathing has worsened, saw pulm and me, BP was high. Better now.    Seems like breathing is worsening daily. The other day going up 4 stairs was challenging. Had some discomfort in left arm    Supposed to be traveling in the next few days.    If bends her arm will feel like her veinis being pinched, and has to snap it back open    Struggles to go into office.    PMH  Past Medical History:    Abnormal uterine bleeding    bleeds every 2 weeks    Anxiety state    Asthma (HCC)    Attention deficit hyperactivity disorder (ADHD)    BACK PAIN    Back problem    lumbar radiculopathy    Cancer (HCC)    adenocarcinoma of right lung    MARCIO II (cervical intraepithelial neoplasia II)    DDD (degenerative disc disease), lumbar    DDD (degenerative disc disease), thoracic    Depression    Disorder of thyroid    Dyspareunia    Exposure to medical diagnostic radiation    On hold since 4/2022    Fall    Fibromyalgia    Fibromyalgia    Genital warts    High blood pressure    History of COVID-19    COVID + 7/2020 Headache - NO Hospitalization needed     History of lumbar fusion    Hx of motion sickness    IBS (irritable bowel syndrome)    Per patient - Just constipation    Infertility, female    Lumbar radiculopathy    Mild dysplasia of cervix    Pap smear for cervical cancer screening    pt states normal    Papanicolaou smear of cervix with high grade squamous intraepithelial lesion (HGSIL)    Personal history of antineoplastic chemotherapy    Last chemo 7/12/22 prior to lung bx 8/4/22    Post covid-19 condition, unspecified    symptoms: HA; s/s resolved-yes; not hospitalized    Problems with  swallowing    with radiation    Sexually transmitted disease    HPV    Substance abuse (HCC)    cocaine    Urinary incontinence    Visual impairment    glasses/contacts bifocals        PSH  Past Surgical History:   Procedure Laterality Date    Appendectomy  1980    Back surgery      fusion L5-S1    Back surgery  2021    RIGHT LUMBAR 3/ LUMBAR 4, LUMBAR 4/ LUMBAR 5 HEMILAMINTOMIES, LEFT LUMBAR 2 / LUMBAR 3 MICRODISCECTOMY    Colonoscopy N/A 2023    Procedure: COLONOSCOPY;  Surgeon: Devante Kern MD;  Location:  ENDOSCOPY    Colposcopy,bx cervix/endocerv curr  11    MARCIO 1    Laparoscopy procedure unlisted      Ovarian cyst removed    Leep  2011    MARCIO 2          X2    Other surgical history      bunions bilateral    Other surgical history      nodule lymph nodes neck    Other surgical history       Bladder sling    Other surgical history  2020    Cystoscopy, Dr Beach        Social Hx:  Travel soon    ROS  See HPI    OBJECTIVE:  /80   Pulse 115   Resp 18   Ht 5' 5\" (1.651 m)   Wt 182 lb (82.6 kg)   LMP 2019 (Exact Date)   SpO2 98%   BMI 30.29 kg/m²           Labs:          Meds:   Current Outpatient Medications   Medication Sig Dispense Refill    semaglutide (OZEMPIC, 0.25 OR 0.5 MG/DOSE,) 2 MG/1.5ML Subcutaneous Solution Pen-injector Inject into the skin once a week.      LISINOPRIL-HYDROCHLOROTHIAZIDE 20-12.5 MG Oral Tab TAKE ONE TABLET BY MOUTH ONCE DAILY 30 tablet 0    montelukast 10 MG Oral Tab Take 1 tablet (10 mg total) by mouth nightly. 90 tablet 3    albuterol 108 (90 Base) MCG/ACT Inhalation Aero Soln Inhale 2 puffs into the lungs every 6 (six) hours as needed for Wheezing or Shortness of Breath. 3 each 3    amphetamine-dextroamphetamine 15 MG Oral Tab TAKE ONE TABLET BY MOUTH ONCE DAILY 8 TO 9 HOURS AFTER VYVANSE DOSE      ARIPiprazole 2 MG Oral Tab Take one (1) tablet by mouth every morning      VYVANSE 70 MG Oral Cap       SYMBICORT  160-4.5 MCG/ACT Inhalation Aerosol Inhale 2 puffs into the lungs 2 (two) times daily.      HYDROcodone-acetaminophen (NORCO) 5-325 MG Oral Tab Take 1 tablet by mouth every 6 (six) hours as needed for Pain. 20 tablet 0    cetirizine 10 MG Oral Tab Take 1 tablet (10 mg total) by mouth daily. 90 tablet 1    ferrous sulfate 325 (65 FE) MG Oral Tab EC Take 1 tablet (325 mg total) by mouth daily with breakfast.      umeclidinium bromide (INCRUSE ELLIPTA) 62.5 MCG/ACT Inhalation Aerosol Powder, Breath Activated Inhale 1 puff into the lungs daily. 3 each 3    Multiple Vitamin Oral Tab Take 1 tablet by mouth daily.      ibuprofen 200 MG Oral Tab Take 1 tablet (200 mg total) by mouth every 6 (six) hours as needed for Pain.      Vitamin D3, Cholecalciferol, 125 MCG (5000 UT) Oral Cap Take 1 capsule (5,000 Units total) by mouth daily.  0    DULoxetine HCl 60 MG Oral Cap DR Particles Take 1 capsule (60 mg total) by mouth daily.  2         Assessment/Plan  Hanna was seen today for shortness of breath and irregular heart beat.    Diagnoses and all orders for this visit:    Dyspnea, unspecified type  -     EKG with interpretation and Report -IN OFFICE [[id number]]    Chest discomfort  -     EKG with interpretation and Report -IN OFFICE [[id number]]         EKG changes, worsening function, severity is such that recommend ER visit to evaluate quickly         Total Time spent with patient and coordinating care:  20 minutes.    Follow up: as needed      Guillermo Curry MD

## 2024-07-12 NOTE — ED INITIAL ASSESSMENT (HPI)
Pt to ED c/o shortness of breath with exertion for weeks that is worsening over last few days, +left arm pain, sent from [facility],  pt with hx of lung cancer

## 2024-07-13 ENCOUNTER — APPOINTMENT (OUTPATIENT)
Dept: CV DIAGNOSTICS | Facility: HOSPITAL | Age: 54
End: 2024-07-13
Attending: STUDENT IN AN ORGANIZED HEALTH CARE EDUCATION/TRAINING PROGRAM
Payer: MEDICAID

## 2024-07-13 ENCOUNTER — APPOINTMENT (OUTPATIENT)
Dept: CV DIAGNOSTICS | Facility: HOSPITAL | Age: 54
End: 2024-07-13
Attending: HOSPITALIST
Payer: MEDICAID

## 2024-07-13 VITALS
HEIGHT: 65 IN | DIASTOLIC BLOOD PRESSURE: 69 MMHG | BODY MASS INDEX: 30.42 KG/M2 | WEIGHT: 182.56 LBS | RESPIRATION RATE: 14 BRPM | TEMPERATURE: 98 F | OXYGEN SATURATION: 100 % | SYSTOLIC BLOOD PRESSURE: 92 MMHG | HEART RATE: 113 BPM

## 2024-07-13 LAB
ANION GAP SERPL CALC-SCNC: 4 MMOL/L (ref 0–18)
ATRIAL RATE: 95 BPM
BASOPHILS # BLD AUTO: 0.05 X10(3) UL (ref 0–0.2)
BASOPHILS NFR BLD AUTO: 1 %
BUN BLD-MCNC: 24 MG/DL (ref 9–23)
CALCIUM BLD-MCNC: 9.6 MG/DL (ref 8.5–10.1)
CHLORIDE SERPL-SCNC: 107 MMOL/L (ref 98–112)
CHOLEST SERPL-MCNC: 147 MG/DL (ref ?–200)
CO2 SERPL-SCNC: 28 MMOL/L (ref 21–32)
CREAT BLD-MCNC: 1.03 MG/DL
EGFRCR SERPLBLD CKD-EPI 2021: 65 ML/MIN/1.73M2 (ref 60–?)
EOSINOPHIL # BLD AUTO: 0.26 X10(3) UL (ref 0–0.7)
EOSINOPHIL NFR BLD AUTO: 5.3 %
ERYTHROCYTE [DISTWIDTH] IN BLOOD BY AUTOMATED COUNT: 12.1 %
GLUCOSE BLD-MCNC: 93 MG/DL (ref 70–99)
HCT VFR BLD AUTO: 34.4 %
HDLC SERPL-MCNC: 47 MG/DL (ref 40–59)
HGB BLD-MCNC: 12.1 G/DL
IMM GRANULOCYTES # BLD AUTO: 0.02 X10(3) UL (ref 0–1)
IMM GRANULOCYTES NFR BLD: 0.4 %
LDLC SERPL CALC-MCNC: 69 MG/DL (ref ?–100)
LYMPHOCYTES # BLD AUTO: 0.84 X10(3) UL (ref 1–4)
LYMPHOCYTES NFR BLD AUTO: 17.3 %
MCH RBC QN AUTO: 30.7 PG (ref 26–34)
MCHC RBC AUTO-ENTMCNC: 35.2 G/DL (ref 31–37)
MCV RBC AUTO: 87.3 FL
MONOCYTES # BLD AUTO: 0.56 X10(3) UL (ref 0.1–1)
MONOCYTES NFR BLD AUTO: 11.5 %
NEUTROPHILS # BLD AUTO: 3.13 X10 (3) UL (ref 1.5–7.7)
NEUTROPHILS # BLD AUTO: 3.13 X10(3) UL (ref 1.5–7.7)
NEUTROPHILS NFR BLD AUTO: 64.5 %
NONHDLC SERPL-MCNC: 100 MG/DL (ref ?–130)
OSMOLALITY SERPL CALC.SUM OF ELEC: 292 MOSM/KG (ref 275–295)
P AXIS: 76 DEGREES
P-R INTERVAL: 168 MS
PLATELET # BLD AUTO: 263 10(3)UL (ref 150–450)
POTASSIUM SERPL-SCNC: 3.7 MMOL/L (ref 3.5–5.1)
Q-T INTERVAL: 370 MS
QRS DURATION: 72 MS
QTC CALCULATION (BEZET): 464 MS
R AXIS: 72 DEGREES
RBC # BLD AUTO: 3.94 X10(6)UL
SODIUM SERPL-SCNC: 139 MMOL/L (ref 136–145)
T AXIS: 65 DEGREES
TRIGL SERPL-MCNC: 186 MG/DL (ref 30–149)
TROPONIN I SERPL HS-MCNC: 7 NG/L
VENTRICULAR RATE: 95 BPM
VLDLC SERPL CALC-MCNC: 28 MG/DL (ref 0–30)
WBC # BLD AUTO: 4.9 X10(3) UL (ref 4–11)

## 2024-07-13 PROCEDURE — 78452 HT MUSCLE IMAGE SPECT MULT: CPT | Performed by: STUDENT IN AN ORGANIZED HEALTH CARE EDUCATION/TRAINING PROGRAM

## 2024-07-13 PROCEDURE — 93306 TTE W/DOPPLER COMPLETE: CPT | Performed by: HOSPITALIST

## 2024-07-13 PROCEDURE — 99223 1ST HOSP IP/OBS HIGH 75: CPT | Performed by: STUDENT IN AN ORGANIZED HEALTH CARE EDUCATION/TRAINING PROGRAM

## 2024-07-13 PROCEDURE — 93017 CV STRESS TEST TRACING ONLY: CPT | Performed by: STUDENT IN AN ORGANIZED HEALTH CARE EDUCATION/TRAINING PROGRAM

## 2024-07-13 PROCEDURE — 93018 CV STRESS TEST I&R ONLY: CPT | Performed by: STUDENT IN AN ORGANIZED HEALTH CARE EDUCATION/TRAINING PROGRAM

## 2024-07-13 RX ORDER — MORPHINE SULFATE 2 MG/ML
2 INJECTION, SOLUTION INTRAMUSCULAR; INTRAVENOUS EVERY 2 HOUR PRN
Status: DISCONTINUED | OUTPATIENT
Start: 2024-07-13 | End: 2024-07-13

## 2024-07-13 RX ORDER — NITROGLYCERIN 0.4 MG/1
0.4 TABLET SUBLINGUAL EVERY 5 MIN PRN
Status: DISCONTINUED | OUTPATIENT
Start: 2024-07-13 | End: 2024-07-13

## 2024-07-13 RX ORDER — POTASSIUM CHLORIDE 20 MEQ/1
40 TABLET, EXTENDED RELEASE ORAL ONCE
Status: COMPLETED | OUTPATIENT
Start: 2024-07-13 | End: 2024-07-13

## 2024-07-13 RX ORDER — MORPHINE SULFATE 2 MG/ML
1 INJECTION, SOLUTION INTRAMUSCULAR; INTRAVENOUS EVERY 2 HOUR PRN
Status: DISCONTINUED | OUTPATIENT
Start: 2024-07-13 | End: 2024-07-13

## 2024-07-13 RX ORDER — MORPHINE SULFATE 4 MG/ML
4 INJECTION, SOLUTION INTRAMUSCULAR; INTRAVENOUS EVERY 2 HOUR PRN
Status: DISCONTINUED | OUTPATIENT
Start: 2024-07-13 | End: 2024-07-13

## 2024-07-13 NOTE — PLAN OF CARE
Nuc exercise stress test showed normal perfusion study, EF 92%.      BLAKE Quinn  7/13/2024  16:39

## 2024-07-13 NOTE — CONSULTS
Cardiology Consultation      Hanna Barrett Patient Status:  Inpatient    1970 MRN [id number]   Location [facility] 8NE-A Attending Gayla Linder MD   Hosp Day # 1 PCP Abbie Lai DO     Reason for Consultation:  Dyspnea    History of Present Illness:  Hanna Barrett is a(n) 54 year old female with chronic medical conditions including hx adenocarcinoma of the lung s/p chemo/radiation, asthma, HLD  who presents with worsening shortness of breath with exertion. Worsening over the past 10 weeks to the point that even going up the stairs minimally triggers severe shortness of breath as well as palpitations. Denies chest pain, fevers, chills, cough, nausea, emesis, syncope. Notes that she not feel like she is wheezing during these episodes. Notes tobacco vaping use.  Denies swelling, weight gain. No other complaints. Planning travel to [place] Tuesday. Cardiology asked to evaluate.     History:  Past Medical History:    Abnormal uterine bleeding    bleeds every 2 weeks    Anxiety state    Asthma (HCC)    Attention deficit hyperactivity disorder (ADHD)    BACK PAIN    Back problem    lumbar radiculopathy    Cancer (HCC)    adenocarcinoma of right lung    MARCIO II (cervical intraepithelial neoplasia II)    DDD (degenerative disc disease), lumbar    DDD (degenerative disc disease), thoracic    Depression    Disorder of thyroid    Dyspareunia    Exposure to medical diagnostic radiation    On hold since 2022    Fall    Fibromyalgia    Fibromyalgia    Genital warts    High blood pressure    History of COVID-19    COVID + 2020 Headache - NO Hospitalization needed     History of lumbar fusion    Hx of motion sickness    IBS (irritable bowel syndrome)    Per patient - Just constipation    Infertility, female    Lumbar radiculopathy    Mild dysplasia of cervix    Pap smear for cervical cancer screening    pt states normal    Papanicolaou smear of cervix with high grade squamous intraepithelial lesion  (HGSIL)    Personal history of antineoplastic chemotherapy    Last chemo 22 prior to lung bx 22    Post covid-19 condition, unspecified    symptoms: HA; s/s resolved-yes; not hospitalized    Problems with swallowing    with radiation    Sexually transmitted disease    HPV    Substance abuse (HCC)    cocaine    Urinary incontinence    Visual impairment    glasses/contacts bifocals     Past Surgical History:   Procedure Laterality Date    Appendectomy  1980    Back surgery  2009    fusion L5-S1    Back surgery  2021    RIGHT LUMBAR 3/ LUMBAR 4, LUMBAR 4/ LUMBAR 5 HEMILAMINTOMIES, LEFT LUMBAR 2 / LUMBAR 3 MICRODISCECTOMY    Colonoscopy N/A 2023    Procedure: COLONOSCOPY;  Surgeon: Devante Kern MD;  Location:  ENDOSCOPY    Colposcopy,bx cervix/endocerv curr  11    MARCIO 1    Laparoscopy procedure unlisted      Ovarian cyst removed    Leep  2011    MARCIO 2          X2    Other surgical history      bunions bilateral    Other surgical history      nodule lymph nodes neck    Other surgical history  2016     Bladder sling    Other surgical history  2020    Cystoscopy, Dr Beach     Family History   Problem Relation Age of Onset    Other (lung CA) Self     Hypertension Mother     Diabetes Mother     Heart Disease Mother     Heart Disease Father     Other (lung cancer) Father 55        Lung Cancer    Other (pancreatic cancer) Sister 47        Pancreatic Cancer    Cancer Sister 51        lung CA    Other (Other) Sister         Back problems    Other (Other) Son         anxiety    Other (Other) Son         behavioral problems    Diabetes Maternal Grandmother     Breast Cancer Maternal Grandmother         dx late-60s    Stroke Maternal Grandfather 86    Dementia Paternal Grandmother     Heart Disorder Paternal Grandfather     Cancer Maternal Aunt 50        LUNG CA 51    Breast Cancer Maternal Aunt         dx 46-47y    Ovarian Cancer Maternal Cousin Female 33         of  ovarian ca at 35y      reports that she quit smoking about 13 years ago. Her smoking use included cigarettes. She started smoking about 33 years ago. She has a 10 pack-year smoking history. She has been exposed to tobacco smoke. She quit smokeless tobacco use about 6 weeks ago. She reports current alcohol use of about 5.0 - 6.0 standard drinks of alcohol per week. She reports that she does not currently use drugs after having used the following drugs: Cocaine.    Allergies:  Allergies   Allergen Reactions    Gabapentin SWELLING and UNKNOWN    Erythromycin UNKNOWN    Clindamycin RASH       Medications:    Current Facility-Administered Medications:     nitroglycerin (Nitrostat) SL tab 0.4 mg, 0.4 mg, Sublingual, Q5 Min PRN    morphINE PF 2 MG/ML injection 1 mg, 1 mg, Intravenous, Q2H PRN **OR** morphINE PF 2 MG/ML injection 2 mg, 2 mg, Intravenous, Q2H PRN **OR** morphINE PF 4 MG/ML injection 4 mg, 4 mg, Intravenous, Q2H PRN    enoxaparin (Lovenox) 40 MG/0.4ML SUBQ injection 40 mg, 40 mg, Subcutaneous, Daily    acetaminophen (Tylenol Extra Strength) tab 500 mg, 500 mg, Oral, Q4H PRN    melatonin tab 3 mg, 3 mg, Oral, Nightly PRN    polyethylene glycol (PEG 3350) (Miralax) 17 g oral packet 17 g, 17 g, Oral, Daily PRN    sennosides (Senokot) tab 17.2 mg, 17.2 mg, Oral, Nightly PRN    bisacodyl (Dulcolax) 10 MG rectal suppository 10 mg, 10 mg, Rectal, Daily PRN    fleet enema (Fleet) 7-19 GM/118ML rectal enema 133 mL, 1 enema, Rectal, Once PRN    ondansetron (Zofran) 4 MG/2ML injection 4 mg, 4 mg, Intravenous, Q6H PRN    prochlorperazine (Compazine) 10 MG/2ML injection 5 mg, 5 mg, Intravenous, Q8H PRN    amphetamine-dextroamphetamine (Adderall) tab 15 mg, 15 mg, Oral, Daily    ARIPiprazole (Abilify) tab 2 mg, 2 mg, Oral, Daily    cetirizine (ZyrTEC) tab 10 mg, 10 mg, Oral, Daily    DULoxetine (Cymbalta) DR cap 60 mg, 60 mg, Oral, Daily    ferrous sulfate DR tab 325 mg, 325 mg, Oral, Daily with breakfast     HYDROcodone-acetaminophen (Norco) 5-325 MG per tab 1 tablet, 1 tablet, Oral, Q6H PRN    montelukast (Singulair) tab 10 mg, 10 mg, Oral, Nightly    fluticasone furoate-vilanterol (Breo Ellipta) 100-25 MCG/ACT inhaler 1 puff, 1 puff, Inhalation, Daily    umeclidinium bromide (Incruse Ellipta) 62.5 MCG/ACT inhaler 1 puff, 1 puff, Inhalation, Daily    lisinopril (Prinivil; Zestril) 20 mg, hydroCHLOROthiazide 12.5 mg for Zestoretic 20-12.5 (EEH only), , Oral, Daily    Review of Systems:  A comprehensive review of systems was negative if not otherwise mention in above HPI.    /62 (BP Location: Right arm)   Pulse 89   Temp 97.9 °F (36.6 °C) (Oral)   Resp 11   Ht 5' 5\" (1.651 m)   Wt 182 lb 8.7 oz (82.8 kg)   LMP 2019 (Exact Date)   SpO2 100%   BMI 30.38 kg/m²   Temp (24hrs), Av °F (36.7 °C), Min:97.5 °F (36.4 °C), Max:98.5 °F (36.9 °C)       Intake/Output Summary (Last 24 hours) at 2024 0757  Last data filed at 2024 0304  Gross per 24 hour   Intake --   Output 2 ml   Net -2 ml     Wt Readings from Last 3 Encounters:   24 182 lb 8.7 oz (82.8 kg)   24 182 lb (82.6 kg)   24 187 lb (84.8 kg)       Physical Exam:   General: Alert and oriented x 3. No apparent distress. No respiratory or constitutional distress.  HEENT: Normocephalic, anicteric sclera, neck supple.  Neck: No JVD  Cardiac: Regular rate and rhythm. S1, S2 normal. No murmur, pericardial rub, S3.  Lungs: Clear without wheezes, rales, rhonchi or dullness.  Normal excursions and effort.  Abdomen: Soft, non-tender. BS-present.  Extremities: Without clubbing, cyanosis or edema.    Neurologic: Alert and oriented, normal affect.  Skin: Warm and dry.     Laboratory Data:  Lab Results   Component Value Date    WBC 4.9 2024    HGB 12.1 2024    HCT 34.4 2024    .0 2024    CREATSERUM 1.03 2024    BUN 24 2024     2024    K 3.7 2024     2024    CO2 28.0  07/13/2024    GLU 93 07/13/2024    CA 9.6 07/13/2024    ALB 4.1 07/12/2024    ALKPHO 68 07/12/2024    BILT 0.3 07/12/2024    TP 7.3 07/12/2024    AST 20 07/12/2024    ALT 34 07/12/2024    MG 1.9 07/12/2024       Imaging/results:  CTA chest - No PE. Atelectasis/fibrosis w/ enlarging consolidation superior right lower lobe - tx related?   High-sensitivity troponin 8, 8, 7   EKG - sinus, no acute signs of ischemia      Assessment:  Dyspnea on exertion   Palpitations   Stage 3 Adenocarcinoma - lung cancer s/p chemo/radiation  HLD   HTN  Asthma   Former smoker       Plan:  Obtain EKG, ECHO, exercise nuclear MPI   R/o infection/pna given ct findings, per primary  MCT outpatient if workup unremarkable   Recommended cessation of vaping         Thank you for allowing me to participate in the care of your patient.      Kvng Lopez DO  Cardiologist  [facility]  7/13/2024 7:57 AM      Note to the patient: The 21st Century Cures Act makes medical notes like these available to patients in the interest of transparency. However, be advised that this is a medical document. It is intended as peer to peer communication. It is written in medical language and may contain abbreviations or verbiage that are unfamiliar. It may appear blunt or direct. Medical documents are intended to carry relevant information, facts as evident, and clinical opinion of the practitioner.     Disclaimer: Components of this note were documented using voice recognition system and are subject to errors not corrected at proofreading. Contact the author of this note for any clarifications.

## 2024-07-13 NOTE — PLAN OF CARE
Received pt from ED at 2220 7/12  Pt is A/OX4, up w/ standby, steady gait  Room air and maintaining adequate o2 sats. Denies shortness of breath at this time.   NSR on tele. No complaints of chest pain. Pt endorsed slight leg pain, declined pain meds.   Oriented to room. Safety measures reviewed.   Bed locked and in lowest position. Call light in reach. Pt updated on plan of care.    Problem: Patient/Family Goals  Goal: Patient/Family Long Term Goal  Description: Patient's Long Term Goal: \"go home\"     Interventions:  - medications as ordered by physician   - testing as ordered by physician   - See additional Care Plan goals for specific interventions  Outcome: Progressing  Goal: Patient/Family Short Term Goal  Description: Patient's Short Term Goal: \"breathe better\"     Interventions:   - medications as ordered by physician   - testing as ordered by physician   - see consults  - See additional Care Plan goals for specific interventions  Outcome: Progressing

## 2024-07-13 NOTE — ED QUICK NOTES
Rounding Completed.    Plan of Care reviewed. Waiting for Rm. 8607 to be clean and ready.  Elimination needs assessed. Pt ambulated to the washroom with steady gait noted. Returned to A2 without any untoward incident. Pt requesting for Ibuprofen 800 mg for leg pain. Dr Camacho made aware.  Provided information regarding bed assignment.    Bed is locked and in lowest position. Call light within reach.

## 2024-07-13 NOTE — ED QUICK NOTES
Orders for admission, patient is aware of plan and ready to go upstairs. Any questions, please call ED RN Gaby at extension [phone].     Patient Covid vaccination status: Fully vaccinated     COVID Test Ordered in ED: None    COVID Suspicion at Admission: N/A    Running Infusions:  None    Mental Status/LOC at time of transport: A/A/O x 4    Other pertinent information: Turkey sandwich given. 2nd Trop sent  CIWA score: N/A   NIH score:  N/A

## 2024-07-13 NOTE — H&P
[facility]     Hanna Barrett Patient Status:  Inpatient    1970 MRN [id number]   Location [facility] 8NE-A Attending Zoey David MD   Hosp Day # 1 PCP Abbie Lai DO     Chief Complaint: dyspnea    Subjective:    History of Present Illness:     Hanna Barrett is a 54 year old female with PMHx lung adenocarcinoma (s/p chemoradiation)/ asthma/ HLD who presented to the hospital for dyspnea. She reports exertional dyspnea for the past 6-8 weeks that has been getting progressively worse. She denied any cough, fever, chills. She does get palpitations and chest tightness when the dyspnea is severe. Her dyspnea was so severe she could not perform most of her daily activities prompting her to seek medical eval.    History/Other:    Past Medical History:  Past Medical History:    Abnormal uterine bleeding    bleeds every 2 weeks    Anxiety state    Asthma (HCC)    Attention deficit hyperactivity disorder (ADHD)    BACK PAIN    Back problem    lumbar radiculopathy    Cancer (HCC)    adenocarcinoma of right lung    MARCIO II (cervical intraepithelial neoplasia II)    DDD (degenerative disc disease), lumbar    DDD (degenerative disc disease), thoracic    Depression    Disorder of thyroid    Dyspareunia    Exposure to medical diagnostic radiation    On hold since 2022    Fall    Fibromyalgia    Fibromyalgia    Genital warts    High blood pressure    History of COVID-19    COVID + 2020 Headache - NO Hospitalization needed     History of lumbar fusion    Hx of motion sickness    IBS (irritable bowel syndrome)    Per patient - Just constipation    Infertility, female    Lumbar radiculopathy    Mild dysplasia of cervix    Pap smear for cervical cancer screening    pt states normal    Papanicolaou smear of cervix with high grade squamous intraepithelial lesion (HGSIL)    Personal history of antineoplastic chemotherapy    Last chemo 22 prior to lung bx 22    Post covid-19  condition, unspecified    symptoms: HA; s/s resolved-yes; not hospitalized    Problems with swallowing    with radiation    Sexually transmitted disease    HPV    Substance abuse (HCC)    cocaine    Urinary incontinence    Visual impairment    glasses/contacts bifocals     Past Surgical History:   Past Surgical History:   Procedure Laterality Date    Appendectomy  1980    Back surgery  2009    fusion L5-S1    Back surgery  2021    RIGHT LUMBAR 3/ LUMBAR 4, LUMBAR 4/ LUMBAR 5 HEMILAMINTOMIES, LEFT LUMBAR 2 / LUMBAR 3 MICRODISCECTOMY    Colonoscopy N/A 2023    Procedure: COLONOSCOPY;  Surgeon: Devante Kern MD;  Location:  ENDOSCOPY    Colposcopy,bx cervix/endocerv curr  11    MARCIO 1    Laparoscopy procedure unlisted      Ovarian cyst removed    Leep  2011    MARCIO 2          X2    Other surgical history      bunions bilateral    Other surgical history      nodule lymph nodes neck    Other surgical history       Bladder sling    Other surgical history  2020    Cystoscopy, Dr Beach      Family History:   Family History   Problem Relation Age of Onset    Other (lung CA) Self     Hypertension Mother     Diabetes Mother     Heart Disease Mother     Heart Disease Father     Other (lung cancer) Father 55        Lung Cancer    Other (pancreatic cancer) Sister 47        Pancreatic Cancer    Cancer Sister 51        lung CA    Other (Other) Sister         Back problems    Other (Other) Son         anxiety    Other (Other) Son         behavioral problems    Diabetes Maternal Grandmother     Breast Cancer Maternal Grandmother         dx late-60s    Stroke Maternal Grandfather 86    Dementia Paternal Grandmother     Heart Disorder Paternal Grandfather     Cancer Maternal Aunt 50        LUNG CA 51    Breast Cancer Maternal Aunt         dx 46-47y    Ovarian Cancer Maternal Cousin Female 33         of ovarian ca at 35y     Social History:    reports that she quit smoking about 13  years ago. Her smoking use included cigarettes. She started smoking about 33 years ago. She has a 10 pack-year smoking history. She has been exposed to tobacco smoke. She quit smokeless tobacco use about 6 weeks ago. She reports current alcohol use of about 5.0 - 6.0 standard drinks of alcohol per week. She reports that she does not currently use drugs after having used the following drugs: Cocaine.     Allergies:   Allergies   Allergen Reactions    Gabapentin SWELLING and UNKNOWN    Erythromycin UNKNOWN    Clindamycin RASH       Medications:    No current facility-administered medications on file prior to encounter.     Current Outpatient Medications on File Prior to Encounter   Medication Sig Dispense Refill    semaglutide (OZEMPIC, 0.25 OR 0.5 MG/DOSE,) 2 MG/1.5ML Subcutaneous Solution Pen-injector Inject into the skin once a week.      Cyanocobalamin (VITAMIN B-12) 1000 MCG Sublingual SL Tab Place 2,000 tablets under the tongue 2 (two) times daily.      LISINOPRIL-HYDROCHLOROTHIAZIDE 20-12.5 MG Oral Tab TAKE ONE TABLET BY MOUTH ONCE DAILY 30 tablet 0    montelukast 10 MG Oral Tab Take 1 tablet (10 mg total) by mouth nightly. (Patient taking differently: Take 1 tablet (10 mg total) by mouth daily.) 90 tablet 3    albuterol 108 (90 Base) MCG/ACT Inhalation Aero Soln Inhale 2 puffs into the lungs every 6 (six) hours as needed for Wheezing or Shortness of Breath. 3 each 3    amphetamine-dextroamphetamine 15 MG Oral Tab TAKE ONE TABLET BY MOUTH ONCE DAILY 8 TO 9 HOURS AFTER VYVANSE DOSE      ARIPiprazole 2 MG Oral Tab Take one (1) tablet by mouth every morning      VYVANSE 70 MG Oral Cap Take 1 capsule (70 mg total) by mouth every morning.      SYMBICORT 160-4.5 MCG/ACT Inhalation Aerosol Inhale 2 puffs into the lungs 2 (two) times daily.      HYDROcodone-acetaminophen (NORCO) 5-325 MG Oral Tab Take 1 tablet by mouth every 6 (six) hours as needed for Pain. 20 tablet 0    cetirizine 10 MG Oral Tab Take 1 tablet (10 mg  total) by mouth daily. 90 tablet 1    ferrous sulfate 325 (65 FE) MG Oral Tab EC Take 1 tablet (325 mg total) by mouth daily with breakfast.      umeclidinium bromide (INCRUSE ELLIPTA) 62.5 MCG/ACT Inhalation Aerosol Powder, Breath Activated Inhale 1 puff into the lungs daily. 3 each 3    Multiple Vitamin Oral Tab Take 1 tablet by mouth daily.      ibuprofen 200 MG Oral Tab Take 4 tablets (800 mg total) by mouth every 8 (eight) hours as needed for Pain.      cholecalciferol 25 MCG (1000 UT) Oral Cap Take 1 capsule (1,000 Units total) by mouth daily.  0    DULoxetine HCl 60 MG Oral Cap DR Particles Take 1 capsule (60 mg total) by mouth daily.  2       Review of Systems:   A comprehensive review of systems was completed.    Pertinent positives and negatives noted in the HPI.    Objective:   Physical Exam:    BP 94/75 (BP Location: Right arm)   Pulse 116   Temp 98.5 °F (36.9 °C) (Oral)   Resp 19   Ht 5' 5\" (1.651 m)   Wt 182 lb 8.7 oz (82.8 kg)   LMP 02/14/2019 (Exact Date)   SpO2 99%   BMI 30.38 kg/m²   General: No acute distress, Alert  Respiratory: No rhonchi, no wheezes, decreased lung sounds in bl mid to lower lung zones, on room air  Cardiovascular: S1, S2. Regular rate and rhythm  Abdomen: Soft, Non-tender, non-distended, positive bowel sounds  Neuro: No new focal deficits  Extremities: No edema      Results:    Labs:      Labs Last 24 Hours:    Recent Labs   Lab 07/12/24  1656   RBC 3.81   HGB 11.8*   HCT 32.1*   MCV 84.3   MCH 31.0   MCHC 36.8   RDW 12.1   NEPRELIM 3.60   WBC 5.4   .0       Recent Labs   Lab 07/12/24  1656   *   BUN 28*   CREATSERUM 1.06*   EGFRCR 63   CA 9.9   ALB 4.1      K 3.4*      CO2 25.0   ALKPHO 68   AST 20   ALT 34   BILT 0.3   TP 7.3       Lab Results   Component Value Date    INR 0.96 08/04/2022    INR 1.0 09/21/2021    INR 1.0 02/02/2021       Recent Labs   Lab 07/12/24  1656 07/12/24 1941   TROPHS 8 8       No results for input(s): \"TROP\", \"PBNP\"  in the last 168 hours.    Recent Labs   Lab 07/12/24  1656   PCT <0.05       Imaging: Imaging data reviewed in Epic.    Assessment & Plan:      #Chest pain  -procalcitonin <0.05  -troponin 8 -->8  -EKG showing sinus tachycardia @107 bpm, no ST changes  -CTA chest negative for PE, increased opacity of RLL compared to March concerning for worsening fibrosis vs PNA vs aspiration  -r/o ACS possible viral PNA given normal pro-calcitonin vs pulmonary fibrosis exacerbation  -trend troponin until peak  -monitor on telemetry  -nitroglycerin, morphine prn  -check AM lipid panel  -if cardiac work-up negative consider course of PO steroids and neb tx for pulm fibrosis  -cardiology c/s in ED    #hypokalemia  -K 3.4  -replete  -cont to monitor BMP and replete prn    #Normocytic anemia  -hgb within baseline range  -no signs active bleeding  -cont to monitor CBC  -cont home iron supplement    #HTN  -hold home lisinopril-hydrochlorothiazide due to borderline low BP on arrival and restart as able    #asthma  -cont home montelukast, symbicort, incruse ellipta    #anxiety  -cont home aripiprazole, duloxetine      Mirta Arias, DO    Supplementary Documentation:     The 21st Century Cures Act makes medical notes like these available to patients in the interest of transparency. Please be advised this is a medical document. Medical documents are intended to carry relevant information, facts as evident, and the clinical opinion of the practitioner. The medical note is intended as peer to peer communication and may appear blunt or direct. It is written in medical language and may contain abbreviations or verbiage that are unfamiliar.

## 2024-07-13 NOTE — PROGRESS NOTES
07/12/24 2225 07/12/24 2226 07/12/24 2228   Vital Signs   Pulse 102 106 116   Heart Rate Source Monitor  --  Monitor   Resp 24 18 19   Respiratory Quality Normal  --  Normal   /71 (!) 86/72 94/75   MAP (mmHg) 81  --  75   BP Location Right arm Right arm Right arm   BP Method Automatic Automatic Automatic   Patient Position Lying Sitting Standing     Pt denied dizziness or lightheadedness at time of admission

## 2024-07-14 NOTE — PROGRESS NOTES
Received bedside report on this Pt at 0720. Pt A&Ox4. Pt was in SR/ST on Tele monitor, sats greater than 92% on RA. Pt up ad aguila. Pt to stress test from 1010 to 1215, upon return Pt reported a headache, asked for and was given Norco, effective.  Potassium was 3.7, replaced per protocol. Received note and message from APN, was told Pt can be discharged. Notified Dr. Linder, discharge orders received. Discharge instructions reviewed with and given to Pt, her questions answered and she verbalized understanding. Pt discharged to home, escorted to [facility] via wheelchair by PCT at this time, left with her son Dick. Pt took her personal belongings. PIV discontinued, cath tip intact.

## 2024-07-15 NOTE — PAYOR COMM NOTE
--------------  ADMISSION REVIEW     Payor: [facility]  Subscriber #:  [id number]  Authorization Number: [id number]    Admit date: 7/12/24  Admit time: 10:23 PM       ED Provider Notes signed by dEd Camacho MD at 7/12/2024  8:44 PM       Author: Edd Camacho MD Service: -- Author Type: Physician    Filed: 7/12/2024  8:44 PM Date of Service: 7/12/2024  4:41 PM Status: Signed    : Edd Camacho MD (Physician)     History     Chief Complaint   Patient presents with    Difficulty Breathing    Abnormal Result     54 year old female sent in by primary care physician for evaluation of dyspnea.  For about 6 weeks now patient has been having progressively worsening dyspnea worse with exertion now with chest pain, left arm pain, sweating and palpitations.  She states she can only take a couple steps now without feeling winded which is not typical for her.  She has a history of lung adenocarcinoma status post chemoradiation, prior longtime smoker and asthmatic.  She saw her pulmonologist at the end of June who prescribed a course of steroids and antibiotics which did not help her symptoms.  Has an occasional cough, no fevers.    Past Medical History:    Abnormal uterine bleeding    bleeds every 2 weeks    Anxiety state    Asthma (HCC)    Attention deficit hyperactivity disorder (ADHD)    BACK PAIN    Back problem    lumbar radiculopathy    Cancer (HCC)    adenocarcinoma of right lung    MARCIO II (cervical intraepithelial neoplasia II)    DDD (degenerative disc disease), lumbar    DDD (degenerative disc disease), thoracic    Depression    Disorder of thyroid    Dyspareunia    Exposure to medical diagnostic radiation    On hold since 4/2022    Fall    Fibromyalgia    Fibromyalgia    Genital warts    High blood pressure    History of COVID-19    COVID + 7/2020 Headache - NO Hospitalization needed     History of lumbar fusion    Hx of motion sickness    IBS (irritable bowel syndrome)    Per  patient - Just constipation    Infertility, female    Lumbar radiculopathy    Mild dysplasia of cervix    Pap smear for cervical cancer screening    pt states normal    Papanicolaou smear of cervix with high grade squamous intraepithelial lesion (HGSIL)    Personal history of antineoplastic chemotherapy    Last chemo 22 prior to lung bx 22    Post covid-19 condition, unspecified    symptoms: HA; s/s resolved-yes; not hospitalized    Problems with swallowing    with radiation    Sexually transmitted disease    HPV    Substance abuse (HCC)    cocaine    Urinary incontinence    Visual impairment    glasses/contacts bifocals       Past Surgical History:   Procedure Laterality Date    Appendectomy  1980    Back surgery  2009    fusion L5-S1    Back surgery  2021    RIGHT LUMBAR 3/ LUMBAR 4, LUMBAR 4/ LUMBAR 5 HEMILAMINTOMIES, LEFT LUMBAR 2 / LUMBAR 3 MICRODISCECTOMY    Colonoscopy N/A 2023    Procedure: COLONOSCOPY;  Surgeon: Devnate Kern MD;  Location:  ENDOSCOPY    Colposcopy,bx cervix/endocerv curr  11    MARCIO 1    Laparoscopy procedure unlisted      Ovarian cyst removed    Leep  2011    MARCIO 2          X2    Other surgical history      bunions bilateral    Other surgical history      nodule lymph nodes neck    Other surgical history  2016     Bladder sling    Other surgical history  2020    Cystoscopy, Dr Beach     Physical Exam     ED Triage Vitals   BP 24 1613 112/72   Pulse 24 1613 104   Resp 24 1613 22   Temp 24 1613 97.5 °F (36.4 °C)   Temp src 24 1613 Temporal   SpO2 24 1613 97 %   O2 Device 24 1659 None (Room air)     Physical Exam  Constitutional:       General: She is not in acute distress.  Eyes:      Extraocular Movements: Extraocular movements intact.   Cardiovascular:      Rate and Rhythm: Normal rate and regular rhythm.      Pulses: Normal pulses.   Pulmonary:      Effort: Pulmonary effort is normal. No  respiratory distress.      Breath sounds: Normal breath sounds.   Musculoskeletal:         General: No swelling or deformity.      Cervical back: Normal range of motion.   Skin:     General: Skin is warm and dry.   Neurological:      General: No focal deficit present.      Mental Status: She is alert.      Labs Reviewed   COMP METABOLIC PANEL (14) - Abnormal; Notable for the following components:       Result Value    Glucose 103 (*)     Potassium 3.4 (*)     BUN 28 (*)     Creatinine 1.06 (*)     All other components within normal limits   CBC W/ DIFFERENTIAL - Abnormal; Notable for the following components:    HGB 11.8 (*)     HCT 32.1 (*)     Lymphocyte Absolute 0.84 (*)     All other components within normal limits   TROPONIN I HIGH SENSITIVITY - Normal   MAGNESIUM - Normal   PROCALCITONIN - Normal   TROPONIN I HIGH SENSITIVITY - Normal     CT CHEST PE AORTA (    1. Negative for pulmonary embolism.  2. Treatment related paramediastinal atelectasis/fibrosis with enlarging consolidative opacity of the superior segment right lower lobe (compared to 03/27/2024).  This could represent increasing treatment related fibrosis versus superimposed pneumonia or  aspiration.  Correlate with clinical evidence of active pulmonary infection.          Exertional dyspnea and chest pain.  No prior cardiac disease.  Blood pressure here is reassuring.  Lungs are clear, no evidence of bronchospasm.  PE is on the differential as well as ACS versus metabolic.    Time: 07/12 1640  Comment: EKG interpretation by me: EKG sinus rhythm at a rate of 97, axis nomal, no concerning acute ischemic ST changes, low voltage appears similar to prior EKGs    ------------------------------------------------------------  Time: 07/12 1910  Comment: My interpretation of CT without large central PE.  Radiology is noting increased opacity consistent with worsening fibrosis versus developing infiltrate.  Patient has no cough, fever, leukocytosis to suggest  infection  ------------------------------------------------------------  Time: 07/12 1927  Comment: Discussed with cardiology, admitted for further care.     Disposition and Plan     Clinical Impression:  1. Exertional chest pain    2. Decreased exercise tolerance    3. Lung fibrosis (HCC)        7/13:      History and Physical     Hanna Berenice Barrett is a 54 year old female with PMHx lung adenocarcinoma (s/p chemoradiation)/ asthma/ HLD who presented to the hospital for dyspnea. She reports exertional dyspnea for the past 6-8 weeks that has been getting progressively worse. She denied any cough, fever, chills. She does get palpitations and chest tightness when the dyspnea is severe. Her dyspnea was so severe she could not perform most of her daily activities prompting her to seek medical eval.     Physical Exam:    BP 94/75 (BP Location: Right arm)   Pulse 116   Temp 98.5 °F (36.9 °C) (Oral)   Resp 19   Ht 5' 5\" (1.651 m)   Wt 182 lb 8.7 oz (82.8 kg)   LMP 02/14/2019 (Exact Date)   SpO2 99%   BMI 30.38 kg/m²   General: No acute distress, Alert  Respiratory: No rhonchi, no wheezes, decreased lung sounds in bl mid to lower lung zones, on room air  Cardiovascular: S1, S2. Regular rate and rhythm  Abdomen: Soft, Non-tender, non-distended, positive bowel sounds  Neuro: No new focal deficits  Extremities: No edema     Lab 07/12/24 1656   RBC 3.81   HGB 11.8*   HCT 32.1*   MCV 84.3   MCH 31.0   MCHC 36.8   RDW 12.1   NEPRELIM 3.60   WBC 5.4   .0      *   BUN 28*   CREATSERUM 1.06*   EGFRCR 63   CA 9.9   ALB 4.1      K 3.4*      CO2 25.0   ALKPHO 68   AST 20   ALT 34   BILT 0.3   TP 7.3      Lab 07/12/24 1656 07/12/24  1941   TROPHS 8 8         Lab 07/12/24 1656   PCT <0.05        Assessment & Plan:  #Chest pain  -procalcitonin <0.05  -troponin 8 -->8  -EKG showing sinus tachycardia @107 bpm, no ST changes  -CTA chest negative for PE, increased opacity of RLL compared to March concerning  for worsening fibrosis vs PNA vs aspiration  -r/o ACS possible viral PNA given normal pro-calcitonin vs pulmonary fibrosis exacerbation  -trend troponin until peak  -monitor on telemetry  -nitroglycerin, morphine prn  -check AM lipid panel  -if cardiac work-up negative consider course of PO steroids and neb tx for pulm fibrosis  -cardiology c/s in ED     #hypokalemia  -K 3.4  -replete  -cont to monitor BMP and replete prn     #Normocytic anemia  -hgb within baseline range  -no signs active bleeding  -cont to monitor CBC  -cont home iron supplement     #HTN  -hold home lisinopril-hydrochlorothiazide due to borderline low BP on arrival and restart as able     #asthma  -cont home montelukast, symbicort, incruse ellipta     #anxiety  -cont home aripiprazole, duloxetine       CARDS:    Hanna Barrett is a(n) 54 year old female with chronic medical conditions including hx adenocarcinoma of the lung s/p chemo/radiation, asthma, HLD  who presents with worsening shortness of breath with exertion. Worsening over the past 10 weeks to the point that even going up the stairs minimally triggers severe shortness of breath as well as palpitations. Denies chest pain, fevers, chills, cough, nausea, emesis, syncope. Notes that she not feel like she is wheezing during these episodes. Notes tobacco vaping use.  Denies swelling, weight gain. No other complaints. Planning travel to [place] Tuesday. Cardiology asked to evaluate.       /62 (BP Location: Right arm)   Pulse 89   Temp 97.9 °F (36.6 °C) (Oral)   Resp 11   Ht 5' 5\" (1.651 m)   Wt 182 lb 8.7 oz (82.8 kg)   LMP 02/14/2019 (Exact Date)   SpO2 100%   BMI 30.38 kg/m²     Physical Exam:   General: Alert and oriented x 3. No apparent distress. No respiratory or constitutional distress.  HEENT: Normocephalic, anicteric sclera, neck supple.  Neck: No JVD  Cardiac: Regular rate and rhythm. S1, S2 normal. No murmur, pericardial rub, S3.  Lungs: Clear without wheezes,  rales, rhonchi or dullness.  Normal excursions and effort.  Abdomen: Soft, non-tender. BS-present.  Extremities: Without clubbing, cyanosis or edema.    Neurologic: Alert and oriented, normal affect.  Skin: Warm and dry.      Laboratory Data:        Lab Results   Component Value Date     WBC 4.9 07/13/2024     HGB 12.1 07/13/2024     HCT 34.4 07/13/2024     .0 07/13/2024     CREATSERUM 1.03 07/13/2024     BUN 24 07/13/2024      07/13/2024     K 3.7 07/13/2024      07/13/2024     CO2 28.0 07/13/2024     GLU 93 07/13/2024     CA 9.6 07/13/2024          Imaging/results:  CTA chest - No PE. Atelectasis/fibrosis w/ enlarging consolidation superior right lower lobe - tx related?   High-sensitivity troponin 8, 8, 7   EKG - sinus, no acute signs of ischemia        Assessment:  Dyspnea on exertion   Palpitations   Stage 3 Adenocarcinoma - lung cancer s/p chemo/radiation  HLD   HTN  Asthma   Former smoker         Plan:  Obtain EKG, ECHO, exercise nuclear MPI   R/o infection/pna given ct findings, per primary  MCT outpatient if workup unremarkable   Recommended cessation of vaping     Vitals (last day) before discharge       Date/Time Temp Pulse Resp BP SpO2 Weight O2 Device O2 Flow Rate (L/min) [facility]    07/13/24 1630 98.2 °F (36.8 °C) 113 14 92/69 100 % -- None (Room air) 0 L/min AL    07/13/24 1500 -- 91 -- -- 98 % -- -- --     07/13/24 1226 98.8 °F (37.1 °C) 101 18 94/70 97 % -- None (Room air) --     07/13/24 0930 -- 98 16 -- 100 % -- None (Room air) --     07/13/24 0820 97.7 °F (36.5 °C) 90 19 103/70 100 % -- None (Room air) 0 L/min AL    07/13/24 0720 -- 85 16 -- 97 % -- None (Room air) --     07/13/24 0304 97.9 °F (36.6 °C) 89 11 107/62 100 % -- -- -- RS    07/12/24 2231 -- -- -- -- -- 182 lb 8.7 oz (82.8 kg) -- -- AB    07/12/24 2228 -- 116 19 94/75 99 % -- -- -- RS    07/12/24 2226 -- 106 18 86/72 100 % -- -- -- RS    07/12/24 2225 98.5 °F (36.9 °C) 102 24 100/71 100 % 182 lb 8.7 oz (82.8 kg)  -- --     07/12/24 2200 -- 99 10 99/70 99 % -- None (Room air) --     07/12/24 2100 -- 100 22 103/81 99 % -- None (Room air) --     07/12/24 2045 -- 105 18 103/68 99 % -- None (Room air) --     07/12/24 2000 -- 103 23 108/80 99 % -- None (Room air) --     07/12/24 1915 -- 99 11 113/71 91 % -- None (Room air) --     07/12/24 1830 -- 95 22 105/70 100 % -- None (Room air) --     07/12/24 1659 -- -- -- -- -- -- None (Room air) --     07/12/24 1613 97.5 °F (36.4 °C) 104 22 112/72 97 % 179 lb (81.2 kg) -- -- EM

## 2024-07-15 NOTE — PAYOR COMM NOTE
--------------  DISCHARGE REVIEW    Payor: [facility]  Subscriber #:  [id number]  Authorization Number: [id number]    Admit date: 7/12/24  Admit time:  10:23 PM  Discharge Date: 7/13/2024  6:25 PM     NURSING:  Pt to stress test from 1010 to 1215, upon return Pt reported a headache, asked for and was given Norco, effective. Potassium was 3.7, replaced per protocol. Received note and message from APN, was told Pt can be discharged. Notified Dr. Linder, discharge orders received. Discharge instructions reviewed with and given to Pt, her questions answered and she verbalized understanding. Pt discharged to home, escorted to [facility] via wheelchair by PCT at this time, left with her son Dick.

## 2024-07-23 ENCOUNTER — TELEPHONE (OUTPATIENT)
Dept: ADMINISTRATIVE | Facility: HOSPITAL | Age: 54
End: 2024-07-23

## 2024-07-23 DIAGNOSIS — Z98.1 S/P LUMBAR FUSION: Primary | ICD-10-CM

## 2024-07-23 DIAGNOSIS — M96.1 FAILED BACK SURGICAL SYNDROME: ICD-10-CM

## 2024-07-23 DIAGNOSIS — M48.062 LUMBAR STENOSIS WITH NEUROGENIC CLAUDICATION: ICD-10-CM

## 2024-07-23 NOTE — TELEPHONE ENCOUNTER
Good Morning, Please be advise that the MRI SPINE LUMBAR (W+WO) (CPT=72158)  has been denied by the Patient [employer] Plan.  According to Aniceto Cardenas can call an Initiate a P2P to be done to see if the Denial can be overturned.  All clinicals has been submitted. Case#[id number] Tel#[phone]    Susan  Candice    Denial Reason:    Urgent Action Needed: Information needed in order to approve request for   member MAICO SÁNCHEZ.  Date: 7/22/2024 Member number: [id number]  Beneficiary’s name: MAICO SÁNCHEZ  This fax is to inform you that our Medical Director has reviewed your request for MAICO SÁNCHEZ, CPT 63016 - Magnetic Resonance Imaging (MRI) Lumbar Spine without   and with Contrast, a special picture study of the lower part of the spine both without and   with injected dye.  Please note that the service requested for the above listed member has been denied.  For 46438 MRI L SPINE W/ & W/O CONTRAST Your doctor told us that you have lower   back pain. We cannot approve this request because:  Imaging requires six weeks of provider directed treatment to be completed. This must   have been completed in the past three months without improved symptoms. Contact   (via office visit, phone, email, or messaging) must occur after the treatment is   completed. This has not been met because:  You have not completed six weeks of provider directed treatment.  The provider directed treatment did not occur within the last three months.  Symptoms must be the same or worse after treatment to support imaging.  There was no contact with your provider after completing treatment.  This finding was based on review of [facility] Guidelines Section(s): Low   Back (Lumbar Spine) Pain without Neurological Features (SP 5.1) and 1.0 General   Guidelines.  You are allotted an additional seven calendar days from the notification of adverse   determination to send one set of additional clinical or complete a [id number]    discussion with a Medical Director (by calling [phone], select Option 4).   Reference number [id number] will need to be entered or provided. This must be   completed within 7 calendar days from the date of this letter

## 2024-07-23 NOTE — TELEPHONE ENCOUNTER
Case#[id number],   Tel#[phone]     MRI lumbar spine denial reason:    \"Please note that the service requested for the above listed member has been denied.  For 34333 MRI L SPINE W/ & W/O CONTRAST Your doctor told us that you have lower   back pain. We cannot approve this request because:  Imaging requires six weeks of provider directed treatment to be completed. This must   have been completed in the past three months without improved symptoms.\"    Patient underwent surgery with Dr. Quintero on 10/4/21:    RIGHT LUMBAR 1-LUMBAR 2 AND RIGHT LUMBAR 2-LUMBAR 3 HEMILAMINOTOMIES, LEFT LUMBAR 2-LUMBAR 3 RE-EXPLORATION     Per Dr. Quintero and BLAKE De Dios at  on 6/13/24:    \"she has had ongoing bilateral leg weakness with numbness in the anterior thighs.  Patient states she has difficulty ambulating any sort of distance due to this.  Patient states she occasionally has pain down the back of the legs bilaterally however this takes about 30 minutes before the onset.  Patient reports a charley horse sensation in bilateral heels.  She has tried changing her shoes thinking it was for feet.  She is taking quite a bit of Motrin with some relief.  She occasionally takes Norco.  Patient is a previous lumbar fusion at L5-S1 and hemilaminotomies.  She was previously noted to have severe scarring.  Pt had a lumbar MRI in 2023 and saw Dr. Minoo Burt.  Pt was recommended to try a spinal cord stimulator however she states she was unable to have the psych eval due to insurance.  Pt has had several falls and states she \"walks like she is drunk\".      MRI lumbar reviewed from 2023, shows stenosis at L2-3, R L1-2 lateral recess stenosis, previous fusion at L5-S1     ASSESSMENT:  S/p lumbar fusion  Lumbar stenosis with neurogenic claudication  Failed back syndrome     PLAN:  Reviewed previous imaging with pt  -Repeat MRI lumbar with and w/o contrast  -Lumbar flexion extension xrays ordered  -CT lumbar to evaluate bone  Detail Level: Detailed anatomy  2.   F/u after imaging to discuss next steps for pain control\"    Noted that patient reported neck and back pain in a TE on 2/19/24. Patient started Norco, 5-325 mg's on 2/19/24 and prednisone, 10 mg on 6/20/24.     Called patient to inquire about PT and conservative treatment. Patient acknowledged and stated that she has attempted PT in the past, but will give it another try for now.     Hanna thanked Nursing for the call and the discussion and the call was ended.     Routed to SHAWN Castillo for PT orders.

## 2024-07-23 NOTE — TELEPHONE ENCOUNTER
Received notification that PT orders have been placed.  Notified Hanna of new orders and provided contact information for scheduling PT and canceling MRI lumbar spine with CS.

## 2024-07-24 ENCOUNTER — HOSPITAL ENCOUNTER (OUTPATIENT)
Dept: MRI IMAGING | Age: 54
Discharge: HOME | End: 2024-07-24
Attending: INTERNAL MEDICINE
Payer: MEDICAID

## 2024-07-24 ENCOUNTER — HOSPITAL ENCOUNTER (OUTPATIENT)
Dept: MRI IMAGING | Age: 54
End: 2024-07-24
Attending: NURSE PRACTITIONER
Payer: MEDICAID

## 2024-07-24 DIAGNOSIS — H57.9 VISUAL SYMPTOMS: ICD-10-CM

## 2024-07-24 DIAGNOSIS — C34.2 MALIGNANT NEOPLASM OF MIDDLE LOBE OF RIGHT LUNG (HCC): ICD-10-CM

## 2024-07-24 DIAGNOSIS — C34.91 PRIMARY ADENOCARCINOMA OF RIGHT LUNG (HCC): ICD-10-CM

## 2024-07-24 PROCEDURE — A9575 INJ GADOTERATE MEGLUMI 0.1ML: HCPCS | Performed by: INTERNAL MEDICINE

## 2024-07-24 PROCEDURE — 70553 MRI BRAIN STEM W/O & W/DYE: CPT | Performed by: INTERNAL MEDICINE

## 2024-07-24 RX ORDER — GADOTERATE MEGLUMINE 376.9 MG/ML
30 INJECTION INTRAVENOUS
Status: COMPLETED | OUTPATIENT
Start: 2024-07-24 | End: 2024-07-24

## 2024-07-24 RX ADMIN — GADOTERATE MEGLUMINE 24 ML: 376.9 INJECTION INTRAVENOUS at 13:20:00

## 2024-08-05 DIAGNOSIS — I10 ESSENTIAL HYPERTENSION: ICD-10-CM

## 2024-08-05 RX ORDER — LISINOPRIL AND HYDROCHLOROTHIAZIDE 20; 12.5 MG/1; MG/1
1 TABLET ORAL DAILY
Qty: 30 TABLET | Refills: 0 | Status: SHIPPED | OUTPATIENT
Start: 2024-08-05

## 2024-09-09 ENCOUNTER — OFFICE VISIT (OUTPATIENT)
Facility: CLINIC | Age: 54
End: 2024-09-09
Payer: MEDICAID

## 2024-09-09 VITALS
RESPIRATION RATE: 16 BRPM | SYSTOLIC BLOOD PRESSURE: 92 MMHG | HEIGHT: 65 IN | OXYGEN SATURATION: 97 % | WEIGHT: 178 LBS | HEART RATE: 99 BPM | DIASTOLIC BLOOD PRESSURE: 60 MMHG | BODY MASS INDEX: 29.66 KG/M2

## 2024-09-09 DIAGNOSIS — R06.09 DOE (DYSPNEA ON EXERTION): Primary | ICD-10-CM

## 2024-09-09 DIAGNOSIS — C34.91 PRIMARY LUNG CANCER WITH METASTASIS FROM LUNG TO OTHER SITE, RIGHT (HCC): ICD-10-CM

## 2024-09-09 DIAGNOSIS — J45.51 SEVERE PERSISTENT ASTHMA WITH EXACERBATION (HCC): ICD-10-CM

## 2024-09-09 PROCEDURE — 99214 OFFICE O/P EST MOD 30 MIN: CPT | Performed by: INTERNAL MEDICINE

## 2024-09-09 RX ORDER — ALBUTEROL SULFATE 0.83 MG/ML
SOLUTION RESPIRATORY (INHALATION) EVERY 6 HOURS PRN
COMMUNITY

## 2024-09-09 NOTE — PATIENT INSTRUCTIONS
Obtain a pulmonary function test - Call central scheduling at [phone] to schedule the test  Call/message with questions/concerns  I will plan to review your test once you get it done and contact you.  If you don't hear anything after a day or two, then please contact me

## 2024-09-09 NOTE — PROGRESS NOTES
[facility] General Pulmonary Progress Note    History of Present Illness:  Hanna Barrett is a 54 year old female female former smoker (quit 2011, 20 pack years) with significant PMH of asthma, stage 3 RUL NSCLC s/p chemoRT who presents today for follow up. Since last visit she continues to have MARTINS, especially with stairs or carrying a load. No cough, wheeze, pain. No fevers  +palpitations  Was hospitalized in July 2024 with negative cardiac stress  Has been using albuterol nebs 1-2/day without relief.  Remain son symbicort and incruse    June 2024 previously  Since last visit she reports she had been well until the past week with the hot weather she has had worsening dyspnea, cough and wheeze.  Has needed albuterol much more frequently. No pain. No fevers    Dec 2023 previously  Hanna Barrett is a 53 year old female former smoker (quit 2011, 20 pack years) with significant PMH of asthma, stage 3 RUL NSCLC s/p chemoRT who presents today for follow up. She denies acute concerns today.  Does continue to have nasal congestion/drainage leading to cough and throat clearing. No blood. Dyspnea on exertion overall better on symbicort BID and incruse daily. Has albuterol but not using often.    October 2023 previously BLAKE acuña  Hanna Barrett is a 53 year old female who presents for 2 month asthma followup. Breathing has been better with Incruse and Symbicort. Had Sinus infection about 2  weeks ago; blood mixed in phlegm about a quarter size. No further hemoptysis;  still coughing. Feels like a vice around her chest at times; mostly after coughing. Was on 2 courses of antibiotics; PCN and Augmentin. Reports overall feeling much better; still with cough with white-yellow tinged sputum. No f/c/ns. Worried about cancer returning. Asthma score is 14     Previously 8/2023  a 53 year old female who presents for c/o worsening dyspnea with exertion. Notices this mostly with stairs and worsens with stairs when carrying anything.  Unable to exercise due to back pain. No f/c/ns. Denies hx of COPD; quit smoking in 2000 and had a couple here and there; vaps here and there. Reports hx of Asthma and using Symbicort with minimal improvement.  PMH of stage III lung cancer s/p chemo RT completed 4/2022; recent  CT chest.      Previously 4/2023 Dr Corrigan  a 53 year old female former smoker (quit 2011, 20 pack years) with significant PMH of asthma, stage III adenocarcinoma s/p chemoRT  who presents today for evaluation of hemoptysis. The patient explains she has had increasing episodes hemoptysis since around late February 2023. Thinks hemoptysis began first then about a week later developed nasal congestion/drainage sinus pressure. She was seen at [facility] two weeks ago and treated for sinus infection and given augmentin.  Her sinus pressure and congestion improved, however her hemoptysis persists. Also c/o sore throat/pain which has been ongoing for the past month as well.  Describes hemoptysis as red blood, about pencil eraser in size, may have 2-3 episodes a day. None today, but did have two episodes yesterday.    No epistaxis. No GERD, abdominal pain. No fevers. No chest pain, pressure or pleurisy.   Feels overall her breathing has been worse since her radiation treatment since last year. Using advair BID and albuterol. Previously on trelegy but too pricey.      Works in insurance sales. No known exposures.   Past Medical History:   Past Medical History:    Abnormal uterine bleeding    bleeds every 2 weeks    Anxiety state    Asthma (HCC)    Attention deficit hyperactivity disorder (ADHD)    BACK PAIN    Back problem    lumbar radiculopathy    Cancer (HCC)    adenocarcinoma of right lung    MARCIO II (cervical intraepithelial neoplasia II)    DDD (degenerative disc disease), lumbar    DDD (degenerative disc disease), thoracic    Depression    Disorder of thyroid    Dyspareunia    Exposure to medical diagnostic radiation    On hold since 4/2022     Fall    Fibromyalgia    Fibromyalgia    Genital warts    High blood pressure    History of COVID-19    COVID + 2020 Headache - NO Hospitalization needed     History of lumbar fusion    Hx of motion sickness    IBS (irritable bowel syndrome)    Per patient - Just constipation    Infertility, female    Lumbar radiculopathy    Mild dysplasia of cervix    Pap smear for cervical cancer screening    pt states normal    Papanicolaou smear of cervix with high grade squamous intraepithelial lesion (HGSIL)    Personal history of antineoplastic chemotherapy    Last chemo 22 prior to lung bx 22    Post covid-19 condition, unspecified    symptoms: HA; s/s resolved-yes; not hospitalized    Problems with swallowing    with radiation    Sexually transmitted disease    HPV    Substance abuse (HCC)    cocaine    Urinary incontinence    Visual impairment    glasses/contacts bifocals        Past Surgical History:   Past Surgical History:   Procedure Laterality Date    Appendectomy      Back surgery  2009    fusion L5-S1    Back surgery  2021    RIGHT LUMBAR 3/ LUMBAR 4, LUMBAR 4/ LUMBAR 5 HEMILAMINTOMIES, LEFT LUMBAR 2 / LUMBAR 3 MICRODISCECTOMY    Colonoscopy N/A 2023    Procedure: COLONOSCOPY;  Surgeon: Devante Kern MD;  Location:  ENDOSCOPY    Colposcopy,bx cervix/endocerv curr  12    MARCIO 1    Laparoscopy procedure unlisted      Ovarian cyst removed    Leep  2011    MARCIO 2          X2    Other surgical history      bunions bilateral    Other surgical history      nodule lymph nodes neck    Other surgical history  2016     Bladder sling    Other surgical history  2020    Cystoscopy, Dr Beach       Family Medical History:   Family History   Problem Relation Age of Onset    Other (lung CA) Self     Hypertension Mother     Diabetes Mother     Heart Disease Mother     Heart Disease Father     Other (lung cancer) Father 55        Lung Cancer    Other (pancreatic cancer)  Sister 47        Pancreatic Cancer    Cancer Sister 51        lung CA    Other (Other) Sister         Back problems    Other (Other) Son         anxiety    Other (Other) Son         behavioral problems    Diabetes Maternal Grandmother     Breast Cancer Maternal Grandmother         dx late-60s    Stroke Maternal Grandfather 86    Dementia Paternal Grandmother     Heart Disorder Paternal Grandfather     Cancer Maternal Aunt 50        LUNG CA 51    Breast Cancer Maternal Aunt         dx 46-47y    Ovarian Cancer Maternal Cousin Female 33         of ovarian ca at 35y        Social History:   Social History     Socioeconomic History    Marital status:      Spouse name: Not on file    Number of children: Not on file    Years of education: Not on file    Highest education level: Not on file   Occupational History    Not on file   Tobacco Use    Smoking status: Former     Current packs/day: 0.00     Average packs/day: 0.8 packs/day for 19.0 years (14.5 ttl pk-yrs)     Types: Cigarettes     Start date:      Quit date:      Years since quittin.6     Passive exposure: Past    Smokeless tobacco: Former     Quit date: 2024    Tobacco comments:     Has not vaped since 2024.    Vaping Use    Vaping status: Every Day    Substances: Nicotine, daily    Devices: Pre-filled pod   Substance and Sexual Activity    Alcohol use: Yes     Alcohol/week: 5.0 - 6.0 standard drinks of alcohol     Types: 3 Glasses of wine, 2 - 3 Standard drinks or equivalent per week     Comment: glass of wine each night    Drug use: Not Currently     Types: Cocaine     Comment: USED COCAINE BETW [id number]    Sexual activity: Yes     Partners: Male   Other Topics Concern     Service Not Asked    Blood Transfusions Not Asked    Caffeine Concern Yes     Comment: 3-4 daily of pop    Occupational Exposure Not Asked    Hobby Hazards Not Asked    Sleep Concern Not Asked    Stress Concern Not Asked    Weight Concern Not Asked     Special Diet Not Asked    Back Care Not Asked    Exercise No     Comment: not tolerated right now    Bike Helmet Not Asked    Seat Belt Not Asked    Self-Exams Not Asked   Social History Narrative    Not on file     Social Determinants of Health     Financial Resource Strain: Not on file   Food Insecurity: No Food Insecurity (7/12/2024)    Food Insecurity     Food Insecurity: Never true   Transportation Needs: No Transportation Needs (7/12/2024)    Transportation Needs     Lack of Transportation: No     Car Seat: Not on file   Physical Activity: Not on file   Stress: Not on file   Social Connections: Not on file   Housing Stability: Low Risk  (7/12/2024)    Housing Stability     Housing Instability: No     Housing Instability Emergency: Not on file     Crib or Bassinette: Not on file        Medications:   Current Outpatient Medications   Medication Sig Dispense Refill    albuterol (2.5 MG/3ML) 0.083% Inhalation Nebu Soln Take by nebulization every 6 (six) hours as needed for Wheezing.      LISINOPRIL-HYDROCHLOROTHIAZIDE 20-12.5 MG Oral Tab TAKE ONE TABLET BY MOUTH ONCE DAILY 30 tablet 0    semaglutide (OZEMPIC, 0.25 OR 0.5 MG/DOSE,) 2 MG/1.5ML Subcutaneous Solution Pen-injector Inject into the skin once a week.      Cyanocobalamin (VITAMIN B-12) 1000 MCG Sublingual SL Tab Place 2,000 tablets under the tongue 2 (two) times daily.      montelukast 10 MG Oral Tab Take 1 tablet (10 mg total) by mouth nightly. (Patient taking differently: Take 1 tablet (10 mg total) by mouth daily.) 90 tablet 3    albuterol 108 (90 Base) MCG/ACT Inhalation Aero Soln Inhale 2 puffs into the lungs every 6 (six) hours as needed for Wheezing or Shortness of Breath. 3 each 3    amphetamine-dextroamphetamine 15 MG Oral Tab TAKE ONE TABLET BY MOUTH ONCE DAILY 8 TO 9 HOURS AFTER VYVANSE DOSE      ARIPiprazole 2 MG Oral Tab Take one (1) tablet by mouth every morning      VYVANSE 70 MG Oral Cap Take 1 capsule (70 mg total) by mouth every morning.       SYMBICORT 160-4.5 MCG/ACT Inhalation Aerosol Inhale 2 puffs into the lungs 2 (two) times daily.      HYDROcodone-acetaminophen (NORCO) 5-325 MG Oral Tab Take 1 tablet by mouth every 6 (six) hours as needed for Pain. 20 tablet 0    cetirizine 10 MG Oral Tab Take 1 tablet (10 mg total) by mouth daily. 90 tablet 1    ferrous sulfate 325 (65 FE) MG Oral Tab EC Take 1 tablet (325 mg total) by mouth daily with breakfast.      umeclidinium bromide (INCRUSE ELLIPTA) 62.5 MCG/ACT Inhalation Aerosol Powder, Breath Activated Inhale 1 puff into the lungs daily. 3 each 3    Multiple Vitamin Oral Tab Take 1 tablet by mouth daily.      ibuprofen 200 MG Oral Tab Take 4 tablets (800 mg total) by mouth every 8 (eight) hours as needed for Pain.      cholecalciferol 25 MCG (1000 UT) Oral Cap Take 1 capsule (1,000 Units total) by mouth daily.  0    DULoxetine HCl 60 MG Oral Cap DR Particles Take 1 capsule (60 mg total) by mouth daily.  2       Review of Systems: Review of Systems   Constitutional: Negative.    HENT: Negative.     Respiratory:  Positive for chest tightness and shortness of breath. Negative for stridor.    Cardiovascular:  Positive for palpitations.   All other systems reviewed and are negative.       Physical Exam:  BP 92/60 (BP Location: Left arm, Patient Position: Sitting, Cuff Size: adult)   Pulse 99   Resp 16   Ht 5' 5\" (1.651 m)   Wt 178 lb (80.7 kg)   LMP 02/14/2019 (Exact Date)   SpO2 97%   BMI 29.62 kg/m²      Constitutional: alert, cooperative. No acute distress.  HEENT: Head NC/AT.    Cardio: tachycardia, RR. No murmur  Respiratory: Thorax symmetrical with no labored breathing. Clear to ausculation bilaterally with symmetrical breath sounds. No wheezing, rhonchi, rales, or crackles.   GI: NABS. Abd soft, non-tender.  Extremities: No clubbing or cyanosis. No BLE edema.    Neurologic: A&Ox3. No gross motor deficits.  Skin: Warm, dry  Psych: Calm, cooperative. Pleasant affect.    Results:  Personally  reviewed    WBC: 4.9, done on 7/13/2024.  HGB: 12.1, done on 7/13/2024.  PLT: 263, done on 7/13/2024.     Glucose: 93, done on 7/13/2024.  Cr: 1.03, done on 7/13/2024.  GFR(AA): 70, done on 4/3/2022.  GFR (non-AA): 61, done on 4/3/2022.  CA: 9.6, done on 7/13/2024.  Na: 139, done on 7/13/2024.  K: 3.7, done on 7/13/2024.  Cl: 107, done on 7/13/2024.  CO2: 28, done on 7/13/2024.  Last ALB was 4.1% done on 7/12/2024.     MRI BRAIN (W+WO) (CPT=70553)    Result Date: 7/24/2024  CONCLUSION:   No evidence of metastatic disease or acute intracranial process.   LOCATION:  [facility]    Dictated by (CST): Cj García MD on 7/24/2024 at 2:12 PM     Finalized by (CST): Cj García MD on 7/24/2024 at 2:21 PM       CT CHEST PE AORTA (IV ONLY) (CPT=71260)    Result Date: 7/12/2024  CONCLUSION:   1. Negative for pulmonary embolism.  2. Treatment related paramediastinal atelectasis/fibrosis with enlarging consolidative opacity of the superior segment right lower lobe (compared to 03/27/2024).  This could represent increasing treatment related fibrosis versus superimposed pneumonia or  aspiration.  Correlate with clinical evidence of active pulmonary infection.    LOCATION:  [id number]   Dictated by (CST): Umm Hodges MD on 7/12/2024 at 6:58 PM     Finalized by (CST): Umm Hodges MD on 7/12/2024 at 7:07 PM         Assessment/Plan:   #1. asthma  Reported hx of asthma but feels symptoms slowly worsening post radiation in 2022 8/2023 CPFT values from [facility] provider, essentially normal; hx of asthma  7/2024 CTA with no PE and slight increase in linear opacity/atelectasis on right mid lung - appears stable to my review  Presently on symbicort + incruse as unable to afford trelegy.   Given steroids in June 2024 with no change  Check PFTs. If normal then will need CPET  Otherwise if PFT abnormal, then change to budesonide/arformoterol nebs    #2. Hemoptysis, non-lifethreatening  Previously developed late Feb 2023 4/2024 s/p bronchoscopy with  BAL shows normal endobronchial exam. No further hemoptysis  Consider ENT eval if returns    #3. bronchiectasis  Noted bronchiectasis on imaging in [facility], suggestive of post radiation changes. However I am not able to view her previous CTs prior to treatment to confirm this developed post radiation treatment in 2022 8/2023 CT with mild bronchiectasis and chronic cough; recommend to use flutter valve daily, inhalers and nebs as above  Can use guaifenesin as well    #4. Lung cancer  Dx with stage III NSCLC (adenoca) 12/2021  S/p chemoRT 4/2022, durvalumab x 1 yr completed 3/2023 and following with Dr. Sanchez and soon to be Dr. Baker  2/2023 CT chest with stable post treatment changes  8/2023 CT chest as noted above and personally reviewed with Dr Corrigan; no e/o recurrence  11/2023 CT chest with new findings.   12/2023 PET/CT with low uptake in RLL  3/2024 CT chest with decrease in right sided opacities  7/2024 CTA with no PE and slight increase in linear opacity/atelectasis on right mid lung - appears stable to my review  Plan f/u and serial imaging with Dr. Gordon later this month    Sd Corrigan MD

## 2024-09-24 ENCOUNTER — OFFICE VISIT (OUTPATIENT)
Dept: FAMILY MEDICINE CLINIC | Facility: CLINIC | Age: 54
End: 2024-09-24
Payer: MEDICAID

## 2024-09-24 VITALS
HEART RATE: 62 BPM | WEIGHT: 170 LBS | TEMPERATURE: 98 F | DIASTOLIC BLOOD PRESSURE: 76 MMHG | OXYGEN SATURATION: 98 % | RESPIRATION RATE: 18 BRPM | HEIGHT: 65 IN | BODY MASS INDEX: 28.32 KG/M2 | SYSTOLIC BLOOD PRESSURE: 90 MMHG

## 2024-09-24 DIAGNOSIS — J01.00 ACUTE NON-RECURRENT MAXILLARY SINUSITIS: Primary | ICD-10-CM

## 2024-09-24 PROCEDURE — 99213 OFFICE O/P EST LOW 20 MIN: CPT | Performed by: NURSE PRACTITIONER

## 2024-09-24 NOTE — PROGRESS NOTES
CHIEF COMPLAINT:     Chief Complaint   Patient presents with    Sinus Problem     Symptoms for 10 days : green mucus , burning sensation in the eyes , headache, and sinus pressure.  OTC: Nasal rinse and Flonase  Negative at home covid test        HPI:   Hanna Barrett is a 54 year old female who presents for sinus congestion for  10  days. Symptoms have been worsening since onset. Sinus congestion/pain is described as a pressure and is located mainly front of face.  Reports thick green nasal discharge. Has treated symptoms with left over antibiotics, nasal rinse, flonase.  Patient also reports headache, cough, fullness in ears.  Denies fever, chills, dental pain, tinnitus, N/V/D.        Current Outpatient Medications   Medication Sig Dispense Refill    amoxicillin clavulanate 875-125 MG Oral Tab Take 1 tablet by mouth 2 (two) times daily for 7 days. 14 tablet 0    albuterol (2.5 MG/3ML) 0.083% Inhalation Nebu Soln Take by nebulization every 6 (six) hours as needed for Wheezing.      LISINOPRIL-HYDROCHLOROTHIAZIDE 20-12.5 MG Oral Tab TAKE ONE TABLET BY MOUTH ONCE DAILY 30 tablet 0    semaglutide (OZEMPIC, 0.25 OR 0.5 MG/DOSE,) 2 MG/1.5ML Subcutaneous Solution Pen-injector Inject into the skin once a week.      Cyanocobalamin (VITAMIN B-12) 1000 MCG Sublingual SL Tab Place 2,000 tablets under the tongue 2 (two) times daily.      montelukast 10 MG Oral Tab Take 1 tablet (10 mg total) by mouth nightly. (Patient taking differently: Take 1 tablet (10 mg total) by mouth daily.) 90 tablet 3    albuterol 108 (90 Base) MCG/ACT Inhalation Aero Soln Inhale 2 puffs into the lungs every 6 (six) hours as needed for Wheezing or Shortness of Breath. 3 each 3    amphetamine-dextroamphetamine 15 MG Oral Tab TAKE ONE TABLET BY MOUTH ONCE DAILY 8 TO 9 HOURS AFTER VYVANSE DOSE      ARIPiprazole 2 MG Oral Tab Take one (1) tablet by mouth every morning      VYVANSE 70 MG Oral Cap Take 1 capsule (70 mg total) by mouth every morning.       SYMBICORT 160-4.5 MCG/ACT Inhalation Aerosol Inhale 2 puffs into the lungs 2 (two) times daily.      HYDROcodone-acetaminophen (NORCO) 5-325 MG Oral Tab Take 1 tablet by mouth every 6 (six) hours as needed for Pain. 20 tablet 0    cetirizine 10 MG Oral Tab Take 1 tablet (10 mg total) by mouth daily. 90 tablet 1    ferrous sulfate 325 (65 FE) MG Oral Tab EC Take 1 tablet (325 mg total) by mouth daily with breakfast.      umeclidinium bromide (INCRUSE ELLIPTA) 62.5 MCG/ACT Inhalation Aerosol Powder, Breath Activated Inhale 1 puff into the lungs daily. 3 each 3    Multiple Vitamin Oral Tab Take 1 tablet by mouth daily.      ibuprofen 200 MG Oral Tab Take 4 tablets (800 mg total) by mouth every 8 (eight) hours as needed for Pain.      cholecalciferol 25 MCG (1000 UT) Oral Cap Take 1 capsule (1,000 Units total) by mouth daily.  0    DULoxetine HCl 60 MG Oral Cap DR Particles Take 1 capsule (60 mg total) by mouth daily.  2      Past Medical History:    Abnormal uterine bleeding    bleeds every 2 weeks    Anxiety state    Asthma (HCC)    Attention deficit hyperactivity disorder (ADHD)    BACK PAIN    Back problem    lumbar radiculopathy    Cancer (HCC)    adenocarcinoma of right lung    MARCIO II (cervical intraepithelial neoplasia II)    DDD (degenerative disc disease), lumbar    DDD (degenerative disc disease), thoracic    Depression    Disorder of thyroid    Dyspareunia    Exposure to medical diagnostic radiation    On hold since 4/2022    Fall    Fibromyalgia    Fibromyalgia    Genital warts    High blood pressure    History of COVID-19    COVID + 7/2020 Headache - NO Hospitalization needed     History of lumbar fusion    Hx of motion sickness    IBS (irritable bowel syndrome)    Per patient - Just constipation    Infertility, female    Lumbar radiculopathy    Mild dysplasia of cervix    Pap smear for cervical cancer screening    pt states normal    Papanicolaou smear of cervix with high grade squamous intraepithelial  lesion (HGSIL)    Personal history of antineoplastic chemotherapy    Last chemo 22 prior to lung bx 22    Post covid-19 condition, unspecified    symptoms: HA; s/s resolved-yes; not hospitalized    Problems with swallowing    with radiation    Sexually transmitted disease    HPV    Substance abuse (HCC)    cocaine    Urinary incontinence    Visual impairment    glasses/contacts bifocals      Past Surgical History:   Procedure Laterality Date    Appendectomy  1980    Back surgery  2009    fusion L5-S1    Back surgery  2021    RIGHT LUMBAR 3/ LUMBAR 4, LUMBAR 4/ LUMBAR 5 HEMILAMINTOMIES, LEFT LUMBAR 2 / LUMBAR 3 MICRODISCECTOMY    Colonoscopy N/A 2023    Procedure: COLONOSCOPY;  Surgeon: Devante Kern MD;  Location:  ENDOSCOPY    Colposcopy,bx cervix/endocerv curr  11    MARCIO 1    Laparoscopy procedure unlisted      Ovarian cyst removed    Leep  2011    MARCIO 2          X2    Other surgical history      bunions bilateral    Other surgical history      nodule lymph nodes neck    Other surgical history  2016     Bladder sling    Other surgical history  2020    Cystoscopy, Dr Beach      Family History   Problem Relation Age of Onset    Other (lung CA) Self     Hypertension Mother     Diabetes Mother     Heart Disease Mother     Heart Disease Father     Other (lung cancer) Father 55        Lung Cancer    Other (pancreatic cancer) Sister 47        Pancreatic Cancer    Cancer Sister 51        lung CA    Other (Other) Sister         Back problems    Other (Other) Son         anxiety    Other (Other) Son         behavioral problems    Diabetes Maternal Grandmother     Breast Cancer Maternal Grandmother         dx late-60s    Stroke Maternal Grandfather 86    Dementia Paternal Grandmother     Heart Disorder Paternal Grandfather     Cancer Maternal Aunt 50        LUNG CA 51    Breast Cancer Maternal Aunt         dx 46-47y    Ovarian Cancer Maternal Cousin Female 33          of ovarian ca at 35y      Social History     Socioeconomic History    Marital status:    Tobacco Use    Smoking status: Former     Current packs/day: 0.00     Average packs/day: 0.8 packs/day for 19.0 years (14.5 ttl pk-yrs)     Types: Cigarettes     Start date:      Quit date: 2010     Years since quittin.7     Passive exposure: Past    Smokeless tobacco: Former     Quit date: 2024    Tobacco comments:     Has not vaped since 2024.    Vaping Use    Vaping status: Every Day    Substances: Nicotine, daily    Devices: Pre-filled pod   Substance and Sexual Activity    Alcohol use: Yes     Alcohol/week: 5.0 - 6.0 standard drinks of alcohol     Types: 3 Glasses of wine, 2 - 3 Standard drinks or equivalent per week     Comment: glass of wine each night    Drug use: Not Currently     Types: Cocaine     Comment: USED COCAINE BETW [id number]    Sexual activity: Yes     Partners: Male   Other Topics Concern    Caffeine Concern Yes     Comment: 3-4 daily of pop    Exercise No     Comment: not tolerated right now     Social Determinants of Health     Food Insecurity: No Food Insecurity (2024)    Food Insecurity     Food Insecurity: Never true   Transportation Needs: No Transportation Needs (2024)    Transportation Needs     Lack of Transportation: No   Housing Stability: Low Risk  (2024)    Housing Stability     Housing Instability: No         REVIEW OF SYSTEMS:   GENERAL: feels well otherwise, no unplanned weight change,  good appetite  SKIN: no rashes or abnormal skin lesions  HEENT: See HPI.    LUNGS: denies shortness of breath or wheezing, See HPI  CARDIOVASCULAR: denies chest pain or palpitations   GI: denies N/V/C or abdominal pain  NEURO: + sinus headaches.  No numbness or tingling in face.    EXAM:   BP 90/76   Pulse 62   Temp 97.5 °F (36.4 °C)   Resp 18   Ht 5' 5\" (1.651 m)   Wt 170 lb (77.1 kg)   LMP 2019 (Exact Date)   SpO2 98%   BMI 28.29 kg/m²   GENERAL: well  developed, well nourished,in no apparent distress  SKIN: no rashes,no suspicious lesions  HEAD: atraumatic, normocephalic,  + tenderness on palpation of maxillary sinuses  EYES: conjunctiva clear, EOM intact  EARS: TM's clear gray, no bulging, no retraction, + fluid, bony landmarks intact  NOSE: nostrils patent, clear nasal mucous, nasal mucosa reddened and swollen  THROAT: oral mucosa pink, moist. No visible dental caries. Posterior pharynx is mild erythematous. no exudates.  NECK: supple, non-tender  LUNGS: clear to auscultation bilaterally, no wheezes or rhonchi, no diminished breath sounds. Breathing is non labored.  CARDIO: RRR without murmur  EXTREMITIES: no cyanosis, clubbing or edema  LYMPH:  bilateral anterior cervical lymphadenopathy.   NEURO: No focal deficits       ASSESSMENT AND PLAN:   ASSESSMENT:  Hanna Barrett is a 54 year old female who presents with    ASSESSMENT:   Encounter Diagnosis   Name Primary?    Acute non-recurrent maxillary sinusitis Yes     1. Acute non-recurrent maxillary sinusitis  - amoxicillin clavulanate 875-125 MG Oral Tab; Take 1 tablet by mouth 2 (two) times daily for 7 days.  Dispense: 14 tablet; Refill: 0    PLAN: Meds and instructions as below.  Comfort care instructions as listed in Patient Instructions.  To f/u with PCP if no improvement in 3-5 days or sooner if sx worsen.    Meds & Refills for this Visit:  Requested Prescriptions     Signed Prescriptions Disp Refills    amoxicillin clavulanate 875-125 MG Oral Tab 14 tablet 0     Sig: Take 1 tablet by mouth 2 (two) times daily for 7 days.       Risks, benefits, side effects of medication addressed and explained.    The patient indicates understanding of these issues and agrees to the plan.

## 2024-09-27 ENCOUNTER — HOSPITAL ENCOUNTER (OUTPATIENT)
Dept: CT IMAGING | Facility: HOSPITAL | Age: 54
Discharge: HOME | End: 2024-09-27
Attending: INTERNAL MEDICINE
Payer: MEDICAID

## 2024-09-27 DIAGNOSIS — H57.9 VISUAL SYMPTOMS: ICD-10-CM

## 2024-09-27 DIAGNOSIS — C34.2 MALIGNANT NEOPLASM OF MIDDLE LOBE OF RIGHT LUNG (HCC): ICD-10-CM

## 2024-09-27 DIAGNOSIS — C34.91 PRIMARY ADENOCARCINOMA OF RIGHT LUNG (HCC): ICD-10-CM

## 2024-09-27 LAB
CREAT BLD-MCNC: 0.8 MG/DL
EGFRCR SERPLBLD CKD-EPI 2021: 88 ML/MIN/1.73M2 (ref 60–?)

## 2024-09-27 PROCEDURE — 74177 CT ABD & PELVIS W/CONTRAST: CPT | Performed by: INTERNAL MEDICINE

## 2024-09-27 PROCEDURE — 82565 ASSAY OF CREATININE: CPT

## 2024-09-27 PROCEDURE — 71260 CT THORAX DX C+: CPT | Performed by: INTERNAL MEDICINE

## 2024-11-01 DIAGNOSIS — D64.9 ANEMIA, NORMOCYTIC NORMOCHROMIC: ICD-10-CM

## 2024-11-01 DIAGNOSIS — C34.91 PRIMARY ADENOCARCINOMA OF RIGHT LUNG (HCC): Primary | ICD-10-CM

## 2024-11-07 ENCOUNTER — OFFICE VISIT (OUTPATIENT)
Dept: HEMATOLOGY/ONCOLOGY | Facility: HOSPITAL | Age: 54
End: 2024-11-07
Attending: INTERNAL MEDICINE
Payer: MEDICAID

## 2024-11-07 VITALS
OXYGEN SATURATION: 97 % | WEIGHT: 175.38 LBS | RESPIRATION RATE: 16 BRPM | HEART RATE: 110 BPM | DIASTOLIC BLOOD PRESSURE: 86 MMHG | TEMPERATURE: 98 F | HEIGHT: 65 IN | SYSTOLIC BLOOD PRESSURE: 118 MMHG | BODY MASS INDEX: 29.22 KG/M2

## 2024-11-07 DIAGNOSIS — M54.12 CERVICAL RADICULOPATHY: ICD-10-CM

## 2024-11-07 DIAGNOSIS — H57.9 VISUAL SYMPTOMS: Primary | ICD-10-CM

## 2024-11-07 DIAGNOSIS — R59.0 MEDIASTINAL ADENOPATHY: ICD-10-CM

## 2024-11-07 DIAGNOSIS — C34.91 PRIMARY ADENOCARCINOMA OF RIGHT LUNG (HCC): ICD-10-CM

## 2024-11-07 DIAGNOSIS — D64.9 ANEMIA, NORMOCYTIC NORMOCHROMIC: ICD-10-CM

## 2024-11-07 LAB
ALBUMIN SERPL-MCNC: 4.2 G/DL (ref 3.2–4.8)
ALBUMIN/GLOB SERPL: 1.5 {RATIO} (ref 1–2)
ALP LIVER SERPL-CCNC: 62 U/L
ALT SERPL-CCNC: 13 U/L
ANION GAP SERPL CALC-SCNC: 4 MMOL/L (ref 0–18)
AST SERPL-CCNC: 16 U/L (ref ?–34)
BASOPHILS # BLD AUTO: 0.03 X10(3) UL (ref 0–0.2)
BASOPHILS NFR BLD AUTO: 0.6 %
BILIRUB SERPL-MCNC: 0.3 MG/DL (ref 0.3–1.2)
BUN BLD-MCNC: 13 MG/DL (ref 9–23)
CALCIUM BLD-MCNC: 10.4 MG/DL (ref 8.7–10.4)
CHLORIDE SERPL-SCNC: 109 MMOL/L (ref 98–112)
CO2 SERPL-SCNC: 28 MMOL/L (ref 21–32)
CREAT BLD-MCNC: 0.87 MG/DL
DEPRECATED HBV CORE AB SER IA-ACNC: 59 NG/ML
EGFRCR SERPLBLD CKD-EPI 2021: 80 ML/MIN/1.73M2 (ref 60–?)
EOSINOPHIL # BLD AUTO: 0.15 X10(3) UL (ref 0–0.7)
EOSINOPHIL NFR BLD AUTO: 2.9 %
ERYTHROCYTE [DISTWIDTH] IN BLOOD BY AUTOMATED COUNT: 12.3 %
FASTING STATUS PATIENT QL REPORTED: NO
FOLATE SERPL-MCNC: 29.8 NG/ML (ref 5.4–?)
GLOBULIN PLAS-MCNC: 2.8 G/DL (ref 2–3.5)
GLUCOSE BLD-MCNC: 87 MG/DL (ref 70–99)
HCT VFR BLD AUTO: 37.4 %
HGB BLD-MCNC: 12.8 G/DL
IMM GRANULOCYTES # BLD AUTO: 0.01 X10(3) UL (ref 0–1)
IMM GRANULOCYTES NFR BLD: 0.2 %
LDH SERPL L TO P-CCNC: 168 U/L
LYMPHOCYTES # BLD AUTO: 0.75 X10(3) UL (ref 1–4)
LYMPHOCYTES NFR BLD AUTO: 14.3 %
MCH RBC QN AUTO: 30.1 PG (ref 26–34)
MCHC RBC AUTO-ENTMCNC: 34.2 G/DL (ref 31–37)
MCV RBC AUTO: 88 FL
MONOCYTES # BLD AUTO: 0.58 X10(3) UL (ref 0.1–1)
MONOCYTES NFR BLD AUTO: 11 %
NEUTROPHILS # BLD AUTO: 3.74 X10 (3) UL (ref 1.5–7.7)
NEUTROPHILS # BLD AUTO: 3.74 X10(3) UL (ref 1.5–7.7)
NEUTROPHILS NFR BLD AUTO: 71 %
OSMOLALITY SERPL CALC.SUM OF ELEC: 291 MOSM/KG (ref 275–295)
PLATELET # BLD AUTO: 295 10(3)UL (ref 150–450)
POTASSIUM SERPL-SCNC: 4.4 MMOL/L (ref 3.5–5.1)
PROT SERPL-MCNC: 7 G/DL (ref 5.7–8.2)
RBC # BLD AUTO: 4.25 X10(6)UL
SODIUM SERPL-SCNC: 141 MMOL/L (ref 136–145)
T4 FREE SERPL-MCNC: 1 NG/DL (ref 0.8–1.7)
TSI SER-ACNC: 2.89 UIU/ML (ref 0.55–4.78)
VIT B12 SERPL-MCNC: >2000 PG/ML (ref 211–911)
WBC # BLD AUTO: 5.3 X10(3) UL (ref 4–11)

## 2024-11-07 PROCEDURE — 99214 OFFICE O/P EST MOD 30 MIN: CPT | Performed by: INTERNAL MEDICINE

## 2024-11-07 RX ORDER — DILTIAZEM HYDROCHLORIDE 120 MG/1
120 TABLET, FILM COATED ORAL DAILY
COMMUNITY
Start: 2024-09-10

## 2024-11-07 NOTE — PROGRESS NOTES
3 month MD f/u for adenocarcinoma of R Lung. CT completed 9/30/24. C/o increased fatigue over the last 2 months. Seeing [facility] for eye sight changes.     Education Record    Learner:  Patient    Disease / Diagnosis: h/o R Lung ca    Barriers / Limitations:  None   Comments:    Method:  Discussion   Comments:    General Topics:  Plan of care reviewed   Comments:    Outcome:  Shows understanding   Comments:

## 2024-11-07 NOTE — PAT NURSING NOTE
PreOp Instructions     You are scheduled for: an Interventional Radiology Procedure     Date of Procedure: 11/26/24. CHECK IN AT 8:30 AM     Diet Instructions: Do not eat or drink anything after midnight including gum, mints, candy, etc.     Medications: Medications you are allowed to take can be taken with a sip of water the morning of your procedure     Do not take the following Blood Thinner(s): STOP IBUPROFEN FOR 5 DAYS PRIOR TO PROCEDURE     Other Medications: HOLD ADDERAL MORNING OF PROCEDURE     Skin Prep : Shower with antibacterial soap using a clean washcloth, prior to procedure. Once dried off, no lotions/powders/creams/ointments, etc.     Discharge Teaching: Your nurse will give you specific instructions before discharge, Most people can resume normal activities in 2-3 days, Any questions, please call the physician's office

## 2024-11-26 ENCOUNTER — HOSPITAL ENCOUNTER (OUTPATIENT)
Dept: INTERVENTIONAL RADIOLOGY/VASCULAR | Facility: HOSPITAL | Age: 54
Discharge: HOME | End: 2024-11-26
Attending: INTERNAL MEDICINE | Admitting: INTERNAL MEDICINE
Payer: MEDICAID

## 2024-11-26 VITALS
TEMPERATURE: 97 F | RESPIRATION RATE: 16 BRPM | DIASTOLIC BLOOD PRESSURE: 83 MMHG | HEART RATE: 96 BPM | SYSTOLIC BLOOD PRESSURE: 108 MMHG | OXYGEN SATURATION: 97 %

## 2024-11-26 DIAGNOSIS — C34.91 PRIMARY ADENOCARCINOMA OF RIGHT LUNG (HCC): ICD-10-CM

## 2024-11-26 LAB
INR CARTRIDGE LOT #: NORMAL
INR: 1 (ref 0.8–1.3)

## 2024-11-26 PROCEDURE — 0JPT0WZ REMOVAL OF TOTALLY IMPLANTABLE VASCULAR ACCESS DEVICE FROM TRUNK SUBCUTANEOUS TISSUE AND FASCIA, OPEN APPROACH: ICD-10-PCS | Performed by: RADIOLOGY

## 2024-11-26 PROCEDURE — 85610 PROTHROMBIN TIME: CPT | Performed by: RADIOLOGY

## 2024-11-26 PROCEDURE — 77001 FLUOROGUIDE FOR VEIN DEVICE: CPT | Performed by: RADIOLOGY

## 2024-11-26 PROCEDURE — 36590 REMOVAL TUNNELED CV CATH: CPT | Performed by: RADIOLOGY

## 2024-11-26 RX ORDER — SODIUM CHLORIDE 9 MG/ML
INJECTION, SOLUTION INTRAVENOUS CONTINUOUS
Status: DISCONTINUED | OUTPATIENT
Start: 2024-11-26 | End: 2024-11-26

## 2024-11-26 RX ORDER — VANCOMYCIN HYDROCHLORIDE 1 G/20ML
INJECTION, POWDER, LYOPHILIZED, FOR SOLUTION INTRAVENOUS
Status: COMPLETED
Start: 2024-11-26 | End: 2024-11-26

## 2024-11-26 RX ORDER — MIDAZOLAM HYDROCHLORIDE 1 MG/ML
INJECTION INTRAMUSCULAR; INTRAVENOUS
Status: COMPLETED
Start: 2024-11-26 | End: 2024-11-26

## 2024-11-26 RX ORDER — LIDOCAINE HYDROCHLORIDE AND EPINEPHRINE BITARTRATE 20; .01 MG/ML; MG/ML
INJECTION, SOLUTION SUBCUTANEOUS
Status: COMPLETED
Start: 2024-11-26 | End: 2024-11-26

## 2024-11-26 RX ADMIN — SODIUM CHLORIDE: 9 INJECTION, SOLUTION INTRAVENOUS at 09:00:00

## 2024-11-26 NOTE — DISCHARGE INSTRUCTIONS
~ Follow up with MDs as scheduled.   ~ Rest today and resume regular activity in the am as tolerated.   ~ No driving or drinking alcohol for 24hrs.   ~ Resume diet and medications.   ~ No lifting more than 10 pounds for 48hrs. Avoid lifting heavy objects such as a purse or a backpack from the shoulder of which the port was placed for 48hrs.   ~ Avoid strenuous activity with the arm of which th port was placed for 48hrs.   ~ Keep bandage completely dry and intact. Leave large brown bandage on until Sunday. You can remove on Thursday and shower. Do not remove the paper tape or glue. That will fall off in 7-10 days. Do not submerge under water until the incision is completely healed about 2-3 weeks.   ~ Notify MD if any signs of infection... Fever, chills, redness, swelling or drainage.   ~ It is normal to have some discomfort at the incisions ite for the first 24-48hrs. You make take over the counter Tylenol if needed.

## 2024-11-26 NOTE — PROGRESS NOTES
Rc'd pt from IR s/p port removal in stable condition. VSS. Aquacel to right chest is soft, clean and dry. No bleeding or hematoma. Pt denies c/o pain or discomfort. Pt tolerating po intake well. Pts son at bedside.     11:10: Bedrest completed. Pt ambulated and tolerated well. IV removed with catheter intact. Reviewed discharge instructions verbalizes understanding. Home via w/c in stable condition without c/o. Pts son is the .

## 2024-12-04 NOTE — H&P
[facility]   part of [facility]   History & Physical    Hanna Barrett Patient Status:  Outpatient    1970 MRN [id number]   Location [facility] INTERVENTIONAL SUITES Attending No att. providers found   Hosp Day # 0 PCP Guillermo Curry MD     Admitting Diagnosis:   Referral for chestport removal.    History of Present Illness:   Lung cancer, completion of chemotherapy.    History   Past Medical History:  Past Medical History:    Abnormal uterine bleeding    bleeds every 2 weeks    Anxiety state    Asthma (HCC)    Attention deficit hyperactivity disorder (ADHD)    BACK PAIN    Back problem    lumbar radiculopathy    Cancer (HCC)    adenocarcinoma of right lung    MARCIO II (cervical intraepithelial neoplasia II)    DDD (degenerative disc disease), lumbar    DDD (degenerative disc disease), thoracic    Depression    Disorder of thyroid    Dyspareunia    Exposure to medical diagnostic radiation    On hold since 2022    Fall    Fibromyalgia    Fibromyalgia    Genital warts    High blood pressure    History of COVID-19    COVID + 2020 Headache - NO Hospitalization needed     History of lumbar fusion    Hx of motion sickness    IBS (irritable bowel syndrome)    Per patient - Just constipation    Infertility, female    Lumbar radiculopathy    Mild dysplasia of cervix    Pap smear for cervical cancer screening    pt states normal    Papanicolaou smear of cervix with high grade squamous intraepithelial lesion (HGSIL)    Personal history of antineoplastic chemotherapy    Last chemo 22 prior to lung bx 22    Post covid-19 condition, unspecified    symptoms: HA; s/s resolved-yes; not hospitalized    Problems with swallowing    with radiation    Sexually transmitted disease    HPV    Substance abuse (HCC)    cocaine    Urinary incontinence    Visual impairment    glasses/contacts bifocals       Past Surgical History:  Past Surgical History:   Procedure Laterality Date    Appendectomy      Back  surgery  2009    fusion L5-S1    Back surgery  2021    RIGHT LUMBAR 3/ LUMBAR 4, LUMBAR 4/ LUMBAR 5 HEMILAMINTOMIES, LEFT LUMBAR 2 / LUMBAR 3 MICRODISCECTOMY    Colonoscopy N/A 2023    Procedure: COLONOSCOPY;  Surgeon: Devante Kern MD;  Location:  ENDOSCOPY    Colposcopy,bx cervix/endocerv curr  11    MARCIO 1    Laparoscopy procedure unlisted      Ovarian cyst removed    Leep  2011    MARCIO 2          X2    Other surgical history      bunions bilateral    Other surgical history      nodule lymph nodes neck    Other surgical history       Bladder sling    Other surgical history  2020    Cystoscopy, Dr Beach       Social History:  Social History     Tobacco Use    Smoking status: Former     Current packs/day: 0.00     Average packs/day: 0.8 packs/day for 19.0 years (14.5 ttl pk-yrs)     Types: Cigarettes     Start date:      Quit date:      Years since quittin.9     Passive exposure: Past    Smokeless tobacco: Former     Quit date: 2024    Tobacco comments:     Has not vaped since 2024.    Substance Use Topics    Alcohol use: Yes     Alcohol/week: 5.0 - 6.0 standard drinks of alcohol     Types: 3 Glasses of wine, 2 - 3 Standard drinks or equivalent per week     Comment: glass of wine each night        Family History:  Family History   Problem Relation Age of Onset    Other (lung CA) Self     Hypertension Mother     Diabetes Mother     Heart Disease Mother     Heart Disease Father     Other (lung cancer) Father 55        Lung Cancer    Other (pancreatic cancer) Sister 47        Pancreatic Cancer    Cancer Sister 51        lung CA    Other (Other) Sister         Back problems    Other (Other) Son         anxiety    Other (Other) Son         behavioral problems    Diabetes Maternal Grandmother     Breast Cancer Maternal Grandmother         dx late-60s    Stroke Maternal Grandfather 86    Dementia Paternal Grandmother     Heart Disorder Paternal  Grandfather     Cancer Maternal Aunt 50        LUNG CA 51    Breast Cancer Maternal Aunt         dx 46-47y    Ovarian Cancer Maternal Cousin Female 33         of ovarian ca at 35y       Allergies/Medications:   Allergies:  Allergies[1]    Medications:    Current Outpatient Medications:     dilTIAZem HCl 120 MG Oral Tab, Take 1 tablet (120 mg total) by mouth daily., Disp: , Rfl:     albuterol (2.5 MG/3ML) 0.083% Inhalation Nebu Soln, Take by nebulization every 6 (six) hours as needed for Wheezing., Disp: , Rfl:     LISINOPRIL-HYDROCHLOROTHIAZIDE 20-12.5 MG Oral Tab, TAKE ONE TABLET BY MOUTH ONCE DAILY, Disp: 30 tablet, Rfl: 0    semaglutide (OZEMPIC, 0.25 OR 0.5 MG/DOSE,) 2 MG/1.5ML Subcutaneous Solution Pen-injector, Inject into the skin once a week., Disp: , Rfl:     Cyanocobalamin (VITAMIN B-12) 1000 MCG Sublingual SL Tab, Place 2,000 tablets under the tongue 2 (two) times daily., Disp: , Rfl:     montelukast 10 MG Oral Tab, Take 1 tablet (10 mg total) by mouth nightly., Disp: 90 tablet, Rfl: 3    albuterol 108 (90 Base) MCG/ACT Inhalation Aero Soln, Inhale 2 puffs into the lungs every 6 (six) hours as needed for Wheezing or Shortness of Breath., Disp: 3 each, Rfl: 3    amphetamine-dextroamphetamine 15 MG Oral Tab, TAKE ONE TABLET BY MOUTH ONCE DAILY 8 TO 9 HOURS AFTER VYVANSE DOSE, Disp: , Rfl:     ARIPiprazole 2 MG Oral Tab, Take one (1) tablet by mouth every morning, Disp: , Rfl:     VYVANSE 70 MG Oral Cap, Take 1 capsule (70 mg total) by mouth every morning., Disp: , Rfl:     SYMBICORT 160-4.5 MCG/ACT Inhalation Aerosol, Inhale 2 puffs into the lungs 2 (two) times daily., Disp: , Rfl:     HYDROcodone-acetaminophen (NORCO) 5-325 MG Oral Tab, Take 1 tablet by mouth every 6 (six) hours as needed for Pain., Disp: 20 tablet, Rfl: 0    cetirizine 10 MG Oral Tab, Take 1 tablet (10 mg total) by mouth daily., Disp: 90 tablet, Rfl: 1    ferrous sulfate 325 (65 FE) MG Oral Tab EC, Take 1 tablet (325 mg total) by  mouth daily with breakfast., Disp: , Rfl:     umeclidinium bromide (INCRUSE ELLIPTA) 62.5 MCG/ACT Inhalation Aerosol Powder, Breath Activated, Inhale 1 puff into the lungs daily., Disp: 3 each, Rfl: 3    Multiple Vitamin Oral Tab, Take 1 tablet by mouth daily., Disp: , Rfl:     ibuprofen 200 MG Oral Tab, Take 4 tablets (800 mg total) by mouth every 8 (eight) hours as needed for Pain., Disp: , Rfl:     cholecalciferol 25 MCG (1000 UT) Oral Cap, Take 1 capsule (1,000 Units total) by mouth daily., Disp: , Rfl: 0    DULoxetine HCl 60 MG Oral Cap DR Particles, Take 1 capsule (60 mg total) by mouth daily., Disp: , Rfl: 2    Physical Exam & Review of Systems:   Physical Exam:    /83   Pulse 96   Temp 97 °F (36.1 °C) (Temporal)   Resp 16   LMP 02/14/2019 (Exact Date)   SpO2 97%     General: NAD  Neck: No JVD  Lungs: CTA bilat  Heart: RRR, S1, S2  Abdomen: Soft, NT/ND, BS+x4  Extremities: Warm, dry, no LE edema bilat  Pulses: 2+ bilat DP    Results:   Labs:  No results for input(s): \"RBC\", \"HGB\", \"HCT\", \"MCV\", \"MCH\", \"MCHC\", \"RDW\", \"NEPRELIM\", \"WBC\", \"PLT\" in the last 168 hours.  No results for input(s): \"PTP\", \"INR\", \"PTT\" in the last 168 hours.  No results for input(s): \"GLU\", \"BUN\", \"CREATSERUM\", \"GFRAA\", \"GFRNAA\", \"CA\", \"NA\", \"K\", \"CL\", \"CO2\" in the last 168 hours.    Assessment/Plan:   Impression: Referral for chestport removal.    Recommendations: Outpatient procedure.    Fabien Wadsworth MD  12/4/2024  9:45 AM           [1]   Allergies  Allergen Reactions    Gabapentin SWELLING and UNKNOWN    Erythromycin UNKNOWN    Clindamycin RASH

## 2024-12-09 ENCOUNTER — PATIENT MESSAGE (OUTPATIENT)
Dept: FAMILY MEDICINE CLINIC | Facility: CLINIC | Age: 54
End: 2024-12-09

## 2024-12-09 DIAGNOSIS — I10 ESSENTIAL HYPERTENSION: ICD-10-CM

## 2024-12-09 DIAGNOSIS — D49.2 ABNORMAL SKIN GROWTH: Primary | ICD-10-CM

## 2024-12-09 NOTE — TELEPHONE ENCOUNTER
I called [facility] on this as we do not show any notes of her being on lisinopril 10mg only.  See thread of msgs.    S/w Rico RN there. Updated her on what pt is reporting to us.    She sees what we see, no mention of plain lisinopril. She sts she will look into with Dr Lopez who saw pt in October and get back to us. At any rate--if we are managing her bp, she may be due for appt. Saw Dr Curry in May for that.    Will await cb from their ofc.

## 2024-12-09 NOTE — TELEPHONE ENCOUNTER
I suppose ENT for nose, and derm for arm, but could see me in clinic for the skin issue on arm as well    Guillermo Curry MD

## 2024-12-10 RX ORDER — LISINOPRIL AND HYDROCHLOROTHIAZIDE 12.5; 2 MG/1; MG/1
1 TABLET ORAL DAILY
Qty: 30 TABLET | Refills: 0 | Status: CANCELLED
Start: 2024-12-10

## 2024-12-10 NOTE — TELEPHONE ENCOUNTER
We have not heard back from [facility] but from what pt is reporting they advised to be on the combo vs just plain.  Last filled in August.  Unable to pend d/t encounter being closed.   Please advise thx

## 2024-12-10 NOTE — TELEPHONE ENCOUNTER
Disregard note below from RN, we are awaiting cb cardio ofc, pt is sending msgs before we even get a response on what she is supposed to be taking

## 2024-12-10 NOTE — TELEPHONE ENCOUNTER
Pt is having a higher 135/90.    She was holding since this last note from cardiology:    -Hold lisinopril-HCTZ unless BP elevated - instructed to log BP/HR x2/day and resume if BP elevated     Ok to fill?

## 2024-12-11 ENCOUNTER — TELEPHONE (OUTPATIENT)
Dept: FAMILY MEDICINE CLINIC | Facility: CLINIC | Age: 54
End: 2024-12-11

## 2024-12-11 RX ORDER — LISINOPRIL AND HYDROCHLOROTHIAZIDE 12.5; 2 MG/1; MG/1
1 TABLET ORAL DAILY
Qty: 30 TABLET | Refills: 0 | Status: SHIPPED | OUTPATIENT
Start: 2024-12-11 | End: 2024-12-11 | Stop reason: ALTCHOICE

## 2024-12-11 NOTE — TELEPHONE ENCOUNTER
Script sent    Hanna,    I discussed your request with your physician. The doctor is concerned about your symptoms and would like to see you in the office to discuss them.    Please remember that you can <a [email or web address]">schedule your own appointment online</a>, or you can call the office for an appointment.      Feel free to reply to this message or call the office if you have any questions or concerns.

## 2024-12-11 NOTE — TELEPHONE ENCOUNTER
Nurse called from [facility], Dr Lopez from [facility] sent over Lisinopril 5mg.  Pt has a follow up with them 12/23/24, pt is aware of the medication change and the plan per the nurse from [facility].

## 2024-12-12 NOTE — TELEPHONE ENCOUNTER
Please schedule patient for procedure. Please refer to pre-op data and checklist in note.  Pt picked up rx, verified id # [phone] (pt memorized #)

## 2024-12-17 ENCOUNTER — PATIENT MESSAGE (OUTPATIENT)
Dept: SURGERY | Facility: CLINIC | Age: 54
End: 2024-12-17

## 2024-12-17 NOTE — TELEPHONE ENCOUNTER
Patient has not been seen in office since 6/13/2024  Patient is requesting a refill on [place]    MRI has been denied due to patient not completing physical therapy, message from patient suggest she is not open to following through with physical therapy referral.      Review of EMR does not demonstrate we are currently prescribing nor does it show any recent scripts from us, most recent fill was by  PCP on 7/12/24    Per LOV:  \"ASSESSMENT:  S/p lumbar fusion  Lumbar stenosis with neurogenic claudication  Failed back syndrome     PLAN:  Reviewed previous imaging with pt  -Repeat MRI lumbar with and w/o contrast  -Lumbar flexion extension xrays ordered  -CT lumbar to evaluate bone anatomy  2.   F/u after imaging to discuss next steps for pain control\"     Routed to provider for input and refill if appropriate

## 2024-12-18 NOTE — TELEPHONE ENCOUNTER
Patient has not been seen since 6/2024. MRI imaging ordered at that time. PT provided in July. Did she complete PT? No updates from patient since her last OV.     At this time, as no neurosurgical intervention is recommended currently, I do not recommend Norco scripts. I recommend she become re established with pain services for management of her pain in the interim and medication therapy.     If surgery is recommended in the future, we can manage with pain medication therapy in the post operative setting.     If she's like a short course of a muscle relaxant or NSAID such as Meloxicam to get her to her pain services appointment, we can provide that.     [facility] nursing, please advise and let me know how she'd like to proceed, thank you

## 2024-12-18 NOTE — TELEPHONE ENCOUNTER
Message below noted.    Advised patient of message and to let us know if those medication therapies are something she is willing to try.

## 2024-12-18 NOTE — TELEPHONE ENCOUNTER
Noted that patient has replied, stating that she willing to try Meloxicam.  Patient also states that she would like to schedule imaging. Contact number for Central Scheduling was provided in message to patient.  Recommendation to attempt PT was also included in message.

## 2025-01-08 ENCOUNTER — OFFICE VISIT (OUTPATIENT)
Dept: FAMILY MEDICINE CLINIC | Facility: CLINIC | Age: 55
End: 2025-01-08
Payer: MEDICAID

## 2025-01-08 VITALS
TEMPERATURE: 97 F | BODY MASS INDEX: 26.52 KG/M2 | OXYGEN SATURATION: 100 % | RESPIRATION RATE: 18 BRPM | DIASTOLIC BLOOD PRESSURE: 76 MMHG | HEART RATE: 99 BPM | WEIGHT: 165 LBS | SYSTOLIC BLOOD PRESSURE: 110 MMHG | HEIGHT: 66 IN

## 2025-01-08 DIAGNOSIS — J01.00 ACUTE NON-RECURRENT MAXILLARY SINUSITIS: Primary | ICD-10-CM

## 2025-01-08 PROCEDURE — 99213 OFFICE O/P EST LOW 20 MIN: CPT | Performed by: PHYSICIAN ASSISTANT

## 2025-01-08 NOTE — PATIENT INSTRUCTIONS
Home Care    Take Amox-clav as prescribed.    Flonase or Nasacort OTC for nasal symptoms as instructed  Follow up with your health care provider if your symptoms do not improve in 3-5 days.   You can use an over-the-counter decongestant.  Sudafed 12 hour or Afrin Nasal Spray as per package directions may help with nasal/sinus congestion.  Do not use Afrin for longer than 3 days.  (People with high blood pressure should not use decongestants. They can raise blood pressure.)  Over the counter saline nasal spray may also help with congestion.  An expectorant with guaifenesin may help thin nasal mucus and help your sinuses drain fluids.  (Mucinex D contains both guaifenesin and sudafed).  Avoid use of antihistamines unless allergies contributed to your infection  Over-the-counter acetaminophen/Ibuprofen according to package instructions as needed for fever or pain.  If you have chronic liver or kidney disease or ever had a stomach ulcer, talk with your healthcare provider before using these medicines.   Over the counter Delsym or cough drops may help with your cough.  Cepacol lozenges can soothe your throat.  For coughing at night, sleep with head of bed elevated; try tea with honey  Humidify the air.  Steam inhalation and warm compresses often help relieve pressure  Drink plenty of water, hot tea, and other liquids. This may help thin nasal mucus. It also may help your sinuses drain fluids.    If you were prescribed an antibiotic:   Complete the entire prescription.  If you develop a rash, hives, itching, throat tightness, or shortness of breath while on the antibiotic or shortly after completion, please call your PCP immediately and stop your antibiotic.  This may be an allergic reaction.     Probiotics or yogurt daily during antibioitic use may help decrease stomach upset and restore good bacteria to the gut.  Take the probiotic at least 2 -3 hours after taking the antibiotic.  Examples include:  Yogurt: eat 4-8 oz  twice daily.     Probiotics: Capsule/granule forms such as Florastor, Florajen (refrigerated), or Culturelle.      When to seek medical advice  Call your healthcare provider if any of these occur:  Facial pain or headache that gets worse  Stiff neck  Unusual drowsiness or confusion  Swelling of your forehead or eyelids  Vision problems, such as blurred or double vision  Fever of 100.4ºF (38ºC) or higher, or as directed by your healthcare provider  Seizure  Breathing problems  Symptoms don't go away in 10 days]

## 2025-01-08 NOTE — PROGRESS NOTES
CHIEF COMPLAINT:     Chief Complaint   Patient presents with    Sinus Problem     X 4 weeks, PND, yellow/green mucus, took 3 antibiotics, OTC decongestant      HPI:   Hanna Barrett is a 54 year old female who presents for sinus congestion for 1 month. Symptoms have been worsening since onset. Sinus congestion/pain is described as a pressure and is located mainly at cheeks and forehead.  Patient reports thick post nasal drainage, purulent nasal mucous, + sinus headache, +cough to clear PND worse at night, no sore throat, + fullness in ears.  Denies fever, chills, dental pain, tinnitus.    Has treated symptoms with decongestant, nasal spray.     Has appt with ENT end of January   Took 3 pills of Amoxicillin three weeks ago- did not help.     Current Outpatient Medications   Medication Sig Dispense Refill    amoxicillin clavulanate 875-125 MG Oral Tab Take 1 tablet by mouth 2 (two) times daily for 7 days. 14 tablet 0    dilTIAZem HCl 120 MG Oral Tab Take 1 tablet (120 mg total) by mouth daily.      albuterol (2.5 MG/3ML) 0.083% Inhalation Nebu Soln Take by nebulization every 6 (six) hours as needed for Wheezing.      semaglutide (OZEMPIC, 0.25 OR 0.5 MG/DOSE,) 2 MG/1.5ML Subcutaneous Solution Pen-injector Inject into the skin once a week.      Cyanocobalamin (VITAMIN B-12) 1000 MCG Sublingual SL Tab Place 2,000 tablets under the tongue 2 (two) times daily.      montelukast 10 MG Oral Tab Take 1 tablet (10 mg total) by mouth nightly. 90 tablet 3    albuterol 108 (90 Base) MCG/ACT Inhalation Aero Soln Inhale 2 puffs into the lungs every 6 (six) hours as needed for Wheezing or Shortness of Breath. 3 each 3    amphetamine-dextroamphetamine 15 MG Oral Tab TAKE ONE TABLET BY MOUTH ONCE DAILY 8 TO 9 HOURS AFTER VYVANSE DOSE      ARIPiprazole 2 MG Oral Tab Take one (1) tablet by mouth every morning      VYVANSE 70 MG Oral Cap Take 1 capsule (70 mg total) by mouth every morning.      SYMBICORT 160-4.5 MCG/ACT Inhalation  Aerosol Inhale 2 puffs into the lungs 2 (two) times daily.      HYDROcodone-acetaminophen (NORCO) 5-325 MG Oral Tab Take 1 tablet by mouth every 6 (six) hours as needed for Pain. 20 tablet 0    cetirizine 10 MG Oral Tab Take 1 tablet (10 mg total) by mouth daily. 90 tablet 1    ferrous sulfate 325 (65 FE) MG Oral Tab EC Take 1 tablet (325 mg total) by mouth daily with breakfast.      umeclidinium bromide (INCRUSE ELLIPTA) 62.5 MCG/ACT Inhalation Aerosol Powder, Breath Activated Inhale 1 puff into the lungs daily. 3 each 3    Multiple Vitamin Oral Tab Take 1 tablet by mouth daily.      ibuprofen 200 MG Oral Tab Take 4 tablets (800 mg total) by mouth every 8 (eight) hours as needed for Pain.      cholecalciferol 25 MCG (1000 UT) Oral Cap Take 1 capsule (1,000 Units total) by mouth daily.  0    DULoxetine HCl 60 MG Oral Cap DR Particles Take 1 capsule (60 mg total) by mouth daily.  2      Past Medical History:    Abnormal uterine bleeding    bleeds every 2 weeks    Anxiety state    Asthma (HCC)    Attention deficit hyperactivity disorder (ADHD)    BACK PAIN    Back problem    lumbar radiculopathy    Cancer (HCC)    adenocarcinoma of right lung    MARCIO II (cervical intraepithelial neoplasia II)    DDD (degenerative disc disease), lumbar    DDD (degenerative disc disease), thoracic    Depression    Disorder of thyroid    Dyspareunia    Exposure to medical diagnostic radiation    On hold since 4/2022    Fall    Fibromyalgia    Fibromyalgia    Genital warts    High blood pressure    History of COVID-19    COVID + 7/2020 Headache - NO Hospitalization needed     History of lumbar fusion    Hx of motion sickness    IBS (irritable bowel syndrome)    Per patient - Just constipation    Infertility, female    Lumbar radiculopathy    Mild dysplasia of cervix    Pap smear for cervical cancer screening    pt states normal    Papanicolaou smear of cervix with high grade squamous intraepithelial lesion (HGSIL)    Personal history of  antineoplastic chemotherapy    Last chemo 22 prior to lung bx 22    Post covid-19 condition, unspecified    symptoms: HA; s/s resolved-yes; not hospitalized    Problems with swallowing    with radiation    Sexually transmitted disease    HPV    Substance abuse (HCC)    cocaine    Urinary incontinence    Visual impairment    glasses/contacts bifocals      Past Surgical History:   Procedure Laterality Date    Appendectomy  1980    Back surgery  2009    fusion L5-S1    Back surgery  2021    RIGHT LUMBAR 3/ LUMBAR 4, LUMBAR 4/ LUMBAR 5 HEMILAMINTOMIES, LEFT LUMBAR 2 / LUMBAR 3 MICRODISCECTOMY    Colonoscopy N/A 2023    Procedure: COLONOSCOPY;  Surgeon: Devante Kern MD;  Location:  ENDOSCOPY    Colposcopy,bx cervix/endocerv curr  11    MARCIO 1    Laparoscopy procedure unlisted      Ovarian cyst removed    Leep  2011    MARCIO 2          X2    Other surgical history      bunions bilateral    Other surgical history      nodule lymph nodes neck    Other surgical history  2016     Bladder sling    Other surgical history  2020    Cystoscopy, Dr Beach      Family History   Problem Relation Age of Onset    Other (lung CA) Self     Hypertension Mother     Diabetes Mother     Heart Disease Mother     Heart Disease Father     Other (lung cancer) Father 55        Lung Cancer    Other (pancreatic cancer) Sister 47        Pancreatic Cancer    Cancer Sister 51        lung CA    Other (Other) Sister         Back problems    Other (Other) Son         anxiety    Other (Other) Son         behavioral problems    Diabetes Maternal Grandmother     Breast Cancer Maternal Grandmother         dx late-60s    Stroke Maternal Grandfather 86    Dementia Paternal Grandmother     Heart Disorder Paternal Grandfather     Cancer Maternal Aunt 50        LUNG CA 51    Breast Cancer Maternal Aunt         dx 46-47y    Ovarian Cancer Maternal Cousin Female 33         of ovarian ca at 35y      Social  History     Socioeconomic History    Marital status:    Tobacco Use    Smoking status: Former     Current packs/day: 0.00     Average packs/day: 0.8 packs/day for 19.0 years (14.5 ttl pk-yrs)     Types: Cigarettes     Start date: 1986     Quit date: 2010     Years since quitting: 15.0     Passive exposure: Past    Smokeless tobacco: Former     Quit date: 6/1/2024    Tobacco comments:     Has not vaped since 6/1/2024.    Vaping Use    Vaping status: Every Day    Substances: Nicotine, daily    Devices: Pre-filled pod   Substance and Sexual Activity    Alcohol use: Yes     Alcohol/week: 5.0 - 6.0 standard drinks of alcohol     Types: 3 Glasses of wine, 2 - 3 Standard drinks or equivalent per week     Comment: glass of wine each night    Drug use: Not Currently     Types: Cocaine     Comment: USED COCAINE BETW [id number]    Sexual activity: Yes     Partners: Male   Other Topics Concern    Caffeine Concern Yes     Comment: 3-4 daily of pop    Exercise No     Comment: not tolerated right now     Social Drivers of Health     Food Insecurity: No Food Insecurity (7/12/2024)    Food Insecurity     Food Insecurity: Never true   Transportation Needs: No Transportation Needs (7/12/2024)    Transportation Needs     Lack of Transportation: No   Housing Stability: Low Risk  (7/12/2024)    Housing Stability     Housing Instability: No         REVIEW OF SYSTEMS:   GENERAL: feels well otherwise,  ok appetite, +fatigue  HEENT: See HPI.    LUNGS: no shortness of breath or wheezing, See HPI  CARDIOVASCULAR: no chest pain or palpitations   NEURO: + sinus headaches.  No numbness or tingling in face.    EXAM:   /76   Pulse 99   Temp 97.3 °F (36.3 °C)   Resp 18   Ht 5' 6\" (1.676 m)   Wt 165 lb (74.8 kg)   LMP 02/14/2019 (Exact Date)   SpO2 100%   BMI 26.63 kg/m²   GENERAL: well developed, well nourished, in no apparent distress  HEAD: atraumatic, normocephalic,  ++ tenderness on palpation of maxillary sinuses  EYES:  conjunctiva clear; EOMI.  EARS: TM's clear gray, no bulging, no retraction, no fluid, bony landmarks sharp bilaterally  NOSE: nostrils patent, purulent nasal mucous, nasal mucosa reddened and swollen  THROAT: oral mucosa pink, moist.  Posterior pharynx is not erythematous. No exudates.  LUNGS: Breathing is non labored; clear to auscultation bilaterally. No wheezing, rales or rhonchi. No decreased BS  CARDIO: RRR without murmur  LYMPH:  no cervical lymphadenopathy.   NEURO: No focal deficits     ASSESSMENT AND PLAN:   ASSESSMENT:  Hanna Barrett is a 54 year old female who presents with    ASSESSMENT:   Encounter Diagnosis   Name Primary?    Acute non-recurrent maxillary sinusitis Yes       PLAN: Reviewed meds and instructions as below with patient.  Risks, benefits, and side effects of medication explained and discussed.  Comfort measures discussed.   To follow-up with PCP if no improvement in 3-5 days or sooner if sx worsen. To see ENT as scheduled.   Verbalizes understanding of these issues and agrees to the plan.    Meds & Refills for this Visit:  Requested Prescriptions     Signed Prescriptions Disp Refills    amoxicillin clavulanate 875-125 MG Oral Tab 14 tablet 0     Sig: Take 1 tablet by mouth 2 (two) times daily for 7 days.           Patient Instructions   Home Care    Take Amox-clav as prescribed.    Flonase or Nasacort OTC for nasal symptoms as instructed  Follow up with your health care provider if your symptoms do not improve in 3-5 days.   You can use an over-the-counter decongestant.  Sudafed 12 hour or Afrin Nasal Spray as per package directions may help with nasal/sinus congestion.  Do not use Afrin for longer than 3 days.  (People with high blood pressure should not use decongestants. They can raise blood pressure.)  Over the counter saline nasal spray may also help with congestion.  An expectorant with guaifenesin may help thin nasal mucus and help your sinuses drain fluids.  (Mucinex D contains  both guaifenesin and sudafed).  Avoid use of antihistamines unless allergies contributed to your infection  Over-the-counter acetaminophen/Ibuprofen according to package instructions as needed for fever or pain.  If you have chronic liver or kidney disease or ever had a stomach ulcer, talk with your healthcare provider before using these medicines.   Over the counter Delsym or cough drops may help with your cough.  Cepacol lozenges can soothe your throat.  For coughing at night, sleep with head of bed elevated; try tea with honey  Humidify the air.  Steam inhalation and warm compresses often help relieve pressure  Drink plenty of water, hot tea, and other liquids. This may help thin nasal mucus. It also may help your sinuses drain fluids.    If you were prescribed an antibiotic:   Complete the entire prescription.  If you develop a rash, hives, itching, throat tightness, or shortness of breath while on the antibiotic or shortly after completion, please call your PCP immediately and stop your antibiotic.  This may be an allergic reaction.     Probiotics or yogurt daily during antibioitic use may help decrease stomach upset and restore good bacteria to the gut.  Take the probiotic at least 2 -3 hours after taking the antibiotic.  Examples include:  Yogurt: eat 4-8 oz twice daily.     Probiotics: Capsule/granule forms such as Florastor, Florajen (refrigerated), or Culturelle.      When to seek medical advice  Call your healthcare provider if any of these occur:  Facial pain or headache that gets worse  Stiff neck  Unusual drowsiness or confusion  Swelling of your forehead or eyelids  Vision problems, such as blurred or double vision  Fever of 100.4ºF (38ºC) or higher, or as directed by your healthcare provider  Seizure  Breathing problems  Symptoms don't go away in 10 days]

## 2025-01-28 ENCOUNTER — LAB ENCOUNTER (OUTPATIENT)
Dept: LAB | Age: 55
End: 2025-01-28
Attending: FAMILY MEDICINE
Payer: MEDICAID

## 2025-01-28 ENCOUNTER — OFFICE VISIT (OUTPATIENT)
Dept: FAMILY MEDICINE CLINIC | Facility: CLINIC | Age: 55
End: 2025-01-28
Payer: MEDICAID

## 2025-01-28 VITALS
DIASTOLIC BLOOD PRESSURE: 70 MMHG | HEIGHT: 66 IN | BODY MASS INDEX: 26.52 KG/M2 | WEIGHT: 165 LBS | SYSTOLIC BLOOD PRESSURE: 122 MMHG

## 2025-01-28 DIAGNOSIS — R59.9 ENLARGED LYMPH NODE: Primary | ICD-10-CM

## 2025-01-28 DIAGNOSIS — R59.9 ENLARGED LYMPH NODE: ICD-10-CM

## 2025-01-28 LAB
BASOPHILS # BLD AUTO: 0.05 X10(3) UL (ref 0–0.2)
BASOPHILS NFR BLD AUTO: 0.7 %
EOSINOPHIL # BLD AUTO: 0.19 X10(3) UL (ref 0–0.7)
EOSINOPHIL NFR BLD AUTO: 2.6 %
ERYTHROCYTE [DISTWIDTH] IN BLOOD BY AUTOMATED COUNT: 13.2 %
HCT VFR BLD AUTO: 39 %
HGB BLD-MCNC: 13.4 G/DL
IMM GRANULOCYTES # BLD AUTO: 0.03 X10(3) UL (ref 0–1)
IMM GRANULOCYTES NFR BLD: 0.4 %
LYMPHOCYTES # BLD AUTO: 1.19 X10(3) UL (ref 1–4)
LYMPHOCYTES NFR BLD AUTO: 16.1 %
MCH RBC QN AUTO: 30.5 PG (ref 26–34)
MCHC RBC AUTO-ENTMCNC: 34.4 G/DL (ref 31–37)
MCV RBC AUTO: 88.8 FL
MONOCYTES # BLD AUTO: 1.02 X10(3) UL (ref 0.1–1)
MONOCYTES NFR BLD AUTO: 13.8 %
NEUTROPHILS # BLD AUTO: 4.93 X10 (3) UL (ref 1.5–7.7)
NEUTROPHILS # BLD AUTO: 4.93 X10(3) UL (ref 1.5–7.7)
NEUTROPHILS NFR BLD AUTO: 66.4 %
PLATELET # BLD AUTO: 342 10(3)UL (ref 150–450)
RBC # BLD AUTO: 4.39 X10(6)UL
WBC # BLD AUTO: 7.4 X10(3) UL (ref 4–11)

## 2025-01-28 PROCEDURE — 85025 COMPLETE CBC W/AUTO DIFF WBC: CPT

## 2025-01-28 PROCEDURE — 36415 COLL VENOUS BLD VENIPUNCTURE: CPT

## 2025-01-28 PROCEDURE — 99213 OFFICE O/P EST LOW 20 MIN: CPT | Performed by: FAMILY MEDICINE

## 2025-01-28 RX ORDER — CEFDINIR 300 MG/1
300 CAPSULE ORAL 2 TIMES DAILY
Qty: 14 CAPSULE | Refills: 0 | Status: SHIPPED | OUTPATIENT
Start: 2025-01-28

## 2025-01-28 NOTE — PROGRESS NOTES
[facility] Progress Note    SUBJECTIVE: Hanna Berenice Barrett 54 year old female is here today for   Chief Complaint   Patient presents with    Lymph Node     After a uti she noticed a lump near her lymph node on the left but due to her cancer hx she wants to make sure it is nothing to worry about -also has been feeling fatigue -denies fever or chills or night sweats  Did have sinus infection a couple weeks ago     Derm Problem     Has a couple spots she just wants to have looked at        Has had a lymph node pop up, was fighting a UTI, and ended up taking some medications she had left over, and noticed it since them. Did have sinusitis not too long ago as well. This was clearly new, is mobile, somewhat tender.    Not sure how long, just noted two days ago    PMH  Past Medical History:    Abnormal uterine bleeding    bleeds every 2 weeks    Anxiety state    Asthma (HCC)    Attention deficit hyperactivity disorder (ADHD)    BACK PAIN    Back problem    lumbar radiculopathy    Cancer (HCC)    adenocarcinoma of right lung    MARCIO II (cervical intraepithelial neoplasia II)    DDD (degenerative disc disease), lumbar    DDD (degenerative disc disease), thoracic    Depression    Disorder of thyroid    Dyspareunia    Exposure to medical diagnostic radiation    On hold since 4/2022    Fall    Fibromyalgia    Fibromyalgia    Genital warts    High blood pressure    History of COVID-19    COVID + 7/2020 Headache - NO Hospitalization needed     History of lumbar fusion    Hx of motion sickness    IBS (irritable bowel syndrome)    Per patient - Just constipation    Infertility, female    Lumbar radiculopathy    Mild dysplasia of cervix    Pap smear for cervical cancer screening    pt states normal    Papanicolaou smear of cervix with high grade squamous intraepithelial lesion (HGSIL)    Personal history of antineoplastic chemotherapy    Last chemo 7/12/22 prior to lung bx 8/4/22    Post covid-19 condition, unspecified     symptoms: HA; s/s resolved-yes; not hospitalized    Problems with swallowing    with radiation    Sexually transmitted disease    HPV    Substance abuse (HCC)    cocaine    Urinary incontinence    Visual impairment    glasses/contacts bifocals        PSH  Past Surgical History:   Procedure Laterality Date    Appendectomy      Back surgery  2009    fusion L5-S1    Back surgery  2021    RIGHT LUMBAR 3/ LUMBAR 4, LUMBAR 4/ LUMBAR 5 HEMILAMINTOMIES, LEFT LUMBAR 2 / LUMBAR 3 MICRODISCECTOMY    Colonoscopy N/A 2023    Procedure: COLONOSCOPY;  Surgeon: Devante Kern MD;  Location:  ENDOSCOPY    Colposcopy,bx cervix/endocerv curr  11    MARCIO 1    Laparoscopy procedure unlisted      Ovarian cyst removed    Leep  2011    MARCIO 2          X2    Other surgical history      bunions bilateral    Other surgical history      nodule lymph nodes neck    Other surgical history       Bladder sling    Other surgical history  2020    Cystoscopy, Dr Beach        Social Hx:  No changes    ROS  See HPI    OBJECTIVE:  /70   Ht 5' 6\" (1.676 m)   Wt 165 lb (74.8 kg)   LMP 2019 (Exact Date)   BMI 26.63 kg/m²     Exam  Neck: mobile lymph node, mildly swollen    Labs:          Meds:   Current Outpatient Medications   Medication Sig Dispense Refill    cefdinir 300 MG Oral Cap Take 1 capsule (300 mg total) by mouth 2 (two) times daily. 14 capsule 0    dilTIAZem HCl 120 MG Oral Tab Take 1 tablet (120 mg total) by mouth daily.      albuterol (2.5 MG/3ML) 0.083% Inhalation Nebu Soln Take by nebulization every 6 (six) hours as needed for Wheezing.      semaglutide (OZEMPIC, 0.25 OR 0.5 MG/DOSE,) 2 MG/1.5ML Subcutaneous Solution Pen-injector Inject into the skin once a week.      Cyanocobalamin (VITAMIN B-12) 1000 MCG Sublingual SL Tab Place 2,000 tablets under the tongue 2 (two) times daily.      montelukast 10 MG Oral Tab Take 1 tablet (10 mg total) by mouth nightly. 90 tablet 3     albuterol 108 (90 Base) MCG/ACT Inhalation Aero Soln Inhale 2 puffs into the lungs every 6 (six) hours as needed for Wheezing or Shortness of Breath. 3 each 3    amphetamine-dextroamphetamine 15 MG Oral Tab TAKE ONE TABLET BY MOUTH ONCE DAILY 8 TO 9 HOURS AFTER VYVANSE DOSE      ARIPiprazole 2 MG Oral Tab Take one (1) tablet by mouth every morning      VYVANSE 70 MG Oral Cap Take 1 capsule (70 mg total) by mouth every morning.      SYMBICORT 160-4.5 MCG/ACT Inhalation Aerosol Inhale 2 puffs into the lungs 2 (two) times daily.      HYDROcodone-acetaminophen (NORCO) 5-325 MG Oral Tab Take 1 tablet by mouth every 6 (six) hours as needed for Pain. 20 tablet 0    cetirizine 10 MG Oral Tab Take 1 tablet (10 mg total) by mouth daily. 90 tablet 1    ferrous sulfate 325 (65 FE) MG Oral Tab EC Take 1 tablet (325 mg total) by mouth daily with breakfast.      umeclidinium bromide (INCRUSE ELLIPTA) 62.5 MCG/ACT Inhalation Aerosol Powder, Breath Activated Inhale 1 puff into the lungs daily. 3 each 3    Multiple Vitamin Oral Tab Take 1 tablet by mouth daily.      ibuprofen 200 MG Oral Tab Take 4 tablets (800 mg total) by mouth every 8 (eight) hours as needed for Pain.      cholecalciferol 25 MCG (1000 UT) Oral Cap Take 1 capsule (1,000 Units total) by mouth daily.  0    DULoxetine HCl 60 MG Oral Cap DR Particles Take 1 capsule (60 mg total) by mouth daily.  2         Assessment/Plan  Hanna was seen today for lymph node and derm problem.    Diagnoses and all orders for this visit:    Enlarged lymph node  -     CBC W Differential W Platelet [E]; Future  -     cefdinir 300 MG Oral Cap; Take 1 capsule (300 mg total) by mouth 2 (two) times daily.         Will get CBC, start abx cours, and if not resolving consider imaging.         Total Time spent with patient and coordinating care:  15 minutes.    Follow up: as vikki Curry MD

## 2025-01-28 NOTE — TELEPHONE ENCOUNTER
Patient advised of insurance approval to proceed with injections and is agreeable to scheduling. Patient scheduled for procedure, pre-procedure instructions reviewed. Patient prefers conscious sedation.  Reviewed sedation instructions including need to be f ? If you take morning blood pressure medication or oral diabetic medication, please take with a small sip of water. ? You are required to have a responsible adult drive you home after your procedure.  You may not take a cab unless you have a responsible ad No Meloxicam -- Cervical procedures require 3 days    Ibuprofen (Motrin, Advil, Vicoprofen), Naproxen (Naprosyn, Aleve), Piroxcam (Feldene), Meloxicam (Mobic)--(Cervical procedures will require 3 days), Oxaprozin (Daypro), Diclofenac (Voltaren), Indomethacin

## 2025-01-29 ENCOUNTER — OFFICE VISIT (OUTPATIENT)
Facility: LOCATION | Age: 55
End: 2025-01-29
Payer: MEDICAID

## 2025-01-29 DIAGNOSIS — D36.7 NASAL DERMOID CYST: Primary | ICD-10-CM

## 2025-01-29 PROCEDURE — 99204 OFFICE O/P NEW MOD 45 MIN: CPT | Performed by: OTOLARYNGOLOGY

## 2025-01-29 NOTE — PROGRESS NOTES
Hanna Barrett is a 54 year old female. No chief complaint on file.    HPI:   54-year-old white female she has had a gradually enlarging either cystic structure or growth that she feels is in the nasal dorsum area is giving her a deformity of her nasal dorsal tip particularly on the left side might be causing her some nasal obstructive symptoms.  Does not bleed or have pain associated with it has been a slow process.  Current Outpatient Medications   Medication Sig Dispense Refill    cefdinir 300 MG Oral Cap Take 1 capsule (300 mg total) by mouth 2 (two) times daily. 14 capsule 0    dilTIAZem HCl 120 MG Oral Tab Take 1 tablet (120 mg total) by mouth daily.      albuterol (2.5 MG/3ML) 0.083% Inhalation Nebu Soln Take by nebulization every 6 (six) hours as needed for Wheezing.      semaglutide (OZEMPIC, 0.25 OR 0.5 MG/DOSE,) 2 MG/1.5ML Subcutaneous Solution Pen-injector Inject into the skin once a week.      Cyanocobalamin (VITAMIN B-12) 1000 MCG Sublingual SL Tab Place 2,000 tablets under the tongue 2 (two) times daily.      montelukast 10 MG Oral Tab Take 1 tablet (10 mg total) by mouth nightly. 90 tablet 3    albuterol 108 (90 Base) MCG/ACT Inhalation Aero Soln Inhale 2 puffs into the lungs every 6 (six) hours as needed for Wheezing or Shortness of Breath. 3 each 3    amphetamine-dextroamphetamine 15 MG Oral Tab TAKE ONE TABLET BY MOUTH ONCE DAILY 8 TO 9 HOURS AFTER VYVANSE DOSE      ARIPiprazole 2 MG Oral Tab Take one (1) tablet by mouth every morning      VYVANSE 70 MG Oral Cap Take 1 capsule (70 mg total) by mouth every morning.      SYMBICORT 160-4.5 MCG/ACT Inhalation Aerosol Inhale 2 puffs into the lungs 2 (two) times daily.      HYDROcodone-acetaminophen (NORCO) 5-325 MG Oral Tab Take 1 tablet by mouth every 6 (six) hours as needed for Pain. 20 tablet 0    cetirizine 10 MG Oral Tab Take 1 tablet (10 mg total) by mouth daily. 90 tablet 1    ferrous sulfate 325 (65 FE) MG Oral Tab EC Take 1 tablet (325 mg  total) by mouth daily with breakfast.      umeclidinium bromide (INCRUSE ELLIPTA) 62.5 MCG/ACT Inhalation Aerosol Powder, Breath Activated Inhale 1 puff into the lungs daily. 3 each 3    Multiple Vitamin Oral Tab Take 1 tablet by mouth daily.      ibuprofen 200 MG Oral Tab Take 4 tablets (800 mg total) by mouth every 8 (eight) hours as needed for Pain.      cholecalciferol 25 MCG (1000 UT) Oral Cap Take 1 capsule (1,000 Units total) by mouth daily.  0    DULoxetine HCl 60 MG Oral Cap DR Particles Take 1 capsule (60 mg total) by mouth daily.  2      Past Medical History:    Abnormal uterine bleeding    bleeds every 2 weeks    Anxiety state    Asthma (HCC)    Attention deficit hyperactivity disorder (ADHD)    BACK PAIN    Back problem    lumbar radiculopathy    Cancer (HCC)    adenocarcinoma of right lung    MARCIO II (cervical intraepithelial neoplasia II)    DDD (degenerative disc disease), lumbar    DDD (degenerative disc disease), thoracic    Depression    Disorder of thyroid    Dyspareunia    Exposure to medical diagnostic radiation    On hold since 4/2022    Fall    Fibromyalgia    Fibromyalgia    Genital warts    High blood pressure    History of COVID-19    COVID + 7/2020 Headache - NO Hospitalization needed     History of lumbar fusion    Hx of motion sickness    IBS (irritable bowel syndrome)    Per patient - Just constipation    Infertility, female    Lumbar radiculopathy    Mild dysplasia of cervix    Pap smear for cervical cancer screening    pt states normal    Papanicolaou smear of cervix with high grade squamous intraepithelial lesion (HGSIL)    Personal history of antineoplastic chemotherapy    Last chemo 7/12/22 prior to lung bx 8/4/22    Post covid-19 condition, unspecified    symptoms: HA; s/s resolved-yes; not hospitalized    Problems with swallowing    with radiation    Sexually transmitted disease    HPV    Substance abuse (HCC)    cocaine    Urinary incontinence    Visual impairment     glasses/contacts bifocals      Social History:  Social History     Socioeconomic History    Marital status:    Tobacco Use    Smoking status: Former     Current packs/day: 0.00     Average packs/day: 0.8 packs/day for 19.0 years (14.5 ttl pk-yrs)     Types: Cigarettes     Start date:      Quit date: 2010     Years since quitting: 15.0     Passive exposure: Past    Smokeless tobacco: Former     Quit date: 2024    Tobacco comments:     Has not vaped since 2024.    Vaping Use    Vaping status: Every Day    Substances: Nicotine, daily    Devices: Pre-filled pod   Substance and Sexual Activity    Alcohol use: Yes     Alcohol/week: 5.0 - 6.0 standard drinks of alcohol     Types: 3 Glasses of wine, 2 - 3 Standard drinks or equivalent per week     Comment: glass of wine each night    Drug use: Not Currently     Types: Cocaine     Comment: USED COCAINE BETW [id number]    Sexual activity: Yes     Partners: Male   Other Topics Concern    Caffeine Concern Yes     Comment: 3-4 daily of pop    Exercise No     Comment: not tolerated right now     Social Drivers of Health     Food Insecurity: No Food Insecurity (2024)    Food Insecurity     Food Insecurity: Never true   Transportation Needs: No Transportation Needs (2024)    Transportation Needs     Lack of Transportation: No   Housing Stability: Low Risk  (2024)    Housing Stability     Housing Instability: No      Past Surgical History:   Procedure Laterality Date    Appendectomy  1980    Back surgery  2009    fusion L5-S1    Back surgery  2021    RIGHT LUMBAR 3/ LUMBAR 4, LUMBAR 4/ LUMBAR 5 HEMILAMINTOMIES, LEFT LUMBAR 2 / LUMBAR 3 MICRODISCECTOMY    Colonoscopy N/A 2023    Procedure: COLONOSCOPY;  Surgeon: Devante Kern MD;  Location:  ENDOSCOPY    Colposcopy,bx cervix/endocerv curr  11    MARCIO 1    Laparoscopy procedure unlisted      Ovarian cyst removed    Leep  2011    MARCIO 2          X2    Other surgical history   1983    bunions bilateral    Other surgical history  2006    nodule lymph nodes neck    Other surgical history  2016     Bladder sling    Other surgical history  09/11/2020    Cystoscopy, Dr Beach         REVIEW OF SYSTEMS:   GENERAL HEALTH: feels well otherwise  GENERAL : denies fever, chills, sweats, weight loss, weight gain  SKIN: denies any unusual skin lesions or rashes  RESPIRATORY: denies shortness of breath with exertion  NEURO: denies headaches    EXAM:   LMP 02/14/2019 (Exact Date)     System Pertinent findings Details   Constitutional  Overall appearance - Normal.   Psychiatric  Orientation - Oriented to time, place, person & situation. Appropriate mood and affect.   Head/Face  Facial features -- Normal. Skull - Normal.   Eyes  Pupils equal ,round ,react to light and accomidate   Ears  External Ear Right: Normal, Left: Normal. Canal - Right: Normal, Left: Normal. TM - Right: Normal left: Normal   Nose Photo External Nose, lower nasal dorsal dome left side asymmetric.?  Whether there is a cyst within the soft tissue, Septum -no intranasal cyst is noted nasal Vault, clear,Turbinates - Right: Normal left: Normal   Mouth/Throat  Lips/teeth/gums - Normal. Tonsils -1+. Oropharynx - Normal.   Neck Exam  Inspection - Normal. Palpation - Normal. Parotid gland - Normal. Thyroid gland -normal   Neurological  Cranial nerves - Cranial nerves II through XII grossly intact.   Nasopharynx   Normal        Lymph Detail  Submental. Submandibular. Anterior cervical. Posterior cervical. Supraclavicular.       ASSESSMENT AND PLAN:   1. 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 Nolen facial plastics    The patient indicates understanding of these issues and agrees to the plan.      Devon Ulloa MD  1/29/2025  4:26 PM

## 2025-02-06 ENCOUNTER — HOSPITAL ENCOUNTER (OUTPATIENT)
Dept: CT IMAGING | Age: 55
Discharge: HOME | End: 2025-02-06
Attending: OTOLARYNGOLOGY
Payer: MEDICAID

## 2025-02-06 DIAGNOSIS — D36.7 NASAL DERMOID CYST: ICD-10-CM

## 2025-02-06 PROCEDURE — 70486 CT MAXILLOFACIAL W/O DYE: CPT | Performed by: OTOLARYNGOLOGY

## 2025-02-07 ENCOUNTER — TELEPHONE (OUTPATIENT)
Facility: LOCATION | Age: 55
End: 2025-02-07

## 2025-02-07 NOTE — TELEPHONE ENCOUNTER
I left message on the answering machine regarding test results for recent CT scan of the sinuses did not show any masses or lesions suggested that she follow-up with Dr. Nolen as previously discussed.

## 2025-02-11 ENCOUNTER — OFFICE VISIT (OUTPATIENT)
Dept: FAMILY MEDICINE CLINIC | Facility: CLINIC | Age: 55
End: 2025-02-11
Payer: MEDICAID

## 2025-02-11 VITALS
BODY MASS INDEX: 27.48 KG/M2 | SYSTOLIC BLOOD PRESSURE: 112 MMHG | DIASTOLIC BLOOD PRESSURE: 70 MMHG | WEIGHT: 171 LBS | HEIGHT: 66 IN

## 2025-02-11 DIAGNOSIS — R59.0 VIRCHOW'S NODE: Primary | ICD-10-CM

## 2025-02-11 PROCEDURE — 99213 OFFICE O/P EST LOW 20 MIN: CPT | Performed by: FAMILY MEDICINE

## 2025-02-11 RX ORDER — LISINOPRIL AND HYDROCHLOROTHIAZIDE 12.5; 2 MG/1; MG/1
1 TABLET ORAL DAILY
COMMUNITY
Start: 2024-12-11

## 2025-02-11 NOTE — PROGRESS NOTES
[facility] Progress Note    SUBJECTIVE: Hanna Barrett 55 year old female is here today for   Chief Complaint   Patient presents with    Lymph Node     wAs here last month and given abx which she finished   And feels it has gotten bigger   No fever no chills no night sweats     Fibromyalgia     Lymph node persisting.    PMH  Past Medical History:    Abnormal uterine bleeding    bleeds every 2 weeks    Anxiety state    Asthma (HCC)    Attention deficit hyperactivity disorder (ADHD)    BACK PAIN    Back problem    lumbar radiculopathy    Cancer (HCC)    adenocarcinoma of right lung    MARCIO II (cervical intraepithelial neoplasia II)    DDD (degenerative disc disease), lumbar    DDD (degenerative disc disease), thoracic    Depression    Disorder of thyroid    Dyspareunia    Exposure to medical diagnostic radiation    On hold since 4/2022    Fall    Fibromyalgia    Fibromyalgia    Genital warts    High blood pressure    History of COVID-19    COVID + 7/2020 Headache - NO Hospitalization needed     History of lumbar fusion    Hx of motion sickness    IBS (irritable bowel syndrome)    Per patient - Just constipation    Infertility, female    Lumbar radiculopathy    Mild dysplasia of cervix    Pap smear for cervical cancer screening    pt states normal    Papanicolaou smear of cervix with high grade squamous intraepithelial lesion (HGSIL)    Personal history of antineoplastic chemotherapy    Last chemo 7/12/22 prior to lung bx 8/4/22    Post covid-19 condition, unspecified    symptoms: HA; s/s resolved-yes; not hospitalized    Problems with swallowing    with radiation    Sexually transmitted disease    HPV    Substance abuse (HCC)    cocaine    Urinary incontinence    Visual impairment    glasses/contacts bifocals        PSH  Past Surgical History:   Procedure Laterality Date    Appendectomy  1980    Back surgery  2009    fusion L5-S1    Back surgery  03/03/2021    RIGHT LUMBAR 3/ LUMBAR 4, LUMBAR 4/ LUMBAR 5  HEMILAMINTOMIES, LEFT LUMBAR 2 / LUMBAR 3 MICRODISCECTOMY    Colonoscopy N/A 2023    Procedure: COLONOSCOPY;  Surgeon: Devante Kern MD;  Location:  ENDOSCOPY    Colposcopy,bx cervix/endocerv curr  11    MARCIO 1    Laparoscopy procedure unlisted      Ovarian cyst removed    Leep  2011    MARCIO 2          X2    Other surgical history      bunions bilateral    Other surgical history      nodule lymph nodes neck    Other surgical history       Bladder sling    Other surgical history  2020    Cystoscopy, Dr Beach        Social Hx:  No changes    ROS  See HPI    OBJECTIVE:  /70   Ht 5' 6\" (1.676 m)   Wt 171 lb (77.6 kg)   LMP 2019 (Exact Date)   BMI 27.60 kg/m²     Exam  Neck: supraclavicular lymph node, on left      Labs:          Meds:   Current Outpatient Medications   Medication Sig Dispense Refill    lisinopril-hydroCHLOROthiazide 20-12.5 MG Oral Tab Take 1 tablet by mouth daily.      dilTIAZem HCl 120 MG Oral Tab Take 1 tablet (120 mg total) by mouth daily.      albuterol (2.5 MG/3ML) 0.083% Inhalation Nebu Soln Take by nebulization every 6 (six) hours as needed for Wheezing.      semaglutide (OZEMPIC, 0.25 OR 0.5 MG/DOSE,) 2 MG/1.5ML Subcutaneous Solution Pen-injector Inject into the skin once a week.      Cyanocobalamin (VITAMIN B-12) 1000 MCG Sublingual SL Tab Place 2,000 tablets under the tongue 2 (two) times daily.      montelukast 10 MG Oral Tab Take 1 tablet (10 mg total) by mouth nightly. 90 tablet 3    albuterol 108 (90 Base) MCG/ACT Inhalation Aero Soln Inhale 2 puffs into the lungs every 6 (six) hours as needed for Wheezing or Shortness of Breath. 3 each 3    amphetamine-dextroamphetamine 15 MG Oral Tab TAKE ONE TABLET BY MOUTH ONCE DAILY 8 TO 9 HOURS AFTER VYVANSE DOSE      ARIPiprazole 2 MG Oral Tab Take one (1) tablet by mouth every morning      VYVANSE 70 MG Oral Cap Take 1 capsule (70 mg total) by mouth every morning.      SYMBICORT 160-4.5  MCG/ACT Inhalation Aerosol Inhale 2 puffs into the lungs 2 (two) times daily.      HYDROcodone-acetaminophen (NORCO) 5-325 MG Oral Tab Take 1 tablet by mouth every 6 (six) hours as needed for Pain. 20 tablet 0    cetirizine 10 MG Oral Tab Take 1 tablet (10 mg total) by mouth daily. 90 tablet 1    ferrous sulfate 325 (65 FE) MG Oral Tab EC Take 1 tablet (325 mg total) by mouth daily with breakfast.      umeclidinium bromide (INCRUSE ELLIPTA) 62.5 MCG/ACT Inhalation Aerosol Powder, Breath Activated Inhale 1 puff into the lungs daily. 3 each 3    Multiple Vitamin Oral Tab Take 1 tablet by mouth daily.      ibuprofen 200 MG Oral Tab Take 4 tablets (800 mg total) by mouth every 8 (eight) hours as needed for Pain.      cholecalciferol 25 MCG (1000 UT) Oral Cap Take 1 capsule (1,000 Units total) by mouth daily.  0    DULoxetine HCl 60 MG Oral Cap DR Particles Take 1 capsule (60 mg total) by mouth daily.  2         Assessment/Plan  Hanna was seen today for lymph node and fibromyalgia.    Diagnoses and all orders for this visit:    Virchow's node  -     CT ABDOMEN+PELVIS(ALL W+WO)(CPT=74178); Future  -     US HEAD/NECK (CPT=76536); Future         Will get CT scan and US of lymph node, as it is persisting and is in classic location of concern         Total Time spent with patient and coordinating care:  25 minutes.    Follow up: as needed      Guillermo Curry MD

## 2025-02-12 ENCOUNTER — HOSPITAL ENCOUNTER (OUTPATIENT)
Dept: ULTRASOUND IMAGING | Facility: HOSPITAL | Age: 55
Discharge: HOME | End: 2025-02-12
Attending: FAMILY MEDICINE
Payer: MEDICAID

## 2025-02-12 ENCOUNTER — OFFICE VISIT (OUTPATIENT)
Facility: LOCATION | Age: 55
End: 2025-02-12
Payer: MEDICAID

## 2025-02-12 DIAGNOSIS — R59.0 SUPRACLAVICULAR ADENOPATHY: ICD-10-CM

## 2025-02-12 DIAGNOSIS — C34.91 PRIMARY ADENOCARCINOMA OF RIGHT LUNG (HCC): Primary | ICD-10-CM

## 2025-02-12 DIAGNOSIS — D23.30 DERMOID CYST OF FACE: Primary | ICD-10-CM

## 2025-02-12 DIAGNOSIS — C77.1 SECONDARY MALIGNANT NEOPLASM OF MEDIASTINAL LYMPH NODE (HCC): ICD-10-CM

## 2025-02-12 DIAGNOSIS — R59.0 VIRCHOW'S NODE: ICD-10-CM

## 2025-02-12 PROCEDURE — 99212 OFFICE O/P EST SF 10 MIN: CPT | Performed by: STUDENT IN AN ORGANIZED HEALTH CARE EDUCATION/TRAINING PROGRAM

## 2025-02-12 PROCEDURE — 76536 US EXAM OF HEAD AND NECK: CPT | Performed by: FAMILY MEDICINE

## 2025-02-12 NOTE — PROGRESS NOTES
Hanna Barrett is a 55 year old female.   Chief Complaint   Patient presents with    Sinus Problem     HPI:   55 year old female presenting for evaluation of a nasal mass.  She has noticed it growing over the last couple of years.  She denies fever, chills, drainage and erythema.  She underwent CT sinus ordered by Dr. Ulloa and presents for further evaluation.    Current Outpatient Medications   Medication Sig Dispense Refill    lisinopril-hydroCHLOROthiazide 20-12.5 MG Oral Tab Take 1 tablet by mouth daily.      dilTIAZem HCl 120 MG Oral Tab Take 1 tablet (120 mg total) by mouth daily.      albuterol (2.5 MG/3ML) 0.083% Inhalation Nebu Soln Take by nebulization every 6 (six) hours as needed for Wheezing.      semaglutide (OZEMPIC, 0.25 OR 0.5 MG/DOSE,) 2 MG/1.5ML Subcutaneous Solution Pen-injector Inject into the skin once a week.      Cyanocobalamin (VITAMIN B-12) 1000 MCG Sublingual SL Tab Place 2,000 tablets under the tongue 2 (two) times daily.      montelukast 10 MG Oral Tab Take 1 tablet (10 mg total) by mouth nightly. 90 tablet 3    albuterol 108 (90 Base) MCG/ACT Inhalation Aero Soln Inhale 2 puffs into the lungs every 6 (six) hours as needed for Wheezing or Shortness of Breath. 3 each 3    amphetamine-dextroamphetamine 15 MG Oral Tab TAKE ONE TABLET BY MOUTH ONCE DAILY 8 TO 9 HOURS AFTER VYVANSE DOSE      ARIPiprazole 2 MG Oral Tab Take one (1) tablet by mouth every morning      VYVANSE 70 MG Oral Cap Take 1 capsule (70 mg total) by mouth every morning.      SYMBICORT 160-4.5 MCG/ACT Inhalation Aerosol Inhale 2 puffs into the lungs 2 (two) times daily.      HYDROcodone-acetaminophen (NORCO) 5-325 MG Oral Tab Take 1 tablet by mouth every 6 (six) hours as needed for Pain. 20 tablet 0    cetirizine 10 MG Oral Tab Take 1 tablet (10 mg total) by mouth daily. 90 tablet 1    ferrous sulfate 325 (65 FE) MG Oral Tab EC Take 1 tablet (325 mg total) by mouth daily with breakfast.      umeclidinium bromide  (INCRUSE ELLIPTA) 62.5 MCG/ACT Inhalation Aerosol Powder, Breath Activated Inhale 1 puff into the lungs daily. 3 each 3    Multiple Vitamin Oral Tab Take 1 tablet by mouth daily.      ibuprofen 200 MG Oral Tab Take 4 tablets (800 mg total) by mouth every 8 (eight) hours as needed for Pain.      cholecalciferol 25 MCG (1000 UT) Oral Cap Take 1 capsule (1,000 Units total) by mouth daily.  0    DULoxetine HCl 60 MG Oral Cap DR Particles Take 1 capsule (60 mg total) by mouth daily.  2      Past Medical History:    Abnormal uterine bleeding    bleeds every 2 weeks    Anxiety state    Asthma (HCC)    Attention deficit hyperactivity disorder (ADHD)    BACK PAIN    Back problem    lumbar radiculopathy    Cancer (HCC)    adenocarcinoma of right lung    MARCIO II (cervical intraepithelial neoplasia II)    DDD (degenerative disc disease), lumbar    DDD (degenerative disc disease), thoracic    Depression    Disorder of thyroid    Dyspareunia    Exposure to medical diagnostic radiation    On hold since 4/2022    Fall    Fibromyalgia    Fibromyalgia    Genital warts    High blood pressure    History of COVID-19    COVID + 7/2020 Headache - NO Hospitalization needed     History of lumbar fusion    Hx of motion sickness    IBS (irritable bowel syndrome)    Per patient - Just constipation    Infertility, female    Lumbar radiculopathy    Mild dysplasia of cervix    Pap smear for cervical cancer screening    pt states normal    Papanicolaou smear of cervix with high grade squamous intraepithelial lesion (HGSIL)    Personal history of antineoplastic chemotherapy    Last chemo 7/12/22 prior to lung bx 8/4/22    Post covid-19 condition, unspecified    symptoms: HA; s/s resolved-yes; not hospitalized    Problems with swallowing    with radiation    Sexually transmitted disease    HPV    Substance abuse (HCC)    cocaine    Urinary incontinence    Visual impairment    glasses/contacts bifocals      Social History:  Social History      Socioeconomic History    Marital status:    Tobacco Use    Smoking status: Former     Current packs/day: 0.00     Average packs/day: 0.8 packs/day for 19.0 years (14.5 ttl pk-yrs)     Types: Cigarettes     Start date:      Quit date: 2010     Years since quitting: 15.1     Passive exposure: Past    Smokeless tobacco: Former     Quit date: 2024    Tobacco comments:     Has not vaped since 2024.    Vaping Use    Vaping status: Every Day    Substances: Nicotine, daily    Devices: Pre-filled pod   Substance and Sexual Activity    Alcohol use: Yes     Alcohol/week: 5.0 - 6.0 standard drinks of alcohol     Types: 3 Glasses of wine, 2 - 3 Standard drinks or equivalent per week     Comment: glass of wine each night    Drug use: Not Currently     Types: Cocaine     Comment: USED COCAINE BETW [id number]    Sexual activity: Yes     Partners: Male   Other Topics Concern    Caffeine Concern Yes     Comment: 3-4 daily of pop    Exercise No     Comment: not tolerated right now     Social Drivers of Health     Food Insecurity: No Food Insecurity (2024)    Food Insecurity     Food Insecurity: Never true   Transportation Needs: No Transportation Needs (2024)    Transportation Needs     Lack of Transportation: No   Housing Stability: Low Risk  (2024)    Housing Stability     Housing Instability: No      Past Surgical History:   Procedure Laterality Date    Appendectomy  1980    Back surgery  2009    fusion L5-S1    Back surgery  2021    RIGHT LUMBAR 3/ LUMBAR 4, LUMBAR 4/ LUMBAR 5 HEMILAMINTOMIES, LEFT LUMBAR 2 / LUMBAR 3 MICRODISCECTOMY    Colonoscopy N/A 2023    Procedure: COLONOSCOPY;  Surgeon: Devante Kern MD;  Location:  ENDOSCOPY    Colposcopy,bx cervix/endocerv curr  11    MARCIO 1    Laparoscopy procedure unlisted      Ovarian cyst removed    Leep  2011    MARCIO 2          X2    Other surgical history      bunions bilateral    Other surgical history       nodule lymph nodes neck    Other surgical history  2016     Bladder sling    Other surgical history  09/11/2020    Cystoscopy, Dr Beach         REVIEW OF SYSTEMS:   GENERAL HEALTH: feels well otherwise  GENERAL : denies fever, chills, sweats, weight loss, weight gain  SKIN: denies any unusual skin lesions or rashes  RESPIRATORY: denies shortness of breath with exertion  NEURO: denies headaches    EXAM:   LMP 02/14/2019 (Exact Date)     System Findings Details   Constitutional  Overall appearance - Normal.   Head/Face  Atraumatic   Eyes  Sclera white, pupils equal and round, EOMI   Ears  External ears normal in appearance   Nose  Bulbous nasal tip, left greater than right, nares patent, septum straight, no epistaxis   Oral cavity  Lips normal in appearance   Neck  Trachea midline   Neurological  Memory - Normal. Cranial nerves - Cranial nerves II through XII grossly intact.     CT sinus  Images personally reviewed, interpretation below  Left nasal tip with 11.3 x 5.5 mm soft tissue mass    ASSESSMENT AND PLAN:   1. Dermoid cyst of face  - CT FACIAL BONES(CONTRAST ONLY) (CPT=70487); Future. Contrast imaging ordered to better visualize the appearance of mass on CT sinus  - Will contact patient with results    The patient indicates understanding of these issues and agrees to the plan.    Eri Nolen MD, AMANDEEP  Facial Plastic & Reconstructive Surgery, Otolaryngology-Head & Neck Surgery  [facility]    This note was prepared using Dragon Medical voice recognition dictation software. As a result, errors may occur. When identified, these errors have been corrected. While every attempt is made to correct errors during dictation, discrepancies may still exist.

## 2025-02-14 ENCOUNTER — TELEPHONE (OUTPATIENT)
Dept: CASE MANAGEMENT | Age: 55
End: 2025-02-14

## 2025-02-14 ENCOUNTER — PATIENT MESSAGE (OUTPATIENT)
Dept: CASE MANAGEMENT | Age: 55
End: 2025-02-14

## 2025-02-14 DIAGNOSIS — R59.0 VIRCHOW'S NODE: Primary | ICD-10-CM

## 2025-02-14 NOTE — TELEPHONE ENCOUNTER
Hello     We have received correspondence from the health plan regarding the request for CT abdomen and pelvis. The health plan has denied this request.     Clinical rationale:     Your doctor told us that you have swollen lymph nodes (glands) or a lump above your   collar bone. An imaging study was asked for. We cannot approve this request because:  Two sets of pictures (without a dye and then with a dye) are not needed to show your   doctor the details needed to treat you. One picture study taken with a dye is sufficient.  A different study (83752-Ycrlveaf Tomography (CT), a special kind of picture of your   abdomen (stomach area) and pelvis with contrast (dye)) is likely to show your doctor all   of the details they need to see in order to treat you. This study will be approved if   requested.  This finding was based on review of [employer] Chest Imaging Guidelines Section(s):   Supraclavicular Region (CH 2.1) and Preface to the Imaging Guidelines, section   Preface-3.1 Clinical Information    Additional information in media    You are welcome to complete a peer to peer   Ph. [phone] option 4  Case # [id number]    You are allotted 7 calendar days to complete. Patient is scheduled for 2.19.25. Please advise plan of care.     Thank you,   [place]  Referral specialist

## 2025-02-14 NOTE — TELEPHONE ENCOUNTER
See previous message CT abdomen and pelvis denied by insurance  I instructed patient to not go for CT that she has scheduled.  Patient verbalized understanding.

## 2025-02-17 NOTE — TELEPHONE ENCOUNTER
Hello     The new request for CPT code 96440 is authorized. The patient can move forward with care.     Thank you,  [place]  Referral specialist

## 2025-02-20 ENCOUNTER — HOSPITAL ENCOUNTER (OUTPATIENT)
Dept: CT IMAGING | Facility: HOSPITAL | Age: 55
Discharge: HOME | End: 2025-02-20
Attending: FAMILY MEDICINE
Payer: MEDICAID

## 2025-02-20 DIAGNOSIS — C34.91 PRIMARY ADENOCARCINOMA OF RIGHT LUNG (HCC): Primary | ICD-10-CM

## 2025-02-20 DIAGNOSIS — R59.0 VIRCHOW'S NODE: ICD-10-CM

## 2025-02-20 LAB
CREAT BLD-MCNC: 0.9 MG/DL
EGFRCR SERPLBLD CKD-EPI 2021: 76 ML/MIN/1.73M2 (ref 60–?)

## 2025-02-20 PROCEDURE — 74177 CT ABD & PELVIS W/CONTRAST: CPT | Performed by: FAMILY MEDICINE

## 2025-02-20 PROCEDURE — 82565 ASSAY OF CREATININE: CPT

## 2025-02-26 NOTE — DISCHARGE INSTRUCTIONS
Discharge/After Visit [facility] Department of Radiology  - Biopsy of Lymph Node -   Activity  Take it easy for the rest of the day after your biopsy. You may be sore at the site of the biopsy for the next 5 days. Do not do any strenuous exercises or lift over 5 lbs. for 24 hours after the procedure.     Biopsy Site  Keep the bandage on the biopsy site for the next hour. No shower/bathing restrictions.    Pain Management  You may use over-the-counter or prescribed pain relief medication if not contraindicated due to a medical condition.   You may use a covered ice pack to the procedure site for 20 min, as needed, for pain.   The site may be red or tender for a few days.  You may develop a sore throat after the procedure. If necessary, throat lozenges may be used to relieve the discomfort.     Medications  You may resume your usual home medications, including blood thinners (24 hours after the procedure) if you experience no bleeding or hematoma at the puncture site.     When to seek medical advice  Call the provider that ordered the biopsy with questions and to discuss test results. They may also be available on your [facility] My Chart. You may also call the Radiology nurse at [phone], M-F, 8-5 with any additional questions or concerns.    Dial [phone] and ask the  to page the on-call Radiologist right away if any of these occur:  There is a change in color or temperature of the area where the biopsy was performed.  You develop increasing pain, increased swelling in your neck, difficulty breathing or swallowing.    IF YOU FEEL YOU ARE HAVING AN EMERGENCY,   CALL 911 OR GO TO THE NEAREST EMERGENCY ROOM      4.2.24 MO/  Radiology

## 2025-02-27 ENCOUNTER — APPOINTMENT (OUTPATIENT)
Dept: LAB | Facility: HOSPITAL | Age: 55
End: 2025-02-27
Attending: FAMILY MEDICINE
Payer: MEDICAID

## 2025-02-27 ENCOUNTER — HOSPITAL ENCOUNTER (OUTPATIENT)
Dept: ULTRASOUND IMAGING | Facility: HOSPITAL | Age: 55
Discharge: HOME | End: 2025-02-27
Attending: INTERNAL MEDICINE
Payer: MEDICAID

## 2025-02-27 DIAGNOSIS — R59.0 SUPRACLAVICULAR ADENOPATHY: ICD-10-CM

## 2025-02-27 DIAGNOSIS — C34.91 PRIMARY ADENOCARCINOMA OF RIGHT LUNG (HCC): ICD-10-CM

## 2025-02-27 DIAGNOSIS — C77.1 SECONDARY MALIGNANT NEOPLASM OF MEDIASTINAL LYMPH NODE (HCC): ICD-10-CM

## 2025-02-27 PROCEDURE — 88307 TISSUE EXAM BY PATHOLOGIST: CPT | Performed by: INTERNAL MEDICINE

## 2025-02-27 PROCEDURE — 88342 IMHCHEM/IMCYTCHM 1ST ANTB: CPT | Performed by: INTERNAL MEDICINE

## 2025-02-27 PROCEDURE — 88341 IMHCHEM/IMCYTCHM EA ADD ANTB: CPT | Performed by: INTERNAL MEDICINE

## 2025-02-27 PROCEDURE — 76942 ECHO GUIDE FOR BIOPSY: CPT | Performed by: INTERNAL MEDICINE

## 2025-02-27 PROCEDURE — 38505 NEEDLE BIOPSY LYMPH NODES: CPT | Performed by: INTERNAL MEDICINE

## 2025-02-27 NOTE — PROCEDURES
[facility]   part of [facility]  Procedure Note    Hanna Barrett Patient Status:  Outpatient    1970 MRN [id number]   Location [facility] ULTRASOUND Attending Shoshana Gordon MD   Hosp Day # 0 PCP Guillermo Curry MD     Procedure: US guided core biopsy L supraclavicular node    Pre-Procedure Diagnosis:  Lung CA    Post-Procedure Diagnosis: Same    Anesthesia:  Local    Findings:  5 x 20g core of L supraclav node    Specimens: As above    Blood Loss:  Minimal    Tourniquet Time: None  Complications:  None  Drains:  None    Secondary Diagnosis:  None    Félix Alcantar MD  2025

## 2025-02-28 ENCOUNTER — TELEPHONE (OUTPATIENT)
Facility: CLINIC | Age: 55
End: 2025-02-28

## 2025-02-28 ENCOUNTER — PATIENT MESSAGE (OUTPATIENT)
Facility: CLINIC | Age: 55
End: 2025-02-28

## 2025-02-28 ENCOUNTER — HOSPITAL ENCOUNTER (OUTPATIENT)
Dept: CT IMAGING | Age: 55
Discharge: HOME | End: 2025-02-28
Attending: INTERNAL MEDICINE
Payer: MEDICAID

## 2025-02-28 DIAGNOSIS — J90 PLEURAL EFFUSION: Primary | ICD-10-CM

## 2025-02-28 DIAGNOSIS — C34.91 PRIMARY ADENOCARCINOMA OF RIGHT LUNG (HCC): ICD-10-CM

## 2025-02-28 PROCEDURE — 71260 CT THORAX DX C+: CPT | Performed by: INTERNAL MEDICINE

## 2025-02-28 RX ORDER — IOHEXOL 350 MG/ML
75 INJECTION, SOLUTION INTRAVENOUS
Status: COMPLETED | OUTPATIENT
Start: 2025-02-28 | End: 2025-02-28

## 2025-02-28 RX ADMIN — IOHEXOL 75 ML: 350 INJECTION, SOLUTION INTRAVENOUS at 11:04:00

## 2025-02-28 NOTE — TELEPHONE ENCOUNTER
Called and spoke to patient  Reviewed CT chest results- large right pleural effusion with worsening atelectasis   Dr Gordon saw results and recommended thoracentesis     Reports  SOB  Fatigue    Plan:   IR right thoracentesis     If symptoms become worse instructed to go to the ER    Patient verbalized understanding to above plan

## 2025-02-28 NOTE — TELEPHONE ENCOUNTER
Call placed to central scheduling and ticket was placed for US guided thoracentesis to be scheduled and pt will be contacted in 24-48 hours.  Call placed to pt and informed her of above.  Pt will call Central scheduling on Monday if she does not hear back from scheduling.  Pt advised to go to ER with decline in respiratory status.  Pt verbalizes understanding.

## 2025-03-03 ENCOUNTER — TELEPHONE (OUTPATIENT)
Age: 55
End: 2025-03-03

## 2025-03-03 DIAGNOSIS — J90 PLEURAL EFFUSION: Primary | ICD-10-CM

## 2025-03-03 DIAGNOSIS — C34.91 RECURRENT MALIGNANT NEOPLASM OF RIGHT LUNG OF UNKNOWN CELL TYPE (HCC): ICD-10-CM

## 2025-03-03 DIAGNOSIS — C34.91 PRIMARY ADENOCARCINOMA OF RIGHT LUNG (HCC): Primary | ICD-10-CM

## 2025-03-03 NOTE — TELEPHONE ENCOUNTER
Hanna called and said she saw her lymph node biopsy result on MyChart. She is tearful and upset and really would like Dr Gordon to call her back to discuss her results.     I told her I will forward this message to Dr Gordon and GABY Lockhart now.

## 2025-03-03 NOTE — TELEPHONE ENCOUNTER
Called Hanna with biopsy results which unfortunately showed lung cancer. Need to restage with PET and MRI brain. Will proceed with thoracentesis tomorrow. She will see me back after the above tests and call to schedule. Biopsy sent for NGS/PDL-1

## 2025-03-03 NOTE — DISCHARGE INSTRUCTIONS
[facility] Department of Radiology  Thoracentesis    Activity  Take it easy for the rest of the day after your thoracentesis. You may be sore at the site for the next five days. Do not do any strenuous exercises or lift over 5 lbs. for the next 24 hours.    Site Care  Keep a bandage on the site for the next 24 hours.  You may shower after 24 hours but no soaking in a bathtub for five days.    Diet  Drink plenty of fluids and resume your regular diet. A slow return to normal foods is an ideal way to minimize nausea.    Pain Management  You may use acetaminophen for pain relief if it is not contraindicated due to a medical condition. Follow the  recommendations for dosing.     Medications  You may resume your home medications. If you take blood thinners, you may resume them after 24 hours.    When to seek medical advice  Call your ordering provider with questions and to discuss test results.   If you have any questions or concerns about the procedure, please call the Radiology Nurse at [phone] from 8am - 5pm, Monday-Friday. After those hours, Dial [phone] and ask the Radiology Department  to page the on-call Interventional Radiologist if any of these occur:    There is a change in color or temperature of the area where the procedure was performed.  There is more than a small amount of fluid leaking from the puncture site.  You develop increasing pain or shortness of breath.  Unusual drowsiness, weakness, or dizziness gets worse.  Unusual vomiting.    IF YOU FEEL YOU ARE EXPERIENCIG AN EMERGENCY,   CALL 911 OR GO TO THE NEAREST EMERGENCY ROOM.      4.2.24 MO/  Radiology

## 2025-03-04 ENCOUNTER — HOSPITAL ENCOUNTER (OUTPATIENT)
Dept: ULTRASOUND IMAGING | Facility: HOSPITAL | Age: 55
Discharge: HOME | End: 2025-03-04
Payer: MEDICAID

## 2025-03-04 ENCOUNTER — NURSE ONLY (OUTPATIENT)
Dept: LAB | Facility: HOSPITAL | Age: 55
End: 2025-03-04
Payer: MEDICAID

## 2025-03-04 ENCOUNTER — TELEPHONE (OUTPATIENT)
Facility: CLINIC | Age: 55
End: 2025-03-04

## 2025-03-04 ENCOUNTER — HOSPITAL ENCOUNTER (OUTPATIENT)
Dept: GENERAL RADIOLOGY | Facility: HOSPITAL | Age: 55
Discharge: HOME | End: 2025-03-04
Attending: RADIOLOGY
Payer: MEDICAID

## 2025-03-04 ENCOUNTER — MED REC SCAN ONLY (OUTPATIENT)
Facility: CLINIC | Age: 55
End: 2025-03-04

## 2025-03-04 VITALS
BODY MASS INDEX: 26.66 KG/M2 | TEMPERATURE: 98 F | RESPIRATION RATE: 17 BRPM | HEIGHT: 65 IN | DIASTOLIC BLOOD PRESSURE: 82 MMHG | SYSTOLIC BLOOD PRESSURE: 115 MMHG | WEIGHT: 160 LBS | OXYGEN SATURATION: 97 % | HEART RATE: 107 BPM

## 2025-03-04 DIAGNOSIS — J90 PLEURAL EFFUSION: ICD-10-CM

## 2025-03-04 DIAGNOSIS — J90 PLEURAL EFFUSION: Primary | ICD-10-CM

## 2025-03-04 LAB
ALBUMIN FLD-MCNC: 3.3 G/DL
BASOPHILS NFR PLR: 0 %
CHOLEST PLR-MCNC: 97 MG/DL
COLOR FLD: YELLOW
CREAT FLD-MCNC: 0.8 MG/DL
EOSINOPHIL NFR PLR: 0 %
ERYTHROCYTE [DISTWIDTH] IN BLOOD BY AUTOMATED COUNT: 12.8 %
GLUCOSE PLR-MCNC: 22 MG/DL
HCT VFR BLD AUTO: 41.7 %
HCT VFR PLR CALC: <5 %
HGB BLD-MCNC: 14.3 G/DL
INR BLD: 0.92 (ref 0.8–1.2)
LDH FLD L TO P-CCNC: 1435 U/L
LYMPHOCYTES NFR PLR: 86 %
MCH RBC QN AUTO: 29.8 PG (ref 26–34)
MCHC RBC AUTO-ENTMCNC: 34.3 G/DL (ref 31–37)
MCV RBC AUTO: 86.9 FL
MESOTHL CELL NFR PLR: 4 %
MONOS+MACROS NFR PLR: 10 %
NEUTROPHILS NFR PLR: 0 %
PH PLR: 7.21 [PH] (ref 7.2–7.5)
PLATELET # BLD AUTO: 388 10(3)UL (ref 150–450)
PROT PLR-MCNC: 4.6 G/DL
PROTHROMBIN TIME: 12.5 SECONDS (ref 11.6–14.8)
RBC # BLD AUTO: 4.8 X10(6)UL
RBC PLEURAL FLUID: 5000 /MM3
TOTAL CELLS COUNTED FLD: 100
TRIGL PLR-MCNC: 58 MG/DL
WBC # BLD AUTO: 8.8 X10(3) UL (ref 4–11)
WBC # PLR: 1011 /MM3

## 2025-03-04 PROCEDURE — 87102 FUNGUS ISOLATION CULTURE: CPT

## 2025-03-04 PROCEDURE — 88108 CYTOPATH CONCENTRATE TECH: CPT

## 2025-03-04 PROCEDURE — 87186 SC STD MICRODIL/AGAR DIL: CPT

## 2025-03-04 PROCEDURE — 87205 SMEAR GRAM STAIN: CPT

## 2025-03-04 PROCEDURE — 85610 PROTHROMBIN TIME: CPT

## 2025-03-04 PROCEDURE — 89050 BODY FLUID CELL COUNT: CPT

## 2025-03-04 PROCEDURE — 84157 ASSAY OF PROTEIN OTHER: CPT

## 2025-03-04 PROCEDURE — 87206 SMEAR FLUORESCENT/ACID STAI: CPT

## 2025-03-04 PROCEDURE — 85014 HEMATOCRIT: CPT

## 2025-03-04 PROCEDURE — 71045 X-RAY EXAM CHEST 1 VIEW: CPT | Performed by: RADIOLOGY

## 2025-03-04 PROCEDURE — 88341 IMHCHEM/IMCYTCHM EA ADD ANTB: CPT

## 2025-03-04 PROCEDURE — 36415 COLL VENOUS BLD VENIPUNCTURE: CPT

## 2025-03-04 PROCEDURE — 82800 BLOOD PH: CPT

## 2025-03-04 PROCEDURE — 83615 LACTATE (LD) (LDH) ENZYME: CPT

## 2025-03-04 PROCEDURE — 88305 TISSUE EXAM BY PATHOLOGIST: CPT

## 2025-03-04 PROCEDURE — 82945 GLUCOSE OTHER FLUID: CPT

## 2025-03-04 PROCEDURE — 87077 CULTURE AEROBIC IDENTIFY: CPT

## 2025-03-04 PROCEDURE — 89051 BODY FLUID CELL COUNT: CPT

## 2025-03-04 PROCEDURE — 87116 MYCOBACTERIA CULTURE: CPT

## 2025-03-04 PROCEDURE — 32555 ASPIRATE PLEURA W/ IMAGING: CPT

## 2025-03-04 PROCEDURE — 85027 COMPLETE CBC AUTOMATED: CPT

## 2025-03-04 PROCEDURE — 82570 ASSAY OF URINE CREATININE: CPT

## 2025-03-04 PROCEDURE — 87070 CULTURE OTHR SPECIMN AEROBIC: CPT

## 2025-03-04 PROCEDURE — 82465 ASSAY BLD/SERUM CHOLESTEROL: CPT

## 2025-03-04 PROCEDURE — 84478 ASSAY OF TRIGLYCERIDES: CPT

## 2025-03-04 PROCEDURE — 88342 IMHCHEM/IMCYTCHM 1ST ANTB: CPT

## 2025-03-04 PROCEDURE — 82042 OTHER SOURCE ALBUMIN QUAN EA: CPT

## 2025-03-04 RX ORDER — ONDANSETRON 2 MG/ML
4 INJECTION INTRAMUSCULAR; INTRAVENOUS ONCE AS NEEDED
Status: DISCONTINUED | OUTPATIENT
Start: 2025-03-04 | End: 2025-03-04

## 2025-03-04 RX ORDER — SODIUM CHLORIDE 9 MG/ML
INJECTION, SOLUTION INTRAVENOUS CONTINUOUS
Status: DISCONTINUED | OUTPATIENT
Start: 2025-03-04 | End: 2025-03-04

## 2025-03-04 NOTE — PROCEDURES
[facility]  Pre-Procedure Note    Name: Hanna Barrett  MRN#: [id number]  : 1970    Procedure:  Ultrasound Guided Right Thoracentesis    Indication:  Symptomatic Right Pleural Effusion    Allergies:    Allergies[1]    Pertinent Medications:    Is patient on any Aspirin, Coumadin, or any other Anticoagulations/Antiplatelet medications?  no      Mental Status:  Alert and Oriented      Health Status: Acceptable for Procedure    Impression and Plans:    Symptomatic right pleural effusion for ultrasound guided thoracentesis. History of metastatic lung cancer.    I have reviewed the above information prior to procedure.    I have discussed the risks and benefits and alternatives with the patient.  The patient understands and agrees to proceed with plan of care.    Payam Headley MD             [1]   Allergies  Allergen Reactions    Gabapentin SWELLING and UNKNOWN    Erythromycin UNKNOWN    Clindamycin RASH

## 2025-03-04 NOTE — PROCEDURES
[facility]    Hanna Barrett Patient Status:  Outpatient    1970 MRN [id number]   Location [facility] ULTRASOUND Attending Sharon Keller, GABY   Hosp Day # 0 PCP Guillermo Curry MD         Procedure Report    Pre-Operative Diagnosis: Symptomatic Right Pleural Effusion    Post-Operative Diagnosis: Same as above.    Procedure Performed: Ultrasound guided right thoracentesis    Anesthesia: 1% lidocaine    EBL: 0    Complications: None    Summary of Case: 1550cc of peach colored fluid aspirated from the right pleural space.  Fluid was sent for routine studies as directed by the ordering physician. Patient tolerated procedure well without immediate complication.  Post thoracentesis chest x-ray to be obtained.  Full report to follow in PACS.    Payam Headley

## 2025-03-04 NOTE — TELEPHONE ENCOUNTER
Thoracentesis performed today.  Received a critical value from Radiology.  pH thora fluid 7.21 (normal 7.25 - 7.50 - outside critical range of 7.24  Fax from radiology placed on GABY Gastelum  Message also routed to Sharon.

## 2025-03-04 NOTE — IMAGING NOTE
Patient received in holding.   All pertinent labs reviewed.  NPO since 0000  1550 cc removed by DR Headley  Puncture site to Left middle back with tegaderm dressing in place, C/D/I.  Patient denies pain.   Report given to Yvonne RAUSCH.   Patient transported to [facility] 2253 accompanied by  ayesha Morrow.

## 2025-03-05 ENCOUNTER — TELEPHONE (OUTPATIENT)
Facility: CLINIC | Age: 55
End: 2025-03-05

## 2025-03-05 DIAGNOSIS — J90 PLEURAL EFFUSION: Primary | ICD-10-CM

## 2025-03-05 NOTE — TELEPHONE ENCOUNTER
I called and spoke to the patient  Overall feels good post thora    Reviewed some thoracentesis results    Denies   Fever  Chills  Body aches    Plan:   Augmentin  CXR next week    Patient verbalized understanding and agreed to plan

## 2025-03-05 NOTE — TELEPHONE ENCOUNTER
Critical results received.  Pleural fluid:    2+ WBCs seen   No organisms seen   This is a cytocentrifuged smear.   Gram stain of positive bottle shows:   Gram positive cocci in clusters     Message routed to GABY Gastelum  She was also notified verbally.

## 2025-03-06 DIAGNOSIS — C34.91 PRIMARY ADENOCARCINOMA OF RIGHT LUNG (HCC): Primary | ICD-10-CM

## 2025-03-07 ENCOUNTER — HOSPITAL ENCOUNTER (OUTPATIENT)
Dept: MRI IMAGING | Facility: HOSPITAL | Age: 55
Discharge: HOME | End: 2025-03-07
Attending: INTERNAL MEDICINE
Payer: MEDICAID

## 2025-03-07 DIAGNOSIS — C34.91 PRIMARY ADENOCARCINOMA OF RIGHT LUNG (HCC): ICD-10-CM

## 2025-03-07 PROCEDURE — 70553 MRI BRAIN STEM W/O & W/DYE: CPT | Performed by: INTERNAL MEDICINE

## 2025-03-07 PROCEDURE — A9575 INJ GADOTERATE MEGLUMI 0.1ML: HCPCS | Performed by: INTERNAL MEDICINE

## 2025-03-07 RX ORDER — GADOTERATE MEGLUMINE 376.9 MG/ML
15 INJECTION INTRAVENOUS
Status: COMPLETED | OUTPATIENT
Start: 2025-03-07 | End: 2025-03-07

## 2025-03-07 RX ADMIN — GADOTERATE MEGLUMINE 15 ML: 376.9 INJECTION INTRAVENOUS at 15:58:00

## 2025-03-11 ENCOUNTER — HOSPITAL ENCOUNTER (OUTPATIENT)
Dept: NUCLEAR MEDICINE | Facility: HOSPITAL | Age: 55
Discharge: HOME | End: 2025-03-11
Attending: INTERNAL MEDICINE
Payer: MEDICAID

## 2025-03-11 DIAGNOSIS — C34.91 RECURRENT MALIGNANT NEOPLASM OF RIGHT LUNG OF UNKNOWN CELL TYPE (HCC): ICD-10-CM

## 2025-03-11 DIAGNOSIS — C34.91 PRIMARY ADENOCARCINOMA OF RIGHT LUNG (HCC): ICD-10-CM

## 2025-03-11 LAB — GLUCOSE BLD-MCNC: 80 MG/DL (ref 70–99)

## 2025-03-11 PROCEDURE — 78815 PET IMAGE W/CT SKULL-THIGH: CPT | Performed by: INTERNAL MEDICINE

## 2025-03-11 PROCEDURE — 82962 GLUCOSE BLOOD TEST: CPT

## 2025-03-12 ENCOUNTER — OFFICE VISIT (OUTPATIENT)
Age: 55
End: 2025-03-12
Attending: INTERNAL MEDICINE
Payer: MEDICAID

## 2025-03-12 ENCOUNTER — HOSPITAL ENCOUNTER (OUTPATIENT)
Dept: GENERAL RADIOLOGY | Age: 55
Discharge: HOME | End: 2025-03-12
Payer: MEDICAID

## 2025-03-12 ENCOUNTER — OFFICE VISIT (OUTPATIENT)
Facility: CLINIC | Age: 55
End: 2025-03-12
Payer: MEDICAID

## 2025-03-12 VITALS
OXYGEN SATURATION: 99 % | BODY MASS INDEX: 27.99 KG/M2 | RESPIRATION RATE: 16 BRPM | DIASTOLIC BLOOD PRESSURE: 78 MMHG | WEIGHT: 168 LBS | SYSTOLIC BLOOD PRESSURE: 109 MMHG | HEART RATE: 90 BPM | HEIGHT: 65 IN | TEMPERATURE: 98 F

## 2025-03-12 VITALS
WEIGHT: 165 LBS | HEART RATE: 94 BPM | SYSTOLIC BLOOD PRESSURE: 112 MMHG | BODY MASS INDEX: 27.49 KG/M2 | HEIGHT: 65 IN | RESPIRATION RATE: 97 BRPM | DIASTOLIC BLOOD PRESSURE: 64 MMHG

## 2025-03-12 DIAGNOSIS — J45.909 UNCOMPLICATED ASTHMA, UNSPECIFIED ASTHMA SEVERITY, UNSPECIFIED WHETHER PERSISTENT (HCC): ICD-10-CM

## 2025-03-12 DIAGNOSIS — J90 PLEURAL EFFUSION: Primary | ICD-10-CM

## 2025-03-12 DIAGNOSIS — R06.09 DOE (DYSPNEA ON EXERTION): ICD-10-CM

## 2025-03-12 DIAGNOSIS — J90 PLEURAL EFFUSION: ICD-10-CM

## 2025-03-12 DIAGNOSIS — R59.0 SUPRACLAVICULAR ADENOPATHY: ICD-10-CM

## 2025-03-12 DIAGNOSIS — C34.91 PRIMARY ADENOCARCINOMA OF RIGHT LUNG (HCC): ICD-10-CM

## 2025-03-12 DIAGNOSIS — D64.9 ANEMIA, NORMOCYTIC NORMOCHROMIC: ICD-10-CM

## 2025-03-12 DIAGNOSIS — J91.0 MALIGNANT PLEURAL EFFUSION (HCC): ICD-10-CM

## 2025-03-12 DIAGNOSIS — G89.3 NEOPLASM RELATED PAIN: ICD-10-CM

## 2025-03-12 DIAGNOSIS — M54.12 CERVICAL RADICULOPATHY: Primary | ICD-10-CM

## 2025-03-12 DIAGNOSIS — R59.0 MEDIASTINAL ADENOPATHY: ICD-10-CM

## 2025-03-12 LAB
ALBUMIN SERPL-MCNC: 4.8 G/DL (ref 3.2–4.8)
ALBUMIN/GLOB SERPL: 2 {RATIO} (ref 1–2)
ALP LIVER SERPL-CCNC: 69 U/L
ALT SERPL-CCNC: 18 U/L
ANION GAP SERPL CALC-SCNC: 9 MMOL/L (ref 0–18)
AST SERPL-CCNC: 20 U/L (ref ?–34)
BASOPHILS # BLD AUTO: 0.05 X10(3) UL (ref 0–0.2)
BASOPHILS NFR BLD AUTO: 0.9 %
BILIRUB SERPL-MCNC: 0.3 MG/DL (ref 0.3–1.2)
BUN BLD-MCNC: 15 MG/DL (ref 9–23)
CALCIUM BLD-MCNC: 10.4 MG/DL (ref 8.7–10.6)
CHLORIDE SERPL-SCNC: 102 MMOL/L (ref 98–112)
CO2 SERPL-SCNC: 29 MMOL/L (ref 21–32)
CREAT BLD-MCNC: 0.93 MG/DL
DEPRECATED HBV CORE AB SER IA-ACNC: 91 NG/ML
EGFRCR SERPLBLD CKD-EPI 2021: 73 ML/MIN/1.73M2 (ref 60–?)
EOSINOPHIL # BLD AUTO: 0.14 X10(3) UL (ref 0–0.7)
EOSINOPHIL NFR BLD AUTO: 2.5 %
ERYTHROCYTE [DISTWIDTH] IN BLOOD BY AUTOMATED COUNT: 12.3 %
GLOBULIN PLAS-MCNC: 2.4 G/DL (ref 2–3.5)
GLUCOSE BLD-MCNC: 92 MG/DL (ref 70–99)
HCT VFR BLD AUTO: 37.7 %
HGB BLD-MCNC: 13 G/DL
IMM GRANULOCYTES # BLD AUTO: 0.01 X10(3) UL (ref 0–1)
IMM GRANULOCYTES NFR BLD: 0.2 %
IRON SATN MFR SERPL: 23 %
IRON SERPL-MCNC: 75 UG/DL
LDH SERPL L TO P-CCNC: 183 U/L
LYMPHOCYTES # BLD AUTO: 0.83 X10(3) UL (ref 1–4)
LYMPHOCYTES NFR BLD AUTO: 14.6 %
MCH RBC QN AUTO: 29.7 PG (ref 26–34)
MCHC RBC AUTO-ENTMCNC: 34.5 G/DL (ref 31–37)
MCV RBC AUTO: 86.1 FL
MONOCYTES # BLD AUTO: 0.58 X10(3) UL (ref 0.1–1)
MONOCYTES NFR BLD AUTO: 10.2 %
NEUTROPHILS # BLD AUTO: 4.08 X10 (3) UL (ref 1.5–7.7)
NEUTROPHILS # BLD AUTO: 4.08 X10(3) UL (ref 1.5–7.7)
NEUTROPHILS NFR BLD AUTO: 71.6 %
OSMOLALITY SERPL CALC.SUM OF ELEC: 290 MOSM/KG (ref 275–295)
PLATELET # BLD AUTO: 388 10(3)UL (ref 150–450)
PLATELETS.RETICULATED NFR BLD AUTO: 1.2 % (ref 0–7)
POTASSIUM SERPL-SCNC: 3.8 MMOL/L (ref 3.5–5.1)
PROT SERPL-MCNC: 7.2 G/DL (ref 5.7–8.2)
RBC # BLD AUTO: 4.38 X10(6)UL
SODIUM SERPL-SCNC: 140 MMOL/L (ref 136–145)
TOTAL IRON BINDING CAPACITY: 324 UG/DL (ref 250–425)
TRANSFERRIN SERPL-MCNC: 249 MG/DL (ref 250–380)
VIT B12 SERPL-MCNC: 884 PG/ML (ref 211–911)
WBC # BLD AUTO: 5.7 X10(3) UL (ref 4–11)

## 2025-03-12 PROCEDURE — 99214 OFFICE O/P EST MOD 30 MIN: CPT

## 2025-03-12 PROCEDURE — 71046 X-RAY EXAM CHEST 2 VIEWS: CPT

## 2025-03-12 RX ORDER — LISINOPRIL 5 MG/1
5 TABLET ORAL DAILY
COMMUNITY
Start: 2025-03-06

## 2025-03-12 RX ORDER — HYDROCODONE BITARTRATE AND ACETAMINOPHEN 10; 325 MG/1; MG/1
1 TABLET ORAL EVERY 8 HOURS PRN
Qty: 60 TABLET | Refills: 0 | Status: SHIPPED | OUTPATIENT
Start: 2025-03-12

## 2025-03-12 NOTE — PROGRESS NOTES
Cancer Center Progress Note    Patient Name: Hanna Barrett   YOB: 1970   Medical Record Number: [id number]   CSN: [id number]   Attending Physician: Shoshana Gordon M.D.   Referring Physician: MD Dr. Flynn Geronimo    Date of Visit: 3/12/2025     Chief Complaint:  Chief Complaint   Patient presents with    Cancer     Follow up with        Hem/Onc History:  Stage IIIC NSCLC adenocarcinoma of the right lung diagnosed 12/2021    Oncologic History:  5/2021: patient presented with abnormal preoperative CXR; left apical 8 mm nodule with irregular margins, and a right middle lobe soft tissue density with associated bronchiectasis medially and mild adjacent nonspecific ground glass density, overall stable since 2/2021.     11/2021: CT scan of the chest demonstrated a spiculated airspace density within the right middle lobe worrisome for primary lung malignancy along with mediastinal lymph nodes.  12/7/2021: PET/CT scan showed a hypermetabolic RML and RUL mass with enlargement of mediastinal lymph nodes.    12/13/2021: bronchoscopy and transbronchial needle aspiration to subcarinal lymph node was positive for metastatic adenocarcinoma. PD-L1 <1%, EGFR, ROS1, ALK, KRAS, RET, BRAF all negative.     1/17/2022: concurrent chemoradiation with cisplatin + pemetrexed at [facility] (Dr. Subramanian) completed in 4/2022 5/13/2022: started consolidative durvalumab q4 weeks    7/2022: PET concerning for progression of disease     8/12/2022: transferred care to Dr. Sanchez at [place]    8/16/2022: bronchoscopy and biopsies were negative for malignancy.     9/14/2022: CT scan showed some improvement     11/14/2022: PET scan continues to show improvement with interval decrease in the right lung opacities with decreased uptake, may represent scarring and posttreatment change. Increased uptake distal esophagus.     1/17/2023: EGD with normal esophagus and negative biopsies.     2/17/2023: CT chest  stable.      3/31/2023: completed 12 cycles of durvalumab.     - care transferred to Dr. Baker when Dr. Sanchez retired    4/7/2023: bronchoscopy for hemoptysis was unremarkable.   5/17/2023: CT chest stable.      - care transferred to me 11/2023 from Dr. Baker  who now practices primarily in [place]      History of Present Illness:  Last saw Hanna in November. Imaging done just prior showed stable right perihilar consolidation which compared to her previous PET showed no uptake in these areas and therefore suggestive of no significant residual or recurrent neoplasm. No new findings described in C/A/P with devrease in size of a liver lesion favored to be a hemangioma. C/o chronic fatigue and some SOB and cough. Had reported some visual changes and was seen at [facility]. Reports no evidence of malignancy seen. As felt relatively well with recent good scans she opted to have her port removed. This was removed by IR on 11/26/24.    Early 1/2025 she saw her PCP c/o nasal and sinus congestion with pressure, cough, PND, fullness in the ears. No adenopathy was noted on exam. She was placed on Augmentin for sinusitis.     She then reported feeling a growth in her nose causing a deformity with nasal obstructive symptoms. Saw ENT 1/29/25 (Laila). Sinus CT scans were ordered and was referred to plastics. Scans showed no nasal mass or abnormality of the soft tissues but did show postoperative changes from previous sinus surgery. Did see plastics and was felt to have a dermoid cyst.     The following month c/o left supraclavicular node which was increasing in size and tender. Saw PCP who ordered imaging. Thought to have Virchow node. US of the neck and CT abdomen/pelvis were ordered. US showed nonspecific appearing supraclav node. CT of the abdomen and pelvis showed a moderate to large right pleural effusion that was not present 9/2024 CT. Dedicated CT chest was ordered. Testing results were reviewed. US guided  biopsy of left supraclav node was ordered. CT chest confirmed new right pleural effusion and was referred to pulmonary for tap.     Biopsy of the left supraclav node on 2/27/25 showed metastatic adenocarcinoma c/w lung primary. US guided right thoracentesis drained 1500 mL of fluid. Cytology from the right pleural effusion showed metastatic carcinoma c/w lung adenocarcinoma. Full path reports below.     She reported increasing SOB and cough which have improved significantly since her thoracentesis. Left neck/supraclav area is tender. She was placed on antibiotics by pulmonary for Staph epi in the fluid.  Denies chest or abdominal pain, change in bowel or urinary habits, headaches, dizziness. Fibromyalgia feels worse she says and could sense \"something was brewing\".      Medical Cytology Report                           Case: [id number]                                     Authorizing Provider:  Sharon Keller APRN       Collected:           03/04/2025                     Ordering Location:     [facility]    Received:            03/04/2025 11:42 AM                                    [facility]                                                                     Pathologist:           Mellissa Scott MD                                                           Specimen:    Pleural Fluid, Right, Pleural Fluid, Right 1400ml nemo                                        Final Diagnosis:     Right pleural fluid, cytospin and cell block preparation:  -Adequate for evaluation.   -POSITIVE for metastatic carcinoma, compatible with lung adenocarcinoma.      Electronically signed by Mellissa Scott MD on 3/6/2025 at 0846        Final Diagnosis Comment      Immunohistochemical stains are performed for confirmation of the morphologic impression. Lesional cells are positive for TTF-1, CK7 and AE1/AE3 and negative  for Napsin-A. These findings are compatible with a metastasis from the patient's known lung primary.     Dr. Garcia has reviewed this case and agrees with this diagnosis.         Ancillary Studies      Immunohistochemistry:     Material:  Cell block  Population:  Tumor Cells     Antibody                        Result  CK7                                See comment      Napsin-A                         See comment  TTF-1                              See comment  AE1/AE3                         See comment        Medical Necessity    Immunohistochemical stains were performed:                To evaluate tumor phenotype                 Positive tissue controls were utilized in the staining process.  These slides were reviewed by the signout Pathologist and showed appropriate staining results.     Interpreted by:  Mellissa Scott MD     Methodology:   Immunohistochemical stains are performed on formalin-fixed, paraffin-embedded tissue sections.  Deparaffinization, antigen retrieval, and staining utilizes the automated Leica Zuniga III immunohistochemistry platform.  A proprietary, non-biotin, polymer-based detection system (Bond Polymer Refine DetectionTM ) is employed.  All antibodies are validated by [facility] Department of Pathology to document appropriate staining reactions.  Positive controls are utilized and show appropriate reactivity.        Clinical Information      J90 Pleural Effusion.        Non Gyne Interpretation     Malignant Abnormal      Electronically signed by Mellissa Scott MD on 3/6/2025 at 0846         Surgical Pathology                                Case: [id number]                                     Authorizing Provider:  Shoshana Gordon MD       Collected:           02/27/2025 10:32 AM             Ordering Location:     [facility] Ultrasound Received:            02/27/2025 11:22 AM             Pathologist:           Goldberg, Cathryn A, MD                                                          Specimen:    Lymph node neck, L neck node supraclavicular                                                  Final Diagnosis:     Biopsy, left supraclavicular lymph node:  -Metastatic adenocarcinoma consistent with the patient's known lung primary.     Electronically signed by Goldberg, Cathryn A, MD on 3/3/2025 at 1249        Ancillary Studies      Immunohistochemistry:     Material:  Block A2  Population:  Tumor Cells     Antibody                        Result  CK7                                Positive  CK20                              Negative  TTF1                               Positive   Napsin                            Positive           Medical Necessity    Immunohistochemical stains were performed:                To evaluate tumor phenotype                 Positive tissue controls were utilized in the staining process.  These slides were reviewed by the signout Pathologist and showed appropriate staining results.     Interpreted by:  Cathryn A. Goldberg, MD     Methodology:   Immunohistochemical stains are performed on formalin-fixed, paraffin-embedded tissue sections.  Deparaffinization, antigen retrieval, and staining utilizes the automated Leica Zuniga III immunohistochemistry platform.  A proprietary, non-biotin, polymer-based detection system (Bond Polymer Refine DetectionTM ) is employed.  All antibodies are validated by [facility] Department of Pathology to document appropriate staining reactions.  Positive controls are utilized and show appropriate reactivity.     Clinical Information      History of lung adenocarcinoma.     Gross Description      Received in formalin labeled with the patient's name and \" left neck node supraclavicular\". It consists of multiple core fragments of soft tan-white, rubbery tissue measuring 0.7 x 0.2 x 0.1 cm in aggregate. The specimen is submitted entirely in A1-A2.      (TB)     Interpretation     Malignant Abnormal      Electronically signed by  Goldberg, Cathryn A, MD on 3/3/2025 at 1249     Resulting Agency [facility] (Pending sale to [facility])               Specimen Collected: 02/27/25 10:32 Last Resulted: 03/03/25 12:49       Current Medications:    Current Outpatient Medications:     lisinopril 5 MG Oral Tab, Take 1 tablet (5 mg total) by mouth daily., Disp: , Rfl:     amoxicillin clavulanate 875-125 MG Oral Tab, Take 1 tablet by mouth 2 (two) times daily for 7 days., Disp: 14 tablet, Rfl: 0    lisinopril-hydroCHLOROthiazide 20-12.5 MG Oral Tab, Take 0.5 tablets by mouth daily., Disp: , Rfl:     dilTIAZem HCl 120 MG Oral Tab, Take 1 tablet (120 mg total) by mouth daily., Disp: , Rfl:     Cyanocobalamin (VITAMIN B-12) 1000 MCG Sublingual SL Tab, Place 2,000 tablets under the tongue 2 (two) times daily., Disp: , Rfl:     montelukast 10 MG Oral Tab, Take 1 tablet (10 mg total) by mouth nightly., Disp: 90 tablet, Rfl: 3    albuterol 108 (90 Base) MCG/ACT Inhalation Aero Soln, Inhale 2 puffs into the lungs every 6 (six) hours as needed for Wheezing or Shortness of Breath., Disp: 3 each, Rfl: 3    amphetamine-dextroamphetamine 15 MG Oral Tab, TAKE ONE TABLET BY MOUTH ONCE DAILY 8 TO 9 HOURS AFTER VYVANSE DOSE, Disp: , Rfl:     ARIPiprazole 2 MG Oral Tab, Take one (1) tablet by mouth every morning, Disp: , Rfl:     VYVANSE 70 MG Oral Cap, Take 1 capsule (70 mg total) by mouth every morning., Disp: , Rfl:     SYMBICORT 160-4.5 MCG/ACT Inhalation Aerosol, Inhale 2 puffs into the lungs 2 (two) times daily., Disp: , Rfl:     HYDROcodone-acetaminophen (NORCO) 5-325 MG Oral Tab, Take 1 tablet by mouth every 6 (six) hours as needed for Pain., Disp: 20 tablet, Rfl: 0    cetirizine 10 MG Oral Tab, Take 1 tablet (10 mg total) by mouth daily., Disp: 90 tablet, Rfl: 1    ferrous sulfate 325 (65 FE) MG Oral Tab EC, Take 1 tablet (325 mg total) by mouth daily with breakfast., Disp: , Rfl:     Multiple Vitamin Oral Tab, Take 1 tablet by mouth daily., Disp: , Rfl:     ibuprofen 200 MG Oral Tab,  Take 4 tablets (800 mg total) by mouth every 8 (eight) hours as needed for Pain., Disp: , Rfl:     cholecalciferol 25 MCG (1000 UT) Oral Cap, Take 1 capsule (1,000 Units total) by mouth daily., Disp: , Rfl: 0    DULoxetine HCl 60 MG Oral Cap DR Particles, Take 1 capsule (60 mg total) by mouth daily., Disp: , Rfl: 2    Past Medical History:  Past Medical History:    Abnormal uterine bleeding    bleeds every 2 weeks    Anxiety state    Asthma (HCC)    Attention deficit hyperactivity disorder (ADHD)    BACK PAIN    Back problem    lumbar radiculopathy    Cancer (HCC)    adenocarcinoma of right lung    MARCIO II (cervical intraepithelial neoplasia II)    DDD (degenerative disc disease), lumbar    DDD (degenerative disc disease), thoracic    Depression    Disorder of thyroid    Dyspareunia    Exposure to medical diagnostic radiation    On hold since 4/2022    Fall    Fibromyalgia    Fibromyalgia    Genital warts    High blood pressure    History of COVID-19    COVID + 7/2020 Headache - NO Hospitalization needed     History of lumbar fusion    Hx of motion sickness    IBS (irritable bowel syndrome)    Per patient - Just constipation    Infertility, female    Lumbar radiculopathy    Mild dysplasia of cervix    Pap smear for cervical cancer screening    pt states normal    Papanicolaou smear of cervix with high grade squamous intraepithelial lesion (HGSIL)    Personal history of antineoplastic chemotherapy    Last chemo 7/12/22 prior to lung bx 8/4/22    Post covid-19 condition, unspecified    symptoms: HA; s/s resolved-yes; not hospitalized    Problems with swallowing    with radiation    Sexually transmitted disease    HPV    Substance abuse (HCC)    cocaine    Urinary incontinence    Visual impairment    glasses/contacts bifocals       Past Surgical History:  Past Surgical History:   Procedure Laterality Date    Appendectomy  1980    Back surgery  2009    fusion L5-S1    Back surgery  03/03/2021    RIGHT LUMBAR 3/ LUMBAR 4,  LUMBAR 4/ LUMBAR 5 HEMILAMINTOMIES, LEFT LUMBAR 2 / LUMBAR 3 MICRODISCECTOMY    Colonoscopy N/A 2023    Procedure: COLONOSCOPY;  Surgeon: Devante Kern MD;  Location:  ENDOSCOPY    Colposcopy,bx cervix/endocerv curr  11    MARCIO 1    Laparoscopy procedure unlisted      Ovarian cyst removed    Leep  2011    MARCIO 2          X2    Other surgical history      bunions bilateral    Other surgical history      nodule lymph nodes neck    Other surgical history       Bladder sling    Other surgical history  2020    Cystoscopy, Dr Beach       Family Medical History:  Family History   Problem Relation Age of Onset    Other (lung CA) Self     Hypertension Mother     Diabetes Mother     Heart Disease Mother     Heart Disease Father     Other (lung cancer) Father 55        Lung Cancer    Other (pancreatic cancer) Sister 47        Pancreatic Cancer    Cancer Sister 51        lung CA    Other (Other) Sister         Back problems    Other (Other) Son         anxiety    Other (Other) Son         behavioral problems    Diabetes Maternal Grandmother     Breast Cancer Maternal Grandmother         dx late-60s    Stroke Maternal Grandfather 86    Dementia Paternal Grandmother     Heart Disorder Paternal Grandfather     Cancer Maternal Aunt 50        LUNG CA 51    Breast Cancer Maternal Aunt         dx 46-47y    Ovarian Cancer Maternal Cousin Female 33         of ovarian ca at 35y       Gyne History:  OB History    Para Term  AB Living   2 2 2 0 0 2   SAB IAB Ectopic Multiple Live Births   0 0 0 0 2       Psychosocial History:  Social History     Socioeconomic History    Marital status:      Spouse name: Not on file    Number of children: Not on file    Years of education: Not on file    Highest education level: Not on file   Occupational History    Not on file   Tobacco Use    Smoking status: Former     Current packs/day: 0.00     Average packs/day: 0.8 packs/day for 19.0  years (14.5 ttl pk-yrs)     Types: Cigarettes     Start date: 1986     Quit date: 2010     Years since quitting: 15.2     Passive exposure: Past    Smokeless tobacco: Former     Quit date: 6/1/2024    Tobacco comments:     Has not vaped since 6/1/2024.    Vaping Use    Vaping status: Every Day    Substances: Nicotine, daily    Devices: Pre-filled pod   Substance and Sexual Activity    Alcohol use: Yes     Alcohol/week: 1.0 standard drink of alcohol     Types: 1 Glasses of wine per week    Drug use: Not Currently     Types: Cocaine     Comment: USED COCAINE BETW [id number]    Sexual activity: Yes     Partners: Male   Other Topics Concern     Service Not Asked    Blood Transfusions Not Asked    Caffeine Concern Yes     Comment: 3-4 daily of pop    Occupational Exposure Not Asked    Hobby Hazards Not Asked    Sleep Concern Not Asked    Stress Concern Not Asked    Weight Concern Not Asked    Special Diet Not Asked    Back Care Not Asked    Exercise No     Comment: not tolerated right now    Bike Helmet Not Asked    Seat Belt Not Asked    Self-Exams Not Asked   Social History Narrative    Not on file     Social Drivers of Health     Food Insecurity: No Food Insecurity (7/12/2024)    Food Insecurity     Food Insecurity: Never true   Transportation Needs: No Transportation Needs (7/12/2024)    Transportation Needs     Lack of Transportation: No     Car Seat: Not on file   Housing Stability: Low Risk  (7/12/2024)    Housing Stability     Housing Instability: No     Housing Instability Emergency: Not on file     Crib or Bassinette: Not on file         Allergies:  Allergies   Allergen Reactions    Gabapentin SWELLING and UNKNOWN    Erythromycin UNKNOWN    Clindamycin RASH        Review of Systems:  A 14-point ROS was done with pertinent positives and negative per the HPI    Vital Signs:  /78 (BP Location: Left arm, Patient Position: Sitting, Cuff Size: adult)   Pulse 90   Temp 97.5 °F (36.4 °C) (Temporal)    Resp 16   Ht 1.651 m (5' 5\")   Wt 76.2 kg (168 lb)   LMP 02/14/2019 (Exact Date)   SpO2 99%   BMI 27.96 kg/m²       Physical Examination:  General: Patient is alert and oriented x 3, not in acute distress.  Psych:  Mood and affect appropriate  HEENT: EOMs intact. PERRL. Oropharynx is clear.   Neck: No JVD. Left palpable lymphadenopathy supraclav with tenderness. Neck is supple.  Chest: Diminished BS  Heart: Regular rate and rhythm.   Abdomen: Soft, non tender with good bowel sounds.  No hepatosplenomegaly.  No palpable mass.  Extremities: Pedal pulses are present. No BLE edema.  Neurological: Grossly intact.       Laboratory:  Lab Results   Component Value Date    WBC 5.7 03/12/2025    RBC 4.38 03/12/2025    HGB 13.0 03/12/2025    HCT 37.7 03/12/2025    .0 03/12/2025    MPV 9.4 09/02/2012    MCV 86.1 03/12/2025    MCH 29.7 03/12/2025    MCHC 34.5 03/12/2025    RDW 12.3 03/12/2025    NEPRELIM 4.08 03/12/2025    NEUTABS 11.73 (H) 04/03/2022    LYMPHABS 0.83 (L) 04/03/2022    EOSABS 0.55 04/03/2022    BASABS 0.00 04/03/2022    NEUT 73 04/03/2022    LYMPH 6 04/03/2022    MON 5 04/03/2022    EOS 4 04/03/2022    BASO 0 04/03/2022    NEPERCENT 71.6 03/12/2025    LYPERCENT 14.6 03/12/2025    MOPERCENT 10.2 03/12/2025    EOPERCENT 2.5 03/12/2025    BAPERCENT 0.9 03/12/2025    NE 4.08 03/12/2025    LYMABS 0.83 (L) 03/12/2025    MOABSO 0.58 03/12/2025    EOABSO 0.14 03/12/2025    BAABSO 0.05 03/12/2025     Lab Results   Component Value Date    GLU 87 11/07/2024    BUN 13 11/07/2024    BUNCREA 18.0 03/21/2022    CREATSERUM 0.87 11/07/2024    ANIONGAP 4 11/07/2024     01/28/2018    GFRNAA 61 04/03/2022    GFRAA 70 04/03/2022    CA 10.4 11/07/2024    OSMOCALC 291 11/07/2024    ALKPHO 62 11/07/2024    AST 16 11/07/2024    ALT 13 11/07/2024    BILT 0.3 11/07/2024    TP 7.0 11/07/2024    ALB 4.2 11/07/2024    GLOBULIN 2.8 11/07/2024     11/07/2024    K 4.4 11/07/2024     11/07/2024    CO2 28.0  11/07/2024      Latest Reference Range & Units 11/07/24 10:09   -246 U/L U/L 168   FERRITIN 50 - 306 ng/mL 59   Vitamin B12 211 - 911 pg/mL >2,000 (H)   FOLATE (FOLIC ACID), SERUM >5.4 ng/mL 29.8   (H): Data is abnormally high      Radiology:  PROCEDURE:  PET/CT STANDARD BODY SCAN (ONCOLOGY) (CPT=78815)     COMPARISON:  [facility] , MR, MRI BRAIN (W+WO) (CPT=70553), 3/07/2025, 3:45 PM.  [facility] , CT, CT CHEST+ABDOMEN+PELVIS(ALL CNTRST ONLY)(CPT=71260/52519), 9/27/2024, 1:11 PM.  [facility] , US, US HEAD/NECK (CPT=76536), 2/12/2025, 8:41 AM.  [facility] , NM, PET  STANDARD BODY SCAN (ONCOLOGY) (CPT=78815), 4/13/2022, 12:14 PM.  External [facility], NM, PET STANDARD BODY SCAN (ONCOLOGY) (CPT=78815), 12/07/2021, 12:36 PM.  [facility] , NM, PET STANDARD BODY SCAN (ONCOLOGY) (CPT=78815), 12/27/2023, 9:02 AM.     INDICATIONS:  C34.91 Recurrent malignant neoplasm of right lung of unknown cell type (HCC) C34.91 Primary adenocarcinoma of right lung (HCC)     TECHNIQUE:  The patient fasted for at least 6 hours. F-18 FDG was injected IV, and whole-body images from vertex to mid-thigh were obtained with concurrent CT scan for both anatomic localization as well as attenuation correction.     RADIOPHARMACEUTICAL:    9.6 mCi F-18 FDG  FASTING GLUCOSE LEVEL:  80 mg/dl  INJECTION TIME:         0850 hours  SCAN TIME:              0954 hours     FINDINGS:       Left supraclavicular lymph nodes with elevated FDG uptake as high as 4.5 consistent with metastatic disease.  There is a low left supraclavicular lymph node medially with elevated FDG uptake of 5.9.  Low left jugular digastric lymph node with elevated  FDG uptake of 4.2.  Asymmetric radiotracer uptake within the right masseter muscles likely related to physiologic changes as there is no underlying mass lesion.  Physiologic uptake noted throughout the tongue.       Left infrahilar lymph node has elevated FDG uptake of 7.0.  Right hilar lymph node has elevated FDG uptake of 5.0  posterior  mediastinal lymph node adjacent to the aorta with elevated FDG uptake of 4.2.  Right pleural fluid has elevated FDG uptake  concerning for involvement per of the pleural fluid by malignancy with maximum SUV uptake of 9.4.  It should be noted that there is been recent thoracentesis in this may be the reason behind this uptake rather than neoplastic disease.     Abdomen demonstrates a lymph node within the posterior gastrohepatic ligament with SUV of 5.4.     There is retroperitoneal adenopathy bilaterally posterior to the aorta and cava with the largest lymph node on the right with maximum SUV of 14.4 and the maximum SUV of the left retroperitoneal lymph node being 7.1.  Within the pelvis left external iliac   lymph node has maximum SUV of 8.6 and left internal iliac lymph node has maximum SUV of 8.2.  There are multiple left external iliac lymph nodes with maximum SUV of 16.5.  There is a left obturator lymph node with maximum SUV of 5.8.       Minimal uptake within left small inguinal lymph nodes with SUV less than 2 likely reactive.                        Impression   CONCLUSION:       1. Left supraclavicular and jugular digastric lymph node with elevated FDG uptake consistent metastatic disease.     2. Bilateral hilar and posterior retroperitoneal lymph node elevated FDG uptake consistent with metastatic disease.     3. Pleural uptake within the right posterior pleural space could be related to recent thoracentesis although metastatic disease to the pleura is also considered.     4. Multiple abnormal lymph nodes including gastrohepatic ligament, bilateral retroperitoneal, left external iliac, and left  lymph nodes consistent with metastatic disease.        LOCATION:  [facility]           Dictated by (CST): Cj García MD on 3/11/2025 at 11:04 AM      Finalized by (CST): Cj García MD on 3/11/2025 at 12:25 PM       PROCEDURE:  MRI BRAIN (W+WO) (CPT=70553)     COMPARISON:  [facility], MR, MRI BRAIN (W+WO)  (CPT=70553), 7/24/2024, 1:02 PM.     INDICATIONS:  C34.91 Primary adenocarcinoma of right lung (HCC)     TECHNIQUE:  MRI of the brain was performed with multi-planar T1, T2-weighted images with FLAIR sequences and diffusion weighted images without and with infusion.     PATIENT STATED HISTORY:(As transcribed by Technologist)  History of cancer      CONTRAST USED:  15 mL of Dotarem     FINDINGS:    INTRACRANIAL:  There are few foci of T2/FLAIR hyperintensity in the periventricular subcortical white matter consistent small vessel ischemic disease..  Diffusion weighted imaging was performed and is unremarkable.  There is no evidence for acute  infarction.  There is no evidence of hemorrhage or mass lesion.  VENTRICLES/SULCI:   Ventricles and sulci are normal in caliber.  There are no extra-axial fluid collections.  There is no midline shift.  SINUSES/ORBITS:       The visualized paranasal sinuses are clear.  The orbits are unremarkable.  MASTOIDS:       The mastoids are clear.                  Impression  CONCLUSION:  No acute intracranial process.  No evidence of metastatic disease to the brain.        LOCATION:  [facility]           Dictated by (CST): Cj García MD on 3/07/2025 at 4:40 PM      Finalized by (CST): Cj García MD on 3/07/2025 at 4:46 PM      PROCEDURE:  CT CHEST(CONTRAST ONLY) (CPT=71260)     COMPARISON:  [facility] , CT, CT CHEST+ABDOMEN+PELVIS(ALL CNTRST ONLY)(CPT=71260/66289), 9/27/2024, 1:11 PM.     INDICATIONS:  C34.91 Primary adenocarcinoma of right lung (HCC)     TECHNIQUE:  CT images were obtained with non-ionic intravenous contrast material. Dose reduction techniques were used. Dose information is transmitted to the ACR (American College of Radiology) NRDR (National Radiology Data Registry) which includes the  Dose Index Registry.     PATIENT STATED HISTORY:(As transcribed by Technologist)  Patient has history of Lung cancer and now fluid in lung. Disease surveillance. Shortness of breath.       CONTRAST USED:  75cc of Isovue 370     FINDINGS:    LUNGS:  There is progressive atelectasis of the right lower lobe and right middle lobe related to developing large right effusion.  VASCULATURE:  No visible pulmonary arterial thrombus or attenuation.    MICHAEL:  No mass or adenopathy.    MEDIASTINUM:  No mass or adenopathy.    CARDIAC:  There is a small amount of pericardial fluid which is similar to the prior study.  PLEURA:  Large right pleural effusion has developed in the interval since the prior study.  This predominantly layers in the dependent part of the chest.  THORACIC AORTA:  No aneurysm.    CHEST WALL:  No mass or axillary adenopathy.    LIMITED ABDOMEN:  Hyperenhancing focus in right hepatic lobe series 3, image 139 has been previously described and is unchanged.  BONES:  There is diffuse degenerative change in the thoracic spine.  There are no aggressive bone lesions detected.                   Impression   CONCLUSION:    1. Interval development of large right pleural effusion with associated worsening atelectasis in right lung.  Post treatment changes in the right perihilar lung are otherwise unchanged.  2. Otherwise no specific evidence of metastatic disease.  3. Small pericardial effusion is similar to prior study.  4. Hyperenhancing focus in right hepatic lobe has been previously described and is unchanged.        LOCATION:  [facility]        Dictated by (CST): Damir Deleon MD on 2/28/2025 at 11:56 AM      Finalized by (CST): Damir Deleon MD on 2/28/2025 at 12:02 PM       PROCEDURE:  CT ABDOMEN+PELVIS (CONTRAST ONLY) (CPT=74177)     COMPARISON:  [facility] , CT, CT CHEST+ABDOMEN+PELVIS(ALL CNTRST ONLY)(CPT=71260/83251), 9/27/2024, 1:11 PM.  [facility] , CT, CT ABDOMEN+PELVIS(CONTRAST ONLY)(CPT=74177), 3/07/2021, 2:05 PM.     INDICATIONS:  R59.0 Virchow's node     TECHNIQUE:  CT scanning was performed from the dome of the diaphragm to the pubic symphysis with non-ionic intravenous contrast material. Post  contrast coronal MPR imaging was performed.  Dose reduction techniques were used. Dose information is  transmitted to the ACR (American College of Radiology) NRDR (National Radiology Data Registry) which includes the Dose Index Registry.     PATIENT STATED HISTORY:(As transcribed by Technologist)  Pt states rule out cancer.      CONTRAST USED:  100cc of Isovue 370     FINDINGS:    LIVER:  Low-attenuation lesion in hepatic segment 4 measures 11 x 9 mm on image 23 of series 2, previously 11 x 9 mm.  BILIARY:  No visible dilatation or calcification.    PANCREAS:  No lesion, fluid collection, ductal dilatation, or atrophy.    SPLEEN:  No enlargement or focal lesion.    KIDNEYS:  No mass, obstruction, or calcification.  Simple left renal cysts again demonstrated which requires no further workup or follow-up.  ADRENALS:  No mass or enlargement.    AORTA/VASCULAR:  Trace atherosclerotic calcifications.  No aneurysm.  RETROPERITONEUM:  No mass or adenopathy.    BOWEL/MESENTERY:  No visible mass, obstruction, or bowel wall thickening.  Moderate colonic stool.  ABDOMINAL WALL:  Stable tiny fat containing umbilical hernia.  URINARY BLADDER:  No visible focal wall thickening, lesion, or calculus.    PELVIC NODES:  No adenopathy.    PELVIC ORGANS:  No visible mass.  Pelvic organs appropriate for patient age.    BONES:  Degenerative changes of spine.  Stable postsurgical changes of fusion at L5-S1.  Healed fracture of L1 spinous process.  LUNG BASES:  Moderate to large right pleural effusion.  Dependent atelectasis of right lung base.                      Impression   CONCLUSION:    1. Moderate to large right pleural effusion, incompletely visualized.  This was not present on 9/27/2024.  Full CT of the chest may be beneficial in further evaluation.  2. No significant change in the abdomen and pelvis.  There is a stable low-attenuation hepatic lesion.        LOCATION:  [facility]        Dictated by (CST): Javed Dye MD on 2/20/2025  at 10:21 AM      Finalized by (CST): Javed Dye MD on 2/20/2025 at 10:30 AM       PROCEDURE:  US HEAD/NECK (CPT=76536)     COMPARISON:  None.     INDICATIONS:  R59.0 Virchow's node, history of lung cancer.  Palpable abnormality along the left supraclavicular region.     TECHNIQUE:  Sonography was performed of the clinically requested area of interest.     FINDINGS:    Dedicated imaging in the area of palpable abnormality along the left supraclavicular region demonstrates a nonspecific rounded hypoechoic lymph node measuring 9 x 7 x 9 mm with the cortex measuring up to 3 mm in thickness.  There is a more elongated  nonspecific lymph node slightly more laterally in the left supraclavicular region measuring 10 x 4 x 8 mm with the cortex measuring up to 5 mm in thickness.  Given the patient's history, pathologic lymph nodes in this area cannot be entirely excluded.    Clinical correlation recommended.  FNA may be of further value.                      Impression   CONCLUSION:  Dedicated imaging in the area of palpable abnormality along the left supraclavicular region demonstrates a nonspecific rounded hypoechoic lymph node measuring 9 x 7 x 9 mm with the cortex measuring up to 3 mm in thickness.  There is a more  elongated nonspecific lymph node slightly more laterally in the left supraclavicular region measuring 10 x 4 x 8 mm with the cortex measuring up to 5 mm in thickness.  Given the patient's history, pathologic lymph nodes in this area cannot be entirely  excluded.  Clinical correlation recommended.  FNA may be of further value.     LOCATION:  [facility]        Dictated by (CST): Akshat Conklin MD on 2/12/2025 at 9:11 AM      Finalized by (CST): Akshat Conklin MD on 2/12/2025 at 9:16 AM         ADDENDUM:  Comparison is also made to previous PET-CT from 12/27/2023.  Findings as described above in the hilum, mediastinum and right lung are stable compared to the previous PET-CT also.  There is no significant uptake  in the regions of right   perihilar consolidation and right paratracheal mediastinum on the previous PET-CT.  These findings would suggest no significant residual or recurrent neoplasm.     COMPARISON:  [facility] , NM, PET STANDARD BODY SCAN (ONCOLOGY) (CPT=78815), 12/27/2023, 9:02 AM.           Dictated by (CST): Payam Headley MD on 9/30/2024 at 12:17 PM       Finalized by (CST): Payam Headley MD on 9/30/2024 at 12:20 PM                    Addended by Payam Headley MD on 9/30/2024 12:20 PM     Study Result    Narrative   PROCEDURE:  CT CHEST+ABDOMEN+PELVIS(ALL CNTRST ONLY)(CPT=71260/98832)     COMPARISON:  [facility] , CT, CT CHEST(CONTRAST ONLY) (CPT=71260), 11/21/2023, 9:59 AM.  [facility] , CT, CT CHEST+ABDOMEN+PELVIS(ALL CNTRST ONLY)(CPT=71260/67050), 3/27/2024, 12:27 PM.     INDICATIONS:  H57.9 Visual symptoms C34.2 Malignant neoplasm of middle lobe of right lung (HCC) C34.91 Primary adenocarcinoma of right lung (HCC)     TECHNIQUE:  IV contrast-enhanced scanning through the chest, abdomen, and pelvis was performed.  Dose reduction techniques were used. Dose information is transmitted to the ACR (American College of Radiology) NRDR (National Radiology Data Registry) which   includes the Dose Index Registry.     PATIENT STATED HISTORY:(As transcribed by Technologist)  Surveillance of right lung cancer. She is having short of breath recently.      CONTRAST USED:  100cc of Isovue 370     FINDINGS:       CHEST:    LUNGS:  Right perihilar consolidation with angular concave contour is an associated bronchiectasis centered at the superior segment of the right lower lobe is stable in appearance measuring approximately 7.4 x 3.1 cm compared to 7.5 x 3.2 cm.  The small  differences are likely related to differences in measurement technique.  There is a stable 2 mm subpleural right upper lobe lung nodule seen on image 29 of series 3. There is stable mild left apical pleural and parenchymal scarring.  There is no new  lung   nodule identified.    MEDIASTINUM:  Right paratracheal and central right hilar confluent soft tissue density is unchanged in appearance.  No new adenopathy is appreciated.  MICHAEL:  No interval change.  Calcified granuloma seen in the left hilum.  CARDIAC:  No enlargement, pericardial thickening, or significant coronary artery calcification.  There is a small pericardial effusion anteriorly measuring 6 mm in thickness.  No significant change from previous.  PLEURA:  No pleural effusion or pneumothorax.  CHEST WALL:  No axillary lymphadenopathy or chest wall mass.  Right chest wall chest port catheter noted.  AORTA:  No aneurysm or dissection.    VASCULATURE:  No visible pulmonary arterial thrombus or attenuation.       ABDOMEN/PELVIS:  LIVER:  The previously demonstrated early enhancing 20 x 14 mm focus appears smaller measuring 15 x 9 mm.  The differences in size could reflect differences in timing of the bolus and filming.  This could reflect a vascular lesion such as a hemangioma.    There is a stable 9 mm focus of decreased attenuation in the left lobe in hepatic segment 4A.  This has been stable on previous exams dating back to 11/21/2023.    BILIARY:  No visible dilatation or calcification.    PANCREAS:  No lesion, fluid collection, ductal dilatation, or atrophy.    SPLEEN:  No enlargement or focal lesion.    KIDNEYS:  There is a 1.8 x 1.5 cm simple cyst in the upper pole cortex of the left kidney and a smaller subcentimeter cyst in the midpole cortex of the left kidney.  ADRENALS:  No mass or enlargement.    AORTA:  Mild atheromatous plaque noted in the aorta and iliac arteries.  No evidence for aneurysm.  RETROPERITONEUM:  No mass or adenopathy.    BOWEL/MESENTERY:  No visible mass, obstruction, or bowel wall thickening.  Status post appendectomy.  No evidence for bowel wall thickening.  ABDOMINAL WALL:  No mass or hernia.    URINARY BLADDER:  No visible focal wall thickening, lesion, or calculus.     PELVIC NODES:  No adenopathy.    PELVIC ORGANS:  No visible mass.  Pelvic organs appropriate for patient age.    BONES:  Degenerative changes are seen in the cervical, thoracic and lumbar spine.  Postoperative changes of posterior fusion noted at L5-S1.  No new lytic or blastic bony lesions are identified.  There is an old healed posterior right 10th and 11th rib  fracture.                   Impression   CONCLUSION:    1. Stable right perihilar consolidation with angular concave contour with associated bronchiectasis centered at the superior segment of the right lower lobe.  This is in continuity with stable soft tissue density extending to the right paratracheal  mediastinum.  This may reflect changes related to treated cancer/treatment changes.  PET-CT would better evaluate for any residual neoplasm.  2. No new metastatic findings in the chest, abdomen or pelvis.  3. Interval decrease in size of an early enhancing right lobe liver lesion.  Imaging characteristics favor benign lesions such as hemangioma.  4. Multiple other incidental findings as detailed above.             LOCATION:  [facility]           Dictated by (CST): Payam Headley MD on 9/27/2024 at 6:28 PM      Finalized by (CST): Payam Headley MD on 9/27/2024 at 6:50 PM         PROCEDURE:  CT CHEST+ABDOMEN+PELVIS(ALL CNTRST ONLY)(CPT=71260/11404)     COMPARISON:  [facility] , NM, PET STANDARD BODY SCAN (ONCOLOGY) (CPT=78815), 12/27/2023, 9:02 AM.  [facility] , CT, CT CHEST(CONTRAST ONLY) (CPT=71260), 11/21/2023, 9:59 AM.     INDICATIONS:  C34.91 Primary adenocarcinoma of right lung (HCC)     TECHNIQUE:  IV contrast-enhanced scanning through the chest, abdomen, and pelvis was performed.  Dose reduction techniques were used. Dose information is transmitted to the ACR (American College of Radiology) NRDR (National Radiology Data Registry) which   includes the Dose Index Registry.     PATIENT STATED HISTORY:(As transcribed by Technologist)  Routine scan for a  previous history of right lung cancer. Patient reports no complications at time of exam      CONTRAST USED:  100cc of Isovue 370     FINDINGS:       CHEST:    LUNGS:  Right perihilar consolidation with angular, concave contours and associated bronchiectasis centered at the superior segment lower lobe has decreased in size.  Sample measurement, 7.5 x 3.2 cm versus prior 8.7 x 4.7 cm.    Mild scattered scarring elsewhere in both lungs.  No new bronchiectasis or cystic lung change.  No new infiltrate.  MEDIASTINUM:  Stable.  No adenopathy.  MICHAEL:  Stable.  No adenopathy.  CARDIAC:  Stable.  Mild anterior pericardial thickening.  Thickness measures 0.7 cm.  Coronary calcifications are present.    PLEURA:  Stable.  No pleural effusion.  CHEST WALL:  Stable.  No axillary adenopathy.  Right chest port.  AORTA:  Stable.  Normal caliber.  VASCULATURE:  Stable.  Smooth tapering.     ABDOMEN/PELVIS:  LIVER:  The visualized portions of liver are stable.  There is an early enhancing focus in segment 7. It is measured 2.0 x 1.4 cm, prior 1.8 x 1.6 cm.  It may be a flash filling hemangioma or vascular focus.  It appears benign.  In the now visualized  remainder of the liver, no abnormality.    BILIARY:  Nondistended gallbladder.  No biliary dilatation.  PANCREAS:  Uniform parenchyma.  No ductal dilatation.  SPLEEN:  Not enlarged.  KIDNEYS:  Normal anatomic positions.  No hydronephrosis or perinephric stranding.  1.9 cm left cortical cyst is unchanged.  ADRENALS:  Not enlarged.  AORTA:  Smooth tapering.  No abdominal aortic aneurysm.  Patent celiac artery, SMA and KATIE.  Patent splenic vein and portal vein.  RETROPERITONEUM:  No adenopathy.  BOWEL/MESENTERY:  Normal bowel caliber.  No colonic inflammation.  Moderate to large amount of stool scattered throughout the colon.  No ascites.  Right lower quadrant clip consistent with prior appendectomy.  ABDOMINAL WALL:  Tiny fat containing umbilical hernia.  URINARY BLADDER:   Nondistended.  Smooth contour.  No calculus within.  PELVIC NODES:  None enlarged  PELVIC ORGANS:  No uterine or ovarian abnormality.  BONES:  Severe degenerative changes.  At least moderate central canal stenosis at L2-L3.  Normal vertebral body heights.  No subluxation.                      Impression   CONCLUSION:    1. Decrease in size of right perihilar consolidation.  This may be related to treated cancer/treatment changes.  2. No new metastatic findings in the chest.  3. No metastatic findings in the abdomen or pelvis.  4. Details as above.             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 F-18 FDG was injected IV, and whole-body images from vertex to mid-thigh were obtained with concurrent CT scan for both anatomic localization as well as attenuation correction.     RADIOPHARMACEUTICAL:    9.5 mCi F-18 FDG  FASTING GLUCOSE LEVEL:  106 mg/dl  INJECTION TIME:         0 800  SCAN TIME:              0911     FINDINGS:     Mean SUV, mediastinal blood pool:  2.2     Mean SUV, liver:  2.8         HEAD/NECK:  Physiologic activity in all regions.     CHEST:  Consolidation with air bronchograms identified within the superior segment right lower lobe with corresponding low-grade radiotracer uptake, SUV max of 2.5 (previously 3.1).  This is favored to represent post-therapeutic inflammation/fibrosis.    No enlarged or hypermetabolic regional lymph nodes.     ABDOMEN/PELVIS:  Physiologic activity in all regions.       MUSCULOSKELETAL:  Scattered areas of  degenerative uptake noted within the spine.  No hypermetabolic osseous metastatic disease.  Mild exertional/inflammatory uptake of bilateral shoulder girdles and peritrochanteric regions.                     Impression  CONCLUSION:       Low-grade hypermetabolism corresponds to consolidative opacity of the superior segment right lower lobe favoring treatment-related inflammation/fibrosis.  No imaging evidence of residual/recurrent malignancy.          LOCATION:  [facility]           Dictated by (CST): Umm Hodges MD on 12/27/2023 at 1:25 PM      Finalized by (CST): Umm Hodges MD on 12/27/2023 at 1:38 PM      PROCEDURE:  CT CHEST(CONTRAST ONLY) (CPT=71260)     COMPARISON:  [facility], , MRI LIVER (W+WO) (CPT=74183), 5/22/2017, 7:11 PM.  [facility] , CT, CT CHEST(CONTRAST ONLY) (CPT=71260), 2/17/2023, 10:15 AM.  [facility]74178[facility] 9/08/2020, 10:29 PM.  [facility] , CT, CT CHEST(CONTRAST  ONLY) (CPT=71260), 8/04/2023, 11:19 AM.     INDICATIONS:  C34.2 Malignant neoplasm of middle lobe of right lung (HCC)     TECHNIQUE:  CT images were obtained with non-ionic intravenous contrast material. Dose reduction techniques were used. Dose information is transmitted to the ACR (American College of Radiology) NRDR (National Radiology Data Registry) which includes the  Dose Index Registry.     PATIENT STATED HISTORY:(As transcribed by Technologist)  Lung cancer. The patient doesn't have complaints.      CONTRAST USED:  75cc of Isovue 370     FINDINGS:    LUNGS:  There are right perihilar fibrotic changes which likely represent treatment change.  There is right perihilar consolidation and bronchiectasis, not significantly changed.  No new enhancing nodule is seen.  Scattered atelectasis and/or scarring.    No pulmonary mass is seen.  VASCULATURE:  Unremarkable.  THORACIC AORTA:  Unremarkable.  MEDIASTINUM/MICHAEL:  Unremarkable.  CARDIAC:  Unremarkable.  PLEURA:  Unremarkable.  CHEST WALL:  Unremarkable.  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  BONES:  Degenerative changes in the spine.  Mild scoliosis.  OTHER:  Right chest port noted.                  Impression  CONCLUSION:  No significant interval change since 8/4/2023.  Treatment change is again seen within the right perihilar region.  No recurrent or metastatic disease is identified.        LOCATION:  [place]        Dictated by (CST): Stromberg, LeRoy, MD on 11/21/2023 at 11:37 AM      Finalized by (CST): Stromberg, LeRoy, MD on 11/21/2023 at 11:52 AM        Impression and Plan:  1. H/o locally advanced NSCLC  - new adenopathy and effusion since last imaging  - biopsy of left supraclav node and right pleural effusion showed metastatic adenocarcinoma c/w lung primary  - reviewed results with her in detail. With malignant effusion explained to her that unfortunately she now has stage IV disease which is incurable but very treatable.   - PET also shows new uptake in left neck, hilar and posterior retroperitoneal, abdominal and pelvic nodes and right pleural space  - MRI of the brain showed no evidence of metastases  - An overview of treatment options for metastatic NSCLC were discussed at length. Therapy varies based upon molecular and histologic features of the tumor. If the patient has a  mutation, preferred treatment would be targeted therapy. If a  mutation is not present and PDL-1 status is high, we usually will treat with immunotherapy alone. In patients w/o a  mutation and low PDL-1 score we generally treat initially with chemo+immunotherapy  - NGS and PDL-1 have been sent, results pending  - Guardant to be drawn today  - monitor for reaccummulation of right pleural effusion. Aware of s/sx to watch out for    2. Neoplasm related pain, fibromyalgia. Chronic LBP, neck and left arm main  - prescription for  hydrocodone 10 sent to her pharmacy  - will also refer to palliative care  - used to be followed at a pain clinic- not anymore    3. H/o asthma, bronchiectasis, former smoker, cough  - follows with pulmonary continue inhalers   - flutter valve    4. Anemia  - noted back in July.  Added iron and ferritin to labs drawn and could not add B12 and folic acid. Ferritin was <50. She started taking oral iron and Hgb normalized and stayed normal. B12 and folate normal. Ferritin >50  - h/o previous hemoptysis. Has not had coughed up any since treatment for (1) but still tastes metal every so often.  - Hgb today normal      Labs and imaging reviewed   Await NGS/PDL-1  I will call her with results as soon as available and will make more definitive treatment recommendations at that time. Gave her the option of starting chemo while waiting for results. She is comfortable waiting.   We anticipate to start treatment shortly    Risk level high- recurrent lung cancer-now with stage IV disease      Shoshana Gordon MD  [facility]

## 2025-03-12 NOTE — PROGRESS NOTES
Patient is here today for follow up with Dr. Gordon for Lung Cancer regarding test results. Patient denies pain. Medication list and medical history   were reviewed and updated.     Education Record    Learner:  Patient      Disease / Diagnosis: Lung cancer    Barriers / Limitations:  None   Comments:    Method:  Brief focused, Discussion, Printed material and Reinforcement   Comments:    General Topics:  Medication, Pain, Procedure and Plan of care reviewed   Comments:    Outcome:  Shows understanding   Comments:    Guardant 360 Drawn today    AVS provided and follow up reviewed.  Patient instructed to call as needed.

## 2025-03-12 NOTE — PATIENT INSTRUCTIONS
Call office with any questions or concerns  Call office if develop any new or worsening respiratory symptoms.   Call office if your inhalers are more than $50 after co-pay  Continue to use  inhalers  Obtain chest xray if become short of breath and call the office

## 2025-03-12 NOTE — PROGRESS NOTES
[facility] Pulmonary Progress Note    History of Present Illness:  Hanna Barrett is a 55 year old female former smoker with significant PMH asthma, stage 3 RUL NSCLC s/p chemoRT who presents today for follow up of post thoracentesis.   Came back from [place] a few weeks ago and found a lump on her left neck/shoulder. She had a biopsy that was positive for malignancy. Then had a PET scan and found to have pleural effusion, had a thoracentesis and cytology was positive for malignancy. Seeing Dr Law today for plan.  Reports SOB and coughing at times. Denies acute concerns. Denies  chest pain/pressure, wheezing, no fevers, chills, fatigue.   Has one more day of antibiotics for positive culture in her pleural fluid. Taking inhalers as prescribed. Using mucinex for cough/phlegm.     Previously 9/2024 Dr Corrigan  Hanna Barrett is a 54 year old female female former smoker (quit 2011, 20 pack years) with significant PMH of asthma, stage 3 RUL NSCLC s/p chemoRT who presents today for follow up. Since last visit she continues to have MARTINS, especially with stairs or carrying a load. No cough, wheeze, pain. No fevers  +palpitations  Was hospitalized in July 2024 with negative cardiac stress  Has been using albuterol nebs 1-2/day without relief.  Remain son symbicort and incruse     June 2024 previously  Since last visit she reports she had been well until the past week with the hot weather she has had worsening dyspnea, cough and wheeze.  Has needed albuterol much more frequently. No pain. No fevers     Dec 2023 previously  Hanna Barrett is a 53 year old female former smoker (quit 2011, 20 pack years) with significant PMH of asthma, stage 3 RUL NSCLC s/p chemoRT who presents today for follow up. She denies acute concerns today.  Does continue to have nasal congestion/drainage leading to cough and throat clearing. No blood. Dyspnea on exertion overall better on symbicort BID and incruse daily. Has albuterol but not using  often.     October 2023 previously BLAKE Ferreira Berenice Barrett is a 53 year old female who presents for 2 month asthma followup. Breathing has been better with Incruse and Symbicort. Had Sinus infection about 2  weeks ago; blood mixed in phlegm about a quarter size. No further hemoptysis;  still coughing. Feels like a vice around her chest at times; mostly after coughing. Was on 2 courses of antibiotics; PCN and Augmentin. Reports overall feeling much better; still with cough with white-yellow tinged sputum. No f/c/ns. Worried about cancer returning. Asthma score is 14      Previously 8/2023  a 53 year old female who presents for c/o worsening dyspnea with exertion. Notices this mostly with stairs and worsens with stairs when carrying anything. Unable to exercise due to back pain. No f/c/ns. Denies hx of COPD; quit smoking in 2000 and had a couple here and there; vaps here and there. Reports hx of Asthma and using Symbicort with minimal improvement.  PMH of stage III lung cancer s/p chemo RT completed 4/2022; recent  CT chest.      Previously 4/2023 Dr Corrigan  a 53 year old female former smoker (quit 2011, 20 pack years) with significant PMH of asthma, stage III adenocarcinoma s/p chemoRT  who presents today for evaluation of hemoptysis. The patient explains she has had increasing episodes hemoptysis since around late February 2023. Thinks hemoptysis began first then about a week later developed nasal congestion/drainage sinus pressure. She was seen at [facility] pulmonary two weeks ago and treated for sinus infection and given augmentin.  Her sinus pressure and congestion improved, however her hemoptysis persists. Also c/o sore throat/pain which has been ongoing for the past month as well.  Describes hemoptysis as red blood, about pencil eraser in size, may have 2-3 episodes a day. None today, but did have two episodes yesterday.    No epistaxis. No GERD, abdominal pain. No fevers. No chest pain, pressure or pleurisy.    Feels overall her breathing has been worse since her radiation treatment since last year. Using advair BID and albuterol. Previously on trelegy but too pricey.      Works in insurance sales. No known exposures.     Past Medical History:   Past Medical History:    Abnormal uterine bleeding    bleeds every 2 weeks    Anxiety state    Asthma (HCC)    Attention deficit hyperactivity disorder (ADHD)    BACK PAIN    Back problem    lumbar radiculopathy    Cancer (HCC)    adenocarcinoma of right lung    MARCIO II (cervical intraepithelial neoplasia II)    DDD (degenerative disc disease), lumbar    DDD (degenerative disc disease), thoracic    Depression    Disorder of thyroid    Dyspareunia    Exposure to medical diagnostic radiation    On hold since 4/2022    Fall    Fibromyalgia    Fibromyalgia    Genital warts    High blood pressure    History of COVID-19    COVID + 7/2020 Headache - NO Hospitalization needed     History of lumbar fusion    Hx of motion sickness    IBS (irritable bowel syndrome)    Per patient - Just constipation    Infertility, female    Lumbar radiculopathy    Mild dysplasia of cervix    Pap smear for cervical cancer screening    pt states normal    Papanicolaou smear of cervix with high grade squamous intraepithelial lesion (HGSIL)    Personal history of antineoplastic chemotherapy    Last chemo 7/12/22 prior to lung bx 8/4/22    Post covid-19 condition, unspecified    symptoms: HA; s/s resolved-yes; not hospitalized    Problems with swallowing    with radiation    Sexually transmitted disease    HPV    Substance abuse (HCC)    cocaine    Urinary incontinence    Visual impairment    glasses/contacts bifocals        Past Surgical History:   Past Surgical History:   Procedure Laterality Date    Appendectomy  1980    Back surgery  2009    fusion L5-S1    Back surgery  03/03/2021    RIGHT LUMBAR 3/ LUMBAR 4, LUMBAR 4/ LUMBAR 5 HEMILAMINTOMIES, LEFT LUMBAR 2 / LUMBAR 3 MICRODISCECTOMY    Colonoscopy N/A  2023    Procedure: COLONOSCOPY;  Surgeon: Devante Kern MD;  Location:  ENDOSCOPY    Colposcopy,bx cervix/endocerv curr  12    MARCIO 1    Laparoscopy procedure unlisted      Ovarian cyst removed    Leep  2011    MARCIO 2          X2    Other surgical history      bunions bilateral    Other surgical history      nodule lymph nodes neck    Other surgical history       Bladder sling    Other surgical history  2020    Cystoscopy, Dr Beach       Family Medical History:   Family History   Problem Relation Age of Onset    Other (lung CA) Self     Hypertension Mother     Diabetes Mother     Heart Disease Mother     Heart Disease Father     Other (lung cancer) Father 55        Lung Cancer    Other (pancreatic cancer) Sister 47        Pancreatic Cancer    Cancer Sister 51        lung CA    Other (Other) Sister         Back problems    Other (Other) Son         anxiety    Other (Other) Son         behavioral problems    Diabetes Maternal Grandmother     Breast Cancer Maternal Grandmother         dx late-60s    Stroke Maternal Grandfather 86    Dementia Paternal Grandmother     Heart Disorder Paternal Grandfather     Cancer Maternal Aunt 50        LUNG CA 51    Breast Cancer Maternal Aunt         dx 46-47y    Ovarian Cancer Maternal Cousin Female 33         of ovarian ca at 35y        Social History:   Social History     Socioeconomic History    Marital status:      Spouse name: Not on file    Number of children: Not on file    Years of education: Not on file    Highest education level: Not on file   Occupational History    Not on file   Tobacco Use    Smoking status: Former     Current packs/day: 0.00     Average packs/day: 0.8 packs/day for 19.0 years (14.5 ttl pk-yrs)     Types: Cigarettes     Start date:      Quit date: 2010     Years since quitting: 15.2     Passive exposure: Past    Smokeless tobacco: Former     Quit date: 2024    Tobacco comments:     Has not  vaped since 6/1/2024.    Vaping Use    Vaping status: Every Day    Substances: Nicotine, daily    Devices: Pre-filled pod   Substance and Sexual Activity    Alcohol use: Yes     Alcohol/week: 1.0 standard drink of alcohol     Types: 1 Glasses of wine per week    Drug use: Not Currently     Types: Cocaine     Comment: USED COCAINE BETW [id number]    Sexual activity: Yes     Partners: Male   Other Topics Concern     Service Not Asked    Blood Transfusions Not Asked    Caffeine Concern Yes     Comment: 3-4 daily of pop    Occupational Exposure Not Asked    Hobby Hazards Not Asked    Sleep Concern Not Asked    Stress Concern Not Asked    Weight Concern Not Asked    Special Diet Not Asked    Back Care Not Asked    Exercise No     Comment: not tolerated right now    Bike Helmet Not Asked    Seat Belt Not Asked    Self-Exams Not Asked   Social History Narrative    Not on file     Social Drivers of Health     Food Insecurity: No Food Insecurity (7/12/2024)    Food Insecurity     Food Insecurity: Never true   Transportation Needs: No Transportation Needs (7/12/2024)    Transportation Needs     Lack of Transportation: No     Car Seat: Not on file   Stress: Not on file   Housing Stability: Low Risk  (7/12/2024)    Housing Stability     Housing Instability: No     Housing Instability Emergency: Not on file     Crib or Bassinette: Not on file        Medications:   Current Outpatient Medications   Medication Sig Dispense Refill    lisinopril 5 MG Oral Tab Take 1 tablet (5 mg total) by mouth daily.      amoxicillin clavulanate 875-125 MG Oral Tab Take 1 tablet by mouth 2 (two) times daily for 7 days. 14 tablet 0    lisinopril-hydroCHLOROthiazide 20-12.5 MG Oral Tab Take 0.5 tablets by mouth daily.      dilTIAZem HCl 120 MG Oral Tab Take 1 tablet (120 mg total) by mouth daily.      Cyanocobalamin (VITAMIN B-12) 1000 MCG Sublingual SL Tab Place 2,000 tablets under the tongue 2 (two) times daily.      montelukast 10 MG Oral  Tab Take 1 tablet (10 mg total) by mouth nightly. 90 tablet 3    albuterol 108 (90 Base) MCG/ACT Inhalation Aero Soln Inhale 2 puffs into the lungs every 6 (six) hours as needed for Wheezing or Shortness of Breath. 3 each 3    amphetamine-dextroamphetamine 15 MG Oral Tab TAKE ONE TABLET BY MOUTH ONCE DAILY 8 TO 9 HOURS AFTER VYVANSE DOSE      ARIPiprazole 2 MG Oral Tab Take one (1) tablet by mouth every morning      VYVANSE 70 MG Oral Cap Take 1 capsule (70 mg total) by mouth every morning.      SYMBICORT 160-4.5 MCG/ACT Inhalation Aerosol Inhale 2 puffs into the lungs 2 (two) times daily.      HYDROcodone-acetaminophen (NORCO) 5-325 MG Oral Tab Take 1 tablet by mouth every 6 (six) hours as needed for Pain. 20 tablet 0    cetirizine 10 MG Oral Tab Take 1 tablet (10 mg total) by mouth daily. 90 tablet 1    ferrous sulfate 325 (65 FE) MG Oral Tab EC Take 1 tablet (325 mg total) by mouth daily with breakfast.      Multiple Vitamin Oral Tab Take 1 tablet by mouth daily.      ibuprofen 200 MG Oral Tab Take 4 tablets (800 mg total) by mouth every 8 (eight) hours as needed for Pain.      cholecalciferol 25 MCG (1000 UT) Oral Cap Take 1 capsule (1,000 Units total) by mouth daily.  0    DULoxetine HCl 60 MG Oral Cap DR Particles Take 1 capsule (60 mg total) by mouth daily.  2    semaglutide (OZEMPIC, 0.25 OR 0.5 MG/DOSE,) 2 MG/1.5ML Subcutaneous Solution Pen-injector Inject into the skin once a week. (Patient not taking: Reported on 3/12/2025)      umeclidinium bromide (INCRUSE ELLIPTA) 62.5 MCG/ACT Inhalation Aerosol Powder, Breath Activated Inhale 1 puff into the lungs daily. (Patient not taking: Reported on 3/12/2025) 3 each 3       Review of Systems: Review of Systems   Constitutional: Negative.    HENT: Negative.     Respiratory:  Positive for cough and shortness of breath. Negative for apnea, choking, chest tightness, wheezing and stridor.    Cardiovascular: Negative.    Gastrointestinal: Negative.         Physical  Exam:  /64 (BP Location: Right arm, Patient Position: Sitting, Cuff Size: adult)   Pulse 94   Resp (!) 97   Ht 5' 5\" (1.651 m)   Wt 165 lb (74.8 kg)   LMP 02/14/2019 (Exact Date)   BMI 27.46 kg/m²      Constitutional: alert, cooperative. No acute distress.  HEENT: Head NC/AT.   Cardio: Regular rate and rhythm. Normal S1 and S2. No murmurs.   Respiratory: Thorax symmetrical with no labored breathing. Clear to ausculation bilaterally with symmetrical breath sounds. No wheezing, rhonchi, rales, or crackles.   Extremities: No clubbing or cyanosis. No BLE edema.    Neurologic: A&Ox3. No gross motor deficits.  Skin: Warm, dry  Psych: Calm, cooperative. Pleasant affect.    Results:  Personally reviewed    WBC: 8.8, done on 3/4/2025.  HGB: 14.3, done on 3/4/2025.  PLT: 388, done on 3/4/2025.     Glucose: 87, done on 11/7/2024.  Cr: 0.87, done on 11/7/2024.  Last eGFR was 76 on 2/20/2025.  CA: 10.4, done on 11/7/2024.  Na: 141, done on 11/7/2024.  K: 4.4, done on 11/7/2024.  Cl: 109, done on 11/7/2024.  CO2: 28, done on 11/7/2024.  Last ALB was 4.2% done on 11/7/2024.     XR CHEST PA + LAT CHEST (CPT=71046)    Result Date: 3/12/2025  CONCLUSION:  Stable cardiac and mediastinal contours.  Stable elevation of right hemidiaphragm.  Discoid atelectasis or scar of the right mid lung without pulmonary edema or acute airspace disease.  No residual pleural effusion or appreciable pneumothorax.   LOCATION:  [facility]   Dictated by (CST): Umm Hodges MD on 3/12/2025 at 9:41 AM     Finalized by (CST): Umm Hodges MD on 3/12/2025 at 9:43 AM       PET STANDARD BODY SCAN (ONCOLOGY) (CPT=78815)    Result Date: 3/11/2025  CONCLUSION:   1. Left supraclavicular and jugular digastric lymph node with elevated FDG uptake consistent metastatic disease.  2. Bilateral hilar and posterior retroperitoneal lymph node elevated FDG uptake consistent with metastatic disease.  3. Pleural uptake within the right posterior pleural space could be  related to recent thoracentesis although metastatic disease to the pleura is also considered.  4. Multiple abnormal lymph nodes including gastrohepatic ligament, bilateral retroperitoneal, left external iliac, and left  lymph nodes consistent with metastatic disease.   LOCATION:  [facility]    Dictated by (CST): Cj García MD on 3/11/2025 at 11:04 AM     Finalized by (CST): Cj García MD on 3/11/2025 at 12:25 PM       MRI BRAIN (W+WO) (CPT=70553)    Result Date: 3/7/2025  CONCLUSION:  No acute intracranial process.  No evidence of metastatic disease to the brain.   LOCATION:  [facility]    Dictated by (CST): Cj García MD on 3/07/2025 at 4:40 PM     Finalized by (CST): Cj García MD on 3/07/2025 at 4:46 PM       US THORACENTESIS GUIDED RIGHT (CPT=32555)    Result Date: 3/4/2025  CONCLUSION:  Ultrasound-guided right thoracentesis was performed without complication.   LOCATION:  [facility]    Dictated by (CST): Payam Headley MD on 3/04/2025 at 9:08 PM     Finalized by (CST): Payam Headley MD on 3/04/2025 at 9:11 PM       XR CHEST AP PORTABLE  (CPT=71045)    Result Date: 3/4/2025  CONCLUSION:  The heart size and vascularity are normal.  No CHF.  The left lung is clear. The patient is status post right thoracentesis without pneumothorax.  There is right hemithorax volume loss elevating the right hemidiaphragm.  There is consolidation extending from the hilum into the mid minor fissure region with increased density in the right hilum and lower right paratracheal space unchanged.  Slight mediastinal shift to the right due to the volume loss unchanged.  Decrease in the right pleural effusion.   LOCATION:  [id number]      Dictated by (CST): Orion Bryant MD on 3/04/2025 at 10:32 AM     Finalized by (CST): Orion Bryant MD on 3/04/2025 at 10:35 AM       CT CHEST(CONTRAST ONLY) (CPT=71260)    Result Date: 2/28/2025  CONCLUSION:  1. Interval development of large right pleural effusion with associated  worsening atelectasis in right lung.  Post treatment changes in the right perihilar lung are otherwise unchanged. 2. Otherwise no specific evidence of metastatic disease. 3. Small pericardial effusion is similar to prior study. 4. Hyperenhancing focus in right hepatic lobe has been previously described and is unchanged.   LOCATION:  [facility]   Dictated by (CST): Damir Deleon MD on 2/28/2025 at 11:56 AM     Finalized by (CST): Damir Deleon MD on 2/28/2025 at 12:02 PM       US BIOPSY CORE LYMPH NODE, LEFT (CPT=76942/58326)    Result Date: 2/27/2025  CONCLUSION:  Uneventful ultrasound guided biopsy of the left supraclavicular node.  The patient was instructed to obtain follow up care and biopsy results from the referring physician.   LOCATION:  [id number]     Dictated by (CST): Félix Alcantar MD on 2/27/2025 at 3:57 PM     Finalized by (CST): Félix Alcantar MD on 2/27/2025 at 3:57 PM       CT ABDOMEN+PELVIS(CONTRAST ONLY)(CPT=74177)    Result Date: 2/20/2025  CONCLUSION:  1. Moderate to large right pleural effusion, incompletely visualized.  This was not present on 9/27/2024.  Full CT of the chest may be beneficial in further evaluation. 2. No significant change in the abdomen and pelvis.  There is a stable low-attenuation hepatic lesion.   LOCATION:  [facility]   Dictated by (CST): Javed Dye MD on 2/20/2025 at 10:21 AM     Finalized by (CST): Javed Dye MD on 2/20/2025 at 10:30 AM       US HEAD/NECK (CPT=76536)    Result Date: 2/12/2025  CONCLUSION:  Dedicated imaging in the area of palpable abnormality along the left supraclavicular region demonstrates a nonspecific rounded hypoechoic lymph node measuring 9 x 7 x 9 mm with the cortex measuring up to 3 mm in thickness.  There is a more elongated nonspecific lymph node slightly more laterally in the left supraclavicular region measuring 10 x 4 x 8 mm with the cortex measuring up to 5 mm in thickness.  Given the patient's history, pathologic lymph nodes in this area cannot be  entirely excluded.  Clinical correlation recommended.  FNA may be of further value.  LOCATION:  [facility]   Dictated by (CST): Akshat Conklin MD on 2/12/2025 at 9:11 AM     Finalized by (CST): Akshat Conklin MD on 2/12/2025 at 9:16 AM       CT SINUS (CPT=70486)    Result Date: 2/6/2025  CONCLUSION:   1. No nasal mass or abnormality of the paranasal soft tissues, as clinically queried.  2. Postoperative changes of functional endoscopic sinus surgery with mild mucoperiosteal thickening of the paranasal sinuses.    LOCATION:  [id number]   Dictated by (CST): Umm Hodges MD on 2/06/2025 at 4:51 PM     Finalized by (CST): Umm Hodges MD on 2/06/2025 at 5:00 PM         Assessment/Plan:  #1. asthma  Reported hx of asthma but feels symptoms slowly worsening post radiation in 2022 8/2023 CPFT values from [facility] provider, essentially normal; hx of asthma  7/2024 CTA with no PE and slight increase in linear opacity/atelectasis on right mid lung - appears stable to my review  Continue symbicort + incruse as unable to afford trelegy.   Check PFTs in the future.      #2. bronchiectasis  Noted bronchiectasis on imaging in RML, suggestive of post radiation changes. However I am not able to view her previous CTs prior to treatment to confirm this developed post radiation treatment in 2022 8/2023 CT with mild bronchiectasis and chronic cough; recommend to use flutter valve daily, inhalers and nebs as above  Continue guaifenesin     #3. Lung cancer  Dx with stage III NSCLC (adenoca) 12/2021  S/p chemoRT 4/2022, durvalumab x 1 yr completed 3/2023 and following with Dr. Sanchez and soon to be Dr. Baker  2/2023 CT chest with stable post treatment changes  8/2023 CT chest with mild bronchiectasis; no e/o recurrence  11/2023 CT chest with new findings.   12/2023 PET/CT with low uptake in RLL  3/2024 CT chest with decrease in right sided opacities  7/2024 CTA with no PE and slight increase in linear opacity/atelectasis on right mid lung -  appears stable to my review  2/2025 Left supraclavicular lymph node biopsy Metastatic adenocarcinoma consistent with the patient's known lung primary   2/2025 CT chest large right pleural effusion, no e/o metastatic disease   2/2025 s/p Right thora with IR 1550ml out, fluid sent- see below   3/2025 PET multiple left supraclavicular lymph FDG >4.5, left infrahilar lymph node FDG 7, right hilar lymph node FDG 4.2, mediastinal lymph node FDG 4.2, right pleural fluid FDG >9  Plan f/u with Dr. Gordon   Serial imaging based on Dr Gordon's treatment plan    #4 Pleural effusion  Found during follow up imaging CT chest and PET as above  S/p IR Right thora with IR 1550ml out, fluid sent  Right pleural fluid -POSITIVE for metastatic carcinoma, compatible with lung adenocarcinoma; body fluid culture + staphylococcus epidermidis- treated with amoxicillin   3/2025 CXR no pleural effusion noted  Reviewed results in detail with the patient, all questions answered   Standing CXR order if become SOB, call office     Sharon Keller [facility]  Pulmonary Medicine   3/12/2025

## 2025-03-18 ENCOUNTER — PATIENT MESSAGE (OUTPATIENT)
Age: 55
End: 2025-03-18

## 2025-03-19 ENCOUNTER — TELEPHONE (OUTPATIENT)
Age: 55
End: 2025-03-19

## 2025-03-19 DIAGNOSIS — C34.91 PRIMARY ADENOCARCINOMA OF RIGHT LUNG (HCC): Primary | ICD-10-CM

## 2025-03-24 ENCOUNTER — TELEPHONE (OUTPATIENT)
Age: 55
End: 2025-03-24

## 2025-03-24 DIAGNOSIS — C34.91 PRIMARY ADENOCARCINOMA OF RIGHT LUNG (HCC): Primary | ICD-10-CM

## 2025-03-24 DIAGNOSIS — D64.9 ANEMIA, NORMOCYTIC NORMOCHROMIC: ICD-10-CM

## 2025-03-24 DIAGNOSIS — J91.0 MALIGNANT PLEURAL EFFUSION (HCC): ICD-10-CM

## 2025-03-24 DIAGNOSIS — G89.3 NEOPLASM RELATED PAIN: ICD-10-CM

## 2025-03-24 NOTE — TELEPHONE ENCOUNTER
Spoke to Hanna. Unfortunately [facility] ultimately could not run NGS as needed additional specimen. Have requested path to send but will take another 2 weeks. With PDL-1 being <1% plan Ipi+Nivo and adjust regimen pending results. Already receiived platinum and pemetrxed prior. She verbalized understanding and would like to proceed.

## 2025-03-25 ENCOUNTER — TELEPHONE (OUTPATIENT)
Age: 55
End: 2025-03-25

## 2025-03-25 NOTE — TELEPHONE ENCOUNTER
Hanna called and said she was suppose to start immunotherapy tomorrow, but it had to be cancelled because her insurance has not approved her immunotherapy yet. She called her insurance and was told that her Doctor needs to ball [phone] and provide the reference number [id number] to expedite the approval. They have to call today to get the expedited approval this week.     I told Hanna I will forward her message to Dr Gordon and her nurse now.

## 2025-03-26 ENCOUNTER — APPOINTMENT (OUTPATIENT)
Age: 55
End: 2025-03-26
Attending: INTERNAL MEDICINE
Payer: MEDICAID

## 2025-03-26 ENCOUNTER — SOCIAL WORK SERVICES (OUTPATIENT)
Age: 55
End: 2025-03-26

## 2025-03-26 ENCOUNTER — OFFICE VISIT (OUTPATIENT)
Age: 55
End: 2025-03-26
Attending: INTERNAL MEDICINE
Payer: MEDICAID

## 2025-03-26 DIAGNOSIS — C34.91 PRIMARY LUNG CANCER WITH METASTASIS FROM LUNG TO OTHER SITE, RIGHT (HCC): Primary | ICD-10-CM

## 2025-03-26 DIAGNOSIS — R59.0 MEDIASTINAL ADENOPATHY: ICD-10-CM

## 2025-03-26 DIAGNOSIS — C34.91 PRIMARY ADENOCARCINOMA OF RIGHT LUNG (HCC): ICD-10-CM

## 2025-03-26 DIAGNOSIS — Z71.89 OTHER SPECIFIED COUNSELING: ICD-10-CM

## 2025-03-26 RX ORDER — ONDANSETRON 8 MG/1
8 TABLET, FILM COATED ORAL EVERY 8 HOURS PRN
Qty: 30 TABLET | Refills: 3 | Status: SHIPPED | OUTPATIENT
Start: 2025-03-26

## 2025-03-26 RX ORDER — PROCHLORPERAZINE MALEATE 10 MG
10 TABLET ORAL EVERY 6 HOURS PRN
Qty: 30 TABLET | Refills: 3 | Status: SHIPPED | OUTPATIENT
Start: 2025-03-26

## 2025-03-26 NOTE — PROGRESS NOTES
met with patient due to request for support information. Provided information on [employer] website and [employer] for son who is away at school. Also encouraged son to reach out to college counseling for free services if needed. Patient is aware of Cancer Resource Centers and programs, and will continue to seek support there as well. Educated on SSD as well. Contact provided and patient is aware to reach out with any needs. Bringing Keisha Bag provided, which patient was grateful for.

## 2025-03-26 NOTE — PROGRESS NOTES
IV Cancer Therapy Education    Patient:Hanna Barrett     Date: 3/26/25   Diagnosis:  NSCLC     Support person(s) present: Son    Drug names:  Ipilimumab, Nivolumab, Carboplatin, Paclitaxel    Myelosuppression  Decrease in wbc  Decrease in hgb  Decrease in platelets  Risk of infection  Fatigue  Risk of bleeding  Notify MD/RN of any chills or fever 100.5and above:     Gastrointestinal Toxicities:  Stomatitis, sensitivity or oropharyngeal membranes, dry mouth, thrush  Appropriate oral hygiene  Changes in taste perception   Loss of appetite, possible weightloss  Nausea and vomiting   Use of anti-nausea medications  Diarrhea   Use of Imodium  Constipation  Use of various laxatives      Treatment Effects on Hair:  Alopecia       Neurotoxicity:  Peripheral neuropathy    Hepatotoxicity  Rise in LFTs    Nephrotoxicity  Rise in serum creatinine  Dehydration  Electrolyte abnormalities    Dermatologic toxicity:  Rash  Nail changes        Vesicants / Irritants:  Potential extravasation at site of administration that may result in skin/tissue damage  To Notify MD/RN IMMEDIATELY  any of the above signs / symptoms occur         Immunotherapy  Education     Immunotherapy action on cancer / normal cells:       Lung: pneumonitis, cough, shortness of breath       Gastrointestinal (Colitis): Diarrhea, Mucus, blood      Liver (Hepatitis): Yellowing of skin or eyes, Nausea or vomiting, abdominal pain on right, dark urine, excessive bruising or bleeding      Hormone Gland (Thyroid, Pituitary, Adrenal and pancreas): rapid heart rate, weight loss or gain, increased sweating, feeling more hungry or thirsty, urinating more frequently, hair loss, feeling cold, constipation, deeper voice, muscle aches, dizziness or fainting, headache       Kidney Problems (Nephritis): change in amount or color of urine     Problems in other organs: Rash, change in eyesight, severe or persistent muscle or joint pains, severe muscle weakness, low red blood  cell (Anemia)      Infusion Reactions: Chills, shaking, shortness of breath or wheezing, itching or rash, flushing, dizziness, fever, feeling like passing out          Teaching Materials Provided:      Immunotherapy information sheets   Chemotherapy information sheets  ACS Nutrition for the Person with Cancer during Treatment / Dietician information sheet  When to contact the Treatment Team Information Sheet  Side Effect Management Information Sheet  [employer] Support Services Sheet  National Resources Information sheet       Patient and caregiver were given ample opportunity to ask questions. All questions and concerns addressed. We discussed self care techniques, symptom management, fluid, diet, and activities.    Chemotherapy education video provided to the patient to view prior to the education visit.     1.Did the patient watch the video before the appointment? yes    2. Was the video helpful? yes    Cancer Therapy  Consent Form signed by the patient.  I spent 70 minutes counseling patient regarding the above documented side effects and management, when to call provider and contact information.            Encounter Times  PreCharting: 10 minutes    Reviewing/Obtaining:   minutes      Medical Exam:   minutes    Plan:   minutes      Notes: 5 minutes    Counseling/Education: 70 minutes      Referring/Communicating:   minutes    Ind Interpretation:   minutes      Care Coordination: 5 minutes       My total time spent caring for the patient on the day of the encounter: 90 minutes.         Felicitas LOZANO  Nurse Practitioner  [facility]

## 2025-03-27 ENCOUNTER — HOSPITAL ENCOUNTER (OUTPATIENT)
Dept: GENERAL RADIOLOGY | Age: 55
Discharge: HOME | End: 2025-03-27
Payer: MEDICAID

## 2025-03-27 DIAGNOSIS — R06.09 DOE (DYSPNEA ON EXERTION): ICD-10-CM

## 2025-03-27 PROCEDURE — 71046 X-RAY EXAM CHEST 2 VIEWS: CPT

## 2025-03-28 ENCOUNTER — OFFICE VISIT (OUTPATIENT)
Age: 55
End: 2025-03-28
Attending: INTERNAL MEDICINE
Payer: MEDICAID

## 2025-03-28 ENCOUNTER — NURSE ONLY (OUTPATIENT)
Age: 55
End: 2025-03-28
Attending: INTERNAL MEDICINE
Payer: MEDICAID

## 2025-03-28 VITALS
TEMPERATURE: 96 F | WEIGHT: 167.63 LBS | OXYGEN SATURATION: 98 % | HEIGHT: 63.74 IN | HEART RATE: 108 BPM | SYSTOLIC BLOOD PRESSURE: 112 MMHG | BODY MASS INDEX: 28.97 KG/M2 | DIASTOLIC BLOOD PRESSURE: 81 MMHG | RESPIRATION RATE: 20 BRPM

## 2025-03-28 VITALS
OXYGEN SATURATION: 93 % | RESPIRATION RATE: 20 BRPM | SYSTOLIC BLOOD PRESSURE: 100 MMHG | DIASTOLIC BLOOD PRESSURE: 63 MMHG | HEART RATE: 92 BPM

## 2025-03-28 DIAGNOSIS — R59.0 MEDIASTINAL ADENOPATHY: Primary | ICD-10-CM

## 2025-03-28 DIAGNOSIS — C34.91 PRIMARY LUNG CANCER WITH METASTASIS FROM LUNG TO OTHER SITE, RIGHT (HCC): ICD-10-CM

## 2025-03-28 DIAGNOSIS — C34.91 PRIMARY LUNG CANCER WITH METASTASIS FROM LUNG TO OTHER SITE, RIGHT (HCC): Primary | ICD-10-CM

## 2025-03-28 DIAGNOSIS — C34.91 PRIMARY ADENOCARCINOMA OF RIGHT LUNG (HCC): ICD-10-CM

## 2025-03-28 DIAGNOSIS — R59.0 MEDIASTINAL ADENOPATHY: ICD-10-CM

## 2025-03-28 DIAGNOSIS — R53.83 FATIGUE DUE TO TREATMENT: ICD-10-CM

## 2025-03-28 DIAGNOSIS — R59.0 SUPRACLAVICULAR ADENOPATHY: ICD-10-CM

## 2025-03-28 DIAGNOSIS — G89.3 NEOPLASM RELATED PAIN: Primary | ICD-10-CM

## 2025-03-28 DIAGNOSIS — K59.03 DRUG-INDUCED CONSTIPATION: ICD-10-CM

## 2025-03-28 DIAGNOSIS — M54.12 CERVICAL RADICULOPATHY: ICD-10-CM

## 2025-03-28 DIAGNOSIS — J91.0 MALIGNANT PLEURAL EFFUSION (HCC): ICD-10-CM

## 2025-03-28 DIAGNOSIS — G89.3 NEOPLASM RELATED PAIN: ICD-10-CM

## 2025-03-28 DIAGNOSIS — R04.2 HEMOPTYSIS: ICD-10-CM

## 2025-03-28 PROBLEM — Z51.5 PALLIATIVE CARE BY SPECIALIST: Status: ACTIVE | Noted: 2025-03-28

## 2025-03-28 LAB
ALBUMIN SERPL-MCNC: 4.6 G/DL (ref 3.2–4.8)
ALBUMIN/GLOB SERPL: 1.8 {RATIO} (ref 1–2)
ALP LIVER SERPL-CCNC: 71 U/L
ALT SERPL-CCNC: 21 U/L
ANION GAP SERPL CALC-SCNC: 11 MMOL/L (ref 0–18)
AST SERPL-CCNC: 23 U/L (ref ?–34)
BASOPHILS # BLD AUTO: 0.06 X10(3) UL (ref 0–0.2)
BASOPHILS NFR BLD AUTO: 1 %
BILIRUB SERPL-MCNC: 0.4 MG/DL (ref 0.3–1.2)
BUN BLD-MCNC: 18 MG/DL (ref 9–23)
CALCIUM BLD-MCNC: 10 MG/DL (ref 8.7–10.6)
CHLORIDE SERPL-SCNC: 104 MMOL/L (ref 98–112)
CO2 SERPL-SCNC: 25 MMOL/L (ref 21–32)
CREAT BLD-MCNC: 0.97 MG/DL
EGFRCR SERPLBLD CKD-EPI 2021: 69 ML/MIN/1.73M2 (ref 60–?)
EOSINOPHIL # BLD AUTO: 0.23 X10(3) UL (ref 0–0.7)
EOSINOPHIL NFR BLD AUTO: 3.7 %
ERYTHROCYTE [DISTWIDTH] IN BLOOD BY AUTOMATED COUNT: 12.2 %
GLOBULIN PLAS-MCNC: 2.5 G/DL (ref 2–3.5)
GLUCOSE BLD-MCNC: 183 MG/DL (ref 70–99)
HCT VFR BLD AUTO: 37.5 %
HGB BLD-MCNC: 13.2 G/DL
IMM GRANULOCYTES # BLD AUTO: 0.03 X10(3) UL (ref 0–1)
IMM GRANULOCYTES NFR BLD: 0.5 %
LYMPHOCYTES # BLD AUTO: 0.83 X10(3) UL (ref 1–4)
LYMPHOCYTES NFR BLD AUTO: 13.2 %
MCH RBC QN AUTO: 30.4 PG (ref 26–34)
MCHC RBC AUTO-ENTMCNC: 35.2 G/DL (ref 31–37)
MCV RBC AUTO: 86.4 FL
MONOCYTES # BLD AUTO: 0.73 X10(3) UL (ref 0.1–1)
MONOCYTES NFR BLD AUTO: 11.6 %
NEUTROPHILS # BLD AUTO: 4.42 X10 (3) UL (ref 1.5–7.7)
NEUTROPHILS # BLD AUTO: 4.42 X10(3) UL (ref 1.5–7.7)
NEUTROPHILS NFR BLD AUTO: 70 %
OSMOLALITY SERPL CALC.SUM OF ELEC: 297 MOSM/KG (ref 275–295)
PLATELET # BLD AUTO: 319 10(3)UL (ref 150–450)
POTASSIUM SERPL-SCNC: 3.5 MMOL/L (ref 3.5–5.1)
PROT SERPL-MCNC: 7.1 G/DL (ref 5.7–8.2)
RBC # BLD AUTO: 4.34 X10(6)UL
SODIUM SERPL-SCNC: 140 MMOL/L (ref 136–145)
T4 FREE SERPL-MCNC: 1 NG/DL (ref 0.8–1.7)
TSI SER-ACNC: 8.3 UIU/ML (ref 0.55–4.78)
WBC # BLD AUTO: 6.3 X10(3) UL (ref 4–11)

## 2025-03-28 RX ORDER — DIPHENHYDRAMINE HYDROCHLORIDE 50 MG/ML
50 INJECTION, SOLUTION INTRAMUSCULAR; INTRAVENOUS ONCE
Status: COMPLETED | OUTPATIENT
Start: 2025-03-28 | End: 2025-03-28

## 2025-03-28 RX ORDER — DIPHENHYDRAMINE HYDROCHLORIDE 50 MG/ML
50 INJECTION, SOLUTION INTRAMUSCULAR; INTRAVENOUS ONCE
OUTPATIENT
Start: 2025-04-15

## 2025-03-28 RX ORDER — FAMOTIDINE 10 MG/ML
20 INJECTION, SOLUTION INTRAVENOUS ONCE
OUTPATIENT
Start: 2025-04-15

## 2025-03-28 RX ORDER — DIPHENHYDRAMINE HYDROCHLORIDE 50 MG/ML
50 INJECTION, SOLUTION INTRAMUSCULAR; INTRAVENOUS ONCE
Status: CANCELLED | OUTPATIENT
Start: 2025-03-28

## 2025-03-28 RX ORDER — FAMOTIDINE 10 MG/ML
20 INJECTION, SOLUTION INTRAVENOUS ONCE
Status: COMPLETED | OUTPATIENT
Start: 2025-03-28 | End: 2025-03-28

## 2025-03-28 RX ORDER — FAMOTIDINE 10 MG/ML
20 INJECTION, SOLUTION INTRAVENOUS ONCE
Status: CANCELLED | OUTPATIENT
Start: 2025-03-28

## 2025-03-28 RX ADMIN — DIPHENHYDRAMINE HYDROCHLORIDE 50 MG: 50 INJECTION, SOLUTION INTRAMUSCULAR; INTRAVENOUS at 11:22:00

## 2025-03-28 RX ADMIN — FAMOTIDINE 20 MG: 10 INJECTION, SOLUTION INTRAVENOUS at 11:22:00

## 2025-03-28 NOTE — PATIENT INSTRUCTIONS
Anti-Nausea Medication:    Ondansetron (Zofran) -- can take every 8 hours as needed for nausea.    Prochlorperazine (Compazine) -- can take every 6 hours as needed for nausea.       IV Zofran given through your IV @ 11:30am    Oral Zofran - you can take as early as 7:30 pm tonight or before bed    Oral Compazine - you can take this any time when you get home (before dinner)      We recommend alternating every 4 hours for the next 2-3 days. EX: Zofran 8am, compazine 12pm, zofran 4pm, compazine 8pm, etc...     You do not need to wake yourself up to take anything. Start up again in the morning. Alternate every 4 hours like this for next 2-3 days.      Zofran may cause headaches and/or constipation.     You may use laxatives/stool softeners (miralax, etc) and tylenol to help with this (try to avoid advil/motrin/ibuprofen).

## 2025-03-28 NOTE — PROGRESS NOTES
Patient here for follow-up and planned C1D1 treatment. States she is short of breath and gets winded easily. Has some hemoptysis. Having pain to her left shoulder, left collarbone, and lower abdomen. States pain is managed with norco. Would like to discuss how many cycles of treatment before repeat imaging and potentially delaying her next cycle due to upcoming travel plans.

## 2025-03-28 NOTE — CONSULTS
Palliative Care Consult Note     Patient Name: Hanna Barrett   YOB: 1970   Medical Record Number: [id number]   [facility]: [id number]   Date of visit: 3/28/2025     Reason for Consult: Referred by Dr. Gordon for Symptom management and goals of care    Chief Complaint/Reason for Visit:  Initial visit for symptoms     History of Present Illness:         Hanna Barrett is a 55 year old female with metastatic lung cancer receiving chemo/immunotherapy.  She is with her son today who is her POA.  She follows with psychiatry for her ADHD meds.  She doesn't feel they have been that helpful since her cancer recurrence.  She complains of extreme fatigue sleeping most of the day.  This is frustrating her and affecting her ability to work.  She has chronic cervical and back pain treated with SRAVANTHI, multiple fusions and fentanyl.  This pain has been controlled without opioids for 7 yrs since stopping all opioids.  Her current cancer related pain affects her L neck/clavicle area and R groin area.  This pain is deep achy throbbing pain.  She is using norco 2X daily and waits until pain is severe.  The norco is effective when needed.  She is trying to minimize opioid use and their SE.  She is finding benefit with 3000mg ibuprofen daily. She is trying to stay active, work and function normally.   She struggles with chronic constipation and uses metamucil daily without benefit lately.  Her appetite is fair.      Problem List:  Patient Active Problem List   Diagnosis    Essential hypertension    Family history of breast cancer    Family history of pancreatic cancer    Family history of ovarian cancer    Stress incontinence    Hyperlipidemia    Vitamin D deficiency    Primary osteoarthritis of first carpometacarpal joint of right hand    Cervical radiculopathy    Chronic narcotic use    Cervical spondylosis with radiculopathy    DDD (degenerative disc disease), lumbar    Polyneuropathy, unspecified    Other intervertebral  disc degeneration, lumbar region    Cervical myofascial pain syndrome    Neck and shoulder pain    Opioid use, unspecified with withdrawal (HCC)    Chronic bilateral low back pain without sciatica    History of tobacco use    Myalgia, other site    Fibromyalgia    Chronic bilateral thoracic back pain    Constipation    Canker sore    Other spondylosis with radiculopathy, cervical region    Abnormal mammogram    Primary adenocarcinoma of right lung (HCC)    Mediastinal adenopathy    Primary lung cancer with metastasis from lung to other site, right (HCC)    Personal history of nicotine dependence    At risk for dehydration    Dyspnea    Dyspnea, unspecified type    History of COVID-19    Hemoptysis    Disorder of thyroid, unspecified    Encounter for other orthopedic aftercare    History of falling    Generalized anxiety disorder    Major depressive disorder, single episode, unspecified    Other specified urinary incontinence    Unspecified visual loss    Memory changes    Failed back surgical syndrome    Exertional chest pain    Decreased exercise tolerance    Lung fibrosis ([facility])    Palliative care by specialist      Medical History:  Past Medical History:    Abnormal uterine bleeding    bleeds every 2 weeks    Anxiety state    Asthma ([facility])    Attention deficit hyperactivity disorder (ADHD)    BACK PAIN    Back problem    lumbar radiculopathy    Cancer (HCC)    adenocarcinoma of right lung    MARCIO II (cervical intraepithelial neoplasia II)    DDD (degenerative disc disease), lumbar    DDD (degenerative disc disease), thoracic    Depression    Disorder of thyroid    Dyspareunia    Exposure to medical diagnostic radiation    On hold since 4/2022    Fall    Fibromyalgia    Fibromyalgia    Genital warts    High blood pressure    History of COVID-19    COVID + 7/2020 Headache - NO Hospitalization needed     History of lumbar fusion    Hx of motion sickness    IBS (irritable bowel syndrome)    Per patient - Just  constipation    Infertility, female    Lumbar radiculopathy    Mild dysplasia of cervix    Pap smear for cervical cancer screening    pt states normal    Papanicolaou smear of cervix with high grade squamous intraepithelial lesion (HGSIL)    Personal history of antineoplastic chemotherapy    Last chemo 22 prior to lung bx 22    Post covid-19 condition, unspecified    symptoms: HA; s/s resolved-yes; not hospitalized    Problems with swallowing    with radiation    Sexually transmitted disease    HPV    Substance abuse (HCC)    cocaine    Urinary incontinence    Visual impairment    glasses/contacts bifocals     Surgical History:  Past Surgical History:   Procedure Laterality Date    Appendectomy  1980    Back surgery  2009    fusion L5-S1    Back surgery  2021    RIGHT LUMBAR 3/ LUMBAR 4, LUMBAR 4/ LUMBAR 5 HEMILAMINTOMIES, LEFT LUMBAR 2 / LUMBAR 3 MICRODISCECTOMY    Colonoscopy N/A 2023    Procedure: COLONOSCOPY;  Surgeon: Devante Kern MD;  Location:  ENDOSCOPY    Colposcopy,bx cervix/endocerv curr  11    MARCIO 1    Laparoscopy procedure unlisted      Ovarian cyst removed    Leep  2011    MARCIO 2          X2    Other surgical history      bunions bilateral    Other surgical history      nodule lymph nodes neck    Other surgical history  2016     Bladder sling    Other surgical history  2020    Cystoscopy, Dr Beach       Allergies:  Allergies[1]    Palliative Care Social History:    Marital Status:  single  Children: son  Living Situation: independent .  Works full-time with Medicare insurance      Medications:  Current Outpatient Medications   Medication Sig Dispense Refill    prochlorperazine (COMPAZINE) 10 mg tablet Take 1 tablet (10 mg total) by mouth every 6 (six) hours as needed for Nausea. (Patient not taking: Reported on 3/28/2025) 30 tablet 3    ondansetron (ZOFRAN) 8 MG tablet Take 1 tablet (8 mg total) by mouth every 8 (eight) hours as needed for Nausea.  30 tablet 3    lisinopril 5 MG Oral Tab Take 1 tablet (5 mg total) by mouth daily.      HYDROcodone-acetaminophen  MG Oral Tab Take 1 tablet by mouth every 8 (eight) hours as needed for Pain. 60 tablet 0    lisinopril-hydroCHLOROthiazide 20-12.5 MG Oral Tab Take 0.5 tablets by mouth daily.      dilTIAZem HCl 120 MG Oral Tab Take 1 tablet (120 mg total) by mouth daily.      Cyanocobalamin (VITAMIN B-12) 1000 MCG Sublingual SL Tab Place 2,000 tablets under the tongue 2 (two) times daily.      montelukast 10 MG Oral Tab Take 1 tablet (10 mg total) by mouth nightly. (Patient not taking: Reported on 3/28/2025) 90 tablet 3    albuterol 108 (90 Base) MCG/ACT Inhalation Aero Soln Inhale 2 puffs into the lungs every 6 (six) hours as needed for Wheezing or Shortness of Breath. 3 each 3    amphetamine-dextroamphetamine 15 MG Oral Tab TAKE ONE TABLET BY MOUTH ONCE DAILY 8 TO 9 HOURS AFTER VYVANSE DOSE      ARIPiprazole 2 MG Oral Tab Take one (1) tablet by mouth every morning      VYVANSE 70 MG Oral Cap Take 1 capsule (70 mg total) by mouth every morning.      SYMBICORT 160-4.5 MCG/ACT Inhalation Aerosol Inhale 2 puffs into the lungs 2 (two) times daily.      HYDROcodone-acetaminophen (NORCO) 5-325 MG Oral Tab Take 1 tablet by mouth every 6 (six) hours as needed for Pain. 20 tablet 0    cetirizine 10 MG Oral Tab Take 1 tablet (10 mg total) by mouth daily. (Patient not taking: Reported on 3/28/2025) 90 tablet 1    ferrous sulfate 325 (65 FE) MG Oral Tab EC Take 1 tablet (325 mg total) by mouth daily with breakfast. (Patient not taking: Reported on 3/28/2025)      Multiple Vitamin Oral Tab Take 1 tablet by mouth daily.      ibuprofen 200 MG Oral Tab Take 4 tablets (800 mg total) by mouth every 8 (eight) hours as needed for Pain.      cholecalciferol 25 MCG (1000 UT) Oral Cap Take 1 capsule (1,000 Units total) by mouth daily.  0    DULoxetine HCl 60 MG Oral Cap DR Particles Take 1 capsule (60 mg total) by mouth daily.  2        Review of Systems:  General:  Fatigue.  Feels well.    Respiratory:  Denies SOB, denies cough  Cardiac:  Denies chest pain, heart palpitations  Abdomen:  Denies constipation, diarrhea.  Denies pain.    Psych:  No complaints.  Sleeping well    Palliative Performance Scale:   80%    Physical Examination:  General: Patient is alert and oriented, not in acute distress.  Respiratory:   Normal excursions and effort  Cardiac: Regular rate   Abdomen: Soft, non tender with good bowel sounds.  Musculoskeletal: Normal gait. Walks without assistive device.  Psych:  Mood/Affect appropriate    Advanced Directives Discussed and Completed:     HCPOA/Health Surrogate:    There is no completed HCPOA documentation on file in Epic.    Patient/family was asked to bring in a copy of HCPOA document to be scanned in to Epic    I confirmed that patient's HCPOA is:  Her son, Dick BRISCOE Discussed and Completed:   focused on symptoms today.      Palliative Care:  Patient is getting benefit from ibuprofen.  She can continue this for now but at lower doses.  She can use 400mg TID prn.  She can alternate with norco if needed.  Discussed how to optimize pain control  Discussed switching to miralax BID and adding senna for bowel prophylaxis  Discussed strategies to improve fatigue. Will monitor.  Hopefully, this will improve if fatigue is related to disease    Impression/Plan:   1. Neoplasm related pain  Ibuprofen 400mg TID prn  Norco 10mg Q 6 prn    2. Drug-induced constipation  Miralax BID  Senna BID prn  Increase activity  Diet/hydration    3. Fatigue due to treatment  Increase activity  Sunlight  Minimize daytime sleep      Planned Follow up: Return in about 1 month (around 4/28/2025).      I spent a total of 60 minutes with the patient today, which included all of the following:direct face to face contact, history taking, physical examination, and >50% was spent counseling and coordinating care         Electronically Signed  by:  GABY SANFORD   [facility] Outpatient Palliative Nurse Practitioner          [1]   Allergies  Allergen Reactions    Gabapentin SWELLING and UNKNOWN    Erythromycin UNKNOWN    Clindamycin RASH

## 2025-03-28 NOTE — PROGRESS NOTES
Pt here for C1D1 Drug(s) Opdivo/Yervoy/Taxol/Carbo.  Arrives Ambulating independently, accompanied by Family member     Patient was evaluated today by MD and Treatment Nurse.    Oral medications included in this regimen:  no    Patient confirms comprehension of cancer treatment schedule:  yes    Pregnancy screening:  Not applicable    Modifications in dose or schedule:  Yes - next chemo delayed by 1 week due to travel plans    Medications appearance and physical integrity checked by RN: yes.    Chemotherapy IV pump settings verified by 2 RNs:  Yes.  Frequency of blood return and site check throughout administration: Prior to administration and At completion of therapy     Infusion/treatment outcome:  patient tolerated treatment without incident    Education Record    Learner:  Patient  Barriers / Limitations:  None  Method:  Discussion and Printed material  Education / instructions given:  plan of care, next appts, nausea management  Outcome:  Shows understanding    Discharged Home, Ambulating independently, accompanied by:Family member    Patient/family verbalized understanding of future appointments: by [employer] [place] messaging     Pt reported flushing after 15 mins of Taxol infusion at half rate. Vitals stable. IVF started - pt reported quick relief of symptoms. Gege LOZANO updated on pt's status. Taxol restarted at half rate x15 mins, followed by full rate until completion. Pt tolerated well and completed full dose without further complication.

## 2025-03-31 ENCOUNTER — OFFICE VISIT (OUTPATIENT)
Dept: FAMILY MEDICINE CLINIC | Facility: CLINIC | Age: 55
End: 2025-03-31
Payer: MEDICAID

## 2025-03-31 ENCOUNTER — TELEPHONE (OUTPATIENT)
Age: 55
End: 2025-03-31

## 2025-03-31 ENCOUNTER — PATIENT MESSAGE (OUTPATIENT)
Age: 55
End: 2025-03-31

## 2025-03-31 VITALS
WEIGHT: 165 LBS | DIASTOLIC BLOOD PRESSURE: 74 MMHG | OXYGEN SATURATION: 97 % | HEART RATE: 113 BPM | BODY MASS INDEX: 29 KG/M2 | SYSTOLIC BLOOD PRESSURE: 130 MMHG | RESPIRATION RATE: 16 BRPM

## 2025-03-31 DIAGNOSIS — I10 ESSENTIAL HYPERTENSION: ICD-10-CM

## 2025-03-31 DIAGNOSIS — E03.9 HYPOTHYROIDISM, UNSPECIFIED TYPE: ICD-10-CM

## 2025-03-31 DIAGNOSIS — Z00.00 WELLNESS EXAMINATION: Primary | ICD-10-CM

## 2025-03-31 DIAGNOSIS — R79.89 ELEVATED TSH: ICD-10-CM

## 2025-03-31 PROCEDURE — 99396 PREV VISIT EST AGE 40-64: CPT | Performed by: FAMILY MEDICINE

## 2025-03-31 RX ORDER — LEVOTHYROXINE SODIUM 25 UG/1
25 TABLET ORAL
Qty: 90 TABLET | Refills: 1 | Status: SHIPPED | OUTPATIENT
Start: 2025-03-31

## 2025-03-31 RX ORDER — LISINOPRIL AND HYDROCHLOROTHIAZIDE 12.5; 2 MG/1; MG/1
0.5 TABLET ORAL DAILY
Qty: 45 TABLET | Refills: 3 | Status: SHIPPED | OUTPATIENT
Start: 2025-03-31

## 2025-03-31 NOTE — PROGRESS NOTES
HPI:     Hanna Barrett is a 55 year old female who presents for an Annual Health Visit.      Started chemotherapy Friday, and will do immunotherapy.    Lung cancer recurrence. Told 30% survival chance 5 years..    Dealing with significant fatigue.    TSH over 8 and T4 1.0    Still needing to work.    Allergies:     Allergies   Allergen Reactions    Gabapentin SWELLING and UNKNOWN    Erythromycin UNKNOWN    Clindamycin RASH       CURRENT MEDICATIONS:   Current Outpatient Medications   Medication Sig Dispense Refill    levothyroxine 25 MCG Oral Tab Take 1 tablet (25 mcg total) by mouth before breakfast. 90 tablet 1    lisinopril-hydroCHLOROthiazide 20-12.5 MG Oral Tab Take 0.5 tablets by mouth daily. 45 tablet 3    prochlorperazine (COMPAZINE) 10 mg tablet Take 1 tablet (10 mg total) by mouth every 6 (six) hours as needed for Nausea. 30 tablet 3    ondansetron (ZOFRAN) 8 MG tablet Take 1 tablet (8 mg total) by mouth every 8 (eight) hours as needed for Nausea. 30 tablet 3    HYDROcodone-acetaminophen  MG Oral Tab Take 1 tablet by mouth every 8 (eight) hours as needed for Pain. 60 tablet 0    dilTIAZem HCl 120 MG Oral Tab Take 1 tablet (120 mg total) by mouth daily.      albuterol 108 (90 Base) MCG/ACT Inhalation Aero Soln Inhale 2 puffs into the lungs every 6 (six) hours as needed for Wheezing or Shortness of Breath. 3 each 3    amphetamine-dextroamphetamine 15 MG Oral Tab TAKE ONE TABLET BY MOUTH ONCE DAILY 8 TO 9 HOURS AFTER VYVANSE DOSE      ARIPiprazole 2 MG Oral Tab Take one (1) tablet by mouth every morning      VYVANSE 70 MG Oral Cap Take 1 capsule (70 mg total) by mouth every morning.      SYMBICORT 160-4.5 MCG/ACT Inhalation Aerosol Inhale 2 puffs into the lungs 2 (two) times daily.      cetirizine 10 MG Oral Tab Take 1 tablet (10 mg total) by mouth daily. 90 tablet 1    ferrous sulfate 325 (65 FE) MG Oral Tab EC Take 1 tablet (325 mg total) by mouth daily with breakfast.      Multiple Vitamin Oral  Tab Take 1 tablet by mouth daily.      ibuprofen 200 MG Oral Tab Take 4 tablets (800 mg total) by mouth every 8 (eight) hours as needed for Pain.      cholecalciferol 25 MCG (1000 UT) Oral Cap Take 1 capsule (1,000 Units total) by mouth daily.  0    DULoxetine HCl 60 MG Oral Cap DR Particles Take 1 capsule (60 mg total) by mouth daily.  2    montelukast 10 MG Oral Tab Take 1 tablet (10 mg total) by mouth nightly. (Patient not taking: Reported on 3/28/2025) 90 tablet 3      MEDICAL INFORMATION:   Past Medical History:    Abnormal uterine bleeding    bleeds every 2 weeks    Anxiety state    Asthma (HCC)    Attention deficit hyperactivity disorder (ADHD)    BACK PAIN    Back problem    lumbar radiculopathy    Cancer (HCC)    adenocarcinoma of right lung    MARCIO II (cervical intraepithelial neoplasia II)    DDD (degenerative disc disease), lumbar    DDD (degenerative disc disease), thoracic    Depression    Disorder of thyroid    Dyspareunia    Exposure to medical diagnostic radiation    On hold since 4/2022    Fall    Fibromyalgia    Fibromyalgia    Genital warts    High blood pressure    History of COVID-19    COVID + 7/2020 Headache - NO Hospitalization needed     History of lumbar fusion    Hx of motion sickness    IBS (irritable bowel syndrome)    Per patient - Just constipation    Infertility, female    Lumbar radiculopathy    Mild dysplasia of cervix    Pap smear for cervical cancer screening    pt states normal    Papanicolaou smear of cervix with high grade squamous intraepithelial lesion (HGSIL)    Personal history of antineoplastic chemotherapy    Last chemo 7/12/22 prior to lung bx 8/4/22    Post covid-19 condition, unspecified    symptoms: HA; s/s resolved-yes; not hospitalized    Problems with swallowing    with radiation    Sexually transmitted disease    HPV    Substance abuse (HCC)    cocaine    Urinary incontinence    Visual impairment    glasses/contacts bifocals      Past Surgical History:   Procedure  Laterality Date    Appendectomy  1980    Back surgery  2009    fusion L5-S1    Back surgery  2021    RIGHT LUMBAR 3/ LUMBAR 4, LUMBAR 4/ LUMBAR 5 HEMILAMINTOMIES, LEFT LUMBAR 2 / LUMBAR 3 MICRODISCECTOMY    Colonoscopy N/A 2023    Procedure: COLONOSCOPY;  Surgeon: Devante Kern MD;  Location:  ENDOSCOPY    Colposcopy,bx cervix/endocerv curr  12    MARCIO 1    Laparoscopy procedure unlisted      Ovarian cyst removed    Leep  2011    MARCIO 2          X2    Other surgical history      bunions bilateral    Other surgical history      nodule lymph nodes neck    Other surgical history       Bladder sling    Other surgical history  2020    Cystoscopy, Dr Beach      Family History   Problem Relation Age of Onset    Other (lung CA) Self     Hypertension Mother     Diabetes Mother     Heart Disease Mother     Heart Disease Father     Other (lung cancer) Father 55        Lung Cancer    Other (pancreatic cancer) Sister 47        Pancreatic Cancer    Cancer Sister 51        lung CA    Other (Other) Sister         Back problems    Other (Other) Son         anxiety    Other (Other) Son         behavioral problems    Diabetes Maternal Grandmother     Breast Cancer Maternal Grandmother         dx late-60s    Stroke Maternal Grandfather 86    Dementia Paternal Grandmother     Heart Disorder Paternal Grandfather     Cancer Maternal Aunt 50        LUNG CA 51    Breast Cancer Maternal Aunt         dx 46-47y    Ovarian Cancer Maternal Cousin Female 33         of ovarian ca at 35y      SOCIAL HISTORY:   Social History     Socioeconomic History    Marital status:    Tobacco Use    Smoking status: Former     Current packs/day: 0.00     Average packs/day: 0.8 packs/day for 19.0 years (14.5 ttl pk-yrs)     Types: Cigarettes     Start date:      Quit date: 2010     Years since quitting: 15.2     Passive exposure: Past    Smokeless tobacco: Former     Quit date: 2024    Tobacco  comments:     Has not vaped since 6/1/2024.    Vaping Use    Vaping status: Former    Substances: Nicotine, daily    Devices: Pre-filled pod   Substance and Sexual Activity    Alcohol use: Not Currently     Comment: 3 drinks per week    Drug use: Not Currently     Types: Cocaine     Comment: USED COCAINE BETW [id number]    Sexual activity: Yes     Partners: Male   Other Topics Concern    Caffeine Concern Yes     Comment: 3-4 daily of pop    Exercise No     Comment: not tolerated right now     Social Drivers of Health     Food Insecurity: No Food Insecurity (7/12/2024)    Food Insecurity     Food Insecurity: Never true   Transportation Needs: No Transportation Needs (7/12/2024)    Transportation Needs     Lack of Transportation: No   Housing Stability: Low Risk  (7/12/2024)    Housing Stability     Housing Instability: No     Social History     Social History Narrative    Not on file        REVIEW OF SYSTEMS:     Constitutional:  fatigue  Eyes: negative  ENT: negative  Respiratory: negative  Cardiovascular: negative  Gastrointestinal: negative  Integument/Breast: negative  Genitourinary: negative  Heme/Lymph: negative  Musculoskeletal: negative  Neurological: negative  Psych: stressed  Endocrine: negative  Allergic/Immune: negative        EXAM:   /74   Pulse 113   Resp 16   Wt 165 lb (74.8 kg)   LMP 02/14/2019 (Exact Date)   SpO2 97%   BMI 28.55 kg/m²    Wt Readings from Last 6 Encounters:   03/31/25 165 lb (74.8 kg)   03/28/25 167 lb 9.6 oz (76 kg)   03/12/25 168 lb (76.2 kg)   03/12/25 165 lb (74.8 kg)   03/03/25 160 lb (72.6 kg)   02/11/25 171 lb (77.6 kg)     Body mass index is 28.55 kg/m².    General: alert, appears stated age, and cooperative  Head: Normocephalic, without obvious abnormality, atraumatic  Eyes: conjunctivae/corneas clear. PERRL, EOM's intact. Fundi benign.  Ears: normal TM's and external ear canals both ears  Nose: Nares normal. Septum midline. Mucosa normal. No drainage or sinus  tenderness.  Throat: lips, mucosa, and tongue normal; teeth and gums normal  Neck: no carotid bruit, no JVD, supple, symmetrical, trachea midline, thyroid not enlarged, symmetric, no tenderness/mass/nodules, and positive lymphadenopathy  Heart: S1, S2 normal, no murmur, click, rub or gallop, regular rate and rhythm  Lungs: clear to auscultation bilaterally  Breast: see HPI  Abdomen: soft, non-tender; bowel sounds normal; no masses,  no organomegaly  Pelvic: deferred  Back: symmetric, no curvature. ROM normal. No CVA tenderness.  Extremities: extremities normal, atraumatic, no cyanosis or edema  Pulses: 2+ and symmetric  Skin: Skin color, texture, turgor normal. No rashes or lesions  Lymph Nodes: Cervical, supraclavicular, and axillary nodes normal.  Neurologic: Grossly normal      ASSESSMENT AND PLAN:   Hanna was seen today for physical.    Diagnoses and all orders for this visit:    Wellness examination    Elevated TSH  -     Cancel: Thyroid peroxidase & thyroglobulin ab [E]; Future  -     Thyroid peroxidase & thyroglobulin ab [E]; Future    Hypothyroidism, unspecified type  -     levothyroxine 25 MCG Oral Tab; Take 1 tablet (25 mcg total) by mouth before breakfast.    Essential hypertension  -     lisinopril-hydroCHLOROthiazide 20-12.5 MG Oral Tab; Take 0.5 tablets by mouth daily.    She is just starting treatment for recurrence of cancer, so is very stressed at this time. Will start treatment for thyroid due to findings and fatigue and will need to follow it going forward.  There are no Patient Instructions on file for this visit.    The patient indicates understanding of these issues and agrees to the plan.    Problem List:  Patient Active Problem List   Diagnosis    Essential hypertension    Family history of breast cancer    Family history of pancreatic cancer    Family history of ovarian cancer    Stress incontinence    Hyperlipidemia    Vitamin D deficiency    Primary osteoarthritis of first carpometacarpal  joint of right hand    Cervical radiculopathy    Chronic narcotic use    Cervical spondylosis with radiculopathy    DDD (degenerative disc disease), lumbar    Polyneuropathy, unspecified    Other intervertebral disc degeneration, lumbar region    Cervical myofascial pain syndrome    Neck and shoulder pain    Opioid use, unspecified with withdrawal (HCC)    Chronic bilateral low back pain without sciatica    History of tobacco use    Myalgia, other site    Fibromyalgia    Chronic bilateral thoracic back pain    Constipation    Canker sore    Other spondylosis with radiculopathy, cervical region    Abnormal mammogram    Primary adenocarcinoma of right lung (HCC)    Mediastinal adenopathy    Primary lung cancer with metastasis from lung to other site, right (HCC)    Personal history of nicotine dependence    At risk for dehydration    Dyspnea    Dyspnea, unspecified type    History of COVID-19    Hemoptysis    Disorder of thyroid, unspecified    Encounter for other orthopedic aftercare    History of falling    Generalized anxiety disorder    Major depressive disorder, single episode, unspecified    Other specified urinary incontinence    Unspecified visual loss    Memory changes    Failed back surgical syndrome    Exertional chest pain    Decreased exercise tolerance    Lung fibrosis (HCC)    Palliative care by specialist       Guillermo Curry MD  3/31/2025  3:14 PM

## 2025-03-31 NOTE — TELEPHONE ENCOUNTER
----- Message from Thalia MONK sent at 3/28/2025  3:51 PM CDT -----  Hi, this patient had a very mild Taxol reaction during first treatment today. She experienced flushing, vital stable, relief upon cessation of infusion. We were able to resume treatment and complete the dose without further complication.     Thanks!  Thalia

## 2025-03-31 NOTE — TELEPHONE ENCOUNTER
Toxicities: C1 D1 Nivolumab/Ipilimumab/Paclitaxel/Carboplatin on 3/28/2025    Condition Update: Flushing with Paclitaxel    I attempted to reach Hanna to see how she is feeling after her first treatment. I left a voice mail message asking her to please return my call at her earliest convenience.

## 2025-04-01 ENCOUNTER — TELEPHONE (OUTPATIENT)
Age: 55
End: 2025-04-01

## 2025-04-01 DIAGNOSIS — C34.91 PRIMARY ADENOCARCINOMA OF RIGHT LUNG (HCC): Primary | ICD-10-CM

## 2025-04-01 RX ORDER — PREDNISONE 10 MG/1
10 TABLET ORAL 2 TIMES DAILY
Qty: 10 TABLET | Refills: 1 | Status: SHIPPED | OUTPATIENT
Start: 2025-04-01 | End: 2025-04-01

## 2025-04-01 RX ORDER — PREDNISONE 10 MG/1
10 TABLET ORAL 2 TIMES DAILY
Qty: 10 TABLET | Refills: 1 | Status: SHIPPED | OUTPATIENT
Start: 2025-04-01

## 2025-04-01 RX ORDER — PREGABALIN 50 MG/1
50 CAPSULE ORAL 3 TIMES DAILY
Qty: 30 CAPSULE | Refills: 1 | Status: SHIPPED | OUTPATIENT
Start: 2025-04-01

## 2025-04-01 NOTE — TELEPHONE ENCOUNTER
Called Hanna- c/o severe muscle/nerve pain likely c/w TAPS. Discussed trial of neuropathic agents- she said she is allergic to gabapentin but had not problems in the past with lyrica. She will start this now for the next 2-3 days and hopefully by then will resolve. For next cycle of chemo will take prednisone 10 mg BID with food for 5 days

## 2025-04-01 NOTE — PROGRESS NOTES
Cancer Center Progress Note    Patient Name: Hanna Barrett   YOB: 1970   Medical Record Number: [id number]   CSN: [id number]   Attending Physician: Shoshana Gordon M.D.   Referring Physician: MD Dr. Flynn Geronimo    Date of Visit: 4//2025     Chief Complaint:  Chief Complaint   Patient presents with    Follow - Up    Chemotherapy    Immunotherapy        Hem/Onc History:  Stage IIIC NSCLC adenocarcinoma of the right lung diagnosed 12/2021    Oncologic History:  5/2021: patient presented with abnormal preoperative CXR; left apical 8 mm nodule with irregular margins, and a right middle lobe soft tissue density with associated bronchiectasis medially and mild adjacent nonspecific ground glass density, overall stable since 2/2021.     11/2021: CT scan of the chest demonstrated a spiculated airspace density within the right middle lobe worrisome for primary lung malignancy along with mediastinal lymph nodes.  12/7/2021: PET/CT scan showed a hypermetabolic RML and RUL mass with enlargement of mediastinal lymph nodes.    12/13/2021: bronchoscopy and transbronchial needle aspiration to subcarinal lymph node was positive for metastatic adenocarcinoma. PD-L1 <1%, EGFR, ROS1, ALK, KRAS, RET, BRAF all negative.     1/17/2022: concurrent chemoradiation with cisplatin + pemetrexed at [facility] (Dr. Subramanian) completed in 4/2022 5/13/2022: started consolidative durvalumab q4 weeks    7/2022: PET concerning for progression of disease     8/12/2022: transferred care to Dr. Sanchez at [facility]    8/16/2022: bronchoscopy and biopsies were negative for malignancy.     9/14/2022: CT scan showed some improvement     11/14/2022: PET scan continues to show improvement with interval decrease in the right lung opacities with decreased uptake, may represent scarring and posttreatment change. Increased uptake distal esophagus.     1/17/2023: EGD with normal esophagus and negative biopsies.     2/17/2023: CT chest  stable.      3/31/2023: completed 12 cycles of durvalumab.     - care transferred to Dr. Baker when Dr. Sanchez retired    4/7/2023: bronchoscopy for hemoptysis was unremarkable.   5/17/2023: CT chest stable.      - care transferred to me 11/2023 from Dr. Baker  who now practices primarily in [place]    11/2024:  Imaging done just prior showed stable right perihilar consolidation which compared to her previous PET showed no uptake in these areas and therefore suggestive of no significant residual or recurrent neoplasm. No new findings described in C/A/P with devrease in size of a liver lesion favored to be a hemangioma. C/o chronic fatigue and some SOB and cough. Had reported some visual changes and was seen at [facility]. Reports no evidence of malignancy seen. As felt relatively well with recent good scans she opted to have her port removed. This was removed by IR on 11/26/24.    Early 1/2025 she saw her PCP c/o nasal and sinus congestion with pressure, cough, PND, fullness in the ears. No adenopathy was noted on exam. She was placed on Augmentin for sinusitis.     She then reported feeling a growth in her nose causing a deformity with nasal obstructive symptoms. Saw ENT 1/29/25 (Laila). Sinus CT scans were ordered and was referred to plastics. Scans showed no nasal mass or abnormality of the soft tissues but did show postoperative changes from previous sinus surgery. Did see plastics and was felt to have a dermoid cyst.     The following month c/o left supraclavicular node which was increasing in size and tender. Saw PCP who ordered imaging. Thought to have Virchow node. US of the neck and CT abdomen/pelvis were ordered. US showed nonspecific appearing supraclav node. CT of the abdomen and pelvis showed a moderate to large right pleural effusion that was not present 9/2024 CT. Dedicated CT chest was ordered. Testing results were reviewed. US guided biopsy of left supraclav node was ordered. CT  chest confirmed new right pleural effusion and was referred to pulmonary for tap.     Biopsy of the left supraclav node on 2/27/25 showed metastatic adenocarcinoma c/w lung primary. US guided right thoracentesis drained 1500 mL of fluid. Cytology from the right pleural effusion showed metastatic carcinoma c/w lung adenocarcinoma.       History of Present Illness:  Hanna is here to start systemic therapy. Guardant did not show any targetable mutations: CDK6, PIK3CA, TP53). PDL-1 came back at <1% NGS could not be run with initial sample sent by path as was insufficient and another specimen has been sent. She wanted to get going with treatment.     She reported increasing MARTINS, cough and left neck nodes which have been painful she said.  She also has pain in her left shoulder, left collarbone and lower abdomen. She takes Norco for pain. She has noticed some blood tinged sputum when she coughs. Denies chest pain, change in bowel or urinary habits, headaches, dizziness. Fibromyalgia feels worse she says. No fevers.         Current Medications:    Current Outpatient Medications:     ondansetron (ZOFRAN) 8 MG tablet, Take 1 tablet (8 mg total) by mouth every 8 (eight) hours as needed for Nausea., Disp: 30 tablet, Rfl: 3    HYDROcodone-acetaminophen  MG Oral Tab, Take 1 tablet by mouth every 8 (eight) hours as needed for Pain., Disp: 60 tablet, Rfl: 0    dilTIAZem HCl 120 MG Oral Tab, Take 1 tablet (120 mg total) by mouth daily., Disp: , Rfl:     albuterol 108 (90 Base) MCG/ACT Inhalation Aero Soln, Inhale 2 puffs into the lungs every 6 (six) hours as needed for Wheezing or Shortness of Breath., Disp: 3 each, Rfl: 3    amphetamine-dextroamphetamine 15 MG Oral Tab, TAKE ONE TABLET BY MOUTH ONCE DAILY 8 TO 9 HOURS AFTER VYVANSE DOSE, Disp: , Rfl:     ARIPiprazole 2 MG Oral Tab, Take one (1) tablet by mouth every morning, Disp: , Rfl:     VYVANSE 70 MG Oral Cap, Take 1 capsule (70 mg total) by mouth every morning., Disp: ,  Rfl:     SYMBICORT 160-4.5 MCG/ACT Inhalation Aerosol, Inhale 2 puffs into the lungs 2 (two) times daily., Disp: , Rfl:     Multiple Vitamin Oral Tab, Take 1 tablet by mouth daily., Disp: , Rfl:     ibuprofen 200 MG Oral Tab, Take 4 tablets (800 mg total) by mouth every 8 (eight) hours as needed for Pain., Disp: , Rfl:     cholecalciferol 25 MCG (1000 UT) Oral Cap, Take 1 capsule (1,000 Units total) by mouth daily., Disp: , Rfl: 0    DULoxetine HCl 60 MG Oral Cap DR Particles, Take 1 capsule (60 mg total) by mouth daily., Disp: , Rfl: 2    pregabalin 50 MG Oral Cap, Take 1 capsule (50 mg total) by mouth 3 (three) times daily., Disp: 30 capsule, Rfl: 1    predniSONE 10 MG Oral Tab, Take 1 tablet (10 mg total) by mouth 2 (two) times daily. Start day after chemo, Disp: 10 tablet, Rfl: 1    levothyroxine 25 MCG Oral Tab, Take 1 tablet (25 mcg total) by mouth before breakfast., Disp: 90 tablet, Rfl: 1    lisinopril-hydroCHLOROthiazide 20-12.5 MG Oral Tab, Take 0.5 tablets by mouth daily., Disp: 45 tablet, Rfl: 3    prochlorperazine (COMPAZINE) 10 mg tablet, Take 1 tablet (10 mg total) by mouth every 6 (six) hours as needed for Nausea., Disp: 30 tablet, Rfl: 3    montelukast 10 MG Oral Tab, Take 1 tablet (10 mg total) by mouth nightly. (Patient not taking: Reported on 3/28/2025), Disp: 90 tablet, Rfl: 3    cetirizine 10 MG Oral Tab, Take 1 tablet (10 mg total) by mouth daily., Disp: 90 tablet, Rfl: 1    ferrous sulfate 325 (65 FE) MG Oral Tab EC, Take 1 tablet (325 mg total) by mouth daily with breakfast., Disp: , Rfl:     Past Medical History:  Past Medical History:    Abnormal uterine bleeding    bleeds every 2 weeks    Anxiety state    Asthma (HCC)    Attention deficit hyperactivity disorder (ADHD)    BACK PAIN    Back problem    lumbar radiculopathy    Cancer (HCC)    adenocarcinoma of right lung    MARCIO II (cervical intraepithelial neoplasia II)    DDD (degenerative disc disease), lumbar    DDD (degenerative disc  disease), thoracic    Depression    Disorder of thyroid    Dyspareunia    Exposure to medical diagnostic radiation    On hold since 2022    Fall    Fibromyalgia    Fibromyalgia    Genital warts    High blood pressure    History of COVID-19    COVID + 2020 Headache - NO Hospitalization needed     History of lumbar fusion    Hx of motion sickness    IBS (irritable bowel syndrome)    Per patient - Just constipation    Infertility, female    Lumbar radiculopathy    Mild dysplasia of cervix    Pap smear for cervical cancer screening    pt states normal    Papanicolaou smear of cervix with high grade squamous intraepithelial lesion (HGSIL)    Personal history of antineoplastic chemotherapy    Last chemo 22 prior to lung bx 22    Post covid-19 condition, unspecified    symptoms: HA; s/s resolved-yes; not hospitalized    Problems with swallowing    with radiation    Sexually transmitted disease    HPV    Substance abuse (HCC)    cocaine    Urinary incontinence    Visual impairment    glasses/contacts bifocals       Past Surgical History:  Past Surgical History:   Procedure Laterality Date    Appendectomy      Back surgery  2009    fusion L5-S1    Back surgery  2021    RIGHT LUMBAR 3/ LUMBAR 4, LUMBAR 4/ LUMBAR 5 HEMILAMINTOMIES, LEFT LUMBAR 2 / LUMBAR 3 MICRODISCECTOMY    Colonoscopy N/A 2023    Procedure: COLONOSCOPY;  Surgeon: Devante Kern MD;  Location:  ENDOSCOPY    Colposcopy,bx cervix/endocerv curr  11    MARCIO 1    Laparoscopy procedure unlisted      Ovarian cyst removed    Leep  2011    MARCIO 2          X2    Other surgical history      bunions bilateral    Other surgical history      nodule lymph nodes neck    Other surgical history  2016     Bladder sling    Other surgical history  2020    Cystoscopy, Dr Beach       Family Medical History:  Family History   Problem Relation Age of Onset    Other (lung CA) Self     Hypertension Mother     Diabetes Mother      Heart Disease Mother     Heart Disease Father     Other (lung cancer) Father 55        Lung Cancer    Other (pancreatic cancer) Sister 47        Pancreatic Cancer    Cancer Sister 51        lung CA    Other (Other) Sister         Back problems    Other (Other) Son         anxiety    Other (Other) Son         behavioral problems    Diabetes Maternal Grandmother     Breast Cancer Maternal Grandmother         dx late-60s    Stroke Maternal Grandfather 86    Dementia Paternal Grandmother     Heart Disorder Paternal Grandfather     Cancer Maternal Aunt 50        LUNG CA 51    Breast Cancer Maternal Aunt         dx 46-47y    Ovarian Cancer Maternal Cousin Female 33         of ovarian ca at 35y       Gyne History:  OB History    Para Term  AB Living   2 2 2 0 0 2   SAB IAB Ectopic Multiple Live Births   0 0 0 0 2       Psychosocial History:  Social History     Socioeconomic History    Marital status:      Spouse name: Not on file    Number of children: Not on file    Years of education: Not on file    Highest education level: Not on file   Occupational History    Not on file   Tobacco Use    Smoking status: Former     Current packs/day: 0.00     Average packs/day: 0.8 packs/day for 19.0 years (14.5 ttl pk-yrs)     Types: Cigarettes     Start date:      Quit date: 2010     Years since quitting: 15.2     Passive exposure: Past    Smokeless tobacco: Former     Quit date: 2024    Tobacco comments:     Has not vaped since 2024.    Vaping Use    Vaping status: Former    Substances: Nicotine, daily    Devices: Pre-filled pod   Substance and Sexual Activity    Alcohol use: Not Currently     Comment: 3 drinks per week    Drug use: Not Currently     Types: Cocaine     Comment: USED COCAINE BETW [id number]    Sexual activity: Yes     Partners: Male   Other Topics Concern     Service Not Asked    Blood Transfusions Not Asked    Caffeine Concern Yes     Comment: 3-4 daily of pop     Occupational Exposure Not Asked    Hobby Hazards Not Asked    Sleep Concern Not Asked    Stress Concern Not Asked    Weight Concern Not Asked    Special Diet Not Asked    Back Care Not Asked    Exercise No     Comment: not tolerated right now    Bike Helmet Not Asked    Seat Belt Not Asked    Self-Exams Not Asked   Social History Narrative    Not on file     Social Drivers of Health     Food Insecurity: No Food Insecurity (7/12/2024)    Food Insecurity     Food Insecurity: Never true   Transportation Needs: No Transportation Needs (7/12/2024)    Transportation Needs     Lack of Transportation: No     Car Seat: Not on file   Housing Stability: Low Risk  (7/12/2024)    Housing Stability     Housing Instability: No     Housing Instability Emergency: Not on file     Crib or Bassinette: Not on file         Allergies:  Allergies   Allergen Reactions    Gabapentin SWELLING and UNKNOWN    Erythromycin UNKNOWN    Clindamycin RASH        Review of Systems:  A 14-point ROS was done with pertinent positives and negative per the HPI    Vital Signs:   Day 1,  Cycle 1  03/28/25   Height 1.619 m (5' 3.74\") [1]   Weight 76 kg (167 lb 9.6 oz)   BSA (Calculated - sq m) 1.81 sq meters   BMI (Calculated) 29 kg/m2   /63   Pulse 92   BP Location Right arm   Patient Position Sitting   Temp 96 °F        Physical Examination:  General: Patient is alert and oriented x 3, not in acute distress.  Psych:  Mood and affect appropriate  HEENT: EOMs intact. PERRL. Oropharynx is clear.   Neck: No JVD. Left palpable lymphadenopathy supraclav - 2 discrete nodes felt, slightly larger than when last seen but more swelling/fullness in area with tenderness. Neck is supple.  Chest: Diminished BS  Heart: Regular rate and rhythm.   Abdomen: Soft, non tender with good bowel sounds.  No hepatosplenomegaly.  No palpable mass.  Extremities: Pedal pulses are present. No BLE edema.  Neurological: Grossly intact.       Laboratory:  Lab Results   Component  Value Date    WBC 6.3 03/28/2025    RBC 4.34 03/28/2025    HGB 13.2 03/28/2025    HCT 37.5 03/28/2025    .0 03/28/2025    MPV 9.4 09/02/2012    MCV 86.4 03/28/2025    MCH 30.4 03/28/2025    MCHC 35.2 03/28/2025    RDW 12.2 03/28/2025    NEPRELIM 4.42 03/28/2025    NEUTABS 11.73 (H) 04/03/2022    LYMPHABS 0.83 (L) 04/03/2022    EOSABS 0.55 04/03/2022    BASABS 0.00 04/03/2022    NEUT 73 04/03/2022    LYMPH 6 04/03/2022    MON 5 04/03/2022    EOS 4 04/03/2022    BASO 0 04/03/2022    NEPERCENT 70.0 03/28/2025    LYPERCENT 13.2 03/28/2025    MOPERCENT 11.6 03/28/2025    EOPERCENT 3.7 03/28/2025    BAPERCENT 1.0 03/28/2025    NE 4.42 03/28/2025    LYMABS 0.83 (L) 03/28/2025    MOABSO 0.73 03/28/2025    EOABSO 0.23 03/28/2025    BAABSO 0.06 03/28/2025     Lab Results   Component Value Date     (H) 03/28/2025    BUN 18 03/28/2025    BUNCREA 18.0 03/21/2022    CREATSERUM 0.97 03/28/2025    ANIONGAP 11 03/28/2025     01/28/2018    GFRNAA 61 04/03/2022    GFRAA 70 04/03/2022    CA 10.0 03/28/2025    OSMOCALC 297 (H) 03/28/2025    ALKPHO 71 03/28/2025    AST 23 03/28/2025    ALT 21 03/28/2025    BILT 0.4 03/28/2025    TP 7.1 03/28/2025    ALB 4.6 03/28/2025    GLOBULIN 2.5 03/28/2025     03/28/2025    K 3.5 03/28/2025     03/28/2025    CO2 25.0 03/28/2025     Lab Component   Component Value Date/Time     03/12/2025 1102     Lab Component   Component Value Date/Time    CEA 1.0 05/12/2017 1425        Latest Reference Range & Units 11/07/24 10:09   -246 U/L U/L 168   FERRITIN 50 - 306 ng/mL 59   Vitamin B12 211 - 911 pg/mL >2,000 (H)   FOLATE (FOLIC ACID), SERUM >5.4 ng/mL 29.8   (H): Data is abnormally high      Radiology:  PROCEDURE:  PET/CT STANDARD BODY SCAN (ONCOLOGY) (CPT=78815)     COMPARISON:  [facility] , MR, MRI BRAIN (W+WO) (CPT=70553), 3/07/2025, 3:45 PM.  [facility] , CT, CT CHEST+ABDOMEN+PELVIS(ALL CNTRST ONLY)(CPT=71260/37193), 9/27/2024, 1:11 PM.  [facility] , US, US HEAD/NECK  (CPT=76536), 2/12/2025, 8:41 AM.  [facility], PET  STANDARD BODY SCAN (ONCOLOGY) (CPT=78815), 4/13/2022, 12:14 PM.  External Exams, [facility], PET STANDARD BODY SCAN (ONCOLOGY) (CPT=78815), 12/07/2021, 12:36 PM.  [facility], PET STANDARD BODY SCAN (ONCOLOGY) (CPT=78815), 12/27/2023, 9:02 AM.     INDICATIONS:  C34.91 Recurrent malignant neoplasm of right lung of unknown cell type (HCC) C34.91 Primary adenocarcinoma of right lung (HCC)     TECHNIQUE:  The patient fasted for at least 6 hours. F-18 FDG was injected IV, and whole-body images from vertex to mid-thigh were obtained with concurrent CT scan for both anatomic localization as well as attenuation correction.     RADIOPHARMACEUTICAL:    9.6 mCi F-18 FDG  FASTING GLUCOSE LEVEL:  80 mg/dl  INJECTION TIME:         0850 hours  SCAN TIME:              0954 hours     FINDINGS:       Left supraclavicular lymph nodes with elevated FDG uptake as high as 4.5 consistent with metastatic disease.  There is a low left supraclavicular lymph node medially with elevated FDG uptake of 5.9.  Low left jugular digastric lymph node with elevated  FDG uptake of 4.2.  Asymmetric radiotracer uptake within the right masseter muscles likely related to physiologic changes as there is no underlying mass lesion.  Physiologic uptake noted throughout the tongue.       Left infrahilar lymph node has elevated FDG uptake of 7.0.  Right hilar lymph node has elevated FDG uptake of 5.0  posterior mediastinal lymph node adjacent to the aorta with elevated FDG uptake of 4.2.  Right pleural fluid has elevated FDG uptake  concerning for involvement per of the pleural fluid by malignancy with maximum SUV uptake of 9.4.  It should be noted that there is been recent thoracentesis in this may be the reason behind this uptake rather than neoplastic disease.     Abdomen demonstrates a lymph node within the posterior gastrohepatic ligament with SUV of 5.4.     There is retroperitoneal adenopathy bilaterally  posterior to the aorta and cava with the largest lymph node on the right with maximum SUV of 14.4 and the maximum SUV of the left retroperitoneal lymph node being 7.1.  Within the pelvis left external iliac   lymph node has maximum SUV of 8.6 and left internal iliac lymph node has maximum SUV of 8.2.  There are multiple left external iliac lymph nodes with maximum SUV of 16.5.  There is a left obturator lymph node with maximum SUV of 5.8.       Minimal uptake within left small inguinal lymph nodes with SUV less than 2 likely reactive.                        Impression   CONCLUSION:       1. Left supraclavicular and jugular digastric lymph node with elevated FDG uptake consistent metastatic disease.     2. Bilateral hilar and posterior retroperitoneal lymph node elevated FDG uptake consistent with metastatic disease.     3. Pleural uptake within the right posterior pleural space could be related to recent thoracentesis although metastatic disease to the pleura is also considered.     4. Multiple abnormal lymph nodes including gastrohepatic ligament, bilateral retroperitoneal, left external iliac, and left  lymph nodes consistent with metastatic disease.        LOCATION:  [facility]           Dictated by (CST): Cj García MD on 3/11/2025 at 11:04 AM      Finalized by (CST): Cj García MD on 3/11/2025 at 12:25 PM       PROCEDURE:  MRI BRAIN (W+WO) (CPT=70553)     COMPARISON:  [facility], MRI BRAIN (W+WO) (CPT=70553), 7/24/2024, 1:02 PM.     INDICATIONS:  C34.91 Primary adenocarcinoma of right lung (HCC)     TECHNIQUE:  MRI of the brain was performed with multi-planar T1, T2-weighted images with FLAIR sequences and diffusion weighted images without and with infusion.     PATIENT STATED HISTORY:(As transcribed by Technologist)  History of cancer      CONTRAST USED:  15 mL of Dotarem     FINDINGS:    INTRACRANIAL:  There are few foci of T2/FLAIR hyperintensity in the periventricular subcortical white matter  consistent small vessel ischemic disease..  Diffusion weighted imaging was performed and is unremarkable.  There is no evidence for acute  infarction.  There is no evidence of hemorrhage or mass lesion.  VENTRICLES/SULCI:   Ventricles and sulci are normal in caliber.  There are no extra-axial fluid collections.  There is no midline shift.  SINUSES/ORBITS:       The visualized paranasal sinuses are clear.  The orbits are unremarkable.  MASTOIDS:       The mastoids are clear.                  Impression  CONCLUSION:  No acute intracranial process.  No evidence of metastatic disease to the brain.        LOCATION:  [facility]           Dictated by (CST): Cj García MD on 3/07/2025 at 4:40 PM      Finalized by (CST): Cj García MD on 3/07/2025 at 4:46 PM      PROCEDURE:  CT CHEST(CONTRAST ONLY) (CPT=71260)     COMPARISON:  [facility] , CT, CT CHEST+ABDOMEN+PELVIS(ALL CNTRST ONLY)(CPT=71260/43582), 9/27/2024, 1:11 PM.     INDICATIONS:  C34.91 Primary adenocarcinoma of right lung (HCC)     TECHNIQUE:  CT images were obtained with non-ionic intravenous contrast material. Dose reduction techniques were used. Dose information is transmitted to the ACR (American College of Radiology) NRDR (National Radiology Data Registry) which includes the  Dose Index Registry.     PATIENT STATED HISTORY:(As transcribed by Technologist)  Patient has history of Lung cancer and now fluid in lung. Disease surveillance. Shortness of breath.      CONTRAST USED:  75cc of Isovue 370     FINDINGS:    LUNGS:  There is progressive atelectasis of the right lower lobe and right middle lobe related to developing large right effusion.  VASCULATURE:  No visible pulmonary arterial thrombus or attenuation.    MICHAEL:  No mass or adenopathy.    MEDIASTINUM:  No mass or adenopathy.    CARDIAC:  There is a small amount of pericardial fluid which is similar to the prior study.  PLEURA:  Large right pleural effusion has developed in the interval since the prior  study.  This predominantly layers in the dependent part of the chest.  THORACIC AORTA:  No aneurysm.    CHEST WALL:  No mass or axillary adenopathy.    LIMITED ABDOMEN:  Hyperenhancing focus in right hepatic lobe series 3, image 139 has been previously described and is unchanged.  BONES:  There is diffuse degenerative change in the thoracic spine.  There are no aggressive bone lesions detected.                   Impression   CONCLUSION:    1. Interval development of large right pleural effusion with associated worsening atelectasis in right lung.  Post treatment changes in the right perihilar lung are otherwise unchanged.  2. Otherwise no specific evidence of metastatic disease.  3. Small pericardial effusion is similar to prior study.  4. Hyperenhancing focus in right hepatic lobe has been previously described and is unchanged.        LOCATION:  [facility]        Dictated by (CST): Damir Deleon MD on 2/28/2025 at 11:56 AM      Finalized by (CST): Damir Deleon MD on 2/28/2025 at 12:02 PM       PROCEDURE:  CT ABDOMEN+PELVIS (CONTRAST ONLY) (CPT=74177)     COMPARISON:  [facility] , CT, CT CHEST+ABDOMEN+PELVIS(ALL CNTRST ONLY)(CPT=71260/94004), 9/27/2024, 1:11 PM.  [facility] , CT, CT ABDOMEN+PELVIS(CONTRAST ONLY)(CPT=74177), 3/07/2021, 2:05 PM.     INDICATIONS:  R59.0 Virchow's node     TECHNIQUE:  CT scanning was performed from the dome of the diaphragm to the pubic symphysis with non-ionic intravenous contrast material. Post contrast coronal MPR imaging was performed.  Dose reduction techniques were used. Dose information is  transmitted to the ACR (American College of Radiology) NRDR (National Radiology Data Registry) which includes the Dose Index Registry.     PATIENT STATED HISTORY:(As transcribed by Technologist)  Pt states rule out cancer.      CONTRAST USED:  100cc of Isovue 370     FINDINGS:    LIVER:  Low-attenuation lesion in hepatic segment 4 measures 11 x 9 mm on image 23 of series 2, previously 11 x 9  mm.  BILIARY:  No visible dilatation or calcification.    PANCREAS:  No lesion, fluid collection, ductal dilatation, or atrophy.    SPLEEN:  No enlargement or focal lesion.    KIDNEYS:  No mass, obstruction, or calcification.  Simple left renal cysts again demonstrated which requires no further workup or follow-up.  ADRENALS:  No mass or enlargement.    AORTA/VASCULAR:  Trace atherosclerotic calcifications.  No aneurysm.  RETROPERITONEUM:  No mass or adenopathy.    BOWEL/MESENTERY:  No visible mass, obstruction, or bowel wall thickening.  Moderate colonic stool.  ABDOMINAL WALL:  Stable tiny fat containing umbilical hernia.  URINARY BLADDER:  No visible focal wall thickening, lesion, or calculus.    PELVIC NODES:  No adenopathy.    PELVIC ORGANS:  No visible mass.  Pelvic organs appropriate for patient age.    BONES:  Degenerative changes of spine.  Stable postsurgical changes of fusion at L5-S1.  Healed fracture of L1 spinous process.  LUNG BASES:  Moderate to large right pleural effusion.  Dependent atelectasis of right lung base.                      Impression   CONCLUSION:    1. Moderate to large right pleural effusion, incompletely visualized.  This was not present on 9/27/2024.  Full CT of the chest may be beneficial in further evaluation.  2. No significant change in the abdomen and pelvis.  There is a stable low-attenuation hepatic lesion.        LOCATION:  [facility]        Dictated by (CST): Javed Dye MD on 2/20/2025 at 10:21 AM      Finalized by (CST): Javed Dye MD on 2/20/2025 at 10:30 AM       PROCEDURE:  US HEAD/NECK (CPT=76536)     COMPARISON:  None.     INDICATIONS:  R59.0 Virchow's node, history of lung cancer.  Palpable abnormality along the left supraclavicular region.     TECHNIQUE:  Sonography was performed of the clinically requested area of interest.     FINDINGS:    Dedicated imaging in the area of palpable abnormality along the left supraclavicular region demonstrates a nonspecific  rounded hypoechoic lymph node measuring 9 x 7 x 9 mm with the cortex measuring up to 3 mm in thickness.  There is a more elongated  nonspecific lymph node slightly more laterally in the left supraclavicular region measuring 10 x 4 x 8 mm with the cortex measuring up to 5 mm in thickness.  Given the patient's history, pathologic lymph nodes in this area cannot be entirely excluded.    Clinical correlation recommended.  FNA may be of further value.                      Impression   CONCLUSION:  Dedicated imaging in the area of palpable abnormality along the left supraclavicular region demonstrates a nonspecific rounded hypoechoic lymph node measuring 9 x 7 x 9 mm with the cortex measuring up to 3 mm in thickness.  There is a more  elongated nonspecific lymph node slightly more laterally in the left supraclavicular region measuring 10 x 4 x 8 mm with the cortex measuring up to 5 mm in thickness.  Given the patient's history, pathologic lymph nodes in this area cannot be entirely  excluded.  Clinical correlation recommended.  FNA may be of further value.     LOCATION:  [facility]        Dictated by (CST): Akshat Conklin MD on 2/12/2025 at 9:11 AM      Finalized by (CST): Akshat Conklin MD on 2/12/2025 at 9:16 AM         ADDENDUM:  Comparison is also made to previous PET-CT from 12/27/2023.  Findings as described above in the hilum, mediastinum and right lung are stable compared to the previous PET-CT also.  There is no significant uptake in the regions of right   perihilar consolidation and right paratracheal mediastinum on the previous PET-CT.  These findings would suggest no significant residual or recurrent neoplasm.     COMPARISON:  [facility], PET STANDARD BODY SCAN (ONCOLOGY) (CPT=78815), 12/27/2023, 9:02 AM.           Dictated by (CST): Payam Headley MD on 9/30/2024 at 12:17 PM       Finalized by (CST): Payam Headley MD on 9/30/2024 at 12:20 PM                    Addended by Payam Headley MD on  9/30/2024 12:20 PM     Study Result    Narrative   PROCEDURE:  CT CHEST+ABDOMEN+PELVIS(ALL CNTRST ONLY)(CPT=71260/94491)     COMPARISON:  [facility] , CT, CT CHEST(CONTRAST ONLY) (CPT=71260), 11/21/2023, 9:59 AM.  [facility] , CT, CT CHEST+ABDOMEN+PELVIS(ALL CNTRST ONLY)(CPT=71260/35601), 3/27/2024, 12:27 PM.     INDICATIONS:  H57.9 Visual symptoms C34.2 Malignant neoplasm of middle lobe of right lung (HCC) C34.91 Primary adenocarcinoma of right lung (HCC)     TECHNIQUE:  IV contrast-enhanced scanning through the chest, abdomen, and pelvis was performed.  Dose reduction techniques were used. Dose information is transmitted to the ACR (American College of Radiology) NRDR (National Radiology Data Registry) which   includes the Dose Index Registry.     PATIENT STATED HISTORY:(As transcribed by Technologist)  Surveillance of right lung cancer. She is having short of breath recently.      CONTRAST USED:  100cc of Isovue 370     FINDINGS:       CHEST:    LUNGS:  Right perihilar consolidation with angular concave contour is an associated bronchiectasis centered at the superior segment of the right lower lobe is stable in appearance measuring approximately 7.4 x 3.1 cm compared to 7.5 x 3.2 cm.  The small  differences are likely related to differences in measurement technique.  There is a stable 2 mm subpleural right upper lobe lung nodule seen on image 29 of series 3. There is stable mild left apical pleural and parenchymal scarring.  There is no new lung   nodule identified.    MEDIASTINUM:  Right paratracheal and central right hilar confluent soft tissue density is unchanged in appearance.  No new adenopathy is appreciated.  MICHAEL:  No interval change.  Calcified granuloma seen in the left hilum.  CARDIAC:  No enlargement, pericardial thickening, or significant coronary artery calcification.  There is a small pericardial effusion anteriorly measuring 6 mm in thickness.  No significant change from previous.  PLEURA:  No pleural  effusion or pneumothorax.  CHEST WALL:  No axillary lymphadenopathy or chest wall mass.  Right chest wall chest port catheter noted.  AORTA:  No aneurysm or dissection.    VASCULATURE:  No visible pulmonary arterial thrombus or attenuation.       ABDOMEN/PELVIS:  LIVER:  The previously demonstrated early enhancing 20 x 14 mm focus appears smaller measuring 15 x 9 mm.  The differences in size could reflect differences in timing of the bolus and filming.  This could reflect a vascular lesion such as a hemangioma.    There is a stable 9 mm focus of decreased attenuation in the left lobe in hepatic segment 4A.  This has been stable on previous exams dating back to 11/21/2023.    BILIARY:  No visible dilatation or calcification.    PANCREAS:  No lesion, fluid collection, ductal dilatation, or atrophy.    SPLEEN:  No enlargement or focal lesion.    KIDNEYS:  There is a 1.8 x 1.5 cm simple cyst in the upper pole cortex of the left kidney and a smaller subcentimeter cyst in the midpole cortex of the left kidney.  ADRENALS:  No mass or enlargement.    AORTA:  Mild atheromatous plaque noted in the aorta and iliac arteries.  No evidence for aneurysm.  RETROPERITONEUM:  No mass or adenopathy.    BOWEL/MESENTERY:  No visible mass, obstruction, or bowel wall thickening.  Status post appendectomy.  No evidence for bowel wall thickening.  ABDOMINAL WALL:  No mass or hernia.    URINARY BLADDER:  No visible focal wall thickening, lesion, or calculus.    PELVIC NODES:  No adenopathy.    PELVIC ORGANS:  No visible mass.  Pelvic organs appropriate for patient age.    BONES:  Degenerative changes are seen in the cervical, thoracic and lumbar spine.  Postoperative changes of posterior fusion noted at L5-S1.  No new lytic or blastic bony lesions are identified.  There is an old healed posterior right 10th and 11th rib  fracture.                   Impression   CONCLUSION:    1. Stable right perihilar consolidation with angular concave  contour with associated bronchiectasis centered at the superior segment of the right lower lobe.  This is in continuity with stable soft tissue density extending to the right paratracheal  mediastinum.  This may reflect changes related to treated cancer/treatment changes.  PET-CT would better evaluate for any residual neoplasm.  2. No new metastatic findings in the chest, abdomen or pelvis.  3. Interval decrease in size of an early enhancing right lobe liver lesion.  Imaging characteristics favor benign lesions such as hemangioma.  4. Multiple other incidental findings as detailed above.             LOCATION:  [facility]           Dictated by (CST): Payam Headley MD on 9/27/2024 at 6:28 PM      Finalized by (CST): Payam Headley MD on 9/27/2024 at 6:50 PM         PROCEDURE:  CT CHEST+ABDOMEN+PELVIS(ALL CNTRST ONLY)(CPT=71260/77181)     COMPARISON:  [facility] , [facility], PET STANDARD BODY SCAN (ONCOLOGY) (CPT=78815), 12/27/2023, 9:02 AM.  [facility] , CT, CT CHEST(CONTRAST ONLY) (CPT=71260), 11/21/2023, 9:59 AM.     INDICATIONS:  C34.91 Primary adenocarcinoma of right lung (HCC)     TECHNIQUE:  IV contrast-enhanced scanning through the chest, abdomen, and pelvis was performed.  Dose reduction techniques were used. Dose information is transmitted to the ACR (American College of Radiology) NRDR (National Radiology Data Registry) which   includes the Dose Index Registry.     PATIENT STATED HISTORY:(As transcribed by Technologist)  Routine scan for a previous history of right lung cancer. Patient reports no complications at time of exam      CONTRAST USED:  100cc of Isovue 370     FINDINGS:       CHEST:    LUNGS:  Right perihilar consolidation with angular, concave contours and associated bronchiectasis centered at the superior segment lower lobe has decreased in size.  Sample measurement, 7.5 x 3.2 cm versus prior 8.7 x 4.7 cm.    Mild scattered scarring elsewhere in both lungs.  No new bronchiectasis or cystic lung change.  No  new infiltrate.  MEDIASTINUM:  Stable.  No adenopathy.  MICHAEL:  Stable.  No adenopathy.  CARDIAC:  Stable.  Mild anterior pericardial thickening.  Thickness measures 0.7 cm.  Coronary calcifications are present.    PLEURA:  Stable.  No pleural effusion.  CHEST WALL:  Stable.  No axillary adenopathy.  Right chest port.  AORTA:  Stable.  Normal caliber.  VASCULATURE:  Stable.  Smooth tapering.     ABDOMEN/PELVIS:  LIVER:  The visualized portions of liver are stable.  There is an early enhancing focus in segment 7. It is measured 2.0 x 1.4 cm, prior 1.8 x 1.6 cm.  It may be a flash filling hemangioma or vascular focus.  It appears benign.  In the now visualized  remainder of the liver, no abnormality.    BILIARY:  Nondistended gallbladder.  No biliary dilatation.  PANCREAS:  Uniform parenchyma.  No ductal dilatation.  SPLEEN:  Not enlarged.  KIDNEYS:  Normal anatomic positions.  No hydronephrosis or perinephric stranding.  1.9 cm left cortical cyst is unchanged.  ADRENALS:  Not enlarged.  AORTA:  Smooth tapering.  No abdominal aortic aneurysm.  Patent celiac artery, SMA and KATIE.  Patent splenic vein and portal vein.  RETROPERITONEUM:  No adenopathy.  BOWEL/MESENTERY:  Normal bowel caliber.  No colonic inflammation.  Moderate to large amount of stool scattered throughout the colon.  No ascites.  Right lower quadrant clip consistent with prior appendectomy.  ABDOMINAL WALL:  Tiny fat containing umbilical hernia.  URINARY BLADDER:  Nondistended.  Smooth contour.  No calculus within.  PELVIC NODES:  None enlarged  PELVIC ORGANS:  No uterine or ovarian abnormality.  BONES:  Severe degenerative changes.  At least moderate central canal stenosis at L2-L3.  Normal vertebral body heights.  No subluxation.                      Impression   CONCLUSION:    1. Decrease in size of right perihilar consolidation.  This may be related to treated cancer/treatment changes.  2. No new metastatic findings in the chest.  3. No metastatic  findings in the abdomen or pelvis.  4. Details as above.             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 F-18 FDG was injected IV, and whole-body images from vertex to mid-thigh were obtained with concurrent CT scan for both anatomic localization as well as attenuation correction.     RADIOPHARMACEUTICAL:    9.5 mCi F-18 FDG  FASTING GLUCOSE LEVEL:  106 mg/dl  INJECTION TIME:         0 800  SCAN TIME:              0911     FINDINGS:     Mean SUV, mediastinal blood pool:  2.2     Mean SUV, liver:  2.8         HEAD/NECK:  Physiologic activity in all regions.     CHEST:  Consolidation with air bronchograms identified within the superior segment right lower lobe with corresponding low-grade radiotracer uptake, SUV max of 2.5 (previously 3.1).  This is favored to represent post-therapeutic inflammation/fibrosis.    No enlarged or hypermetabolic regional lymph nodes.     ABDOMEN/PELVIS:  Physiologic activity in all regions.       MUSCULOSKELETAL:  Scattered areas of degenerative uptake noted within the spine.  No hypermetabolic osseous metastatic disease.  Mild exertional/inflammatory uptake of bilateral shoulder girdles and peritrochanteric regions.                     Impression  CONCLUSION:       Low-grade hypermetabolism corresponds to consolidative opacity of the superior segment right lower lobe favoring treatment-related inflammation/fibrosis.  No imaging evidence of residual/recurrent malignancy.          LOCATION:  [facility]           Dictated by  (CST): Umm Hodges MD on 12/27/2023 at 1:25 PM      Finalized by (CST): Umm Hodges MD on 12/27/2023 at 1:38 PM      PROCEDURE:  CT CHEST(CONTRAST ONLY) (CPT=71260)     COMPARISON:  [facility], MR, MRI LIVER (W+WO) (CPT=74183), 5/22/2017, 7:11 PM.  [facility] , CT, CT CHEST(CONTRAST ONLY) (CPT=71260), 2/17/2023, 10:15 AM.  [facility], CT, CT UROGRAM(W+WO)SH(CPT=74178), 9/08/2020, 10:29 PM.  [facility] , CT, CT CHEST(CONTRAST  ONLY) (CPT=71260), 8/04/2023, 11:19 AM.     INDICATIONS:  C34.2 Malignant neoplasm of middle lobe of right lung (HCC)     TECHNIQUE:  CT images were obtained with non-ionic intravenous contrast material. Dose reduction techniques were used. Dose information is transmitted to the ACR (American College of Radiology) NRDR (National Radiology Data Registry) which includes the  Dose Index Registry.     PATIENT STATED HISTORY:(As transcribed by Technologist)  Lung cancer. The patient doesn't have complaints.      CONTRAST USED:  75cc of Isovue 370     FINDINGS:    LUNGS:  There are right perihilar fibrotic changes which likely represent treatment change.  There is right perihilar consolidation and bronchiectasis, not significantly changed.  No new enhancing nodule is seen.  Scattered atelectasis and/or scarring.    No pulmonary mass is seen.  VASCULATURE:  Unremarkable.  THORACIC AORTA:  Unremarkable.  MEDIASTINUM/MICHAEL:  Unremarkable.  CARDIAC:  Unremarkable.  PLEURA:  Unremarkable.  CHEST WALL:  Unremarkable.  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  BONES:  Degenerative changes in the spine.  Mild scoliosis.  OTHER:  Right chest port noted.                  Impression  CONCLUSION:  No significant interval change since 8/4/2023.  Treatment change is again seen within the right perihilar region.  No recurrent or metastatic disease is identified.        LOCATION:   [place]        Dictated by (CST): Stromberg, LeRoy, MD on 11/21/2023 at 11:37 AM      Finalized by (CST): Stromberg, LeRoy, MD on 11/21/2023 at 11:52 AM        Impression and Plan:  1. H/o locally advanced NSCLC (IV low neck- cervical?)  - new adenopathy and effusion since last imaging  - biopsy of left supraclav node and right pleural effusion showed metastatic adenocarcinoma c/w lung primary  - With malignant effusion understands she now has stage IV disease which is incurable but very treatable.   - PET also shows new uptake in left neck, hilar and posterior retroperitoneal, abdominal and pelvic nodes and right pleural space  - MRI of the brain showed no evidence of metastases  - An overview of treatment options for metastatic NSCLC were discussed at length. Therapy varies based upon molecular and histologic features of the tumor. If the patient has a  mutation, preferred treatment would be targeted therapy. If a  mutation is not present and PDL-1 status is high, we usually will treat with immunotherapy alone. In patients w/o a  mutation and low PDL-1 score we generally treat initially with chemo+immunotherapy  - PDL-1 negative (<1%), NGS had to be resent as initial sample sent by path insufficient. Maura showed no targetable mutations  - She wants to get going with treatment and not wait for NGS results which is understandable and given increasing symptoms do concur we should proceed  - discussed options with her in detail. She had mentioned she developed an allergic reaction to her previous chemo regimen. I reviewed her outside records from her previous oncologist at that time. There was no mention of hypersensitivity or allergic type or even adverse reaction to her regimen back then. She did have expected SEs such as cytopenias, constipation, dysphagia (with RT), etc. Given what she had experienced did give me pause with how she would react to further chemotherapy. We discussed here  options including chemo free option of Ipi+Nivo given her PDL-1 score while we wait for her NGS results. However she wanted to be as aggressive as possible to start. We therefore decided to proceed with the 9LA regimen (she was EGFR,ALK, ROS 1 negative at her initial diagnosis). She has had teaching and would like to proceed today.   - monitor for reaccummulation of right pleural effusion. Does not seem significant on exam today. Just had CXR ordered by pulmonary yesterday which reported no pleural effusions. Aware of s/sx to watch out for    2. Neoplasm related pain, fibromyalgia. Chronic LBP, neck and left arm main  - takes Ibuprofen and Norco 10 as needed  - also followed by palliative care  - used to be followed at a pain clinic- not anymore    3. H/o asthma, bronchiectasis, former smoker, cough  - follows with pulmonary, continue inhalers   - flutter valve    4. Anemia  - Hgb has been normal since 7/2024    5. TSH elevated  - but T4 normal. This was baseline even before starting IO. Should still be able to proceed but will need close monitoring  - was on LT4 before but was stopped by PCP as levels had been good. She will touch base with her PCP regarding supplementation    6. Access  - had prior port removed. Will have replaced by IR. Was able to get peripheral access to start treatment today    7. Hemoptysis  - reports occasional bloody streaking of mucus. Could also be from bronchiectasis   - Hgb normal  - monitor      Labs reviewed   Await NGS  Start cycle 1 carbo/taxol +Ipi/Nivo today  Day 8 APN  Continue palliative care    Risk level high- metastatic lung cancer to start chemo+IO      Shoshana Gordon MD  [facility]

## 2025-04-02 ENCOUNTER — PATIENT MESSAGE (OUTPATIENT)
Age: 55
End: 2025-04-02

## 2025-04-02 DIAGNOSIS — G89.3 NEOPLASM RELATED PAIN: Primary | ICD-10-CM

## 2025-04-02 RX ORDER — MORPHINE SULFATE 15 MG/1
TABLET ORAL EVERY 4 HOURS PRN
Qty: 45 TABLET | Refills: 0 | Status: SHIPPED | OUTPATIENT
Start: 2025-04-02

## 2025-04-02 NOTE — TELEPHONE ENCOUNTER
Spoke with patient regarding her pain.  She feels her whole body, especially her legs and feet are constantly deep achy throbbing pain.  She also has allodynia.  This pain started after she received chemo/immunotherapy last week.   This pain is severe, not controlled and limiting her ADLs, ability to work and overall function.  Her cancer related abdominal pain is sharp and intermittent.  This is unchanged and manageable.    She started lyrica yesterday and hasn't noticed any benefit yet.  May be too early to evaluate.  Can adjust dose if needed.  She is using norco 10mg without benefit.  Norco 20mg isn't helpful for pain and too sedating.  She is very frustrated and tearful with how she feels.   has a day 8 appt scheduled for next week already.    She will continue her lyrica and ibuprofen.  She will stop norco and will add morphine 7.5-15mg Q 4 to help with pain.  She has found morphine helpful in the past and tolerated it well.

## 2025-04-03 ENCOUNTER — OFFICE VISIT (OUTPATIENT)
Age: 55
End: 2025-04-03
Attending: INTERNAL MEDICINE
Payer: MEDICAID

## 2025-04-03 ENCOUNTER — APPOINTMENT (OUTPATIENT)
Age: 55
End: 2025-04-03
Attending: INTERNAL MEDICINE
Payer: MEDICAID

## 2025-04-03 VITALS
RESPIRATION RATE: 18 BRPM | TEMPERATURE: 98 F | BODY MASS INDEX: 29.73 KG/M2 | HEART RATE: 121 BPM | HEIGHT: 63.74 IN | OXYGEN SATURATION: 97 % | WEIGHT: 172 LBS | SYSTOLIC BLOOD PRESSURE: 125 MMHG | DIASTOLIC BLOOD PRESSURE: 81 MMHG

## 2025-04-03 DIAGNOSIS — C34.91 PRIMARY ADENOCARCINOMA OF RIGHT LUNG (HCC): Primary | ICD-10-CM

## 2025-04-03 DIAGNOSIS — K59.03 DRUG-INDUCED CONSTIPATION: ICD-10-CM

## 2025-04-03 DIAGNOSIS — J91.0 MALIGNANT PLEURAL EFFUSION (HCC): ICD-10-CM

## 2025-04-03 DIAGNOSIS — G89.3 NEOPLASM RELATED PAIN: Primary | ICD-10-CM

## 2025-04-03 DIAGNOSIS — Z51.11 ENCOUNTER FOR CHEMOTHERAPY MANAGEMENT: ICD-10-CM

## 2025-04-03 DIAGNOSIS — T45.1X5A CHEMOTHERAPY-INDUCED NAUSEA: ICD-10-CM

## 2025-04-03 DIAGNOSIS — G89.3 NEOPLASM RELATED PAIN: ICD-10-CM

## 2025-04-03 DIAGNOSIS — R11.0 CHEMOTHERAPY-INDUCED NAUSEA: ICD-10-CM

## 2025-04-03 DIAGNOSIS — R59.0 MEDIASTINAL ADENOPATHY: ICD-10-CM

## 2025-04-03 DIAGNOSIS — Z51.5 PALLIATIVE CARE BY SPECIALIST: ICD-10-CM

## 2025-04-03 DIAGNOSIS — C34.91 PRIMARY ADENOCARCINOMA OF RIGHT LUNG (HCC): ICD-10-CM

## 2025-04-03 DIAGNOSIS — C34.91 PRIMARY LUNG CANCER WITH METASTASIS FROM LUNG TO OTHER SITE, RIGHT (HCC): ICD-10-CM

## 2025-04-03 NOTE — PROGRESS NOTES
Palliative Care Follow Up Note     Patient Name: Hanna Barrett   YOB: 1970   Medical Record Number: [id number]   CSN: [id number]   Date of visit:  4/3/2025     Chief Complaint/Reason for Visit:  Follow up visit for pain     History of Present Illness:         Hanna Barrett is a 55 year old female with metastatic lung cancer receiving chemo/immunotherapy.   She is feeling so much better today.  She is walking and able to sleep.  The pain is most problematic in her legs and feet.  Its deep achy throbbing pain with allodynia.  This is now tolerable as long as she uses the morphine regularly to minimize pain spikes.  She thinks she is up to a quiet weekend with her son now.  She is in better spirits.  She is currently using morphine 5-6X daily with great benefit.  She denies any SE.  She is hoping pain will begin to improve and she can reduce morphine use.   She is anxious about her next cycle since she felt so bad this week after chemo.  She is happy for the pain control and feels she can be more independent and functional now.           Problem List:  Patient Active Problem List   Diagnosis    Essential hypertension    Family history of breast cancer    Family history of pancreatic cancer    Family history of ovarian cancer    Stress incontinence    Hyperlipidemia    Vitamin D deficiency    Primary osteoarthritis of first carpometacarpal joint of right hand    Cervical radiculopathy    Chronic narcotic use    Cervical spondylosis with radiculopathy    DDD (degenerative disc disease), lumbar    Polyneuropathy, unspecified    Other intervertebral disc degeneration, lumbar region    Cervical myofascial pain syndrome    Neck and shoulder pain    Opioid use, unspecified with withdrawal (HCC)    Chronic bilateral low back pain without sciatica    History of tobacco use    Myalgia, other site    Fibromyalgia    Chronic bilateral thoracic back pain    Constipation    Canker sore    Other spondylosis with  radiculopathy, cervical region    Abnormal mammogram    Primary adenocarcinoma of right lung (HCC)    Mediastinal adenopathy    Primary lung cancer with metastasis from lung to other site, right (HCC)    Personal history of nicotine dependence    At risk for dehydration    Dyspnea    Dyspnea, unspecified type    History of COVID-19    Hemoptysis    Disorder of thyroid, unspecified    Encounter for other orthopedic aftercare    History of falling    Generalized anxiety disorder    Major depressive disorder, single episode, unspecified    Other specified urinary incontinence    Unspecified visual loss    Memory changes    Failed back surgical syndrome    Exertional chest pain    Decreased exercise tolerance    Lung fibrosis (HCC)    Palliative care by specialist      Medical History:  Past Medical History:    Abnormal uterine bleeding    bleeds every 2 weeks    Anxiety state    Asthma (HCC)    Attention deficit hyperactivity disorder (ADHD)    BACK PAIN    Back problem    lumbar radiculopathy    Cancer (HCC)    adenocarcinoma of right lung    MARCIO II (cervical intraepithelial neoplasia II)    DDD (degenerative disc disease), lumbar    DDD (degenerative disc disease), thoracic    Depression    Disorder of thyroid    Dyspareunia    Exposure to medical diagnostic radiation    On hold since 4/2022    Fall    Fibromyalgia    Fibromyalgia    Genital warts    High blood pressure    History of COVID-19    COVID + 7/2020 Headache - NO Hospitalization needed     History of lumbar fusion    Hx of motion sickness    IBS (irritable bowel syndrome)    Per patient - Just constipation    Infertility, female    Lumbar radiculopathy    Mild dysplasia of cervix    Pap smear for cervical cancer screening    pt states normal    Papanicolaou smear of cervix with high grade squamous intraepithelial lesion (HGSIL)    Personal history of antineoplastic chemotherapy    Last chemo 7/12/22 prior to lung bx 8/4/22    Post covid-19 condition,  unspecified    symptoms: HA; s/s resolved-yes; not hospitalized    Problems with swallowing    with radiation    Sexually transmitted disease    HPV    Substance abuse (HCC)    cocaine    Urinary incontinence    Visual impairment    glasses/contacts bifocals     Surgical History:  Past Surgical History:   Procedure Laterality Date    Appendectomy      Back surgery  2009    fusion L5-S1    Back surgery  2021    RIGHT LUMBAR 3/ LUMBAR 4, LUMBAR 4/ LUMBAR 5 HEMILAMINTOMIES, LEFT LUMBAR 2 / LUMBAR 3 MICRODISCECTOMY    Colonoscopy N/A 2023    Procedure: COLONOSCOPY;  Surgeon: Devante Kern MD;  Location:  ENDOSCOPY    Colposcopy,bx cervix/endocerv curr  11    MARCIO 1    Laparoscopy procedure unlisted      Ovarian cyst removed    Leep  2011    MARCIO 2          X2    Other surgical history      bunions bilateral    Other surgical history      nodule lymph nodes neck    Other surgical history       Bladder sling    Other surgical history  2020    Cystoscopy, Dr Beach       Allergies:  Allergies[1]    Palliative Care Social History:    Marital Status:  single  Children: son  Living Situation: independent .  Works full-time with Medicare insurance      Medications:  Current Outpatient Medications   Medication Sig Dispense Refill    morphINE 15 MG Oral Tab Take 0.5-1 tablets (7.5-15 mg total) by mouth every 4 (four) hours as needed for Pain. 45 tablet 0    pregabalin 50 MG Oral Cap Take 1 capsule (50 mg total) by mouth 3 (three) times daily. 30 capsule 1    predniSONE 10 MG Oral Tab Take 1 tablet (10 mg total) by mouth 2 (two) times daily. Start day after chemo 10 tablet 1    levothyroxine 25 MCG Oral Tab Take 1 tablet (25 mcg total) by mouth before breakfast. 90 tablet 1    lisinopril-hydroCHLOROthiazide 20-12.5 MG Oral Tab Take 0.5 tablets by mouth daily. 45 tablet 3    prochlorperazine (COMPAZINE) 10 mg tablet Take 1 tablet (10 mg total) by mouth every 6 (six) hours as  needed for Nausea. 30 tablet 3    ondansetron (ZOFRAN) 8 MG tablet Take 1 tablet (8 mg total) by mouth every 8 (eight) hours as needed for Nausea. 30 tablet 3    HYDROcodone-acetaminophen  MG Oral Tab Take 1 tablet by mouth every 8 (eight) hours as needed for Pain. 60 tablet 0    dilTIAZem HCl 120 MG Oral Tab Take 1 tablet (120 mg total) by mouth daily.      montelukast 10 MG Oral Tab Take 1 tablet (10 mg total) by mouth nightly. (Patient not taking: Reported on 3/28/2025) 90 tablet 3    albuterol 108 (90 Base) MCG/ACT Inhalation Aero Soln Inhale 2 puffs into the lungs every 6 (six) hours as needed for Wheezing or Shortness of Breath. 3 each 3    amphetamine-dextroamphetamine 15 MG Oral Tab TAKE ONE TABLET BY MOUTH ONCE DAILY 8 TO 9 HOURS AFTER VYVANSE DOSE      ARIPiprazole 2 MG Oral Tab Take one (1) tablet by mouth every morning      VYVANSE 70 MG Oral Cap Take 1 capsule (70 mg total) by mouth every morning.      SYMBICORT 160-4.5 MCG/ACT Inhalation Aerosol Inhale 2 puffs into the lungs 2 (two) times daily.      cetirizine 10 MG Oral Tab Take 1 tablet (10 mg total) by mouth daily. 90 tablet 1    ferrous sulfate 325 (65 FE) MG Oral Tab EC Take 1 tablet (325 mg total) by mouth daily with breakfast.      Multiple Vitamin Oral Tab Take 1 tablet by mouth daily.      ibuprofen 200 MG Oral Tab Take 4 tablets (800 mg total) by mouth every 8 (eight) hours as needed for Pain.      cholecalciferol 25 MCG (1000 UT) Oral Cap Take 1 capsule (1,000 Units total) by mouth daily.  0    DULoxetine HCl 60 MG Oral Cap DR Particles Take 1 capsule (60 mg total) by mouth daily.  2       Review of Systems:  General:  Fatigue.  Feels well.    Respiratory:  Denies SOB, denies cough  Cardiac:  Denies chest pain, heart palpitations  Abdomen:  Denies constipation, diarrhea.  Denies pain.    Psych:  No complaints.  Sleeping well    Palliative Performance Scale:   80%    Physical Examination:  General: Patient is alert and oriented, not in  acute distress.  Respiratory:   Normal excursions and effort  Cardiac: Regular rate   Abdomen: Soft, non tender with good bowel sounds.  Musculoskeletal: Normal gait. Walks without assistive device.  Psych:  Mood/Affect appropriate    Advanced Directives Discussed and Completed:     HCPOA/Health Surrogate:    There is no completed HCPOA documentation on file in Epic.    Patient/family was asked to bring in a copy of HCPOA document to be scanned in to Epic    I confirmed that patient's HCPOA is:  Her son, Dick BRISCOE Discussed and Completed:   focused on symptoms today.      Palliative Care:   she is tolerating morphine better than norco.  Pain is controlled so will continue current plan.  Patient knows to decrease use as pain improves    Impression/Plan:    Neoplasm related pain  Ibuprofen 400mg TID prn  Morphine 15mg Q 4 prn  Topical lidocaine    Palliative care  Ongoing support    R Lung cancer      Planned Follow up: Return in about 3 weeks (around 4/24/2025).      I spent a total of 25 minutes with the patient today, which included all of the following:direct face to face contact, history taking, physical examination, and >50% was spent counseling and coordinating care         Electronically Signed by:  GABY SANFORD   [facility] Outpatient Palliative Nurse Practitioner            [1]   Allergies  Allergen Reactions    Gabapentin SWELLING and UNKNOWN    Erythromycin UNKNOWN    Clindamycin RASH

## 2025-04-03 NOTE — PROGRESS NOTES
[facility]cer Center ANP Visit Note    Patient Name: Hanna Barrett   YOB: 1970   Medical Record Number: [id number]   Date of visit: 4/3/2025     Chief Complaint/Reason for Visit:  Chief Complaint   Patient presents with    Follow - Up      Oncologic history:     5/2021: patient presented with abnormal preoperative CXR; left apical 8 mm nodule with irregular margins, and a right middle lobe soft tissue density with associated bronchiectasis medially and mild adjacent nonspecific ground glass density, overall stable since 2/2021.      11/2021: CT scan of the chest demonstrated a spiculated airspace density within the right middle lobe worrisome for primary lung malignancy along with mediastinal lymph nodes.  12/7/2021: PET/CT scan showed a hypermetabolic RML and RUL mass with enlargement of mediastinal lymph nodes.     12/13/2021: bronchoscopy and transbronchial needle aspiration to subcarinal lymph node was positive for metastatic adenocarcinoma. PD-L1 <1%, EGFR, ROS1, ALK, KRAS, RET, BRAF all negative.      1/17/2022: concurrent chemoradiation with cisplatin + pemetrexed at [facility] (Dr. Subramanian) completed in 4/2022 5/13/2022: started consolidative durvalumab q4 weeks     7/2022: PET concerning for progression of disease      8/12/2022: transferred care to Dr. Sanchez at [place]     8/16/2022: bronchoscopy and biopsies were negative for malignancy.      9/14/2022: CT scan showed some improvement      11/14/2022: PET scan continues to show improvement with interval decrease in the right lung opacities with decreased uptake, may represent scarring and posttreatment change. Increased uptake distal esophagus.      1/17/2023: EGD with normal esophagus and negative biopsies.     2/17/2023: CT chest stable.        3/31/2023: completed 12 cycles of durvalumab.                   - care transferred to Dr. Baker when Dr. Sanchez retired     4/7/2023: bronchoscopy for hemoptysis was unremarkable.   5/17/2023: CT  chest stable.                    - care transferred to Dr Gordon 11/2023 from Dr. Baker who now practices primarily in [place]     11/2024:  Imaging done just prior showed stable right perihilar consolidation which compared to her previous PET showed no uptake in these areas and therefore suggestive of no significant residual or recurrent neoplasm. No new findings described in C/A/P with devrease in size of a liver lesion favored to be a hemangioma. C/o chronic fatigue and some SOB and cough. Had reported some visual changes and was seen at [facility]. Reports no evidence of malignancy seen. As felt relatively well with recent good scans she opted to have her port removed. This was removed by IR on 11/26/24.     Early 1/2025 she saw her PCP c/o nasal and sinus congestion with pressure, cough, PND, fullness in the ears. No adenopathy was noted on exam. She was placed on Augmentin for sinusitis.      She then reported feeling a growth in her nose causing a deformity with nasal obstructive symptoms. Saw ENT 1/29/25 (Laila). Sinus CT scans were ordered and was referred to plastics. Scans showed no nasal mass or abnormality of the soft tissues but did show postoperative changes from previous sinus surgery. Did see plastics and was felt to have a dermoid cyst.      The following month c/o left supraclavicular node which was increasing in size and tender. Saw PCP who ordered imaging. Thought to have Virchow node. US of the neck and CT abdomen/pelvis were ordered. US showed nonspecific appearing supraclav node. CT of the abdomen and pelvis showed a moderate to large right pleural effusion that was not present 9/2024 CT. Dedicated CT chest was ordered. Testing results were reviewed. US guided biopsy of left supraclav node was ordered. CT chest confirmed new right pleural effusion and was referred to pulmonary for tap.      Biopsy of the left supraclav node on 2/27/25 showed metastatic adenocarcinoma c/w lung  primary. US guided right thoracentesis drained 1500 mL of fluid. Cytology from the right pleural effusion showed metastatic carcinoma c/w lung adenocarcinoma.     On 3/28/25, initiated palliative carboplatin + paclitaxel + ipilimumab + nivolumab.        History of Present Illness:     Hanna Barrett is a 55 year old patient of Dr. Gordon with the above oncologic history who presents today for toxicity check with APN, 1 week after starting her new chemoIO regimen.     Starting the night after treatment, she developed severe myalgias + nerve pain, affecting mainly her lower extremities that was unrelieved by hydrocodone. She was in contact with the office and ultimately started on prednisone 1- mg BID + pregabalin 50 mg TID + morphine 7.5-15 mg every 4 hours PRN.     Today, she reports that her pain started to subside yesterday and she is now back to baseline. Still has some discomfort in her L supraclav area due to adenopathy. She has had nausea since receiving chemo, but feels it is controlled as long as she takes her antiemetics regularly. Chronic constipation is somewhat worse, requiring an enema + ducolax earlier this week. Energy is low, but improving.     Denies any worsening cough/dyspnea, fevers/chills, or rash. She has periodic itching which is unchanged.     Reviewed available chart notes, labs, and imaging.    History:     Past medical, surgical, social and family history reviewed with the patient and updated as appropriate in the chart.    Allergies:  Allergies[1]    Medications:    Current Outpatient Medications:     morphINE 15 MG Oral Tab, Take 0.5-1 tablets (7.5-15 mg total) by mouth every 4 (four) hours as needed for Pain., Disp: 45 tablet, Rfl: 0    pregabalin 50 MG Oral Cap, Take 1 capsule (50 mg total) by mouth 3 (three) times daily., Disp: 30 capsule, Rfl: 1    predniSONE 10 MG Oral Tab, Take 1 tablet (10 mg total) by mouth 2 (two) times daily. Start day after chemo, Disp: 10 tablet, Rfl: 1     levothyroxine 25 MCG Oral Tab, Take 1 tablet (25 mcg total) by mouth before breakfast., Disp: 90 tablet, Rfl: 1    lisinopril-hydroCHLOROthiazide 20-12.5 MG Oral Tab, Take 0.5 tablets by mouth daily., Disp: 45 tablet, Rfl: 3    prochlorperazine (COMPAZINE) 10 mg tablet, Take 1 tablet (10 mg total) by mouth every 6 (six) hours as needed for Nausea., Disp: 30 tablet, Rfl: 3    ondansetron (ZOFRAN) 8 MG tablet, Take 1 tablet (8 mg total) by mouth every 8 (eight) hours as needed for Nausea., Disp: 30 tablet, Rfl: 3    HYDROcodone-acetaminophen  MG Oral Tab, Take 1 tablet by mouth every 8 (eight) hours as needed for Pain., Disp: 60 tablet, Rfl: 0    dilTIAZem HCl 120 MG Oral Tab, Take 1 tablet (120 mg total) by mouth daily., Disp: , Rfl:     montelukast 10 MG Oral Tab, Take 1 tablet (10 mg total) by mouth nightly. (Patient not taking: Reported on 3/28/2025), Disp: 90 tablet, Rfl: 3    albuterol 108 (90 Base) MCG/ACT Inhalation Aero Soln, Inhale 2 puffs into the lungs every 6 (six) hours as needed for Wheezing or Shortness of Breath., Disp: 3 each, Rfl: 3    amphetamine-dextroamphetamine 15 MG Oral Tab, TAKE ONE TABLET BY MOUTH ONCE DAILY 8 TO 9 HOURS AFTER VYVANSE DOSE, Disp: , Rfl:     ARIPiprazole 2 MG Oral Tab, Take one (1) tablet by mouth every morning, Disp: , Rfl:     VYVANSE 70 MG Oral Cap, Take 1 capsule (70 mg total) by mouth every morning., Disp: , Rfl:     SYMBICORT 160-4.5 MCG/ACT Inhalation Aerosol, Inhale 2 puffs into the lungs 2 (two) times daily., Disp: , Rfl:     cetirizine 10 MG Oral Tab, Take 1 tablet (10 mg total) by mouth daily., Disp: 90 tablet, Rfl: 1    ferrous sulfate 325 (65 FE) MG Oral Tab EC, Take 1 tablet (325 mg total) by mouth daily with breakfast., Disp: , Rfl:     Multiple Vitamin Oral Tab, Take 1 tablet by mouth daily., Disp: , Rfl:     ibuprofen 200 MG Oral Tab, Take 4 tablets (800 mg total) by mouth every 8 (eight) hours as needed for Pain., Disp: , Rfl:     cholecalciferol 25  MCG (1000 UT) Oral Cap, Take 1 capsule (1,000 Units total) by mouth daily., Disp: , Rfl: 0    DULoxetine HCl 60 MG Oral Cap DR Particles, Take 1 capsule (60 mg total) by mouth daily., Disp: , Rfl: 2    Review of Systems:  A comprehensive 14 point review of systems was completed.  Pertinent positives and negatives noted in the HPI.    Performance Status: ECOG 1    Vital Signs:  /81 (BP Location: Left arm, Patient Position: Sitting, Cuff Size: adult)   Pulse (!) 121   Temp 97.9 °F (36.6 °C) (Temporal)   Resp 18   Ht 1.619 m (5' 3.74\")   Wt 78 kg (172 lb)   LMP 02/14/2019 (Exact Date)   SpO2 97%   BMI 29.77 kg/m²     Physical Examination:  General: Well developed, casually dressed, appropriately groomed, alert and oriented x 3, not in acute distress.  HEENT: Anicteric, conjunctivae and sclerae clear, no oropharyngeal lesion/thrush, mucous membranes are moist.   Chest: Clear to auscultation, respirations unlabored.  Heart: Regular rate and rhythm, normal S1/2. No murmur.   Extremities: No edema.  Neurological: Grossly intact.   Skin: Warm, Dry, No erythema, No rash  Psych/Depression: mood and affect are appropriate.     Impression/Plan    Metastatic non-small cell lung cancer: new metastatic adenopathy + malignant pleural effusion. Started on palliative carbo + paclitaxel + ipi + nivo last week while awaiting NGS.     Taxane acute pain syndrome: improved on current regimen, using pregabalin + prednisone + morphine. Planning prednisone premedication prior to next dose of taxol.     Chemotherapy induced nausea: will add emend to pretreatment antiemetic regimen, to hopefully minimize need for ondansetron, which could be contributing to worsening of chronic constipation.     Constipation: discussed ways to optimize bowel regimen, currently using miralax only once per day, does not like taking any stimulant medication regularly. Increase miralax to BID dosing.     Planned Follow Up: 3 weeks for MD + labs +  chemo    Risk Level: HIGH - metastatic lung cancer receiving chemotherapy and immunotherapy with systemic effects requiring intervention and close monitoring     The 21st Century Cures Act makes medical notes like these available to patients in the interest of transparency. Please be advised this is a medical document. Medical documents are intended to carry relevant information, facts as evident, and the clinical opinion of the practitioner. The medical note is intended as peer to peer communication and may appear blunt or direct. It is written in medical language and may contain abbreviations or verbiage that are unfamiliar.     Electronically Signed by:    Jaz Berg DNP, NP-C  Nurse Practitioner  [facility]       [1]   Allergies  Allergen Reactions    Gabapentin SWELLING and UNKNOWN    Erythromycin UNKNOWN    Clindamycin RASH

## 2025-04-03 NOTE — PROGRESS NOTES
Chief Complaint   Patient presents with    Follow - Up     Pt is here for follow up - Day 8 carbo/taxol/ipi/nivo. Low appetite but is eating and drinking. Nausea despite anti-nausea meds; had vomiting x1; no diarrhea; constipation managed with miralax, laxative and enema. Pain has been better since Palliative Care APN made changes to medications; no energy, sleeps a lot. Has had itching that is not new to this treatment but no rash.    Education Record    Learner:  Patient    Disease / Diagnosis: lung cancer    Barriers / Limitations:  None   Comments:    Method:  Brief focused   Comments:    General Topics:  Diet, Medication, Pain, Side effects and symptom management, and Plan of care reviewed   Comments:    Outcome:  Shows understanding   Comments:

## 2025-04-04 ENCOUNTER — APPOINTMENT (OUTPATIENT)
Age: 55
End: 2025-04-04
Attending: INTERNAL MEDICINE
Payer: MEDICAID

## 2025-04-08 NOTE — PAT NURSING NOTE
Per PAT encounter/MyChart message sent to pt:    PreOp Instructions for Port Insertion     You are scheduled for: an Interventional Radiology Procedure     Date of Procedure: 04/11/25 Friday; Check in at 9 am.     Diet Instructions: Do not eat or drink anything after midnight including gum, mints, candy, etc.     Medications: Medications you are allowed to take can be taken with a sip of water the morning of your procedure     Medications to Stop: Hold herbal supplements and vitamins; continue to hold.     Other Medications: Do NOT take your Adderall on Friday morning 4/11.     Skin Prep : Shower with antibacterial soap using a clean washcloth, prior to procedure. Once dried off, no lotions/powders/creams/ointments, etc.    Driving After Procedure: Sedation will be given so you WILL NOT be able to drive home. You will need a responsible adult  to drive you home. You can NOT take uber or taxi unless approved by your physician in advance.     Discharge Teaching: Your nurse will give you specific instructions before discharge, Most people can resume normal activities in 2-3 days, Any questions, please call the physician's office      parking is available starting at 6 am or park in the [place] parking garage at [facility]. Check in at the [facility] [place] reception desk. Our  will be there to check you in for your procedure. Please bring your insurance cards and ID with you.                                                                                                                                      Please DO NOT respond to this message, the inbasket is not monitored for messages. For any questions, please call the physician's office.

## 2025-04-11 ENCOUNTER — HOSPITAL ENCOUNTER (OUTPATIENT)
Dept: INTERVENTIONAL RADIOLOGY/VASCULAR | Facility: HOSPITAL | Age: 55
Discharge: HOME | End: 2025-04-11
Attending: INTERNAL MEDICINE | Admitting: INTERNAL MEDICINE
Payer: MEDICAID

## 2025-04-11 VITALS
DIASTOLIC BLOOD PRESSURE: 83 MMHG | HEART RATE: 112 BPM | TEMPERATURE: 97 F | RESPIRATION RATE: 22 BRPM | WEIGHT: 172 LBS | HEIGHT: 64 IN | BODY MASS INDEX: 29.37 KG/M2 | SYSTOLIC BLOOD PRESSURE: 100 MMHG | OXYGEN SATURATION: 95 %

## 2025-04-11 DIAGNOSIS — D64.9 ANEMIA, NORMOCYTIC NORMOCHROMIC: ICD-10-CM

## 2025-04-11 DIAGNOSIS — J91.0 MALIGNANT PLEURAL EFFUSION (HCC): ICD-10-CM

## 2025-04-11 DIAGNOSIS — C34.91 PRIMARY ADENOCARCINOMA OF RIGHT LUNG (HCC): ICD-10-CM

## 2025-04-11 DIAGNOSIS — G89.3 NEOPLASM RELATED PAIN: ICD-10-CM

## 2025-04-11 LAB — INR: 0.9 (ref 0.8–1.3)

## 2025-04-11 PROCEDURE — 36561 INSERT TUNNELED CV CATH: CPT | Performed by: STUDENT IN AN ORGANIZED HEALTH CARE EDUCATION/TRAINING PROGRAM

## 2025-04-11 PROCEDURE — 85610 PROTHROMBIN TIME: CPT | Performed by: STUDENT IN AN ORGANIZED HEALTH CARE EDUCATION/TRAINING PROGRAM

## 2025-04-11 PROCEDURE — 76937 US GUIDE VASCULAR ACCESS: CPT | Performed by: STUDENT IN AN ORGANIZED HEALTH CARE EDUCATION/TRAINING PROGRAM

## 2025-04-11 PROCEDURE — 77001 FLUOROGUIDE FOR VEIN DEVICE: CPT | Performed by: STUDENT IN AN ORGANIZED HEALTH CARE EDUCATION/TRAINING PROGRAM

## 2025-04-11 PROCEDURE — 99152 MOD SED SAME PHYS/QHP 5/>YRS: CPT | Performed by: STUDENT IN AN ORGANIZED HEALTH CARE EDUCATION/TRAINING PROGRAM

## 2025-04-11 RX ORDER — DIPHENHYDRAMINE HYDROCHLORIDE 50 MG/ML
INJECTION, SOLUTION INTRAMUSCULAR; INTRAVENOUS
Status: COMPLETED
Start: 2025-04-11 | End: 2025-04-11

## 2025-04-11 RX ORDER — LIDOCAINE HYDROCHLORIDE 10 MG/ML
INJECTION, SOLUTION INFILTRATION; PERINEURAL
Status: COMPLETED
Start: 2025-04-11 | End: 2025-04-11

## 2025-04-11 RX ORDER — CEFAZOLIN SODIUM 1 G/3ML
INJECTION, POWDER, FOR SOLUTION INTRAMUSCULAR; INTRAVENOUS
Status: COMPLETED
Start: 2025-04-11 | End: 2025-04-11

## 2025-04-11 RX ORDER — LIDOCAINE HYDROCHLORIDE AND EPINEPHRINE BITARTRATE 20; .01 MG/ML; MG/ML
INJECTION, SOLUTION SUBCUTANEOUS
Status: COMPLETED
Start: 2025-04-11 | End: 2025-04-11

## 2025-04-11 RX ORDER — HEPARIN SODIUM 5000 [USP'U]/ML
INJECTION, SOLUTION INTRAVENOUS; SUBCUTANEOUS
Status: COMPLETED
Start: 2025-04-11 | End: 2025-04-11

## 2025-04-11 RX ORDER — MIDAZOLAM HYDROCHLORIDE 1 MG/ML
INJECTION INTRAMUSCULAR; INTRAVENOUS
Status: COMPLETED
Start: 2025-04-11 | End: 2025-04-11

## 2025-04-11 RX ORDER — SODIUM CHLORIDE 9 MG/ML
INJECTION, SOLUTION INTRAVENOUS CONTINUOUS
Status: DISCONTINUED | OUTPATIENT
Start: 2025-04-11 | End: 2025-04-11

## 2025-04-11 NOTE — PROGRESS NOTES
Pt s/p left upper chest PAC placement. Aquacel dressing in place, dressing is c/d/I. VSS. Pt awake and tolerating PO. After recovery completed, discharge instructions reviewed with patient, copy given and all questions answered. IV removed. Pt discharged via wheelchair to [facility], son to drive patient home.

## 2025-04-14 ENCOUNTER — TELEPHONE (OUTPATIENT)
Age: 55
End: 2025-04-14

## 2025-04-14 NOTE — TELEPHONE ENCOUNTER
3/28/25 - C1D1 - Lung Nivolumab/Ipilmumab/Paclitaxel/Carbo    Patient complains of hair loss and mouth sores over the weekend.    Denies fever or chills, no sob or chest pain. Denies nausea  Vomiting - Grade 1 - had some vomiting but using antiemetics now and is managing it. Denies diarrhea, but has some constipation,  Constipation - grade 2- will have small amounts of stool when voiding, has used Miralax and concerned about having diarrhea from it  discussed use of alternative stool softeners. Prune juice-(she does not like it)  She also is complaining of mouth sores and sore throat, tongue pink, with sore to bottom of tongue, also sore on inner lip. Does not see anything in throat but is slightly sore.  All is causing discomfort and burning when trying to eat. Decreasing oral intake.  Occasional light headedness if she Moves too fast.  No urinary complaints.    She in requesting a call back with instruction - for mouth wash/rinse, and other possible interventions for constipation.  Please advise.

## 2025-04-14 NOTE — TELEPHONE ENCOUNTER
Spoke with patient regarding her pain, constipation and mouth sores.  She has 2 canker sores - one on her lip and tongue.  They are painful.  She is still able to eat and drink.  Her throat is a little sore when swallowing.      Will send magic mouthwash to [place] for her.  She verbalized understanding.    She is complaining of constipation.  She doesn't feel miralax is effective enough for full evacuation.  Senna causes cramping.  She is using enema prn.  She has used metamucil in the past with good benefit.    Instructed her to try the metamucil and colace as long as she is drinking well.  Reassurance provided that there are options to help regulate her bowels.    She will hold off on enema use    She now has alopecia and is coping with this.  She found it upsetting and is doing better now.    Support provided    Her neck and shoulder pain still persists but is relieved with 1-3 doses of morphine daily.  She used 2 doses of norco.  Reinforced to only use morphine and we can adjust as needed.  The goal is pain will improve with treatment.      She is feeling well enough today to work.  She is eating and drinking and overall feels ok.

## 2025-04-16 NOTE — H&P
Cancer Center Progress Note     Patient Name: Hanna Barrett             YOB: 1970     Medical Record Number: [id number]      CSN: [id number]              Attending Physician: Shoshana Gordon M.D.   Referring Physician: MD Dr. Flynn Geronimo     Date of Visit: 3/28/25          Chief Complaint:      Chief Complaint   Patient presents with    Follow - Up    Chemotherapy    Immunotherapy                      Hem/Onc History:  Stage IIIC NSCLC adenocarcinoma of the right lung diagnosed 12/2021     Oncologic History:  5/2021: patient presented with abnormal preoperative CXR; left apical 8 mm nodule with irregular margins, and a right middle lobe soft tissue density with associated bronchiectasis medially and mild adjacent nonspecific ground glass density, overall stable since 2/2021.      11/2021: CT scan of the chest demonstrated a spiculated airspace density within the right middle lobe worrisome for primary lung malignancy along with mediastinal lymph nodes.  12/7/2021: PET/CT scan showed a hypermetabolic RML and RUL mass with enlargement of mediastinal lymph nodes.     12/13/2021: bronchoscopy and transbronchial needle aspiration to subcarinal lymph node was positive for metastatic adenocarcinoma. PD-L1 <1%, EGFR, ROS1, ALK, KRAS, RET, BRAF all negative.      1/17/2022: concurrent chemoradiation with cisplatin + pemetrexed at [facility] (Dr. Subramanian) completed in 4/2022 5/13/2022: started consolidative durvalumab q4 weeks     7/2022: PET concerning for progression of disease      8/12/2022: transferred care to Dr. Sanchez at [place]     8/16/2022: bronchoscopy and biopsies were negative for malignancy.      9/14/2022: CT scan showed some improvement      11/14/2022: PET scan continues to show improvement with interval decrease in the right lung opacities with decreased uptake, may represent scarring and posttreatment change. Increased uptake distal esophagus.      1/17/2023: EGD with normal  esophagus and negative biopsies.     2/17/2023: CT chest stable.        3/31/2023: completed 12 cycles of durvalumab.                   - care transferred to Dr. Baker when Dr. Sanchez retired     4/7/2023: bronchoscopy for hemoptysis was unremarkable.   5/17/2023: CT chest stable.                    - care transferred to me 11/2023 from Dr. Baker  who now practices primarily in [place]     11/2024:  Imaging done just prior showed stable right perihilar consolidation which compared to her previous PET showed no uptake in these areas and therefore suggestive of no significant residual or recurrent neoplasm. No new findings described in C/A/P with devrease in size of a liver lesion favored to be a hemangioma. C/o chronic fatigue and some SOB and cough. Had reported some visual changes and was seen at [facility]. Reports no evidence of malignancy seen. As felt relatively well with recent good scans she opted to have her port removed. This was removed by IR on 11/26/24.     Early 1/2025 she saw her PCP c/o nasal and sinus congestion with pressure, cough, PND, fullness in the ears. No adenopathy was noted on exam. She was placed on Augmentin for sinusitis.      She then reported feeling a growth in her nose causing a deformity with nasal obstructive symptoms. Saw ENT 1/29/25 (Laila). Sinus CT scans were ordered and was referred to plastics. Scans showed no nasal mass or abnormality of the soft tissues but did show postoperative changes from previous sinus surgery. Did see plastics and was felt to have a dermoid cyst.      The following month c/o left supraclavicular node which was increasing in size and tender. Saw PCP who ordered imaging. Thought to have Virchow node. US of the neck and CT abdomen/pelvis were ordered. US showed nonspecific appearing supraclav node. CT of the abdomen and pelvis showed a moderate to large right pleural effusion that was not present 9/2024 CT. Dedicated CT chest was ordered.  Testing results were reviewed. US guided biopsy of left supraclav node was ordered. CT chest confirmed new right pleural effusion and was referred to pulmonary for tap.      Biopsy of the left supraclav node on 2/27/25 showed metastatic adenocarcinoma c/w lung primary. US guided right thoracentesis drained 1500 mL of fluid. Cytology from the right pleural effusion showed metastatic carcinoma c/w lung adenocarcinoma.         History of Present Illness:  Hanna is here to start systemic therapy. Guardant did not show any targetable mutations: CDK6, PIK3CA, TP53). PDL-1 came back at <1% NGS could not be run with initial sample sent by path as was insufficient and another specimen has been sent. She wanted to get going with treatment.      She reported increasing MARTINS, cough and left neck nodes which have been painful she said.  She also has pain in her left shoulder, left collarbone and lower abdomen. She takes Norco for pain. She has noticed some blood tinged sputum when she coughs. Denies chest pain, change in bowel or urinary habits, headaches, dizziness. Fibromyalgia feels worse she says. No fevers.            Current Medications:    Medications - Current      Current Outpatient Medications:     ondansetron (ZOFRAN) 8 MG tablet, Take 1 tablet (8 mg total) by mouth every 8 (eight) hours as needed for Nausea., Disp: 30 tablet, Rfl: 3    HYDROcodone-acetaminophen  MG Oral Tab, Take 1 tablet by mouth every 8 (eight) hours as needed for Pain., Disp: 60 tablet, Rfl: 0    dilTIAZem HCl 120 MG Oral Tab, Take 1 tablet (120 mg total) by mouth daily., Disp: , Rfl:     albuterol 108 (90 Base) MCG/ACT Inhalation Aero Soln, Inhale 2 puffs into the lungs every 6 (six) hours as needed for Wheezing or Shortness of Breath., Disp: 3 each, Rfl: 3    amphetamine-dextroamphetamine 15 MG Oral Tab, TAKE ONE TABLET BY MOUTH ONCE DAILY 8 TO 9 HOURS AFTER VYVANSE DOSE, Disp: , Rfl:     ARIPiprazole 2 MG Oral Tab, Take one (1) tablet by  mouth every morning, Disp: , Rfl:     VYVANSE 70 MG Oral Cap, Take 1 capsule (70 mg total) by mouth every morning., Disp: , Rfl:     SYMBICORT 160-4.5 MCG/ACT Inhalation Aerosol, Inhale 2 puffs into the lungs 2 (two) times daily., Disp: , Rfl:     Multiple Vitamin Oral Tab, Take 1 tablet by mouth daily., Disp: , Rfl:     ibuprofen 200 MG Oral Tab, Take 4 tablets (800 mg total) by mouth every 8 (eight) hours as needed for Pain., Disp: , Rfl:     cholecalciferol 25 MCG (1000 UT) Oral Cap, Take 1 capsule (1,000 Units total) by mouth daily., Disp: , Rfl: 0    DULoxetine HCl 60 MG Oral Cap DR Particles, Take 1 capsule (60 mg total) by mouth daily., Disp: , Rfl: 2    pregabalin 50 MG Oral Cap, Take 1 capsule (50 mg total) by mouth 3 (three) times daily., Disp: 30 capsule, Rfl: 1    predniSONE 10 MG Oral Tab, Take 1 tablet (10 mg total) by mouth 2 (two) times daily. Start day after chemo, Disp: 10 tablet, Rfl: 1    levothyroxine 25 MCG Oral Tab, Take 1 tablet (25 mcg total) by mouth before breakfast., Disp: 90 tablet, Rfl: 1    lisinopril-hydroCHLOROthiazide 20-12.5 MG Oral Tab, Take 0.5 tablets by mouth daily., Disp: 45 tablet, Rfl: 3    prochlorperazine (COMPAZINE) 10 mg tablet, Take 1 tablet (10 mg total) by mouth every 6 (six) hours as needed for Nausea., Disp: 30 tablet, Rfl: 3    montelukast 10 MG Oral Tab, Take 1 tablet (10 mg total) by mouth nightly. (Patient not taking: Reported on 3/28/2025), Disp: 90 tablet, Rfl: 3    cetirizine 10 MG Oral Tab, Take 1 tablet (10 mg total) by mouth daily., Disp: 90 tablet, Rfl: 1    ferrous sulfate 325 (65 FE) MG Oral Tab EC, Take 1 tablet (325 mg total) by mouth daily with breakfast., Disp: , Rfl:         Past Medical History:  Past Medical History       Past Medical History:    Abnormal uterine bleeding     bleeds every 2 weeks    Anxiety state    Asthma (HCC)    Attention deficit hyperactivity disorder (ADHD)    BACK PAIN    Back problem     lumbar radiculopathy    Cancer  (HCC)     adenocarcinoma of right lung    MARCIO II (cervical intraepithelial neoplasia II)    DDD (degenerative disc disease), lumbar    DDD (degenerative disc disease), thoracic    Depression    Disorder of thyroid    Dyspareunia    Exposure to medical diagnostic radiation     On hold since 2022    Fall    Fibromyalgia    Fibromyalgia    Genital warts    High blood pressure    History of COVID-19     COVID + 2020 Headache - NO Hospitalization needed     History of lumbar fusion    Hx of motion sickness    IBS (irritable bowel syndrome)     Per patient - Just constipation    Infertility, female    Lumbar radiculopathy    Mild dysplasia of cervix    Pap smear for cervical cancer screening     pt states normal    Papanicolaou smear of cervix with high grade squamous intraepithelial lesion (HGSIL)    Personal history of antineoplastic chemotherapy     Last chemo 22 prior to lung bx 22    Post covid-19 condition, unspecified     symptoms: HA; s/s resolved-yes; not hospitalized    Problems with swallowing     with radiation    Sexually transmitted disease     HPV    Substance abuse (HCC)     cocaine    Urinary incontinence    Visual impairment     glasses/contacts bifocals            Past Surgical History:  Past Surgical History         Past Surgical History:   Procedure Laterality Date    Appendectomy       Back surgery   2009     fusion L5-S1    Back surgery   2021     RIGHT LUMBAR 3/ LUMBAR 4, LUMBAR 4/ LUMBAR 5 HEMILAMINTOMIES, LEFT LUMBAR 2 / LUMBAR 3 MICRODISCECTOMY    Colonoscopy N/A 2023     Procedure: COLONOSCOPY;  Surgeon: Devante Kern MD;  Location:  ENDOSCOPY    Colposcopy,bx cervix/endocerv curr   11     MARCIO 1    Laparoscopy procedure unlisted        Ovarian cyst removed    Leep   2011     MARICO 2             X2    Other surgical history        bunions bilateral    Other surgical history        nodule lymph nodes neck    Other surgical history          Bladder sling    Other surgical history   2020     Cystoscopy, Dr Beach            Family Medical History:  Family History         Family History   Problem Relation Age of Onset    Other (lung CA) Self      Hypertension Mother      Diabetes Mother      Heart Disease Mother      Heart Disease Father      Other (lung cancer) Father 55         Lung Cancer    Other (pancreatic cancer) Sister 47         Pancreatic Cancer    Cancer Sister 51         lung CA    Other (Other) Sister           Back problems    Other (Other) Son           anxiety    Other (Other) Son           behavioral problems    Diabetes Maternal Grandmother      Breast Cancer Maternal Grandmother           dx late-60s    Stroke Maternal Grandfather 86    Dementia Paternal Grandmother      Heart Disorder Paternal Grandfather      Cancer Maternal Aunt 50         LUNG CA 51    Breast Cancer Maternal Aunt           dx 46-47y    Ovarian Cancer Maternal Cousin Female 33          of ovarian ca at 35y            Gyne History:          OB History    Para Term  AB Living   2 2 2 0 0 2   SAB IAB Ectopic Multiple Live Births    0 0 0 0 2          Psychosocial History:  Social History               Socioeconomic History    Marital status:        Spouse name: Not on file    Number of children: Not on file    Years of education: Not on file    Highest education level: Not on file   Occupational History    Not on file   Tobacco Use    Smoking status: Former       Current packs/day: 0.00       Average packs/day: 0.8 packs/day for 19.0 years (14.5 ttl pk-yrs)       Types: Cigarettes       Start date:        Quit date: 2010       Years since quitting: 15.2       Passive exposure: Past    Smokeless tobacco: Former       Quit date: 2024    Tobacco comments:       Has not vaped since 2024.    Vaping Use    Vaping status: Former    Substances: Nicotine, daily    Devices: Pre-filled pod   Substance and Sexual Activity    Alcohol  use: Not Currently       Comment: 3 drinks per week    Drug use: Not Currently       Types: Cocaine       Comment: USED COCAINE BETW [id number]    Sexual activity: Yes       Partners: Male   Other Topics Concern     Service Not Asked    Blood Transfusions Not Asked    Caffeine Concern Yes       Comment: 3-4 daily of pop    Occupational Exposure Not Asked    Hobby Hazards Not Asked    Sleep Concern Not Asked    Stress Concern Not Asked    Weight Concern Not Asked    Special Diet Not Asked    Back Care Not Asked    Exercise No       Comment: not tolerated right now    Bike Helmet Not Asked    Seat Belt Not Asked    Self-Exams Not Asked   Social History Narrative    Not on file      Social Drivers of Health           Food Insecurity: No Food Insecurity (7/12/2024)     Food Insecurity      Food Insecurity: Never true   Transportation Needs: No Transportation Needs (7/12/2024)     Transportation Needs      Lack of Transportation: No      Car Seat: Not on file   Housing Stability: Low Risk  (7/12/2024)     Housing Stability      Housing Instability: No      Housing Instability Emergency: Not on file      Crib or Bassinette: Not on file               Allergies:  Allergies        Allergies   Allergen Reactions    Gabapentin SWELLING and UNKNOWN    Erythromycin UNKNOWN    Clindamycin RASH            Review of Systems:  A 14-point ROS was done with pertinent positives and negative per the HPI     Vital Signs:    Day 1,  Cycle 1  03/28/25   Height 1.619 m (5' 3.74\") [1]   Weight 76 kg (167 lb 9.6 oz)   BSA (Calculated - sq m) 1.81 sq meters   BMI (Calculated) 29 kg/m2   /63   Pulse 92   BP Location Right arm   Patient Position Sitting   Temp 96 °F          Physical Examination:  General: Patient is alert and oriented x 3, not in acute distress.  Psych:  Mood and affect appropriate  HEENT: EOMs intact. PERRL. Oropharynx is clear.   Neck: No JVD. Left palpable lymphadenopathy supraclav - 2 discrete nodes felt,  slightly larger than when last seen but more swelling/fullness in area with tenderness. Neck is supple.  Chest: Diminished BS  Heart: Regular rate and rhythm.   Abdomen: Soft, non tender with good bowel sounds.  No hepatosplenomegaly.  No palpable mass.  Extremities: Pedal pulses are present. No BLE edema.  Neurological: Grossly intact.         Laboratory:        Lab Results   Component Value Date     WBC 6.3 03/28/2025     RBC 4.34 03/28/2025     HGB 13.2 03/28/2025     HCT 37.5 03/28/2025     .0 03/28/2025     MPV 9.4 09/02/2012     MCV 86.4 03/28/2025     MCH 30.4 03/28/2025     MCHC 35.2 03/28/2025     RDW 12.2 03/28/2025     NEPRELIM 4.42 03/28/2025     NEUTABS 11.73 (H) 04/03/2022     LYMPHABS 0.83 (L) 04/03/2022     EOSABS 0.55 04/03/2022     BASABS 0.00 04/03/2022     NEUT 73 04/03/2022     LYMPH 6 04/03/2022     MON 5 04/03/2022     EOS 4 04/03/2022     BASO 0 04/03/2022     NEPERCENT 70.0 03/28/2025     LYPERCENT 13.2 03/28/2025     MOPERCENT 11.6 03/28/2025     EOPERCENT 3.7 03/28/2025     BAPERCENT 1.0 03/28/2025     NE 4.42 03/28/2025     LYMABS 0.83 (L) 03/28/2025     MOABSO 0.73 03/28/2025     EOABSO 0.23 03/28/2025     BAABSO 0.06 03/28/2025            Lab Results   Component Value Date      (H) 03/28/2025     BUN 18 03/28/2025     BUNCREA 18.0 03/21/2022     CREATSERUM 0.97 03/28/2025     ANIONGAP 11 03/28/2025      01/28/2018     GFRNAA 61 04/03/2022     GFRAA 70 04/03/2022     CA 10.0 03/28/2025     OSMOCALC 297 (H) 03/28/2025     ALKPHO 71 03/28/2025     AST 23 03/28/2025     ALT 21 03/28/2025     BILT 0.4 03/28/2025     TP 7.1 03/28/2025     ALB 4.6 03/28/2025     GLOBULIN 2.5 03/28/2025      03/28/2025     K 3.5 03/28/2025      03/28/2025     CO2 25.0 03/28/2025            Lab Component   Component Value Date/Time      03/12/2025 1102            Lab Component   Component Value Date/Time     CEA 1.0 05/12/2017 1425           Latest Reference Range & Units  11/07/24 10:09   -246 U/L U/L 168   FERRITIN 50 - 306 ng/mL 59   Vitamin B12 211 - 911 pg/mL >2,000 (H)   FOLATE (FOLIC ACID), SERUM >5.4 ng/mL 29.8   (H): Data is abnormally high        Radiology:  PROCEDURE:  PET/CT STANDARD BODY SCAN (ONCOLOGY) (CPT=78815)     COMPARISON:  [facility] , MR, MRI BRAIN (W+WO) (CPT=70553), 3/07/2025, 3:45 PM.  [facility] , CT, CT CHEST+ABDOMEN+PELVIS(ALL CNTRST ONLY)(CPT=71260/34409), 9/27/2024, 1:11 PM.  [facility] , US, US HEAD/NECK (CPT=76536), 2/12/2025, 8:41 AM.  [facility] , NM, PET  STANDARD BODY SCAN (ONCOLOGY) (CPT=78815), 4/13/2022, 12:14 PM.  [facility], NM, PET STANDARD BODY SCAN (ONCOLOGY) (CPT=78815), 12/07/2021, 12:36 PM.  [facility] , NM, PET STANDARD BODY SCAN (ONCOLOGY) (CPT=78815), 12/27/2023, 9:02 AM.     INDICATIONS:  C34.91 Recurrent malignant neoplasm of right lung of unknown cell type (HCC) C34.91 Primary adenocarcinoma of right lung (HCC)     TECHNIQUE:  The patient fasted for at least 6 hours. F-18 FDG was injected IV, and whole-body images from vertex to mid-thigh were obtained with concurrent CT scan for both anatomic localization as well as attenuation correction.     RADIOPHARMACEUTICAL:    9.6 mCi F-18 FDG  FASTING GLUCOSE LEVEL:  80 mg/dl  INJECTION TIME:         0850 hours  SCAN TIME:              0954 hours     FINDINGS:       Left supraclavicular lymph nodes with elevated FDG uptake as high as 4.5 consistent with metastatic disease.  There is a low left supraclavicular lymph node medially with elevated FDG uptake of 5.9.  Low left jugular digastric lymph node with elevated  FDG uptake of 4.2.  Asymmetric radiotracer uptake within the right masseter muscles likely related to physiologic changes as there is no underlying mass lesion.  Physiologic uptake noted throughout the tongue.       Left infrahilar lymph node has elevated FDG uptake of 7.0.  Right hilar lymph node has elevated FDG uptake of 5.0  posterior mediastinal lymph node adjacent to the aorta with  elevated FDG uptake of 4.2.  Right pleural fluid has elevated FDG uptake  concerning for involvement per of the pleural fluid by malignancy with maximum SUV uptake of 9.4.  It should be noted that there is been recent thoracentesis in this may be the reason behind this uptake rather than neoplastic disease.     Abdomen demonstrates a lymph node within the posterior gastrohepatic ligament with SUV of 5.4.     There is retroperitoneal adenopathy bilaterally posterior to the aorta and cava with the largest lymph node on the right with maximum SUV of 14.4 and the maximum SUV of the left retroperitoneal lymph node being 7.1.  Within the pelvis left external iliac   lymph node has maximum SUV of 8.6 and left internal iliac lymph node has maximum SUV of 8.2.  There are multiple left external iliac lymph nodes with maximum SUV of 16.5.  There is a left obturator lymph node with maximum SUV of 5.8.       Minimal uptake within left small inguinal lymph nodes with SUV less than 2 likely reactive.                        Impression   CONCLUSION:       1. Left supraclavicular and jugular digastric lymph node with elevated FDG uptake consistent metastatic disease.     2. Bilateral hilar and posterior retroperitoneal lymph node elevated FDG uptake consistent with metastatic disease.     3. Pleural uptake within the right posterior pleural space could be related to recent thoracentesis although metastatic disease to the pleura is also considered.     4. Multiple abnormal lymph nodes including gastrohepatic ligament, bilateral retroperitoneal, left external iliac, and left  lymph nodes consistent with metastatic disease.        LOCATION:  [facility]           Dictated by (CST): Cj García MD on 3/11/2025 at 11:04 AM      Finalized by (CST): Cj García MD on 3/11/2025 at 12:25 PM        PROCEDURE:  MRI BRAIN (W+WO) (CPT=70553)     COMPARISON:  [facility], MRI BRAIN (W+WO) (CPT=70553), 7/24/2024, 1:02 PM.      INDICATIONS:  C34.91 Primary adenocarcinoma of right lung (HCC)     TECHNIQUE:  MRI of the brain was performed with multi-planar T1, T2-weighted images with FLAIR sequences and diffusion weighted images without and with infusion.     PATIENT STATED HISTORY:(As transcribed by Technologist)  History of cancer      CONTRAST USED:  15 mL of Dotarem     FINDINGS:    INTRACRANIAL:  There are few foci of T2/FLAIR hyperintensity in the periventricular subcortical white matter consistent small vessel ischemic disease..  Diffusion weighted imaging was performed and is unremarkable.  There is no evidence for acute  infarction.  There is no evidence of hemorrhage or mass lesion.  VENTRICLES/SULCI:   Ventricles and sulci are normal in caliber.  There are no extra-axial fluid collections.  There is no midline shift.  SINUSES/ORBITS:       The visualized paranasal sinuses are clear.  The orbits are unremarkable.  MASTOIDS:       The mastoids are clear.                  Impression  CONCLUSION:  No acute intracranial process.  No evidence of metastatic disease to the brain.        LOCATION:  [facility]           Dictated by (CST): Cj García MD on 3/07/2025 at 4:40 PM      Finalized by (CST): Cj García MD on 3/07/2025 at 4:46 PM       PROCEDURE:  CT CHEST(CONTRAST ONLY) (CPT=71260)     COMPARISON:  [facility] , CT, CT CHEST+ABDOMEN+PELVIS(ALL CNTRST ONLY)(CPT=71260/55187), 9/27/2024, 1:11 PM.     INDICATIONS:  C34.91 Primary adenocarcinoma of right lung (HCC)     TECHNIQUE:  CT images were obtained with non-ionic intravenous contrast material. Dose reduction techniques were used. Dose information is transmitted to the ACR (American College of Radiology) NRDR (National Radiology Data Registry) which includes the  Dose Index Registry.     PATIENT STATED HISTORY:(As transcribed by Technologist)  Patient has history of Lung cancer and now fluid in lung. Disease surveillance. Shortness of breath.      CONTRAST USED:  75cc of Isovue  370     FINDINGS:    LUNGS:  There is progressive atelectasis of the right lower lobe and right middle lobe related to developing large right effusion.  VASCULATURE:  No visible pulmonary arterial thrombus or attenuation.    MICHAEL:  No mass or adenopathy.    MEDIASTINUM:  No mass or adenopathy.    CARDIAC:  There is a small amount of pericardial fluid which is similar to the prior study.  PLEURA:  Large right pleural effusion has developed in the interval since the prior study.  This predominantly layers in the dependent part of the chest.  THORACIC AORTA:  No aneurysm.    CHEST WALL:  No mass or axillary adenopathy.    LIMITED ABDOMEN:  Hyperenhancing focus in right hepatic lobe series 3, image 139 has been previously described and is unchanged.  BONES:  There is diffuse degenerative change in the thoracic spine.  There are no aggressive bone lesions detected.                   Impression   CONCLUSION:    1. Interval development of large right pleural effusion with associated worsening atelectasis in right lung.  Post treatment changes in the right perihilar lung are otherwise unchanged.  2. Otherwise no specific evidence of metastatic disease.  3. Small pericardial effusion is similar to prior study.  4. Hyperenhancing focus in right hepatic lobe has been previously described and is unchanged.        LOCATION:  [facility]        Dictated by (CST): Damir Deleon MD on 2/28/2025 at 11:56 AM      Finalized by (CST): Damir Deleon MD on 2/28/2025 at 12:02 PM        PROCEDURE:  CT ABDOMEN+PELVIS (CONTRAST ONLY) (CPT=74177)     COMPARISON:  [facility] , CT, CT CHEST+ABDOMEN+PELVIS(ALL CNTRST ONLY)(CPT=71260/06732), 9/27/2024, 1:11 PM.  [facility] , CT, CT ABDOMEN+PELVIS(CONTRAST ONLY)(CPT=74177), 3/07/2021, 2:05 PM.     INDICATIONS:  R59.0 Virchow's node     TECHNIQUE:  CT scanning was performed from the dome of the diaphragm to the pubic symphysis with non-ionic intravenous contrast material. Post contrast coronal MPR imaging was  performed.  Dose reduction techniques were used. Dose information is  transmitted to the ACR (American College of Radiology) NRDR (National Radiology Data Registry) which includes the Dose Index Registry.     PATIENT STATED HISTORY:(As transcribed by Technologist)  Pt states rule out cancer.      CONTRAST USED:  100cc of Isovue 370     FINDINGS:    LIVER:  Low-attenuation lesion in hepatic segment 4 measures 11 x 9 mm on image 23 of series 2, previously 11 x 9 mm.  BILIARY:  No visible dilatation or calcification.    PANCREAS:  No lesion, fluid collection, ductal dilatation, or atrophy.    SPLEEN:  No enlargement or focal lesion.    KIDNEYS:  No mass, obstruction, or calcification.  Simple left renal cysts again demonstrated which requires no further workup or follow-up.  ADRENALS:  No mass or enlargement.    AORTA/VASCULAR:  Trace atherosclerotic calcifications.  No aneurysm.  RETROPERITONEUM:  No mass or adenopathy.    BOWEL/MESENTERY:  No visible mass, obstruction, or bowel wall thickening.  Moderate colonic stool.  ABDOMINAL WALL:  Stable tiny fat containing umbilical hernia.  URINARY BLADDER:  No visible focal wall thickening, lesion, or calculus.    PELVIC NODES:  No adenopathy.    PELVIC ORGANS:  No visible mass.  Pelvic organs appropriate for patient age.    BONES:  Degenerative changes of spine.  Stable postsurgical changes of fusion at L5-S1.  Healed fracture of L1 spinous process.  LUNG BASES:  Moderate to large right pleural effusion.  Dependent atelectasis of right lung base.                      Impression   CONCLUSION:    1. Moderate to large right pleural effusion, incompletely visualized.  This was not present on 9/27/2024.  Full CT of the chest may be beneficial in further evaluation.  2. No significant change in the abdomen and pelvis.  There is a stable low-attenuation hepatic lesion.        LOCATION:  [facility]        Dictated by (CST): Javed Dye MD on 2/20/2025 at 10:21 AM      Finalized by  (CST): Javed Dye MD on 2/20/2025 at 10:30 AM        PROCEDURE:  US HEAD/NECK (CPT=76536)     COMPARISON:  None.     INDICATIONS:  R59.0 Virchow's node, history of lung cancer.  Palpable abnormality along the left supraclavicular region.     TECHNIQUE:  Sonography was performed of the clinically requested area of interest.     FINDINGS:    Dedicated imaging in the area of palpable abnormality along the left supraclavicular region demonstrates a nonspecific rounded hypoechoic lymph node measuring 9 x 7 x 9 mm with the cortex measuring up to 3 mm in thickness.  There is a more elongated  nonspecific lymph node slightly more laterally in the left supraclavicular region measuring 10 x 4 x 8 mm with the cortex measuring up to 5 mm in thickness.  Given the patient's history, pathologic lymph nodes in this area cannot be entirely excluded.    Clinical correlation recommended.  FNA may be of further value.                      Impression   CONCLUSION:  Dedicated imaging in the area of palpable abnormality along the left supraclavicular region demonstrates a nonspecific rounded hypoechoic lymph node measuring 9 x 7 x 9 mm with the cortex measuring up to 3 mm in thickness.  There is a more  elongated nonspecific lymph node slightly more laterally in the left supraclavicular region measuring 10 x 4 x 8 mm with the cortex measuring up to 5 mm in thickness.  Given the patient's history, pathologic lymph nodes in this area cannot be entirely  excluded.  Clinical correlation recommended.  FNA may be of further value.     LOCATION:  [facility]        Dictated by (CST): Akshat Conklin MD on 2/12/2025 at 9:11 AM      Finalized by (CST): Akshat Conklin MD on 2/12/2025 at 9:16 AM           ADDENDUM:  Comparison is also made to previous PET-CT from 12/27/2023.  Findings as described above in the hilum, mediastinum and right lung are stable compared to the previous PET-CT also.  There is no significant uptake in the regions of right    perihilar consolidation and right paratracheal mediastinum on the previous PET-CT.  These findings would suggest no significant residual or recurrent neoplasm.     COMPARISON:  [facility] , [facility], PET STANDARD BODY SCAN (ONCOLOGY) (CPT=78815), 12/27/2023, 9:02 AM.           Dictated by (CST): Payam Headley MD on 9/30/2024 at 12:17 PM       Finalized by (CST): Payam Headley MD on 9/30/2024 at 12:20 PM                    Addended by Payam Headley MD on 9/30/2024 12:20 PM      Study Result     Narrative   PROCEDURE:  CT CHEST+ABDOMEN+PELVIS(ALL CNTRST ONLY)(CPT=71260/22980)     COMPARISON:  [facility] , CT, CT CHEST(CONTRAST ONLY) (CPT=71260), 11/21/2023, 9:59 AM.  [facility] , CT, CT CHEST+ABDOMEN+PELVIS(ALL CNTRST ONLY)(CPT=71260/02310), 3/27/2024, 12:27 PM.     INDICATIONS:  H57.9 Visual symptoms C34.2 Malignant neoplasm of middle lobe of right lung (HCC) C34.91 Primary adenocarcinoma of right lung (HCC)     TECHNIQUE:  IV contrast-enhanced scanning through the chest, abdomen, and pelvis was performed.  Dose reduction techniques were used. Dose information is transmitted to the ACR (American College of Radiology) NRDR (National Radiology Data Registry) which   includes the Dose Index Registry.     PATIENT STATED HISTORY:(As transcribed by Technologist)  Surveillance of right lung cancer. She is having short of breath recently.      CONTRAST USED:  100cc of Isovue 370     FINDINGS:       CHEST:    LUNGS:  Right perihilar consolidation with angular concave contour is an associated bronchiectasis centered at the superior segment of the right lower lobe is stable in appearance measuring approximately 7.4 x 3.1 cm compared to 7.5 x 3.2 cm.  The small  differences are likely related to differences in measurement technique.  There is a stable 2 mm subpleural right upper lobe lung nodule seen on image 29 of series 3. There is stable mild left apical pleural and parenchymal scarring.  There is no new lung   nodule  identified.    MEDIASTINUM:  Right paratracheal and central right hilar confluent soft tissue density is unchanged in appearance.  No new adenopathy is appreciated.  MICHAEL:  No interval change.  Calcified granuloma seen in the left hilum.  CARDIAC:  No enlargement, pericardial thickening, or significant coronary artery calcification.  There is a small pericardial effusion anteriorly measuring 6 mm in thickness.  No significant change from previous.  PLEURA:  No pleural effusion or pneumothorax.  CHEST WALL:  No axillary lymphadenopathy or chest wall mass.  Right chest wall chest port catheter noted.  AORTA:  No aneurysm or dissection.    VASCULATURE:  No visible pulmonary arterial thrombus or attenuation.       ABDOMEN/PELVIS:  LIVER:  The previously demonstrated early enhancing 20 x 14 mm focus appears smaller measuring 15 x 9 mm.  The differences in size could reflect differences in timing of the bolus and filming.  This could reflect a vascular lesion such as a hemangioma.    There is a stable 9 mm focus of decreased attenuation in the left lobe in hepatic segment 4A.  This has been stable on previous exams dating back to 11/21/2023.    BILIARY:  No visible dilatation or calcification.    PANCREAS:  No lesion, fluid collection, ductal dilatation, or atrophy.    SPLEEN:  No enlargement or focal lesion.    KIDNEYS:  There is a 1.8 x 1.5 cm simple cyst in the upper pole cortex of the left kidney and a smaller subcentimeter cyst in the midpole cortex of the left kidney.  ADRENALS:  No mass or enlargement.    AORTA:  Mild atheromatous plaque noted in the aorta and iliac arteries.  No evidence for aneurysm.  RETROPERITONEUM:  No mass or adenopathy.    BOWEL/MESENTERY:  No visible mass, obstruction, or bowel wall thickening.  Status post appendectomy.  No evidence for bowel wall thickening.  ABDOMINAL WALL:  No mass or hernia.    URINARY BLADDER:  No visible focal wall thickening, lesion, or calculus.    PELVIC NODES:   No adenopathy.    PELVIC ORGANS:  No visible mass.  Pelvic organs appropriate for patient age.    BONES:  Degenerative changes are seen in the cervical, thoracic and lumbar spine.  Postoperative changes of posterior fusion noted at L5-S1.  No new lytic or blastic bony lesions are identified.  There is an old healed posterior right 10th and 11th rib  fracture.                   Impression   CONCLUSION:    1. Stable right perihilar consolidation with angular concave contour with associated bronchiectasis centered at the superior segment of the right lower lobe.  This is in continuity with stable soft tissue density extending to the right paratracheal  mediastinum.  This may reflect changes related to treated cancer/treatment changes.  PET-CT would better evaluate for any residual neoplasm.  2. No new metastatic findings in the chest, abdomen or pelvis.  3. Interval decrease in size of an early enhancing right lobe liver lesion.  Imaging characteristics favor benign lesions such as hemangioma.  4. Multiple other incidental findings as detailed above.             LOCATION:  [facility]           Dictated by (CST): Payam Headley MD on 9/27/2024 at 6:28 PM      Finalized by (CST): Payam Headley MD on 9/27/2024 at 6:50 PM           PROCEDURE:  CT CHEST+ABDOMEN+PELVIS(ALL CNTRST ONLY)(CPT=71260/09662)     COMPARISON:  [facility] , [place], PET STANDARD BODY SCAN (ONCOLOGY) (CPT=78815), 12/27/2023, 9:02 AM.  [facility] CT, CT CHEST(CONTRAST ONLY) (CPT=71260), 11/21/2023, 9:59 AM.     INDICATIONS:  C34.91 Primary adenocarcinoma of right lung (HCC)     TECHNIQUE:  IV contrast-enhanced scanning through the chest, abdomen, and pelvis was performed.  Dose reduction techniques were used. Dose information is transmitted to the ACR (American College of Radiology) NRDR (National Radiology Data Registry) which   includes the Dose Index Registry.     PATIENT STATED HISTORY:(As transcribed by Technologist)  Routine scan for a previous history  of right lung cancer. Patient reports no complications at time of exam      CONTRAST USED:  100cc of Isovue 370     FINDINGS:       CHEST:    LUNGS:  Right perihilar consolidation with angular, concave contours and associated bronchiectasis centered at the superior segment lower lobe has decreased in size.  Sample measurement, 7.5 x 3.2 cm versus prior 8.7 x 4.7 cm.    Mild scattered scarring elsewhere in both lungs.  No new bronchiectasis or cystic lung change.  No new infiltrate.  MEDIASTINUM:  Stable.  No adenopathy.  MICHAEL:  Stable.  No adenopathy.  CARDIAC:  Stable.  Mild anterior pericardial thickening.  Thickness measures 0.7 cm.  Coronary calcifications are present.    PLEURA:  Stable.  No pleural effusion.  CHEST WALL:  Stable.  No axillary adenopathy.  Right chest port.  AORTA:  Stable.  Normal caliber.  VASCULATURE:  Stable.  Smooth tapering.     ABDOMEN/PELVIS:  LIVER:  The visualized portions of liver are stable.  There is an early enhancing focus in segment 7. It is measured 2.0 x 1.4 cm, prior 1.8 x 1.6 cm.  It may be a flash filling hemangioma or vascular focus.  It appears benign.  In the now visualized  remainder of the liver, no abnormality.    BILIARY:  Nondistended gallbladder.  No biliary dilatation.  PANCREAS:  Uniform parenchyma.  No ductal dilatation.  SPLEEN:  Not enlarged.  KIDNEYS:  Normal anatomic positions.  No hydronephrosis or perinephric stranding.  1.9 cm left cortical cyst is unchanged.  ADRENALS:  Not enlarged.  AORTA:  Smooth tapering.  No abdominal aortic aneurysm.  Patent celiac artery, SMA and KATIE.  Patent splenic vein and portal vein.  RETROPERITONEUM:  No adenopathy.  BOWEL/MESENTERY:  Normal bowel caliber.  No colonic inflammation.  Moderate to large amount of stool scattered throughout the colon.  No ascites.  Right lower quadrant clip consistent with prior appendectomy.  ABDOMINAL WALL:  Tiny fat containing umbilical hernia.  URINARY BLADDER:  Nondistended.  Smooth  contour.  No calculus within.  PELVIC NODES:  None enlarged  PELVIC ORGANS:  No uterine or ovarian abnormality.  BONES:  Severe degenerative changes.  At least moderate central canal stenosis at L2-L3.  Normal vertebral body heights.  No subluxation.                      Impression   CONCLUSION:    1. Decrease in size of right perihilar consolidation.  This may be related to treated cancer/treatment changes.  2. No new metastatic findings in the chest.  3. No metastatic findings in the abdomen or pelvis.  4. Details as above.             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 F-18 FDG was injected IV, and whole-body images from vertex to mid-thigh were obtained with concurrent CT scan for both anatomic localization as well as attenuation correction.     RADIOPHARMACEUTICAL:    9.5 mCi F-18 FDG  FASTING GLUCOSE LEVEL:  106 mg/dl  INJECTION TIME:         0 800  SCAN TIME:              0911     FINDINGS:     Mean SUV, mediastinal blood pool:  2.2     Mean SUV, liver:  2.8         HEAD/NECK:  Physiologic activity in all regions.     CHEST:  Consolidation with air bronchograms identified within the superior segment right lower lobe with corresponding low-grade radiotracer uptake, SUV max of 2.5 (previously 3.1).  This is favored to represent post-therapeutic inflammation/fibrosis.    No enlarged or hypermetabolic regional lymph nodes.     ABDOMEN/PELVIS:  Physiologic activity in all regions.       MUSCULOSKELETAL:  Scattered areas of degenerative uptake  noted within the spine.  No hypermetabolic osseous metastatic disease.  Mild exertional/inflammatory uptake of bilateral shoulder girdles and peritrochanteric regions.                     Impression  CONCLUSION:       Low-grade hypermetabolism corresponds to consolidative opacity of the superior segment right lower lobe favoring treatment-related inflammation/fibrosis.  No imaging evidence of residual/recurrent malignancy.          LOCATION:  [facility]           Dictated by (CST): Umm Hodges MD on 12/27/2023 at 1:25 PM      Finalized by (CST): Umm Hodges MD on 12/27/2023 at 1:38 PM       PROCEDURE:  CT CHEST(CONTRAST ONLY) (CPT=71260)     COMPARISON:  [facility], , MRI LIVER (W+WO) (CPT=74183), 5/22/2017, 7:11 PM.  [facility] , CT, CT CHEST(CONTRAST ONLY) (CPT=71260), 2/17/2023, 10:15 AM.  [facility], CT, CT UROGRAM(W+WO)(CPT=74178), 9/08/2020, 10:29 PM.  [facility] , CT, CT CHEST(CONTRAST  ONLY) (CPT=71260), 8/04/2023, 11:19 AM.     INDICATIONS:  C34.2 Malignant neoplasm of middle lobe of right lung (HCC)     TECHNIQUE:  CT images were obtained with non-ionic intravenous contrast material. Dose reduction techniques were used. Dose information is transmitted to the ACR (American College of Radiology) NRDR (National Radiology Data Registry) which includes the  Dose Index Registry.     PATIENT STATED HISTORY:(As transcribed by Technologist)  Lung cancer. The patient doesn't have complaints.      CONTRAST USED:  75cc of Isovue 370     FINDINGS:    LUNGS:  There are right perihilar fibrotic changes which likely represent treatment change.  There is right perihilar consolidation and bronchiectasis, not significantly changed.  No new enhancing nodule is seen.  Scattered atelectasis and/or scarring.    No pulmonary mass is seen.  VASCULATURE:  Unremarkable.  THORACIC AORTA:  Unremarkable.  MEDIASTINUM/MICHAEL:  Unremarkable.  CARDIAC:  Unremarkable.  PLEURA:  Unremarkable.  CHEST WALL:  Unremarkable.  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  BONES:  Degenerative changes in the spine.  Mild scoliosis.  OTHER:  Right chest port noted.                  Impression  CONCLUSION:  No significant interval change since 8/4/2023.  Treatment change is again seen within the right perihilar region.  No recurrent or metastatic disease is identified.        LOCATION:  [facility]        Dictated by (CST): Stromberg, LeRoy, MD on 11/21/2023 at 11:37 AM      Finalized by (CST): Stromberg, LeRoy, MD on 11/21/2023 at 11:52 AM          Impression and Plan:  1. H/o locally advanced NSCLC (IV low neck- cervical?)  - new adenopathy and effusion since last imaging  - biopsy of left supraclav node and right pleural effusion showed metastatic adenocarcinoma c/w lung primary  - With malignant effusion understands she now has stage IV disease which is incurable but very treatable.   - PET also shows new uptake in left neck, hilar and posterior retroperitoneal, abdominal and pelvic nodes and right pleural space  - MRI of the brain showed no evidence of metastases  - An overview of treatment options for metastatic NSCLC were discussed at length. Therapy varies based upon molecular and histologic features of the tumor. If the patient has a  mutation, preferred treatment would be targeted therapy. If a  mutation is not present and PDL-1 status is high, we usually will treat with immunotherapy alone. In patients w/o a  mutation and low PDL-1 score we generally treat initially with chemo+immunotherapy  - PDL-1 negative (<1%), NGS had to be resent as initial sample sent by path insufficient. Maura showed no targetable mutations  - She wants to get going with treatment and not wait for NGS results which is understandable and given increasing symptoms do concur we should proceed  - discussed options with her in detail.  She had mentioned she developed an allergic reaction to her previous chemo regimen. I reviewed her outside records from her previous oncologist at that time. There was no mention of hypersensitivity or allergic type or even adverse reaction to her regimen back then. She did have expected SEs such as cytopenias, constipation, dysphagia (with RT), etc. Given what she had experienced did give me pause with how she would react to further chemotherapy. We discussed here options including chemo free option of Ipi+Nivo given her PDL-1 score while we wait for her NGS results. However she wanted to be as aggressive as possible to start. We therefore decided to proceed with the 9LA regimen (she was EGFR,ALK, ROS 1 negative at her initial diagnosis). She has had teaching and would like to proceed today.   - monitor for reaccummulation of right pleural effusion. Does not seem significant on exam today. Just had CXR ordered by pulmonary yesterday which reported no pleural effusions. Aware of s/sx to watch out for     2. Neoplasm related pain, fibromyalgia. Chronic LBP, neck and left arm main  - takes Ibuprofen and Norco 10 as needed  - also followed by palliative care  - used to be followed at a pain clinic- not anymore     3. H/o asthma, bronchiectasis, former smoker, cough  - follows with pulmonary, continue inhalers   - flutter valve     4. Anemia  - Hgb has been normal since 7/2024     5. TSH elevated  - but T4 normal. This was baseline even before starting IO. Should still be able to proceed but will need close monitoring  - was on LT4 before but was stopped by PCP as levels had been good. She will touch base with her PCP regarding supplementation     6. Access  - had prior port removed. Will have placed by IR and schedule as soon as able. Was able to get peripheral access to start treatment today     7. Hemoptysis  - reports occasional bloody streaking of mucus. Could also be from bronchiectasis   - Hgb normal  - monitor         Labs reviewed   Await NGS  Start cycle 1 carbo/taxol +Ipi/Nivo today  Day 8 APN  Continue palliative care     Risk level high- metastatic lung cancer to start chemo+IO        Shoshana Gordon MD  [facility]

## 2025-04-16 NOTE — H&P (VIEW-ONLY)
Cancer Center Progress Note     Patient Name: Hanna Barrett             YOB: 1970     Medical Record Number: [id number]      CSN: [id number]              Attending Physician: Shoshana Gordon M.D.   Referring Physician: MD Dr. Flynn Geronimo     Date of Visit: 3/28/25          Chief Complaint:      Chief Complaint   Patient presents with    Follow - Up    Chemotherapy    Immunotherapy                      Hem/Onc History:  Stage IIIC NSCLC adenocarcinoma of the right lung diagnosed 12/2021     Oncologic History:  5/2021: patient presented with abnormal preoperative CXR; left apical 8 mm nodule with irregular margins, and a right middle lobe soft tissue density with associated bronchiectasis medially and mild adjacent nonspecific ground glass density, overall stable since 2/2021.      11/2021: CT scan of the chest demonstrated a spiculated airspace density within the right middle lobe worrisome for primary lung malignancy along with mediastinal lymph nodes.  12/7/2021: PET/CT scan showed a hypermetabolic RML and RUL mass with enlargement of mediastinal lymph nodes.     12/13/2021: bronchoscopy and transbronchial needle aspiration to subcarinal lymph node was positive for metastatic adenocarcinoma. PD-L1 <1%, EGFR, ROS1, ALK, KRAS, RET, BRAF all negative.      1/17/2022: concurrent chemoradiation with cisplatin + pemetrexed at [facility] (Dr. Subramanian) completed in 4/2022 5/13/2022: started consolidative durvalumab q4 weeks     7/2022: PET concerning for progression of disease      8/12/2022: transferred care to Dr. Sanchez at [facility]     8/16/2022: bronchoscopy and biopsies were negative for malignancy.      9/14/2022: CT scan showed some improvement      11/14/2022: PET scan continues to show improvement with interval decrease in the right lung opacities with decreased uptake, may represent scarring and posttreatment change. Increased uptake distal esophagus.      1/17/2023: EGD with normal  esophagus and negative biopsies.     2/17/2023: CT chest stable.        3/31/2023: completed 12 cycles of durvalumab.                   - care transferred to Dr. Baker when Dr. Sanchez retired     4/7/2023: bronchoscopy for hemoptysis was unremarkable.   5/17/2023: CT chest stable.                    - care transferred to me 11/2023 from Dr. Baker  who now practices primarily in [place]     11/2024:  Imaging done just prior showed stable right perihilar consolidation which compared to her previous PET showed no uptake in these areas and therefore suggestive of no significant residual or recurrent neoplasm. No new findings described in C/A/P with devrease in size of a liver lesion favored to be a hemangioma. C/o chronic fatigue and some SOB and cough. Had reported some visual changes and was seen at [facility]. Reports no evidence of malignancy seen. As felt relatively well with recent good scans she opted to have her port removed. This was removed by IR on 11/26/24.     Early 1/2025 she saw her PCP c/o nasal and sinus congestion with pressure, cough, PND, fullness in the ears. No adenopathy was noted on exam. She was placed on Augmentin for sinusitis.      She then reported feeling a growth in her nose causing a deformity with nasal obstructive symptoms. Saw ENT 1/29/25 (Laila). Sinus CT scans were ordered and was referred to plastics. Scans showed no nasal mass or abnormality of the soft tissues but did show postoperative changes from previous sinus surgery. Did see plastics and was felt to have a dermoid cyst.      The following month c/o left supraclavicular node which was increasing in size and tender. Saw PCP who ordered imaging. Thought to have Virchow node. US of the neck and CT abdomen/pelvis were ordered. US showed nonspecific appearing supraclav node. CT of the abdomen and pelvis showed a moderate to large right pleural effusion that was not present 9/2024 CT. Dedicated CT chest was ordered.  Testing results were reviewed. US guided biopsy of left supraclav node was ordered. CT chest confirmed new right pleural effusion and was referred to pulmonary for tap.      Biopsy of the left supraclav node on 2/27/25 showed metastatic adenocarcinoma c/w lung primary. US guided right thoracentesis drained 1500 mL of fluid. Cytology from the right pleural effusion showed metastatic carcinoma c/w lung adenocarcinoma.         History of Present Illness:  Hanna is here to start systemic therapy. Guardant did not show any targetable mutations: CDK6, PIK3CA, TP53). PDL-1 came back at <1% NGS could not be run with initial sample sent by path as was insufficient and another specimen has been sent. She wanted to get going with treatment.      She reported increasing MARTINS, cough and left neck nodes which have been painful she said.  She also has pain in her left shoulder, left collarbone and lower abdomen. She takes Norco for pain. She has noticed some blood tinged sputum when she coughs. Denies chest pain, change in bowel or urinary habits, headaches, dizziness. Fibromyalgia feels worse she says. No fevers.            Current Medications:    Medications - Current      Current Outpatient Medications:     ondansetron (ZOFRAN) 8 MG tablet, Take 1 tablet (8 mg total) by mouth every 8 (eight) hours as needed for Nausea., Disp: 30 tablet, Rfl: 3    HYDROcodone-acetaminophen  MG Oral Tab, Take 1 tablet by mouth every 8 (eight) hours as needed for Pain., Disp: 60 tablet, Rfl: 0    dilTIAZem HCl 120 MG Oral Tab, Take 1 tablet (120 mg total) by mouth daily., Disp: , Rfl:     albuterol 108 (90 Base) MCG/ACT Inhalation Aero Soln, Inhale 2 puffs into the lungs every 6 (six) hours as needed for Wheezing or Shortness of Breath., Disp: 3 each, Rfl: 3    amphetamine-dextroamphetamine 15 MG Oral Tab, TAKE ONE TABLET BY MOUTH ONCE DAILY 8 TO 9 HOURS AFTER VYVANSE DOSE, Disp: , Rfl:     ARIPiprazole 2 MG Oral Tab, Take one (1) tablet by  mouth every morning, Disp: , Rfl:     VYVANSE 70 MG Oral Cap, Take 1 capsule (70 mg total) by mouth every morning., Disp: , Rfl:     SYMBICORT 160-4.5 MCG/ACT Inhalation Aerosol, Inhale 2 puffs into the lungs 2 (two) times daily., Disp: , Rfl:     Multiple Vitamin Oral Tab, Take 1 tablet by mouth daily., Disp: , Rfl:     ibuprofen 200 MG Oral Tab, Take 4 tablets (800 mg total) by mouth every 8 (eight) hours as needed for Pain., Disp: , Rfl:     cholecalciferol 25 MCG (1000 UT) Oral Cap, Take 1 capsule (1,000 Units total) by mouth daily., Disp: , Rfl: 0    DULoxetine HCl 60 MG Oral Cap DR Particles, Take 1 capsule (60 mg total) by mouth daily., Disp: , Rfl: 2    pregabalin 50 MG Oral Cap, Take 1 capsule (50 mg total) by mouth 3 (three) times daily., Disp: 30 capsule, Rfl: 1    predniSONE 10 MG Oral Tab, Take 1 tablet (10 mg total) by mouth 2 (two) times daily. Start day after chemo, Disp: 10 tablet, Rfl: 1    levothyroxine 25 MCG Oral Tab, Take 1 tablet (25 mcg total) by mouth before breakfast., Disp: 90 tablet, Rfl: 1    lisinopril-hydroCHLOROthiazide 20-12.5 MG Oral Tab, Take 0.5 tablets by mouth daily., Disp: 45 tablet, Rfl: 3    prochlorperazine (COMPAZINE) 10 mg tablet, Take 1 tablet (10 mg total) by mouth every 6 (six) hours as needed for Nausea., Disp: 30 tablet, Rfl: 3    montelukast 10 MG Oral Tab, Take 1 tablet (10 mg total) by mouth nightly. (Patient not taking: Reported on 3/28/2025), Disp: 90 tablet, Rfl: 3    cetirizine 10 MG Oral Tab, Take 1 tablet (10 mg total) by mouth daily., Disp: 90 tablet, Rfl: 1    ferrous sulfate 325 (65 FE) MG Oral Tab EC, Take 1 tablet (325 mg total) by mouth daily with breakfast., Disp: , Rfl:         Past Medical History:  Past Medical History       Past Medical History:    Abnormal uterine bleeding     bleeds every 2 weeks    Anxiety state    Asthma (HCC)    Attention deficit hyperactivity disorder (ADHD)    BACK PAIN    Back problem     lumbar radiculopathy    Cancer  (HCC)     adenocarcinoma of right lung    MARCIO II (cervical intraepithelial neoplasia II)    DDD (degenerative disc disease), lumbar    DDD (degenerative disc disease), thoracic    Depression    Disorder of thyroid    Dyspareunia    Exposure to medical diagnostic radiation     On hold since 2022    Fall    Fibromyalgia    Fibromyalgia    Genital warts    High blood pressure    History of COVID-19     COVID + 2020 Headache - NO Hospitalization needed     History of lumbar fusion    Hx of motion sickness    IBS (irritable bowel syndrome)     Per patient - Just constipation    Infertility, female    Lumbar radiculopathy    Mild dysplasia of cervix    Pap smear for cervical cancer screening     pt states normal    Papanicolaou smear of cervix with high grade squamous intraepithelial lesion (HGSIL)    Personal history of antineoplastic chemotherapy     Last chemo 22 prior to lung bx 22    Post covid-19 condition, unspecified     symptoms: HA; s/s resolved-yes; not hospitalized    Problems with swallowing     with radiation    Sexually transmitted disease     HPV    Substance abuse (HCC)     cocaine    Urinary incontinence    Visual impairment     glasses/contacts bifocals            Past Surgical History:  Past Surgical History         Past Surgical History:   Procedure Laterality Date    Appendectomy       Back surgery   2009     fusion L5-S1    Back surgery   2021     RIGHT LUMBAR 3/ LUMBAR 4, LUMBAR 4/ LUMBAR 5 HEMILAMINTOMIES, LEFT LUMBAR 2 / LUMBAR 3 MICRODISCECTOMY    Colonoscopy N/A 2023     Procedure: COLONOSCOPY;  Surgeon: Devante Kern MD;  Location:  ENDOSCOPY    Colposcopy,bx cervix/endocerv curr   11     MARCIO 1    Laparoscopy procedure unlisted        Ovarian cyst removed    Leep   2011     MARCIO 2             X2    Other surgical history        bunions bilateral    Other surgical history        nodule lymph nodes neck    Other surgical history          Bladder sling    Other surgical history   2020     Cystoscopy, Dr Beach            Family Medical History:  Family History         Family History   Problem Relation Age of Onset    Other (lung CA) Self      Hypertension Mother      Diabetes Mother      Heart Disease Mother      Heart Disease Father      Other (lung cancer) Father 55         Lung Cancer    Other (pancreatic cancer) Sister 47         Pancreatic Cancer    Cancer Sister 51         lung CA    Other (Other) Sister           Back problems    Other (Other) Son           anxiety    Other (Other) Son           behavioral problems    Diabetes Maternal Grandmother      Breast Cancer Maternal Grandmother           dx late-60s    Stroke Maternal Grandfather 86    Dementia Paternal Grandmother      Heart Disorder Paternal Grandfather      Cancer Maternal Aunt 50         LUNG CA 51    Breast Cancer Maternal Aunt           dx 46-47y    Ovarian Cancer Maternal Cousin Female 33          of ovarian ca at 35y            Gyne History:          OB History    Para Term  AB Living   2 2 2 0 0 2   SAB IAB Ectopic Multiple Live Births    0 0 0 0 2          Psychosocial History:  Social History               Socioeconomic History    Marital status:        Spouse name: Not on file    Number of children: Not on file    Years of education: Not on file    Highest education level: Not on file   Occupational History    Not on file   Tobacco Use    Smoking status: Former       Current packs/day: 0.00       Average packs/day: 0.8 packs/day for 19.0 years (14.5 ttl pk-yrs)       Types: Cigarettes       Start date:        Quit date: 2010       Years since quitting: 15.2       Passive exposure: Past    Smokeless tobacco: Former       Quit date: 2024    Tobacco comments:       Has not vaped since 2024.    Vaping Use    Vaping status: Former    Substances: Nicotine, daily    Devices: Pre-filled pod   Substance and Sexual Activity    Alcohol  use: Not Currently       Comment: 3 drinks per week    Drug use: Not Currently       Types: Cocaine       Comment: USED COCAINE BETW [id number]    Sexual activity: Yes       Partners: Male   Other Topics Concern     Service Not Asked    Blood Transfusions Not Asked    Caffeine Concern Yes       Comment: 3-4 daily of pop    Occupational Exposure Not Asked    Hobby Hazards Not Asked    Sleep Concern Not Asked    Stress Concern Not Asked    Weight Concern Not Asked    Special Diet Not Asked    Back Care Not Asked    Exercise No       Comment: not tolerated right now    Bike Helmet Not Asked    Seat Belt Not Asked    Self-Exams Not Asked   Social History Narrative    Not on file      Social Drivers of Health           Food Insecurity: No Food Insecurity (7/12/2024)     Food Insecurity      Food Insecurity: Never true   Transportation Needs: No Transportation Needs (7/12/2024)     Transportation Needs      Lack of Transportation: No      Car Seat: Not on file   Housing Stability: Low Risk  (7/12/2024)     Housing Stability      Housing Instability: No      Housing Instability Emergency: Not on file      Crib or Bassinette: Not on file               Allergies:  Allergies        Allergies   Allergen Reactions    Gabapentin SWELLING and UNKNOWN    Erythromycin UNKNOWN    Clindamycin RASH            Review of Systems:  A 14-point ROS was done with pertinent positives and negative per the HPI     Vital Signs:    Day 1,  Cycle 1  03/28/25   Height 1.619 m (5' 3.74\") [1]   Weight 76 kg (167 lb 9.6 oz)   BSA (Calculated - sq m) 1.81 sq meters   BMI (Calculated) 29 kg/m2   /63   Pulse 92   BP Location Right arm   Patient Position Sitting   Temp 96 °F          Physical Examination:  General: Patient is alert and oriented x 3, not in acute distress.  Psych:  Mood and affect appropriate  HEENT: EOMs intact. PERRL. Oropharynx is clear.   Neck: No JVD. Left palpable lymphadenopathy supraclav - 2 discrete nodes felt,  slightly larger than when last seen but more swelling/fullness in area with tenderness. Neck is supple.  Chest: Diminished BS  Heart: Regular rate and rhythm.   Abdomen: Soft, non tender with good bowel sounds.  No hepatosplenomegaly.  No palpable mass.  Extremities: Pedal pulses are present. No BLE edema.  Neurological: Grossly intact.         Laboratory:        Lab Results   Component Value Date     WBC 6.3 03/28/2025     RBC 4.34 03/28/2025     HGB 13.2 03/28/2025     HCT 37.5 03/28/2025     .0 03/28/2025     MPV 9.4 09/02/2012     MCV 86.4 03/28/2025     MCH 30.4 03/28/2025     MCHC 35.2 03/28/2025     RDW 12.2 03/28/2025     NEPRELIM 4.42 03/28/2025     NEUTABS 11.73 (H) 04/03/2022     LYMPHABS 0.83 (L) 04/03/2022     EOSABS 0.55 04/03/2022     BASABS 0.00 04/03/2022     NEUT 73 04/03/2022     LYMPH 6 04/03/2022     MON 5 04/03/2022     EOS 4 04/03/2022     BASO 0 04/03/2022     NEPERCENT 70.0 03/28/2025     LYPERCENT 13.2 03/28/2025     MOPERCENT 11.6 03/28/2025     EOPERCENT 3.7 03/28/2025     BAPERCENT 1.0 03/28/2025     NE 4.42 03/28/2025     LYMABS 0.83 (L) 03/28/2025     MOABSO 0.73 03/28/2025     EOABSO 0.23 03/28/2025     BAABSO 0.06 03/28/2025            Lab Results   Component Value Date      (H) 03/28/2025     BUN 18 03/28/2025     BUNCREA 18.0 03/21/2022     CREATSERUM 0.97 03/28/2025     ANIONGAP 11 03/28/2025      01/28/2018     GFRNAA 61 04/03/2022     GFRAA 70 04/03/2022     CA 10.0 03/28/2025     OSMOCALC 297 (H) 03/28/2025     ALKPHO 71 03/28/2025     AST 23 03/28/2025     ALT 21 03/28/2025     BILT 0.4 03/28/2025     TP 7.1 03/28/2025     ALB 4.6 03/28/2025     GLOBULIN 2.5 03/28/2025      03/28/2025     K 3.5 03/28/2025      03/28/2025     CO2 25.0 03/28/2025            Lab Component   Component Value Date/Time      03/12/2025 1102            Lab Component   Component Value Date/Time     CEA 1.0 05/12/2017 1425           Latest Reference Range & Units  11/07/24 10:09   -246 U/L U/L 168   FERRITIN 50 - 306 ng/mL 59   Vitamin B12 211 - 911 pg/mL >2,000 (H)   FOLATE (FOLIC ACID), SERUM >5.4 ng/mL 29.8   (H): Data is abnormally high        Radiology:  PROCEDURE:  PET/CT STANDARD BODY SCAN (ONCOLOGY) (CPT=78815)     COMPARISON:  [facility] , MR, MRI BRAIN (W+WO) (CPT=70553), 3/07/2025, 3:45 PM.  [facility] , CT, CT CHEST+ABDOMEN+PELVIS(ALL CNTRST ONLY)(CPT=71260/72717), 9/27/2024, 1:11 PM.  [facility] , US, US HEAD/NECK (CPT=76536), 2/12/2025, 8:41 AM.  [facility] , NM, PET  STANDARD BODY SCAN (ONCOLOGY) (CPT=78815), 4/13/2022, 12:14 PM.  [facility], NM, PET STANDARD BODY SCAN (ONCOLOGY) (CPT=78815), 12/07/2021, 12:36 PM.  [facility] , NM, PET STANDARD BODY SCAN (ONCOLOGY) (CPT=78815), 12/27/2023, 9:02 AM.     INDICATIONS:  C34.91 Recurrent malignant neoplasm of right lung of unknown cell type (HCC) C34.91 Primary adenocarcinoma of right lung (HCC)     TECHNIQUE:  The patient fasted for at least 6 hours. F-18 FDG was injected IV, and whole-body images from vertex to mid-thigh were obtained with concurrent CT scan for both anatomic localization as well as attenuation correction.     RADIOPHARMACEUTICAL:    9.6 mCi F-18 FDG  FASTING GLUCOSE LEVEL:  80 mg/dl  INJECTION TIME:         0850 hours  SCAN TIME:              0954 hours     FINDINGS:       Left supraclavicular lymph nodes with elevated FDG uptake as high as 4.5 consistent with metastatic disease.  There is a low left supraclavicular lymph node medially with elevated FDG uptake of 5.9.  Low left jugular digastric lymph node with elevated  FDG uptake of 4.2.  Asymmetric radiotracer uptake within the right masseter muscles likely related to physiologic changes as there is no underlying mass lesion.  Physiologic uptake noted throughout the tongue.       Left infrahilar lymph node has elevated FDG uptake of 7.0.  Right hilar lymph node has elevated FDG uptake of 5.0  posterior mediastinal lymph node adjacent to the aorta with  elevated FDG uptake of 4.2.  Right pleural fluid has elevated FDG uptake  concerning for involvement per of the pleural fluid by malignancy with maximum SUV uptake of 9.4.  It should be noted that there is been recent thoracentesis in this may be the reason behind this uptake rather than neoplastic disease.     Abdomen demonstrates a lymph node within the posterior gastrohepatic ligament with SUV of 5.4.     There is retroperitoneal adenopathy bilaterally posterior to the aorta and cava with the largest lymph node on the right with maximum SUV of 14.4 and the maximum SUV of the left retroperitoneal lymph node being 7.1.  Within the pelvis left external iliac   lymph node has maximum SUV of 8.6 and left internal iliac lymph node has maximum SUV of 8.2.  There are multiple left external iliac lymph nodes with maximum SUV of 16.5.  There is a left obturator lymph node with maximum SUV of 5.8.       Minimal uptake within left small inguinal lymph nodes with SUV less than 2 likely reactive.                        Impression   CONCLUSION:       1. Left supraclavicular and jugular digastric lymph node with elevated FDG uptake consistent metastatic disease.     2. Bilateral hilar and posterior retroperitoneal lymph node elevated FDG uptake consistent with metastatic disease.     3. Pleural uptake within the right posterior pleural space could be related to recent thoracentesis although metastatic disease to the pleura is also considered.     4. Multiple abnormal lymph nodes including gastrohepatic ligament, bilateral retroperitoneal, left external iliac, and left  lymph nodes consistent with metastatic disease.        LOCATION:  [facility]           Dictated by (CST): Cj García MD on 3/11/2025 at 11:04 AM      Finalized by (CST): Cj García MD on 3/11/2025 at 12:25 PM        PROCEDURE:  MRI BRAIN (W+WO) (CPT=70553)     COMPARISON:  [facility], MRI BRAIN (W+WO) (CPT=70553), 7/24/2024, 1:02 PM.      INDICATIONS:  C34.91 Primary adenocarcinoma of right lung (HCC)     TECHNIQUE:  MRI of the brain was performed with multi-planar T1, T2-weighted images with FLAIR sequences and diffusion weighted images without and with infusion.     PATIENT STATED HISTORY:(As transcribed by Technologist)  History of cancer      CONTRAST USED:  15 mL of Dotarem     FINDINGS:    INTRACRANIAL:  There are few foci of T2/FLAIR hyperintensity in the periventricular subcortical white matter consistent small vessel ischemic disease..  Diffusion weighted imaging was performed and is unremarkable.  There is no evidence for acute  infarction.  There is no evidence of hemorrhage or mass lesion.  VENTRICLES/SULCI:   Ventricles and sulci are normal in caliber.  There are no extra-axial fluid collections.  There is no midline shift.  SINUSES/ORBITS:       The visualized paranasal sinuses are clear.  The orbits are unremarkable.  MASTOIDS:       The mastoids are clear.                  Impression  CONCLUSION:  No acute intracranial process.  No evidence of metastatic disease to the brain.        LOCATION:  [facility]           Dictated by (CST): Cj García MD on 3/07/2025 at 4:40 PM      Finalized by (CST): Cj García MD on 3/07/2025 at 4:46 PM       PROCEDURE:  CT CHEST(CONTRAST ONLY) (CPT=71260)     COMPARISON:  [facility] , CT, CT CHEST+ABDOMEN+PELVIS(ALL CNTRST ONLY)(CPT=71260/20128), 9/27/2024, 1:11 PM.     INDICATIONS:  C34.91 Primary adenocarcinoma of right lung (HCC)     TECHNIQUE:  CT images were obtained with non-ionic intravenous contrast material. Dose reduction techniques were used. Dose information is transmitted to the ACR (American College of Radiology) NRDR (National Radiology Data Registry) which includes the  Dose Index Registry.     PATIENT STATED HISTORY:(As transcribed by Technologist)  Patient has history of Lung cancer and now fluid in lung. Disease surveillance. Shortness of breath.      CONTRAST USED:  75cc of Isovue  370     FINDINGS:    LUNGS:  There is progressive atelectasis of the right lower lobe and right middle lobe related to developing large right effusion.  VASCULATURE:  No visible pulmonary arterial thrombus or attenuation.    MICHAEL:  No mass or adenopathy.    MEDIASTINUM:  No mass or adenopathy.    CARDIAC:  There is a small amount of pericardial fluid which is similar to the prior study.  PLEURA:  Large right pleural effusion has developed in the interval since the prior study.  This predominantly layers in the dependent part of the chest.  THORACIC AORTA:  No aneurysm.    CHEST WALL:  No mass or axillary adenopathy.    LIMITED ABDOMEN:  Hyperenhancing focus in right hepatic lobe series 3, image 139 has been previously described and is unchanged.  BONES:  There is diffuse degenerative change in the thoracic spine.  There are no aggressive bone lesions detected.                   Impression   CONCLUSION:    1. Interval development of large right pleural effusion with associated worsening atelectasis in right lung.  Post treatment changes in the right perihilar lung are otherwise unchanged.  2. Otherwise no specific evidence of metastatic disease.  3. Small pericardial effusion is similar to prior study.  4. Hyperenhancing focus in right hepatic lobe has been previously described and is unchanged.        LOCATION:  [facility]        Dictated by (CST): Damir Deleon MD on 2/28/2025 at 11:56 AM      Finalized by (CST): Damir Deleon MD on 2/28/2025 at 12:02 PM        PROCEDURE:  CT ABDOMEN+PELVIS (CONTRAST ONLY) (CPT=74177)     COMPARISON:  [facility] , CT, CT CHEST+ABDOMEN+PELVIS(ALL CNTRST ONLY)(CPT=71260/02404), 9/27/2024, 1:11 PM.  [facility] , CT, CT ABDOMEN+PELVIS(CONTRAST ONLY)(CPT=74177), 3/07/2021, 2:05 PM.     INDICATIONS:  R59.0 Virchow's node     TECHNIQUE:  CT scanning was performed from the dome of the diaphragm to the pubic symphysis with non-ionic intravenous contrast material. Post contrast coronal MPR imaging was  performed.  Dose reduction techniques were used. Dose information is  transmitted to the ACR (American College of Radiology) NRDR (National Radiology Data Registry) which includes the Dose Index Registry.     PATIENT STATED HISTORY:(As transcribed by Technologist)  Pt states rule out cancer.      CONTRAST USED:  100cc of Isovue 370     FINDINGS:    LIVER:  Low-attenuation lesion in hepatic segment 4 measures 11 x 9 mm on image 23 of series 2, previously 11 x 9 mm.  BILIARY:  No visible dilatation or calcification.    PANCREAS:  No lesion, fluid collection, ductal dilatation, or atrophy.    SPLEEN:  No enlargement or focal lesion.    KIDNEYS:  No mass, obstruction, or calcification.  Simple left renal cysts again demonstrated which requires no further workup or follow-up.  ADRENALS:  No mass or enlargement.    AORTA/VASCULAR:  Trace atherosclerotic calcifications.  No aneurysm.  RETROPERITONEUM:  No mass or adenopathy.    BOWEL/MESENTERY:  No visible mass, obstruction, or bowel wall thickening.  Moderate colonic stool.  ABDOMINAL WALL:  Stable tiny fat containing umbilical hernia.  URINARY BLADDER:  No visible focal wall thickening, lesion, or calculus.    PELVIC NODES:  No adenopathy.    PELVIC ORGANS:  No visible mass.  Pelvic organs appropriate for patient age.    BONES:  Degenerative changes of spine.  Stable postsurgical changes of fusion at L5-S1.  Healed fracture of L1 spinous process.  LUNG BASES:  Moderate to large right pleural effusion.  Dependent atelectasis of right lung base.                      Impression   CONCLUSION:    1. Moderate to large right pleural effusion, incompletely visualized.  This was not present on 9/27/2024.  Full CT of the chest may be beneficial in further evaluation.  2. No significant change in the abdomen and pelvis.  There is a stable low-attenuation hepatic lesion.        LOCATION:  [facility]        Dictated by (CST): Javed Dye MD on 2/20/2025 at 10:21 AM      Finalized by  (CST): Javed Dye MD on 2/20/2025 at 10:30 AM        PROCEDURE:  US HEAD/NECK (CPT=76536)     COMPARISON:  None.     INDICATIONS:  R59.0 Virchow's node, history of lung cancer.  Palpable abnormality along the left supraclavicular region.     TECHNIQUE:  Sonography was performed of the clinically requested area of interest.     FINDINGS:    Dedicated imaging in the area of palpable abnormality along the left supraclavicular region demonstrates a nonspecific rounded hypoechoic lymph node measuring 9 x 7 x 9 mm with the cortex measuring up to 3 mm in thickness.  There is a more elongated  nonspecific lymph node slightly more laterally in the left supraclavicular region measuring 10 x 4 x 8 mm with the cortex measuring up to 5 mm in thickness.  Given the patient's history, pathologic lymph nodes in this area cannot be entirely excluded.    Clinical correlation recommended.  FNA may be of further value.                      Impression   CONCLUSION:  Dedicated imaging in the area of palpable abnormality along the left supraclavicular region demonstrates a nonspecific rounded hypoechoic lymph node measuring 9 x 7 x 9 mm with the cortex measuring up to 3 mm in thickness.  There is a more  elongated nonspecific lymph node slightly more laterally in the left supraclavicular region measuring 10 x 4 x 8 mm with the cortex measuring up to 5 mm in thickness.  Given the patient's history, pathologic lymph nodes in this area cannot be entirely  excluded.  Clinical correlation recommended.  FNA may be of further value.     LOCATION:  [facility]        Dictated by (CST): Akshat Conklin MD on 2/12/2025 at 9:11 AM      Finalized by (CST): Akshat Conklin MD on 2/12/2025 at 9:16 AM           ADDENDUM:  Comparison is also made to previous PET-CT from 12/27/2023.  Findings as described above in the hilum, mediastinum and right lung are stable compared to the previous PET-CT also.  There is no significant uptake in the regions of right    perihilar consolidation and right paratracheal mediastinum on the previous PET-CT.  These findings would suggest no significant residual or recurrent neoplasm.     COMPARISON:  [facility] , [facility], PET STANDARD BODY SCAN (ONCOLOGY) (CPT=78815), 12/27/2023, 9:02 AM.           Dictated by (CST): Payam Headley MD on 9/30/2024 at 12:17 PM       Finalized by (CST): Payam Headley MD on 9/30/2024 at 12:20 PM                    Addended by Payam Headley MD on 9/30/2024 12:20 PM      Study Result     Narrative   PROCEDURE:  CT CHEST+ABDOMEN+PELVIS(ALL CNTRST ONLY)(CPT=71260/67599)     COMPARISON:  [facility] , CT, CT CHEST(CONTRAST ONLY) (CPT=71260), 11/21/2023, 9:59 AM.  [facility] , CT, CT CHEST+ABDOMEN+PELVIS(ALL CNTRST ONLY)(CPT=71260/14650), 3/27/2024, 12:27 PM.     INDICATIONS:  H57.9 Visual symptoms C34.2 Malignant neoplasm of middle lobe of right lung (HCC) C34.91 Primary adenocarcinoma of right lung (HCC)     TECHNIQUE:  IV contrast-enhanced scanning through the chest, abdomen, and pelvis was performed.  Dose reduction techniques were used. Dose information is transmitted to the ACR (American College of Radiology) NRDR (National Radiology Data Registry) which   includes the Dose Index Registry.     PATIENT STATED HISTORY:(As transcribed by Technologist)  Surveillance of right lung cancer. She is having short of breath recently.      CONTRAST USED:  100cc of Isovue 370     FINDINGS:       CHEST:    LUNGS:  Right perihilar consolidation with angular concave contour is an associated bronchiectasis centered at the superior segment of the right lower lobe is stable in appearance measuring approximately 7.4 x 3.1 cm compared to 7.5 x 3.2 cm.  The small  differences are likely related to differences in measurement technique.  There is a stable 2 mm subpleural right upper lobe lung nodule seen on image 29 of series 3. There is stable mild left apical pleural and parenchymal scarring.  There is no new lung   nodule  identified.    MEDIASTINUM:  Right paratracheal and central right hilar confluent soft tissue density is unchanged in appearance.  No new adenopathy is appreciated.  MICHAEL:  No interval change.  Calcified granuloma seen in the left hilum.  CARDIAC:  No enlargement, pericardial thickening, or significant coronary artery calcification.  There is a small pericardial effusion anteriorly measuring 6 mm in thickness.  No significant change from previous.  PLEURA:  No pleural effusion or pneumothorax.  CHEST WALL:  No axillary lymphadenopathy or chest wall mass.  Right chest wall chest port catheter noted.  AORTA:  No aneurysm or dissection.    VASCULATURE:  No visible pulmonary arterial thrombus or attenuation.       ABDOMEN/PELVIS:  LIVER:  The previously demonstrated early enhancing 20 x 14 mm focus appears smaller measuring 15 x 9 mm.  The differences in size could reflect differences in timing of the bolus and filming.  This could reflect a vascular lesion such as a hemangioma.    There is a stable 9 mm focus of decreased attenuation in the left lobe in hepatic segment 4A.  This has been stable on previous exams dating back to 11/21/2023.    BILIARY:  No visible dilatation or calcification.    PANCREAS:  No lesion, fluid collection, ductal dilatation, or atrophy.    SPLEEN:  No enlargement or focal lesion.    KIDNEYS:  There is a 1.8 x 1.5 cm simple cyst in the upper pole cortex of the left kidney and a smaller subcentimeter cyst in the midpole cortex of the left kidney.  ADRENALS:  No mass or enlargement.    AORTA:  Mild atheromatous plaque noted in the aorta and iliac arteries.  No evidence for aneurysm.  RETROPERITONEUM:  No mass or adenopathy.    BOWEL/MESENTERY:  No visible mass, obstruction, or bowel wall thickening.  Status post appendectomy.  No evidence for bowel wall thickening.  ABDOMINAL WALL:  No mass or hernia.    URINARY BLADDER:  No visible focal wall thickening, lesion, or calculus.    PELVIC NODES:   No adenopathy.    PELVIC ORGANS:  No visible mass.  Pelvic organs appropriate for patient age.    BONES:  Degenerative changes are seen in the cervical, thoracic and lumbar spine.  Postoperative changes of posterior fusion noted at L5-S1.  No new lytic or blastic bony lesions are identified.  There is an old healed posterior right 10th and 11th rib  fracture.                   Impression   CONCLUSION:    1. Stable right perihilar consolidation with angular concave contour with associated bronchiectasis centered at the superior segment of the right lower lobe.  This is in continuity with stable soft tissue density extending to the right paratracheal  mediastinum.  This may reflect changes related to treated cancer/treatment changes.  PET-CT would better evaluate for any residual neoplasm.  2. No new metastatic findings in the chest, abdomen or pelvis.  3. Interval decrease in size of an early enhancing right lobe liver lesion.  Imaging characteristics favor benign lesions such as hemangioma.  4. Multiple other incidental findings as detailed above.             LOCATION:  [facility]           Dictated by (CST): Payam Headley MD on 9/27/2024 at 6:28 PM      Finalized by (CST): Payam Headley MD on 9/27/2024 at 6:50 PM           PROCEDURE:  CT CHEST+ABDOMEN+PELVIS(ALL CNTRST ONLY)(CPT=71260/28554)     COMPARISON:  [facility] , [place], PET STANDARD BODY SCAN (ONCOLOGY) (CPT=78815), 12/27/2023, 9:02 AM.  [facility] CT, CT CHEST(CONTRAST ONLY) (CPT=71260), 11/21/2023, 9:59 AM.     INDICATIONS:  C34.91 Primary adenocarcinoma of right lung (HCC)     TECHNIQUE:  IV contrast-enhanced scanning through the chest, abdomen, and pelvis was performed.  Dose reduction techniques were used. Dose information is transmitted to the ACR (American College of Radiology) NRDR (National Radiology Data Registry) which   includes the Dose Index Registry.     PATIENT STATED HISTORY:(As transcribed by Technologist)  Routine scan for a previous history  of right lung cancer. Patient reports no complications at time of exam      CONTRAST USED:  100cc of Isovue 370     FINDINGS:       CHEST:    LUNGS:  Right perihilar consolidation with angular, concave contours and associated bronchiectasis centered at the superior segment lower lobe has decreased in size.  Sample measurement, 7.5 x 3.2 cm versus prior 8.7 x 4.7 cm.    Mild scattered scarring elsewhere in both lungs.  No new bronchiectasis or cystic lung change.  No new infiltrate.  MEDIASTINUM:  Stable.  No adenopathy.  MICHAEL:  Stable.  No adenopathy.  CARDIAC:  Stable.  Mild anterior pericardial thickening.  Thickness measures 0.7 cm.  Coronary calcifications are present.    PLEURA:  Stable.  No pleural effusion.  CHEST WALL:  Stable.  No axillary adenopathy.  Right chest port.  AORTA:  Stable.  Normal caliber.  VASCULATURE:  Stable.  Smooth tapering.     ABDOMEN/PELVIS:  LIVER:  The visualized portions of liver are stable.  There is an early enhancing focus in segment 7. It is measured 2.0 x 1.4 cm, prior 1.8 x 1.6 cm.  It may be a flash filling hemangioma or vascular focus.  It appears benign.  In the now visualized  remainder of the liver, no abnormality.    BILIARY:  Nondistended gallbladder.  No biliary dilatation.  PANCREAS:  Uniform parenchyma.  No ductal dilatation.  SPLEEN:  Not enlarged.  KIDNEYS:  Normal anatomic positions.  No hydronephrosis or perinephric stranding.  1.9 cm left cortical cyst is unchanged.  ADRENALS:  Not enlarged.  AORTA:  Smooth tapering.  No abdominal aortic aneurysm.  Patent celiac artery, SMA and KATIE.  Patent splenic vein and portal vein.  RETROPERITONEUM:  No adenopathy.  BOWEL/MESENTERY:  Normal bowel caliber.  No colonic inflammation.  Moderate to large amount of stool scattered throughout the colon.  No ascites.  Right lower quadrant clip consistent with prior appendectomy.  ABDOMINAL WALL:  Tiny fat containing umbilical hernia.  URINARY BLADDER:  Nondistended.  Smooth  contour.  No calculus within.  PELVIC NODES:  None enlarged  PELVIC ORGANS:  No uterine or ovarian abnormality.  BONES:  Severe degenerative changes.  At least moderate central canal stenosis at L2-L3.  Normal vertebral body heights.  No subluxation.                      Impression   CONCLUSION:    1. Decrease in size of right perihilar consolidation.  This may be related to treated cancer/treatment changes.  2. No new metastatic findings in the chest.  3. No metastatic findings in the abdomen or pelvis.  4. Details as above.             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 F-18 FDG was injected IV, and whole-body images from vertex to mid-thigh were obtained with concurrent CT scan for both anatomic localization as well as attenuation correction.     RADIOPHARMACEUTICAL:    9.5 mCi F-18 FDG  FASTING GLUCOSE LEVEL:  106 mg/dl  INJECTION TIME:         0 800  SCAN TIME:              0911     FINDINGS:     Mean SUV, mediastinal blood pool:  2.2     Mean SUV, liver:  2.8         HEAD/NECK:  Physiologic activity in all regions.     CHEST:  Consolidation with air bronchograms identified within the superior segment right lower lobe with corresponding low-grade radiotracer uptake, SUV max of 2.5 (previously 3.1).  This is favored to represent post-therapeutic inflammation/fibrosis.    No enlarged or hypermetabolic regional lymph nodes.     ABDOMEN/PELVIS:  Physiologic activity in all regions.       MUSCULOSKELETAL:  Scattered areas of degenerative uptake  noted within the spine.  No hypermetabolic osseous metastatic disease.  Mild exertional/inflammatory uptake of bilateral shoulder girdles and peritrochanteric regions.                     Impression  CONCLUSION:       Low-grade hypermetabolism corresponds to consolidative opacity of the superior segment right lower lobe favoring treatment-related inflammation/fibrosis.  No imaging evidence of residual/recurrent malignancy.          LOCATION:  [facility]           Dictated by (CST): Umm Hodges MD on 12/27/2023 at 1:25 PM      Finalized by (CST): Umm Hodges MD on 12/27/2023 at 1:38 PM       PROCEDURE:  CT CHEST(CONTRAST ONLY) (CPT=71260)     COMPARISON:  [facility], , MRI LIVER (W+WO) (CPT=74183), 5/22/2017, 7:11 PM.  [facility] , CT, CT CHEST(CONTRAST ONLY) (CPT=71260), 2/17/2023, 10:15 AM.  [facility], CT, CT UROGRAM(W+WO)(CPT=74178), 9/08/2020, 10:29 PM.  [facility] , CT, CT CHEST(CONTRAST  ONLY) (CPT=71260), 8/04/2023, 11:19 AM.     INDICATIONS:  C34.2 Malignant neoplasm of middle lobe of right lung (HCC)     TECHNIQUE:  CT images were obtained with non-ionic intravenous contrast material. Dose reduction techniques were used. Dose information is transmitted to the ACR (American College of Radiology) NRDR (National Radiology Data Registry) which includes the  Dose Index Registry.     PATIENT STATED HISTORY:(As transcribed by Technologist)  Lung cancer. The patient doesn't have complaints.      CONTRAST USED:  75cc of Isovue 370     FINDINGS:    LUNGS:  There are right perihilar fibrotic changes which likely represent treatment change.  There is right perihilar consolidation and bronchiectasis, not significantly changed.  No new enhancing nodule is seen.  Scattered atelectasis and/or scarring.    No pulmonary mass is seen.  VASCULATURE:  Unremarkable.  THORACIC AORTA:  Unremarkable.  MEDIASTINUM/MICHAEL:  Unremarkable.  CARDIAC:  Unremarkable.  PLEURA:  Unremarkable.  CHEST WALL:  Unremarkable.  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  BONES:  Degenerative changes in the spine.  Mild scoliosis.  OTHER:  Right chest port noted.                  Impression  CONCLUSION:  No significant interval change since 8/4/2023.  Treatment change is again seen within the right perihilar region.  No recurrent or metastatic disease is identified.        LOCATION:  [facility]        Dictated by (CST): Stromberg, LeRoy, MD on 11/21/2023 at 11:37 AM      Finalized by (CST): Stromberg, LeRoy, MD on 11/21/2023 at 11:52 AM          Impression and Plan:  1. H/o locally advanced NSCLC (IV low neck- cervical?)  - new adenopathy and effusion since last imaging  - biopsy of left supraclav node and right pleural effusion showed metastatic adenocarcinoma c/w lung primary  - With malignant effusion understands she now has stage IV disease which is incurable but very treatable.   - PET also shows new uptake in left neck, hilar and posterior retroperitoneal, abdominal and pelvic nodes and right pleural space  - MRI of the brain showed no evidence of metastases  - An overview of treatment options for metastatic NSCLC were discussed at length. Therapy varies based upon molecular and histologic features of the tumor. If the patient has a  mutation, preferred treatment would be targeted therapy. If a  mutation is not present and PDL-1 status is high, we usually will treat with immunotherapy alone. In patients w/o a  mutation and low PDL-1 score we generally treat initially with chemo+immunotherapy  - PDL-1 negative (<1%), NGS had to be resent as initial sample sent by path insufficient. Maura showed no targetable mutations  - She wants to get going with treatment and not wait for NGS results which is understandable and given increasing symptoms do concur we should proceed  - discussed options with her in detail.  She had mentioned she developed an allergic reaction to her previous chemo regimen. I reviewed her outside records from her previous oncologist at that time. There was no mention of hypersensitivity or allergic type or even adverse reaction to her regimen back then. She did have expected SEs such as cytopenias, constipation, dysphagia (with RT), etc. Given what she had experienced did give me pause with how she would react to further chemotherapy. We discussed here options including chemo free option of Ipi+Nivo given her PDL-1 score while we wait for her NGS results. However she wanted to be as aggressive as possible to start. We therefore decided to proceed with the 9LA regimen (she was EGFR,ALK, ROS 1 negative at her initial diagnosis). She has had teaching and would like to proceed today.   - monitor for reaccummulation of right pleural effusion. Does not seem significant on exam today. Just had CXR ordered by pulmonary yesterday which reported no pleural effusions. Aware of s/sx to watch out for     2. Neoplasm related pain, fibromyalgia. Chronic LBP, neck and left arm main  - takes Ibuprofen and Norco 10 as needed  - also followed by palliative care  - used to be followed at a pain clinic- not anymore     3. H/o asthma, bronchiectasis, former smoker, cough  - follows with pulmonary, continue inhalers   - flutter valve     4. Anemia  - Hgb has been normal since 7/2024     5. TSH elevated  - but T4 normal. This was baseline even before starting IO. Should still be able to proceed but will need close monitoring  - was on LT4 before but was stopped by PCP as levels had been good. She will touch base with her PCP regarding supplementation     6. Access  - had prior port removed. Will have placed by IR and schedule as soon as able. Was able to get peripheral access to start treatment today     7. Hemoptysis  - reports occasional bloody streaking of mucus. Could also be from bronchiectasis   - Hgb normal  - monitor         Labs reviewed   Await NGS  Start cycle 1 carbo/taxol +Ipi/Nivo today  Day 8 APN  Continue palliative care     Risk level high- metastatic lung cancer to start chemo+IO        Shoshana Gordon MD  [facility]

## 2025-04-19 ENCOUNTER — APPOINTMENT (OUTPATIENT)
Dept: GENERAL RADIOLOGY | Facility: HOSPITAL | Age: 55
End: 2025-04-19
Attending: EMERGENCY MEDICINE
Payer: MEDICAID

## 2025-04-19 ENCOUNTER — APPOINTMENT (OUTPATIENT)
Dept: ULTRASOUND IMAGING | Facility: HOSPITAL | Age: 55
End: 2025-04-19
Attending: EMERGENCY MEDICINE
Payer: MEDICAID

## 2025-04-19 ENCOUNTER — HOSPITAL ENCOUNTER (EMERGENCY)
Facility: HOSPITAL | Age: 55
Discharge: HOME | End: 2025-04-19
Attending: EMERGENCY MEDICINE
Payer: MEDICAID

## 2025-04-19 ENCOUNTER — APPOINTMENT (OUTPATIENT)
Dept: CT IMAGING | Facility: HOSPITAL | Age: 55
End: 2025-04-19
Attending: EMERGENCY MEDICINE
Payer: MEDICAID

## 2025-04-19 VITALS
OXYGEN SATURATION: 100 % | SYSTOLIC BLOOD PRESSURE: 113 MMHG | HEART RATE: 81 BPM | HEIGHT: 65 IN | BODY MASS INDEX: 27.49 KG/M2 | RESPIRATION RATE: 35 BRPM | WEIGHT: 165 LBS | DIASTOLIC BLOOD PRESSURE: 77 MMHG | TEMPERATURE: 97 F

## 2025-04-19 DIAGNOSIS — J90 PLEURAL EFFUSION, RIGHT: Primary | ICD-10-CM

## 2025-04-19 DIAGNOSIS — C34.90 MALIGNANT NEOPLASM OF LUNG, UNSPECIFIED LATERALITY, UNSPECIFIED PART OF LUNG (HCC): ICD-10-CM

## 2025-04-19 LAB
ALBUMIN SERPL-MCNC: 4 G/DL (ref 3.2–4.8)
ALBUMIN/GLOB SERPL: 1.7 {RATIO} (ref 1–2)
ALP LIVER SERPL-CCNC: 65 U/L (ref 41–108)
ALT SERPL-CCNC: 20 U/L (ref 10–49)
ANION GAP SERPL CALC-SCNC: 6 MMOL/L (ref 0–18)
AST SERPL-CCNC: 21 U/L (ref ?–34)
ATRIAL RATE: 87 BPM
BASOPHILS # BLD AUTO: 0.04 X10(3) UL (ref 0–0.2)
BASOPHILS NFR BLD AUTO: 1 %
BILIRUB SERPL-MCNC: 0.3 MG/DL (ref 0.3–1.2)
BUN BLD-MCNC: 14 MG/DL (ref 9–23)
CALCIUM BLD-MCNC: 9.6 MG/DL (ref 8.7–10.6)
CHLORIDE SERPL-SCNC: 106 MMOL/L (ref 98–112)
CO2 SERPL-SCNC: 27 MMOL/L (ref 21–32)
CREAT BLD-MCNC: 0.79 MG/DL (ref 0.55–1.02)
EGFRCR SERPLBLD CKD-EPI 2021: 89 ML/MIN/1.73M2 (ref 60–?)
EOSINOPHIL # BLD AUTO: 0.16 X10(3) UL (ref 0–0.7)
EOSINOPHIL NFR BLD AUTO: 4 %
ERYTHROCYTE [DISTWIDTH] IN BLOOD BY AUTOMATED COUNT: 13.3 %
GLOBULIN PLAS-MCNC: 2.3 G/DL (ref 2–3.5)
GLUCOSE BLD-MCNC: 113 MG/DL (ref 70–99)
HCT VFR BLD AUTO: 32 % (ref 35–48)
HGB BLD-MCNC: 10.9 G/DL (ref 12–16)
IMM GRANULOCYTES # BLD AUTO: 0.03 X10(3) UL (ref 0–1)
IMM GRANULOCYTES NFR BLD: 0.7 %
INR BLD: 0.94 (ref 0.8–1.2)
LYMPHOCYTES # BLD AUTO: 0.6 X10(3) UL (ref 1–4)
LYMPHOCYTES NFR BLD AUTO: 14.9 %
MCH RBC QN AUTO: 29.5 PG (ref 26–34)
MCHC RBC AUTO-ENTMCNC: 34.1 G/DL (ref 31–37)
MCV RBC AUTO: 86.5 FL (ref 80–100)
MONOCYTES # BLD AUTO: 0.59 X10(3) UL (ref 0.1–1)
MONOCYTES NFR BLD AUTO: 14.6 %
NEUTROPHILS # BLD AUTO: 2.61 X10 (3) UL (ref 1.5–7.7)
NEUTROPHILS # BLD AUTO: 2.61 X10(3) UL (ref 1.5–7.7)
NEUTROPHILS NFR BLD AUTO: 64.8 %
NT-PROBNP SERPL-MCNC: 90 PG/ML (ref ?–125)
OSMOLALITY SERPL CALC.SUM OF ELEC: 289 MOSM/KG (ref 275–295)
P AXIS: 4 DEGREES
P-R INTERVAL: 178 MS
PLATELET # BLD AUTO: 190 10(3)UL (ref 150–450)
POTASSIUM SERPL-SCNC: 4.3 MMOL/L (ref 3.5–5.1)
PROT SERPL-MCNC: 6.3 G/DL (ref 5.7–8.2)
PROTHROMBIN TIME: 12.7 SECONDS (ref 11.6–14.8)
Q-T INTERVAL: 362 MS
QRS DURATION: 80 MS
QTC CALCULATION (BEZET): 435 MS
R AXIS: 8 DEGREES
RBC # BLD AUTO: 3.7 X10(6)UL (ref 3.8–5.3)
SODIUM SERPL-SCNC: 139 MMOL/L (ref 136–145)
T AXIS: 21 DEGREES
TROPONIN I SERPL HS-MCNC: 3 NG/L (ref ?–34)
VENTRICULAR RATE: 87 BPM
WBC # BLD AUTO: 4 X10(3) UL (ref 4–11)

## 2025-04-19 PROCEDURE — 83880 ASSAY OF NATRIURETIC PEPTIDE: CPT | Performed by: EMERGENCY MEDICINE

## 2025-04-19 PROCEDURE — 71046 X-RAY EXAM CHEST 2 VIEWS: CPT | Performed by: EMERGENCY MEDICINE

## 2025-04-19 PROCEDURE — 93010 ELECTROCARDIOGRAM REPORT: CPT

## 2025-04-19 PROCEDURE — 85610 PROTHROMBIN TIME: CPT | Performed by: EMERGENCY MEDICINE

## 2025-04-19 PROCEDURE — 93005 ELECTROCARDIOGRAM TRACING: CPT

## 2025-04-19 PROCEDURE — 71275 CT ANGIOGRAPHY CHEST: CPT | Performed by: EMERGENCY MEDICINE

## 2025-04-19 PROCEDURE — 99285 EMERGENCY DEPT VISIT HI MDM: CPT

## 2025-04-19 PROCEDURE — 80053 COMPREHEN METABOLIC PANEL: CPT | Performed by: EMERGENCY MEDICINE

## 2025-04-19 PROCEDURE — 32555 ASPIRATE PLEURA W/ IMAGING: CPT | Performed by: EMERGENCY MEDICINE

## 2025-04-19 PROCEDURE — 36415 COLL VENOUS BLD VENIPUNCTURE: CPT

## 2025-04-19 PROCEDURE — 84484 ASSAY OF TROPONIN QUANT: CPT | Performed by: EMERGENCY MEDICINE

## 2025-04-19 PROCEDURE — 85025 COMPLETE CBC W/AUTO DIFF WBC: CPT | Performed by: EMERGENCY MEDICINE

## 2025-04-19 PROCEDURE — 99284 EMERGENCY DEPT VISIT MOD MDM: CPT

## 2025-04-19 NOTE — ED PROVIDER NOTES
Patient Seen in: [facility] Emergency Department      History     Chief Complaint   Patient presents with    Difficulty Breathing     Stated Complaint: Pt is having difficulty breathing, Pt has a Hx of lungs being drained and says *    Subjective:   55-year-old female, history of lung cancer on chemotherapy and immunotherapy, last treatment 3 weeks ago, sees Dr. Coronado, presents with shortness of breath.  Patient states that she had a thoracentesis for a right pleural effusion about 3 weeks ago and this feels similar.  No chest pain.  No fevers.  Minimal cough.  No abdominal pain.  No vomiting diarrhea.  No other complaints          History of Present Illness               Objective:     Past Medical History:    Abnormal uterine bleeding    bleeds every 2 weeks    Anxiety state    Asthma (HCC)    Attention deficit hyperactivity disorder (ADHD)    BACK PAIN    Back problem    lumbar radiculopathy    Cancer (HCC)    adenocarcinoma of right lung    MARCIO II (cervical intraepithelial neoplasia II)    DDD (degenerative disc disease), lumbar    DDD (degenerative disc disease), thoracic    Depression    Disorder of thyroid    Dyspareunia    Exposure to medical diagnostic radiation    On hold since 4/2022    Fall    Fibromyalgia    Fibromyalgia    Genital warts    High blood pressure    History of COVID-19    COVID + 7/2020 Headache - NO Hospitalization needed     History of lumbar fusion    Hx of motion sickness    IBS (irritable bowel syndrome)    Per patient - Just constipation    Infertility, female    Lumbar radiculopathy    Mild dysplasia of cervix    Pap smear for cervical cancer screening    pt states normal    Papanicolaou smear of cervix with high grade squamous intraepithelial lesion (HGSIL)    Personal history of antineoplastic chemotherapy    Last chemo 7/12/22 prior to lung bx 8/4/22    Post covid-19 condition, unspecified    symptoms: HA; s/s resolved-yes; not hospitalized    Problems with swallowing     with radiation    Sexually transmitted disease    HPV    Substance abuse (HCC)    cocaine    Urinary incontinence    Visual impairment    glasses/contacts bifocals              Past Surgical History:   Procedure Laterality Date    Appendectomy  1980    Back surgery  2009    fusion L5-S1    Back surgery  2021    RIGHT LUMBAR 3/ LUMBAR 4, LUMBAR 4/ LUMBAR 5 HEMILAMINTOMIES, LEFT LUMBAR 2 / LUMBAR 3 MICRODISCECTOMY    Colonoscopy N/A 2023    Procedure: COLONOSCOPY;  Surgeon: Devante Kern MD;  Location:  ENDOSCOPY    Colposcopy,bx cervix/endocerv curr  11    MARCIO 1    Laparoscopy procedure unlisted      Ovarian cyst removed    Leep  2011    MARCIO 2          X2    Other surgical history      bunions bilateral    Other surgical history      nodule lymph nodes neck    Other surgical history       Bladder sling    Other surgical history  2020    Cystoscopy, Dr Beach                Social History     Socioeconomic History    Marital status:    Tobacco Use    Smoking status: Former     Current packs/day: 0.00     Average packs/day: 0.8 packs/day for 19.0 years (14.5 ttl pk-yrs)     Types: Cigarettes     Start date:      Quit date: 2010     Years since quitting: 15.3     Passive exposure: Past    Smokeless tobacco: Former     Quit date: 2024    Tobacco comments:     Has not vaped since 2024.    Vaping Use    Vaping status: Former    Substances: Nicotine, daily    Devices: Pre-filled pod   Substance and Sexual Activity    Alcohol use: Not Currently     Comment: 3 drinks per week    Drug use: Not Currently     Types: Cocaine     Comment: USED COCAINE BETW [id number]    Sexual activity: Yes     Partners: Male   Other Topics Concern    Caffeine Concern Yes     Comment: 3-4 daily of pop    Exercise No     Comment: not tolerated right now     Social Drivers of Health     Food Insecurity: No Food Insecurity (2024)    Food Insecurity     Food Insecurity: Never  true   Transportation Needs: No Transportation Needs (7/12/2024)    Transportation Needs     Lack of Transportation: No   Housing Stability: Low Risk  (7/12/2024)    Housing Stability     Housing Instability: No                                Physical Exam     ED Triage Vitals [04/19/25 1036]   /72   Pulse 95   Resp (!) 34   Temp 97 °F (36.1 °C)   Temp src Temporal   SpO2 100 %   O2 Device None (Room air)       Current Vitals:   Vital Signs  BP: 105/72  Pulse: 95  Resp: (!) 34  Temp: 97 °F (36.1 °C)  Temp src: Temporal    Oxygen Therapy  SpO2: 100 %  O2 Device: None (Room air)        Physical Exam  Vitals and nursing note reviewed.   Constitutional:       General: She is not in acute distress.     Appearance: She is not toxic-appearing.   HENT:      Head: Normocephalic.   Cardiovascular:      Rate and Rhythm: Normal rate.   Pulmonary:      Breath sounds: Examination of the right-lower field reveals decreased breath sounds. Decreased breath sounds present.   Chest:      Chest wall: No tenderness or crepitus.   Abdominal:      Tenderness: There is no abdominal tenderness.   Musculoskeletal:         General: Normal range of motion.   Skin:     General: Skin is warm and dry.   Neurological:      General: No focal deficit present.      Mental Status: She is alert.   Psychiatric:         Mood and Affect: Mood normal.         Behavior: Behavior normal.           Physical Exam                ED Course   Labs Reviewed - No data to display  CTA CHEST (CPT=71275)  Result Date: 4/19/2025  CONCLUSION:   1. No evidence of pulmonary embolus.  2. Large right pleural effusion with significant atelectasis is again noted.  Consolidation in the right upper lobe and right lower lobe adjacent to the right major fissure and horizontal fissure is increased since prior examination.  This may partially be due to scarring and post treatment change.     LOCATION:  [facility]   Dictated by (CST): Inder Kingston MD on 4/19/2025 at 12:18 PM      Finalized by (CST): Inder Kingston MD on 4/19/2025 at 12:21 PM       XR CHEST PA + LAT CHEST (TGW=84143)  Result Date: 4/19/2025  CONCLUSION:  No acute cardiopulmonary abnormality.   LOCATION:  [facility]   Dictated by (CST): John Hampton MD on 4/19/2025 at 11:05 AM     Finalized by (CST): John Hampton MD on 4/19/2025 at 11:05 AM       I independent interpreted the CT of the chest and note the large right-sided pleural effusion which was not obvious on the chest x-ray    External chart review demonstrates outpatient thoracentesis beginning of March of this year with IR    Differential diagnosis includes, but limited to, PE, pneumonia, metastatic disease, pleural effusion    55-year-old female presents with shortness of breath.  Felt like her previous pleural effusion.  Not seen on x-ray but on CTA did demonstrate a large right pleural effusion.  Spoke with IR, they are here, can perform the procedure and that she be discharged home afterwards.  Initially plan on admission to hospital and spoke with Dr. Cruz, I discussed with him, also with pulm and canceled consultation.  Plan for thoracentesis and discharged home.    Patient was screened and evaluated during this visit.  As the treating physician attending to the patient, I determined within reasonable clinical confidence and prior to discharge, that an emergency medical condition was not or was no longer present.  There was no indication for further evaluation, treatment, or admission on an emergency basis.  Comprehensive verbal and written discharge and follow-up instructions were provided to help prevent relapse or worsening.  Patient was instructed to follow-up with their primary care provider for further evaluation and treatment, return immediately to ER for worsening, concerning, new, or changing/persisting symptoms. I discussed the case with the patient and they had no questions, complaints, or concerns.  Patient was comfortable going home.     Per  the discharge paperwork, patients are encouraged to and given instructions on how to sign [facility] for [facility], where they have access to their records, including any/all incidental findings.     This note was prepared using Dragon Medical voice recognition dictation software. As a result errors may occur. When identified these errors have been corrected. While every attempt is made to correct errors during dictation discrepancies may still exist    Note to patient: The 21st Century Cures Act makes medical notes like these available to patients in the interest of transparency. However, this is a medical document intended as peer to peer communication. It is written in medical language and may contain abbreviations or verbiage that are unfamiliar. It may appear blunt or direct. Medical documents are intended to carry relevant information, facts as evident, and the clinical opinion of the practitioner.        Results              EKG sinus rhythm 87 bpm.  Normal axis.  No ST elevations.  , QRS 80,  ms.    Patient placed on cardiac monitor for telemetry monitoring secondary to sob. Interpretation at bedside by me is sinus rhythm.                     MDM              Medical Decision Making      Disposition and Plan     Clinical Impression:  No diagnosis found.     Disposition:  There is no disposition on file for this visit.  There is no disposition time on file for this visit.    Follow-up:  No follow-up provider specified.        Medications Prescribed:  Current Discharge Medication List          Supplementary Documentation:

## 2025-04-19 NOTE — ED INITIAL ASSESSMENT (HPI)
PT PRESENTS TO ED WITH SHORTNESS OF BREATH, STATES SHE IS UNABLE TO TAKE A DEEP BREATH, ALSO STATES SHE HAS AN ELEVATED HEART RATE.  STATES SHE HAS DYSPNEA AT REST.  SYMPTOMS STARTED WEDNESDAY, HAS HX OF LUNG CANCER.  PT IS ALSO ON CHEMO AND IMMUNOTHERAPY

## 2025-04-19 NOTE — PROCEDURES
[facility]   part of [facility]  Procedure Note    Hanna Barrett Patient Status:  Emergency    1970 MRN [id number]   Location [facility] EMERGENCY DEPARTMENT Attending Edd Camacho MD   Hosp Day # 0 PCP Guillermo Curry MD     Procedure: Right thoracentesis    Pre-Procedure Diagnosis:  Right pleural effusion    Post-Procedure Diagnosis: Same    Anesthesia:  Local    Findings:  Right pleural effusion. Return of yellow serous fluid. 650ml removed  See full dictation for further details.  Post CXR ordered.    Specimens: As ordered    Blood Loss:  <5ml    Tourniquet Time: 0  Complications:  None  Drains:  None    Secondary Diagnosis:  None      Alma Lopez MD  2025

## 2025-04-19 NOTE — INTERVAL H&P NOTE
The above referenced H&P was reviewed by Alma Lopez MD on 4/19/2025, the patient was examined and no significant changes have occurred in the patient's condition since the H&P was performed.  Risks, benefits, alternative treatments and consequences of no treatment were discussed.  We will proceed with procedure as planned.      Alma Lopez MD  4/19/2025  1:40 PM

## 2025-04-19 NOTE — ED PROVIDER NOTES
Case endorsed to me, 55-year-old here with pleural effusion status post IR thoracentesis.  Repeat x-ray pending and if negative for any complications plan for discharge home.  Patient remains hemodynamically stable, repeat x-ray with resolution and no complications.

## 2025-04-20 ENCOUNTER — PATIENT MESSAGE (OUTPATIENT)
Dept: FAMILY MEDICINE CLINIC | Facility: CLINIC | Age: 55
End: 2025-04-20

## 2025-04-20 DIAGNOSIS — E03.9 HYPOTHYROIDISM, UNSPECIFIED TYPE: Primary | ICD-10-CM

## 2025-04-21 NOTE — TELEPHONE ENCOUNTER
Plz review/advise on 3/28 thyroid labs--ordered under chandrika. Pt asking if dose change is recommended?     Please advise, confirms dosing as 25mcg daily. Funk if we send higher dose  thx

## 2025-04-21 NOTE — TELEPHONE ENCOUNTER
I think I started the 25 mcg in response to that lab, need to recheck in a few weeks, along with thyroid antibodies. Previously wasn't on medication.    So would recheck Mid alix Curry MD

## 2025-04-21 NOTE — TELEPHONE ENCOUNTER
S/w pt on this  She was not aware of other labs.  I placed order for tsh/t4, has antibodies in place.  Will have labs Friday.

## 2025-04-25 ENCOUNTER — APPOINTMENT (OUTPATIENT)
Age: 55
End: 2025-04-25
Attending: INTERNAL MEDICINE
Payer: MEDICAID

## 2025-04-25 ENCOUNTER — OFFICE VISIT (OUTPATIENT)
Age: 55
End: 2025-04-25
Attending: INTERNAL MEDICINE
Payer: MEDICAID

## 2025-04-25 VITALS
HEART RATE: 116 BPM | WEIGHT: 176 LBS | OXYGEN SATURATION: 97 % | BODY MASS INDEX: 30.42 KG/M2 | RESPIRATION RATE: 16 BRPM | TEMPERATURE: 98 F | SYSTOLIC BLOOD PRESSURE: 129 MMHG | DIASTOLIC BLOOD PRESSURE: 79 MMHG | HEIGHT: 63.74 IN

## 2025-04-25 DIAGNOSIS — R04.2 HEMOPTYSIS: ICD-10-CM

## 2025-04-25 DIAGNOSIS — J91.0 MALIGNANT PLEURAL EFFUSION (HCC): ICD-10-CM

## 2025-04-25 DIAGNOSIS — R59.0 MEDIASTINAL ADENOPATHY: ICD-10-CM

## 2025-04-25 DIAGNOSIS — D64.9 ANEMIA, NORMOCYTIC NORMOCHROMIC: Primary | ICD-10-CM

## 2025-04-25 DIAGNOSIS — R11.0 CHEMOTHERAPY-INDUCED NAUSEA: ICD-10-CM

## 2025-04-25 DIAGNOSIS — T45.1X5A CHEMOTHERAPY-INDUCED NAUSEA: ICD-10-CM

## 2025-04-25 DIAGNOSIS — C34.91 PRIMARY LUNG CANCER WITH METASTASIS FROM LUNG TO OTHER SITE, RIGHT (HCC): ICD-10-CM

## 2025-04-25 DIAGNOSIS — D64.9 ANEMIA, NORMOCYTIC NORMOCHROMIC: ICD-10-CM

## 2025-04-25 DIAGNOSIS — G89.3 NEOPLASM RELATED PAIN: ICD-10-CM

## 2025-04-25 DIAGNOSIS — C34.91 PRIMARY ADENOCARCINOMA OF RIGHT LUNG (HCC): ICD-10-CM

## 2025-04-25 DIAGNOSIS — R59.0 MEDIASTINAL ADENOPATHY: Primary | ICD-10-CM

## 2025-04-25 DIAGNOSIS — R53.83 FATIGUE DUE TO TREATMENT: ICD-10-CM

## 2025-04-25 DIAGNOSIS — R59.0 SUPRACLAVICULAR ADENOPATHY: ICD-10-CM

## 2025-04-25 LAB
ALBUMIN SERPL-MCNC: 4.5 G/DL (ref 3.2–4.8)
ALBUMIN/GLOB SERPL: 2 {RATIO} (ref 1–2)
ALP LIVER SERPL-CCNC: 76 U/L (ref 41–108)
ALT SERPL-CCNC: 15 U/L (ref 10–49)
ANION GAP SERPL CALC-SCNC: 10 MMOL/L (ref 0–18)
AST SERPL-CCNC: 21 U/L (ref ?–34)
BASOPHILS # BLD: 0.22 X10(3) UL (ref 0–0.2)
BASOPHILS NFR BLD: 2 %
BILIRUB SERPL-MCNC: 0.2 MG/DL (ref 0.3–1.2)
BUN BLD-MCNC: 11 MG/DL (ref 9–23)
CALCIUM BLD-MCNC: 9.9 MG/DL (ref 8.7–10.6)
CHLORIDE SERPL-SCNC: 104 MMOL/L (ref 98–112)
CO2 SERPL-SCNC: 28 MMOL/L (ref 21–32)
CREAT BLD-MCNC: 0.87 MG/DL (ref 0.55–1.02)
DEPRECATED HBV CORE AB SER IA-ACNC: 77 NG/ML (ref 50–306)
EGFRCR SERPLBLD CKD-EPI 2021: 79 ML/MIN/1.73M2 (ref 60–?)
EOSINOPHIL # BLD: 0.65 X10(3) UL (ref 0–0.7)
EOSINOPHIL NFR BLD: 6 %
ERYTHROCYTE [DISTWIDTH] IN BLOOD BY AUTOMATED COUNT: 15.2 %
GLOBULIN PLAS-MCNC: 2.3 G/DL (ref 2–3.5)
GLUCOSE BLD-MCNC: 89 MG/DL (ref 70–99)
HCT VFR BLD AUTO: 28.8 % (ref 35–48)
HGB BLD-MCNC: 9 G/DL (ref 12–16)
LYMPHOCYTES NFR BLD: 1.4 X10(3) UL (ref 1–4)
LYMPHOCYTES NFR BLD: 13 %
MCH RBC QN AUTO: 23.4 PG (ref 26–34)
MCHC RBC AUTO-ENTMCNC: 31.3 G/DL (ref 31–37)
MCV RBC AUTO: 74.8 FL (ref 80–100)
METAMYELOCYTES # BLD: 0.11 X10(3) UL (ref ?–0.01)
METAMYELOCYTES NFR BLD: 1 %
MONOCYTES # BLD: 1.4 X10(3) UL (ref 0.1–1)
MONOCYTES NFR BLD: 13 %
NEUTROPHILS # BLD AUTO: 6.52 X10 (3) UL (ref 1.5–7.7)
NEUTROPHILS NFR BLD: 65 %
NEUTS HYPERSEG # BLD: 7.02 X10(3) UL (ref 1.5–7.7)
OSMOLALITY SERPL CALC.SUM OF ELEC: 293 MOSM/KG (ref 275–295)
PLATELET # BLD AUTO: 435 10(3)UL (ref 150–450)
PLATELET MORPHOLOGY: NORMAL
PLATELETS.RETICULATED NFR BLD AUTO: 1.7 % (ref 0–7)
POTASSIUM SERPL-SCNC: 3.8 MMOL/L (ref 3.5–5.1)
PROT SERPL-MCNC: 6.8 G/DL (ref 5.7–8.2)
RBC # BLD AUTO: 3.85 X10(6)UL (ref 3.8–5.3)
SODIUM SERPL-SCNC: 142 MMOL/L (ref 136–145)
T4 FREE SERPL-MCNC: 1.1 NG/DL (ref 0.8–1.7)
TOTAL CELLS COUNTED BLD: 100
TSI SER-ACNC: 9.25 UIU/ML (ref 0.55–4.78)
WBC # BLD AUTO: 10.8 X10(3) UL (ref 4–11)

## 2025-04-25 RX ORDER — DIPHENHYDRAMINE HYDROCHLORIDE 50 MG/ML
50 INJECTION, SOLUTION INTRAMUSCULAR; INTRAVENOUS ONCE
Status: COMPLETED | OUTPATIENT
Start: 2025-04-25 | End: 2025-04-25

## 2025-04-25 RX ORDER — FAMOTIDINE 10 MG/ML
20 INJECTION, SOLUTION INTRAVENOUS ONCE
Status: CANCELLED | OUTPATIENT
Start: 2025-04-25

## 2025-04-25 RX ORDER — FAMOTIDINE 10 MG/ML
20 INJECTION, SOLUTION INTRAVENOUS ONCE
Status: COMPLETED | OUTPATIENT
Start: 2025-04-25 | End: 2025-04-25

## 2025-04-25 RX ORDER — DIPHENHYDRAMINE HYDROCHLORIDE 50 MG/ML
50 INJECTION, SOLUTION INTRAMUSCULAR; INTRAVENOUS ONCE
Status: CANCELLED | OUTPATIENT
Start: 2025-04-25

## 2025-04-25 RX ADMIN — DIPHENHYDRAMINE HYDROCHLORIDE 50 MG: 50 INJECTION, SOLUTION INTRAMUSCULAR; INTRAVENOUS at 11:15:00

## 2025-04-25 RX ADMIN — FAMOTIDINE 20 MG: 10 INJECTION, SOLUTION INTRAVENOUS at 11:18:00

## 2025-04-25 NOTE — PROGRESS NOTES
Cancer Center Progress Note    Patient Name: Hanna Barrett   YOB: 1970   Medical Record Number: [id number]   CSN: [id number]   Attending Physician: Shoshana Gordon M.D.   Referring Physician: MD Dr. Flynn Geronimo    Date of Visit: 4/25/2025     Chief Complaint:  Chief Complaint   Patient presents with    Follow - Up     Lung caner pt here for f/u and treatment. She c/o increasing fatigue, MARTINS improved with thoracentesis last week. Denies cough/fevers. Thigh pain improved, has new right sided abdomen/side/back pain. Chronic constipation but had BM today. States left neck node decreased, shoulder pain continues.          Hem/Onc History:  Stage IIIC NSCLC adenocarcinoma of the right lung diagnosed 12/2021    Oncologic History:  5/2021: patient presented with abnormal preoperative CXR; left apical 8 mm nodule with irregular margins, and a right middle lobe soft tissue density with associated bronchiectasis medially and mild adjacent nonspecific ground glass density, overall stable since 2/2021.     11/2021: CT scan of the chest demonstrated a spiculated airspace density within the right middle lobe worrisome for primary lung malignancy along with mediastinal lymph nodes.  12/7/2021: PET/CT scan showed a hypermetabolic RML and RUL mass with enlargement of mediastinal lymph nodes.    12/13/2021: bronchoscopy and transbronchial needle aspiration to subcarinal lymph node was positive for metastatic adenocarcinoma. PD-L1 <1%, EGFR, ROS1, ALK, KRAS, RET, BRAF all negative.     1/17/2022: concurrent chemoradiation with cisplatin + pemetrexed at [facility] (Dr. Subramanian) completed in 4/2022 5/13/2022: started consolidative durvalumab q4 weeks    7/2022: PET concerning for progression of disease     8/12/2022: transferred care to Dr. Sanchez at [facility]    8/16/2022: bronchoscopy and biopsies were negative for malignancy.     9/14/2022: CT scan showed some improvement     11/14/2022: PET scan continues  to show improvement with interval decrease in the right lung opacities with decreased uptake, may represent scarring and posttreatment change. Increased uptake distal esophagus.     1/17/2023: EGD with normal esophagus and negative biopsies.     2/17/2023: CT chest stable.      3/31/2023: completed 12 cycles of durvalumab.     - care transferred to Dr. Baker when Dr. Sanchez retired    4/7/2023: bronchoscopy for hemoptysis was unremarkable.   5/17/2023: CT chest stable.      - care transferred to me 11/2023 from Dr. Baker  who now practices primarily in [place]    11/2024:  Imaging done just prior showed stable right perihilar consolidation which compared to her previous PET showed no uptake in these areas and therefore suggestive of no significant residual or recurrent neoplasm. No new findings described in C/A/P with devrease in size of a liver lesion favored to be a hemangioma. C/o chronic fatigue and some SOB and cough. Had reported some visual changes and was seen at [facility]. Reports no evidence of malignancy seen. As felt relatively well with recent good scans she opted to have her port removed. This was removed by IR on 11/26/24.    Early 1/2025 she saw her PCP c/o nasal and sinus congestion with pressure, cough, PND, fullness in the ears. No adenopathy was noted on exam. She was placed on Augmentin for sinusitis.     She then reported feeling a growth in her nose causing a deformity with nasal obstructive symptoms. Saw ENT 1/29/25 (Laila). Sinus CT scans were ordered and was referred to plastics. Scans showed no nasal mass or abnormality of the soft tissues but did show postoperative changes from previous sinus surgery. Did see plastics and was felt to have a dermoid cyst.     The following month c/o left supraclavicular node which was increasing in size and tender. Saw PCP who ordered imaging. Thought to have Virchow node. US of the neck and CT abdomen/pelvis were ordered. US showed  nonspecific appearing supraclav node. CT of the abdomen and pelvis showed a moderate to large right pleural effusion that was not present 9/2024 CT. Dedicated CT chest was ordered. Testing results were reviewed. US guided biopsy of left supraclav node was ordered. CT chest confirmed new right pleural effusion and was referred to pulmonary for tap.     Biopsy of the left supraclav node on 2/27/25 showed metastatic adenocarcinoma c/w lung primary. US guided right thoracentesis drained 1500 mL of fluid. Cytology from the right pleural effusion showed metastatic carcinoma c/w lung adenocarcinoma.       History of Present Illness:  Hanna is here to start systemic therapy. Guardant did not show any targetable mutations: CDK6, PIK3CA, TP53). PDL-1 came back at <1% NGS could not be run with initial sample sent by path as was insufficient and another specimen has been sent. She wanted to get going with treatment.     She reported increasing MARTINS, cough and left neck nodes which have been painful she said.  She also has pain in her left shoulder, left collarbone and lower abdomen. She takes Norco for pain. She has noticed some blood tinged sputum when she coughs. Denies chest pain, change in bowel or urinary habits, headaches, dizziness. Fibromyalgia feels worse she says. No fevers.     Discussed tempus  Muscle aches  Nodess and neck swelling less  Right sided below rib pain and pelvic pain  Venofer CT before next visit. Will decide if more chemo but I favor not especially if CT shows iprovemnt already      Current Medications:    Current Outpatient Medications:     maalox/diphenhydramine/lidocaine Oral Suspension, Take 5 mL by mouth 4 (four) times daily before meals and nightly., Disp: 500 mL, Rfl: 1    morphINE 15 MG Oral Tab, Take 0.5-1 tablets (7.5-15 mg total) by mouth every 4 (four) hours as needed for Pain., Disp: 45 tablet, Rfl: 0    pregabalin 50 MG Oral Cap, Take 1 capsule (50 mg total) by mouth 3 (three) times  daily., Disp: 30 capsule, Rfl: 1    predniSONE 10 MG Oral Tab, Take 1 tablet (10 mg total) by mouth 2 (two) times daily. Start day after chemo, Disp: 10 tablet, Rfl: 1    levothyroxine 25 MCG Oral Tab, Take 1 tablet (25 mcg total) by mouth before breakfast., Disp: 90 tablet, Rfl: 1    lisinopril-hydroCHLOROthiazide 20-12.5 MG Oral Tab, Take 0.5 tablets by mouth daily., Disp: 45 tablet, Rfl: 3    prochlorperazine (COMPAZINE) 10 mg tablet, Take 1 tablet (10 mg total) by mouth every 6 (six) hours as needed for Nausea., Disp: 30 tablet, Rfl: 3    ondansetron (ZOFRAN) 8 MG tablet, Take 1 tablet (8 mg total) by mouth every 8 (eight) hours as needed for Nausea., Disp: 30 tablet, Rfl: 3    HYDROcodone-acetaminophen  MG Oral Tab, Take 1 tablet by mouth every 8 (eight) hours as needed for Pain., Disp: 60 tablet, Rfl: 0    dilTIAZem HCl 120 MG Oral Tab, Take 1 tablet (120 mg total) by mouth in the morning., Disp: , Rfl:     montelukast 10 MG Oral Tab, Take 1 tablet (10 mg total) by mouth nightly., Disp: 90 tablet, Rfl: 3    albuterol 108 (90 Base) MCG/ACT Inhalation Aero Soln, Inhale 2 puffs into the lungs every 6 (six) hours as needed for Wheezing or Shortness of Breath., Disp: 3 each, Rfl: 3    amphetamine-dextroamphetamine 15 MG Oral Tab, , Disp: , Rfl:     ARIPiprazole 2 MG Oral Tab, , Disp: , Rfl:     VYVANSE 70 MG Oral Cap, Take 1 capsule (70 mg total) by mouth every morning., Disp: , Rfl:     SYMBICORT 160-4.5 MCG/ACT Inhalation Aerosol, Inhale 2 puffs into the lungs in the morning and 2 puffs before bedtime., Disp: , Rfl:     cetirizine 10 MG Oral Tab, Take 1 tablet (10 mg total) by mouth daily., Disp: 90 tablet, Rfl: 1    ferrous sulfate 325 (65 FE) MG Oral Tab EC, Take 1 tablet (325 mg total) by mouth daily with breakfast., Disp: , Rfl:     Multiple Vitamin Oral Tab, Take 1 tablet by mouth in the morning., Disp: , Rfl:     ibuprofen 200 MG Oral Tab, Take 4 tablets (800 mg total) by mouth every 8 (eight) hours  as needed for Pain., Disp: , Rfl:     cholecalciferol 25 MCG (1000 UT) Oral Cap, Take 1 capsule (1,000 Units total) by mouth in the morning., Disp: , Rfl: 0    DULoxetine HCl 60 MG Oral Cap DR Particles, Take 1 capsule (60 mg total) by mouth in the morning., Disp: , Rfl: 2    Past Medical History:  Past Medical History:    Abnormal uterine bleeding    bleeds every 2 weeks    Anxiety state    Asthma (HCC)    Attention deficit hyperactivity disorder (ADHD)    BACK PAIN    Back problem    lumbar radiculopathy    Cancer (HCC)    adenocarcinoma of right lung    MARCIO II (cervical intraepithelial neoplasia II)    DDD (degenerative disc disease), lumbar    DDD (degenerative disc disease), thoracic    Depression    Disorder of thyroid    Dyspareunia    Exposure to medical diagnostic radiation    On hold since 4/2022    Fall    Fibromyalgia    Fibromyalgia    Genital warts    High blood pressure    History of COVID-19    COVID + 7/2020 Headache - NO Hospitalization needed     History of lumbar fusion    Hx of motion sickness    IBS (irritable bowel syndrome)    Per patient - Just constipation    Infertility, female    Lumbar radiculopathy    Mild dysplasia of cervix    Pap smear for cervical cancer screening    pt states normal    Papanicolaou smear of cervix with high grade squamous intraepithelial lesion (HGSIL)    Personal history of antineoplastic chemotherapy    Last chemo 7/12/22 prior to lung bx 8/4/22    Post covid-19 condition, unspecified    symptoms: HA; s/s resolved-yes; not hospitalized    Problems with swallowing    with radiation    Sexually transmitted disease    HPV    Substance abuse (HCC)    cocaine    Urinary incontinence    Visual impairment    glasses/contacts bifocals       Past Surgical History:  Past Surgical History:   Procedure Laterality Date    Appendectomy  1980    Back surgery  2009    fusion L5-S1    Back surgery  03/03/2021    RIGHT LUMBAR 3/ LUMBAR 4, LUMBAR 4/ LUMBAR 5 HEMILAMINTOMIES, LEFT  LUMBAR 2 / LUMBAR 3 MICRODISCECTOMY    Colonoscopy N/A 2023    Procedure: COLONOSCOPY;  Surgeon: Devante Kern MD;  Location:  ENDOSCOPY    Colposcopy,bx cervix/endocerv curr  11    MARCIO 1    Laparoscopy procedure unlisted      Ovarian cyst removed    Leep  2011    MARCIO 2          X2    Other surgical history      bunions bilateral    Other surgical history      nodule lymph nodes neck    Other surgical history  2016     Bladder sling    Other surgical history  2020    Cystoscopy, Dr Beach       Family Medical History:  Family History   Problem Relation Age of Onset    Other (lung CA) Self     Hypertension Mother     Diabetes Mother     Heart Disease Mother     Heart Disease Father     Other (lung cancer) Father 55        Lung Cancer    Other (pancreatic cancer) Sister 47        Pancreatic Cancer    Cancer Sister 51        lung CA    Other (Other) Sister         Back problems    Other (Other) Son         anxiety    Other (Other) Son         behavioral problems    Diabetes Maternal Grandmother     Breast Cancer Maternal Grandmother         dx late-60s    Stroke Maternal Grandfather 86    Dementia Paternal Grandmother     Heart Disorder Paternal Grandfather     Cancer Maternal Aunt 50        LUNG CA 51    Breast Cancer Maternal Aunt         dx 46-47y    Ovarian Cancer Maternal Cousin Female 33         of ovarian ca at 35y       Gyne History:  OB History    Para Term  AB Living   2 2 2 0 0 2   SAB IAB Ectopic Multiple Live Births   0 0 0 0 2       Psychosocial History:  Social History     Socioeconomic History    Marital status:      Spouse name: Not on file    Number of children: Not on file    Years of education: Not on file    Highest education level: Not on file   Occupational History    Not on file   Tobacco Use    Smoking status: Former     Current packs/day: 0.00     Average packs/day: 0.8 packs/day for 19.0 years (14.5 ttl pk-yrs)     Types:  Cigarettes     Start date: 1986     Quit date: 2010     Years since quitting: 15.3     Passive exposure: Past    Smokeless tobacco: Former     Quit date: 6/1/2024    Tobacco comments:     Has not vaped since 6/1/2024.    Vaping Use    Vaping status: Former    Substances: Nicotine, daily    Devices: Pre-filled pod   Substance and Sexual Activity    Alcohol use: Not Currently     Comment: 3 drinks per week    Drug use: Not Currently     Types: Cocaine     Comment: USED COCAINE BETW [id number]    Sexual activity: Yes     Partners: Male   Other Topics Concern     Service Not Asked    Blood Transfusions Not Asked    Caffeine Concern Yes     Comment: 3-4 daily of pop    Occupational Exposure Not Asked    Hobby Hazards Not Asked    Sleep Concern Not Asked    Stress Concern Not Asked    Weight Concern Not Asked    Special Diet Not Asked    Back Care Not Asked    Exercise No     Comment: not tolerated right now    Bike Helmet Not Asked    Seat Belt Not Asked    Self-Exams Not Asked   Social History Narrative    Not on file     Social Drivers of Health     Food Insecurity: No Food Insecurity (7/12/2024)    Food Insecurity     Food Insecurity: Never true   Transportation Needs: No Transportation Needs (7/12/2024)    Transportation Needs     Lack of Transportation: No     Car Seat: Not on file   Housing Stability: Low Risk  (7/12/2024)    Housing Stability     Housing Instability: No     Housing Instability Emergency: Not on file     Crib or Bassinette: Not on file         Allergies:  Allergies   Allergen Reactions    Gabapentin SWELLING and UNKNOWN    Erythromycin UNKNOWN    Clindamycin RASH        Review of Systems:  A 14-point ROS was done with pertinent positives and negative per the HPI    Vital Signs:   Day 1,  Cycle 1  03/28/25   Height 1.619 m (5' 3.74\") [1]   Weight 76 kg (167 lb 9.6 oz)   BSA (Calculated - sq m) 1.81 sq meters   BMI (Calculated) 29 kg/m2   /63   Pulse 92   BP Location Right arm   Patient  Position Sitting   Temp 96 °F        Physical Examination:  General: Patient is alert and oriented x 3, not in acute distress.  Psych:  Mood and affect appropriate  HEENT: EOMs intact. PERRL. Oropharynx is clear.   Neck: No JVD. Left palpable lymphadenopathy supraclav - 2 discrete nodes felt, slightly larger than when last seen but more swelling/fullness in area with tenderness. Neck is supple.  Chest: Diminished BS  Heart: Regular rate and rhythm.   Abdomen: Soft, non tender with good bowel sounds.  No hepatosplenomegaly.  No palpable mass.  Extremities: Pedal pulses are present. No BLE edema.  Neurological: Grossly intact.       Laboratory:  Lab Results   Component Value Date    WBC 4.0 04/19/2025    RBC 3.70 (L) 04/19/2025    HGB 10.9 (L) 04/19/2025    HCT 32.0 (L) 04/19/2025    .0 04/19/2025    MPV 9.4 09/02/2012    MCV 86.5 04/19/2025    MCH 29.5 04/19/2025    MCHC 34.1 04/19/2025    RDW 13.3 04/19/2025    NEPRELIM 2.61 04/19/2025    NEUTABS 11.73 (H) 04/03/2022    LYMPHABS 0.83 (L) 04/03/2022    EOSABS 0.55 04/03/2022    BASABS 0.00 04/03/2022    NEUT 73 04/03/2022    LYMPH 6 04/03/2022    MON 5 04/03/2022    EOS 4 04/03/2022    BASO 0 04/03/2022    NEPERCENT 64.8 04/19/2025    LYPERCENT 14.9 04/19/2025    MOPERCENT 14.6 04/19/2025    EOPERCENT 4.0 04/19/2025    BAPERCENT 1.0 04/19/2025    NE 2.61 04/19/2025    LYMABS 0.60 (L) 04/19/2025    MOABSO 0.59 04/19/2025    EOABSO 0.16 04/19/2025    BAABSO 0.04 04/19/2025     Lab Results   Component Value Date     (H) 04/19/2025    BUN 14 04/19/2025    BUNCREA 18.0 03/21/2022    CREATSERUM 0.79 04/19/2025    ANIONGAP 6 04/19/2025     01/28/2018    GFRNAA 61 04/03/2022    GFRAA 70 04/03/2022    CA 9.6 04/19/2025    OSMOCALC 289 04/19/2025    ALKPHO 65 04/19/2025    AST 21 04/19/2025    ALT 20 04/19/2025    BILT 0.3 04/19/2025    TP 6.3 04/19/2025    ALB 4.0 04/19/2025    GLOBULIN 2.3 04/19/2025     04/19/2025    K 4.3 04/19/2025      04/19/2025    CO2 27.0 04/19/2025     Lab Component   Component Value Date/Time     03/12/2025 1102     Lab Component   Component Value Date/Time    CEA 1.0 05/12/2017 1425        Latest Reference Range & Units 11/07/24 10:09   -246 U/L U/L 168   FERRITIN 50 - 306 ng/mL 59   Vitamin B12 211 - 911 pg/mL >2,000 (H)   FOLATE (FOLIC ACID), SERUM >5.4 ng/mL 29.8   (H): Data is abnormally high      Radiology:  PROCEDURE:  PET/CT STANDARD BODY SCAN (ONCOLOGY) (CPT=78815)     COMPARISON:  [facility] , MR, MRI BRAIN (W+WO) (CPT=70553), 3/07/2025, 3:45 PM.  [facility] , CT, CT CHEST+ABDOMEN+PELVIS(ALL CNTRST ONLY)(CPT=71260/15345), 9/27/2024, 1:11 PM.  [facility] , US, US HEAD/NECK (CPT=76536), 2/12/2025, 8:41 AM.  [facility] , [place], PET  STANDARD BODY SCAN (ONCOLOGY) (CPT=78815), 4/13/2022, 12:14 PM.  [facility], [place], PET STANDARD BODY SCAN (ONCOLOGY) (CPT=78815), 12/07/2021, 12:36 PM.  [facility] , [place], PET STANDARD BODY SCAN (ONCOLOGY) (CPT=78815), 12/27/2023, 9:02 AM.     INDICATIONS:  C34.91 Recurrent malignant neoplasm of right lung of unknown cell type (HCC) C34.91 Primary adenocarcinoma of right lung (HCC)     TECHNIQUE:  The patient fasted for at least 6 hours. F-18 FDG was injected IV, and whole-body images from vertex to mid-thigh were obtained with concurrent CT scan for both anatomic localization as well as attenuation correction.     RADIOPHARMACEUTICAL:    9.6 mCi F-18 FDG  FASTING GLUCOSE LEVEL:  80 mg/dl  INJECTION TIME:         0850 hours  SCAN TIME:              0954 hours     FINDINGS:       Left supraclavicular lymph nodes with elevated FDG uptake as high as 4.5 consistent with metastatic disease.  There is a low left supraclavicular lymph node medially with elevated FDG uptake of 5.9.  Low left jugular digastric lymph node with elevated  FDG uptake of 4.2.  Asymmetric radiotracer uptake within the right masseter muscles likely related to physiologic changes as there is no underlying mass lesion.  Physiologic  uptake noted throughout the tongue.       Left infrahilar lymph node has elevated FDG uptake of 7.0.  Right hilar lymph node has elevated FDG uptake of 5.0  posterior mediastinal lymph node adjacent to the aorta with elevated FDG uptake of 4.2.  Right pleural fluid has elevated FDG uptake  concerning for involvement per of the pleural fluid by malignancy with maximum SUV uptake of 9.4.  It should be noted that there is been recent thoracentesis in this may be the reason behind this uptake rather than neoplastic disease.     Abdomen demonstrates a lymph node within the posterior gastrohepatic ligament with SUV of 5.4.     There is retroperitoneal adenopathy bilaterally posterior to the aorta and cava with the largest lymph node on the right with maximum SUV of 14.4 and the maximum SUV of the left retroperitoneal lymph node being 7.1.  Within the pelvis left external iliac   lymph node has maximum SUV of 8.6 and left internal iliac lymph node has maximum SUV of 8.2.  There are multiple left external iliac lymph nodes with maximum SUV of 16.5.  There is a left obturator lymph node with maximum SUV of 5.8.       Minimal uptake within left small inguinal lymph nodes with SUV less than 2 likely reactive.                        Impression   CONCLUSION:       1. Left supraclavicular and jugular digastric lymph node with elevated FDG uptake consistent metastatic disease.     2. Bilateral hilar and posterior retroperitoneal lymph node elevated FDG uptake consistent with metastatic disease.     3. Pleural uptake within the right posterior pleural space could be related to recent thoracentesis although metastatic disease to the pleura is also considered.     4. Multiple abnormal lymph nodes including gastrohepatic ligament, bilateral retroperitoneal, left external iliac, and left  lymph nodes consistent with metastatic disease.        LOCATION:  [facility]           Dictated by (CST): Cj García MD on 3/11/2025 at 11:04  AM      Finalized by (CST): Cj García MD on 3/11/2025 at 12:25 PM       PROCEDURE:  MRI BRAIN (W+WO) (CPT=70553)     COMPARISON:  [facility], , MRI BRAIN (W+WO) (CPT=70553), 7/24/2024, 1:02 PM.     INDICATIONS:  C34.91 Primary adenocarcinoma of right lung (HCC)     TECHNIQUE:  MRI of the brain was performed with multi-planar T1, T2-weighted images with FLAIR sequences and diffusion weighted images without and with infusion.     PATIENT STATED HISTORY:(As transcribed by Technologist)  History of cancer      CONTRAST USED:  15 mL of Dotarem     FINDINGS:    INTRACRANIAL:  There are few foci of T2/FLAIR hyperintensity in the periventricular subcortical white matter consistent small vessel ischemic disease..  Diffusion weighted imaging was performed and is unremarkable.  There is no evidence for acute  infarction.  There is no evidence of hemorrhage or mass lesion.  VENTRICLES/SULCI:   Ventricles and sulci are normal in caliber.  There are no extra-axial fluid collections.  There is no midline shift.  SINUSES/ORBITS:       The visualized paranasal sinuses are clear.  The orbits are unremarkable.  MASTOIDS:       The mastoids are clear.                  Impression  CONCLUSION:  No acute intracranial process.  No evidence of metastatic disease to the brain.        LOCATION:  [facility]           Dictated by (CST): Cj García MD on 3/07/2025 at 4:40 PM      Finalized by (CST): Cj García MD on 3/07/2025 at 4:46 PM      PROCEDURE:  CT CHEST(CONTRAST ONLY) (CPT=71260)     COMPARISON:  [facility] , CT, CT CHEST+ABDOMEN+PELVIS(ALL CNTRST ONLY)(CPT=71260/38509), 9/27/2024, 1:11 PM.     INDICATIONS:  C34.91 Primary adenocarcinoma of right lung (HCC)     TECHNIQUE:  CT images were obtained with non-ionic intravenous contrast material. Dose reduction techniques were used. Dose information is transmitted to the ACR (American College of Radiology) NRDR (National Radiology Data Registry) which includes the  Dose Index  Registry.     PATIENT STATED HISTORY:(As transcribed by Technologist)  Patient has history of Lung cancer and now fluid in lung. Disease surveillance. Shortness of breath.      CONTRAST USED:  75cc of Isovue 370     FINDINGS:    LUNGS:  There is progressive atelectasis of the right lower lobe and right middle lobe related to developing large right effusion.  VASCULATURE:  No visible pulmonary arterial thrombus or attenuation.    MICHAEL:  No mass or adenopathy.    MEDIASTINUM:  No mass or adenopathy.    CARDIAC:  There is a small amount of pericardial fluid which is similar to the prior study.  PLEURA:  Large right pleural effusion has developed in the interval since the prior study.  This predominantly layers in the dependent part of the chest.  THORACIC AORTA:  No aneurysm.    CHEST WALL:  No mass or axillary adenopathy.    LIMITED ABDOMEN:  Hyperenhancing focus in right hepatic lobe series 3, image 139 has been previously described and is unchanged.  BONES:  There is diffuse degenerative change in the thoracic spine.  There are no aggressive bone lesions detected.                   Impression   CONCLUSION:    1. Interval development of large right pleural effusion with associated worsening atelectasis in right lung.  Post treatment changes in the right perihilar lung are otherwise unchanged.  2. Otherwise no specific evidence of metastatic disease.  3. Small pericardial effusion is similar to prior study.  4. Hyperenhancing focus in right hepatic lobe has been previously described and is unchanged.        LOCATION:  [facility]        Dictated by (CST): Damir Deleon MD on 2/28/2025 at 11:56 AM      Finalized by (CST): Damir Deleon MD on 2/28/2025 at 12:02 PM       PROCEDURE:  CT ABDOMEN+PELVIS (CONTRAST ONLY) (CPT=74177)     COMPARISON:  [facility] , CT, CT CHEST+ABDOMEN+PELVIS(ALL CNTRST ONLY)(CPT=71260/29043), 9/27/2024, 1:11 PM.  [facility] , CT, CT ABDOMEN+PELVIS(CONTRAST ONLY)(CPT=74177), 3/07/2021, 2:05 PM.      INDICATIONS:  R59.0 Virchow's node     TECHNIQUE:  CT scanning was performed from the dome of the diaphragm to the pubic symphysis with non-ionic intravenous contrast material. Post contrast coronal MPR imaging was performed.  Dose reduction techniques were used. Dose information is  transmitted to the ACR (American College of Radiology) NRDR (National Radiology Data Registry) which includes the Dose Index Registry.     PATIENT STATED HISTORY:(As transcribed by Technologist)  Pt states rule out cancer.      CONTRAST USED:  100cc of Isovue 370     FINDINGS:    LIVER:  Low-attenuation lesion in hepatic segment 4 measures 11 x 9 mm on image 23 of series 2, previously 11 x 9 mm.  BILIARY:  No visible dilatation or calcification.    PANCREAS:  No lesion, fluid collection, ductal dilatation, or atrophy.    SPLEEN:  No enlargement or focal lesion.    KIDNEYS:  No mass, obstruction, or calcification.  Simple left renal cysts again demonstrated which requires no further workup or follow-up.  ADRENALS:  No mass or enlargement.    AORTA/VASCULAR:  Trace atherosclerotic calcifications.  No aneurysm.  RETROPERITONEUM:  No mass or adenopathy.    BOWEL/MESENTERY:  No visible mass, obstruction, or bowel wall thickening.  Moderate colonic stool.  ABDOMINAL WALL:  Stable tiny fat containing umbilical hernia.  URINARY BLADDER:  No visible focal wall thickening, lesion, or calculus.    PELVIC NODES:  No adenopathy.    PELVIC ORGANS:  No visible mass.  Pelvic organs appropriate for patient age.    BONES:  Degenerative changes of spine.  Stable postsurgical changes of fusion at L5-S1.  Healed fracture of L1 spinous process.  LUNG BASES:  Moderate to large right pleural effusion.  Dependent atelectasis of right lung base.                      Impression   CONCLUSION:    1. Moderate to large right pleural effusion, incompletely visualized.  This was not present on 9/27/2024.  Full CT of the chest may be beneficial in further  evaluation.  2. No significant change in the abdomen and pelvis.  There is a stable low-attenuation hepatic lesion.        LOCATION:  [facility]        Dictated by (CST): Javed Dye MD on 2/20/2025 at 10:21 AM      Finalized by (CST): Javed Dye MD on 2/20/2025 at 10:30 AM       PROCEDURE:  US HEAD/NECK (CPT=76536)     COMPARISON:  None.     INDICATIONS:  R59.0 Virchow's node, history of lung cancer.  Palpable abnormality along the left supraclavicular region.     TECHNIQUE:  Sonography was performed of the clinically requested area of interest.     FINDINGS:    Dedicated imaging in the area of palpable abnormality along the left supraclavicular region demonstrates a nonspecific rounded hypoechoic lymph node measuring 9 x 7 x 9 mm with the cortex measuring up to 3 mm in thickness.  There is a more elongated  nonspecific lymph node slightly more laterally in the left supraclavicular region measuring 10 x 4 x 8 mm with the cortex measuring up to 5 mm in thickness.  Given the patient's history, pathologic lymph nodes in this area cannot be entirely excluded.    Clinical correlation recommended.  FNA may be of further value.                      Impression   CONCLUSION:  Dedicated imaging in the area of palpable abnormality along the left supraclavicular region demonstrates a nonspecific rounded hypoechoic lymph node measuring 9 x 7 x 9 mm with the cortex measuring up to 3 mm in thickness.  There is a more  elongated nonspecific lymph node slightly more laterally in the left supraclavicular region measuring 10 x 4 x 8 mm with the cortex measuring up to 5 mm in thickness.  Given the patient's history, pathologic lymph nodes in this area cannot be entirely  excluded.  Clinical correlation recommended.  FNA may be of further value.     LOCATION:  [facility]        Dictated by (CST): Akshat Conklin MD on 2/12/2025 at 9:11 AM      Finalized by (CST): Akshat Conklin MD on 2/12/2025 at 9:16 AM         ADDENDUM:  Comparison is  also made to previous PET-CT from 12/27/2023.  Findings as described above in the hilum, mediastinum and right lung are stable compared to the previous PET-CT also.  There is no significant uptake in the regions of right   perihilar consolidation and right paratracheal mediastinum on the previous PET-CT.  These findings would suggest no significant residual or recurrent neoplasm.     COMPARISON:  [facility] , [place], PET STANDARD BODY SCAN (ONCOLOGY) (CPT=78815), 12/27/2023, 9:02 AM.           Dictated by (CST): Payam Headley MD on 9/30/2024 at 12:17 PM       Finalized by (CST): Payam Headley MD on 9/30/2024 at 12:20 PM                    Addended by Payam Headley MD on 9/30/2024 12:20 PM     Study Result    Narrative   PROCEDURE:  CT CHEST+ABDOMEN+PELVIS(ALL CNTRST ONLY)(CPT=71260/70491)     COMPARISON:  [facility] , CT, CT CHEST(CONTRAST ONLY) (CPT=71260), 11/21/2023, 9:59 AM.  [facility] , CT, CT CHEST+ABDOMEN+PELVIS(ALL CNTRST ONLY)(CPT=71260/86168), 3/27/2024, 12:27 PM.     INDICATIONS:  H57.9 Visual symptoms C34.2 Malignant neoplasm of middle lobe of right lung (HCC) C34.91 Primary adenocarcinoma of right lung (HCC)     TECHNIQUE:  IV contrast-enhanced scanning through the chest, abdomen, and pelvis was performed.  Dose reduction techniques were used. Dose information is transmitted to the ACR (American College of Radiology) NRDR (National Radiology Data Registry) which   includes the Dose Index Registry.     PATIENT STATED HISTORY:(As transcribed by Technologist)  Surveillance of right lung cancer. She is having short of breath recently.      CONTRAST USED:  100cc of Isovue 370     FINDINGS:       CHEST:    LUNGS:  Right perihilar consolidation with angular concave contour is an associated bronchiectasis centered at the superior segment of the right lower lobe is stable in appearance measuring approximately 7.4 x 3.1 cm compared to 7.5 x 3.2 cm.  The small  differences are likely related to differences in  measurement technique.  There is a stable 2 mm subpleural right upper lobe lung nodule seen on image 29 of series 3. There is stable mild left apical pleural and parenchymal scarring.  There is no new lung   nodule identified.    MEDIASTINUM:  Right paratracheal and central right hilar confluent soft tissue density is unchanged in appearance.  No new adenopathy is appreciated.  MICHAEL:  No interval change.  Calcified granuloma seen in the left hilum.  CARDIAC:  No enlargement, pericardial thickening, or significant coronary artery calcification.  There is a small pericardial effusion anteriorly measuring 6 mm in thickness.  No significant change from previous.  PLEURA:  No pleural effusion or pneumothorax.  CHEST WALL:  No axillary lymphadenopathy or chest wall mass.  Right chest wall chest port catheter noted.  AORTA:  No aneurysm or dissection.    VASCULATURE:  No visible pulmonary arterial thrombus or attenuation.       ABDOMEN/PELVIS:  LIVER:  The previously demonstrated early enhancing 20 x 14 mm focus appears smaller measuring 15 x 9 mm.  The differences in size could reflect differences in timing of the bolus and filming.  This could reflect a vascular lesion such as a hemangioma.    There is a stable 9 mm focus of decreased attenuation in the left lobe in hepatic segment 4A.  This has been stable on previous exams dating back to 11/21/2023.    BILIARY:  No visible dilatation or calcification.    PANCREAS:  No lesion, fluid collection, ductal dilatation, or atrophy.    SPLEEN:  No enlargement or focal lesion.    KIDNEYS:  There is a 1.8 x 1.5 cm simple cyst in the upper pole cortex of the left kidney and a smaller subcentimeter cyst in the midpole cortex of the left kidney.  ADRENALS:  No mass or enlargement.    AORTA:  Mild atheromatous plaque noted in the aorta and iliac arteries.  No evidence for aneurysm.  RETROPERITONEUM:  No mass or adenopathy.    BOWEL/MESENTERY:  No visible mass, obstruction, or bowel  wall thickening.  Status post appendectomy.  No evidence for bowel wall thickening.  ABDOMINAL WALL:  No mass or hernia.    URINARY BLADDER:  No visible focal wall thickening, lesion, or calculus.    PELVIC NODES:  No adenopathy.    PELVIC ORGANS:  No visible mass.  Pelvic organs appropriate for patient age.    BONES:  Degenerative changes are seen in the cervical, thoracic and lumbar spine.  Postoperative changes of posterior fusion noted at L5-S1.  No new lytic or blastic bony lesions are identified.  There is an old healed posterior right 10th and 11th rib  fracture.                   Impression   CONCLUSION:    1. Stable right perihilar consolidation with angular concave contour with associated bronchiectasis centered at the superior segment of the right lower lobe.  This is in continuity with stable soft tissue density extending to the right paratracheal  mediastinum.  This may reflect changes related to treated cancer/treatment changes.  PET-CT would better evaluate for any residual neoplasm.  2. No new metastatic findings in the chest, abdomen or pelvis.  3. Interval decrease in size of an early enhancing right lobe liver lesion.  Imaging characteristics favor benign lesions such as hemangioma.  4. Multiple other incidental findings as detailed above.             LOCATION:  [facility]           Dictated by (CST): Payam Headley MD on 9/27/2024 at 6:28 PM      Finalized by (CST): Payam Headley MD on 9/27/2024 at 6:50 PM         PROCEDURE:  CT CHEST+ABDOMEN+PELVIS(ALL CNTRST ONLY)(CPT=71260/26454)     COMPARISON:  [facility] , [place], PET STANDARD BODY SCAN (ONCOLOGY) (CPT=78815), 12/27/2023, 9:02 AM.  [facility] , CT, CT CHEST(CONTRAST ONLY) (CPT=71260), 11/21/2023, 9:59 AM.     INDICATIONS:  C34.91 Primary adenocarcinoma of right lung (HCC)     TECHNIQUE:  IV contrast-enhanced scanning through the chest, abdomen, and pelvis was performed.  Dose reduction techniques were used. Dose information is transmitted to the  ACR (American College of Radiology) NRDR (National Radiology Data Registry) which   includes the Dose Index Registry.     PATIENT STATED HISTORY:(As transcribed by Technologist)  Routine scan for a previous history of right lung cancer. Patient reports no complications at time of exam      CONTRAST USED:  100cc of Isovue 370     FINDINGS:       CHEST:    LUNGS:  Right perihilar consolidation with angular, concave contours and associated bronchiectasis centered at the superior segment lower lobe has decreased in size.  Sample measurement, 7.5 x 3.2 cm versus prior 8.7 x 4.7 cm.    Mild scattered scarring elsewhere in both lungs.  No new bronchiectasis or cystic lung change.  No new infiltrate.  MEDIASTINUM:  Stable.  No adenopathy.  MICHAEL:  Stable.  No adenopathy.  CARDIAC:  Stable.  Mild anterior pericardial thickening.  Thickness measures 0.7 cm.  Coronary calcifications are present.    PLEURA:  Stable.  No pleural effusion.  CHEST WALL:  Stable.  No axillary adenopathy.  Right chest port.  AORTA:  Stable.  Normal caliber.  VASCULATURE:  Stable.  Smooth tapering.     ABDOMEN/PELVIS:  LIVER:  The visualized portions of liver are stable.  There is an early enhancing focus in segment 7. It is measured 2.0 x 1.4 cm, prior 1.8 x 1.6 cm.  It may be a flash filling hemangioma or vascular focus.  It appears benign.  In the now visualized  remainder of the liver, no abnormality.    BILIARY:  Nondistended gallbladder.  No biliary dilatation.  PANCREAS:  Uniform parenchyma.  No ductal dilatation.  SPLEEN:  Not enlarged.  KIDNEYS:  Normal anatomic positions.  No hydronephrosis or perinephric stranding.  1.9 cm left cortical cyst is unchanged.  ADRENALS:  Not enlarged.  AORTA:  Smooth tapering.  No abdominal aortic aneurysm.  Patent celiac artery, SMA and KATIE.  Patent splenic vein and portal vein.  RETROPERITONEUM:  No adenopathy.  BOWEL/MESENTERY:  Normal bowel caliber.  No colonic inflammation.  Moderate to large amount of  stool scattered throughout the colon.  No ascites.  Right lower quadrant clip consistent with prior appendectomy.  ABDOMINAL WALL:  Tiny fat containing umbilical hernia.  URINARY BLADDER:  Nondistended.  Smooth contour.  No calculus within.  PELVIC NODES:  None enlarged  PELVIC ORGANS:  No uterine or ovarian abnormality.  BONES:  Severe degenerative changes.  At least moderate central canal stenosis at L2-L3.  Normal vertebral body heights.  No subluxation.                      Impression   CONCLUSION:    1. Decrease in size of right perihilar consolidation.  This may be related to treated cancer/treatment changes.  2. No new metastatic findings in the chest.  3. No metastatic findings in the abdomen or pelvis.  4. Details as above.             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 F-18 FDG was injected IV, and whole-body images from vertex to mid-thigh were obtained with concurrent CT scan for both anatomic localization as well as attenuation correction.     RADIOPHARMACEUTICAL:    9.5 mCi F-18 FDG  FASTING GLUCOSE LEVEL:  106 mg/dl  INJECTION TIME:         0 800  SCAN TIME:              0911     FINDINGS:     Mean SUV, mediastinal blood pool:  2.2     Mean SUV, liver:  2.8         HEAD/NECK:  Physiologic activity in all regions.     CHEST:  Consolidation with air bronchograms identified within the superior segment right lower lobe with corresponding low-grade radiotracer uptake, SUV max of 2.5 (previously 3.1).  This is favored to represent  post-therapeutic inflammation/fibrosis.    No enlarged or hypermetabolic regional lymph nodes.     ABDOMEN/PELVIS:  Physiologic activity in all regions.       MUSCULOSKELETAL:  Scattered areas of degenerative uptake noted within the spine.  No hypermetabolic osseous metastatic disease.  Mild exertional/inflammatory uptake of bilateral shoulder girdles and peritrochanteric regions.                     Impression  CONCLUSION:       Low-grade hypermetabolism corresponds to consolidative opacity of the superior segment right lower lobe favoring treatment-related inflammation/fibrosis.  No imaging evidence of residual/recurrent malignancy.          LOCATION:  [facility]           Dictated by (CST): Umm Hodges MD on 12/27/2023 at 1:25 PM      Finalized by (CST): Umm Hodges MD on 12/27/2023 at 1:38 PM      PROCEDURE:  CT CHEST(CONTRAST ONLY) (CPT=71260)     COMPARISON:  [facility], MRI LIVER (W+WO) (CPT=74183), 5/22/2017, 7:11 PM.  [facility] , CT, CT CHEST(CONTRAST ONLY) (CPT=71260), 2/17/2023, 10:15 AM.  [facility], CT, CT UROGRAM(W+WO)SH(CPT=74178), 9/08/2020, 10:29 PM.  [facility] , CT, CT CHEST(CONTRAST  ONLY) (CPT=71260), 8/04/2023, 11:19 AM.     INDICATIONS:  C34.2 Malignant neoplasm of middle lobe of right lung (HCC)     TECHNIQUE:  CT images were obtained with non-ionic intravenous contrast material. Dose reduction techniques were used. Dose information is transmitted to the ACR (American College of Radiology) NRDR (National Radiology Data Registry) which includes the  Dose Index Registry.     PATIENT STATED HISTORY:(As transcribed by Technologist)  Lung cancer. The patient doesn't have complaints.      CONTRAST USED:  75cc of Isovue 370     FINDINGS:    LUNGS:  There are right perihilar fibrotic changes which likely represent treatment change.  There is right perihilar consolidation and bronchiectasis, not significantly changed.  No new enhancing nodule is seen.  Scattered atelectasis and/or scarring.    No pulmonary  mass is seen.  VASCULATURE:  Unremarkable.  THORACIC AORTA:  Unremarkable.  MEDIASTINUM/MICHAEL:  Unremarkable.  CARDIAC:  Unremarkable.  PLEURA:  Unremarkable.  CHEST WALL:  Unremarkable.  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  BONES:  Degenerative changes in the spine.  Mild scoliosis.  OTHER:  Right chest port noted.                  Impression  CONCLUSION:  No significant interval change since 8/4/2023.  Treatment change is again seen within the right perihilar region.  No recurrent or metastatic disease is identified.        LOCATION:  [facility]        Dictated by (CST): Stromberg, LeRoy, MD on 11/21/2023 at 11:37 AM      Finalized by (CST): Stromberg, LeRoy, MD on 11/21/2023 at 11:52 AM        Impression and Plan:  1. H/o locally advanced NSCLC (IV low neck- cervical?)  - new adenopathy and effusion since last imaging  - biopsy of left supraclav node and right pleural effusion showed metastatic adenocarcinoma c/w lung primary  - With malignant effusion understands she now has stage IV disease which is incurable but very treatable.   - PET also shows new uptake in left neck, hilar and posterior retroperitoneal, abdominal and pelvic nodes and right pleural space  - MRI of the brain showed no evidence of metastases  - An overview of treatment options for metastatic NSCLC were discussed at length. Therapy varies based upon molecular and histologic features of the tumor. If the patient has a  mutation, preferred treatment would be targeted therapy. If a  mutation is not present and PDL-1 status is high, we usually will treat with immunotherapy alone. In patients w/o a  mutation and low PDL-1 score we generally treat initially with chemo+immunotherapy  - PDL-1 negative (<1%), NGS had to be resent as initial sample sent by path insufficient. Guardant showed no  targetable mutations  - She wants to get going with treatment and not wait for NGS results which is understandable and given increasing symptoms do concur we should proceed  - discussed options with her in detail. She had mentioned she developed an allergic reaction to her previous chemo regimen. I reviewed her outside records from her previous oncologist at that time. There was no mention of hypersensitivity or allergic type or even adverse reaction to her regimen back then. She did have expected SEs such as cytopenias, constipation, dysphagia (with RT), etc. Given what she had experienced did give me pause with how she would react to further chemotherapy. We discussed here options including chemo free option of Ipi+Nivo given her PDL-1 score while we wait for her NGS results. However she wanted to be as aggressive as possible to start. We therefore decided to proceed with the 9LA regimen (she was EGFR,ALK, ROS 1 negative at her initial diagnosis). She has had teaching and would like to proceed today.   - monitor for reaccummulation of right pleural effusion. Does not seem significant on exam today. Just had CXR ordered by pulmonary yesterday which reported no pleural effusions. Aware of s/sx to watch out for    2. Neoplasm related pain, fibromyalgia. Chronic LBP, neck and left arm main  - takes Ibuprofen and Norco 10 as needed  - also followed by palliative care  - used to be followed at a pain clinic- not anymore    3. H/o asthma, bronchiectasis, former smoker, cough  - follows with pulmonary, continue inhalers   - flutter valve    4. Anemia  - Hgb has been normal since 7/2024    5. TSH elevated  - but T4 normal. This was baseline even before starting IO. Should still be able to proceed but will need close monitoring  - was on LT4 before but was stopped by PCP as levels had been good. She will touch base with her PCP regarding supplementation    6. Access  - had prior port removed. Will have placed by IR and  schedule as soon as able. Was able to get peripheral access to start treatment today    7. Hemoptysis  - reports occasional bloody streaking of mucus. Could also be from bronchiectasis   - Hgb normal  - monitor      Labs reviewed   Await NGS  Start cycle 1 carbo/taxol +Ipi/Nivo today  Day 8 APN  Continue palliative care    Risk level high- metastatic lung cancer to start chemo+IO      Shoshana Gordon MD  [facility]   PCP. Only has been on this for a few weeks.     6. Access  - had prior port removed. Replaced by IR 4/11    7. Hemoptysis  - reports occasional bloody streaking of mucus. Could also be from bronchiectasis. Appears less  - monitor    8. Right pleural effusion  - s/p thoracentesis x 2  - hopefully will respond to therapy and not require further taps.   - will continue to monitor. No significant effusion noted on exam today    9. Mucositis  - pain meds, magic mouthwash    10. Constipation   - bowel regimen   - could be culprit for abdominal pain she describes    11. Nausea  - anti-emetics. Will add Emend to regimen    Labs and imaging reviewed   Continue palliative care  Proceed with day 22 treatment today  Scans to be done before next visit      Risk level high- metastatic lung cancer on chemo+IO      Shoshana Gordon MD  [facility]

## 2025-04-25 NOTE — PROGRESS NOTES
Pt here for C1 D22 Drug(s) Opdivo/Taxol/Carboplatin.  Arrives Ambulating independently, accompanied by Family member     Patient was evaluated today by MD.    Oral medications included in this regimen:  no    Patient confirms comprehension of cancer treatment schedule:  yes    Pregnancy screening:  Not applicable    Modifications in dose or schedule:  Yes, Venofer added    Medications appearance and physical integrity checked by RN: yes.    Chemotherapy IV pump settings verified by 2 RNs:  Yes.  Frequency of blood return and site check throughout administration: Prior to administration and At completion of therapy     Infusion/treatment outcome:  patient tolerated treatment without incident    Education Record    Learner:  Patient and Family Member  Barriers / Limitations:  None  Method:  Discussion  Education / instructions given:  today's regime  Outcome:  Shows understanding    Discharged Home, Ambulating independently, accompanied by:Family member    Patient/family verbalized understanding of future appointments: by MyChart messaging

## 2025-04-28 DIAGNOSIS — G89.3 NEOPLASM RELATED PAIN: ICD-10-CM

## 2025-04-28 RX ORDER — MORPHINE SULFATE 15 MG/1
TABLET ORAL EVERY 4 HOURS PRN
Qty: 45 TABLET | Refills: 0 | Status: CANCELLED | OUTPATIENT
Start: 2025-04-28

## 2025-04-28 RX ORDER — MORPHINE SULFATE 15 MG/1
TABLET ORAL EVERY 4 HOURS PRN
Qty: 45 TABLET | Refills: 0 | Status: SHIPPED | OUTPATIENT
Start: 2025-04-28

## 2025-05-02 DIAGNOSIS — G89.3 NEOPLASM RELATED PAIN: ICD-10-CM

## 2025-05-02 RX ORDER — MORPHINE SULFATE 15 MG/1
TABLET ORAL EVERY 4 HOURS PRN
Qty: 45 TABLET | Refills: 0 | Status: CANCELLED | OUTPATIENT
Start: 2025-05-02

## 2025-05-02 RX ORDER — MORPHINE SULFATE 15 MG/1
15 TABLET ORAL EVERY 6 HOURS PRN
Qty: 60 TABLET | Refills: 0 | Status: SHIPPED | OUTPATIENT
Start: 2025-05-02

## 2025-05-07 ENCOUNTER — HOSPITAL ENCOUNTER (OUTPATIENT)
Dept: CT IMAGING | Age: 55
Discharge: HOME | End: 2025-05-07
Attending: INTERNAL MEDICINE
Payer: MEDICAID

## 2025-05-07 DIAGNOSIS — D64.9 ANEMIA, NORMOCYTIC NORMOCHROMIC: ICD-10-CM

## 2025-05-07 DIAGNOSIS — C34.91 PRIMARY ADENOCARCINOMA OF RIGHT LUNG (HCC): ICD-10-CM

## 2025-05-07 DIAGNOSIS — R59.0 MEDIASTINAL ADENOPATHY: ICD-10-CM

## 2025-05-07 PROCEDURE — 70491 CT SOFT TISSUE NECK W/DYE: CPT | Performed by: INTERNAL MEDICINE

## 2025-05-07 PROCEDURE — 71260 CT THORAX DX C+: CPT | Performed by: INTERNAL MEDICINE

## 2025-05-07 PROCEDURE — 74177 CT ABD & PELVIS W/CONTRAST: CPT | Performed by: INTERNAL MEDICINE

## 2025-05-16 ENCOUNTER — OFFICE VISIT (OUTPATIENT)
Age: 55
End: 2025-05-16
Attending: INTERNAL MEDICINE
Payer: MEDICAID

## 2025-05-16 VITALS
HEART RATE: 126 BPM | BODY MASS INDEX: 30.28 KG/M2 | WEIGHT: 175.19 LBS | HEIGHT: 63.74 IN | DIASTOLIC BLOOD PRESSURE: 73 MMHG | OXYGEN SATURATION: 96 % | TEMPERATURE: 98 F | SYSTOLIC BLOOD PRESSURE: 112 MMHG | RESPIRATION RATE: 18 BRPM

## 2025-05-16 DIAGNOSIS — R59.0 MEDIASTINAL ADENOPATHY: ICD-10-CM

## 2025-05-16 DIAGNOSIS — T45.1X5A CHEMOTHERAPY-INDUCED NAUSEA: ICD-10-CM

## 2025-05-16 DIAGNOSIS — G89.3 NEOPLASM RELATED PAIN: Primary | ICD-10-CM

## 2025-05-16 DIAGNOSIS — R11.0 CHEMOTHERAPY-INDUCED NAUSEA: ICD-10-CM

## 2025-05-16 DIAGNOSIS — G89.3 NEOPLASM RELATED PAIN: ICD-10-CM

## 2025-05-16 DIAGNOSIS — C34.91 PRIMARY LUNG CANCER WITH METASTASIS FROM LUNG TO OTHER SITE, RIGHT (HCC): ICD-10-CM

## 2025-05-16 DIAGNOSIS — C34.91 PRIMARY ADENOCARCINOMA OF RIGHT LUNG (HCC): Primary | ICD-10-CM

## 2025-05-16 DIAGNOSIS — K59.03 DRUG-INDUCED CONSTIPATION: ICD-10-CM

## 2025-05-16 DIAGNOSIS — R53.83 FATIGUE DUE TO TREATMENT: ICD-10-CM

## 2025-05-16 DIAGNOSIS — G62.0 CHEMOTHERAPY-INDUCED NEUROPATHY (HCC): ICD-10-CM

## 2025-05-16 DIAGNOSIS — R04.2 HEMOPTYSIS: ICD-10-CM

## 2025-05-16 DIAGNOSIS — D64.9 ANEMIA, NORMOCYTIC NORMOCHROMIC: ICD-10-CM

## 2025-05-16 DIAGNOSIS — C34.91 PRIMARY ADENOCARCINOMA OF RIGHT LUNG (HCC): ICD-10-CM

## 2025-05-16 DIAGNOSIS — R59.0 SUPRACLAVICULAR ADENOPATHY: ICD-10-CM

## 2025-05-16 DIAGNOSIS — R59.0 MEDIASTINAL ADENOPATHY: Primary | ICD-10-CM

## 2025-05-16 DIAGNOSIS — J91.0 MALIGNANT PLEURAL EFFUSION (HCC): ICD-10-CM

## 2025-05-16 DIAGNOSIS — Z51.5 PALLIATIVE CARE BY SPECIALIST: ICD-10-CM

## 2025-05-16 DIAGNOSIS — T45.1X5A CHEMOTHERAPY-INDUCED NEUROPATHY (HCC): ICD-10-CM

## 2025-05-16 LAB
ALBUMIN SERPL-MCNC: 4.1 G/DL (ref 3.2–4.8)
ALBUMIN/GLOB SERPL: 2 {RATIO} (ref 1–2)
ALP LIVER SERPL-CCNC: 71 U/L (ref 41–108)
ALT SERPL-CCNC: 18 U/L (ref 10–49)
ANION GAP SERPL CALC-SCNC: 7 MMOL/L (ref 0–18)
AST SERPL-CCNC: 19 U/L (ref ?–34)
BASOPHILS # BLD AUTO: 0.01 X10(3) UL (ref 0–0.2)
BASOPHILS NFR BLD AUTO: 0.3 %
BILIRUB SERPL-MCNC: 0.3 MG/DL (ref 0.3–1.2)
BUN BLD-MCNC: 16 MG/DL (ref 9–23)
CALCIUM BLD-MCNC: 9.4 MG/DL (ref 8.7–10.6)
CHLORIDE SERPL-SCNC: 108 MMOL/L (ref 98–112)
CO2 SERPL-SCNC: 27 MMOL/L (ref 21–32)
CREAT BLD-MCNC: 0.88 MG/DL (ref 0.55–1.02)
EGFRCR SERPLBLD CKD-EPI 2021: 78 ML/MIN/1.73M2 (ref 60–?)
EOSINOPHIL # BLD AUTO: 0.05 X10(3) UL (ref 0–0.7)
EOSINOPHIL NFR BLD AUTO: 1.6 %
ERYTHROCYTE [DISTWIDTH] IN BLOOD BY AUTOMATED COUNT: 15.2 %
GLOBULIN PLAS-MCNC: 2.1 G/DL (ref 2–3.5)
GLUCOSE BLD-MCNC: 123 MG/DL (ref 70–99)
HCT VFR BLD AUTO: 28.9 % (ref 35–48)
HGB BLD-MCNC: 10.1 G/DL (ref 12–16)
IMM GRANULOCYTES # BLD AUTO: 0.01 X10(3) UL (ref 0–1)
IMM GRANULOCYTES NFR BLD: 0.3 %
LYMPHOCYTES # BLD AUTO: 0.51 X10(3) UL (ref 1–4)
LYMPHOCYTES NFR BLD AUTO: 16.3 %
MCH RBC QN AUTO: 31.1 PG (ref 26–34)
MCHC RBC AUTO-ENTMCNC: 34.9 G/DL (ref 31–37)
MCV RBC AUTO: 88.9 FL (ref 80–100)
MONOCYTES # BLD AUTO: 0.55 X10(3) UL (ref 0.1–1)
MONOCYTES NFR BLD AUTO: 17.6 %
NEUTROPHILS # BLD AUTO: 2 X10 (3) UL (ref 1.5–7.7)
NEUTROPHILS # BLD AUTO: 2 X10(3) UL (ref 1.5–7.7)
NEUTROPHILS NFR BLD AUTO: 63.9 %
OSMOLALITY SERPL CALC.SUM OF ELEC: 297 MOSM/KG (ref 275–295)
PLATELET # BLD AUTO: 179 10(3)UL (ref 150–450)
POTASSIUM SERPL-SCNC: 3.5 MMOL/L (ref 3.5–5.1)
PROT SERPL-MCNC: 6.2 G/DL (ref 5.7–8.2)
RBC # BLD AUTO: 3.25 X10(6)UL (ref 3.8–5.3)
SODIUM SERPL-SCNC: 142 MMOL/L (ref 136–145)
T4 FREE SERPL-MCNC: 1.1 NG/DL (ref 0.8–1.7)
TSI SER-ACNC: 1.41 UIU/ML (ref 0.55–4.78)
WBC # BLD AUTO: 3.1 X10(3) UL (ref 4–11)

## 2025-05-16 RX ORDER — PREGABALIN 75 MG/1
75 CAPSULE ORAL 2 TIMES DAILY
Qty: 60 CAPSULE | Refills: 1 | Status: SHIPPED | OUTPATIENT
Start: 2025-05-16

## 2025-05-16 NOTE — PROGRESS NOTES
Pt here for C2D1 Drug(s)opdivo/yervoy.  Arrives Ambulating independently, accompanied by Self and Family member     Patient was evaluated today by MD.    Oral medications included in this regimen:  no    Patient confirms comprehension of cancer treatment schedule:  yes    Pregnancy screening:  Not applicable    Modifications in dose or schedule:  Yes -- immuno only per MD    Medications appearance and physical integrity checked by RN: yes.    Chemotherapy IV pump settings verified by 2 RNs:  No due to targeted therapy IV administration.  Frequency of blood return and site check throughout administration: Prior to administration     Infusion/treatment outcome:  patient tolerated treatment without incident    Education Record    Learner:  Patient and Family Member  Barriers / Limitations:  None  Method:  Brief focused and Discussion  Education / instructions given:  poc  Outcome:  Shows understanding    Discharged Home, Ambulating independently, accompanied by:Self and Family member    Patient/family verbalized understanding of future appointments: by Signature messaging

## 2025-05-16 NOTE — PROGRESS NOTES
Palliative Care Follow Up Note     Patient Name: Hanna Barrett   YOB: 1970   Medical Record Number: [id number]   CSN: [id number]   Date of visit:   5/16/2025     Chief Complaint/Reason for Visit:  Follow up visit for pain     History of Present Illness:         Hanna Barrett is a 55 year old female with metastatic lung cancer receiving chemo/immunotherapy.   She is feeling ok but still struggles with fatigue but feels this is manageable.  She is still working full time and tries to increase her activity.  She is walking and able to sleep.  She continues to struggle with her chronic fibromyalgia pain which may be a little worse with treatment.  She feels her neck and abdominal pain have improved with chemo.   She continues to struggle with MARCIO  in her legs and feet.  This increases with activity.   She finds the lyrica somewhat helpful.  She is using 2 doses of ibuprofen and 2 doses of morphine daily with good benefit for her abdominal and neck pain.  Overall, she feels her pain has improved.    She is happy for the pain control and feels her QOL has improved.  She has been using miralax BID resulting in small BMs which she feels isn't enough.  Denies feeling constipated.  She doesn't tolerate senna.  She finds MOM and prune juice helpful with better BMs and no cramping      Problem List:  Patient Active Problem List   Diagnosis    Essential hypertension    Family history of breast cancer    Family history of pancreatic cancer    Family history of ovarian cancer    Stress incontinence    Hyperlipidemia    Vitamin D deficiency    Primary osteoarthritis of first carpometacarpal joint of right hand    Cervical radiculopathy    Chronic narcotic use    Cervical spondylosis with radiculopathy    DDD (degenerative disc disease), lumbar    Polyneuropathy, unspecified    Other intervertebral disc degeneration, lumbar region    Cervical myofascial pain syndrome    Neck and shoulder pain    Opioid use,  unspecified with withdrawal (HCC)    Chronic bilateral low back pain without sciatica    History of tobacco use    Myalgia, other site    Fibromyalgia    Chronic bilateral thoracic back pain    Constipation    Canker sore    Other spondylosis with radiculopathy, cervical region    Abnormal mammogram    Primary adenocarcinoma of right lung (HCC)    Mediastinal adenopathy    Primary lung cancer with metastasis from lung to other site, right (HCC)    Personal history of nicotine dependence    At risk for dehydration    Dyspnea    Dyspnea, unspecified type    History of COVID-19    Hemoptysis    Disorder of thyroid, unspecified    Encounter for other orthopedic aftercare    History of falling    Generalized anxiety disorder    Major depressive disorder, single episode, unspecified    Other specified urinary incontinence    Unspecified visual loss    Memory changes    Failed back surgical syndrome    Exertional chest pain    Decreased exercise tolerance    Lung fibrosis (HCC)    Palliative care by specialist    Anemia, normocytic normochromic      Medical History:  Past Medical History:    Abnormal uterine bleeding    bleeds every 2 weeks    Anxiety state    Asthma (HCC)    Attention deficit hyperactivity disorder (ADHD)    BACK PAIN    Back problem    lumbar radiculopathy    Cancer (HCC)    adenocarcinoma of right lung    MARCIO II (cervical intraepithelial neoplasia II)    DDD (degenerative disc disease), lumbar    DDD (degenerative disc disease), thoracic    Depression    Disorder of thyroid    Dyspareunia    Exposure to medical diagnostic radiation    On hold since 4/2022    Fall    Fibromyalgia    Fibromyalgia    Genital warts    High blood pressure    History of COVID-19    COVID + 7/2020 Headache - NO Hospitalization needed     History of lumbar fusion    Hx of motion sickness    IBS (irritable bowel syndrome)    Per patient - Just constipation    Infertility, female    Lumbar radiculopathy    Mild dysplasia of  cervix    Pap smear for cervical cancer screening    pt states normal    Papanicolaou smear of cervix with high grade squamous intraepithelial lesion (HGSIL)    Personal history of antineoplastic chemotherapy    Last chemo 22 prior to lung bx 22    Post covid-19 condition, unspecified    symptoms: HA; s/s resolved-yes; not hospitalized    Problems with swallowing    with radiation    Sexually transmitted disease    HPV    Substance abuse (HCC)    cocaine    Urinary incontinence    Visual impairment    glasses/contacts bifocals     Surgical History:  Past Surgical History:   Procedure Laterality Date    Appendectomy      Back surgery      fusion L5-S1    Back surgery  2021    RIGHT LUMBAR 3/ LUMBAR 4, LUMBAR 4/ LUMBAR 5 HEMILAMINTOMIES, LEFT LUMBAR 2 / LUMBAR 3 MICRODISCECTOMY    Colonoscopy N/A 2023    Procedure: COLONOSCOPY;  Surgeon: Devante Kern MD;  Location:  ENDOSCOPY    Colposcopy,bx cervix/endocerv curr  11    MARCIO 1    Laparoscopy procedure unlisted      Ovarian cyst removed    Leep  2011    MARCIO 2          X2    Other surgical history      bunions bilateral    Other surgical history      nodule lymph nodes neck    Other surgical history       Bladder sling    Other surgical history  2020    Cystoscopy, Dr Beach       Allergies:  Allergies[1]    Palliative Care Social History:    Marital Status:  single  Children: son  Living Situation: independent .  Works full-time with Medicare insurance      Medications:  Current Outpatient Medications   Medication Sig Dispense Refill    pregabalin 75 MG Oral Cap Take 1 capsule (75 mg total) by mouth 2 (two) times daily. 60 capsule 1    morphINE 15 MG Oral Tab Take 1 tablet (15 mg total) by mouth every 6 (six) hours as needed for Pain. 60 tablet 0    levothyroxine 75 MCG Oral Tab Take 1 tablet (75 mcg total) by mouth before breakfast. 90 tablet 0    maalox/diphenhydramine/lidocaine Oral Suspension Take 5  mL by mouth 4 (four) times daily before meals and nightly. 500 mL 1    predniSONE 10 MG Oral Tab Take 1 tablet (10 mg total) by mouth 2 (two) times daily. Start day after chemo 10 tablet 1    lisinopril-hydroCHLOROthiazide 20-12.5 MG Oral Tab Take 0.5 tablets by mouth daily. 45 tablet 3    prochlorperazine (COMPAZINE) 10 mg tablet Take 1 tablet (10 mg total) by mouth every 6 (six) hours as needed for Nausea. 30 tablet 3    ondansetron (ZOFRAN) 8 MG tablet Take 1 tablet (8 mg total) by mouth every 8 (eight) hours as needed for Nausea. 30 tablet 3    HYDROcodone-acetaminophen  MG Oral Tab Take 1 tablet by mouth every 8 (eight) hours as needed for Pain. 60 tablet 0    dilTIAZem HCl 120 MG Oral Tab Take 1 tablet (120 mg total) by mouth in the morning.      montelukast 10 MG Oral Tab Take 1 tablet (10 mg total) by mouth nightly. 90 tablet 3    albuterol 108 (90 Base) MCG/ACT Inhalation Aero Soln Inhale 2 puffs into the lungs every 6 (six) hours as needed for Wheezing or Shortness of Breath. 3 each 3    amphetamine-dextroamphetamine 15 MG Oral Tab       ARIPiprazole 2 MG Oral Tab       VYVANSE 70 MG Oral Cap Take 1 capsule (70 mg total) by mouth every morning.      SYMBICORT 160-4.5 MCG/ACT Inhalation Aerosol Inhale 2 puffs into the lungs in the morning and 2 puffs before bedtime.      cetirizine 10 MG Oral Tab Take 1 tablet (10 mg total) by mouth daily. 90 tablet 1    ferrous sulfate 325 (65 FE) MG Oral Tab EC Take 1 tablet (325 mg total) by mouth daily with breakfast.      Multiple Vitamin Oral Tab Take 1 tablet by mouth in the morning.      ibuprofen 200 MG Oral Tab Take 4 tablets (800 mg total) by mouth every 8 (eight) hours as needed for Pain.      cholecalciferol 25 MCG (1000 UT) Oral Cap Take 1 capsule (1,000 Units total) by mouth in the morning.  0    DULoxetine HCl 60 MG Oral Cap DR Particles Take 1 capsule (60 mg total) by mouth in the morning.  2       Review of Systems:  General:  Fatigue.  Feels well.     Respiratory:  Denies SOB, denies cough  Cardiac:  Denies chest pain, heart palpitations  Abdomen:  Denies constipation, diarrhea.  Denies pain.    Psych:  No complaints.  Sleeping well    Palliative Performance Scale:   80%    Physical Examination:  General: Patient is alert and oriented, not in acute distress.  Respiratory:   Normal excursions and effort  Cardiac: Regular rate   Abdomen: Soft, non tender with good bowel sounds.  Musculoskeletal: Normal gait. Walks without assistive device.  Psych:  Mood/Affect appropriate    Advanced Directives Discussed and Completed:     HCPOA/Health Surrogate:    There is no completed HCPOA documentation on file in Epic.    Patient/family was asked to bring in a copy of HCPOA document to be scanned in to Epic    I confirmed that patient's HCPOA is:  Her son, Dick BRISCOE Discussed and Completed:   focused on symptoms today.      Palliative Care:   Her pain is improving and she is using less morphine.  Will increase lyrica to better help with neuropathy symptoms.    The goal is to wean off opioids as pain improves  Discussed strategies to optimize bowels    Impression/Plan:   Neoplasm related pain  Ibuprofen 400mg BID prn  Morphine 15mg Q 6 prn    MARCIO  Pregabalin 75mg BID  Topical lidocaine    Constipation  Miralax prn  MOM daily prn  Prune juice daily    Palliative care  Ongoing support    Metastatic R Lung cancer      Planned Follow up: Return in about 3 weeks (around 6/6/2025).      I spent a total of 35 minutes with the patient today, which included all of the following:direct face to face contact, history taking, physical examination, and >50% was spent counseling and coordinating care         Electronically Signed by:  GABY SANFORD   [facility] Outpatient Palliative Nurse Practitioner            [1]   Allergies  Allergen Reactions    Gabapentin SWELLING and UNKNOWN    Erythromycin UNKNOWN    Clindamycin RASH

## 2025-05-16 NOTE — PROGRESS NOTES
Cancer Center Progress Note    Patient Name: Hanna Barrett   YOB: 1970   Medical Record Number: [id number]   CSN: [id number]   Attending Physician: Shoshana Gordon M.D.   Referring Physician: MD Dr. Flynn Geronimo    Date of Visit: 5/16/2025     Chief Complaint:  Chief Complaint   Patient presents with    Follow - Up     Lung cancer pt here for f/u and treatment. She c/o fatigue, SOB, nausea, occasional vomiting, cough while eating, peeling/itching skin on back/elbows, mouth tenderness (denies sores at this time). She is concerned about her heart rate, had cardiac workup about two years ago.          Hem/Onc History:  Metastatic (stage IV) NSCLC adenocarcinoma documented 4/2023.     - initially diagnosed with stage IIIC NSCLC adenocarcinoma of the right lung diagnosed 12/2021. Was followed by [facility] then.     Oncologic History:  5/2021: patient presented with abnormal preoperative CXR; left apical 8 mm nodule with irregular margins, and a right middle lobe soft tissue density with associated bronchiectasis medially and mild adjacent nonspecific ground glass density, overall stable since 2/2021.     11/2021: CT scan of the chest demonstrated a spiculated airspace density within the right middle lobe worrisome for primary lung malignancy along with mediastinal lymph nodes.  12/7/2021: PET/CT scan showed a hypermetabolic RML and RUL mass with enlargement of mediastinal lymph nodes.    12/13/2021: bronchoscopy and transbronchial needle aspiration to subcarinal lymph node was positive for metastatic adenocarcinoma. PD-L1 <1%, EGFR, ROS1, ALK, KRAS, RET, BRAF all negative.     1/17/2022: concurrent chemoradiation with cisplatin + pemetrexed at [facility] (Dr. Subramanian) completed in 4/2022 5/13/2022: started consolidative durvalumab q4 weeks    7/2022: PET concerning for progression of disease     8/12/2022: transferred care to Dr. Sanchez at [facility]    8/16/2022: bronchoscopy and biopsies were  negative for malignancy.     9/14/2022: CT scan showed some improvement     11/14/2022: PET scan continues to show improvement with interval decrease in the right lung opacities with decreased uptake, may represent scarring and posttreatment change. Increased uptake distal esophagus.     1/17/2023: EGD with normal esophagus and negative biopsies.     2/17/2023: CT chest stable.      3/31/2023: completed 12 cycles of durvalumab.     - care transferred to Dr. Baker when Dr. Sanchez retired    4/7/2023: bronchoscopy for hemoptysis was unremarkable.   5/17/2023: CT chest stable.      - care transferred to me 11/2023 from Dr. Baker  who now practices primarily in [place]    11/2024:  Imaging done just prior showed stable right perihilar consolidation which compared to her previous PET showed no uptake in these areas and therefore suggestive of no significant residual or recurrent neoplasm. No new findings described in C/A/P with devrease in size of a liver lesion favored to be a hemangioma. C/o chronic fatigue and some SOB and cough. Had reported some visual changes and was seen at [facility]. Reports no evidence of malignancy seen. As felt relatively well with recent good scans she opted to have her port removed. This was removed by IR on 11/26/24.    Early 1/2025 she saw her PCP c/o nasal and sinus congestion with pressure, cough, PND, fullness in the ears. No adenopathy was noted on exam. She was placed on Augmentin for sinusitis.     She then reported feeling a growth in her nose causing a deformity with nasal obstructive symptoms. Saw ENT 1/29/25 (Laila). Sinus CT scans were ordered and was referred to plastics. Scans showed no nasal mass or abnormality of the soft tissues but did show postoperative changes from previous sinus surgery. Did see plastics and was felt to have a dermoid cyst.     The following month c/o left supraclavicular node which was increasing in size and tender. Saw PCP who ordered  imaging. Thought to have Virchow node. US of the neck and CT abdomen/pelvis were ordered. US showed nonspecific appearing supraclav node. CT of the abdomen and pelvis showed a moderate to large right pleural effusion that was not present 9/2024 CT. Dedicated CT chest was ordered. Testing results were reviewed. US guided biopsy of left supraclav node was ordered. CT chest confirmed new right pleural effusion and was referred to pulmonary for tap.     Biopsy of the left supraclav node on 2/27/25 showed metastatic adenocarcinoma c/w lung primary. US guided right thoracentesis drained 1500 mL of fluid. Cytology from the right pleural effusion showed metastatic carcinoma c/w lung adenocarcinoma.      - Guardant did not show any targetable mutations: CDK6, PIK3CA, TP53). PDL-1 came back at <1% NGS could not be run with initial sample sent by path as was insufficient and another specimen has been sent. She wanted to get going with treatment     - Started carboplatin/paclitaxel+ipi/nivo (9LA) 3/28/25. Had a very rough time she said with her first cycle of chemo+IO. During Taxol infusion had some mild flushing with VS stable per nurses which resolved when infusion was stopped. Infusion was then resumed with no recurrence of flushing and completed treatment as planned.  Around 2 days after chemo developed generalized pain- reported as muscle/nerve pain especially in groins, shoulder and legs. Took pain pills she had at home which did not help. Thought she may be having an allergic reaction to treatment as also felt very flushed and swollen and took benadryl the weekend.  Discomfort was severe enough that she considered going to the ED but decided not to. [place] to likely have TAPS from Taxol and discussed trial of Lyrica and steroids. Reports allergy to Gabapentin and AE (\"does not do well on steroids\") but reassured her that steroid dose was going to be low (and tolerated her steroid premeds). Also spoke to palliative care  about her s/sx who discussed trial of morphine as norco did not seem to help.    Was seen by AP[facility] and palliative care on 4/3. By then she said the pain had started to subside the day before.     - port placed left chest 4/11/25     - NGS showed TP53     - On 4/19 presented to the ED with acute onset SOB. CTA chest done showed no evidence of PE but did show a large right pleural effusion. Right sided thoracentesis was arranged and 650 mL of yellow serous fluid was removed. She felt significantly better after tap and was discharged home.         History of Present Illness:  With her 2nd cycle of chemo+IO reported fatigue, constipation requiring more aggressive bowel regimen, nausea with occasional vomiting, cough while eating, no rash but had peeling, itchy skin. Nodes had decreased in size and not as tender but maybe the last day or so feels the neck nodes had increased in size. Did not have as muscle/nerve pain (TAPS) on lyrica and steroids. Still has her fibromyalgia pain. Her breathing is better. Denies cough or fevers. Generalized pain improved but still has bilateral groin discomfort. Denies change in urinary habits, headaches, dizziness.       Current Medications:    Current Outpatient Medications:     morphINE 15 MG Oral Tab, Take 1 tablet (15 mg total) by mouth every 6 (six) hours as needed for Pain., Disp: 60 tablet, Rfl: 0    levothyroxine 75 MCG Oral Tab, Take 1 tablet (75 mcg total) by mouth before breakfast., Disp: 90 tablet, Rfl: 0    maalox/diphenhydramine/lidocaine Oral Suspension, Take 5 mL by mouth 4 (four) times daily before meals and nightly., Disp: 500 mL, Rfl: 1    pregabalin 50 MG Oral Cap, Take 1 capsule (50 mg total) by mouth 3 (three) times daily., Disp: 30 capsule, Rfl: 1    predniSONE 10 MG Oral Tab, Take 1 tablet (10 mg total) by mouth 2 (two) times daily. Start day after chemo, Disp: 10 tablet, Rfl: 1    lisinopril-hydroCHLOROthiazide 20-12.5 MG Oral Tab, Take 0.5 tablets by mouth daily.,  Disp: 45 tablet, Rfl: 3    prochlorperazine (COMPAZINE) 10 mg tablet, Take 1 tablet (10 mg total) by mouth every 6 (six) hours as needed for Nausea., Disp: 30 tablet, Rfl: 3    ondansetron (ZOFRAN) 8 MG tablet, Take 1 tablet (8 mg total) by mouth every 8 (eight) hours as needed for Nausea., Disp: 30 tablet, Rfl: 3    HYDROcodone-acetaminophen  MG Oral Tab, Take 1 tablet by mouth every 8 (eight) hours as needed for Pain., Disp: 60 tablet, Rfl: 0    dilTIAZem HCl 120 MG Oral Tab, Take 1 tablet (120 mg total) by mouth in the morning., Disp: , Rfl:     montelukast 10 MG Oral Tab, Take 1 tablet (10 mg total) by mouth nightly., Disp: 90 tablet, Rfl: 3    albuterol 108 (90 Base) MCG/ACT Inhalation Aero Soln, Inhale 2 puffs into the lungs every 6 (six) hours as needed for Wheezing or Shortness of Breath., Disp: 3 each, Rfl: 3    amphetamine-dextroamphetamine 15 MG Oral Tab, , Disp: , Rfl:     ARIPiprazole 2 MG Oral Tab, , Disp: , Rfl:     VYVANSE 70 MG Oral Cap, Take 1 capsule (70 mg total) by mouth every morning., Disp: , Rfl:     SYMBICORT 160-4.5 MCG/ACT Inhalation Aerosol, Inhale 2 puffs into the lungs in the morning and 2 puffs before bedtime., Disp: , Rfl:     cetirizine 10 MG Oral Tab, Take 1 tablet (10 mg total) by mouth daily., Disp: 90 tablet, Rfl: 1    ferrous sulfate 325 (65 FE) MG Oral Tab EC, Take 1 tablet (325 mg total) by mouth daily with breakfast., Disp: , Rfl:     Multiple Vitamin Oral Tab, Take 1 tablet by mouth in the morning., Disp: , Rfl:     ibuprofen 200 MG Oral Tab, Take 4 tablets (800 mg total) by mouth every 8 (eight) hours as needed for Pain., Disp: , Rfl:     cholecalciferol 25 MCG (1000 UT) Oral Cap, Take 1 capsule (1,000 Units total) by mouth in the morning., Disp: , Rfl: 0    DULoxetine HCl 60 MG Oral Cap DR Particles, Take 1 capsule (60 mg total) by mouth in the morning., Disp: , Rfl: 2    Past Medical History:  Past Medical History:    Abnormal uterine bleeding    bleeds every 2  weeks    Anxiety state    Asthma (HCC)    Attention deficit hyperactivity disorder (ADHD)    BACK PAIN    Back problem    lumbar radiculopathy    Cancer (HCC)    adenocarcinoma of right lung    MARCIO II (cervical intraepithelial neoplasia II)    DDD (degenerative disc disease), lumbar    DDD (degenerative disc disease), thoracic    Depression    Disorder of thyroid    Dyspareunia    Exposure to medical diagnostic radiation    On hold since 2022    Fall    Fibromyalgia    Fibromyalgia    Genital warts    High blood pressure    History of COVID-19    COVID + 2020 Headache - NO Hospitalization needed     History of lumbar fusion    Hx of motion sickness    IBS (irritable bowel syndrome)    Per patient - Just constipation    Infertility, female    Lumbar radiculopathy    Mild dysplasia of cervix    Pap smear for cervical cancer screening    pt states normal    Papanicolaou smear of cervix with high grade squamous intraepithelial lesion (HGSIL)    Personal history of antineoplastic chemotherapy    Last chemo 22 prior to lung bx 22    Post covid-19 condition, unspecified    symptoms: HA; s/s resolved-yes; not hospitalized    Problems with swallowing    with radiation    Sexually transmitted disease    HPV    Substance abuse (HCC)    cocaine    Urinary incontinence    Visual impairment    glasses/contacts bifocals       Past Surgical History:  Past Surgical History:   Procedure Laterality Date    Appendectomy  1980    Back surgery  2009    fusion L5-S1    Back surgery  2021    RIGHT LUMBAR 3/ LUMBAR 4, LUMBAR 4/ LUMBAR 5 HEMILAMINTOMIES, LEFT LUMBAR 2 / LUMBAR 3 MICRODISCECTOMY    Colonoscopy N/A 2023    Procedure: COLONOSCOPY;  Surgeon: Devante Kern MD;  Location:  ENDOSCOPY    Colposcopy,bx cervix/endocerv curr  11    MARCIO 1    Laparoscopy procedure unlisted      Ovarian cyst removed    Leep  2011    MARCIO 2          X2    Other surgical history      bunions bilateral    Other  surgical history  2006    nodule lymph nodes neck    Other surgical history  2016     Bladder sling    Other surgical history  2020    Cystoscopy, Dr Beach       Family Medical History:  Family History   Problem Relation Age of Onset    Other (lung CA) Self     Hypertension Mother     Diabetes Mother     Heart Disease Mother     Heart Disease Father     Other (lung cancer) Father 55        Lung Cancer    Other (pancreatic cancer) Sister 47        Pancreatic Cancer    Cancer Sister 51        lung CA    Other (Other) Sister         Back problems    Other (Other) Son         anxiety    Other (Other) Son         behavioral problems    Diabetes Maternal Grandmother     Breast Cancer Maternal Grandmother         dx late-60s    Stroke Maternal Grandfather 86    Dementia Paternal Grandmother     Heart Disorder Paternal Grandfather     Cancer Maternal Aunt 50        LUNG CA 51    Breast Cancer Maternal Aunt         dx 46-47y    Ovarian Cancer Maternal Cousin Female 33         of ovarian ca at 35y       Gyne History:  OB History    Para Term  AB Living   2 2 2 0 0 2   SAB IAB Ectopic Multiple Live Births   0 0 0 0 2       Psychosocial History:  Social History     Socioeconomic History    Marital status:      Spouse name: Not on file    Number of children: Not on file    Years of education: Not on file    Highest education level: Not on file   Occupational History    Not on file   Tobacco Use    Smoking status: Former     Current packs/day: 0.00     Average packs/day: 0.8 packs/day for 19.0 years (14.5 ttl pk-yrs)     Types: Cigarettes     Start date:      Quit date: 2010     Years since quitting: 15.3     Passive exposure: Past    Smokeless tobacco: Former     Quit date: 2024    Tobacco comments:     Has not vaped since 2024.    Vaping Use    Vaping status: Former    Substances: Nicotine, daily    Devices: Pre-filled pod   Substance and Sexual Activity    Alcohol use: Not  Currently     Comment: 3 drinks per week    Drug use: Not Currently     Types: Cocaine     Comment: USED COCAINE BETW [id number]    Sexual activity: Yes     Partners: Male   Other Topics Concern     Service Not Asked    Blood Transfusions Not Asked    Caffeine Concern Yes     Comment: 3-4 daily of pop    Occupational Exposure Not Asked    Hobby Hazards Not Asked    Sleep Concern Not Asked    Stress Concern Not Asked    Weight Concern Not Asked    Special Diet Not Asked    Back Care Not Asked    Exercise No     Comment: not tolerated right now    Bike Helmet Not Asked    Seat Belt Not Asked    Self-Exams Not Asked   Social History Narrative    Not on file     Social Drivers of Health     Food Insecurity: No Food Insecurity (7/12/2024)    Food Insecurity     Food Insecurity: Never true   Transportation Needs: No Transportation Needs (7/12/2024)    Transportation Needs     Lack of Transportation: No     Car Seat: Not on file   Housing Stability: Low Risk  (7/12/2024)    Housing Stability     Housing Instability: No     Housing Instability Emergency: Not on file     Crib or Bassinette: Not on file         Allergies:  Allergies   Allergen Reactions    Gabapentin SWELLING and UNKNOWN    Erythromycin UNKNOWN    Clindamycin RASH        Review of Systems:  A 14-point ROS was done with pertinent positives and negative per the HPI    Vital Signs:   Day 1,  Cycle 2  05/16/25   Height 1.619 m (5' 3.74\")   Weight 79.5 kg (175 lb 3.2 oz)   BSA (Calculated - sq m) 1.84 sq meters   BMI (Calculated) 30.32 kg/m2   /73   Pulse 126 !   BP Location Right arm   Patient Position Sitting   Temp 97.5 °F (36.4 °C)   !: Data is abnormal      Physical Examination:  General: Patient is alert and oriented x 3, not in acute distress. Alopecia  Psych:  Mood and affect appropriate  HEENT: EOMs intact. PERRL. Oropharynx with mucositis, no thrush  Neck: No JVD. Left palpable lymphadenopathy supraclav - 2 discrete nodes previously felt:  smaller no longer palpable, larger one much smaller and barely palpable. Less tenderness and swelling but still with fullness--> last visit. Today no longer palpable, not tender but still with fullness. Neck is supple.  Chest: Diminished BS  Heart: Tachycardic  Abdomen: Soft with good bowel sounds.  No hepatosplenomegaly.  No palpable mass. Some tenderness to palp BLQ but no guarding or rebound  Extremities: Pedal pulses are present. No BLE edema.  Neurological: Grossly intact.       Laboratory:  Lab Results   Component Value Date    WBC 3.1 (L) 05/16/2025    RBC 3.25 (L) 05/16/2025    HGB 10.1 (L) 05/16/2025    HCT 28.9 (L) 05/16/2025    .0 05/16/2025    MPV 9.4 09/02/2012    MCV 88.9 05/16/2025    MCH 31.1 05/16/2025    MCHC 34.9 05/16/2025    RDW 15.2 05/16/2025    NEPRELIM 2.00 05/16/2025    NEUTABS 7.02 04/25/2025    LYMPHABS 1.40 04/25/2025    EOSABS 0.65 04/25/2025    BASABS 0.22 (H) 04/25/2025    NEUT 65 04/25/2025    LYMPH 13 04/25/2025    MON 13 04/25/2025    EOS 6 04/25/2025    BASO 2 04/25/2025    NEPERCENT 63.9 05/16/2025    LYPERCENT 16.3 05/16/2025    MOPERCENT 17.6 05/16/2025    EOPERCENT 1.6 05/16/2025    BAPERCENT 0.3 05/16/2025    NE 2.00 05/16/2025    LYMABS 0.51 (L) 05/16/2025    MOABSO 0.55 05/16/2025    EOABSO 0.05 05/16/2025    BAABSO 0.01 05/16/2025     Lab Results   Component Value Date     (H) 05/16/2025    BUN 16 05/16/2025    BUNCREA 18.0 03/21/2022    CREATSERUM 0.88 05/16/2025    ANIONGAP 7 05/16/2025     01/28/2018    GFRNAA 61 04/03/2022    GFRAA 70 04/03/2022    CA 9.4 05/16/2025    OSMOCALC 297 (H) 05/16/2025    ALKPHO 71 05/16/2025    AST 19 05/16/2025    ALT 18 05/16/2025    BILT 0.3 05/16/2025    TP 6.2 05/16/2025    ALB 4.1 05/16/2025    GLOBULIN 2.1 05/16/2025     05/16/2025    K 3.5 05/16/2025     05/16/2025    CO2 27.0 05/16/2025     Lab Component   Component Value Date/Time     03/12/2025 1102     Lab Component   Component Value  Date/Time    CEA 1.0 05/12/2017 1425      Latest Reference Range & Units 11/07/24 10:09   -246 U/L U/L 168   FERRITIN 50 - 306 ng/mL 59   Vitamin B12 211 - 911 pg/mL >2,000 (H)   FOLATE (FOLIC ACID), SERUM >5.4 ng/mL 29.8   (H): Data is abnormally high      Radiology:  PROCEDURE:  CT STN/CHST/ABD/PEL W CONTRAST (CPT=70491/31223/55212)     COMPARISON:  [facility] , CT, CT CHEST+ABDOMEN+PELVIS(ALL CNTRST ONLY)(CPT=71260/18369), 3/27/2024, 12:27 PM.  [facility] , NM, PET STANDARD BODY SCAN (ONCOLOGY) (CPT=78815), 3/11/2025, 9:49 AM.  [facility] , CT, CT ANGIOGRAPHY, CHEST (CPT=71275), 4/19/2025, 12:04  PM.     INDICATIONS:  D64.9 Anemia, normocytic normochromic C34.91 Primary adenocarcinoma of right lung (HCC) R59.0 Mediastinal adenopathy     TECHNIQUE:  Multislice non-ionic contrast enhanced scanning through the neck, chest, abdomen and pelvis was performed.  Dose reduction techniques were used. Dose information is transmitted to the ACR (American College of Radiology) NRDR (National  Radiology Data Registry) which includes the Dose Index Registry.     PATIENT STATED HISTORY:(As transcribed by Technologist)  Patient has history of lung cancer and is no chemo and immunotherapy.  Disease surveillance.      CONTRAST USED:  155cc of Isovue 370     FINDINGS:       NECK:  NASOPHARYNX:  Fossae of Rosenmuller and torus tubarius are symmetric.    ORAL CAVITY:  No visible mass.    OROPHARYNX:  Faucial and lingual tonsils are symmetric.    HYPOPHARYNX:  No mass or other visible lesion.    LARYNX:  The vocal cords are symmetric and without mass.    SINUSES:  There is mucosal thickening involving maxillary sinuses and ethmoid air cells bilaterally.  NECK GLANDS:  The parotid, submandibular, and thyroid glands are unremarkable.    LYMPH NODES:  Is 1 lymph node anterior to left anterior scalene muscle series 10, image 65 which measures 0.9 x 0.7 cm.  This is not appear to be significantly changed as compared to the PET scan.  A left  supraclavicular lymph node series 10, image 57  measures 0.7 x 0.6 cm.  Other hypermetabolic lymph nodes noted on the PET scan are not detected on this CT.  There are no pathologically enlarged lymph nodes in the neck.  SKULL BASE:  Foramina are symmetric without bony erosion.    VASCULATURE:  Limited views are unremarkable.       CHEST:    LUNGS:  There are reticular densities around the mediastinum which are presumably result of radiation therapy.  There is fibrosis and volume loss in the right perihilar lung involving perihilar right upper, middle and lower lobes.  Representative  measurement of confluent perihilar opacity as seen series 6, image 64 is 6.9 x 1.7 cm.  A similar measurement on chest CT from March 27, 2024 demonstrated dimensions of 7.5 x 3.2 cm.    MEDIASTINUM:  There are no pathologically enlarged lymph nodes.  MICHAEL:  Confluent opacity right perihilar lung includes the right hilus.  Areas of uptake on recent PET scan are not well identified on CT.  CARDIAC:  There is moderate to severe coronary artery atherosclerosis.  There is trace pericardial fluid.  PLEURA:  Trace right pleural effusion is noted.  CHEST WALL:  Right-sided chest port is noted with catheter tip in SVC.  VASCULATURE:  No visible pulmonary arterial thrombus or attenuation.       ABDOMEN/PELVIS:  LIVER:  No enlargement, atrophy, abnormal density, or significant focal lesion.    BILIARY:  No visible dilatation or calcification.    PANCREAS:  No lesion, fluid collection, ductal dilatation, or atrophy.    SPLEEN:  No enlargement or focal lesion.    KIDNEYS:  Benign cyst left kidney is noted.  Kidneys are otherwise unremarkable.  ADRENALS:  No mass or enlargement.    AORTA/VASCULAR:  No aneurysm or dissection.    RETROPERITONEUM:  Representative right retrocaval lymph node which corresponds to area of intense uptake on PET scan is noted series 7, image 59 and measures 0.8 x 0.6 cm (previously 1.3 x 0.7 cm..  Representative left  periaortic nodule series 7, image  60 measures 0.6 x 0.4 cm.  This also corresponds to an area uptake on the PET scan.    BOWEL/MESENTERY:  Gastrohepatic ligament nodule series 7, image 30 measures 0.9 x 0.4 cm.  There is moderate retained fecal debris in the colon.  ABDOMINAL WALL:  No mass or hernia.    URINARY BLADDER:  No visible focal wall thickening, lesion, or calculus.    PELVIC NODES:  Representative left pelvic lymph node posterior to the common iliac veins series 7, image 77 measures 0.7 x 0.7 cm.  Representative left external iliac lymph node series 7, image 91 measures 0.9 x 0.5 cm.  Representative left external  iliac lymph node series 7, image 89 measures 1.4 x 0.5 cm (previously 2.4 x 0.8 cm).  PELVIC ORGANS:  No visible mass.  Pelvic organs appropriate for patient age.    BONES:  No bony lesion or fracture.                     Impression   CONCLUSION:    1. Lymphadenopathy previously described on PET scan is much less conspicuous on CT.  Some of these do appear to have decreased in size since the PET scan, others appear not significantly changed although are very small and difficult to detect on CT.  2. Post treatment volume loss and fibrosis in the lungs is stable.  3. Hilar adenopathy noted on the PET scan is not well visualized on CT.          LOCATION:  [facility]        Dictated by (CST): Damir Deleon MD on 5/08/2025 at 7:54 AM      Finalized by (CST): Damir Deleon MD on 5/08/2025 at 8:15 AM       PROCEDURE:  CT ANGIOGRAPHY, CHEST (CPT=71275)     COMPARISON:  [facility], CT CHEST(CONTRAST ONLY) (CPT=71260), 2/28/2025, 10:29 AM.     INDICATIONS:  lung cancer, sob, tachypnic r/o PE     TECHNIQUE:  IV contrast-enhanced multislice CT angiography is performed through the pulmonary arterial anatomy. 3D volume renderings are generated.  Dose reduction techniques were used. Dose information is transmitted to the ACR (American College of  Radiology) NRDR (National Radiology Data Registry) which  includes the Dose Index Registry.     PATIENT STATED HISTORY:(As transcribed by Technologist)  Patient is here with SOB and tachypnea      CONTRAST USED:  100cc of Isovue 370     FINDINGS:    VASCULATURE:  No visible pulmonary arterial thrombus or attenuation.    THORACIC AORTA:  No aneurysm or visible dissection.    LUNGS:  Marked atelectasis march portion of right lung is again identified.  Right perihilar masslike consolidation extending through the base of the right upper lobe adjacent to the horizontal fissure has increased since prior examination.  Right  perihilar consolidation extending to the right lower lobe is noted.  MEDIASTINUM:  No adenopathy or mass.    MICHAEL:  No mass or adenopathy.    CARDIAC:  No enlargement, pericardial thickening, or significant coronary artery calcification.  PLEURA:  No mass or effusion.    CHEST WALL:  No mass or axillary adenopathy.    LIMITED ABDOMEN:  Hyperenhancing right hepatic lobe lesion measuring 1.7 cm is stable.  BONES:  No bony lesion or fracture.    OTHER:  Negative.                     Impression   CONCLUSION:       1. No evidence of pulmonary embolus.     2. Large right pleural effusion with significant atelectasis is again noted.  Consolidation in the right upper lobe and right lower lobe adjacent to the right major fissure and horizontal fissure is increased since prior examination.  This may partially  be due to scarring and post treatment change.             LOCATION:  [facility]        Dictated by (CST): Inder Kingston MD on 4/19/2025 at 12:18 PM      Finalized by (CST): Inder Kingston MD on 4/19/2025 at 12:21 PM         PROCEDURE:  PET/CT STANDARD BODY SCAN (ONCOLOGY) (CPT=78815)     COMPARISON:  [facility] , MR, MRI BRAIN (W+WO) (CPT=70553), 3/07/2025, 3:45 PM.  [facility] , CT, CT CHEST+ABDOMEN+PELVIS(ALL CNTRST ONLY)(CPT=71260/21472), 9/27/2024, 1:11 PM.  [facility] US, US HEAD/NECK (CPT=76536), 2/12/2025, 8:41 AM.  [facility] , NM, PET  STANDARD BODY SCAN  (ONCOLOGY) (CPT=78815), 4/13/2022, 12:14 PM.  External Exams, [place], PET STANDARD BODY SCAN (ONCOLOGY) (CPT=78815), 12/07/2021, 12:36 PM.  [facility], PET STANDARD BODY SCAN (ONCOLOGY) (CPT=78815), 12/27/2023, 9:02 AM.     INDICATIONS:  C34.91 Recurrent malignant neoplasm of right lung of unknown cell type (HCC) C34.91 Primary adenocarcinoma of right lung (HCC)     TECHNIQUE:  The patient fasted for at least 6 hours. F-18 FDG was injected IV, and whole-body images from vertex to mid-thigh were obtained with concurrent CT scan for both anatomic localization as well as attenuation correction.     RADIOPHARMACEUTICAL:    9.6 mCi F-18 FDG  FASTING GLUCOSE LEVEL:  80 mg/dl  INJECTION TIME:         0850 hours  SCAN TIME:              0954 hours     FINDINGS:       Left supraclavicular lymph nodes with elevated FDG uptake as high as 4.5 consistent with metastatic disease.  There is a low left supraclavicular lymph node medially with elevated FDG uptake of 5.9.  Low left jugular digastric lymph node with elevated  FDG uptake of 4.2.  Asymmetric radiotracer uptake within the right masseter muscles likely related to physiologic changes as there is no underlying mass lesion.  Physiologic uptake noted throughout the tongue.       Left infrahilar lymph node has elevated FDG uptake of 7.0.  Right hilar lymph node has elevated FDG uptake of 5.0  posterior mediastinal lymph node adjacent to the aorta with elevated FDG uptake of 4.2.  Right pleural fluid has elevated FDG uptake  concerning for involvement per of the pleural fluid by malignancy with maximum SUV uptake of 9.4.  It should be noted that there is been recent thoracentesis in this may be the reason behind this uptake rather than neoplastic disease.     Abdomen demonstrates a lymph node within the posterior gastrohepatic ligament with SUV of 5.4.     There is retroperitoneal adenopathy bilaterally posterior to the aorta and cava with the largest lymph node on the right with  maximum SUV of 14.4 and the maximum SUV of the left retroperitoneal lymph node being 7.1.  Within the pelvis left external iliac   lymph node has maximum SUV of 8.6 and left internal iliac lymph node has maximum SUV of 8.2.  There are multiple left external iliac lymph nodes with maximum SUV of 16.5.  There is a left obturator lymph node with maximum SUV of 5.8.       Minimal uptake within left small inguinal lymph nodes with SUV less than 2 likely reactive.                        Impression   CONCLUSION:       1. Left supraclavicular and jugular digastric lymph node with elevated FDG uptake consistent metastatic disease.     2. Bilateral hilar and posterior retroperitoneal lymph node elevated FDG uptake consistent with metastatic disease.     3. Pleural uptake within the right posterior pleural space could be related to recent thoracentesis although metastatic disease to the pleura is also considered.     4. Multiple abnormal lymph nodes including gastrohepatic ligament, bilateral retroperitoneal, left external iliac, and left  lymph nodes consistent with metastatic disease.        LOCATION:  [facility]           Dictated by (CST): Cj García MD on 3/11/2025 at 11:04 AM      Finalized by (CST): Cj García MD on 3/11/2025 at 12:25 PM       PROCEDURE:  MRI BRAIN (W+WO) (CPT=70553)     COMPARISON:  [facility], MRI BRAIN (W+WO) (CPT=70553), 7/24/2024, 1:02 PM.     INDICATIONS:  C34.91 Primary adenocarcinoma of right lung (HCC)     TECHNIQUE:  MRI of the brain was performed with multi-planar T1, T2-weighted images with FLAIR sequences and diffusion weighted images without and with infusion.     PATIENT STATED HISTORY:(As transcribed by Technologist)  History of cancer      CONTRAST USED:  15 mL of Dotarem     FINDINGS:    INTRACRANIAL:  There are few foci of T2/FLAIR hyperintensity in the periventricular subcortical white matter consistent small vessel ischemic disease..  Diffusion weighted imaging was  performed and is unremarkable.  There is no evidence for acute  infarction.  There is no evidence of hemorrhage or mass lesion.  VENTRICLES/SULCI:   Ventricles and sulci are normal in caliber.  There are no extra-axial fluid collections.  There is no midline shift.  SINUSES/ORBITS:       The visualized paranasal sinuses are clear.  The orbits are unremarkable.  MASTOIDS:       The mastoids are clear.                  Impression  CONCLUSION:  No acute intracranial process.  No evidence of metastatic disease to the brain.        LOCATION:  [facility]           Dictated by (CST): Cj García MD on 3/07/2025 at 4:40 PM      Finalized by (CST): Cj García MD on 3/07/2025 at 4:46 PM      PROCEDURE:  CT CHEST(CONTRAST ONLY) (CPT=71260)     COMPARISON:  [facility] , CT, CT CHEST+ABDOMEN+PELVIS(ALL CNTRST ONLY)(CPT=71260/23915), 9/27/2024, 1:11 PM.     INDICATIONS:  C34.91 Primary adenocarcinoma of right lung (HCC)     TECHNIQUE:  CT images were obtained with non-ionic intravenous contrast material. Dose reduction techniques were used. Dose information is transmitted to the ACR (American College of Radiology) NRDR (National Radiology Data Registry) which includes the  Dose Index Registry.     PATIENT STATED HISTORY:(As transcribed by Technologist)  Patient has history of Lung cancer and now fluid in lung. Disease surveillance. Shortness of breath.      CONTRAST USED:  75cc of Isovue 370     FINDINGS:    LUNGS:  There is progressive atelectasis of the right lower lobe and right middle lobe related to developing large right effusion.  VASCULATURE:  No visible pulmonary arterial thrombus or attenuation.    MICHAEL:  No mass or adenopathy.    MEDIASTINUM:  No mass or adenopathy.    CARDIAC:  There is a small amount of pericardial fluid which is similar to the prior study.  PLEURA:  Large right pleural effusion has developed in the interval since the prior study.  This predominantly layers in the dependent part of the chest.  THORACIC  AORTA:  No aneurysm.    CHEST WALL:  No mass or axillary adenopathy.    LIMITED ABDOMEN:  Hyperenhancing focus in right hepatic lobe series 3, image 139 has been previously described and is unchanged.  BONES:  There is diffuse degenerative change in the thoracic spine.  There are no aggressive bone lesions detected.                   Impression   CONCLUSION:    1. Interval development of large right pleural effusion with associated worsening atelectasis in right lung.  Post treatment changes in the right perihilar lung are otherwise unchanged.  2. Otherwise no specific evidence of metastatic disease.  3. Small pericardial effusion is similar to prior study.  4. Hyperenhancing focus in right hepatic lobe has been previously described and is unchanged.        LOCATION:  [facility]        Dictated by (CST): Damir Deleon MD on 2/28/2025 at 11:56 AM      Finalized by (CST): Damir Deleon MD on 2/28/2025 at 12:02 PM       PROCEDURE:  CT ABDOMEN+PELVIS (CONTRAST ONLY) (CPT=74177)     COMPARISON:  [facility] , CT, CT CHEST+ABDOMEN+PELVIS(ALL CNTRST ONLY)(CPT=71260/29849), 9/27/2024, 1:11 PM.  [facility] , CT, CT ABDOMEN+PELVIS(CONTRAST ONLY)(CPT=74177), 3/07/2021, 2:05 PM.     INDICATIONS:  R59.0 Virchow's node     TECHNIQUE:  CT scanning was performed from the dome of the diaphragm to the pubic symphysis with non-ionic intravenous contrast material. Post contrast coronal MPR imaging was performed.  Dose reduction techniques were used. Dose information is  transmitted to the ACR (American College of Radiology) NRDR (National Radiology Data Registry) which includes the Dose Index Registry.     PATIENT STATED HISTORY:(As transcribed by Technologist)  Pt states rule out cancer.      CONTRAST USED:  100cc of Isovue 370     FINDINGS:    LIVER:  Low-attenuation lesion in hepatic segment 4 measures 11 x 9 mm on image 23 of series 2, previously 11 x 9 mm.  BILIARY:  No visible dilatation or calcification.    PANCREAS:  No lesion, fluid  collection, ductal dilatation, or atrophy.    SPLEEN:  No enlargement or focal lesion.    KIDNEYS:  No mass, obstruction, or calcification.  Simple left renal cysts again demonstrated which requires no further workup or follow-up.  ADRENALS:  No mass or enlargement.    AORTA/VASCULAR:  Trace atherosclerotic calcifications.  No aneurysm.  RETROPERITONEUM:  No mass or adenopathy.    BOWEL/MESENTERY:  No visible mass, obstruction, or bowel wall thickening.  Moderate colonic stool.  ABDOMINAL WALL:  Stable tiny fat containing umbilical hernia.  URINARY BLADDER:  No visible focal wall thickening, lesion, or calculus.    PELVIC NODES:  No adenopathy.    PELVIC ORGANS:  No visible mass.  Pelvic organs appropriate for patient age.    BONES:  Degenerative changes of spine.  Stable postsurgical changes of fusion at L5-S1.  Healed fracture of L1 spinous process.  LUNG BASES:  Moderate to large right pleural effusion.  Dependent atelectasis of right lung base.                      Impression   CONCLUSION:    1. Moderate to large right pleural effusion, incompletely visualized.  This was not present on 9/27/2024.  Full CT of the chest may be beneficial in further evaluation.  2. No significant change in the abdomen and pelvis.  There is a stable low-attenuation hepatic lesion.        LOCATION:  [facility]        Dictated by (CST): Javed Dye MD on 2/20/2025 at 10:21 AM      Finalized by (CST): Javed Dye MD on 2/20/2025 at 10:30 AM       PROCEDURE:  US HEAD/NECK (CPT=76536)     COMPARISON:  None.     INDICATIONS:  R59.0 Virchow's node, history of lung cancer.  Palpable abnormality along the left supraclavicular region.     TECHNIQUE:  Sonography was performed of the clinically requested area of interest.     FINDINGS:    Dedicated imaging in the area of palpable abnormality along the left supraclavicular region demonstrates a nonspecific rounded hypoechoic lymph node measuring 9 x 7 x 9 mm with the cortex measuring up to 3 mm  in thickness.  There is a more elongated  nonspecific lymph node slightly more laterally in the left supraclavicular region measuring 10 x 4 x 8 mm with the cortex measuring up to 5 mm in thickness.  Given the patient's history, pathologic lymph nodes in this area cannot be entirely excluded.    Clinical correlation recommended.  FNA may be of further value.                      Impression   CONCLUSION:  Dedicated imaging in the area of palpable abnormality along the left supraclavicular region demonstrates a nonspecific rounded hypoechoic lymph node measuring 9 x 7 x 9 mm with the cortex measuring up to 3 mm in thickness.  There is a more  elongated nonspecific lymph node slightly more laterally in the left supraclavicular region measuring 10 x 4 x 8 mm with the cortex measuring up to 5 mm in thickness.  Given the patient's history, pathologic lymph nodes in this area cannot be entirely  excluded.  Clinical correlation recommended.  FNA may be of further value.     LOCATION:  [facility]        Dictated by (CST): Akshat Conklin MD on 2/12/2025 at 9:11 AM      Finalized by (CST): Akshat Conklin MD on 2/12/2025 at 9:16 AM         ADDENDUM:  Comparison is also made to previous PET-CT from 12/27/2023.  Findings as described above in the hilum, mediastinum and right lung are stable compared to the previous PET-CT also.  There is no significant uptake in the regions of right   perihilar consolidation and right paratracheal mediastinum on the previous PET-CT.  These findings would suggest no significant residual or recurrent neoplasm.     COMPARISON:  [facility], PET STANDARD BODY SCAN (ONCOLOGY) (CPT=78815), 12/27/2023, 9:02 AM.           Dictated by (CST): Payam Headley MD on 9/30/2024 at 12:17 PM       Finalized by (CST): Payam Headley MD on 9/30/2024 at 12:20 PM                    Addended by Payam Headley MD on 9/30/2024 12:20 PM     Study Result    Narrative   PROCEDURE:  CT CHEST+ABDOMEN+PELVIS(ALL  CNTRST ONLY)(CPT=71260/21595)     COMPARISON:  [facility] , CT, CT CHEST(CONTRAST ONLY) (CPT=71260), 11/21/2023, 9:59 AM.  [facility] , CT, CT CHEST+ABDOMEN+PELVIS(ALL CNTRST ONLY)(CPT=71260/29773), 3/27/2024, 12:27 PM.     INDICATIONS:  H57.9 Visual symptoms C34.2 Malignant neoplasm of middle lobe of right lung (HCC) C34.91 Primary adenocarcinoma of right lung (HCC)     TECHNIQUE:  IV contrast-enhanced scanning through the chest, abdomen, and pelvis was performed.  Dose reduction techniques were used. Dose information is transmitted to the ACR (American College of Radiology) NRDR (National Radiology Data Registry) which   includes the Dose Index Registry.     PATIENT STATED HISTORY:(As transcribed by Technologist)  Surveillance of right lung cancer. She is having short of breath recently.      CONTRAST USED:  100cc of Isovue 370     FINDINGS:       CHEST:    LUNGS:  Right perihilar consolidation with angular concave contour is an associated bronchiectasis centered at the superior segment of the right lower lobe is stable in appearance measuring approximately 7.4 x 3.1 cm compared to 7.5 x 3.2 cm.  The small  differences are likely related to differences in measurement technique.  There is a stable 2 mm subpleural right upper lobe lung nodule seen on image 29 of series 3. There is stable mild left apical pleural and parenchymal scarring.  There is no new lung   nodule identified.    MEDIASTINUM:  Right paratracheal and central right hilar confluent soft tissue density is unchanged in appearance.  No new adenopathy is appreciated.  MICHAEL:  No interval change.  Calcified granuloma seen in the left hilum.  CARDIAC:  No enlargement, pericardial thickening, or significant coronary artery calcification.  There is a small pericardial effusion anteriorly measuring 6 mm in thickness.  No significant change from previous.  PLEURA:  No pleural effusion or pneumothorax.  CHEST WALL:  No axillary lymphadenopathy or chest wall mass.   Right chest wall chest port catheter noted.  AORTA:  No aneurysm or dissection.    VASCULATURE:  No visible pulmonary arterial thrombus or attenuation.       ABDOMEN/PELVIS:  LIVER:  The previously demonstrated early enhancing 20 x 14 mm focus appears smaller measuring 15 x 9 mm.  The differences in size could reflect differences in timing of the bolus and filming.  This could reflect a vascular lesion such as a hemangioma.    There is a stable 9 mm focus of decreased attenuation in the left lobe in hepatic segment 4A.  This has been stable on previous exams dating back to 11/21/2023.    BILIARY:  No visible dilatation or calcification.    PANCREAS:  No lesion, fluid collection, ductal dilatation, or atrophy.    SPLEEN:  No enlargement or focal lesion.    KIDNEYS:  There is a 1.8 x 1.5 cm simple cyst in the upper pole cortex of the left kidney and a smaller subcentimeter cyst in the midpole cortex of the left kidney.  ADRENALS:  No mass or enlargement.    AORTA:  Mild atheromatous plaque noted in the aorta and iliac arteries.  No evidence for aneurysm.  RETROPERITONEUM:  No mass or adenopathy.    BOWEL/MESENTERY:  No visible mass, obstruction, or bowel wall thickening.  Status post appendectomy.  No evidence for bowel wall thickening.  ABDOMINAL WALL:  No mass or hernia.    URINARY BLADDER:  No visible focal wall thickening, lesion, or calculus.    PELVIC NODES:  No adenopathy.    PELVIC ORGANS:  No visible mass.  Pelvic organs appropriate for patient age.    BONES:  Degenerative changes are seen in the cervical, thoracic and lumbar spine.  Postoperative changes of posterior fusion noted at L5-S1.  No new lytic or blastic bony lesions are identified.  There is an old healed posterior right 10th and 11th rib  fracture.                   Impression   CONCLUSION:    1. Stable right perihilar consolidation with angular concave contour with associated bronchiectasis centered at the superior segment of the right lower  lobe.  This is in continuity with stable soft tissue density extending to the right paratracheal  mediastinum.  This may reflect changes related to treated cancer/treatment changes.  PET-CT would better evaluate for any residual neoplasm.  2. No new metastatic findings in the chest, abdomen or pelvis.  3. Interval decrease in size of an early enhancing right lobe liver lesion.  Imaging characteristics favor benign lesions such as hemangioma.  4. Multiple other incidental findings as detailed above.             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 F-18 FDG was injected IV, and whole-body images from vertex to mid-thigh were obtained with concurrent CT scan for both anatomic localization as well as attenuation correction.     RADIOPHARMACEUTICAL:    9.5 mCi F-18 FDG  FASTING GLUCOSE LEVEL:  106 mg/dl  INJECTION TIME:         0 800  SCAN TIME:              0911     FINDINGS:     Mean SUV, mediastinal blood pool:  2.2     Mean SUV, liver:  2.8         HEAD/NECK:  Physiologic activity in all regions.     CHEST:  Consolidation with air bronchograms identified within the superior segment right lower lobe with corresponding low-grade radiotracer uptake, SUV max of 2.5 (previously 3.1).  This is favored to represent post-therapeutic inflammation/fibrosis.    No enlarged or hypermetabolic regional lymph nodes.     ABDOMEN/PELVIS:  Physiologic activity in all regions.       MUSCULOSKELETAL:  Scattered areas of degenerative uptake noted within the  spine.  No hypermetabolic osseous metastatic disease.  Mild exertional/inflammatory uptake of bilateral shoulder girdles and peritrochanteric regions.                     Impression  CONCLUSION:       Low-grade hypermetabolism corresponds to consolidative opacity of the superior segment right lower lobe favoring treatment-related inflammation/fibrosis.  No imaging evidence of residual/recurrent malignancy.          LOCATION:  [facility]           Dictated by (CST): Umm Hodges MD on 12/27/2023 at 1:25 PM      Finalized by (CST): Umm Hodges MD on 12/27/2023 at 1:38 PM        Impression and Plan:  1. Metastatic NSCLC   - H/o locally advanced NSCLC (IV low neck- cervical?)  - biopsy of left supraclav node and right pleural effusion showed metastatic adenocarcinoma c/w lung primary 2/2025  - PDL-1 negative (<1%), NGS had to be resent as initial sample sent by path insufficient. Guardant showed no targetable mutations. She wanted to get going with treatment and not wait for NGS results   - started carbo/taxol +ipi/nivo (9LA) 3/28/25  - NGS came back showing TP53 with no  mutations to target. She therefor continued on her current regimen.   - scans done after 2 cycles of chemo, 1 dose of ipi and 2 doses of nivo per protocol showed response.   - I had a long discussion with her regarding the 9LA regimen. Protocol only has 2 cycles of chemo + ipi/nivo. This regimen was compared to 4 cycles of chemo alone with the chemo+IO regimen showing superiority. As she had a good response with 2 cycles of chemo with her regimen she wanted to push to receive 2 more cycles despite all the SE/AEs she had on chemo. I was candid with them that we have no data whether adding 2 more cycles of chemo will add further benefit but can guarantee she will have more toxicity. I therefore favored she continued her regimen as outlined by the protocol.   - in the end she agreed to proceed with ipi+nivo alone per the protocol but with possible low  threshold to get scans (next imaging I plan a PET) pending how she does clinically but also she admitted this would give her peace of mind      2. Neoplasm related pain, fibromyalgia. Chronic LBP, neck and left arm pain, TAPS  - followed by palliative care. On Ibuprofen, morphine PRN, also on topical lidocaine  - used to be followed at a pain clinic- not anymore  - as no longer will be getting chemo, unlikely to have TAPS with subsequent cycles.     3. H/o asthma, bronchiectasis, former smoker, cough  - follows with pulmonary, continue inhalers   - flutter valve  - quit smoking in 2010    4. Anemia  - Hgb has dropped with chemo which is likely also contributing to fatigue  - Gave venofer last visit  - checked for other contributing causes- negative    5. TSH elevated  - but T4 normal. This was baseline even before starting IO. Should still be able to proceed but will need close monitoring  - was on LT4 before but was stopped by PCP as levels had been good. Has been restarted by PCP.  - TFTs back to normal    6. Access  - port placed by IR 4/11    7. Hemoptysis  - reports occasional bloody streaking of mucus. Could also be from bronchiectasis. Did not mention this today  - monitor    8. Right pleural effusion  - s/p thoracentesis x 2  - hopefully will respond to therapy and not require further taps.   - will continue to monitor. No significant effusion noted on exam today and recent imaging    9. Mucositis  - pain meds, magic mouthwash  - should be better off chemo    10. Constipation   - bowel regimen   - could be culprit for abdominal pain she describes    11. Nausea  - anti-emetics.   - hopefully will be less/resolve off chemo    12. Tachycardia  - h/o sinus tach. Followed by cards. Was placed on diltiazem. Recommended she discuss this further with cards    Labs and imaging reviewed   Continue palliative care  Proceed with C2D1 today    RTC 6/5/25      Risk level high- metastatic lung cancer on chemo+IO with  SE/AEs      Shoshana Gordon MD  [facility]

## 2025-06-03 ENCOUNTER — APPOINTMENT (OUTPATIENT)
Dept: GENERAL RADIOLOGY | Facility: HOSPITAL | Age: 55
End: 2025-06-03
Payer: MEDICAID

## 2025-06-03 ENCOUNTER — HOSPITAL ENCOUNTER (INPATIENT)
Facility: HOSPITAL | Age: 55
LOS: 1 days | Discharge: HOME | End: 2025-06-04
Attending: EMERGENCY MEDICINE | Admitting: INTERNAL MEDICINE
Payer: MEDICAID

## 2025-06-03 ENCOUNTER — APPOINTMENT (OUTPATIENT)
Dept: CT IMAGING | Facility: HOSPITAL | Age: 55
End: 2025-06-03
Attending: EMERGENCY MEDICINE
Payer: MEDICAID

## 2025-06-03 DIAGNOSIS — R00.0 TACHYCARDIA: ICD-10-CM

## 2025-06-03 DIAGNOSIS — J90 PLEURAL EFFUSION: Primary | ICD-10-CM

## 2025-06-03 DIAGNOSIS — C34.90 MALIGNANT NEOPLASM OF LUNG, UNSPECIFIED LATERALITY, UNSPECIFIED PART OF LUNG (HCC): ICD-10-CM

## 2025-06-03 DIAGNOSIS — R06.09 DYSPNEA ON EXERTION: ICD-10-CM

## 2025-06-03 PROBLEM — R73.9 HYPERGLYCEMIA: Status: ACTIVE | Noted: 2025-06-03

## 2025-06-03 LAB
ALBUMIN SERPL-MCNC: 4.7 G/DL (ref 3.2–4.8)
ALBUMIN/GLOB SERPL: 2 {RATIO} (ref 1–2)
ALP LIVER SERPL-CCNC: 73 U/L (ref 41–108)
ALT SERPL-CCNC: 13 U/L (ref 10–49)
ANION GAP SERPL CALC-SCNC: 11 MMOL/L (ref 0–18)
AST SERPL-CCNC: 16 U/L (ref ?–34)
ATRIAL RATE: 112 BPM
BASOPHILS # BLD AUTO: 0.07 X10(3) UL (ref 0–0.2)
BASOPHILS NFR BLD AUTO: 1.4 %
BILIRUB SERPL-MCNC: 0.3 MG/DL (ref 0.3–1.2)
BUN BLD-MCNC: 14 MG/DL (ref 9–23)
CALCIUM BLD-MCNC: 9.9 MG/DL (ref 8.7–10.6)
CHLORIDE SERPL-SCNC: 103 MMOL/L (ref 98–112)
CO2 SERPL-SCNC: 25 MMOL/L (ref 21–32)
CREAT BLD-MCNC: 1.02 MG/DL (ref 0.55–1.02)
EGFRCR SERPLBLD CKD-EPI 2021: 65 ML/MIN/1.73M2 (ref 60–?)
EOSINOPHIL # BLD AUTO: 0.08 X10(3) UL (ref 0–0.7)
EOSINOPHIL NFR BLD AUTO: 1.6 %
ERYTHROCYTE [DISTWIDTH] IN BLOOD BY AUTOMATED COUNT: 15.9 %
FLUAV + FLUBV RNA SPEC NAA+PROBE: NEGATIVE
FLUAV + FLUBV RNA SPEC NAA+PROBE: NEGATIVE
GLOBULIN PLAS-MCNC: 2.4 G/DL (ref 2–3.5)
GLUCOSE BLD-MCNC: 228 MG/DL (ref 70–99)
HCT VFR BLD AUTO: 35.9 % (ref 35–48)
HGB BLD-MCNC: 12.5 G/DL (ref 12–16)
IMM GRANULOCYTES # BLD AUTO: 0.04 X10(3) UL (ref 0–1)
IMM GRANULOCYTES NFR BLD: 0.8 %
LYMPHOCYTES # BLD AUTO: 0.73 X10(3) UL (ref 1–4)
LYMPHOCYTES NFR BLD AUTO: 14.8 %
MCH RBC QN AUTO: 31.5 PG (ref 26–34)
MCHC RBC AUTO-ENTMCNC: 34.8 G/DL (ref 31–37)
MCV RBC AUTO: 90.4 FL (ref 80–100)
MONOCYTES # BLD AUTO: 0.52 X10(3) UL (ref 0.1–1)
MONOCYTES NFR BLD AUTO: 10.5 %
NEUTROPHILS # BLD AUTO: 3.49 X10 (3) UL (ref 1.5–7.7)
NEUTROPHILS # BLD AUTO: 3.49 X10(3) UL (ref 1.5–7.7)
NEUTROPHILS NFR BLD AUTO: 70.9 %
NT-PROBNP SERPL-MCNC: 45 PG/ML (ref ?–125)
OSMOLALITY SERPL CALC.SUM OF ELEC: 296 MOSM/KG (ref 275–295)
P AXIS: 70 DEGREES
P-R INTERVAL: 164 MS
PLATELET # BLD AUTO: 386 10(3)UL (ref 150–450)
POTASSIUM SERPL-SCNC: 3.9 MMOL/L (ref 3.5–5.1)
PROT SERPL-MCNC: 7.1 G/DL (ref 5.7–8.2)
Q-T INTERVAL: 328 MS
QRS DURATION: 68 MS
QTC CALCULATION (BEZET): 447 MS
R AXIS: 65 DEGREES
RBC # BLD AUTO: 3.97 X10(6)UL (ref 3.8–5.3)
RSV RNA SPEC NAA+PROBE: NEGATIVE
SARS-COV-2 RNA RESP QL NAA+PROBE: NOT DETECTED
SODIUM SERPL-SCNC: 139 MMOL/L (ref 136–145)
T AXIS: 50 DEGREES
TROPONIN I SERPL HS-MCNC: <3 NG/L (ref ?–34)
VENTRICULAR RATE: 112 BPM
WBC # BLD AUTO: 4.9 X10(3) UL (ref 4–11)

## 2025-06-03 PROCEDURE — 99223 1ST HOSP IP/OBS HIGH 75: CPT | Performed by: STUDENT IN AN ORGANIZED HEALTH CARE EDUCATION/TRAINING PROGRAM

## 2025-06-03 PROCEDURE — 71045 X-RAY EXAM CHEST 1 VIEW: CPT

## 2025-06-03 PROCEDURE — 71275 CT ANGIOGRAPHY CHEST: CPT | Performed by: EMERGENCY MEDICINE

## 2025-06-03 RX ORDER — PROCHLORPERAZINE EDISYLATE 5 MG/ML
5 INJECTION INTRAMUSCULAR; INTRAVENOUS EVERY 8 HOURS PRN
Status: DISCONTINUED | OUTPATIENT
Start: 2025-06-03 | End: 2025-06-04

## 2025-06-03 RX ORDER — SODIUM PHOSPHATE, DIBASIC AND SODIUM PHOSPHATE, MONOBASIC 7; 19 G/230ML; G/230ML
1 ENEMA RECTAL ONCE AS NEEDED
Status: DISCONTINUED | OUTPATIENT
Start: 2025-06-03 | End: 2025-06-04

## 2025-06-03 RX ORDER — MONTELUKAST SODIUM 10 MG/1
10 TABLET ORAL NIGHTLY
Status: DISCONTINUED | OUTPATIENT
Start: 2025-06-03 | End: 2025-06-04

## 2025-06-03 RX ORDER — HYDROMORPHONE HYDROCHLORIDE 1 MG/ML
0.5 INJECTION, SOLUTION INTRAMUSCULAR; INTRAVENOUS; SUBCUTANEOUS EVERY 30 MIN PRN
Status: ACTIVE | OUTPATIENT
Start: 2025-06-03 | End: 2025-06-04

## 2025-06-03 RX ORDER — PROCHLORPERAZINE MALEATE 10 MG
10 TABLET ORAL EVERY 6 HOURS PRN
Status: DISCONTINUED | OUTPATIENT
Start: 2025-06-03 | End: 2025-06-04

## 2025-06-03 RX ORDER — ONDANSETRON 2 MG/ML
4 INJECTION INTRAMUSCULAR; INTRAVENOUS EVERY 4 HOURS PRN
Status: ACTIVE | OUTPATIENT
Start: 2025-06-03 | End: 2025-06-04

## 2025-06-03 RX ORDER — ACETAMINOPHEN 500 MG
500 TABLET ORAL EVERY 4 HOURS PRN
Status: DISCONTINUED | OUTPATIENT
Start: 2025-06-03 | End: 2025-06-04

## 2025-06-03 RX ORDER — ONDANSETRON 2 MG/ML
4 INJECTION INTRAMUSCULAR; INTRAVENOUS EVERY 6 HOURS PRN
Status: DISCONTINUED | OUTPATIENT
Start: 2025-06-03 | End: 2025-06-04

## 2025-06-03 RX ORDER — DILTIAZEM HYDROCHLORIDE 30 MG/1
120 TABLET, FILM COATED ORAL DAILY
Status: DISCONTINUED | OUTPATIENT
Start: 2025-06-04 | End: 2025-06-04

## 2025-06-03 RX ORDER — LEVOTHYROXINE SODIUM 75 UG/1
75 TABLET ORAL
Status: DISCONTINUED | OUTPATIENT
Start: 2025-06-04 | End: 2025-06-04

## 2025-06-03 RX ORDER — SENNOSIDES 8.6 MG
17.2 TABLET ORAL NIGHTLY PRN
Status: DISCONTINUED | OUTPATIENT
Start: 2025-06-03 | End: 2025-06-04

## 2025-06-03 RX ORDER — GUAIFENESIN 600 MG/1
600 TABLET, EXTENDED RELEASE ORAL 2 TIMES DAILY PRN
Status: DISCONTINUED | OUTPATIENT
Start: 2025-06-03 | End: 2025-06-04

## 2025-06-03 RX ORDER — HYDROCODONE BITARTRATE AND ACETAMINOPHEN 10; 325 MG/1; MG/1
1 TABLET ORAL EVERY 8 HOURS PRN
Status: DISCONTINUED | OUTPATIENT
Start: 2025-06-03 | End: 2025-06-04

## 2025-06-03 RX ORDER — PREGABALIN 75 MG/1
75 CAPSULE ORAL 3 TIMES DAILY
Status: DISCONTINUED | OUTPATIENT
Start: 2025-06-03 | End: 2025-06-04

## 2025-06-03 RX ORDER — BISACODYL 10 MG
10 SUPPOSITORY, RECTAL RECTAL
Status: DISCONTINUED | OUTPATIENT
Start: 2025-06-03 | End: 2025-06-04

## 2025-06-03 RX ORDER — DULOXETIN HYDROCHLORIDE 30 MG/1
60 CAPSULE, DELAYED RELEASE ORAL DAILY
Status: DISCONTINUED | OUTPATIENT
Start: 2025-06-04 | End: 2025-06-04

## 2025-06-03 RX ORDER — ALBUTEROL SULFATE 90 UG/1
2 INHALANT RESPIRATORY (INHALATION) EVERY 6 HOURS PRN
Status: DISCONTINUED | OUTPATIENT
Start: 2025-06-03 | End: 2025-06-04

## 2025-06-03 RX ORDER — ONDANSETRON 4 MG/1
8 TABLET, FILM COATED ORAL EVERY 8 HOURS PRN
Status: DISCONTINUED | OUTPATIENT
Start: 2025-06-03 | End: 2025-06-04

## 2025-06-03 RX ORDER — ECHINACEA PURPUREA EXTRACT 125 MG
1 TABLET ORAL
Status: DISCONTINUED | OUTPATIENT
Start: 2025-06-03 | End: 2025-06-04

## 2025-06-03 RX ORDER — POLYETHYLENE GLYCOL 3350 17 G/17G
17 POWDER, FOR SOLUTION ORAL DAILY PRN
Status: DISCONTINUED | OUTPATIENT
Start: 2025-06-03 | End: 2025-06-04

## 2025-06-03 RX ORDER — IBUPROFEN 400 MG/1
800 TABLET, FILM COATED ORAL EVERY 8 HOURS PRN
Status: DISCONTINUED | OUTPATIENT
Start: 2025-06-03 | End: 2025-06-04

## 2025-06-03 RX ORDER — BENZONATATE 200 MG/1
200 CAPSULE ORAL 3 TIMES DAILY PRN
Status: DISCONTINUED | OUTPATIENT
Start: 2025-06-03 | End: 2025-06-04

## 2025-06-03 RX ORDER — ARIPIPRAZOLE 2 MG/1
2 TABLET ORAL DAILY
Status: DISCONTINUED | OUTPATIENT
Start: 2025-06-04 | End: 2025-06-04

## 2025-06-03 RX ORDER — MORPHINE SULFATE 15 MG/1
15 TABLET ORAL EVERY 6 HOURS PRN
Status: DISCONTINUED | OUTPATIENT
Start: 2025-06-03 | End: 2025-06-04

## 2025-06-03 RX ORDER — LISINOPRIL AND HYDROCHLOROTHIAZIDE 12.5; 2 MG/1; MG/1
0.5 TABLET ORAL DAILY
Status: DISCONTINUED | OUTPATIENT
Start: 2025-06-03 | End: 2025-06-03 | Stop reason: SDUPTHER

## 2025-06-03 RX ORDER — HYDROCHLOROTHIAZIDE 12.5 MG/1
6.25 TABLET ORAL DAILY
Status: DISCONTINUED | OUTPATIENT
Start: 2025-06-04 | End: 2025-06-04

## 2025-06-03 RX ORDER — LISINOPRIL 10 MG/1
10 TABLET ORAL DAILY
Status: DISCONTINUED | OUTPATIENT
Start: 2025-06-04 | End: 2025-06-04

## 2025-06-03 NOTE — PLAN OF CARE
Admission navigator completed by admission RN.     Last chemo 6 weeks ago .   Currently on daily immunotherapy

## 2025-06-03 NOTE — H&P
[facility]     Hanna Barrett Patient Status:  Emergency    1970 MRN [id number]   Location [facility] EMERGENCY DEPARTMENT Attending Sharon Alberto MD   Hosp Day # 0 PCP Guillermo Curry MD     Chief Complaint: Dyspnea    Subjective:    History of Present Illness:     Hanna Barrett is a 55 year old female with past medical history of right lung adenocarcinoma, hypothyroidism, hypertension, fibromyalgia, depression, anxiety who presents to the ED for dyspnea.  Patient has been having worsening dyspnea and has had difficulty taking deep breaths.  Patient has history of pleural effusion and required drainage in the past.  She is currently undergoing treatment for lung cancer.    History/Other:    Past Medical History:  Past Medical History[1]  Past Surgical History:   Past Surgical History[2]   Family History:   Family History[3]  Social History:    reports that she quit smoking about 15 years ago. Her smoking use included cigarettes. She started smoking about 39 years ago. She has a 14.5 pack-year smoking history. She has been exposed to tobacco smoke. She quit smokeless tobacco use about a year ago. She reports current alcohol use. She reports current drug use. Drugs: Cocaine and Cannabis.     Allergies: Allergies[4]    Medications:  Medications Ordered Prior to Encounter[5]    Review of Systems:   A comprehensive review of systems was completed.    Pertinent positives and negatives noted in the HPI.    Objective:   Physical Exam:    /88   Pulse 99   Temp 97.3 °F (36.3 °C) (Temporal)   Resp 22   Ht 5' 4.5\" (1.638 m)   Wt 175 lb (79.4 kg)   LMP 2019 (Exact Date)   SpO2 100%   BMI 29.57 kg/m²   General: No acute distress, Alert  Respiratory: No rhonchi, no wheezes.  Decreased breath sounds on right  Cardiovascular: S1, S2. Regular rate and rhythm  Abdomen: Soft, Non-tender, non-distended, positive bowel sounds  Neuro: No new focal deficits  Extremities:  No edema    Results:    Labs:      Labs Last 24 Hours:    Recent Labs   Lab 06/03/25  1337   RBC 3.97   HGB 12.5   HCT 35.9   MCV 90.4   MCH 31.5   MCHC 34.8   RDW 15.9   NEPRELIM 3.49   WBC 4.9   .0       Recent Labs   Lab 06/03/25  1337   *   BUN 14   CREATSERUM 1.02   EGFRCR 65   CA 9.9   ALB 4.7      K 3.9      CO2 25.0   ALKPHO 73   AST 16   ALT 13   BILT 0.3   TP 7.1       Estimated Glomerular Filtration Rate: 65 mL/min/1.73m2 (result from lab).    Lab Results   Component Value Date    INR 0.94 04/19/2025    INR 0.9 04/11/2025    INR 0.92 03/04/2025       Recent Labs   Lab 06/03/25  1337   TROPHS <3       Recent Labs   Lab 06/03/25  1337   PBNP 45       No results for input(s): \"PCT\" in the last 168 hours.    Imaging: Imaging data reviewed in Epic.    Assessment & Plan:      #Recurrent pleural effusion  #Right lung adenocarcinoma  - CTA chest shows moderate-sized layering right pleural effusion  - Possible plan for thoracentesis  - Pulm consulted  - Oncology consulted    #Hypothyroidism  - Levothyroxine    #Hypertension  - Lisinopril-hydrochlorothiazide    #Cancer-related pain  - Home meds    #Asthma  - Montelukast    #Anxiety  #Depression  - Aripiprazole, duloxetine      Plan of care discussed with patient    Debbie Lopez DO    Supplementary Documentation:     The 21st Century Cures Act makes medical notes like these available to patients in the interest of transparency. Please be advised this is a medical document. Medical documents are intended to carry relevant information, facts as evident, and the clinical opinion of the practitioner. The medical note is intended as peer to peer communication and may appear blunt or direct. It is written in medical language and may contain abbreviations or verbiage that are unfamiliar.                                       [1]   Past Medical History:   Abnormal uterine bleeding    bleeds every 2 weeks    Anxiety state    Asthma (HCC)     Attention deficit hyperactivity disorder (ADHD)    BACK PAIN    Back problem    lumbar radiculopathy    Cancer (HCC)    adenocarcinoma of right lung    MARCIO II (cervical intraepithelial neoplasia II)    DDD (degenerative disc disease), lumbar    DDD (degenerative disc disease), thoracic    Depression    Disorder of thyroid    Dyspareunia    Exposure to medical diagnostic radiation    On hold since 2022    Fall    Fibromyalgia    Fibromyalgia    Genital warts    High blood pressure    History of COVID-19    COVID + 2020 Headache - NO Hospitalization needed     History of lumbar fusion    Hx of motion sickness    IBS (irritable bowel syndrome)    Per patient - Just constipation    Infertility, female    Lumbar radiculopathy    Mild dysplasia of cervix    Pap smear for cervical cancer screening    pt states normal    Papanicolaou smear of cervix with high grade squamous intraepithelial lesion (HGSIL)    Personal history of antineoplastic chemotherapy    Last chemo 22 prior to lung bx 22    Post covid-19 condition, unspecified    symptoms: HA; s/s resolved-yes; not hospitalized    Problems with swallowing    with radiation    Sexually transmitted disease    HPV    Substance abuse (HCC)    cocaine    Urinary incontinence    Visual impairment    glasses/contacts bifocals   [2]   Past Surgical History:  Procedure Laterality Date    Appendectomy      Back surgery  2009    fusion L5-S1    Back surgery  2021    RIGHT LUMBAR 3/ LUMBAR 4, LUMBAR 4/ LUMBAR 5 HEMILAMINTOMIES, LEFT LUMBAR 2 / LUMBAR 3 MICRODISCECTOMY    Colonoscopy N/A 2023    Procedure: COLONOSCOPY;  Surgeon: Devante Kern MD;  Location:  ENDOSCOPY    Colposcopy,bx cervix/endocerv curr  11    MARCIO 1    Laparoscopy procedure unlisted      Ovarian cyst removed    Leep  2011    MARCIO 2          X2    Other surgical history      bunions bilateral    Other surgical history      nodule lymph nodes neck    Other  surgical history  2016     Bladder sling    Other surgical history  2020    Cystoscopy, Dr Beach   [3]   Family History  Problem Relation Age of Onset    Other (lung CA) Self     Hypertension Mother     Diabetes Mother     Heart Disease Mother     Heart Disease Father     Other (lung cancer) Father 55        Lung Cancer    Other (pancreatic cancer) Sister 47        Pancreatic Cancer    Cancer Sister 51        lung CA    Other (Other) Sister         Back problems    Other (Other) Son         anxiety    Other (Other) Son         behavioral problems    Diabetes Maternal Grandmother     Breast Cancer Maternal Grandmother         dx late-60s    Stroke Maternal Grandfather 86    Dementia Paternal Grandmother     Heart Disorder Paternal Grandfather     Cancer Maternal Aunt 50        LUNG CA 51    Breast Cancer Maternal Aunt         dx 46-47y    Ovarian Cancer Maternal Cousin Female 33         of ovarian ca at 35y   [4]   Allergies  Allergen Reactions    Gabapentin SWELLING and UNKNOWN    Erythromycin UNKNOWN    Clindamycin RASH   [5]   No current facility-administered medications on file prior to encounter.     Current Outpatient Medications on File Prior to Encounter   Medication Sig Dispense Refill    pregabalin 75 MG Oral Cap Take 1 capsule (75 mg total) by mouth 2 (two) times daily. (Patient taking differently: Take 1 capsule (75 mg total) by mouth 3 (three) times daily.) 60 capsule 1    morphINE 15 MG Oral Tab Take 1 tablet (15 mg total) by mouth every 6 (six) hours as needed for Pain. 60 tablet 0    levothyroxine 75 MCG Oral Tab Take 1 tablet (75 mcg total) by mouth before breakfast. 90 tablet 0    lisinopril-hydroCHLOROthiazide 20-12.5 MG Oral Tab Take 0.5 tablets by mouth daily. 45 tablet 3    prochlorperazine (COMPAZINE) 10 mg tablet Take 1 tablet (10 mg total) by mouth every 6 (six) hours as needed for Nausea. 30 tablet 3    ondansetron (ZOFRAN) 8 MG tablet Take 1 tablet (8 mg total) by mouth every 8  (eight) hours as needed for Nausea. 30 tablet 3    dilTIAZem HCl 120 MG Oral Tab Take 1 tablet (120 mg total) by mouth in the morning.      montelukast 10 MG Oral Tab Take 1 tablet (10 mg total) by mouth nightly. (Patient taking differently: Take 1 tablet (10 mg total) by mouth daily as needed (shortness of breath).) 90 tablet 3    albuterol 108 (90 Base) MCG/ACT Inhalation Aero Soln Inhale 2 puffs into the lungs every 6 (six) hours as needed for Wheezing or Shortness of Breath. 3 each 3    amphetamine-dextroamphetamine 15 MG Oral Tab Take 1 tablet (15 mg total) by mouth daily as needed.      ARIPiprazole 2 MG Oral Tab Take 1 tablet (2 mg total) by mouth daily.      VYVANSE 70 MG Oral Cap Take 1 capsule (70 mg total) by mouth every morning.      cetirizine 10 MG Oral Tab Take 1 tablet (10 mg total) by mouth daily. (Patient taking differently: Take 1 tablet (10 mg total) by mouth daily as needed.) 90 tablet 1    ferrous sulfate 325 (65 FE) MG Oral Tab EC Take 1 tablet (325 mg total) by mouth daily with breakfast.      Multiple Vitamin Oral Tab Take 1 tablet by mouth in the morning.      ibuprofen 200 MG Oral Tab Take 4 tablets (800 mg total) by mouth every 8 (eight) hours as needed for Pain.      cholecalciferol 25 MCG (1000 UT) Oral Cap Take 1 capsule (1,000 Units total) by mouth in the morning.  0    DULoxetine HCl 60 MG Oral Cap DR Particles Take 1 capsule (60 mg total) by mouth in the morning.  2    HYDROcodone-acetaminophen  MG Oral Tab Take 1 tablet by mouth every 8 (eight) hours as needed for Pain. 60 tablet 0

## 2025-06-03 NOTE — ED INITIAL ASSESSMENT (HPI)
Pt ambulatory to ED c/o ALMA and increased fatigue ongoing for 6 days, hx lung cancer - last chemo treatment 6 weeks ago, hx pf plural effusion - states this feels similar

## 2025-06-03 NOTE — ED PROVIDER NOTES
Patient Seen in: [facility] Emergency Department        History  Chief Complaint   Patient presents with    Difficulty Breathing     Stated Complaint: ALMA x 6 days, hx of lung cancer    Subjective:   HPI     Hanna Berenice Barrett is a 55 year old female with lung cancer who presents with worsening shortness of breath and pleural effusion.    She is experiencing worsening shortness of breath, described as 'horrible,' and this is the third occurrence of this symptom. She has difficulty taking deep breaths and spends approximately fourteen to fifteen hours a day sleeping and being signiinfcantly fatigued dealing with this issue, significantly impacting her daily life.    She has a history of pleural effusion, with the first instance requiring drainage of 1.6 liters of fluid. Previous x-rays have not shown significant findings, but she anticipates that a CT scan will reveal more than a 'trace' of pleural effusion. She notes that x-rays have not been reliable in detecting the extent of the effusion in the past.    She is currently undergoing treatment for lung cancer and mentions that her symptoms have been getting worse. She is concerned about the presence of fluid around her lungs. No fevers or chills have been experienced recently, although she has been in pain, which she attributes to her cancer.    Her blood sugar level was noted to be 228, but she attributes this to having just eaten.    Her social history includes having two children, one of whom transferred home from the [facility] when she was first diagnosed with cancer, other son is also supportive         Objective:     Past Medical History:    Abnormal uterine bleeding    bleeds every 2 weeks    Anxiety state    Asthma (HCC)    Attention deficit hyperactivity disorder (ADHD)    BACK PAIN    Back problem    lumbar radiculopathy    Cancer (HCC)    adenocarcinoma of right lung    MARCIO II (cervical intraepithelial neoplasia II)    DDD (degenerative  disc disease), lumbar    DDD (degenerative disc disease), thoracic    Depression    Disorder of thyroid    Dyspareunia    Exposure to medical diagnostic radiation    On hold since 2022    Fall    Fibromyalgia    Fibromyalgia    Genital warts    High blood pressure    History of COVID-19    COVID + 2020 Headache - NO Hospitalization needed     History of lumbar fusion    Hx of motion sickness    IBS (irritable bowel syndrome)    Per patient - Just constipation    Infertility, female    Lumbar radiculopathy    Mild dysplasia of cervix    Pap smear for cervical cancer screening    pt states normal    Papanicolaou smear of cervix with high grade squamous intraepithelial lesion (HGSIL)    Personal history of antineoplastic chemotherapy    Last chemo 22 prior to lung bx 22    Post covid-19 condition, unspecified    symptoms: HA; s/s resolved-yes; not hospitalized    Problems with swallowing    with radiation    Sexually transmitted disease    HPV    Substance abuse (HCC)    cocaine    Urinary incontinence    Visual impairment    glasses/contacts bifocals              Past Surgical History:   Procedure Laterality Date    Appendectomy      Back surgery  2009    fusion L5-S1    Back surgery  2021    RIGHT LUMBAR 3/ LUMBAR 4, LUMBAR 4/ LUMBAR 5 HEMILAMINTOMIES, LEFT LUMBAR 2 / LUMBAR 3 MICRODISCECTOMY    Colonoscopy N/A 2023    Procedure: COLONOSCOPY;  Surgeon: Devante Kern MD;  Location:  ENDOSCOPY    Colposcopy,bx cervix/endocerv curr  11    MARCIO 1    Laparoscopy procedure unlisted      Ovarian cyst removed    Leep  2011    MARCIO 2          X2    Other surgical history      bunions bilateral    Other surgical history      nodule lymph nodes neck    Other surgical history  2016     Bladder sling    Other surgical history  2020    Cystoscopy, Dr Beach                Social History     Socioeconomic History    Marital status:    Tobacco Use    Smoking status:  Former     Current packs/day: 0.00     Average packs/day: 0.8 packs/day for 19.0 years (14.5 ttl pk-yrs)     Types: Cigarettes     Start date: 1986     Quit date: 2010     Years since quitting: 15.4     Passive exposure: Past    Smokeless tobacco: Former     Quit date: 6/1/2024    Tobacco comments:     Has not vaped since 6/1/2024.    Vaping Use    Vaping status: Former    Substances: Nicotine, daily    Devices: Pre-filled pod   Substance and Sexual Activity    Alcohol use: Yes     Comment: 2-3 drinks month    Drug use: Yes     Types: Cocaine, Cannabis     Comment: USED COCAINE BETW [id number].      cannabis gummy to sleep    Sexual activity: Yes     Partners: Male   Other Topics Concern    Caffeine Concern Yes     Comment: 3-4 daily of pop    Exercise No     Comment: not tolerated right now     Social Drivers of Health     Food Insecurity: No Food Insecurity (6/3/2025)    NCSS - Food Insecurity     Worried About Running Out of Food in the Last Year: No     Ran Out of Food in the Last Year: No   Transportation Needs: No Transportation Needs (6/3/2025)    NCSS - Transportation     Lack of Transportation: No   Housing Stability: Not At Risk (6/3/2025)    NCSS - Housing/Utilities     Has Housing: Yes     Worried About Losing Housing: No     Unable to Get Utilities: No                                Physical Exam    ED Triage Vitals [06/03/25 1327]   /73   Pulse (!) 122   Resp 24   Temp 97.3 °F (36.3 °C)   Temp src Temporal   SpO2 97 %   O2 Device None (Room air)       Current Vitals:   Vital Signs  BP: 109/72  Pulse: 93  Resp: 15  Temp: 97.3 °F (36.3 °C)  Temp src: Temporal  MAP (mmHg): 80    Oxygen Therapy  SpO2: 99 %  O2 Device: None (Room air)            Physical Exam  Vitals and nursing note reviewed.   Constitutional:       General: She is not in acute distress.     Appearance: She is well-developed. She is ill-appearing. She is not toxic-appearing or diaphoretic.   HENT:      Head: Atraumatic.      Right  Ear: External ear normal.      Left Ear: External ear normal.      Nose: Nose normal.   Eyes:      General: No scleral icterus.        Right eye: No discharge.         Left eye: No discharge.      Conjunctiva/sclera: Conjunctivae normal.      Pupils: Pupils are equal, round, and reactive to light.   Neck:      Vascular: No JVD.   Cardiovascular:      Rate and Rhythm: Regular rhythm. Tachycardia present.      Heart sounds: Normal heart sounds. No murmur heard.     No friction rub. No gallop.   Pulmonary:      Effort: Pulmonary effort is normal. No respiratory distress.      Breath sounds: No stridor. Decreased breath sounds present. No wheezing.   Chest:      Chest wall: No tenderness.   Abdominal:      General: Bowel sounds are normal. There is no distension.      Palpations: Abdomen is soft.      Tenderness: There is no abdominal tenderness. There is no guarding or rebound.   Musculoskeletal:         General: No tenderness or deformity. Normal range of motion.      Cervical back: Normal range of motion and neck supple.   Lymphadenopathy:      Cervical: No cervical adenopathy.   Skin:     General: Skin is warm and dry.      Coloration: Skin is pale.      Findings: No erythema or rash.   Neurological:      Mental Status: She is alert and oriented to person, place, and time.      Cranial Nerves: No cranial nerve deficit.      Coordination: Coordination normal.   Psychiatric:         Behavior: Behavior normal.         Judgment: Judgment normal.                 ED Course  Labs Reviewed   COMP METABOLIC PANEL (14) - Abnormal; Notable for the following components:       Result Value    Glucose 228 (*)     Calculated Osmolality 296 (*)     All other components within normal limits   CBC WITH DIFFERENTIAL WITH PLATELET - Abnormal; Notable for the following components:    Lymphocyte Absolute 0.73 (*)     All other components within normal limits   TROPONIN I HIGH SENSITIVITY - Normal   PRO BETA NATRIURETIC PEPTIDE - Normal    SARS-COV-2/FLU A AND B/RSV BY PCR (GENEXPERT) - Normal    Narrative:     This test is intended for the qualitative detection and differentiation of SARS-CoV-2, influenza A, influenza B, and respiratory syncytial virus (RSV) viral RNA in nasopharyngeal or nares swabs from individuals suspected of respiratory viral infection consistent with COVID-19 by their healthcare provider. Signs and symptoms of respiratory viral infection due to SARS-CoV-2, influenza, and RSV can be similar.    Test performed using the Xpert Xpress SARS-CoV-2/FLU/RSV (real time RT-PCR)  assay on the GeneXpert instrument, [employer], [place] [address].   This test is being used under the Food and Drug Administration's Emergency Use Authorization.    The authorized Fact Sheet for Healthcare Providers for this assay is available upon request from the laboratory.   RAINBOW DRAW LAVENDER   RAINBOW DRAW LIGHT GREEN   RAINBOW DRAW BLUE     EKG    Rate, intervals and axes as noted on EKG Report.  Rate: 112  Rhythm: Sinus Rhythm  Reading: sinus tachycardia with multiple PVC's and septal Q waves are noted as well as inferior Q waves.  This is an abnormal EKG           ED Course as of 06/03/25 2213  ------------------------------------------------------------  Time: 06/03 1502  Value: Hemoglobin: 12.5  Comment: (Reviewed)  ------------------------------------------------------------  Time: 06/03 1502  Value: Pulse(!): 130  Comment: (Reviewed)  ------------------------------------------------------------  Time: 06/03 1502  Value: Pulse(!): 122  Comment: (Reviewed)     Chest x-ray is independently interpreted by myself does show some mild fluid in the fissure on the right, and elevatedhf diaphragm on the right, no significant pleural effusions are visualized on this chest x-ray and no significant pneumonias.                  MDM     Pleasant 55-year-old with a history of lung cancer under the care of Dr. Gordon from oncology and Dr. Corrigan from  pulmonology presents to the emergency department with concern that she is having symptoms that she associates with her increasing pleural effusions which she has had drained twice in the past the last being about 8 weeks ago.  No fevers no chills no signs of pneumonia, also noted to be very fatigued and has noted that her heart is racing when she is up and moving around differential diagnosis includes but is not limited to worsening malignant pleural effusions, pulmonary embolus, dehydration, electrolyte abnormalities, cardiac involvement.  Labs reviewed troponin is reassuring, BNP is only 45 noted to be hyperglycemic with a glucose of 228 but the patient did have a peanut butter and jelly sandwich just before coming into the emergency department.  BC does not show any to explain why the patient is suddenly short of breath.  Will also order a COVID swab.  Lungs are noted to be incredibly quiet all throughout with some lymphopenia so we will need to rule out COVID as a causative factor of that    Admission disposition: 6/3/2025  5:12 PM           Medical Decision Making      Disposition and Plan     Clinical Impression:  1. Pleural effusion    2. Malignant neoplasm of lung, unspecified laterality, unspecified part of lung (HCC)    3. Dyspnea on exertion    4. Tachycardia         Disposition:  Admit  6/3/2025  5:12 pm    Follow-up:  No follow-up provider specified.        Medications Prescribed:  Current Discharge Medication List                Supplementary Documentation:         Hospital Problems       Present on Admission  Date Reviewed: 5/22/2025          ICD-10-CM Noted POA    * (Principal) Pleural effusion J90 6/3/2025 Unknown    Hyperglycemia R73.9 6/3/2025 Yes

## 2025-06-04 ENCOUNTER — APPOINTMENT (OUTPATIENT)
Dept: GENERAL RADIOLOGY | Facility: HOSPITAL | Age: 55
End: 2025-06-04
Attending: INTERNAL MEDICINE
Payer: MEDICAID

## 2025-06-04 ENCOUNTER — APPOINTMENT (OUTPATIENT)
Dept: ULTRASOUND IMAGING | Facility: HOSPITAL | Age: 55
End: 2025-06-04
Attending: INTERNAL MEDICINE
Payer: MEDICAID

## 2025-06-04 ENCOUNTER — APPOINTMENT (OUTPATIENT)
Dept: MRI IMAGING | Facility: HOSPITAL | Age: 55
End: 2025-06-04
Attending: INTERNAL MEDICINE
Payer: MEDICAID

## 2025-06-04 VITALS
DIASTOLIC BLOOD PRESSURE: 68 MMHG | SYSTOLIC BLOOD PRESSURE: 108 MMHG | TEMPERATURE: 98 F | HEART RATE: 90 BPM | HEIGHT: 64.5 IN | BODY MASS INDEX: 29.58 KG/M2 | WEIGHT: 175.38 LBS | RESPIRATION RATE: 18 BRPM | OXYGEN SATURATION: 91 %

## 2025-06-04 PROBLEM — T88.7XXA MEDICATION SIDE EFFECTS: Status: ACTIVE | Noted: 2025-06-04

## 2025-06-04 PROBLEM — R53.83 PROFOUND FATIGUE: Status: ACTIVE | Noted: 2025-06-04

## 2025-06-04 PROBLEM — C34.91 METASTATIC LUNG CANCER (METASTASIS FROM LUNG TO OTHER SITE), RIGHT (HCC): Status: ACTIVE | Noted: 2025-06-04

## 2025-06-04 PROBLEM — G89.3 NEOPLASM RELATED PAIN: Status: ACTIVE | Noted: 2025-06-04

## 2025-06-04 LAB
ALBUMIN SERPL-MCNC: 4.2 G/DL (ref 3.2–4.8)
ALBUMIN/GLOB SERPL: 2 {RATIO} (ref 1–2)
ALP LIVER SERPL-CCNC: 67 U/L (ref 41–108)
ALT SERPL-CCNC: 11 U/L (ref 10–49)
ANION GAP SERPL CALC-SCNC: 12 MMOL/L (ref 0–18)
AST SERPL-CCNC: 14 U/L (ref ?–34)
BASOPHILS # BLD AUTO: 0.05 X10(3) UL (ref 0–0.2)
BASOPHILS NFR BLD AUTO: 1.1 %
BASOPHILS NFR PLR: 0 %
BILIRUB SERPL-MCNC: 0.3 MG/DL (ref 0.3–1.2)
BUN BLD-MCNC: 11 MG/DL (ref 9–23)
CALCIUM BLD-MCNC: 9.3 MG/DL (ref 8.7–10.6)
CHLORIDE SERPL-SCNC: 104 MMOL/L (ref 98–112)
CHOLEST PLR-MCNC: 83 MG/DL
CO2 SERPL-SCNC: 27 MMOL/L (ref 21–32)
COLOR FLD: YELLOW
CORTIS SERPL-MCNC: 4.7 UG/DL
CREAT BLD-MCNC: 0.89 MG/DL (ref 0.55–1.02)
EGFRCR SERPLBLD CKD-EPI 2021: 77 ML/MIN/1.73M2 (ref 60–?)
EOSINOPHIL # BLD AUTO: 0.1 X10(3) UL (ref 0–0.7)
EOSINOPHIL NFR BLD AUTO: 2.2 %
EOSINOPHIL NFR PLR: 0 %
ERYTHROCYTE [DISTWIDTH] IN BLOOD BY AUTOMATED COUNT: 15.8 %
GLOBULIN PLAS-MCNC: 2.1 G/DL (ref 2–3.5)
GLUCOSE BLD-MCNC: 105 MG/DL (ref 70–99)
GLUCOSE PLR-MCNC: 61 MG/DL
HCT VFR BLD AUTO: 33.1 % (ref 35–48)
HCT VFR PLR CALC: <5 %
HGB BLD-MCNC: 11.3 G/DL (ref 12–16)
IMM GRANULOCYTES # BLD AUTO: 0.04 X10(3) UL (ref 0–1)
IMM GRANULOCYTES NFR BLD: 0.9 %
LDH FLD L TO P-CCNC: 1140 U/L
LYMPHOCYTES # BLD AUTO: 0.86 X10(3) UL (ref 1–4)
LYMPHOCYTES NFR BLD AUTO: 19.3 %
LYMPHOCYTES NFR PLR: 90 %
MCH RBC QN AUTO: 31.2 PG (ref 26–34)
MCHC RBC AUTO-ENTMCNC: 34.1 G/DL (ref 31–37)
MCV RBC AUTO: 91.4 FL (ref 80–100)
MESOTHL CELL NFR PLR: 1 %
MONOCYTES # BLD AUTO: 0.74 X10(3) UL (ref 0.1–1)
MONOCYTES NFR BLD AUTO: 16.6 %
MONOS+MACROS NFR PLR: 9 %
NEUTROPHILS # BLD AUTO: 2.67 X10 (3) UL (ref 1.5–7.7)
NEUTROPHILS # BLD AUTO: 2.67 X10(3) UL (ref 1.5–7.7)
NEUTROPHILS NFR BLD AUTO: 59.9 %
NEUTROPHILS NFR PLR: 0 %
OSMOLALITY SERPL CALC.SUM OF ELEC: 296 MOSM/KG (ref 275–295)
PH PLR: 7.05 [PH] (ref 7.2–7.5)
PLATELET # BLD AUTO: 326 10(3)UL (ref 150–450)
POTASSIUM SERPL-SCNC: 3.9 MMOL/L (ref 3.5–5.1)
PROT PLR-MCNC: 4.3 G/DL
PROT SERPL-MCNC: 6.3 G/DL (ref 5.7–8.2)
RBC # BLD AUTO: 3.62 X10(6)UL (ref 3.8–5.3)
RBC PLEURAL FLUID: <3000 /MM3
SODIUM SERPL-SCNC: 143 MMOL/L (ref 136–145)
TOTAL CELLS COUNTED FLD: 100
TRIGL PLR-MCNC: 69 MG/DL
TSI SER-ACNC: 1.85 UIU/ML (ref 0.55–4.78)
WBC # BLD AUTO: 4.5 X10(3) UL (ref 4–11)
WBC # PLR: 1453 /MM3

## 2025-06-04 PROCEDURE — 99223 1ST HOSP IP/OBS HIGH 75: CPT | Performed by: INTERNAL MEDICINE

## 2025-06-04 PROCEDURE — 99253 IP/OBS CNSLTJ NEW/EST LOW 45: CPT | Performed by: INTERNAL MEDICINE

## 2025-06-04 PROCEDURE — 32555 ASPIRATE PLEURA W/ IMAGING: CPT | Performed by: INTERNAL MEDICINE

## 2025-06-04 PROCEDURE — 0W993ZZ DRAINAGE OF RIGHT PLEURAL CAVITY, PERCUTANEOUS APPROACH: ICD-10-PCS | Performed by: INTERNAL MEDICINE

## 2025-06-04 PROCEDURE — 71045 X-RAY EXAM CHEST 1 VIEW: CPT | Performed by: INTERNAL MEDICINE

## 2025-06-04 PROCEDURE — 99239 HOSP IP/OBS DSCHRG MGMT >30: CPT | Performed by: INTERNAL MEDICINE

## 2025-06-04 PROCEDURE — 70553 MRI BRAIN STEM W/O & W/DYE: CPT | Performed by: INTERNAL MEDICINE

## 2025-06-04 RX ORDER — GADOTERATE MEGLUMINE 376.9 MG/ML
15 INJECTION INTRAVENOUS
Status: COMPLETED | OUTPATIENT
Start: 2025-06-04 | End: 2025-06-04

## 2025-06-04 NOTE — PROGRESS NOTES
[facility]   part of [facility]     Hospitalist Progress Note     Hanna Barrett Patient Status:  Inpatient    1970 MRN [id number]   Location [facility] 4NW-A Attending Ama Sanches, DO   Hosp Day # 1 PCP Guillermo Curry MD     Chief Complaint: SOB    Subjective:     Patient continues to feel some SOB and tightness with deep inspiration.     Objective:    Review of Systems:   A comprehensive review of systems was completed; pertinent positive and negatives stated in subjective.    Vital signs:  Temp:  [97.3 °F (36.3 °C)-97.8 °F (36.6 °C)] 97.8 °F (36.6 °C)  Pulse:  [] 96  Resp:  [15-24] 20  BP: ()/(56-98) 99/74  SpO2:  [93 %-100 %] 94 %    Physical Exam:    General: No acute distress  Respiratory: No wheezes, no rhonchi  Cardiovascular: S1, S2, regular rate and rhythm  Abdomen: Soft, Non-tender, non-distended, positive bowel sounds  Neuro: No new focal deficits.   Extremities: No edema    Diagnostic Data:    Labs:  Recent Labs   Lab 25  1337 25  0553   WBC 4.9 4.5   HGB 12.5 11.3*   MCV 90.4 91.4   .0 326.0       Recent Labs   Lab 25  1337 25  0553   * 105*   BUN 14 11   CREATSERUM 1.02 0.89   CA 9.9 9.3   ALB 4.7 4.2    143   K 3.9 3.9    104   CO2 25.0 27.0   ALKPHO 73 67   AST 16 14   ALT 13 11   BILT 0.3 0.3   TP 7.1 6.3       Estimated Glomerular Filtration Rate: 77 mL/min/1.73m2 (result from lab).    Recent Labs   Lab 25  1337   TROPHS <3       No results for input(s): \"PTP\", \"INR\" in the last 168 hours.               Microbiology    No results found for this visit on 25.      Imaging: Reviewed in Epic.    Medications: Scheduled Medications[1]    Assessment & Plan:      #Recurrent pleural effusion  #Right lung adenocarcinoma  -CTA chest shows moderate-sized layering right pleural effusion  -thoracentesis planned for later today  -Pulm consulted  -Oncology consulted     #Hypothyroidism  -Levothyroxine      #Hypertension  -Lisinopril-hydrochlorothiazide     #Cancer-related pain  -Home meds     #Asthma  -Montelukast     #Anxiety  #Depression  -Aripiprazole, duloxetine      Ama Sanches,     Supplementary Documentation:     Quality:  DVT Mechanical Prophylaxis:   SCDs,    DVT Pharmacologic Prophylaxis   Medication   None                Code Status: Full Code  Willis: No urinary catheter in place  Willis Duration (in days):   Central line:    SHERYL:     Discharge is dependent on: pulm recs  At this point Ms. Barrett is expected to be discharge to: home     The 21st Century Cures Act makes medical notes like these available to patients in the interest of transparency. Please be advised this is a medical document. Medical documents are intended to carry relevant information, facts as evident, and the clinical opinion of the practitioner. The medical note is intended as peer to peer communication and may appear blunt or direct. It is written in medical language and may contain abbreviations or verbiage that are unfamiliar.                         [1]    ARIPiprazole  2 mg Oral Daily    dilTIAZem HCl  120 mg Oral Daily    DULoxetine  60 mg Oral Daily    levothyroxine  75 mcg Oral Before breakfast    montelukast  10 mg Oral Nightly    pregabalin  75 mg Oral TID    amphetamine-dextroamphetamine  15 mg Oral BID@0800,1200    lisinopril  10 mg Oral Daily    And    hydrochlorothiazide  6.25 mg Oral Daily

## 2025-06-04 NOTE — PLAN OF CARE
Assumed care of pt at 0700.  Pt alert/oriented x 4.  Lungs diminished bilaterally.  Dyspneic with exertion.  94% on room air.  No cough noted.  Complaints of chronic pain and neuropathy to left lower leg.  Up in room, to/from bathroom independently, gait steady.  Seen by Verónica Bo and Shekhar.  MRI brain ordered.  Writer spoke to MRI dept and was told MRI to be done later this evening 8PM or later. Dr Gordon notified. Ultrasound guidance thoracenthesis on right side done at bedside by Dr. Corrigan.  Pt tolerated procedure well.  States \"breathing feels better/easier.\"  1150ml pleural fluid drained and sent for testing.  Vitals stable post procedure, CXR done. Writer received call fr MRI dept that can send for pt sooner. Pt stating would like to have MRI done prior to her discharging.  Updated Dr. Sanches of above. Updated pt re: plan of care. Meds per MAR. Pain controlled with pain med regimen. Safety measures in place.     Problem: RESPIRATORY - ADULT  Goal: Achieves optimal ventilation and oxygenation  Description: INTERVENTIONS:  - Assess for changes in respiratory status  - Assess for changes in mentation and behavior  - Position to facilitate oxygenation and minimize respiratory effort  - Oxygen supplementation based on oxygen saturation or ABGs  - Provide Smoking Cessation handout, if applicable  - Encourage broncho-pulmonary hygiene including cough, deep breathe, Incentive Spirometry  - Assess the need for suctioning and perform as needed  - Assess and instruct to report SOB or any respiratory difficulty  - Respiratory Therapy support as indicated  - Manage/alleviate anxiety  - Monitor for signs/symptoms of CO2 retention  Outcome: Progressing

## 2025-06-04 NOTE — CONSULTS
[facility]  Report of Consultation    Hanna Barrett Patient Status:  Inpatient    1970 MRN [id number]   formerly [facility] 4NW-A Attending Ama Sanches, DO   Hosp Day # 1 PCP Guillermo Curry MD     Reason for Consultation:  Dyspnea, pleural effusion    History of Present Illness:  Hanna Barrett is a a(n) 55 year old female  former smoker (quit , 20 pack years) with significant PMH of asthma, stage 3 RUL NSCLC s/p chemoRT who was admitted with worsening dyspnea. She had previously developed pleural effusion and dyspnea leading to thoracentesis in March and again in April with similar symptoms which resolved post thoracentesis. She thinks her dyspnea has been present for the past two weeks leading to her presentation today. No cough, phlegm, wheeze, pain. No fevers      2025 previously BLAKE roblero  Hannadarrick JuarezBarrett is a 55 year old female former smoker with significant PMH asthma, stage 3 RUL NSCLC s/p chemoRT who presents today for follow up of post thoracentesis.   Came back from [place] a few weeks ago and found a lump on her left neck/shoulder. She had a biopsy that was positive for malignancy. Then had a PET scan and found to have pleural effusion, had a thoracentesis and cytology was positive for malignancy. Seeing Dr Law today for plan.  Reports SOB and coughing at times. Denies acute concerns. Denies  chest pain/pressure, wheezing, no fevers, chills, fatigue.   Has one more day of antibiotics for positive culture in her pleural fluid. Taking inhalers as prescribed. Using mucinex for cough/phlegm.      Previously 2024 Dr Shekhar Barrett is a 54 year old female female former smoker (quit , 20 pack years) with significant PMH of asthma, stage 3 RUL NSCLC s/p chemoRT who presents today for follow up. Since last visit she continues to have MARTINS, especially with stairs or carrying a load. No cough, wheeze, pain. No fevers  +palpitations  Was hospitalized in  July 2024 with negative cardiac stress  Has been using albuterol nebs 1-2/day without relief.  Remain son symbicort and incruse     June 2024 previously  Since last visit she reports she had been well until the past week with the hot weather she has had worsening dyspnea, cough and wheeze.  Has needed albuterol much more frequently. No pain. No fevers     Dec 2023 previously  Hanna Barrett is a 53 year old female former smoker (quit 2011, 20 pack years) with significant PMH of asthma, stage 3 RUL NSCLC s/p chemoRT who presents today for follow up. She denies acute concerns today.  Does continue to have nasal congestion/drainage leading to cough and throat clearing. No blood. Dyspnea on exertion overall better on symbicort BID and incruse daily. Has albuterol but not using often.     October 2023 previously BLAKE acuña  Hanna Barrett is a 53 year old female who presents for 2 month asthma followup. Breathing has been better with Incruse and Symbicort. Had Sinus infection about 2  weeks ago; blood mixed in phlegm about a quarter size. No further hemoptysis;  still coughing. Feels like a vice around her chest at times; mostly after coughing. Was on 2 courses of antibiotics; PCN and Augmentin. Reports overall feeling much better; still with cough with white-yellow tinged sputum. No f/c/ns. Worried about cancer returning. Asthma score is 14      Previously 8/2023  a 53 year old female who presents for c/o worsening dyspnea with exertion. Notices this mostly with stairs and worsens with stairs when carrying anything. Unable to exercise due to back pain. No f/c/ns. Denies hx of COPD; quit smoking in 2000 and had a couple here and there; vaps here and there. Reports hx of Asthma and using Symbicort with minimal improvement.  PMH of stage III lung cancer s/p chemo RT completed 4/2022; recent  CT chest.      Previously 4/2023 Dr Corrigan  a 53 year old female former smoker (quit 2011, 20 pack years) with significant PMH of  asthma, stage III adenocarcinoma s/p chemoRT  who presents today for evaluation of hemoptysis. The patient explains she has had increasing episodes hemoptysis since around late February 2023. Thinks hemoptysis began first then about a week later developed nasal congestion/drainage sinus pressure. She was seen at [facility] two weeks ago and treated for sinus infection and given augmentin.  Her sinus pressure and congestion improved, however her hemoptysis persists. Also c/o sore throat/pain which has been ongoing for the past month as well.  Describes hemoptysis as red blood, about pencil eraser in size, may have 2-3 episodes a day. None today, but did have two episodes yesterday.    No epistaxis. No GERD, abdominal pain. No fevers. No chest pain, pressure or pleurisy.   Feels overall her breathing has been worse since her radiation treatment since last year. Using advair BID and albuterol. Previously on trelegy but too pricey.      Works in insurance sales. No known exposures.      History:  Past Medical History[1]  Past Surgical History[2]  Family History[3]   reports that she quit smoking about 15 years ago. Her smoking use included cigarettes. She started smoking about 39 years ago. She has a 14.5 pack-year smoking history. She has been exposed to tobacco smoke. She quit smokeless tobacco use about a year ago. She reports current alcohol use. She reports current drug use. Drugs: Cocaine and Cannabis.    Allergies:  Allergies[4]    Medications:  reviewed  Scheduled Medications[5]  PRN Medications[6]    Review of Systems:   Constitutional: Negative for anorexia, chills, fatigue, fevers, malaise, night sweats and weight loss.  Eyes: Negative for visual disturbance, irritation and redness.  Ears, nose, mouth, throat, and face: Negative for hearing loss, tinnitus, nasal congestion, snoring, sore throat, hoarseness and voice change.  Respiratory: see HPI  Cardiovascular: Negative for chest pain, palpitations,  irregular heart beats, syncope, fatigue, orthopnea, paroxysmal nocturnal dyspnea, lower extremity edema.  Gastrointestinal: Negative for dysphagia, odynophagia, reflux symptoms, nausea, vomiting, change in bowel habits, diarrhea, constipation and abdominal pain.  Integument/breast: Negative for rash, skin lesions, and pruritus.  Hematologic/lymphatic: Negative for easy bruising, bleeding, and lymphadenopathy.  Musculoskeletal: Negative for myalgias, arthralgias, muscle weakness.  Neurological: Negative for headaches, dizziness, seizures, memory problems, trouble swallowing, speech problems, gait problems and weakness.  : Denies dysuria, hematuria, urinary retention.  Behavioral/Psych: Normal affect, mood, speech. No AVH, No SI/HI    All other review of systems are negative.    Vital signs in last 24 hours:  Patient Vitals for the past 24 hrs:   BP Temp Temp src Pulse Resp SpO2 Weight   06/04/25 1154 111/73 98.2 °F (36.8 °C) Oral 101 14 95 % --   06/04/25 0437 99/74 97.8 °F (36.6 °C) Oral 96 20 94 % --   06/03/25 2347 94/56 97.8 °F (36.6 °C) Oral 95 20 98 % 175 lb 6.4 oz (79.6 kg)   06/03/25 2342 -- -- -- -- -- -- 178 lb 6.4 oz (80.9 kg)   06/03/25 2130 109/72 -- -- 93 -- 99 % --   06/03/25 1900 (!) 114/98 -- -- 83 15 100 % --   06/03/25 1830 -- -- -- 99 22 100 % --   06/03/25 1800 117/88 -- -- 96 20 100 % --   06/03/25 1730 96/57 -- -- 100 18 93 % --   06/03/25 1700 96/65 -- -- 88 18 100 % --   06/03/25 1630 108/80 -- -- 89 15 97 % --   06/03/25 1530 96/61 -- -- 90 16 100 % --   06/03/25 1500 107/75 -- -- 105 18 98 % --   06/03/25 1445 109/66 -- -- (!) 130 24 100 % --       Intake/Output:    Intake/Output Summary (Last 24 hours) at 6/4/2025 1338  Last data filed at 6/4/2025 0911  Gross per 24 hour   Intake 1380 ml   Output 850 ml   Net 530 ml          PHYSICAL EXAM  GEN: Appears alert, comfortable. No acute distress  PSYCH: Normal mood and affect, AAOX3  NEURO: CN 2-12 grossly intact, no focal deficits  HEENT:  Atraumatic, normocephalic, EOMI, no icterus/hemorrhage, no conjunctival injection/discharge, nares normal  MOUTH: MMM, good dentition  NECK: Trachea midline, symmetric, no visible masses or scars, no crepitus, normal flexion/extension  CHEST: Normal chest excursion, no visible deformity or scars, no tenderness to palpation  PULMONARY:clear to auscultation, no wheeze, rhonchi or crackles. No distress  COR: regular rate and rhythm, no murmur heard  GI: soft, nondistended, no rigidity, no reaction with palpation. No hernias noted  LYMPHATIC: No palpable or visible lymphadenopathy in neck, axillae, groin  MSK: equal strength and sensation in all extremities  EXT: No overt deformities, edema    Lab Data Review:  Recent Labs   Lab 06/03/25  1337 06/04/25  0553   * 105*   BUN 14 11   CREATSERUM 1.02 0.89   CA 9.9 9.3    143   K 3.9 3.9    104   CO2 25.0 27.0     Recent Labs   Lab 06/03/25  1337 06/04/25  0553   RBC 3.97 3.62*   HGB 12.5 11.3*   HCT 35.9 33.1*   MCV 90.4 91.4   MCH 31.5 31.2   MCHC 34.8 34.1   RDW 15.9 15.8   NEPRELIM 3.49 2.67   WBC 4.9 4.5   .0 326.0     No results for input(s): \"BNP\" in the last 168 hours.  No results for input(s): \"TROP\", \"CK\" in the last 168 hours.  No results for input(s): \"PT\", \"INR\", \"PTT\" in the last 168 hours.    Other Labs:     ABG:  No results for input(s): \"ABGPHT\", \"SHBBSG1L\", \"VGRRP2A\", \"ABGHCO3\", \"ABGBE\", \"TEMP\", \"TORREY\", \"SITE\", \"DEV\", \"THGB\" in the last 168 hours.    Invalid input(s): \"ZSO58QCH\", \"CHOB\"    Cultures:   No results found for this visit on 06/03/25.  No results for input(s): \"COLORUR\", \"CLARITY\", \"SPECGRAVITY\", \"GLUUR\", \"BILUR\", \"KETUR\", \"BLOODURINE\", \"PHURINE\", \"PROUR\", \"UROBILINOGEN\", \"NITRITE\", \"LEUUR\", \"WBCUR\", \"RBCUR\", \"BACUR\", \"EPIUR\" in the last 168 hours.    Radiology:   Reviewed personally  CTA CHEST (CPT=71275)  Result Date: 6/3/2025  CONCLUSION:  1. Negative for pulmonary embolism. 2. New moderate-sized layering right  pleural effusion with mild associated passive atelectasis. 3. Details as above.  Continued clinical correlation recommended.     LOCATION:  [facility]   Dictated by (CST): Fabien Wadsworth MD on 6/03/2025 at 4:50 PM     Finalized by (CST): Fabien Wadsworth MD on 6/03/2025 at 4:55 PM       XR CHEST AP PORTABLE  (CPT=71045)  Result Date: 6/3/2025  CONCLUSION:  New minimal blunting of the right CP angle.  This is consistent with new minimal fluid.   LOCATION:  [facility]      Dictated by (CST): Fabien Wadsworth MD on 6/03/2025 at 2:20 PM     Finalized by (CST): Fabien Wadsworth MD on 6/03/2025 at 2:22 PM         ASSESSMENT  Malignant pleural effusion  Dyspnea related to above  Metastatic lung ca  Asthma, controlled  HTN     PLAN  Monitor respiratory status  Maintain sats >89%  Reviewed imaging findings and differential with the patient.  Discussed next steps to include diagnostic/therapeutic thoracentesis today. We also discussed we could consider outpatient pleurx if the effusion repeatedly recurs. We reviewed the procedure in detail well as benefits and risks including but not limited to infection, bleeding, lung injury and respiratory failure. She is agreeable to proceed with the planned procedure.           [1]   Past Medical History:   Abnormal uterine bleeding    bleeds every 2 weeks    Anxiety state    Asthma (HCC)    Attention deficit hyperactivity disorder (ADHD)    BACK PAIN    Back problem    lumbar radiculopathy    Cancer (HCC)    adenocarcinoma of right lung    MARCIO II (cervical intraepithelial neoplasia II)    DDD (degenerative disc disease), lumbar    DDD (degenerative disc disease), thoracic    Depression    Disorder of thyroid    Dyspareunia    Exposure to medical diagnostic radiation    On hold since 4/2022    Fall    Fibromyalgia    Fibromyalgia    Genital warts    High blood pressure    History of COVID-19    COVID + 7/2020 Headache - NO Hospitalization needed     History of lumbar fusion    Hx of motion sickness    IBS  (irritable bowel syndrome)    Per patient - Just constipation    Infertility, female    Lumbar radiculopathy    Mild dysplasia of cervix    Pap smear for cervical cancer screening    pt states normal    Papanicolaou smear of cervix with high grade squamous intraepithelial lesion (HGSIL)    Personal history of antineoplastic chemotherapy    Last chemo 22 prior to lung bx 22    Post covid-19 condition, unspecified    symptoms: HA; s/s resolved-yes; not hospitalized    Problems with swallowing    with radiation    Sexually transmitted disease    HPV    Substance abuse (HCC)    cocaine    Urinary incontinence    Visual impairment    glasses/contacts bifocals   [2]   Past Surgical History:  Procedure Laterality Date    Appendectomy      Back surgery      fusion L5-S1    Back surgery  2021    RIGHT LUMBAR 3/ LUMBAR 4, LUMBAR 4/ LUMBAR 5 HEMILAMINTOMIES, LEFT LUMBAR 2 / LUMBAR 3 MICRODISCECTOMY    Colonoscopy N/A 2023    Procedure: COLONOSCOPY;  Surgeon: Devante Kern MD;  Location:  ENDOSCOPY    Colposcopy,bx cervix/endocerv curr  12    MARCIO 1    Laparoscopy procedure unlisted      Ovarian cyst removed    Leep  2011    MARCIO 2          X2    Other surgical history      bunions bilateral    Other surgical history      nodule lymph nodes neck    Other surgical history  2016     Bladder sling    Other surgical history  2020    Cystoscopy, Dr Beach   [3]   Family History  Problem Relation Age of Onset    Other (lung CA) Self     Hypertension Mother     Diabetes Mother     Heart Disease Mother     Heart Disease Father     Other (lung cancer) Father 55        Lung Cancer    Other (pancreatic cancer) Sister 47        Pancreatic Cancer    Cancer Sister 51        lung CA    Other (Other) Sister         Back problems    Other (Other) Son         anxiety    Other (Other) Son         behavioral problems    Diabetes Maternal Grandmother     Breast Cancer Maternal Grandmother          dx late-60s    Stroke Maternal Grandfather 86    Dementia Paternal Grandmother     Heart Disorder Paternal Grandfather     Cancer Maternal Aunt 50        LUNG CA 51    Breast Cancer Maternal Aunt         dx 46-47y    Ovarian Cancer Maternal Cousin Female 33         of ovarian ca at 35y   [4]   Allergies  Allergen Reactions    Gabapentin SWELLING and UNKNOWN    Erythromycin UNKNOWN    Clindamycin RASH   [5]    ARIPiprazole  2 mg Oral Daily    dilTIAZem HCl  120 mg Oral Daily    DULoxetine  60 mg Oral Daily    levothyroxine  75 mcg Oral Before breakfast    montelukast  10 mg Oral Nightly    pregabalin  75 mg Oral TID    amphetamine-dextroamphetamine  15 mg Oral BID@0800,1200    lisinopril  10 mg Oral Daily    And    hydrochlorothiazide  6.25 mg Oral Daily   [6]   albuterol    HYDROcodone-acetaminophen    ibuprofen    morphINE    ondansetron    prochlorperazine    acetaminophen    melatonin    guaiFENesin ER    benzonatate    glycerin-hypromellose-    sodium chloride    ondansetron    prochlorperazine    polyethylene glycol (PEG 3350)    sennosides    bisacodyl    fleet enema

## 2025-06-04 NOTE — DISCHARGE SUMMARY
[facility]     Hanna Barrett Patient Status:  Inpatient    1970 MRN [id number]   Location [facility] 4NW-A Attending Ama Sanches,    Hosp Day # 1 PCP Guillermo Curry MD     Date of Admission: 6/3/2025  Date of Discharge:   25  Discharge Disposition: Home or Self Care    Discharge Diagnosis:  #Recurrent pleural effusion  #Right lung adenocarcinoma  #Hypothyroidism  #Hypertension  #Cancer-related pain  #Asthma  #Anxiety  #Depression    History of Present Illness: (per Dr. Lopez), Hanna Barrett is a 55 year old female with past medical history of right lung adenocarcinoma, hypothyroidism, hypertension, fibromyalgia, depression, anxiety who presents to the ED for dyspnea.  Patient has been having worsening dyspnea and has had difficulty taking deep breaths.  Patient has history of pleural effusion and required drainage in the past.  She is currently undergoing treatment for lung cancer.     Brief Synopsis: admitted with recurrent R pleural effusion. Pulm and oncology consulted. She underwent ultrasound guided R thoracentesis with no complication; pleural fluid studies/cytology were sent for evaluation. Her clinical condition improved and she was discharged to home with recommendation to f/u closely with PCP, pulm, and oncology in outpt setting.     Lace+ Score: 73  59-90 High Risk  29-58 Medium Risk  0-28   Low Risk       TCM Follow-Up Recommendation:  LACE > 58: High Risk of readmission after discharge from the hospital.      Procedures during hospitalization:   Ultrasound guided R thoracentesis    Incidental or significant findings and recommendations (brief descriptions):  As above    Lab/Test results pending at Discharge:   Pleural fluid studies/cytology    Consultants:  Pulm  Oncology     Discharge Medication List:     Discharge Medications        CONTINUE taking these medications        Instructions Prescription details   albuterol 108 (90 Base) MCG/ACT  Aers  Commonly known as: Ventolin HFA      Inhale 2 puffs into the lungs every 6 (six) hours as needed for Wheezing or Shortness of Breath.   Quantity: 3 each  Refills: 3     amphetamine-dextroamphetamine 15 MG Tabs  Commonly known as: ADDERALL      Take 1 tablet (15 mg total) by mouth daily as needed.   Refills: 0     ARIPiprazole 2 MG Tabs  Commonly known as: Abilify      Take 1 tablet (2 mg total) by mouth daily.   Refills: 0     cetirizine 10 MG Tabs  Commonly known as: ZyrTEC      Take 1 tablet (10 mg total) by mouth daily.   Quantity: 90 tablet  Refills: 1     cholecalciferol 25 MCG (1000 UT) Caps  Commonly known as: DRISDOL      Take 1 capsule (1,000 Units total) by mouth in the morning.   Refills: 0     dilTIAZem HCl 120 MG Tabs  Commonly known as: CARDIZEM      Take 1 tablet (120 mg total) by mouth in the morning.   Refills: 0     DULoxetine 60 MG Cpep  Commonly known as: Cymbalta      Take 1 capsule (60 mg total) by mouth in the morning.   Refills: 2     ferrous sulfate 325 (65 FE) MG Tbec      Take 1 tablet (325 mg total) by mouth daily with breakfast.   Refills: 0     HYDROcodone-acetaminophen  MG Tabs  Commonly known as: Norco      Take 1 tablet by mouth every 8 (eight) hours as needed for Pain.   Quantity: 60 tablet  Refills: 0     ibuprofen 200 MG Tabs  Commonly known as: Motrin      Take 4 tablets (800 mg total) by mouth every 8 (eight) hours as needed for Pain.   Refills: 0     levothyroxine 75 MCG Tabs  Commonly known as: Synthroid      Take 1 tablet (75 mcg total) by mouth before breakfast.   Quantity: 90 tablet  Refills: 0     lisinopril-hydroCHLOROthiazide 20-12.5 MG Tabs  Commonly known as: Zestoretic      Take 0.5 tablets by mouth daily.   Quantity: 45 tablet  Refills: 3     montelukast 10 MG Tabs  Commonly known as: Singulair      Take 1 tablet (10 mg total) by mouth nightly.   Quantity: 90 tablet  Refills: 3     morphINE 15 MG Tabs      Take 1 tablet (15 mg total) by mouth every 6  (six) hours as needed for Pain.   Quantity: 60 tablet  Refills: 0     Multiple Vitamin Tabs      Take 1 tablet by mouth in the morning.   Refills: 0     ondansetron 8 MG tablet  Commonly known as: Zofran      Take 1 tablet (8 mg total) by mouth every 8 (eight) hours as needed for Nausea.   Quantity: 30 tablet  Refills: 3     pregabalin 75 MG Caps  Commonly known as: Lyrica      Take 1 capsule (75 mg total) by mouth 2 (two) times daily.   Quantity: 60 capsule  Refills: 1     prochlorperazine 10 mg tablet  Commonly known as: Compazine      Take 1 tablet (10 mg total) by mouth every 6 (six) hours as needed for Nausea.   Quantity: 30 tablet  Refills: 3     Vyvanse 70 MG Caps  Generic drug: Lisdexamfetamine Dimesylate      Take 1 capsule (70 mg total) by mouth every morning.   Refills: 0              [place]    Follow-up appointment:   No follow-up provider specified.  Appointments for Next 30 Days 6/4/2025 - 7/4/2025        Date Arrival Time Visit Type Length Department Provider     6/5/2025 12:00 PM  CHEMOTHERAPY [2076] 60 min [facility] Rienzi NP [place] RN7    Patient Instructions:         Location Instructions:     Your appointment is on the [facility] in the [facility] [place]. The address is [address] [place] [address]. Please register at the [facility]  on the second floor.  Masks are optional for all patients and visitors, unless otherwise indicated. No care partners/visitors under 18 years of age are allowed in the infusion room.               6/5/2025 12:15 PM  ON TREATMENT VISIT FOLLOW-UP [9851] 15 min [facility] Oncology Rienzi Shoshana Gordon MD    Patient Instructions:         Location Instructions:     **IF YOU NEED LABWORK OR AN INFUSION ALONG WITH YOUR APPOINTMENT, YOU MUST CALL TO SCHEDULE.**  Your appointment is on the [facility] in the [facility] [place]. The address is [address] [place] [address]  [facility]. Please register at the [facility]  on the second floor.  Masks are optional for all patients and visitors, unless otherwise indicated.               2025 12:30 PM  PALLIATIVE CARE FOLLOW UP [5071] 30 min [facility] Hematology Oncology [place] Ebony Love APRN    Patient Instructions:         Location Instructions:     **IF YOU NEED LABWORK OR AN INFUSION ALONG WITH YOUR APPOINTMENT, YOU MUST CALL TO SCHEDULE.**  Your appointment is on the [facility] in the [facility]. The address is [address] [place] [address]. Please register at the [facility]  on the second floor.  Masks are optional for all patients and visitors, unless otherwise indicated.                      Vital signs:  Temp:  [97.8 °F (36.6 °C)-98.2 °F (36.8 °C)] 98.2 °F (36.8 °C)  Pulse:  [] 90  Resp:  [14-20] 18  BP: ()/(56-98) 108/68  SpO2:  [91 %-100 %] 91 %    Physical Exam:    General: No acute distress   Lungs: clear to auscultation  Cardiovascular: S1, S2  Abdomen: Soft    -----------------------------------------------------------------------------------------------  PATIENT DISCHARGE INSTRUCTIONS: See electronic chart    Ama Sanches DO    Total time spent on discharge plannin minutes     The  [employer] C[employer] Act makes medical notes like these available to patients in the interest of transparency. Please be advised this is a medical document. Medical documents are intended to carry relevant information, facts as evident, and the clinical opinion of the practitioner. The medical note is intended as peer to peer communication and may appear blunt or direct. It is written in medical language and may contain abbreviations or verbiage that are unfamiliar.

## 2025-06-04 NOTE — CONSULTS
Hematology-Oncology Consultation Note    Patient Name: Hanna Barrett   YOB: 1970   Medical Record Number: [id number]   CSN: [id number]   Consulting Physician: Shoshana Gordon MD  Date of Consultation: 2025     Reason for Consultation:  Hanna Barrett was seen today for the diagnosis of metastatic lung cancer       History of Present Illness:   56 y/o female with a h/o metastatic lung cancer- on chemo+IO with response and currently on IO alone last dose 25. Presented to the ED with acute worsening SOB with difficulty breathing and significant fatigue. CTA chest showed no PE but did show new moderate sized effusion. Also c/o dizziness, head tingling and generalized pain.     Past Medical History:  Past Medical History[1]    Past Surgical History:  Past Surgical History[2]    Family Medical History:  Family History[3]    Gyne History:  OB History    Para Term  AB Living   2 2 2 0 0 2   SAB IAB Ectopic Multiple Live Births   0 0 0 0 2       Psychosocial History:  Social History     Socioeconomic History    Marital status:      Spouse name: Not on file    Number of children: Not on file    Years of education: Not on file    Highest education level: Not on file   Occupational History    Not on file   Tobacco Use    Smoking status: Former     Current packs/day: 0.00     Average packs/day: 0.8 packs/day for 19.0 years (14.5 ttl pk-yrs)     Types: Cigarettes     Start date:      Quit date: 2010     Years since quitting: 15.4     Passive exposure: Past    Smokeless tobacco: Former     Quit date: 2024    Tobacco comments:     Has not vaped since 2024.    Vaping Use    Vaping status: Former    Substances: Nicotine, daily    Devices: Pre-filled pod   Substance and Sexual Activity    Alcohol use: Yes     Comment: 2-3 drinks month    Drug use: Yes     Types: Cocaine, Cannabis     Comment: USED COCAINE BETW [id number].      cannabis gummy to sleep    Sexual activity: Yes      Partners: Male   Other Topics Concern     Service Not Asked    Blood Transfusions Not Asked    Caffeine Concern Yes     Comment: 3-4 daily of pop    Occupational Exposure Not Asked    Hobby Hazards Not Asked    Sleep Concern Not Asked    Stress Concern Not Asked    Weight Concern Not Asked    Special Diet Not Asked    Back Care Not Asked    Exercise No     Comment: not tolerated right now    Bike Helmet Not Asked    Seat Belt Not Asked    Self-Exams Not Asked   Social History Narrative    Not on file     Social Drivers of Health     Food Insecurity: Patient Declined (6/4/2025)    NCSS - Food Insecurity     Worried About Running Out of Food in the Last Year: Patient declined     Ran Out of Food in the Last Year: Patient declined   Transportation Needs: Patient Declined (6/4/2025)    NCSS - Transportation     Lack of Transportation: Patient declined   Housing Stability: Patient Declined (6/4/2025)    NCSS - Housing/Utilities     Has Housing: Patient declined     Worried About Losing Housing: Patient declined     Unable to Get Utilities: Patient declined       Allergies:   Allergies[4]    Current Medications:  Current Medications[5]      Review of Systems:  A 14-point ROS was done with pertinent positives and negative per the HPI    Vital Signs:  BP 99/74 (BP Location: Left arm)   Pulse 96   Temp 97.8 °F (36.6 °C) (Oral)   Resp 20   Ht 1.638 m (5' 4.5\")   Wt 79.6 kg (175 lb 6.4 oz)   LMP 02/14/2019 (Exact Date)   SpO2 94%   BMI 29.64 kg/m²     Physical Examination:  General: Patient is alert and oriented x 3, not in acute distress. Alopecia  Psych:  Mood and affect appropriate  HEENT: EOMs intact. PERRL. Oropharynx with mucositis, no thrush  Neck: No JVD. No palpable adenopathy! Neck is supple.  Chest: Diminished BS right base  Heart: Regular  Abdomen: Soft with good bowel sounds.  No hepatosplenomegaly.  No palpable mass. Some tenderness to palp BLQ but no guarding or rebound  Extremities: Pedal  pulses are present. No BLE edema.  Neurological: Grossly intact.     Laboratory:  Lab Component   Component Value Date/Time    RED BLOOD COUNT 4.24 09/02/2012 1948    RED BLOOD COUNT 4.16 05/08/2012 2030    RBC 3.62 (L) 06/04/2025 0553    RBC 3.97 06/03/2025 1337    RBC 3.25 (L) 05/16/2025 0912    RBC 3.85 03/21/2022 1041    RBC 3.96 03/07/2022 1300    RBC 3.65 (L) 02/28/2022 1017    HGB 11.3 (L) 06/04/2025 0553    HGB 12.5 06/03/2025 1337    HGB 10.1 (L) 05/16/2025 0912    HGB 12.7 09/02/2012 1948    HGB 12.6 05/08/2012 2030    Hemoglobin 11.7 (L) 03/21/2022 1041    Hemoglobin 11.5 (L) 03/07/2022 1300    Hemoglobin 10.7 (L) 02/28/2022 1017    HCT 33.1 (L) 06/04/2025 0553    HCT 35.9 06/03/2025 1337    HCT 28.9 (L) 05/16/2025 0912    HCT 36.6 (L) 09/02/2012 1948    HCT 36.6 (L) 05/08/2012 2030    Hematocrit 34.7 03/21/2022 1041    Hematocrit 35.4 03/07/2022 1300    Hematocrit 31.1 (L) 02/28/2022 1017    MCV 91.4 06/04/2025 0553    MCV 90.4 06/03/2025 1337    MCV 88.9 05/16/2025 0912    MCV 90.1 03/21/2022 1041    MCV 89.4 03/07/2022 1300    MCV 85.2 02/28/2022 1017    MCV 89 04/22/2016 1009    MEAN CELL VOLUME 86.3 09/02/2012 1948    MEAN CELL VOLUME 88.0 05/08/2012 2030    MCH 31.2 06/04/2025 0553    MCH 31.5 06/03/2025 1337    MCH 31.1 05/16/2025 0912    MCH 30.4 03/21/2022 1041    MCH 29.0 03/07/2022 1300    MCH 29.3 02/28/2022 1017    MCH 29.5 04/22/2016 1009    Mean Corpuscular Hemoglobin 30.0 09/02/2012 1948    Mean Corpuscular Hemoglobin 30.3 05/08/2012 2030    MCHC 34.1 06/04/2025 0553    MCHC 34.8 06/03/2025 1337    MCHC 34.9 05/16/2025 0912    MCHC 33.7 03/21/2022 1041    MCHC 32.5 03/07/2022 1300    MCHC 34.4 02/28/2022 1017    MCHC 33.2 04/22/2016 1009    Mean Corpuscular HGB Conc 34.7 09/02/2012 1948    Mean Corpuscular HGB Conc 34.4 05/08/2012 2030    RDW 15.8 06/04/2025 0553    RDW 15.9 06/03/2025 1337    RDW 15.2 05/16/2025 0912    RDW 17.7 (H) 03/21/2022 1041    RDW 17.5 (H) 03/07/2022 1300    RDW  16.8 (H) 02/28/2022 1017    RDW 13.6 04/22/2016 1009    RED CELL DISTRIBUTION WIDTH 12.9 09/02/2012 1948    RED CELL DISTRIBUTION WIDTH 13.3 05/08/2012 2030    Neutrophil Absolute Prelim 2.67 06/04/2025 0553    Neutrophil Absolute Prelim 3.49 06/03/2025 1337    Neutrophil Absolute Prelim 2.00 05/16/2025 0912    WBC 4.5 06/04/2025 0553    WBC 4.9 06/03/2025 1337    WBC 3.1 (L) 05/16/2025 0912    WBC 6.42 03/21/2022 1041    WBC 3.59 (L) 03/07/2022 1300    WBC 1.77 (L) 02/28/2022 1017    WBC 6.30 03/12/2020 1012    WBC 6.8 04/22/2016 1009    WBC 9.4 09/02/2012 1948    WBC 8.8 05/08/2012 2030    Platelet Count 211 03/21/2022 1041    Platelet Count 358 03/07/2022 1300    Platelet Count 296 02/28/2022 1017    Platelet Count 324 04/22/2016 1009    .0 06/04/2025 0553    .0 06/03/2025 1337    .0 05/16/2025 0912    .0 09/02/2012 1948    .0 05/08/2012 2030       Lab Component   Component Value Date/Time    GLUCOSE 106 (H) 09/02/2012 1948    GLUCOSE 128 (H) 05/08/2012 2030    Glucose 105 (H) 06/04/2025 0553    Glucose 228 (H) 06/03/2025 1337    Glucose 123 (H) 05/16/2025 0912    Glucose 181 (H) 03/21/2022 1041    Glucose 214 (H) 03/07/2022 1300    Glucose 199 (H) 02/28/2022 1017    BUN 11 06/04/2025 0553    BUN 14 06/03/2025 1337    BUN 16 05/16/2025 0912    BUN 11 09/02/2012 1948    BUN 9 05/08/2012 2030    Blood Urea Nitrogen 17.0 03/21/2022 1041    Blood Urea Nitrogen 15.0 03/07/2022 1300    Blood Urea Nitrogen 11.0 02/28/2022 1017    CREATININE 0.7 09/02/2012 1948    CREATININE 0.7 05/08/2012 2030    Creatinine 0.89 06/04/2025 0553    Creatinine 1.02 06/03/2025 1337    Creatinine 0.88 05/16/2025 0912    Creatinine 0.95 (H) 03/21/2022 1041    Creatinine 0.83 03/07/2022 1300    Creatinine 0.66 02/28/2022 1017    GFR  > 90 09/02/2012 1948    GFR  > 90 05/08/2012 2030    GFR, -American 70 04/03/2022 2025    GFR, -American 61 03/29/2022 0807    GFR,  -American 100 03/26/2022 0553    GFR NON- > 90 09/02/2012 1948    GFR NON- > 90 05/08/2012 2030    GFR, Non- 61 04/03/2022 2025    GFR, Non- 53 (L) 03/29/2022 0807    GFR, Non- 87 03/26/2022 0553    CALCIUM 9.1 09/02/2012 1948    CALCIUM 8.7 (L) 05/08/2012 2030    Calcium 9.8 03/21/2022 1041    Calcium 10.1 03/07/2022 1300    Calcium 10.0 02/28/2022 1017    Calcium, Total 9.3 06/04/2025 0553    Calcium, Total 9.9 06/03/2025 1337    Calcium, Total 9.4 05/16/2025 0912    Albumin 4.2 06/04/2025 0553    Albumin 4.7 06/03/2025 1337    Albumin 4.1 05/16/2025 0912    Albumin 4.4 03/21/2022 1041    Albumin 4.4 03/07/2022 1300    Albumin 4.2 02/28/2022 1017    ALBUMIN 4.1 09/02/2012 1948    Sodium 143 06/04/2025 0553    Sodium 139 06/03/2025 1337    Sodium 142 05/16/2025 0912    Sodium 136 03/21/2022 1041    Sodium 139 03/07/2022 1300    Sodium 138 02/28/2022 1017    SODIUM 140 09/02/2012 1948    SODIUM 142 05/08/2012 2030    Potassium 3.9 06/04/2025 0553    Potassium 3.9 06/03/2025 1337    Potassium 3.5 05/16/2025 0912    Potassium 4.04 03/21/2022 1041    Potassium 4.92 03/07/2022 1300    Potassium 4.69 02/28/2022 1017    Chloride 104 06/04/2025 0553    Chloride 103 06/03/2025 1337    Chloride 108 05/16/2025 0912    Chloride 99 03/21/2022 1041    Chloride 101 03/07/2022 1300    Chloride 103 02/28/2022 1017    CHLORIDE 108 09/02/2012 1948    CHLORIDE 109 05/08/2012 2030    CO2 27.0 06/04/2025 0553    CO2 25.0 06/03/2025 1337    CO2 27.0 05/16/2025 0912    CO2 26.0 09/02/2012 1948    CO2 26.0 05/08/2012 2030    Carbon Dioxide 22.5 03/21/2022 1041    Carbon Dioxide 26.9 03/07/2022 1300    Carbon Dioxide 24.0 02/28/2022 1017    Alkaline Phosphatase 67 06/04/2025 0553    Alkaline Phosphatase 73 06/03/2025 1337    Alkaline Phosphatase 71 05/16/2025 0912    Alkaline Phosphatase 133 (H) 03/21/2022 1041    Alkaline Phosphatase 73 03/07/2022 1300     Alkaline Phosphatase 69 02/28/2022 1017    ALKALINE PHOSPHATASE 59 09/02/2012 1948    AST 14 06/04/2025 0553    AST 16 06/03/2025 1337    AST 19 05/16/2025 0912    AST 15 03/21/2022 1041    AST 18 03/07/2022 1300    AST 15 02/28/2022 1017    AST 31 09/02/2012 1948    ALT 11 06/04/2025 0553    ALT 13 06/03/2025 1337    ALT 18 05/16/2025 0912    ALT 15 03/21/2022 1041    ALT 17 03/07/2022 1300    ALT 16 02/28/2022 1017    ALT 36 09/02/2012 1948    BILIRUBIN, TOTAL 0.2 09/02/2012 1948    Bilirubin, Total 0.3 06/04/2025 0553    Bilirubin, Total 0.3 06/03/2025 1337    Bilirubin, Total 0.3 05/16/2025 0912    Bilirubin, Total 0.23 03/21/2022 1041    Bilirubin, Total 0.23 03/07/2022 1300    Bilirubin, Total 0.28 02/28/2022 1017    Total Protein 6.3 06/04/2025 0553    Total Protein 7.1 06/03/2025 1337    Total Protein 6.2 05/16/2025 0912    Total Protein 6.9 03/21/2022 1041    Total Protein 6.7 03/07/2022 1300    Total Protein 6.7 02/28/2022 1017    TOTAL PROTEIN 7.2 09/02/2012 1948           Radiology:  PROCEDURE:  CT ANGIOGRAPHY, CHEST (CPT=71275)     COMPARISON:  [facility], CT, CT STN/CHST/ABD/PEL W CONTRAST (CPT=70491/10672/67131), 5/07/2025, 6:32 PM.     INDICATIONS:  History of lung cancer increasing this dyspnea on exertion and difficulty breathing for 6 days rule out clot or worsening tumor burden.  Also noted to be signif     TECHNIQUE:  IV contrast-enhanced multislice CT angiography is performed through the pulmonary arterial anatomy. 3D volume renderings are generated.  Dose reduction techniques were used. Dose information is transmitted to the ACR (American College of  Radiology) NRDR (National Radiology Data Registry) which includes the Dose Index Registry.     PATIENT STATED HISTORY:(As transcribed by Technologist)  Patient with increasing dyspnea on exertion and difficulty breathing with exertion x6 days. Tachycardic. History of lung Cancer.      CONTRAST USED:  100cc of Isovue 370     FINDINGS:     VASCULATURE:  Smooth tapering.  No filling defect.  THORACIC AORTA:  Normal caliber.  No dissection.  LUNGS:  Right perihilar bandlike opacities do not appear significant changed consistent with any combination of treatment changes, cancer and or treated cancer.    There is new mild passive atelectasis associated with moderate right pleural effusion.  MEDIASTINUM:  No adenopathy.  MICHAEL:  No adenopathy.  CARDIAC:  No pericardial effusion.  No significant coronary calcifications.  PLEURA:  New moderate-sized layering right pleural effusion.  CHEST WALL:  No axillary adenopathy.  Right chest port.  LIMITED ABDOMEN:  No abnormality.  BONES:  Moderate to severe degenerative changes.  Maintained vertebral body heights.  No subluxation.  OTHER:  None.                         Impression   CONCLUSION:    1. Negative for pulmonary embolism.  2. New moderate-sized layering right pleural effusion with mild associated passive atelectasis.  3. Details as above.  Continued clinical correlation recommended.             LOCATION:  [place]        Dictated by (CST): Fabien Wadsworth MD on 6/03/2025 at 4:50 PM      Finalized by (CST): Fabien Wadsworth MD on 6/03/2025 at 4:55 PM           Impression & Plan:   1. SOB  - new right sided pleural effusion  - h/o malignant pleural effusion s/p tap  x 2  - thoracentesis planned. Pulmonary following  - also with h/o underlying lung disease- asthma, bronchiectasis    2. Metastatic NSCLC   - on chemo+IO with response but with increased SE/AEs. Currently on IO alone and next dose tomorrow  - not unusual for effusions to develop especially on IO  - previously palpable nodes still gone    3. Neoplasm related pain, fibromyalgia. chronic LBP, neck and left arm pain  - followed by palliative care. On Ibuprofen, morphine PRN, also on topical lidocaine  - used to be followed at a pain clinic- not anymore     4. Profound fatigue  - could be SE/AE of IO  - check TSH, cortisol, ACTH  - has h/o hypothyroidism and  last TFTs were normal but on IO can change    5. Dizziness, brain fog, poor memory, head tingling  - MRI brain  - also could be SE    Thank you for including us in the care of this patient. We will follow with you.   May DC home after tap if better and MRI brain if possible. Has treatment planned for tomorrow.     Shoshana Gordon MD  [facility]          [1]   Past Medical History:   Abnormal uterine bleeding    bleeds every 2 weeks    Anxiety state    Asthma (HCC)    Attention deficit hyperactivity disorder (ADHD)    BACK PAIN    Back problem    lumbar radiculopathy    Cancer (HCC)    adenocarcinoma of right lung    MARCIO II (cervical intraepithelial neoplasia II)    DDD (degenerative disc disease), lumbar    DDD (degenerative disc disease), thoracic    Depression    Disorder of thyroid    Dyspareunia    Exposure to medical diagnostic radiation    On hold since 4/2022    Fall    Fibromyalgia    Fibromyalgia    Genital warts    High blood pressure    History of COVID-19    COVID + 7/2020 Headache - NO Hospitalization needed     History of lumbar fusion    Hx of motion sickness    IBS (irritable bowel syndrome)    Per patient - Just constipation    Infertility, female    Lumbar radiculopathy    Mild dysplasia of cervix    Pap smear for cervical cancer screening    pt states normal    Papanicolaou smear of cervix with high grade squamous intraepithelial lesion (HGSIL)    Personal history of antineoplastic chemotherapy    Last chemo 7/12/22 prior to lung bx 8/4/22    Post covid-19 condition, unspecified    symptoms: HA; s/s resolved-yes; not hospitalized    Problems with swallowing    with radiation    Sexually transmitted disease    HPV    Substance abuse (HCC)    cocaine    Urinary incontinence    Visual impairment    glasses/contacts bifocals   [2]   Past Surgical History:  Procedure Laterality Date    Appendectomy  1980    Back surgery  2009    fusion L5-S1    Back surgery  03/03/2021     RIGHT LUMBAR 3/ LUMBAR 4, LUMBAR 4/ LUMBAR 5 HEMILAMINTOMIES, LEFT LUMBAR 2 / LUMBAR 3 MICRODISCECTOMY    Colonoscopy N/A 2023    Procedure: COLONOSCOPY;  Surgeon: Devante Kern MD;  Location:  ENDOSCOPY    Colposcopy,bx cervix/endocerv curr  12    MARCIO 1    Laparoscopy procedure unlisted      Ovarian cyst removed    Leep  2011    MARCIO 2          X2    Other surgical history      bunions bilateral    Other surgical history      nodule lymph nodes neck    Other surgical history       Bladder sling    Other surgical history  2020    Cystoscopy, Dr Beach   [3]   Family History  Problem Relation Age of Onset    Other (lung CA) Self     Hypertension Mother     Diabetes Mother     Heart Disease Mother     Heart Disease Father     Other (lung cancer) Father 55        Lung Cancer    Other (pancreatic cancer) Sister 47        Pancreatic Cancer    Cancer Sister 51        lung CA    Other (Other) Sister         Back problems    Other (Other) Son         anxiety    Other (Other) Son         behavioral problems    Diabetes Maternal Grandmother     Breast Cancer Maternal Grandmother         dx late-60s    Stroke Maternal Grandfather 86    Dementia Paternal Grandmother     Heart Disorder Paternal Grandfather     Cancer Maternal Aunt 50        LUNG CA 51    Breast Cancer Maternal Aunt         dx 46-47y    Ovarian Cancer Maternal Cousin Female 33         of ovarian ca at 35y   [4]   Allergies  Allergen Reactions    Gabapentin SWELLING and UNKNOWN    Erythromycin UNKNOWN    Clindamycin RASH   [5]    [COMPLETED] sodium chloride 0.9 % IV bolus 1,000 mL  1,000 mL Intravenous Once    [COMPLETED] iopamidol 76% (ISOVUE-370) injection for power injector  100 mL Intravenous ONCE PRN    [] HYDROmorphone (Dilaudid) 1 MG/ML injection 0.5 mg  0.5 mg Intravenous Q30 Min PRN    [] ondansetron (Zofran) 4 MG/2ML injection 4 mg  4 mg Intravenous Q4H PRN    albuterol (Ventolin HFA) 108 (90  Base) MCG/ACT inhaler 2 puff  2 puff Inhalation Q6H PRN    ARIPiprazole (Abilify) tab 2 mg  2 mg Oral Daily    dilTIAZem (cardIZEM) tab 120 mg  120 mg Oral Daily    DULoxetine (Cymbalta) DR cap 60 mg  60 mg Oral Daily    HYDROcodone-acetaminophen (Norco)  MG per tab 1 tablet  1 tablet Oral Q8H PRN    ibuprofen (Motrin) tab 800 mg  800 mg Oral Q8H PRN    levothyroxine (Synthroid) tab 75 mcg  75 mcg Oral Before breakfast    montelukast (Singulair) tab 10 mg  10 mg Oral Nightly    morphINE immediate release tab 15 mg  15 mg Oral Q6H PRN    ondansetron (Zofran) tab 8 mg  8 mg Oral Q8H PRN    pregabalin (Lyrica) cap 75 mg  75 mg Oral TID    prochlorperazine (Compazine) tab 10 mg  10 mg Oral Q6H PRN    amphetamine-dextroamphetamine (Adderall) tab 15 mg  15 mg Oral BID@0800,1200    acetaminophen (Tylenol Extra Strength) tab 500 mg  500 mg Oral Q4H PRN    melatonin tab 3 mg  3 mg Oral Nightly PRN    guaiFENesin ER (Mucinex) 12 hr tab 600 mg  600 mg Oral BID PRN    benzonatate (Tessalon) cap 200 mg  200 mg Oral TID PRN    glycerin-hypromellose- (Artificial Tears) 0.2-0.2-1 % ophthalmic solution 1 drop  1 drop Both Eyes QID PRN    sodium chloride (Saline Mist) 0.65 % nasal solution 1 spray  1 spray Each Nare Q3H PRN    ondansetron (Zofran) 4 MG/2ML injection 4 mg  4 mg Intravenous Q6H PRN    prochlorperazine (Compazine) 10 MG/2ML injection 5 mg  5 mg Intravenous Q8H PRN    polyethylene glycol (PEG 3350) (Miralax) 17 g oral packet 17 g  17 g Oral Daily PRN    sennosides (Senokot) tab 17.2 mg  17.2 mg Oral Nightly PRN    bisacodyl (Dulcolax) 10 MG rectal suppository 10 mg  10 mg Rectal Daily PRN    fleet enema (Fleet) rectal enema 133 mL  1 enema Rectal Once PRN    lisinopril (Zestril) tab 10 mg  10 mg Oral Daily    And    hydroCHLOROthiazide tab 6.25 mg  6.25 mg Oral Daily

## 2025-06-04 NOTE — PAYOR COMM NOTE
--------------  ADMISSION REVIEW     Payor: [facility]  Subscriber #:  [id number]  Authorization Number: [id number]    Admit date: 6/3/25  Admit time: 11:36 PM       REVIEW DOCUMENTATION:  ED Provider Notes signed by Sharon Alberto MD at 6/3/2025 10:13 PM       Author: Sharon Alberto MD Service: Emergency Medicine Author Type: Physician    Filed: 6/3/2025 10:13 PM Date of Service: 6/3/2025  3:40 PM Status: Signed     Patient Seen in: [facility] Emergency Department    History  Chief Complaint   Patient presents with    Difficulty Breathing     Stated Complaint: ALMA x 6 days, hx of lung cancer    HPI  55 year old female with lung cancer who presents with worsening shortness of breath and pleural effusion.    She is experiencing worsening shortness of breath, described as 'horrible,' and this is the third occurrence of this symptom. She has difficulty taking deep breaths and spends approximately fourteen to fifteen hours a day sleeping and being significantly fatigued dealing with this issue, significantly impacting her daily life.    She has a history of pleural effusion, with the first instance requiring drainage of 1.6 liters of fluid. Previous x-rays have not shown significant findings, but she anticipates that a CT scan will reveal more than a 'trace' of pleural effusion. She notes that x-rays have not been reliable in detecting the extent of the effusion in the past.    She is currently undergoing treatment for lung cancer and mentions that her symptoms have been getting worse. She is concerned about the presence of fluid around her lungs. No fevers or chills have been experienced recently, although she has been in pain, which she attributes to her cancer.    Her blood sugar level was noted to be 228, but she attributes this to having just eaten.    Physical Exam  ED Triage Vitals [06/03/25 1327]   /73   Pulse (!) 122   Resp 24   Temp 97.3 °F (36.3 °C)   Temp src  Temporal   SpO2 97 %   O2 Device None (Room air)     Vital Signs  BP: 109/72  Pulse: 93  Resp: 15  Temp: 97.3 °F (36.3 °C)  Temp src: Temporal  MAP (mmHg): 80    Oxygen Therapy  SpO2: 99 %  O2 Device: None (Room air)    Physical Exam  Vitals and nursing note reviewed.   Constitutional:       General: She is not in acute distress.     Appearance: She is well-developed. She is ill-appearing. She is not toxic-appearing or diaphoretic.   HENT:      Head: Atraumatic.      Right Ear: External ear normal.      Left Ear: External ear normal.      Nose: Nose normal.   Eyes:      General: No scleral icterus.        Right eye: No discharge.         Left eye: No discharge.      Conjunctiva/sclera: Conjunctivae normal.      Pupils: Pupils are equal, round, and reactive to light.   Neck:      Vascular: No JVD.   Cardiovascular:      Rate and Rhythm: Regular rhythm. Tachycardia present.      Heart sounds: Normal heart sounds. No murmur heard.     No friction rub. No gallop.   Pulmonary:      Effort: Pulmonary effort is normal. No respiratory distress.      Breath sounds: No stridor. Decreased breath sounds present. No wheezing.   Chest:      Chest wall: No tenderness.   Abdominal:      General: Bowel sounds are normal. There is no distension.      Palpations: Abdomen is soft.      Tenderness: There is no abdominal tenderness. There is no guarding or rebound.   Musculoskeletal:         General: No tenderness or deformity. Normal range of motion.      Cervical back: Normal range of motion and neck supple.   Lymphadenopathy:      Cervical: No cervical adenopathy.   Skin:     General: Skin is warm and dry.      Coloration: Skin is pale.      Findings: No erythema or rash.   Neurological:      Mental Status: She is alert and oriented to person, place, and time.      Cranial Nerves: No cranial nerve deficit.      Coordination: Coordination normal.   Psychiatric:         Behavior: Behavior normal.         Judgment: Judgment normal.      ED Course  Labs Reviewed   COMP METABOLIC PANEL (14) - Abnormal; Notable for the following components:       Result Value    Glucose 228 (*)     Calculated Osmolality 296 (*)     All other components within normal limits   CBC WITH DIFFERENTIAL WITH PLATELET - Abnormal; Notable for the following components:    Lymphocyte Absolute 0.73 (*)     All other components within normal limits   TROPONIN I HIGH SENSITIVITY - Normal   PRO BETA NATRIURETIC PEPTIDE - Normal   SARS-COV-2/FLU A AND B/RSV BY PCR (GENEXPERT) - Normal     EKG    Rate, intervals and axes as noted on EKG Report.  Rate: 112  Rhythm: Sinus Rhythm  Reading: sinus tachycardia with multiple PVC's and septal Q waves are noted as well as inferior Q waves.  This is an abnormal EKG    ED Course as of 06/03/25 2213  ------------------------------------------------------------  Time: 06/03 1502  Value: Hemoglobin: 12.5  Comment: (Reviewed)  ------------------------------------------------------------  Time: 06/03 1502  Value: Pulse(!): 130  Comment: (Reviewed)  ------------------------------------------------------------  Time: 06/03 1502  Value: Pulse(!): 122  Comment: (Reviewed)  Chest x-ray is independently interpreted by myself does show some mild fluid in the fissure on the right, and elevated hf diaphragm on the right, no significant pleural effusions are visualized on this chest x-ray and no significant pneumonias.    VINCENT  Pleasant 55-year-old with a history of lung cancer under the care of Dr. Gordon from oncology and Dr. Corrigan from pulmonology presents to the emergency department with concern that she is having symptoms that she associates with her increasing pleural effusions which she has had drained twice in the past the last being about 8 weeks ago.  No fevers no chills no signs of pneumonia, also noted to be very fatigued and has noted that her heart is racing when she is up and moving around differential diagnosis includes but is not limited  to worsening malignant pleural effusions, pulmonary embolus, dehydration, electrolyte abnormalities, cardiac involvement.  Labs reviewed troponin is reassuring, BNP is only 45 noted to be hyperglycemic with a glucose of 228 but the patient did have a peanut butter and jelly sandwich just before coming into the emergency department.  BC does not show any to explain why the patient is suddenly short of breath.  Will also order a COVID swab.  Lungs are noted to be incredibly quiet all throughout with some lymphopenia so we will need to rule out COVID as a causative factor of that    Admission disposition: 6/3/2025  5:12 PM  Medical Decision Making  Disposition and Plan     Clinical Impression:  1. Pleural effusion    2. Malignant neoplasm of lung, unspecified laterality, unspecified part of lung (HCC)    3. Dyspnea on exertion    4. Tachycardia       Disposition:  Admit  6/3/2025  5:12 pm    Sharon Alberto MD on 6/3/2025 10:13 PM        History and Physical   Chief Complaint: Dyspnea    History of Present Illness:   55 year old female with past medical history of right lung adenocarcinoma, hypothyroidism, hypertension, fibromyalgia who presents to the ED for dyspnea.  Patient has been having worsening dyspnea and has had difficulty taking deep breaths.  Patient has history of pleural effusion and required drainage in the past.  She is currently undergoing treatment for lung cancer.      Lab 06/03/25  1337   RBC 3.97   HGB 12.5   HCT 35.9   MCV 90.4   MCH 31.5   MCHC 34.8   RDW 15.9   NEPRELIM 3.49   WBC 4.9   .0      *   BUN 14   CREATSERUM 1.02   EGFRCR 65   CA 9.9   ALB 4.7      K 3.9      CO2 25.0   ALKPHO 73   AST 16   ALT 13   BILT 0.3   TP 7.1     TROPHS <3     PBNP 45       Assessment & Plan:  #Recurrent pleural effusion  #Right lung adenocarcinoma  - CTA chest shows moderate-sized layering right pleural effusion  - Possible plan for thoracentesis  - Pulm consulted  - Oncology  consulted     #Hypothyroidism  - Levothyroxine     #Hypertension  - Lisinopril-hydrochlorothiazide     #Cancer-related pain  - Home meds     #Asthma  - Montelukast       6/4/2025    Hematology-Oncology Consultation Note       Reason for Consultation:    Pt was seen today for the diagnosis of metastatic lung cancer      History of Present Illness:   56 y/o female with a h/o metastatic lung cancer- on chemo+IO with response and currently on IO alone last dose 5/16/25. Presented to the ED with acute worsening SOB with difficulty breathing and significant fatigue. CTA chest showed no PE but did show new moderate sized effusion. Also c/o dizziness, head tingling and generalized pain.     Lab 06/03/25  1337 06/04/25  0553   WBC 4.9 4.5   HGB 12.5 11.3*   MCV 90.4 91.4   .0 326.0     * 105*   BUN 14 11   CREATSERUM 1.02 0.89   CA 9.9 9.3   ALB 4.7 4.2    143   K 3.9 3.9    104   CO2 25.0 27.0   ALKPHO 73 67   AST 16 14   ALT 13 11   BILT 0.3 0.3   TP 7.1 6.3     Impression & Plan:   1. SOB  - new right sided pleural effusion  - h/o malignant pleural effusion s/p tap  x 2  - thoracentesis planned. Pulmonary following  - also with h/o underlying lung disease- asthma, bronchiectasis     2. Metastatic NSCLC   - on chemo+IO with response but with increased SE/AEs. Currently on IO alone and next dose tomorrow  - not unusual for effusions to develop especially on IO  - previously palpable nodes still gone     3. Neoplasm related pain, fibromyalgia. chronic LBP, neck and left arm pain  - followed by palliative care. On Ibuprofen, morphine PRN, also on topical lidocaine  - used to be followed at a pain clinic- not anymore     4. Profound fatigue  - could be SE/AE of IO  - check TSH, cortisol, ACTH  - has h/o hypothyroidism and last TFTs were normal but on IO can change     5. Dizziness, brain fog, poor memory, head tingling  - MRI brain  - also could be SE      MEDICATIONS ADMINISTERED:  dilTIAZem  (cardIZEM) tab 120 mg       Date Action Dose Route User    6/4/2025 1054 Given 120 mg Oral LaxKhushboo landeros RN          hydroCHLOROthiazide tab 6.25 mg       Date Action Dose Route User    6/4/2025 1102 Given 6.25 mg Oral LaxaKhushboo RN          ibuprofen (Motrin) tab 800 mg       Date Action Dose Route User    6/4/2025 0758 Given 800 mg Oral LaxKhushboo landeros RN    6/4/2025 0010 Given 800 mg Oral Rhoda Hinds RN          iopamidol 76% (ISOVUE-370) injection for power injector       Date Action Dose Route User    6/3/2025 1629 Given 100 mL Intravenous Conchis Escobedo          levothyroxine (Synthroid) tab 75 mcg       Date Action Dose Route User    6/4/2025 0542 Given 75 mcg Oral Rhoda Hinds RN          lisinopril (Zestril) tab 10 mg       Date Action Dose Route User    6/4/2025 1054 Given 10 mg Oral Khushboo Santos RN          morphINE immediate release tab 15 mg       Date Action Dose Route User    6/4/2025 0758 Given 15 mg Oral Khushboo Santos RN    6/4/2025 0010 Given 15 mg Oral Rhoda Hinds RN          pregabalin (Lyrica) cap 75 mg       Date Action Dose Route User    6/4/2025 1054 Given 75 mg Oral Khushboo Santos RN    6/4/2025 0010 Given 75 mg Oral Rhoda Hinds RN          sodium chloride 0.9 % IV bolus 1,000 mL       Date Action Dose Route User    6/3/2025 1512 New Bag 1,000 mL Intravenous Derrick Zhao RN          amphetamine-dextroamphetamine (Adderall) tab 15 mg       Date Action Dose Route User    6/4/2025 1141 Given 15 mg Oral LaxKhushboo landeros RN    6/4/2025 0758 Given 15 mg Oral LaxaKhushboo RN            Vitals       Date/Time Temp Pulse Resp BP SpO2 Weight O2 Device O2 Flow Rate (L/min) Who    06/04/25 1154 98.2 °F (36.8 °C) 101 14 111/73 95 % -- Nasal cannula -- KS    06/04/25 0437 97.8 °F (36.6 °C) 96 20 99/74 94 % -- None (Room air) --     06/03/25 2347 97.8 °F (36.6 °C) 95 20 94/56 98 % 175 lb 6.4 oz (79.6 kg) None (Room air) --     06/03/25 2342 -- -- -- -- -- 178 lb 6.4 oz (80.9 kg)  -- -- AM    06/03/25 2130 -- 93 -- 109/72 99 % -- -- -- TJ    06/03/25 1900 -- 83 15 114/98 100 % -- None (Room air) -- SHEY    06/03/25 1830 -- 99 22 -- 100 % -- None (Room air) -- HSEY    06/03/25 1800 -- 96 20 117/88 100 % -- None (Room air) -- SHEY    06/03/25 1730 -- 100 18 96/57 93 % -- None (Room air) -- SHEY    06/03/25 1700 -- 88 18 96/65 100 % -- None (Room air) -- HSEY    06/03/25 1630 -- 89 15 108/80 97 % -- None (Room air) -- SHEY    06/03/25 1530 -- 90 16 96/61 100 % -- None (Room air) -- SHEY    06/03/25 1500 -- 105 18 107/75 98 % -- None (Room air) -- SHEY    06/03/25 1445 -- 130 24 109/66 100 % -- -- -- SV    06/03/25 1327 97.3 °F (36.3 °C) 122 24 116/73 97 % 175 lb (79.4 kg) None (Room air) -- EM         FOR REVIEW/APPROVAL OF INPT ADMISSION

## 2025-06-04 NOTE — PLAN OF CARE
NURSING ADMISSION NOTE    Patient admitted via Cart  Oriented to room.  Safety precautions initiated.  Bed in low position.  Call light in reach.    Pt received from ER. Pt a/o x4. VSS on RA. C/o neuropathy pain. Meds per MAR. Port accessed.     Call light within reach.

## 2025-06-04 NOTE — OPERATIVE REPORT
[facility]    Hanna Barrett Patient Status:  Inpatient    1970 MRN [id number]   Location [facility] 4NW-A Attending Ama Sanches,    Hosp Day # 1 PCP Guillermo Curry MD       Date of Procedure: 25      Pre-operative Diagnosis: right pleural effusion     Post-operative Diagnosis: right pleural effusion     Procedure:  right Ultrasound Guided Thoracentesis     Medications:  10 mL Local 1% Lidocaine    Indication: The patient is a 55 year old -year-old Female, who was found to have worsening dyspnea and pleural effusion.     Consent:  Risks of procedure to include infection, bleeding and pneumothorax explained in detail to patient.  Consent taken.     Time out performed.     Procedure: The procedure was performed at the bedside. Using the curvilinear probe, the right chest was evaluated which demonstrated moderate sized effusion with identification of diaphragm and atelectatic lung. The skin site was marked and reconfirmed using the ultrasound. The area was prepped and draped in sterile fashion using chlorhexidine and sterile drape. 10 cc 1% Lidocaine without epinephrine was instilled subcutaneously in the mid scapular line between the 5th and 6th intercostal space.  A 3 mm subcutaneous stab incision was performed at the site. Then the thoracentesis needle and catheter were both advanced gently into the pleural space and the needle was withdrawn, keeping the thoracentesis catheter within the pleural space. Manual aspiration was then performed and in total 1150 cc of cloudy yellow pleural fluid was removed. Fluid was sent for cytology, cultures, and chemistries. Aspiration was stopped due to cessation of pleural fluid flow. The catheter was removed on exhalation maneuver. Postprocedure chest ultrasound of the right hemithorax revealed no residual pleural effusion and the presence of sliding lung.   The patient tolerated the procedure well and without any complications.      Condition:   Stable     Sd Corrigan MD

## 2025-06-05 ENCOUNTER — SOCIAL WORK SERVICES (OUTPATIENT)
Age: 55
End: 2025-06-05

## 2025-06-05 ENCOUNTER — OFFICE VISIT (OUTPATIENT)
Age: 55
End: 2025-06-05
Attending: INTERNAL MEDICINE
Payer: MEDICAID

## 2025-06-05 VITALS
RESPIRATION RATE: 16 BRPM | HEART RATE: 118 BPM | HEIGHT: 63.74 IN | SYSTOLIC BLOOD PRESSURE: 115 MMHG | OXYGEN SATURATION: 93 % | WEIGHT: 176.63 LBS | TEMPERATURE: 97 F | BODY MASS INDEX: 30.53 KG/M2 | DIASTOLIC BLOOD PRESSURE: 74 MMHG

## 2025-06-05 DIAGNOSIS — K59.03 DRUG-INDUCED CONSTIPATION: ICD-10-CM

## 2025-06-05 DIAGNOSIS — R59.0 MEDIASTINAL ADENOPATHY: Primary | ICD-10-CM

## 2025-06-05 DIAGNOSIS — F41.9 ANXIETY: ICD-10-CM

## 2025-06-05 DIAGNOSIS — G89.3 NEOPLASM RELATED PAIN: ICD-10-CM

## 2025-06-05 DIAGNOSIS — D64.9 ANEMIA, NORMOCYTIC NORMOCHROMIC: ICD-10-CM

## 2025-06-05 DIAGNOSIS — R11.0 CHEMOTHERAPY-INDUCED NAUSEA: ICD-10-CM

## 2025-06-05 DIAGNOSIS — Z51.5 PALLIATIVE CARE BY SPECIALIST: ICD-10-CM

## 2025-06-05 DIAGNOSIS — C34.91 PRIMARY LUNG CANCER WITH METASTASIS FROM LUNG TO OTHER SITE, RIGHT (HCC): ICD-10-CM

## 2025-06-05 DIAGNOSIS — C34.91 PRIMARY ADENOCARCINOMA OF RIGHT LUNG (HCC): ICD-10-CM

## 2025-06-05 DIAGNOSIS — G89.3 NEOPLASM RELATED PAIN: Primary | ICD-10-CM

## 2025-06-05 DIAGNOSIS — R59.0 MEDIASTINAL ADENOPATHY: ICD-10-CM

## 2025-06-05 DIAGNOSIS — T45.1X5A CHEMOTHERAPY-INDUCED NAUSEA: ICD-10-CM

## 2025-06-05 DIAGNOSIS — J91.0 MALIGNANT PLEURAL EFFUSION (HCC): ICD-10-CM

## 2025-06-05 DIAGNOSIS — R53.83 FATIGUE DUE TO TREATMENT: ICD-10-CM

## 2025-06-05 DIAGNOSIS — C34.91 PRIMARY ADENOCARCINOMA OF RIGHT LUNG (HCC): Primary | ICD-10-CM

## 2025-06-05 LAB
ACTH: 14.3 PG/ML
ALBUMIN SERPL-MCNC: 4.5 G/DL (ref 3.2–4.8)
ALBUMIN/GLOB SERPL: 2 {RATIO} (ref 1–2)
ALP LIVER SERPL-CCNC: 67 U/L (ref 41–108)
ALT SERPL-CCNC: 14 U/L (ref 10–49)
ANION GAP SERPL CALC-SCNC: 8 MMOL/L (ref 0–18)
AST SERPL-CCNC: 19 U/L (ref ?–34)
BASOPHILS # BLD AUTO: 0.04 X10(3) UL (ref 0–0.2)
BASOPHILS NFR BLD AUTO: 0.9 %
BILIRUB SERPL-MCNC: 0.3 MG/DL (ref 0.3–1.2)
BUN BLD-MCNC: 16 MG/DL (ref 9–23)
CALCIUM BLD-MCNC: 9.8 MG/DL (ref 8.7–10.6)
CHLORIDE SERPL-SCNC: 103 MMOL/L (ref 98–112)
CO2 SERPL-SCNC: 27 MMOL/L (ref 21–32)
CREAT BLD-MCNC: 0.84 MG/DL (ref 0.55–1.02)
EGFRCR SERPLBLD CKD-EPI 2021: 83 ML/MIN/1.73M2 (ref 60–?)
EOSINOPHIL # BLD AUTO: 0.08 X10(3) UL (ref 0–0.7)
EOSINOPHIL NFR BLD AUTO: 1.9 %
ERYTHROCYTE [DISTWIDTH] IN BLOOD BY AUTOMATED COUNT: 15.3 %
GLOBULIN PLAS-MCNC: 2.2 G/DL (ref 2–3.5)
GLUCOSE BLD-MCNC: 146 MG/DL (ref 70–99)
HCT VFR BLD AUTO: 32.8 % (ref 35–48)
HGB BLD-MCNC: 11.3 G/DL (ref 12–16)
IMM GRANULOCYTES # BLD AUTO: 0.03 X10(3) UL (ref 0–1)
IMM GRANULOCYTES NFR BLD: 0.7 %
LYMPHOCYTES # BLD AUTO: 0.65 X10(3) UL (ref 1–4)
LYMPHOCYTES NFR BLD AUTO: 15.2 %
MCH RBC QN AUTO: 30.9 PG (ref 26–34)
MCHC RBC AUTO-ENTMCNC: 34.5 G/DL (ref 31–37)
MCV RBC AUTO: 89.6 FL (ref 80–100)
MONOCYTES # BLD AUTO: 0.62 X10(3) UL (ref 0.1–1)
MONOCYTES NFR BLD AUTO: 14.5 %
NEUTROPHILS # BLD AUTO: 2.87 X10 (3) UL (ref 1.5–7.7)
NEUTROPHILS # BLD AUTO: 2.87 X10(3) UL (ref 1.5–7.7)
NEUTROPHILS NFR BLD AUTO: 66.8 %
OSMOLALITY SERPL CALC.SUM OF ELEC: 290 MOSM/KG (ref 275–295)
PLATELET # BLD AUTO: 319 10(3)UL (ref 150–450)
POTASSIUM SERPL-SCNC: 4.1 MMOL/L (ref 3.5–5.1)
PROT SERPL-MCNC: 6.7 G/DL (ref 5.7–8.2)
RBC # BLD AUTO: 3.66 X10(6)UL (ref 3.8–5.3)
SODIUM SERPL-SCNC: 138 MMOL/L (ref 136–145)
T4 FREE SERPL-MCNC: 1 NG/DL (ref 0.8–1.7)
TSI SER-ACNC: 2.69 UIU/ML (ref 0.55–4.78)
WBC # BLD AUTO: 4.3 X10(3) UL (ref 4–11)

## 2025-06-05 RX ORDER — MEMANTINE HYDROCHLORIDE 5 MG/1
5 TABLET ORAL DAILY
COMMUNITY

## 2025-06-05 RX ORDER — IBUPROFEN 400 MG/1
400 TABLET, FILM COATED ORAL EVERY 8 HOURS PRN
Qty: 90 TABLET | Refills: 0 | Status: SHIPPED | OUTPATIENT
Start: 2025-06-05

## 2025-06-05 RX ORDER — PREGABALIN 75 MG/1
75 CAPSULE ORAL 3 TIMES DAILY
Qty: 90 CAPSULE | Refills: 1 | Status: SHIPPED | OUTPATIENT
Start: 2025-06-05

## 2025-06-05 RX ORDER — MORPHINE SULFATE 15 MG/1
15 TABLET ORAL EVERY 8 HOURS PRN
Qty: 70 TABLET | Refills: 0 | Status: SHIPPED | OUTPATIENT
Start: 2025-06-05

## 2025-06-05 NOTE — PROGRESS NOTES
SW met with pt to provide financial resources per request.    Celi TRUONG, [facility]  [facility] Licensed Clinical

## 2025-06-05 NOTE — PROGRESS NOTES
Cancer Center Progress Note    Patient Name: Hanna Barrett   YOB: 1970   Medical Record Number: [id number]   CSN: [id number]   Attending Physician: Shoshana Gordon M.D.   Referring Physician: MD Dr. Flynn Geronimo    Date of Visit: 6/5/2025    Chief Complaint:  Chief Complaint   Patient presents with    Follow - Up     Lung cancer pt here for f/u and treatment. She c/o fatigue itching (worse at night) relieved with benadryl. MARTINS remains but improving with thoracentesis yesterday (1.3L drain)         Hem/Onc History:  Metastatic (stage IV) NSCLC adenocarcinoma documented 4/2023.     - initially diagnosed with stage IIIC NSCLC adenocarcinoma of the right lung diagnosed 12/2021. Was followed by [facility] then.     Oncologic History:  5/2021: patient presented with abnormal preoperative CXR; left apical 8 mm nodule with irregular margins, and a right middle lobe soft tissue density with associated bronchiectasis medially and mild adjacent nonspecific ground glass density, overall stable since 2/2021.     11/2021: CT scan of the chest demonstrated a spiculated airspace density within the right middle lobe worrisome for primary lung malignancy along with mediastinal lymph nodes.  12/7/2021: PET/CT scan showed a hypermetabolic RML and RUL mass with enlargement of mediastinal lymph nodes.    12/13/2021: bronchoscopy and transbronchial needle aspiration to subcarinal lymph node was positive for metastatic adenocarcinoma. PD-L1 <1%, EGFR, ROS1, ALK, KRAS, RET, BRAF all negative.     1/17/2022: concurrent chemoradiation with cisplatin + pemetrexed at [facility] (Dr. Subramanian) completed in 4/2022 5/13/2022: started consolidative durvalumab q4 weeks    7/2022: PET concerning for progression of disease     8/12/2022: transferred care to Dr. Sanchez at [facility]    8/16/2022: bronchoscopy and biopsies were negative for malignancy.     9/14/2022: CT scan showed some improvement     11/14/2022: PET scan continues  to show improvement with interval decrease in the right lung opacities with decreased uptake, may represent scarring and posttreatment change. Increased uptake distal esophagus.     1/17/2023: EGD with normal esophagus and negative biopsies.     2/17/2023: CT chest stable.      3/31/2023: completed 12 cycles of durvalumab.     - care transferred to Dr. Baker when Dr. Sanchez retired    4/7/2023: bronchoscopy for hemoptysis was unremarkable.   5/17/2023: CT chest stable.      - care transferred to me 11/2023 from Dr. Baker  who now practices primarily in [place]    11/2024:  Imaging done just prior showed stable right perihilar consolidation which compared to her previous PET showed no uptake in these areas and therefore suggestive of no significant residual or recurrent neoplasm. No new findings described in C/A/P with devrease in size of a liver lesion favored to be a hemangioma. C/o chronic fatigue and some SOB and cough. Had reported some visual changes and was seen at [facility]. Reports no evidence of malignancy seen. As felt relatively well with recent good scans she opted to have her port removed. This was removed by IR on 11/26/24.    Early 1/2025 she saw her PCP c/o nasal and sinus congestion with pressure, cough, PND, fullness in the ears. No adenopathy was noted on exam. She was placed on Augmentin for sinusitis.     She then reported feeling a growth in her nose causing a deformity with nasal obstructive symptoms. Saw ENT 1/29/25 ([facility]). Sinus CT scans were ordered and was referred to plastics. Scans showed no nasal mass or abnormality of the soft tissues but did show postoperative changes from previous sinus surgery. Did see plastics and was felt to have a dermoid cyst.     The following month c/o left supraclavicular node which was increasing in size and tender. Saw PCP who ordered imaging. Thought to have Virchow node. US of the neck and CT abdomen/pelvis were ordered. US showed  nonspecific appearing supraclav node. CT of the abdomen and pelvis showed a moderate to large right pleural effusion that was not present 9/2024 CT. Dedicated CT chest was ordered. Testing results were reviewed. US guided biopsy of left supraclav node was ordered. CT chest confirmed new right pleural effusion and was referred to pulmonary for tap.     Biopsy of the left supraclav node on 2/27/25 showed metastatic adenocarcinoma c/w lung primary. US guided right thoracentesis drained 1500 mL of fluid. Cytology from the right pleural effusion showed metastatic carcinoma c/w lung adenocarcinoma.      - Guardant did not show any targetable mutations: CDK6, PIK3CA, TP53). PDL-1 came back at <1% NGS could not be run with initial sample sent by path as was insufficient and another specimen has been sent. She wanted to get going with treatment     - Started carboplatin/paclitaxel+ipi/nivo (9LA) 3/28/25. Had a very rough time she said with her first cycle of chemo+IO. During Taxol infusion had some mild flushing with VS stable per nurses which resolved when infusion was stopped. Infusion was then resumed with no recurrence of flushing and completed treatment as planned.  Around 2 days after chemo developed generalized pain- reported as muscle/nerve pain especially in groins, shoulder and legs. Took pain pills she had at home which did not help. Thought she may be having an allergic reaction to treatment as also felt very flushed and swollen and took benadryl the weekend.  Discomfort was severe enough that she considered going to the ED but decided not to. [place] to likely have TAPS from Taxol and discussed trial of Lyrica and steroids. Reports allergy to Gabapentin and AE (\"does not do well on steroids\") but reassured her that steroid dose was going to be low (and tolerated her steroid premeds). Also spoke to palliative care about her s/sx who discussed trial of morphine as norco did not seem to help.    Was seen by APN and  palliative care on 4/3. By then she said the pain had started to subside the day before.     - port placed left chest 4/11/25     - NGS showed TP53     - On 4/19/2025 presented to the ED with acute onset SOB. CTA chest done showed no evidence of PE but did show a large right pleural effusion. Right sided thoracentesis was arranged and 650 mL of yellow serous fluid was removed. She felt significantly better after tap and was discharged home.         History of Present Illness:  Presented to the ED 6/3/25 with acute worsening SOB with difficulty breathing and significant fatigue. CTA chest showed no PE but did show new moderate sized effusion. Also c/o dizziness, itching, head tingling and generalized pain. Underwent right sided thoracentesis by pulmonary and 1150 ml fluid removed. Felt much better after and was discharged home 6/4/25.    She says she is feeling better but does c/o fatigue and itching- worse at night. Relieved by benadryl. Denies fevers. Generalized pain improved.  Denies change in urinary habits, headaches, dizziness. Says he psych prescribed memantine for her memory but has not started this as yet. Bowels better on Miralax, MOM and prune juice.       Current Medications:    Current Outpatient Medications:     pregabalin 75 MG Oral Cap, Take 1 capsule (75 mg total) by mouth 2 (two) times daily. (Patient taking differently: Take 1 capsule (75 mg total) by mouth 3 (three) times daily.), Disp: 60 capsule, Rfl: 1    morphINE 15 MG Oral Tab, Take 1 tablet (15 mg total) by mouth every 6 (six) hours as needed for Pain., Disp: 60 tablet, Rfl: 0    levothyroxine 75 MCG Oral Tab, Take 1 tablet (75 mcg total) by mouth before breakfast., Disp: 90 tablet, Rfl: 0    lisinopril-hydroCHLOROthiazide 20-12.5 MG Oral Tab, Take 0.5 tablets by mouth daily., Disp: 45 tablet, Rfl: 3    prochlorperazine (COMPAZINE) 10 mg tablet, Take 1 tablet (10 mg total) by mouth every 6 (six) hours as needed for Nausea., Disp: 30 tablet,  Rfl: 3    ondansetron (ZOFRAN) 8 MG tablet, Take 1 tablet (8 mg total) by mouth every 8 (eight) hours as needed for Nausea., Disp: 30 tablet, Rfl: 3    HYDROcodone-acetaminophen  MG Oral Tab, Take 1 tablet by mouth every 8 (eight) hours as needed for Pain., Disp: 60 tablet, Rfl: 0    dilTIAZem HCl 120 MG Oral Tab, Take 1 tablet (120 mg total) by mouth in the morning., Disp: , Rfl:     montelukast 10 MG Oral Tab, Take 1 tablet (10 mg total) by mouth nightly. (Patient taking differently: Take 1 tablet (10 mg total) by mouth daily as needed (shortness of breath).), Disp: 90 tablet, Rfl: 3    albuterol 108 (90 Base) MCG/ACT Inhalation Aero Soln, Inhale 2 puffs into the lungs every 6 (six) hours as needed for Wheezing or Shortness of Breath., Disp: 3 each, Rfl: 3    amphetamine-dextroamphetamine 15 MG Oral Tab, Take 1 tablet (15 mg total) by mouth daily as needed., Disp: , Rfl:     ARIPiprazole 2 MG Oral Tab, Take 1 tablet (2 mg total) by mouth daily., Disp: , Rfl:     VYVANSE 70 MG Oral Cap, Take 1 capsule (70 mg total) by mouth every morning., Disp: , Rfl:     cetirizine 10 MG Oral Tab, Take 1 tablet (10 mg total) by mouth daily. (Patient taking differently: Take 1 tablet (10 mg total) by mouth daily as needed.), Disp: 90 tablet, Rfl: 1    ferrous sulfate 325 (65 FE) MG Oral Tab EC, Take 1 tablet (325 mg total) by mouth daily with breakfast., Disp: , Rfl:     Multiple Vitamin Oral Tab, Take 1 tablet by mouth in the morning., Disp: , Rfl:     ibuprofen 200 MG Oral Tab, Take 4 tablets (800 mg total) by mouth every 8 (eight) hours as needed for Pain., Disp: , Rfl:     cholecalciferol 25 MCG (1000 UT) Oral Cap, Take 1 capsule (1,000 Units total) by mouth in the morning., Disp: , Rfl: 0    DULoxetine HCl 60 MG Oral Cap DR Particles, Take 1 capsule (60 mg total) by mouth in the morning., Disp: , Rfl: 2    Past Medical History:  Past Medical History:    Abnormal uterine bleeding    bleeds every 2 weeks    Anxiety state     Asthma (HCC)    Attention deficit hyperactivity disorder (ADHD)    BACK PAIN    Back problem    lumbar radiculopathy    Cancer (HCC)    adenocarcinoma of right lung    MARCIO II (cervical intraepithelial neoplasia II)    DDD (degenerative disc disease), lumbar    DDD (degenerative disc disease), thoracic    Depression    Disorder of thyroid    Dyspareunia    Exposure to medical diagnostic radiation    On hold since 2022    Fall    Fibromyalgia    Fibromyalgia    Genital warts    High blood pressure    History of COVID-19    COVID + 2020 Headache - NO Hospitalization needed     History of lumbar fusion    Hx of motion sickness    IBS (irritable bowel syndrome)    Per patient - Just constipation    Infertility, female    Lumbar radiculopathy    Mild dysplasia of cervix    Pap smear for cervical cancer screening    pt states normal    Papanicolaou smear of cervix with high grade squamous intraepithelial lesion (HGSIL)    Personal history of antineoplastic chemotherapy    Last chemo 22 prior to lung bx 22    Post covid-19 condition, unspecified    symptoms: HA; s/s resolved-yes; not hospitalized    Problems with swallowing    with radiation    Sexually transmitted disease    HPV    Substance abuse (HCC)    cocaine    Urinary incontinence    Visual impairment    glasses/contacts bifocals       Past Surgical History:  Past Surgical History:   Procedure Laterality Date    Appendectomy      Back surgery  2009    fusion L5-S1    Back surgery  2021    RIGHT LUMBAR 3/ LUMBAR 4, LUMBAR 4/ LUMBAR 5 HEMILAMINTOMIES, LEFT LUMBAR 2 / LUMBAR 3 MICRODISCECTOMY    Colonoscopy N/A 2023    Procedure: COLONOSCOPY;  Surgeon: Devante Kern MD;  Location:  ENDOSCOPY    Colposcopy,bx cervix/endocerv curr  11    MARCIO 1    Laparoscopy procedure unlisted      Ovarian cyst removed    Leep  2011    MARCIO 2          X2    Other surgical history      bunions bilateral    Other surgical history       nodule lymph nodes neck    Other surgical history  2016     Bladder sling    Other surgical history  2020    Cystoscopy, Dr Beach       Family Medical History:  Family History   Problem Relation Age of Onset    Other (lung CA) Self     Hypertension Mother     Diabetes Mother     Heart Disease Mother     Heart Disease Father     Other (lung cancer) Father 55        Lung Cancer    Other (pancreatic cancer) Sister 47        Pancreatic Cancer    Cancer Sister 51        lung CA    Other (Other) Sister         Back problems    Other (Other) Son         anxiety    Other (Other) Son         behavioral problems    Diabetes Maternal Grandmother     Breast Cancer Maternal Grandmother         dx late-60s    Stroke Maternal Grandfather 86    Dementia Paternal Grandmother     Heart Disorder Paternal Grandfather     Cancer Maternal Aunt 50        LUNG CA 51    Breast Cancer Maternal Aunt         dx 46-47y    Ovarian Cancer Maternal Cousin Female 33         of ovarian ca at 35y       Gyne History:  OB History    Para Term  AB Living   2 2 2 0 0 2   SAB IAB Ectopic Multiple Live Births   0 0 0 0 2       Psychosocial History:  Social History     Socioeconomic History    Marital status:      Spouse name: Not on file    Number of children: Not on file    Years of education: Not on file    Highest education level: Not on file   Occupational History    Not on file   Tobacco Use    Smoking status: Former     Current packs/day: 0.00     Average packs/day: 0.8 packs/day for 19.0 years (14.5 ttl pk-yrs)     Types: Cigarettes     Start date:      Quit date: 2010     Years since quitting: 15.4     Passive exposure: Past    Smokeless tobacco: Former     Quit date: 2024    Tobacco comments:     Has not vaped since 2024.    Vaping Use    Vaping status: Former    Substances: Nicotine, daily    Devices: Pre-filled pod   Substance and Sexual Activity    Alcohol use: Yes     Comment: 2-3 drinks month     Drug use: Yes     Types: Cocaine, Cannabis     Comment: USED COCAINE BETW [id number].      cannabis gummy to sleep    Sexual activity: Yes     Partners: Male   Other Topics Concern     Service Not Asked    Blood Transfusions Not Asked    Caffeine Concern Yes     Comment: 3-4 daily of pop    Occupational Exposure Not Asked    Hobby Hazards Not Asked    Sleep Concern Not Asked    Stress Concern Not Asked    Weight Concern Not Asked    Special Diet Not Asked    Back Care Not Asked    Exercise No     Comment: not tolerated right now    Bike Helmet Not Asked    Seat Belt Not Asked    Self-Exams Not Asked   Social History Narrative    Not on file     Social Drivers of Health     Food Insecurity: Patient Declined (6/4/2025)    NCSS - Food Insecurity     Worried About Running Out of Food in the Last Year: Patient declined     Ran Out of Food in the Last Year: Patient declined   Transportation Needs: Patient Declined (6/4/2025)    NCSS - Transportation     Lack of Transportation: Patient declined   Housing Stability: Patient Declined (6/4/2025)    NCSS - Housing/Utilities     Has Housing: Patient declined     Worried About Losing Housing: Patient declined     Unable to Get Utilities: Patient declined         Allergies:  Allergies   Allergen Reactions    Gabapentin SWELLING and UNKNOWN    Erythromycin UNKNOWN    Clindamycin RASH        Review of Systems:  A 14-point ROS was done with pertinent positives and negative per the HPI    Vital Signs:   Day 22,  Cycle 2  06/05/25   Height 1.619 m (5' 3.74\")   Weight 80.1 kg (176 lb 9.6 oz)   BSA (Calculated - sq m) 1.85 sq meters   BMI (Calculated) 30.56 kg/m2   /74   Pulse 118   BP Location Left arm   Patient Position Sitting   Temp 96.5 °F (35.8 °C)       Physical Examination:  General: Patient is alert and oriented x 3, not in acute distress.   Psych:  Mood and affect appropriate  HEENT: EOMs intact. PERRL. Oropharynx with mucositis, no thrush  Neck: No JVD. Left  palpable lymphadenopathy supraclav - 2 discrete nodes previously felt: no longer palpable since 5/16/25, not tender but still with fullness. Neck is supple.  Chest: Diminished BS  Heart: Tachycardic  Abdomen: Soft, non-tender with good bowel sounds.  No hepatosplenomegaly.  No palpable mass.   Extremities: Pedal pulses are present. No BLE edema.  Neurological: Grossly intact.       Laboratory:  Lab Results   Component Value Date    WBC 4.3 06/05/2025    RBC 3.66 (L) 06/05/2025    HGB 11.3 (L) 06/05/2025    HCT 32.8 (L) 06/05/2025    .0 06/05/2025    MPV 9.4 09/02/2012    MCV 89.6 06/05/2025    MCH 30.9 06/05/2025    MCHC 34.5 06/05/2025    RDW 15.3 06/05/2025    NEPRELIM 2.87 06/05/2025    NEUTABS 7.02 04/25/2025    LYMPHABS 1.40 04/25/2025    EOSABS 0.65 04/25/2025    BASABS 0.22 (H) 04/25/2025    NEUT 65 04/25/2025    LYMPH 13 04/25/2025    MON 13 04/25/2025    EOS 6 04/25/2025    BASO 2 04/25/2025    NEPERCENT 66.8 06/05/2025    LYPERCENT 15.2 06/05/2025    MOPERCENT 14.5 06/05/2025    EOPERCENT 1.9 06/05/2025    BAPERCENT 0.9 06/05/2025    NE 2.87 06/05/2025    LYMABS 0.65 (L) 06/05/2025    MOABSO 0.62 06/05/2025    EOABSO 0.08 06/05/2025    BAABSO 0.04 06/05/2025     Lab Results   Component Value Date     (H) 06/05/2025    BUN 16 06/05/2025    BUNCREA 18.0 03/21/2022    CREATSERUM 0.84 06/05/2025    ANIONGAP 8 06/05/2025     01/28/2018    GFRNAA 61 04/03/2022    GFRAA 70 04/03/2022    CA 9.8 06/05/2025    OSMOCALC 290 06/05/2025    ALKPHO 67 06/05/2025    AST 19 06/05/2025    ALT 14 06/05/2025    BILT 0.3 06/05/2025    TP 6.7 06/05/2025    ALB 4.5 06/05/2025    GLOBULIN 2.2 06/05/2025     06/05/2025    K 4.1 06/05/2025     06/05/2025    CO2 27.0 06/05/2025     Lab Component   Component Value Date/Time    CEA 1.0 05/12/2017 1425     Lab Component   Component Value Date/Time     03/12/2025 1102        Latest Reference Range & Units 11/07/24 10:09   -246 U/L U/L 168    FERRITIN 50 - 306 ng/mL 59   Vitamin B12 211 - 911 pg/mL >2,000 (H)   FOLATE (FOLIC ACID), SERUM >5.4 ng/mL 29.8   (H): Data is abnormally high      Radiology:  PROCEDURE:  MRI BRAIN (W+WO) (CPT=70553)     COMPARISON:  None.     INDICATIONS:  metastatic lung cancer, dizzy     TECHNIQUE:  MRI of the brain was performed with multi-planar T1, T2-weighted images with FLAIR sequences and diffusion weighted images without and with infusion.     PATIENT STATED HISTORY:(As transcribed by Technologist)  Lung cancer, possible mets, aphasia, memory issues      CONTRAST USED:  25 mL of Dotarem     FINDINGS:    The ventricles and sulci are normal in size for the patient's age.  No mass-effect, midline shift, hydrocephalus, herniation, extra-axial fluid collections, or signs of acute territorial infarct, or brain tumor.     No abnormality of midline structures. No sign of restricted diffusion. No pathologic gradient susceptibility pattern.     Flow voids are present within the internal carotid and basilar arteries. No sign of acute fluid in the paranasal sinuses. Postinflammatory changes are present within the paranasal sinuses, without findings indicating acute sinusitis.     With contrast infusion, there is no pathologic enhancement pattern identified.  Developmental venous anomaly present posterior right temporal lobe series 10, image 105.                   Impression   CONCLUSION:  Normal        LOCATION:  [facility]           Dictated by (CST): Dat Alvarez MD on 6/04/2025 at 7:51 PM      Finalized by (CST): Dat Alvarez MD on 6/04/2025 at 7:54 PM    PROCEDURE:  CT ANGIOGRAPHY, CHEST (CPT=71275)     COMPARISON:  [facility], CT STN/CHST/ABD/PEL W CONTRAST (CPT=70491/82843/79013), 5/07/2025, 6:32 PM.     INDICATIONS:  History of lung cancer increasing this dyspnea on exertion and difficulty breathing for 6 days rule out clot or worsening tumor burden.  Also noted to be signif     TECHNIQUE:  IV contrast-enhanced  multislice CT angiography is performed through the pulmonary arterial anatomy. 3D volume renderings are generated.  Dose reduction techniques were used. Dose information is transmitted to the ACR (American College of  Radiology) NRDR (National Radiology Data Registry) which includes the Dose Index Registry.     PATIENT STATED HISTORY:(As transcribed by Technologist)  Patient with increasing dyspnea on exertion and difficulty breathing with exertion x6 days. Tachycardic. History of lung Cancer.      CONTRAST USED:  100cc of Isovue 370     FINDINGS:    VASCULATURE:  Smooth tapering.  No filling defect.  THORACIC AORTA:  Normal caliber.  No dissection.  LUNGS:  Right perihilar bandlike opacities do not appear significant changed consistent with any combination of treatment changes, cancer and or treated cancer.    There is new mild passive atelectasis associated with moderate right pleural effusion.  MEDIASTINUM:  No adenopathy.  MICHAEL:  No adenopathy.  CARDIAC:  No pericardial effusion.  No significant coronary calcifications.  PLEURA:  New moderate-sized layering right pleural effusion.  CHEST WALL:  No axillary adenopathy.  Right chest port.  LIMITED ABDOMEN:  No abnormality.  BONES:  Moderate to severe degenerative changes.  Maintained vertebral body heights.  No subluxation.  OTHER:  None.                        Impression  CONCLUSION:    1. Negative for pulmonary embolism.  2. New moderate-sized layering right pleural effusion with mild associated passive atelectasis.  3. Details as above.  Continued clinical correlation recommended.             LOCATION:  [facility]        Dictated by (CST): Fabien Wadsworth MD on 6/03/2025 at 4:50 PM      Finalized by (CST): Fabien Wadsworth MD on 6/03/2025 at 4:55 PM      PROCEDURE:  CT STN/CHST/ABD/PEL W CONTRAST (CPT=70491/06641/54424)     COMPARISON:  [facility] , CT, CT CHEST+ABDOMEN+PELVIS(ALL CNTRST ONLY)(CPT=71260/78455), 3/27/2024, 12:27 PM.  [facility] , NM, PET STANDARD BODY SCAN  (ONCOLOGY) (CPT=78815), 3/11/2025, 9:49 AM.  [facility] , CT, CT ANGIOGRAPHY, CHEST (CPT=71275), 4/19/2025, 12:04  PM.     INDICATIONS:  D64.9 Anemia, normocytic normochromic C34.91 Primary adenocarcinoma of right lung (HCC) R59.0 Mediastinal adenopathy     TECHNIQUE:  Multislice non-ionic contrast enhanced scanning through the neck, chest, abdomen and pelvis was performed.  Dose reduction techniques were used. Dose information is transmitted to the ACR (American College of Radiology) NRDR (National  Radiology Data Registry) which includes the Dose Index Registry.     PATIENT STATED HISTORY:(As transcribed by Technologist)  Patient has history of lung cancer and is no chemo and immunotherapy.  Disease surveillance.      CONTRAST USED:  155cc of Isovue 370     FINDINGS:       NECK:  NASOPHARYNX:  Fossae of Rosenmuller and torus tubarius are symmetric.    ORAL CAVITY:  No visible mass.    OROPHARYNX:  Faucial and lingual tonsils are symmetric.    HYPOPHARYNX:  No mass or other visible lesion.    LARYNX:  The vocal cords are symmetric and without mass.    SINUSES:  There is mucosal thickening involving maxillary sinuses and ethmoid air cells bilaterally.  NECK GLANDS:  The parotid, submandibular, and thyroid glands are unremarkable.    LYMPH NODES:  Is 1 lymph node anterior to left anterior scalene muscle series 10, image 65 which measures 0.9 x 0.7 cm.  This is not appear to be significantly changed as compared to the PET scan.  A left supraclavicular lymph node series 10, image 57  measures 0.7 x 0.6 cm.  Other hypermetabolic lymph nodes noted on the PET scan are not detected on this CT.  There are no pathologically enlarged lymph nodes in the neck.  SKULL BASE:  Foramina are symmetric without bony erosion.    VASCULATURE:  Limited views are unremarkable.       CHEST:    LUNGS:  There are reticular densities around the mediastinum which are presumably result of radiation therapy.  There is fibrosis and volume loss in the  right perihilar lung involving perihilar right upper, middle and lower lobes.  Representative  measurement of confluent perihilar opacity as seen series 6, image 64 is 6.9 x 1.7 cm.  A similar measurement on chest CT from March 27, 2024 demonstrated dimensions of 7.5 x 3.2 cm.    MEDIASTINUM:  There are no pathologically enlarged lymph nodes.  MICHAEL:  Confluent opacity right perihilar lung includes the right hilus.  Areas of uptake on recent PET scan are not well identified on CT.  CARDIAC:  There is moderate to severe coronary artery atherosclerosis.  There is trace pericardial fluid.  PLEURA:  Trace right pleural effusion is noted.  CHEST WALL:  Right-sided chest port is noted with catheter tip in SVC.  VASCULATURE:  No visible pulmonary arterial thrombus or attenuation.       ABDOMEN/PELVIS:  LIVER:  No enlargement, atrophy, abnormal density, or significant focal lesion.    BILIARY:  No visible dilatation or calcification.    PANCREAS:  No lesion, fluid collection, ductal dilatation, or atrophy.    SPLEEN:  No enlargement or focal lesion.    KIDNEYS:  Benign cyst left kidney is noted.  Kidneys are otherwise unremarkable.  ADRENALS:  No mass or enlargement.    AORTA/VASCULAR:  No aneurysm or dissection.    RETROPERITONEUM:  Representative right retrocaval lymph node which corresponds to area of intense uptake on PET scan is noted series 7, image 59 and measures 0.8 x 0.6 cm (previously 1.3 x 0.7 cm..  Representative left periaortic nodule series 7, image  60 measures 0.6 x 0.4 cm.  This also corresponds to an area uptake on the PET scan.    BOWEL/MESENTERY:  Gastrohepatic ligament nodule series 7, image 30 measures 0.9 x 0.4 cm.  There is moderate retained fecal debris in the colon.  ABDOMINAL WALL:  No mass or hernia.    URINARY BLADDER:  No visible focal wall thickening, lesion, or calculus.    PELVIC NODES:  Representative left pelvic lymph node posterior to the common iliac veins series 7, image 77 measures 0.7  x 0.7 cm.  Representative left external iliac lymph node series 7, image 91 measures 0.9 x 0.5 cm.  Representative left external  iliac lymph node series 7, image 89 measures 1.4 x 0.5 cm (previously 2.4 x 0.8 cm).  PELVIC ORGANS:  No visible mass.  Pelvic organs appropriate for patient age.    BONES:  No bony lesion or fracture.                     Impression   CONCLUSION:    1. Lymphadenopathy previously described on PET scan is much less conspicuous on CT.  Some of these do appear to have decreased in size since the PET scan, others appear not significantly changed although are very small and difficult to detect on CT.  2. Post treatment volume loss and fibrosis in the lungs is stable.  3. Hilar adenopathy noted on the PET scan is not well visualized on CT.          LOCATION:  [facility]        Dictated by (CST): Damir Deleon MD on 5/08/2025 at 7:54 AM      Finalized by (CST): Damir Deleon MD on 5/08/2025 at 8:15 AM       PROCEDURE:  CT ANGIOGRAPHY, CHEST (CPT=71275)     COMPARISON:  [facility], CT CHEST(CONTRAST ONLY) (CPT=71260), 2/28/2025, 10:29 AM.     INDICATIONS:  lung cancer, sob, tachypnic r/o PE     TECHNIQUE:  IV contrast-enhanced multislice CT angiography is performed through the pulmonary arterial anatomy. 3D volume renderings are generated.  Dose reduction techniques were used. Dose information is transmitted to the ACR (American College of  Radiology) NRDR (National Radiology Data Registry) which includes the Dose Index Registry.     PATIENT STATED HISTORY:(As transcribed by Technologist)  Patient is here with SOB and tachypnea      CONTRAST USED:  100cc of Isovue 370     FINDINGS:    VASCULATURE:  No visible pulmonary arterial thrombus or attenuation.    THORACIC AORTA:  No aneurysm or visible dissection.    LUNGS:  Marked atelectasis march portion of right lung is again identified.  Right perihilar masslike consolidation extending through the base of the right upper lobe adjacent to the  horizontal fissure has increased since prior examination.  Right  perihilar consolidation extending to the right lower lobe is noted.  MEDIASTINUM:  No adenopathy or mass.    MICHAEL:  No mass or adenopathy.    CARDIAC:  No enlargement, pericardial thickening, or significant coronary artery calcification.  PLEURA:  No mass or effusion.    CHEST WALL:  No mass or axillary adenopathy.    LIMITED ABDOMEN:  Hyperenhancing right hepatic lobe lesion measuring 1.7 cm is stable.  BONES:  No bony lesion or fracture.    OTHER:  Negative.                     Impression   CONCLUSION:       1. No evidence of pulmonary embolus.     2. Large right pleural effusion with significant atelectasis is again noted.  Consolidation in the right upper lobe and right lower lobe adjacent to the right major fissure and horizontal fissure is increased since prior examination.  This may partially  be due to scarring and post treatment change.             LOCATION:  [facility]        Dictated by (CST): Inder Kingston MD on 4/19/2025 at 12:18 PM      Finalized by (CST): Inder Kingston MD on 4/19/2025 at 12:21 PM         PROCEDURE:  PET/CT STANDARD BODY SCAN (ONCOLOGY) (CPT=78815)     COMPARISON:  [facility] , MR, MRI BRAIN (W+WO) (CPT=70553), 3/07/2025, 3:45 PM.  [facility] , CT, CT CHEST+ABDOMEN+PELVIS(ALL CNTRST ONLY)(CPT=71260/89218), 9/27/2024, 1:11 PM.  [facility] , US, US HEAD/NECK (CPT=76536), 2/12/2025, 8:41 AM.  [facility], PET  STANDARD BODY SCAN (ONCOLOGY) (CPT=78815), 4/13/2022, 12:14 PM.  [facility], NM, PET STANDARD BODY SCAN (ONCOLOGY) (CPT=78815), 12/07/2021, 12:36 PM.  [facility] , NM, PET STANDARD BODY SCAN (ONCOLOGY) (CPT=78815), 12/27/2023, 9:02 AM.     INDICATIONS:  C34.91 Recurrent malignant neoplasm of right lung of unknown cell type (HCC) C34.91 Primary adenocarcinoma of right lung (HCC)     TECHNIQUE:  The patient fasted for at least 6 hours. F-18 FDG was injected IV, and whole-body images from vertex to mid-thigh were obtained with  concurrent CT scan for both anatomic localization as well as attenuation correction.     RADIOPHARMACEUTICAL:    9.6 mCi F-18 FDG  FASTING GLUCOSE LEVEL:  80 mg/dl  INJECTION TIME:         0850 hours  SCAN TIME:              0954 hours     FINDINGS:       Left supraclavicular lymph nodes with elevated FDG uptake as high as 4.5 consistent with metastatic disease.  There is a low left supraclavicular lymph node medially with elevated FDG uptake of 5.9.  Low left jugular digastric lymph node with elevated  FDG uptake of 4.2.  Asymmetric radiotracer uptake within the right masseter muscles likely related to physiologic changes as there is no underlying mass lesion.  Physiologic uptake noted throughout the tongue.       Left infrahilar lymph node has elevated FDG uptake of 7.0.  Right hilar lymph node has elevated FDG uptake of 5.0  posterior mediastinal lymph node adjacent to the aorta with elevated FDG uptake of 4.2.  Right pleural fluid has elevated FDG uptake  concerning for involvement per of the pleural fluid by malignancy with maximum SUV uptake of 9.4.  It should be noted that there is been recent thoracentesis in this may be the reason behind this uptake rather than neoplastic disease.     Abdomen demonstrates a lymph node within the posterior gastrohepatic ligament with SUV of 5.4.     There is retroperitoneal adenopathy bilaterally posterior to the aorta and cava with the largest lymph node on the right with maximum SUV of 14.4 and the maximum SUV of the left retroperitoneal lymph node being 7.1.  Within the pelvis left external iliac   lymph node has maximum SUV of 8.6 and left internal iliac lymph node has maximum SUV of 8.2.  There are multiple left external iliac lymph nodes with maximum SUV of 16.5.  There is a left obturator lymph node with maximum SUV of 5.8.       Minimal uptake within left small inguinal lymph nodes with SUV less than 2 likely reactive.                        Impression   CONCLUSION:        1. Left supraclavicular and jugular digastric lymph node with elevated FDG uptake consistent metastatic disease.     2. Bilateral hilar and posterior retroperitoneal lymph node elevated FDG uptake consistent with metastatic disease.     3. Pleural uptake within the right posterior pleural space could be related to recent thoracentesis although metastatic disease to the pleura is also considered.     4. Multiple abnormal lymph nodes including gastrohepatic ligament, bilateral retroperitoneal, left external iliac, and left  lymph nodes consistent with metastatic disease.        LOCATION:  [facility]           Dictated by (CST): Cj García MD on 3/11/2025 at 11:04 AM      Finalized by (CST): Cj García MD on 3/11/2025 at 12:25 PM       Impression and Plan:  1. Metastatic NSCLC   - H/o locally advanced NSCLC (IV low neck- cervical?)  - biopsy of left supraclav node and right pleural effusion showed metastatic adenocarcinoma c/w lung primary 2/2025  - PDL-1 negative (<1%), NGS had to be resent as initial sample sent by path insufficient. Guardant showed no targetable mutations. She wanted to get going with treatment and not wait for NGS results   - started carbo/taxol +ipi/nivo (9LA) 3/28/25  - NGS came back showing TP53 with no  mutations to target. She therefor continued on her current regimen.   - scans done after 2 cycles of chemo, 1 dose of ipi and 2 doses of nivo per protocol showed response.   - I had a long discussion with her regarding the 9LA regimen. Protocol only has 2 cycles of chemo + ipi/nivo. This regimen was compared to 4 cycles of chemo alone with the chemo+IO regimen showing superiority. As she had a good response with 2 cycles of chemo with her regimen she wanted to push to receive 2 more cycles despite all the SE/AEs she had on chemo. I was candid with them that we have no data whether adding 2 more cycles of chemo will add further benefit but can guarantee she will have more  toxicity. I therefore favored she continued her regimen as outlined by the protocol.   - in the end she agreed to proceed with ipi+nivo alone per the protocol but with possible low threshold to get scans (next imaging I plan a PET) pending how she does clinically but also she admitted this would give her peace of mind  - scans just done during recent hospitalization showed stable findings except increase in right sided effusion. Not unusual for effusions to develop on IO and previously palpable nodes still gone  - will continue current regimen      2. Neoplasm related pain, fibromyalgia. Chronic LBP, neck and left arm pain, TAPS  - followed by palliative care. On Ibuprofen, morphine PRN, also on topical lidocaine  - used to be followed at a pain clinic- not anymore  - as no longer will be getting chemo, unlikely to have TAPS with subsequent cycles.     3. H/o asthma, bronchiectasis, former smoker, cough  - follows with pulmonary, continue inhalers   - flutter valve  - quit smoking in 2010    4. Anemia  - Hgb has dropped with chemo which is likely also contributing to fatigue  - Gave venofer at previous visit  - checked for other contributing causes- negative  - as off chemo should improve over time    5. TSH elevated  - but T4 normal. This was baseline even before starting IO. Should still be able to proceed but will need close monitoring  - was on LT4 before but was stopped by PCP as levels had been good. Has been restarted by PCP.  - TFTs back to normal    6. Constipation   - bowel regimen   - could be culprit for abdominal pain she describes    7. Nausea  - anti-emetics.   - hopefully will be less/resolve off chemo    8. Itching  - likely irAE  - mainly at night and benadryl helps. Topical emollients and topical steroids as needed.    9. Tachycardia  - h/o sinus tach. Followed by cards. Was placed on diltiazem. Recommended she discuss this further with cards    Labs and imaging reviewed   Continue palliative  care  Proceed with treatment today    RTC 3 weeks      Risk level high- metastatic lung cancer on chemo+IO with SE/AEs and recent hospitalization      Shoshana Gordon MD  [facility]

## 2025-06-05 NOTE — PLAN OF CARE
NURSING DISCHARGE NOTE  Discharged Home via Wheelchair.  Accompanied by RN  Belongings Taken by patient/family.    Pt received s/p MRI. Pt a/o x4. DC order in. DC education/paperwork given. Pt acknowledges. Port de-accessed.    Pt is an 85yo f, s/p trip and fall yesterday, who p/w left wrist pain and swelling. Pt hit left side of face during fall, however no loc. + mild neck soreness. No n/t/w, ha, dizziness, visual changes, gait difficulty, back pain, cp, sob, palp, nausea, vomiting...    VITAL SIGNS: I have reviewed nursing notes and confirm.  CONSTITUTIONAL: Well-developed; well-nourished; in no acute distress.   SKIN:  warm and dry, no acute rash. + abrasion to left lateral orbital region, ecchymosis to infraorbital region. No tenderness/ step-offs.  HEAD:  normocephalic, atraumatic.  EYES: EOM intact; conjunctiva and sclera clear.  ENT: No nasal discharge; airway clear.   NECK: Supple; non tender.  CARD: S1, S2 normal; no murmurs, gallops, or rubs. Regular rate and rhythm.   RESP:  Clear to auscultation b/l, no wheezes, rales or rhonchi.  L UPPER EXT: + swelling and ttp to wrist/ distal forearm. No ttp elsewhere along lue. + decreased rom 2/2 pain.  2+ radial pulse.   NEURO: Awake, oriented, motor/ sensation intact and equal b/l.   PSYCH: Cooperative, mood and affect appropriate.    CT head/ c-spine neg for acute injury. Xrays of wrist + for comminuted distal radius fx. Pt seen by ortho and fx reduced and splinted. NVI post procedure. Wrist placed in splint and sling. Pt to f/u with ortho for re-evaluation as outpt.

## 2025-06-05 NOTE — PROGRESS NOTES
Pt here for C2D22 Drug(s)opdivo.  Arrives Ambulating independently, accompanied by Self     Patient was evaluated today by MD.    Oral medications included in this regimen:  no    Patient confirms comprehension of cancer treatment schedule:  yes    Pregnancy screening:  Denies possibility of pregnancy    Modifications in dose or schedule:  No    Medications appearance and physical integrity checked by RN: yes.    Chemotherapy IV pump settings verified by 2 RNs:  No due to targeted therapy IV administration.  Frequency of blood return and site check throughout administration: Prior to administration and At completion of therapy     Infusion/treatment outcome:  patient tolerated treatment without incident    Education Record    Learner:  Patient  Barriers / Limitations:  None  Method:  Discussion  Education / instructions given:  plan of care  Outcome:  Shows understanding    Discharged Home, Ambulating independently, accompanied by:Self    Patient/family verbalized understanding of future appointments: by [employer] messaging    Patient here for labs, MD, and treatment. Patient tolerated without issue and left in stable condition with future appointments in place.

## 2025-06-06 NOTE — PROGRESS NOTES
Palliative Care Follow Up Note     Patient Name: Hanna Barrett   YOB: 1970   Medical Record Number: [id number]   CSN: [id number]   Date of visit:   6/5/2025     Chief Complaint/Reason for Visit:  Follow up visit for pain     History of Present Illness:         Hanna Barrett is a 55 year old female with metastatic lung cancer receiving chemo/immunotherapy.   She is feeling ok but still struggles with fatigue which she feels is worse and can be profound.  She is working with her psychiatrist to help with this.  He has added memantine to help this and her memory.  She feels she is breathing better since her thoracentesis yesterday.  She declines pleurex catheter.  She is still working full time and tries to increase her activity.  She is walking and able to sleep.  She continues to struggle with her chronic fibromyalgia pain which may be a little worse with treatment.  She still complains of neck and abdominal pain but this has improved and remains controlled with morphine BID and ibuprofen BID.  She wakes in pain sometimes prompting an extra dose of morphine.  She continues to struggle with MARCIO  in her legs and feet which has improved with lyrica TID.  She is happy for the pain control and feels her QOL has improved.  She has been using miralax BID resulting in small BMs.  She finds MOM with prune juice beneficial when needed.  She denies feeling constipated.         Problem List:  Patient Active Problem List   Diagnosis    Essential hypertension    Family history of breast cancer    Family history of pancreatic cancer    Family history of ovarian cancer    Stress incontinence    Hyperlipidemia    Vitamin D deficiency    Primary osteoarthritis of first carpometacarpal joint of right hand    Cervical radiculopathy    Chronic narcotic use    Cervical spondylosis with radiculopathy    DDD (degenerative disc disease), lumbar    Polyneuropathy, unspecified    Other intervertebral disc degeneration,  lumbar region    Cervical myofascial pain syndrome    Neck and shoulder pain    Opioid use, unspecified with withdrawal (HCC)    Chronic bilateral low back pain without sciatica    History of tobacco use    Myalgia, other site    Fibromyalgia    Chronic bilateral thoracic back pain    Constipation    Canker sore    Other spondylosis with radiculopathy, cervical region    Abnormal mammogram    Primary adenocarcinoma of right lung (HCC)    Mediastinal adenopathy    Primary lung cancer with metastasis from lung to other site, right (HCC)    Personal history of nicotine dependence    At risk for dehydration    Dyspnea    Dyspnea, unspecified type    History of COVID-19    Hemoptysis    Disorder of thyroid, unspecified    Encounter for other orthopedic aftercare    History of falling    Generalized anxiety disorder    Major depressive disorder, single episode, unspecified    Other specified urinary incontinence    Unspecified visual loss    Memory changes    Failed back surgical syndrome    Exertional chest pain    Decreased exercise tolerance    Lung fibrosis (HCC)    Palliative care by specialist    Anemia, normocytic normochromic    Hyperglycemia    Pleural effusion    Malignant neoplasm of lung, unspecified laterality, unspecified part of lung (HCC)    Dyspnea on exertion    Tachycardia    Metastatic lung cancer (metastasis from lung to other site), right (HCC)    Neoplasm related pain    Medication side effects    Profound fatigue      Medical History:  Past Medical History:    Abnormal uterine bleeding    bleeds every 2 weeks    Anxiety state    Asthma (HCC)    Attention deficit hyperactivity disorder (ADHD)    BACK PAIN    Back problem    lumbar radiculopathy    Cancer (HCC)    adenocarcinoma of right lung    MARCIO II (cervical intraepithelial neoplasia II)    DDD (degenerative disc disease), lumbar    DDD (degenerative disc disease), thoracic    Depression    Disorder of thyroid    Dyspareunia    Exposure to  medical diagnostic radiation    On hold since 2022    Fall    Fibromyalgia    Fibromyalgia    Genital warts    High blood pressure    History of COVID-19    COVID + 2020 Headache - NO Hospitalization needed     History of lumbar fusion    Hx of motion sickness    IBS (irritable bowel syndrome)    Per patient - Just constipation    Infertility, female    Lumbar radiculopathy    Mild dysplasia of cervix    Pap smear for cervical cancer screening    pt states normal    Papanicolaou smear of cervix with high grade squamous intraepithelial lesion (HGSIL)    Personal history of antineoplastic chemotherapy    Last chemo 22 prior to lung bx 22    Post covid-19 condition, unspecified    symptoms: HA; s/s resolved-yes; not hospitalized    Problems with swallowing    with radiation    Sexually transmitted disease    HPV    Substance abuse (HCC)    cocaine    Urinary incontinence    Visual impairment    glasses/contacts bifocals     Surgical History:  Past Surgical History:   Procedure Laterality Date    Appendectomy      Back surgery  2009    fusion L5-S1    Back surgery  2021    RIGHT LUMBAR 3/ LUMBAR 4, LUMBAR 4/ LUMBAR 5 HEMILAMINTOMIES, LEFT LUMBAR 2 / LUMBAR 3 MICRODISCECTOMY    Colonoscopy N/A 2023    Procedure: COLONOSCOPY;  Surgeon: Devante Kern MD;  Location:  ENDOSCOPY    Colposcopy,bx cervix/endocerv curr  11    MARCIO 1    Laparoscopy procedure unlisted      Ovarian cyst removed    Leep  2011    MARCIO 2          X2    Other surgical history      bunions bilateral    Other surgical history      nodule lymph nodes neck    Other surgical history  2016     Bladder sling    Other surgical history  2020    Cystoscopy, Dr Beach       Allergies:  Allergies[1]    Palliative Care Social History:    Marital Status:  single  Children: son  Living Situation: independent .  Works full-time with Medicare insurance      Medications:  Current Outpatient Medications    Medication Sig Dispense Refill    morphINE 15 MG Oral Tab Take 1 tablet (15 mg total) by mouth every 8 (eight) hours as needed for Pain. 70 tablet 0    pregabalin 75 MG Oral Cap Take 1 capsule (75 mg total) by mouth 3 (three) times daily. 90 capsule 1    ibuprofen 400 MG Oral Tab Take 1 tablet (400 mg total) by mouth every 8 (eight) hours as needed for Pain. 90 tablet 0    memantine 5 MG Oral Tab Take 1 tablet (5 mg total) by mouth daily.      levothyroxine 75 MCG Oral Tab Take 1 tablet (75 mcg total) by mouth before breakfast. 90 tablet 0    lisinopril-hydroCHLOROthiazide 20-12.5 MG Oral Tab Take 0.5 tablets by mouth daily. 45 tablet 3    prochlorperazine (COMPAZINE) 10 mg tablet Take 1 tablet (10 mg total) by mouth every 6 (six) hours as needed for Nausea. 30 tablet 3    ondansetron (ZOFRAN) 8 MG tablet Take 1 tablet (8 mg total) by mouth every 8 (eight) hours as needed for Nausea. 30 tablet 3    dilTIAZem HCl 120 MG Oral Tab Take 1 tablet (120 mg total) by mouth in the morning.      montelukast 10 MG Oral Tab Take 1 tablet (10 mg total) by mouth nightly. (Patient taking differently: Take 1 tablet (10 mg total) by mouth daily as needed (shortness of breath).) 90 tablet 3    albuterol 108 (90 Base) MCG/ACT Inhalation Aero Soln Inhale 2 puffs into the lungs every 6 (six) hours as needed for Wheezing or Shortness of Breath. 3 each 3    amphetamine-dextroamphetamine 15 MG Oral Tab Take 1 tablet (15 mg total) by mouth daily as needed.      ARIPiprazole 2 MG Oral Tab Take 1 tablet (2 mg total) by mouth daily.      VYVANSE 70 MG Oral Cap Take 1 capsule (70 mg total) by mouth every morning.      cetirizine 10 MG Oral Tab Take 1 tablet (10 mg total) by mouth daily. (Patient taking differently: Take 1 tablet (10 mg total) by mouth daily as needed.) 90 tablet 1    ferrous sulfate 325 (65 FE) MG Oral Tab EC Take 1 tablet (325 mg total) by mouth daily with breakfast.      Multiple Vitamin Oral Tab Take 1 tablet by mouth in  the morning.      ibuprofen 200 MG Oral Tab Take 4 tablets (800 mg total) by mouth every 8 (eight) hours as needed for Pain.      cholecalciferol 25 MCG (1000 UT) Oral Cap Take 1 capsule (1,000 Units total) by mouth in the morning.  0    DULoxetine HCl 60 MG Oral Cap DR Particles Take 1 capsule (60 mg total) by mouth in the morning.  2       Review of Systems:  General:  Fatigue.  Feels well.    Respiratory:  Denies SOB, denies cough  Cardiac:  Denies chest pain, heart palpitations  Abdomen:  Denies constipation, diarrhea.  Denies pain.    Psych:  No complaints.  Sleeping well    Palliative Performance Scale:   80%    Physical Examination:  General: Patient is alert and oriented, not in acute distress.  Respiratory:   Normal excursions and effort  Cardiac: Regular rate   Abdomen: Soft, non tender with good bowel sounds.  Musculoskeletal: Normal gait. Walks without assistive device.  Psych:  Mood/Affect appropriate    Advanced Directives Discussed and Completed:     HCPOA/Health Surrogate:    There is no completed HCPOA documentation on file in Epic.    Patient/family was asked to bring in a copy of HCPOA document to be scanned in to Epic    I confirmed that patient's HCPOA is:  Her son, Dick BRISCOE Discussed and Completed:   focused on symptoms today.      Palliative Care:   Her pain is controlled with morphine, lyrica and ibuprofen.    The goal is to wean off opioids as pain improves  Discussed strategies to optimize bowels  She continues to work with psychiatry to optimize fatigue.  Discussed non medication strategies to improve fatigue.  Encouraged sunlight  She will continue prn thoracentesis for dyspnea.  She is also finding morphine helpful for this    Impression/Plan:   Neoplasm related pain  Ibuprofen 400mg BID prn  Morphine 15mg Q 8 prn    MARCIO  Pregabalin 75mg TID  Topical lidocaine    Constipation  Miralax prn  MOM daily prn  Prune juice daily    Palliative care  Ongoing support    Metastatic R Lung  cancer      Planned Follow up:  3 weeks      I spent a total of 40 minutes with the patient today, which included all of the following:direct face to face contact, history taking, physical examination, and >50% was spent counseling and coordinating care         Electronically Signed by:  GABY SANFORD   [facility] Outpatient Palliative Nurse Practitioner            [1]   Allergies  Allergen Reactions    Gabapentin SWELLING and UNKNOWN    Erythromycin UNKNOWN    Clindamycin RASH

## 2025-06-13 DIAGNOSIS — G89.3 NEOPLASM RELATED PAIN: ICD-10-CM

## 2025-06-13 RX ORDER — MORPHINE SULFATE 15 MG/1
15 TABLET ORAL EVERY 8 HOURS PRN
Qty: 70 TABLET | Refills: 0 | Status: SHIPPED | OUTPATIENT
Start: 2025-06-13

## 2025-06-23 ENCOUNTER — DOCUMENTATION ONLY (OUTPATIENT)
Age: 55
End: 2025-06-23

## 2025-06-24 ENCOUNTER — PATIENT MESSAGE (OUTPATIENT)
Age: 55
End: 2025-06-24

## 2025-06-26 ENCOUNTER — OFFICE VISIT (OUTPATIENT)
Age: 55
End: 2025-06-26
Attending: INTERNAL MEDICINE
Payer: MEDICAID

## 2025-06-26 VITALS
SYSTOLIC BLOOD PRESSURE: 112 MMHG | WEIGHT: 182.19 LBS | BODY MASS INDEX: 31.49 KG/M2 | HEART RATE: 96 BPM | DIASTOLIC BLOOD PRESSURE: 73 MMHG | RESPIRATION RATE: 16 BRPM | TEMPERATURE: 98 F | OXYGEN SATURATION: 95 % | HEIGHT: 63.74 IN

## 2025-06-26 DIAGNOSIS — J91.0 MALIGNANT PLEURAL EFFUSION (HCC): ICD-10-CM

## 2025-06-26 DIAGNOSIS — G62.0 CHEMOTHERAPY-INDUCED NEUROPATHY (HCC): ICD-10-CM

## 2025-06-26 DIAGNOSIS — G47.9 SLEEPING DIFFICULTY: ICD-10-CM

## 2025-06-26 DIAGNOSIS — R59.0 MEDIASTINAL ADENOPATHY: Primary | ICD-10-CM

## 2025-06-26 DIAGNOSIS — C34.91 PRIMARY ADENOCARCINOMA OF RIGHT LUNG (HCC): ICD-10-CM

## 2025-06-26 DIAGNOSIS — G89.3 NEOPLASM RELATED PAIN: ICD-10-CM

## 2025-06-26 DIAGNOSIS — G89.3 NEOPLASM RELATED PAIN: Primary | ICD-10-CM

## 2025-06-26 DIAGNOSIS — T45.1X5A CHEMOTHERAPY-INDUCED NEUROPATHY (HCC): ICD-10-CM

## 2025-06-26 DIAGNOSIS — F11.20 UNCOMPLICATED OPIOID DEPENDENCE (HCC): ICD-10-CM

## 2025-06-26 DIAGNOSIS — D64.9 ANEMIA, NORMOCYTIC NORMOCHROMIC: ICD-10-CM

## 2025-06-26 DIAGNOSIS — R59.0 SUPRACLAVICULAR ADENOPATHY: ICD-10-CM

## 2025-06-26 DIAGNOSIS — C34.91 PRIMARY LUNG CANCER WITH METASTASIS FROM LUNG TO OTHER SITE, RIGHT (HCC): ICD-10-CM

## 2025-06-26 DIAGNOSIS — C34.81 CANCER OF OVERLAPPING SITES OF RIGHT LUNG (HCC): ICD-10-CM

## 2025-06-26 DIAGNOSIS — K59.03 DRUG-INDUCED CONSTIPATION: ICD-10-CM

## 2025-06-26 DIAGNOSIS — Z51.5 PALLIATIVE CARE BY SPECIALIST: ICD-10-CM

## 2025-06-26 DIAGNOSIS — R53.83 FATIGUE DUE TO TREATMENT: ICD-10-CM

## 2025-06-26 DIAGNOSIS — F41.9 ANXIETY: ICD-10-CM

## 2025-06-26 LAB
ALBUMIN SERPL-MCNC: 4.4 G/DL (ref 3.2–4.8)
ALBUMIN/GLOB SERPL: 2.1 {RATIO} (ref 1–2)
ALP LIVER SERPL-CCNC: 73 U/L (ref 41–108)
ALT SERPL-CCNC: 16 U/L (ref 10–49)
ANION GAP SERPL CALC-SCNC: 9 MMOL/L (ref 0–18)
AST SERPL-CCNC: 17 U/L (ref ?–34)
BASOPHILS # BLD AUTO: 0.05 X10(3) UL (ref 0–0.2)
BASOPHILS NFR BLD AUTO: 0.9 %
BILIRUB SERPL-MCNC: 0.3 MG/DL (ref 0.3–1.2)
BUN BLD-MCNC: 19 MG/DL (ref 9–23)
CALCIUM BLD-MCNC: 9.4 MG/DL (ref 8.7–10.6)
CHLORIDE SERPL-SCNC: 105 MMOL/L (ref 98–112)
CO2 SERPL-SCNC: 26 MMOL/L (ref 21–32)
CREAT BLD-MCNC: 1.04 MG/DL (ref 0.55–1.02)
EGFRCR SERPLBLD CKD-EPI 2021: 64 ML/MIN/1.73M2 (ref 60–?)
EOSINOPHIL # BLD AUTO: 0.3 X10(3) UL (ref 0–0.7)
EOSINOPHIL NFR BLD AUTO: 5.3 %
ERYTHROCYTE [DISTWIDTH] IN BLOOD BY AUTOMATED COUNT: 13.6 %
FASTING STATUS PATIENT QL REPORTED: NO
GLOBULIN PLAS-MCNC: 2.1 G/DL (ref 2–3.5)
GLUCOSE BLD-MCNC: 208 MG/DL (ref 70–99)
HCT VFR BLD AUTO: 31.6 % (ref 35–48)
HGB BLD-MCNC: 10.7 G/DL (ref 12–16)
IMM GRANULOCYTES # BLD AUTO: 0.03 X10(3) UL (ref 0–1)
IMM GRANULOCYTES NFR BLD: 0.5 %
LYMPHOCYTES # BLD AUTO: 0.73 X10(3) UL (ref 1–4)
LYMPHOCYTES NFR BLD AUTO: 13 %
MCH RBC QN AUTO: 31 PG (ref 26–34)
MCHC RBC AUTO-ENTMCNC: 33.9 G/DL (ref 31–37)
MCV RBC AUTO: 91.6 FL (ref 80–100)
MONOCYTES # BLD AUTO: 0.51 X10(3) UL (ref 0.1–1)
MONOCYTES NFR BLD AUTO: 9.1 %
NEUTROPHILS # BLD AUTO: 3.99 X10 (3) UL (ref 1.5–7.7)
NEUTROPHILS # BLD AUTO: 3.99 X10(3) UL (ref 1.5–7.7)
NEUTROPHILS NFR BLD AUTO: 71.2 %
OSMOLALITY SERPL CALC.SUM OF ELEC: 298 MOSM/KG (ref 275–295)
PLATELET # BLD AUTO: 284 10(3)UL (ref 150–450)
POTASSIUM SERPL-SCNC: 4 MMOL/L (ref 3.5–5.1)
PROT SERPL-MCNC: 6.5 G/DL (ref 5.7–8.2)
RBC # BLD AUTO: 3.45 X10(6)UL (ref 3.8–5.3)
SODIUM SERPL-SCNC: 140 MMOL/L (ref 136–145)
T4 FREE SERPL-MCNC: 1.3 NG/DL (ref 0.8–1.7)
TSI SER-ACNC: 0.5 UIU/ML (ref 0.55–4.78)
WBC # BLD AUTO: 5.6 X10(3) UL (ref 4–11)

## 2025-06-26 RX ORDER — MORPHINE SULFATE 15 MG/1
15 TABLET, FILM COATED, EXTENDED RELEASE ORAL EVERY 12 HOURS SCHEDULED
Qty: 60 TABLET | Refills: 0 | Status: SHIPPED | OUTPATIENT
Start: 2025-06-26 | End: 2025-07-26

## 2025-06-26 NOTE — PROGRESS NOTES
Pt here for C3D1 Drug(s)opdivo,yervoy.  Arrives Ambulating independently, accompanied by Self and Family member     Patient was evaluated today by Treatment Nurse.    Oral medications included in this regimen:  no    Patient confirms comprehension of cancer treatment schedule:  yes    Pregnancy screening:  Not applicable    Modifications in dose or schedule:  No    Medications appearance and physical integrity checked by RN: yes.    Chemotherapy IV pump settings verified by 2 RNs:  Yes.  Frequency of blood return and site check throughout administration: Prior to administration     Infusion/treatment outcome:  patient tolerated treatment without incident    Education Record    Learner:  Patient and Family Member  Barriers / Limitations:  None  Method:  Brief focused  Education / instructions given:    Outcome:  Shows understanding    Discharged Home, Ambulating independently, accompanied by:Self    Patient/family verbalized understanding of future appointments: by MyChart messaging

## 2025-06-26 NOTE — PROGRESS NOTES
Education Record    Learner:  Patient/ family member    Disease / Diagnosis: lung cancer   OTV D1C3 alan/ yervoy     Barriers / Limitations:  None   Comments:    Method:  Discussion   Comments:    General Topics:  Plan of care reviewed   Comments:    Outcome:  Shows understanding   Comments:   Pt not \"doing well\" noted a new lump left side of lower neck.   Started having neck pain yesterday.   Neuropathy is \"bad\" tough for her to walk in from  parking lot.   Pain is 'so so\" seeing Ebony today too.   Manages constipation with MOM.   Emotional support offered.

## 2025-06-26 NOTE — PROGRESS NOTES
Cancer Center Progress Note    Patient Name: Hanna Barrett   YOB: 1970   Medical Record Number: [id number]   CSN: [id number]   Attending Physician: Shoshana Gordon M.D.   Referring Physician: MD Dr. Flynn Geronimo    Date of Visit: 6/26/2025     Chief Complaint:  Chief Complaint   Patient presents with    Follow - Up        Hem/Onc History:  Metastatic (stage IV) NSCLC adenocarcinoma documented 4/2023.     - initially diagnosed with stage IIIC NSCLC adenocarcinoma of the right lung diagnosed 12/2021. Was followed by [facility] then.     Oncologic History:  5/2021: patient presented with abnormal preoperative CXR; left apical 8 mm nodule with irregular margins, and a right middle lobe soft tissue density with associated bronchiectasis medially and mild adjacent nonspecific ground glass density, overall stable since 2/2021.     11/2021: CT scan of the chest demonstrated a spiculated airspace density within the right middle lobe worrisome for primary lung malignancy along with mediastinal lymph nodes.  12/7/2021: PET/CT scan showed a hypermetabolic RML and RUL mass with enlargement of mediastinal lymph nodes.    12/13/2021: bronchoscopy and transbronchial needle aspiration to subcarinal lymph node was positive for metastatic adenocarcinoma. PD-L1 <1%, EGFR, ROS1, ALK, KRAS, RET, BRAF all negative.     1/17/2022: concurrent chemoradiation with cisplatin + pemetrexed at [facility] (Dr. Subramanian) completed in 4/2022 5/13/2022: started consolidative durvalumab q4 weeks    7/2022: PET concerning for progression of disease     8/12/2022: transferred care to Dr. Sanchez at [place]    8/16/2022: bronchoscopy and biopsies were negative for malignancy.     9/14/2022: CT scan showed some improvement     11/14/2022: PET scan continues to show improvement with interval decrease in the right lung opacities with decreased uptake, may represent scarring and posttreatment change. Increased uptake distal esophagus.      1/17/2023: EGD with normal esophagus and negative biopsies.     2/17/2023: CT chest stable.      3/31/2023: completed 12 cycles of durvalumab.     - care transferred to Dr. Baker when Dr. Sanchez retired    4/7/2023: bronchoscopy for hemoptysis was unremarkable.   5/17/2023: CT chest stable.      - care transferred to me 11/2023 from Dr. Baker  who now practices primarily in [place]    11/2024:  Imaging done just prior showed stable right perihilar consolidation which compared to her previous PET showed no uptake in these areas and therefore suggestive of no significant residual or recurrent neoplasm. No new findings described in C/A/P with devrease in size of a liver lesion favored to be a hemangioma. C/o chronic fatigue and some SOB and cough. Had reported some visual changes and was seen at [facility]. Reports no evidence of malignancy seen. As felt relatively well with recent good scans she opted to have her port removed. This was removed by IR on 11/26/24.    Early 1/2025 she saw her PCP c/o nasal and sinus congestion with pressure, cough, PND, fullness in the ears. No adenopathy was noted on exam. She was placed on Augmentin for sinusitis.     She then reported feeling a growth in her nose causing a deformity with nasal obstructive symptoms. Saw ENT 1/29/25 (Laila). Sinus CT scans were ordered and was referred to plastics. Scans showed no nasal mass or abnormality of the soft tissues but did show postoperative changes from previous sinus surgery. Did see plastics and was felt to have a dermoid cyst.     The following month c/o left supraclavicular node which was increasing in size and tender. Saw PCP who ordered imaging. Thought to have Virchow node. US of the neck and CT abdomen/pelvis were ordered. US showed nonspecific appearing supraclav node. CT of the abdomen and pelvis showed a moderate to large right pleural effusion that was not present 9/2024 CT. Dedicated CT chest was ordered.  Testing results were reviewed. US guided biopsy of left supraclav node was ordered. CT chest confirmed new right pleural effusion and was referred to pulmonary for tap.     Biopsy of the left supraclav node on 2/27/25 showed metastatic adenocarcinoma c/w lung primary. US guided right thoracentesis drained 1500 mL of fluid. Cytology from the right pleural effusion showed metastatic carcinoma c/w lung adenocarcinoma.      - [facility] did not show any targetable mutations: CDK6, PIK3CA, TP53). PDL-1 came back at <1% NGS could not be run with initial sample sent by path as was insufficient and another specimen has been sent. She wanted to get going with treatment     - Started carboplatin/paclitaxel+ipi/nivo (9LA) 3/28/25. Had a very rough time she said with her first cycle of chemo+IO. During Taxol infusion had some mild flushing with VS stable per nurses which resolved when infusion was stopped. Infusion was then resumed with no recurrence of flushing and completed treatment as planned.  Around 2 days after chemo developed generalized pain- reported as muscle/nerve pain especially in groins, shoulder and legs. Took pain pills she had at home which did not help. Thought she may be having an allergic reaction to treatment as also felt very flushed and swollen and took benadryl the weekend.  Discomfort was severe enough that she considered going to the ED but decided not to. [place] to likely have TAPS from Taxol and discussed trial of Lyrica and steroids. Reports allergy to Gabapentin and AE (\"does not do well on steroids\") but reassured her that steroid dose was going to be low (and tolerated her steroid premeds). Also spoke to palliative care about her s/sx who discussed trial of morphine as norco did not seem to help.    Was seen by AP[facility] and palliative care on 4/3. By then she said the pain had started to subside the day before.     - port placed left chest 4/11/25     - NGS showed TP53     - On 4/19/2025 presented to the  ED with acute onset SOB. CTA chest done showed no evidence of PE but did show a large right pleural effusion. Right sided thoracentesis was arranged and 650 mL of yellow serous fluid was removed. She felt significantly better after tap and was discharged home.      - Presented to the ED 6/3/25 with acute worsening SOB with difficulty breathing and significant fatigue. CTA chest showed no PE but did show new moderate sized effusion. Also c/o dizziness, itching, head tingling and generalized pain. Underwent right sided thoracentesis by pulmonary and 1150 ml fluid removed. Felt much better after and was discharged home 6/4/25.        History of Present Illness:  Has not felt well. Generalized malaise and pain. Neuropathy which she says is bad and difficult for her to walk. Noticed new lump on left neck which is tender. Constipated- takes MOM and prune juice. Fatigued. Sleeps better with cannabis. Itching is less. Denies change in urinary habits, headaches, dizziness. Says     Current Medications:    Current Outpatient Medications:     morphINE 15 MG Oral Tab, Take 1 tablet (15 mg total) by mouth every 8 (eight) hours as needed for Pain., Disp: 90 tablet, Rfl: 0    pregabalin 75 MG Oral Cap, Take 1 capsule (75 mg total) by mouth 3 (three) times daily., Disp: 90 capsule, Rfl: 1    ibuprofen 400 MG Oral Tab, Take 1 tablet (400 mg total) by mouth every 8 (eight) hours as needed for Pain., Disp: 90 tablet, Rfl: 0    memantine 5 MG Oral Tab, Take 1 tablet (5 mg total) by mouth daily., Disp: , Rfl:     levothyroxine 75 MCG Oral Tab, Take 1 tablet (75 mcg total) by mouth before breakfast., Disp: 90 tablet, Rfl: 0    lisinopril-hydroCHLOROthiazide 20-12.5 MG Oral Tab, Take 0.5 tablets by mouth daily., Disp: 45 tablet, Rfl: 3    prochlorperazine (COMPAZINE) 10 mg tablet, Take 1 tablet (10 mg total) by mouth every 6 (six) hours as needed for Nausea., Disp: 30 tablet, Rfl: 3    ondansetron (ZOFRAN) 8 MG tablet, Take 1 tablet (8 mg  total) by mouth every 8 (eight) hours as needed for Nausea., Disp: 30 tablet, Rfl: 3    dilTIAZem HCl 120 MG Oral Tab, Take 1 tablet (120 mg total) by mouth in the morning., Disp: , Rfl:     montelukast 10 MG Oral Tab, Take 1 tablet (10 mg total) by mouth nightly. (Patient taking differently: Take 1 tablet (10 mg total) by mouth daily as needed (shortness of breath).), Disp: 90 tablet, Rfl: 3    albuterol 108 (90 Base) MCG/ACT Inhalation Aero Soln, Inhale 2 puffs into the lungs every 6 (six) hours as needed for Wheezing or Shortness of Breath., Disp: 3 each, Rfl: 3    amphetamine-dextroamphetamine 15 MG Oral Tab, Take 1 tablet (15 mg total) by mouth daily as needed., Disp: , Rfl:     ARIPiprazole 2 MG Oral Tab, Take 1 tablet (2 mg total) by mouth daily., Disp: , Rfl:     VYVANSE 70 MG Oral Cap, Take 1 capsule (70 mg total) by mouth every morning., Disp: , Rfl:     cetirizine 10 MG Oral Tab, Take 1 tablet (10 mg total) by mouth daily. (Patient taking differently: Take 1 tablet (10 mg total) by mouth daily as needed.), Disp: 90 tablet, Rfl: 1    Multiple Vitamin Oral Tab, Take 1 tablet by mouth in the morning., Disp: , Rfl:     ibuprofen 200 MG Oral Tab, Take 4 tablets (800 mg total) by mouth every 8 (eight) hours as needed for Pain., Disp: , Rfl:     cholecalciferol 25 MCG (1000 UT) Oral Cap, Take 1 capsule (1,000 Units total) by mouth in the morning., Disp: , Rfl: 0    DULoxetine HCl 60 MG Oral Cap DR Particles, Take 1 capsule (60 mg total) by mouth in the morning., Disp: , Rfl: 2    ferrous sulfate 325 (65 FE) MG Oral Tab EC, Take 1 tablet (325 mg total) by mouth daily with breakfast. (Patient not taking: Reported on 6/26/2025), Disp: , Rfl:     Past Medical History:  Past Medical History:    Abnormal uterine bleeding    bleeds every 2 weeks    Anxiety state    Asthma (HCC)    Attention deficit hyperactivity disorder (ADHD)    BACK PAIN    Back problem    lumbar radiculopathy    Cancer (HCC)    adenocarcinoma of  right lung    MARCIO II (cervical intraepithelial neoplasia II)    DDD (degenerative disc disease), lumbar    DDD (degenerative disc disease), thoracic    Depression    Disorder of thyroid    Dyspareunia    Exposure to medical diagnostic radiation    On hold since 2022    Fall    Fibromyalgia    Fibromyalgia    Genital warts    High blood pressure    History of COVID-19    COVID + 2020 Headache - NO Hospitalization needed     History of lumbar fusion    Hx of motion sickness    IBS (irritable bowel syndrome)    Per patient - Just constipation    Infertility, female    Lumbar radiculopathy    Mild dysplasia of cervix    Pap smear for cervical cancer screening    pt states normal    Papanicolaou smear of cervix with high grade squamous intraepithelial lesion (HGSIL)    Personal history of antineoplastic chemotherapy    Last chemo 22 prior to lung bx 22    Post covid-19 condition, unspecified    symptoms: HA; s/s resolved-yes; not hospitalized    Problems with swallowing    with radiation    Sexually transmitted disease    HPV    Substance abuse (HCC)    cocaine    Urinary incontinence    Visual impairment    glasses/contacts bifocals       Past Surgical History:  Past Surgical History:   Procedure Laterality Date    Appendectomy      Back surgery  2009    fusion L5-S1    Back surgery  2021    RIGHT LUMBAR 3/ LUMBAR 4, LUMBAR 4/ LUMBAR 5 HEMILAMINTOMIES, LEFT LUMBAR 2 / LUMBAR 3 MICRODISCECTOMY    Colonoscopy N/A 2023    Procedure: COLONOSCOPY;  Surgeon: Devante Kern MD;  Location:  ENDOSCOPY    Colposcopy,bx cervix/endocerv curr  11    MARCIO 1    Laparoscopy procedure unlisted      Ovarian cyst removed    Leep  2011    MARCIO 2          X2    Other surgical history      bunions bilateral    Other surgical history      nodule lymph nodes neck    Other surgical history  2016     Bladder sling    Other surgical history  2020    Cystoscopy, Dr Baylee Hinton  Medical History:  Family History   Problem Relation Age of Onset    Other (lung CA) Self     Hypertension Mother     Diabetes Mother     Heart Disease Mother     Heart Disease Father     Other (lung cancer) Father 55        Lung Cancer    Other (pancreatic cancer) Sister 47        Pancreatic Cancer    Cancer Sister 51        lung CA    Other (Other) Sister         Back problems    Other (Other) Son         anxiety    Other (Other) Son         behavioral problems    Diabetes Maternal Grandmother     Breast Cancer Maternal Grandmother         dx late-60s    Stroke Maternal Grandfather 86    Dementia Paternal Grandmother     Heart Disorder Paternal Grandfather     Cancer Maternal Aunt 50        LUNG CA 51    Breast Cancer Maternal Aunt         dx 46-47y    Ovarian Cancer Maternal Cousin Female 33         of ovarian ca at 35y       Gyne History:  OB History    Para Term  AB Living   2 2 2 0 0 2   SAB IAB Ectopic Multiple Live Births   0 0 0 0 2       Psychosocial History:  Social History     Socioeconomic History    Marital status:      Spouse name: Not on file    Number of children: Not on file    Years of education: Not on file    Highest education level: Not on file   Occupational History    Not on file   Tobacco Use    Smoking status: Former     Current packs/day: 0.00     Average packs/day: 0.8 packs/day for 19.0 years (14.5 ttl pk-yrs)     Types: Cigarettes     Start date:      Quit date: 2010     Years since quitting: 15.4     Passive exposure: Past    Smokeless tobacco: Former     Quit date: 2024    Tobacco comments:     Has not vaped since 2024.    Vaping Use    Vaping status: Former    Substances: Nicotine, daily    Devices: Pre-filled pod   Substance and Sexual Activity    Alcohol use: Yes     Comment: 2-3 drinks month    Drug use: Yes     Types: Cocaine, Cannabis     Comment: USED COCAINE BETW [id number].      cannabis gummy to sleep    Sexual activity: Yes     Partners:  Male   Other Topics Concern     Service Not Asked    Blood Transfusions Not Asked    Caffeine Concern Yes     Comment: 3-4 daily of pop    Occupational Exposure Not Asked    Hobby Hazards Not Asked    Sleep Concern Not Asked    Stress Concern Not Asked    Weight Concern Not Asked    Special Diet Not Asked    Back Care Not Asked    Exercise No     Comment: not tolerated right now    Bike Helmet Not Asked    Seat Belt Not Asked    Self-Exams Not Asked   Social History Narrative    Not on file     Social Drivers of Health     Food Insecurity: Patient Declined (6/4/2025)    NCSS - Food Insecurity     Worried About Running Out of Food in the Last Year: Patient declined     Ran Out of Food in the Last Year: Patient declined   Transportation Needs: Patient Declined (6/4/2025)    NCSS - Transportation     Lack of Transportation: Patient declined   Housing Stability: Patient Declined (6/4/2025)    NCSS - Housing/Utilities     Has Housing: Patient declined     Worried About Losing Housing: Patient declined     Unable to Get Utilities: Patient declined         Allergies:  Allergies   Allergen Reactions    Gabapentin SWELLING and UNKNOWN    Erythromycin UNKNOWN    Clindamycin RASH        Review of Systems:  A 14-point ROS was done with pertinent positives and negative per the HPI    Vital Signs:   Day 1,  Cycle 3  06/26/25   Height 1.619 m (5' 3.74\")   Weight 82.6 kg (182 lb 3.2 oz)   BSA (Calculated - sq m) 1.87 sq meters   BMI (Calculated) 31.53 kg/m2   /73   Pulse 96   BP Location Right arm   Patient Position Sitting   Temp 97.6 °F (36.4 °C)       Physical Examination:  General: Patient is alert and oriented x 3, not in acute distress.   Psych:  Mood and affect appropriate  HEENT: EOMs intact. PERRL. Oropharynx with mucositis, no thrush  Neck: No JVD. Left palpable lymphadenopathy supraclav - 2 very small discrete nodes felt. Tender to palp. Neck is supple.  Chest: Diminished BS  Heart: Regular  Abdomen: Soft,  non-tender with good bowel sounds.  No hepatosplenomegaly.  No palpable mass.   Extremities: Pedal pulses are present. No BLE edema. Tender points BLE  Neurological: Grossly intact.       Laboratory:  Recent Results (from the past 24 hours)   CBC W Differential W Platelet    Collection Time: 06/26/25  1:58 PM   Result Value Ref Range    WBC 5.6 4.0 - 11.0 x10(3) uL    RBC 3.45 (L) 3.80 - 5.30 x10(6)uL    HGB 10.7 (L) 12.0 - 16.0 g/dL    HCT 31.6 (L) 35.0 - 48.0 %    .0 150.0 - 450.0 10(3)uL    MCV 91.6 80.0 - 100.0 fL    MCH 31.0 26.0 - 34.0 pg    MCHC 33.9 31.0 - 37.0 g/dL    RDW 13.6 %    Neutrophil Absolute Prelim 3.99 1.50 - 7.70 x10 (3) uL    Neutrophil Absolute 3.99 1.50 - 7.70 x10(3) uL    Lymphocyte Absolute 0.73 (L) 1.00 - 4.00 x10(3) uL    Monocyte Absolute 0.51 0.10 - 1.00 x10(3) uL    Eosinophil Absolute 0.30 0.00 - 0.70 x10(3) uL    Basophil Absolute 0.05 0.00 - 0.20 x10(3) uL    Immature Granulocyte Absolute 0.03 0.00 - 1.00 x10(3) uL    Neutrophil % 71.2 %    Lymphocyte % 13.0 %    Monocyte % 9.1 %    Eosinophil % 5.3 %    Basophil % 0.9 %    Immature Granulocyte % 0.5 %   Comp Metabolic Panel (14)    Collection Time: 06/26/25  1:58 PM   Result Value Ref Range    Glucose 208 (H) 70 - 99 mg/dL    Sodium 140 136 - 145 mmol/L    Potassium 4.0 3.5 - 5.1 mmol/L    Chloride 105 98 - 112 mmol/L    CO2 26.0 21.0 - 32.0 mmol/L    Anion Gap 9 0 - 18 mmol/L    BUN 19 9 - 23 mg/dL    Creatinine 1.04 (H) 0.55 - 1.02 mg/dL    Calcium, Total 9.4 8.7 - 10.6 mg/dL    Calculated Osmolality 298 (H) 275 - 295 mOsm/kg    eGFR-Cr 64 >=60 mL/min/1.73m2    AST 17 <34 U/L    ALT 16 10 - 49 U/L    Alkaline Phosphatase 73 41 - 108 U/L    Bilirubin, Total 0.3 0.3 - 1.2 mg/dL    Total Protein 6.5 5.7 - 8.2 g/dL    Albumin 4.4 3.2 - 4.8 g/dL    Globulin  2.1 2.0 - 3.5 g/dL    A/G Ratio 2.1 (H) 1.0 - 2.0    Patient Fasting for CMP? No    TSH [E]    Collection Time: 06/26/25  1:58 PM   Result Value Ref Range    TSH 0.503 (L)  0.550 - 4.780 uIU/mL   T4 FREE [E]    Collection Time: 06/26/25  1:58 PM   Result Value Ref Range    Free T4 1.3 0.8 - 1.7 ng/dL       Lab Component   Component Value Date/Time    CEA 1.0 05/12/2017 1425     Lab Component   Component Value Date/Time     03/12/2025 1102        Latest Reference Range & Units 11/07/24 10:09   -246 U/L U/L 168   FERRITIN 50 - 306 ng/mL 59   Vitamin B12 211 - 911 pg/mL >2,000 (H)   FOLATE (FOLIC ACID), SERUM >5.4 ng/mL 29.8   (H): Data is abnormally high      Radiology:  PROCEDURE:  MRI BRAIN (W+WO) (CPT=70553)     COMPARISON:  None.     INDICATIONS:  metastatic lung cancer, dizzy     TECHNIQUE:  MRI of the brain was performed with multi-planar T1, T2-weighted images with FLAIR sequences and diffusion weighted images without and with infusion.     PATIENT STATED HISTORY:(As transcribed by Technologist)  Lung cancer, possible mets, aphasia, memory issues      CONTRAST USED:  25 mL of Dotarem     FINDINGS:    The ventricles and sulci are normal in size for the patient's age.  No mass-effect, midline shift, hydrocephalus, herniation, extra-axial fluid collections, or signs of acute territorial infarct, or brain tumor.     No abnormality of midline structures. No sign of restricted diffusion. No pathologic gradient susceptibility pattern.     Flow voids are present within the internal carotid and basilar arteries. No sign of acute fluid in the paranasal sinuses. Postinflammatory changes are present within the paranasal sinuses, without findings indicating acute sinusitis.     With contrast infusion, there is no pathologic enhancement pattern identified.  Developmental venous anomaly present posterior right temporal lobe series 10, image 105.                   Impression   CONCLUSION:  Normal        LOCATION:  [facility]           Dictated by (CST): Dat Alvarez MD on 6/04/2025 at 7:51 PM      Finalized by (CST): Dat Alvarez MD on 6/04/2025 at 7:54 PM    PROCEDURE:  CT  ANGIOGRAPHY, CHEST (CPT=71275)     COMPARISON:  [facility], CT, CT STN/CHST/ABD/PEL W CONTRAST (CPT=70491/98618/86723), 5/07/2025, 6:32 PM.     INDICATIONS:  History of lung cancer increasing this dyspnea on exertion and difficulty breathing for 6 days rule out clot or worsening tumor burden.  Also noted to be signif     TECHNIQUE:  IV contrast-enhanced multislice CT angiography is performed through the pulmonary arterial anatomy. 3D volume renderings are generated.  Dose reduction techniques were used. Dose information is transmitted to the ACR (American College of  Radiology) NRDR (National Radiology Data Registry) which includes the Dose Index Registry.     PATIENT STATED HISTORY:(As transcribed by Technologist)  Patient with increasing dyspnea on exertion and difficulty breathing with exertion x6 days. Tachycardic. History of lung Cancer.      CONTRAST USED:  100cc of Isovue 370     FINDINGS:    VASCULATURE:  Smooth tapering.  No filling defect.  THORACIC AORTA:  Normal caliber.  No dissection.  LUNGS:  Right perihilar bandlike opacities do not appear significant changed consistent with any combination of treatment changes, cancer and or treated cancer.    There is new mild passive atelectasis associated with moderate right pleural effusion.  MEDIASTINUM:  No adenopathy.  MICHAEL:  No adenopathy.  CARDIAC:  No pericardial effusion.  No significant coronary calcifications.  PLEURA:  New moderate-sized layering right pleural effusion.  CHEST WALL:  No axillary adenopathy.  Right chest port.  LIMITED ABDOMEN:  No abnormality.  BONES:  Moderate to severe degenerative changes.  Maintained vertebral body heights.  No subluxation.  OTHER:  None.                        Impression  CONCLUSION:    1. Negative for pulmonary embolism.  2. New moderate-sized layering right pleural effusion with mild associated passive atelectasis.  3. Details as above.  Continued clinical correlation recommended.             LOCATION:   [facility]        Dictated by (CST): Fabien Wadsworth MD on 6/03/2025 at 4:50 PM      Finalized by (CST): Fabien Wadsworth MD on 6/03/2025 at 4:55 PM      PROCEDURE:  CT STN/CHST/ABD/PEL W CONTRAST (CPT=70491/65370/33424)     COMPARISON:  [facility] , CT, CT CHEST+ABDOMEN+PELVIS(ALL CNTRST ONLY)(CPT=71260/87767), 3/27/2024, 12:27 PM.  [facility] , NM, PET STANDARD BODY SCAN (ONCOLOGY) (CPT=78815), 3/11/2025, 9:49 AM.  [facility] , CT, CT ANGIOGRAPHY, CHEST (CPT=71275), 4/19/2025, 12:04  PM.     INDICATIONS:  D64.9 Anemia, normocytic normochromic C34.91 Primary adenocarcinoma of right lung (HCC) R59.0 Mediastinal adenopathy     TECHNIQUE:  Multislice non-ionic contrast enhanced scanning through the neck, chest, abdomen and pelvis was performed.  Dose reduction techniques were used. Dose information is transmitted to the ACR (American College of Radiology) NRDR (National  Radiology Data Registry) which includes the Dose Index Registry.     PATIENT STATED HISTORY:(As transcribed by Technologist)  Patient has history of lung cancer and is no chemo and immunotherapy.  Disease surveillance.      CONTRAST USED:  155cc of Isovue 370     FINDINGS:       NECK:  NASOPHARYNX:  Fossae of Rosenmuller and torus tubarius are symmetric.    ORAL CAVITY:  No visible mass.    OROPHARYNX:  Faucial and lingual tonsils are symmetric.    HYPOPHARYNX:  No mass or other visible lesion.    LARYNX:  The vocal cords are symmetric and without mass.    SINUSES:  There is mucosal thickening involving maxillary sinuses and ethmoid air cells bilaterally.  NECK GLANDS:  The parotid, submandibular, and thyroid glands are unremarkable.    LYMPH NODES:  Is 1 lymph node anterior to left anterior scalene muscle series 10, image 65 which measures 0.9 x 0.7 cm.  This is not appear to be significantly changed as compared to the PET scan.  A left supraclavicular lymph node series 10, image 57  measures 0.7 x 0.6 cm.  Other hypermetabolic lymph nodes noted on the PET scan are not  detected on this CT.  There are no pathologically enlarged lymph nodes in the neck.  SKULL BASE:  Foramina are symmetric without bony erosion.    VASCULATURE:  Limited views are unremarkable.       CHEST:    LUNGS:  There are reticular densities around the mediastinum which are presumably result of radiation therapy.  There is fibrosis and volume loss in the right perihilar lung involving perihilar right upper, middle and lower lobes.  Representative  measurement of confluent perihilar opacity as seen series 6, image 64 is 6.9 x 1.7 cm.  A similar measurement on chest CT from March 27, 2024 demonstrated dimensions of 7.5 x 3.2 cm.    MEDIASTINUM:  There are no pathologically enlarged lymph nodes.  MICHAEL:  Confluent opacity right perihilar lung includes the right hilus.  Areas of uptake on recent PET scan are not well identified on CT.  CARDIAC:  There is moderate to severe coronary artery atherosclerosis.  There is trace pericardial fluid.  PLEURA:  Trace right pleural effusion is noted.  CHEST WALL:  Right-sided chest port is noted with catheter tip in SVC.  VASCULATURE:  No visible pulmonary arterial thrombus or attenuation.       ABDOMEN/PELVIS:  LIVER:  No enlargement, atrophy, abnormal density, or significant focal lesion.    BILIARY:  No visible dilatation or calcification.    PANCREAS:  No lesion, fluid collection, ductal dilatation, or atrophy.    SPLEEN:  No enlargement or focal lesion.    KIDNEYS:  Benign cyst left kidney is noted.  Kidneys are otherwise unremarkable.  ADRENALS:  No mass or enlargement.    AORTA/VASCULAR:  No aneurysm or dissection.    RETROPERITONEUM:  Representative right retrocaval lymph node which corresponds to area of intense uptake on PET scan is noted series 7, image 59 and measures 0.8 x 0.6 cm (previously 1.3 x 0.7 cm..  Representative left periaortic nodule series 7, image  60 measures 0.6 x 0.4 cm.  This also corresponds to an area uptake on the PET scan.    BOWEL/MESENTERY:   Gastrohepatic ligament nodule series 7, image 30 measures 0.9 x 0.4 cm.  There is moderate retained fecal debris in the colon.  ABDOMINAL WALL:  No mass or hernia.    URINARY BLADDER:  No visible focal wall thickening, lesion, or calculus.    PELVIC NODES:  Representative left pelvic lymph node posterior to the common iliac veins series 7, image 77 measures 0.7 x 0.7 cm.  Representative left external iliac lymph node series 7, image 91 measures 0.9 x 0.5 cm.  Representative left external  iliac lymph node series 7, image 89 measures 1.4 x 0.5 cm (previously 2.4 x 0.8 cm).  PELVIC ORGANS:  No visible mass.  Pelvic organs appropriate for patient age.    BONES:  No bony lesion or fracture.                     Impression   CONCLUSION:    1. Lymphadenopathy previously described on PET scan is much less conspicuous on CT.  Some of these do appear to have decreased in size since the PET scan, others appear not significantly changed although are very small and difficult to detect on CT.  2. Post treatment volume loss and fibrosis in the lungs is stable.  3. Hilar adenopathy noted on the PET scan is not well visualized on CT.          LOCATION:  [facility]        Dictated by (CST): Damir Deleon MD on 5/08/2025 at 7:54 AM      Finalized by (CST): Damir Deleon MD on 5/08/2025 at 8:15 AM       PROCEDURE:  CT ANGIOGRAPHY, CHEST (CPT=71275)     COMPARISON:  [facility], CT CHEST(CONTRAST ONLY) (CPT=71260), 2/28/2025, 10:29 AM.     INDICATIONS:  lung cancer, sob, tachypnic r/o PE     TECHNIQUE:  IV contrast-enhanced multislice CT angiography is performed through the pulmonary arterial anatomy. 3D volume renderings are generated.  Dose reduction techniques were used. Dose information is transmitted to the ACR (American College of  Radiology) NRDR (National Radiology Data Registry) which includes the Dose Index Registry.     PATIENT STATED HISTORY:(As transcribed by Technologist)  Patient is here with SOB and tachypnea       CONTRAST USED:  100cc of Isovue 370     FINDINGS:    VASCULATURE:  No visible pulmonary arterial thrombus or attenuation.    THORACIC AORTA:  No aneurysm or visible dissection.    LUNGS:  Marked atelectasis march portion of right lung is again identified.  Right perihilar masslike consolidation extending through the base of the right upper lobe adjacent to the horizontal fissure has increased since prior examination.  Right  perihilar consolidation extending to the right lower lobe is noted.  MEDIASTINUM:  No adenopathy or mass.    MICHAEL:  No mass or adenopathy.    CARDIAC:  No enlargement, pericardial thickening, or significant coronary artery calcification.  PLEURA:  No mass or effusion.    CHEST WALL:  No mass or axillary adenopathy.    LIMITED ABDOMEN:  Hyperenhancing right hepatic lobe lesion measuring 1.7 cm is stable.  BONES:  No bony lesion or fracture.    OTHER:  Negative.                     Impression   CONCLUSION:       1. No evidence of pulmonary embolus.     2. Large right pleural effusion with significant atelectasis is again noted.  Consolidation in the right upper lobe and right lower lobe adjacent to the right major fissure and horizontal fissure is increased since prior examination.  This may partially  be due to scarring and post treatment change.             LOCATION:  [facility]        Dictated by (CST): Inder Kingston MD on 4/19/2025 at 12:18 PM      Finalized by (CST): Inder Kingston MD on 4/19/2025 at 12:21 PM         PROCEDURE:  PET/CT STANDARD BODY SCAN (ONCOLOGY) (CPT=78815)     COMPARISON:  [facility] , MR, MRI BRAIN (W+WO) (CPT=70553), 3/07/2025, 3:45 PM.  [facility] , CT, CT CHEST+ABDOMEN+PELVIS(ALL CNTRST ONLY)(CPT=71260/43336), 9/27/2024, 1:11 PM.  [facility] US, US HEAD/NECK (CPT=76536), 2/12/2025, 8:41 AM.  [facility] NM, PET  STANDARD BODY SCAN (ONCOLOGY) (CPT=78815), 4/13/2022, 12:14 PM.  External Exams, NM, PET STANDARD BODY SCAN (ONCOLOGY) (CPT=78815), 12/07/2021, 12:36 PM.  [facility]  NM, PET STANDARD BODY SCAN (ONCOLOGY) (CPT=78815), 12/27/2023, 9:02 AM.     INDICATIONS:  C34.91 Recurrent malignant neoplasm of right lung of unknown cell type (HCC) C34.91 Primary adenocarcinoma of right lung (HCC)     TECHNIQUE:  The patient fasted for at least 6 hours. F-18 FDG was injected IV, and whole-body images from vertex to mid-thigh were obtained with concurrent CT scan for both anatomic localization as well as attenuation correction.     RADIOPHARMACEUTICAL:    9.6 mCi F-18 FDG  FASTING GLUCOSE LEVEL:  80 mg/dl  INJECTION TIME:         0850 hours  SCAN TIME:              0954 hours     FINDINGS:       Left supraclavicular lymph nodes with elevated FDG uptake as high as 4.5 consistent with metastatic disease.  There is a low left supraclavicular lymph node medially with elevated FDG uptake of 5.9.  Low left jugular digastric lymph node with elevated  FDG uptake of 4.2.  Asymmetric radiotracer uptake within the right masseter muscles likely related to physiologic changes as there is no underlying mass lesion.  Physiologic uptake noted throughout the tongue.       Left infrahilar lymph node has elevated FDG uptake of 7.0.  Right hilar lymph node has elevated FDG uptake of 5.0  posterior mediastinal lymph node adjacent to the aorta with elevated FDG uptake of 4.2.  Right pleural fluid has elevated FDG uptake  concerning for involvement per of the pleural fluid by malignancy with maximum SUV uptake of 9.4.  It should be noted that there is been recent thoracentesis in this may be the reason behind this uptake rather than neoplastic disease.     Abdomen demonstrates a lymph node within the posterior gastrohepatic ligament with SUV of 5.4.     There is retroperitoneal adenopathy bilaterally posterior to the aorta and cava with the largest lymph node on the right with maximum SUV of 14.4 and the maximum SUV of the left retroperitoneal lymph node being 7.1.  Within the pelvis left external iliac   lymph node has  maximum SUV of 8.6 and left internal iliac lymph node has maximum SUV of 8.2.  There are multiple left external iliac lymph nodes with maximum SUV of 16.5.  There is a left obturator lymph node with maximum SUV of 5.8.       Minimal uptake within left small inguinal lymph nodes with SUV less than 2 likely reactive.                        Impression   CONCLUSION:       1. Left supraclavicular and jugular digastric lymph node with elevated FDG uptake consistent metastatic disease.     2. Bilateral hilar and posterior retroperitoneal lymph node elevated FDG uptake consistent with metastatic disease.     3. Pleural uptake within the right posterior pleural space could be related to recent thoracentesis although metastatic disease to the pleura is also considered.     4. Multiple abnormal lymph nodes including gastrohepatic ligament, bilateral retroperitoneal, left external iliac, and left  lymph nodes consistent with metastatic disease.        LOCATION:  [facility]           Dictated by (CST): Cj García MD on 3/11/2025 at 11:04 AM      Finalized by (CST): Cj García MD on 3/11/2025 at 12:25 PM       Impression and Plan:  1. Metastatic NSCLC   - H/o locally advanced NSCLC (IV low neck- cervical?)  - biopsy of left supraclav node and right pleural effusion showed metastatic adenocarcinoma c/w lung primary 2/2025  - PDL-1 negative (<1%), NGS had to be resent as initial sample sent by path insufficient. Guardant showed no targetable mutations. She wanted to get going with treatment and not wait for NGS results   - started carbo/taxol +ipi/nivo (9LA) 3/28/25  - NGS came back showing TP53 with no  mutations to target. She therefor continued on her current regimen.   - scans done after 2 cycles of chemo, 1 dose of ipi and 2 doses of nivo per protocol showed response.   - I had a long discussion with her regarding the 9LA regimen. Protocol only has 2 cycles of chemo + ipi/nivo. This regimen was compared to 4  cycles of chemo alone with the chemo+IO regimen showing superiority. As she had a good response with 2 cycles of chemo with her regimen she wanted to push to receive 2 more cycles despite all the SE/AEs she had on chemo. I was candid with them that we have no data whether adding 2 more cycles of chemo will add further benefit but can guarantee she will have more toxicity. I therefore favored she continued her regimen as outlined by the protocol.   - in the end she agreed to proceed with ipi+nivo alone per the protocol but with possible low threshold to get scans (next imaging I plan a PET) pending how she does clinically but also she admitted this would give her peace of mind  - scans just done during recent hospitalization showed stable findings except increase in right sided effusion. Not unusual for effusions to develop on IO   - nodes have recurred in left neck. Not unusual for nodes to come and go on IO but will proceed with PET as discussed  - will continue Ipi+Nivo for now    2. Neoplasm related pain, fibromyalgia. Chronic LBP, neck and left arm pain, TAPS  - followed by palliative care. On Ibuprofen, morphine PRN, also on topical lidocaine  - used to be followed at a pain clinic- not anymore  - has previous h/o neuropathy before she started recent regimen. Did get worse after chemotherapy but now off. On Lyrica    3. H/o asthma, bronchiectasis, former smoker, cough  - follows with pulmonary, continue inhalers   - flutter valve  - quit smoking in 2010    4. Anemia  - Hgb has dropped with chemo which is likely also contributing to fatigue  - Gave venofer at previous visit  - checked for other contributing causes- negative  - as off chemo hope to improve over time. Levels adequate    5. TSH elevated  - but T4 normal. This was baseline even before starting IO. Should still be able to proceed but will need close monitoring  - was on LT4 before but was stopped by PCP as levels had been good. Has been restarted by  PCP.  - TFTs back to normal--> TSH slightly low today with normal T4. Monitor    6. Constipation   - bowel regimen   - could be culprit for abdominal pain she describes  - better on MOM, prune juice    7. Nausea  - anti-emetics.   - hopefully will be less/resolve off chemo    8. Itching  - likely irAE  - mainly at night and benadryl helps. Topical emollients and topical steroids as needed.  - less    9. Tachycardia  - h/o sinus tach. Followed by cards. Was placed on diltiazem. Recommended she discuss this further with cards. Regular today      Labs reviewed   Continue palliative care  Proceed with treatment today  PET    RTC 3 weeks      Risk level high- metastatic lung cancer on chemo+IO with SE/AEs and recent hospitalization      Shoshana Gordon MD  [facility]

## 2025-06-26 NOTE — PROGRESS NOTES
Palliative Care Follow Up Note     Patient Name: Hanna Barrett   YOB: 1970   Medical Record Number: [id number]   CSN: [id number]   Date of visit:   6/26/2025     Chief Complaint/Reason for Visit:  Follow up visit for pain     History of Present Illness:         Hanna Barrett is a 55 year old female with metastatic lung cancer receiving immunotherapy.   She is feeling ok but still struggles with fatigue.   She is working with her psychiatrist to help with this.   She is looking forward to a couple long weekends with family.   She is sleeping most nights with cannabis use.   She has noticed new hard nodules on her L neck which have been painful.   She continues to struggle with her chronic fibromyalgia pain which is unchanged.  She wakes in pain sometimes prompting an extra dose of morphine IR.   She has found herself using morphine TID more frequently now with with slight benefit for neck related pain.  She is using ibuprofen 400mg 2-3X daily alternating with morphine which is helpful.  She continues to struggle with MARCIO  in her legs and feet which has improved with lyrica TID.   She is happy for the pain control and feels her QOL has improved.    She has been using MOM with prune juice beneficial when needed.   She denies feeling constipated.  She is feeling more anxious about new nodules in neck.  She has a PET scan ordered and is working with her psychiatrist.      Problem List:  Patient Active Problem List   Diagnosis    Essential hypertension    Family history of breast cancer    Family history of pancreatic cancer    Family history of ovarian cancer    Stress incontinence    Hyperlipidemia    Vitamin D deficiency    Primary osteoarthritis of first carpometacarpal joint of right hand    Cervical radiculopathy    Chronic narcotic use    Cervical spondylosis with radiculopathy    DDD (degenerative disc disease), lumbar    Polyneuropathy, unspecified    Other intervertebral disc degeneration,  lumbar region    Cervical myofascial pain syndrome    Neck and shoulder pain    Opioid use, unspecified with withdrawal (HCC)    Chronic bilateral low back pain without sciatica    History of tobacco use    Myalgia, other site    Fibromyalgia    Chronic bilateral thoracic back pain    Constipation    Canker sore    Other spondylosis with radiculopathy, cervical region    Abnormal mammogram    Primary adenocarcinoma of right lung (HCC)    Mediastinal adenopathy    Primary lung cancer with metastasis from lung to other site, right (HCC)    Personal history of nicotine dependence    At risk for dehydration    Dyspnea    Dyspnea, unspecified type    History of COVID-19    Hemoptysis    Disorder of thyroid, unspecified    Encounter for other orthopedic aftercare    History of falling    Generalized anxiety disorder    Major depressive disorder, single episode, unspecified    Other specified urinary incontinence    Unspecified visual loss    Memory changes    Failed back surgical syndrome    Exertional chest pain    Decreased exercise tolerance    Lung fibrosis (HCC)    Palliative care by specialist    Anemia, normocytic normochromic    Hyperglycemia    Pleural effusion    Malignant neoplasm of lung, unspecified laterality, unspecified part of lung (HCC)    Dyspnea on exertion    Tachycardia    Metastatic lung cancer (metastasis from lung to other site), right (HCC)    Neoplasm related pain    Medication side effects    Profound fatigue      Medical History:  Past Medical History:    Abnormal uterine bleeding    bleeds every 2 weeks    Anxiety state    Asthma (HCC)    Attention deficit hyperactivity disorder (ADHD)    BACK PAIN    Back problem    lumbar radiculopathy    Cancer (HCC)    adenocarcinoma of right lung    MARCIO II (cervical intraepithelial neoplasia II)    DDD (degenerative disc disease), lumbar    DDD (degenerative disc disease), thoracic    Depression    Disorder of thyroid    Dyspareunia    Exposure to  medical diagnostic radiation    On hold since 2022    Fall    Fibromyalgia    Fibromyalgia    Genital warts    High blood pressure    History of COVID-19    COVID + 2020 Headache - NO Hospitalization needed     History of lumbar fusion    Hx of motion sickness    IBS (irritable bowel syndrome)    Per patient - Just constipation    Infertility, female    Lumbar radiculopathy    Mild dysplasia of cervix    Pap smear for cervical cancer screening    pt states normal    Papanicolaou smear of cervix with high grade squamous intraepithelial lesion (HGSIL)    Personal history of antineoplastic chemotherapy    Last chemo 22 prior to lung bx 22    Post covid-19 condition, unspecified    symptoms: HA; s/s resolved-yes; not hospitalized    Problems with swallowing    with radiation    Sexually transmitted disease    HPV    Substance abuse (HCC)    cocaine    Urinary incontinence    Visual impairment    glasses/contacts bifocals     Surgical History:  Past Surgical History:   Procedure Laterality Date    Appendectomy      Back surgery  2009    fusion L5-S1    Back surgery  2021    RIGHT LUMBAR 3/ LUMBAR 4, LUMBAR 4/ LUMBAR 5 HEMILAMINTOMIES, LEFT LUMBAR 2 / LUMBAR 3 MICRODISCECTOMY    Colonoscopy N/A 2023    Procedure: COLONOSCOPY;  Surgeon: Devante Kern MD;  Location:  ENDOSCOPY    Colposcopy,bx cervix/endocerv curr  11    MARCIO 1    Laparoscopy procedure unlisted      Ovarian cyst removed    Leep  2011    MARCIO 2          X2    Other surgical history      bunions bilateral    Other surgical history      nodule lymph nodes neck    Other surgical history  2016     Bladder sling    Other surgical history  2020    Cystoscopy, Dr Beach       Allergies:  Allergies[1]    Palliative Care Social History:    Marital Status:  single  Children: son  Living Situation: independent .  Works full-time with Medicare insurance      Medications:  Current Outpatient Medications    Medication Sig Dispense Refill    morphINE ER 15 MG Oral Tab CR Take 1 tablet (15 mg total) by mouth every 12 (twelve) hours. 60 tablet 0    morphINE 15 MG Oral Tab Take 1 tablet (15 mg total) by mouth every 8 (eight) hours as needed for Pain. 90 tablet 0    pregabalin 75 MG Oral Cap Take 1 capsule (75 mg total) by mouth 3 (three) times daily. 90 capsule 1    ibuprofen 400 MG Oral Tab Take 1 tablet (400 mg total) by mouth every 8 (eight) hours as needed for Pain. 90 tablet 0    memantine 5 MG Oral Tab Take 1 tablet (5 mg total) by mouth daily.      levothyroxine 75 MCG Oral Tab Take 1 tablet (75 mcg total) by mouth before breakfast. 90 tablet 0    lisinopril-hydroCHLOROthiazide 20-12.5 MG Oral Tab Take 0.5 tablets by mouth daily. 45 tablet 3    prochlorperazine (COMPAZINE) 10 mg tablet Take 1 tablet (10 mg total) by mouth every 6 (six) hours as needed for Nausea. 30 tablet 3    ondansetron (ZOFRAN) 8 MG tablet Take 1 tablet (8 mg total) by mouth every 8 (eight) hours as needed for Nausea. 30 tablet 3    dilTIAZem HCl 120 MG Oral Tab Take 1 tablet (120 mg total) by mouth in the morning.      montelukast 10 MG Oral Tab Take 1 tablet (10 mg total) by mouth nightly. (Patient taking differently: Take 1 tablet (10 mg total) by mouth daily as needed (shortness of breath).) 90 tablet 3    albuterol 108 (90 Base) MCG/ACT Inhalation Aero Soln Inhale 2 puffs into the lungs every 6 (six) hours as needed for Wheezing or Shortness of Breath. 3 each 3    amphetamine-dextroamphetamine 15 MG Oral Tab Take 1 tablet (15 mg total) by mouth daily as needed.      ARIPiprazole 2 MG Oral Tab Take 1 tablet (2 mg total) by mouth daily.      VYVANSE 70 MG Oral Cap Take 1 capsule (70 mg total) by mouth every morning.      cetirizine 10 MG Oral Tab Take 1 tablet (10 mg total) by mouth daily. (Patient taking differently: Take 1 tablet (10 mg total) by mouth daily as needed.) 90 tablet 1    ferrous sulfate 325 (65 FE) MG Oral Tab EC Take 1  tablet (325 mg total) by mouth daily with breakfast. (Patient not taking: Reported on 6/26/2025)      Multiple Vitamin Oral Tab Take 1 tablet by mouth in the morning.      cholecalciferol 25 MCG (1000 UT) Oral Cap Take 1 capsule (1,000 Units total) by mouth in the morning.  0    DULoxetine HCl 60 MG Oral Cap DR Particles Take 1 capsule (60 mg total) by mouth in the morning.  2       Review of Systems:  General:  Fatigue.  Feels well.    Respiratory:  Denies SOB, denies cough  Cardiac:  Denies chest pain, heart palpitations  Abdomen:  Denies constipation, diarrhea.  Denies pain.    Psych:  No complaints.  Sleeping well    Palliative Performance Scale:   80%    Physical Examination:  General: Patient is alert and oriented, not in acute distress.  HEENT: palpable cervical lymph node  Respiratory:   Normal excursions and effort  Cardiac: Regular rate   Abdomen: Soft, non tender with good bowel sounds.  Musculoskeletal: Normal gait.   Psych:  Mood/Affect appropriate    Advanced Directives Discussed and Completed:     HCPOA/Health Surrogate:    There is no completed HCPOA documentation on file in Epic.    Patient/family was asked to bring in a copy of HCPOA document to be scanned in to Epic    I confirmed that patient's HCPOA is:  Her son, Dick BRISCOE Discussed and Completed:   focused on symptoms today.      Palliative Care:   Her pain is controlled with morphine, lyrica and ibuprofen.   She is finding pain is spiking more and waking hr more often now so will add morphine ER to better regulate pain.    The goal is to wean off opioids as pain improves with treatment  She continues to work with psychiatry to optimize fatigue and anxiety.  Discussed non medication strategies to improve fatigue.  Encouraged sunlight  Support provided  Discussed opioid use and safety  Opioid agreement reviewed  UDS ordered  Morphine ER sent to [facility]    Impression/Plan:   Neoplasm related pain  Ibuprofen 400mg TID prn  Morphine  IR15mg Q 8 prn  Morphine ER 15mg Q 12    MARCIO  Pregabalin 75mg TID  Topical lidocaine    Constipation  MOM daily prn  Prune juice daily    Palliative care  Ongoing support    Metastatic R Lung cancer      Planned Follow up:  3 weeks      I spent a total of 40 minutes with the patient today, which included all of the following:direct face to face contact, history taking, physical examination, and >50% was spent counseling and coordinating care         Electronically Signed by:  GABY SANFORD   [facility] Outpatient Palliative Nurse Practitioner            [1]   Allergies  Allergen Reactions    Gabapentin SWELLING and UNKNOWN    Erythromycin UNKNOWN    Clindamycin RASH

## 2025-07-02 ENCOUNTER — HOSPITAL ENCOUNTER (OUTPATIENT)
Dept: NUCLEAR MEDICINE | Facility: HOSPITAL | Age: 55
Discharge: HOME | End: 2025-07-02
Attending: INTERNAL MEDICINE
Payer: MEDICAID

## 2025-07-02 DIAGNOSIS — R59.0 MEDIASTINAL ADENOPATHY: ICD-10-CM

## 2025-07-02 DIAGNOSIS — C34.91 PRIMARY LUNG CANCER WITH METASTASIS FROM LUNG TO OTHER SITE, RIGHT (HCC): ICD-10-CM

## 2025-07-02 DIAGNOSIS — C34.81 CANCER OF OVERLAPPING SITES OF RIGHT LUNG (HCC): ICD-10-CM

## 2025-07-02 DIAGNOSIS — C34.91 PRIMARY ADENOCARCINOMA OF RIGHT LUNG (HCC): ICD-10-CM

## 2025-07-02 LAB — GLUCOSE BLD-MCNC: 91 MG/DL (ref 70–99)

## 2025-07-02 PROCEDURE — 78815 PET IMAGE W/CT SKULL-THIGH: CPT | Performed by: INTERNAL MEDICINE

## 2025-07-02 PROCEDURE — 82962 GLUCOSE BLOOD TEST: CPT

## 2025-07-07 DIAGNOSIS — C34.91 PRIMARY LUNG CANCER WITH METASTASIS FROM LUNG TO OTHER SITE, RIGHT (HCC): Primary | ICD-10-CM

## 2025-07-09 ENCOUNTER — PATIENT MESSAGE (OUTPATIENT)
Facility: CLINIC | Age: 55
End: 2025-07-09

## 2025-07-09 ENCOUNTER — TELEPHONE (OUTPATIENT)
Age: 55
End: 2025-07-09

## 2025-07-10 ENCOUNTER — TELEPHONE (OUTPATIENT)
Facility: CLINIC | Age: 55
End: 2025-07-10

## 2025-07-10 ENCOUNTER — ORDER TRANSCRIPTION (OUTPATIENT)
Dept: ADMINISTRATIVE | Facility: HOSPITAL | Age: 55
End: 2025-07-10

## 2025-07-10 DIAGNOSIS — J90 PLEURAL EFFUSION: Primary | ICD-10-CM

## 2025-07-10 NOTE — TELEPHONE ENCOUNTER
Patient is stating she cannot wait until 7/16 for her thoracentesis.  She is having issues breathing and sleeping around 20hrs a day. Contimplating if she should go to the ER

## 2025-07-10 NOTE — TELEPHONE ENCOUNTER
Patient following up, she has not heard back from our office and is wondering if she should go to the ER.

## 2025-07-10 NOTE — TELEPHONE ENCOUNTER
Call placed to [facility].  No PA required for CPT code 98425  Call Reference #:  [id number]

## 2025-07-10 NOTE — TELEPHONE ENCOUNTER
Per Sharon Keller, APRN:  Should go to ER then if she's symptomatic.     Left a detailed message for pt with above information.

## 2025-07-11 NOTE — TELEPHONE ENCOUNTER
Call placed to IR and spoke with Faiza.  They have no availability to do a thoracentesis until Thursday.  Pt notified.  Discussed alternative of going to ER if she has increased symptoms and cannot make it until Wednesday when procedure is scheduled.    Pt states that she is now ready to have Pleurex catheter placed as the need for thoracentesis is increasing.  She is also thinking she might need oxygen at night.  She is complaining of increased fatigue during the and wondering if low o2 at night might be the cause.  Message routed to Dr. Corrigan and GABY Gastelum.

## 2025-07-11 NOTE — TELEPHONE ENCOUNTER
Call placed to [facility] in radiology and left message to call back.  Need to check availability for pt to have thoracentesis on Monday or Tuesday with IR.

## 2025-07-11 NOTE — TELEPHONE ENCOUNTER
Patient called and made aware per Dr. Corrigan wouldn't do a PleurX unless she needs thoracenteses repeatedly. Per patient, this is her 6th thoracentesis. Patient stated that both her oncologist and Dr. Corrigan told her to think about the PleurX. Patient advised can discuss with Dr. Corrigan when he sees her.

## 2025-07-12 ENCOUNTER — HOSPITAL ENCOUNTER (INPATIENT)
Facility: HOSPITAL | Age: 55
LOS: 2 days | Discharge: HOME | End: 2025-07-14
Attending: EMERGENCY MEDICINE | Admitting: HOSPITALIST

## 2025-07-12 ENCOUNTER — HOSPITAL ENCOUNTER (INPATIENT)
Facility: HOSPITAL | Age: 55
LOS: 2 days | Discharge: HOME HEALTH | End: 2025-07-14
Attending: EMERGENCY MEDICINE | Admitting: HOSPITALIST

## 2025-07-12 ENCOUNTER — APPOINTMENT (OUTPATIENT)
Dept: GENERAL RADIOLOGY | Facility: HOSPITAL | Age: 55
End: 2025-07-12
Attending: EMERGENCY MEDICINE

## 2025-07-12 ENCOUNTER — APPOINTMENT (OUTPATIENT)
Dept: CT IMAGING | Facility: HOSPITAL | Age: 55
End: 2025-07-12
Attending: EMERGENCY MEDICINE

## 2025-07-12 ENCOUNTER — APPOINTMENT (OUTPATIENT)
Dept: INTERVENTIONAL RADIOLOGY/VASCULAR | Facility: HOSPITAL | Age: 55
End: 2025-07-12
Attending: EMERGENCY MEDICINE

## 2025-07-12 DIAGNOSIS — R09.02 HYPOXIA: ICD-10-CM

## 2025-07-12 DIAGNOSIS — J18.9 COMMUNITY ACQUIRED PNEUMONIA, UNSPECIFIED LATERALITY: ICD-10-CM

## 2025-07-12 DIAGNOSIS — J90 PLEURAL EFFUSION: ICD-10-CM

## 2025-07-12 DIAGNOSIS — C34.90 MALIGNANT NEOPLASM OF LUNG, UNSPECIFIED LATERALITY, UNSPECIFIED PART OF LUNG (HCC): Primary | ICD-10-CM

## 2025-07-12 PROBLEM — J45.909 UNCOMPLICATED ASTHMA (HCC): Status: ACTIVE | Noted: 2025-07-12

## 2025-07-12 PROBLEM — G89.3 CANCER ASSOCIATED PAIN: Status: ACTIVE | Noted: 2025-06-04

## 2025-07-12 LAB
ALBUMIN SERPL-MCNC: 3.9 G/DL (ref 3.2–4.8)
ALBUMIN/GLOB SERPL: 1.7 (ref 1–2)
ALP LIVER SERPL-CCNC: 64 U/L (ref 41–108)
ALT SERPL-CCNC: 21 U/L (ref 10–49)
ANION GAP SERPL CALC-SCNC: 8 MMOL/L (ref 0–18)
AST SERPL-CCNC: 20 U/L (ref ?–34)
BASOPHILS # BLD AUTO: 0.05 X10(3) UL (ref 0–0.2)
BASOPHILS NFR BLD AUTO: 0.7 %
BILIRUB SERPL-MCNC: 0.3 MG/DL (ref 0.3–1.2)
BUN BLD-MCNC: 16 MG/DL (ref 9–23)
CALCIUM BLD-MCNC: 9.4 MG/DL (ref 8.7–10.6)
CHLORIDE SERPL-SCNC: 106 MMOL/L (ref 98–112)
CO2 SERPL-SCNC: 28 MMOL/L (ref 21–32)
CREAT BLD-MCNC: 0.93 MG/DL (ref 0.55–1.02)
EGFRCR SERPLBLD CKD-EPI 2021: 73 ML/MIN/1.73M2 (ref 60–?)
EOSINOPHIL # BLD AUTO: 0.44 X10(3) UL (ref 0–0.7)
EOSINOPHIL NFR BLD AUTO: 6.6 %
ERYTHROCYTE [DISTWIDTH] IN BLOOD BY AUTOMATED COUNT: 12.6 %
FLUAV + FLUBV RNA SPEC NAA+PROBE: NEGATIVE
FLUAV + FLUBV RNA SPEC NAA+PROBE: NEGATIVE
GLOBULIN PLAS-MCNC: 2.3 G/DL (ref 2–3.5)
GLUCOSE BLD-MCNC: 117 MG/DL (ref 70–99)
HCT VFR BLD AUTO: 34.5 % (ref 35–48)
HGB BLD-MCNC: 11.9 G/DL (ref 12–16)
IMM GRANULOCYTES # BLD AUTO: 0.02 X10(3) UL (ref 0–1)
IMM GRANULOCYTES NFR BLD: 0.3 %
LACTATE SERPL-SCNC: 0.8 MMOL/L (ref 0.5–2)
LYMPHOCYTES # BLD AUTO: 0.81 X10(3) UL (ref 1–4)
LYMPHOCYTES NFR BLD AUTO: 12.1 %
MCH RBC QN AUTO: 31 PG (ref 26–34)
MCHC RBC AUTO-ENTMCNC: 34.5 G/DL (ref 31–37)
MCV RBC AUTO: 89.8 FL (ref 80–100)
MONOCYTES # BLD AUTO: 0.71 X10(3) UL (ref 0.1–1)
MONOCYTES NFR BLD AUTO: 10.6 %
NEUTROPHILS # BLD AUTO: 4.68 X10 (3) UL (ref 1.5–7.7)
NEUTROPHILS # BLD AUTO: 4.68 X10(3) UL (ref 1.5–7.7)
NEUTROPHILS NFR BLD AUTO: 69.7 %
OSMOLALITY SERPL CALC.SUM OF ELEC: 296 MOSM/KG (ref 275–295)
PLATELET # BLD AUTO: 279 10(3)UL (ref 150–450)
POTASSIUM SERPL-SCNC: 4 MMOL/L (ref 3.5–5.1)
PROT SERPL-MCNC: 6.2 G/DL (ref 5.7–8.2)
RBC # BLD AUTO: 3.84 X10(6)UL (ref 3.8–5.3)
RSV RNA SPEC NAA+PROBE: NEGATIVE
SARS-COV-2 RNA RESP QL NAA+PROBE: NOT DETECTED
SODIUM SERPL-SCNC: 142 MMOL/L (ref 136–145)
WBC # BLD AUTO: 6.7 X10(3) UL (ref 4–11)

## 2025-07-12 PROCEDURE — 71045 X-RAY EXAM CHEST 1 VIEW: CPT | Performed by: EMERGENCY MEDICINE

## 2025-07-12 PROCEDURE — 99223 1ST HOSP IP/OBS HIGH 75: CPT | Performed by: STUDENT IN AN ORGANIZED HEALTH CARE EDUCATION/TRAINING PROGRAM

## 2025-07-12 PROCEDURE — 71275 CT ANGIOGRAPHY CHEST: CPT | Performed by: EMERGENCY MEDICINE

## 2025-07-12 PROCEDURE — 0W9930Z DRAINAGE OF RIGHT PLEURAL CAVITY WITH DRAINAGE DEVICE, PERCUTANEOUS APPROACH: ICD-10-PCS | Performed by: RADIOLOGY

## 2025-07-12 RX ORDER — CEFAZOLIN SODIUM 1 G/3ML
INJECTION, POWDER, FOR SOLUTION INTRAMUSCULAR; INTRAVENOUS
Status: COMPLETED
Start: 2025-07-12 | End: 2025-07-12

## 2025-07-12 RX ORDER — MIDAZOLAM HYDROCHLORIDE 1 MG/ML
INJECTION INTRAMUSCULAR; INTRAVENOUS
Status: COMPLETED
Start: 2025-07-12 | End: 2025-07-12

## 2025-07-12 RX ORDER — BENZONATATE 100 MG/1
200 CAPSULE ORAL 3 TIMES DAILY PRN
Status: DISCONTINUED | OUTPATIENT
Start: 2025-07-12 | End: 2025-07-14

## 2025-07-12 RX ORDER — LIDOCAINE HYDROCHLORIDE 10 MG/ML
INJECTION, SOLUTION INFILTRATION; PERINEURAL
Status: COMPLETED
Start: 2025-07-12 | End: 2025-07-12

## 2025-07-12 RX ORDER — MORPHINE SULFATE 15 MG/1
15 TABLET, FILM COATED, EXTENDED RELEASE ORAL EVERY 12 HOURS SCHEDULED
Status: DISCONTINUED | OUTPATIENT
Start: 2025-07-12 | End: 2025-07-14

## 2025-07-12 RX ORDER — MEMANTINE HYDROCHLORIDE 5 MG/1
5 TABLET ORAL DAILY
Status: DISCONTINUED | OUTPATIENT
Start: 2025-07-13 | End: 2025-07-14

## 2025-07-12 RX ORDER — DULOXETIN HYDROCHLORIDE 60 MG/1
60 CAPSULE, DELAYED RELEASE ORAL DAILY
Status: DISCONTINUED | OUTPATIENT
Start: 2025-07-13 | End: 2025-07-14

## 2025-07-12 RX ORDER — HYDROCHLOROTHIAZIDE 12.5 MG/1
12.5 TABLET ORAL DAILY
Status: DISCONTINUED | OUTPATIENT
Start: 2025-07-13 | End: 2025-07-14

## 2025-07-12 RX ORDER — BISACODYL 10 MG
10 SUPPOSITORY, RECTAL RECTAL
Status: DISCONTINUED | OUTPATIENT
Start: 2025-07-12 | End: 2025-07-14

## 2025-07-12 RX ORDER — MORPHINE SULFATE 30 MG/1
15 TABLET ORAL EVERY 8 HOURS PRN
Status: DISCONTINUED | OUTPATIENT
Start: 2025-07-12 | End: 2025-07-14

## 2025-07-12 RX ORDER — HYDROMORPHONE HYDROCHLORIDE 1 MG/ML
0.2 INJECTION, SOLUTION INTRAMUSCULAR; INTRAVENOUS; SUBCUTANEOUS EVERY 2 HOUR PRN
Status: DISCONTINUED | OUTPATIENT
Start: 2025-07-12 | End: 2025-07-14

## 2025-07-12 RX ORDER — HYDROCODONE BITARTRATE AND ACETAMINOPHEN 5; 325 MG/1; MG/1
2 TABLET ORAL EVERY 4 HOURS PRN
Status: DISCONTINUED | OUTPATIENT
Start: 2025-07-12 | End: 2025-07-14

## 2025-07-12 RX ORDER — ACETAMINOPHEN 325 MG/1
650 TABLET ORAL EVERY 4 HOURS PRN
Status: DISCONTINUED | OUTPATIENT
Start: 2025-07-12 | End: 2025-07-14

## 2025-07-12 RX ORDER — SODIUM PHOSPHATE,MONO-DIBASIC 19G-7G/118
1 ENEMA (ML) RECTAL ONCE AS NEEDED
Status: COMPLETED | OUTPATIENT
Start: 2025-07-12 | End: 2025-07-12

## 2025-07-12 RX ORDER — SENNOSIDES 8.6 MG
17.2 TABLET ORAL NIGHTLY PRN
Status: DISCONTINUED | OUTPATIENT
Start: 2025-07-12 | End: 2025-07-14

## 2025-07-12 RX ORDER — LISINOPRIL 20 MG/1
20 TABLET ORAL DAILY
Status: DISCONTINUED | OUTPATIENT
Start: 2025-07-13 | End: 2025-07-14

## 2025-07-12 RX ORDER — POLYETHYLENE GLYCOL 3350 17 G/17G
17 POWDER, FOR SOLUTION ORAL DAILY PRN
Status: DISCONTINUED | OUTPATIENT
Start: 2025-07-12 | End: 2025-07-14

## 2025-07-12 RX ORDER — ARIPIPRAZOLE 2 MG/1
2 TABLET ORAL DAILY
Status: DISCONTINUED | OUTPATIENT
Start: 2025-07-13 | End: 2025-07-14

## 2025-07-12 RX ORDER — HYDROCODONE BITARTRATE AND ACETAMINOPHEN 5; 325 MG/1; MG/1
1 TABLET ORAL EVERY 4 HOURS PRN
Status: DISCONTINUED | OUTPATIENT
Start: 2025-07-12 | End: 2025-07-14

## 2025-07-12 RX ORDER — ONDANSETRON 2 MG/ML
4 INJECTION INTRAMUSCULAR; INTRAVENOUS EVERY 6 HOURS PRN
Status: DISCONTINUED | OUTPATIENT
Start: 2025-07-12 | End: 2025-07-14

## 2025-07-12 RX ORDER — PREGABALIN 75 MG/1
75 CAPSULE ORAL 3 TIMES DAILY
Status: DISCONTINUED | OUTPATIENT
Start: 2025-07-12 | End: 2025-07-14

## 2025-07-12 RX ORDER — ECHINACEA PURPUREA EXTRACT 125 MG
1 TABLET ORAL
Status: DISCONTINUED | OUTPATIENT
Start: 2025-07-12 | End: 2025-07-14

## 2025-07-12 RX ORDER — SODIUM CHLORIDE 9 MG/ML
1000 INJECTION, SOLUTION INTRAVENOUS ONCE
Status: COMPLETED | OUTPATIENT
Start: 2025-07-12 | End: 2025-07-12

## 2025-07-12 RX ORDER — LISINOPRIL AND HYDROCHLOROTHIAZIDE 12.5; 2 MG/1; MG/1
0.5 TABLET ORAL DAILY
Status: DISCONTINUED | OUTPATIENT
Start: 2025-07-12 | End: 2025-07-12 | Stop reason: SDUPTHER

## 2025-07-12 RX ORDER — PROCHLORPERAZINE EDISYLATE 5 MG/ML
5 INJECTION INTRAMUSCULAR; INTRAVENOUS EVERY 8 HOURS PRN
Status: DISCONTINUED | OUTPATIENT
Start: 2025-07-12 | End: 2025-07-14

## 2025-07-12 RX ORDER — HYDROMORPHONE HYDROCHLORIDE 1 MG/ML
0.8 INJECTION, SOLUTION INTRAMUSCULAR; INTRAVENOUS; SUBCUTANEOUS EVERY 2 HOUR PRN
Status: DISCONTINUED | OUTPATIENT
Start: 2025-07-12 | End: 2025-07-14

## 2025-07-12 RX ORDER — LIDOCAINE HYDROCHLORIDE AND EPINEPHRINE BITARTRATE 20; .01 MG/ML; MG/ML
INJECTION, SOLUTION SUBCUTANEOUS
Status: COMPLETED
Start: 2025-07-12 | End: 2025-07-12

## 2025-07-12 RX ORDER — DILTIAZEM HYDROCHLORIDE 30 MG/1
120 TABLET, FILM COATED ORAL DAILY
Status: DISCONTINUED | OUTPATIENT
Start: 2025-07-13 | End: 2025-07-14

## 2025-07-12 RX ORDER — IPRATROPIUM BROMIDE AND ALBUTEROL SULFATE 2.5; .5 MG/3ML; MG/3ML
3 SOLUTION RESPIRATORY (INHALATION) EVERY 4 HOURS PRN
Status: DISCONTINUED | OUTPATIENT
Start: 2025-07-12 | End: 2025-07-14

## 2025-07-12 RX ORDER — LEVOTHYROXINE SODIUM 75 UG/1
75 TABLET ORAL
Status: DISCONTINUED | OUTPATIENT
Start: 2025-07-13 | End: 2025-07-14

## 2025-07-12 RX ORDER — ENOXAPARIN SODIUM 100 MG/ML
40 INJECTION SUBCUTANEOUS DAILY
Status: DISCONTINUED | OUTPATIENT
Start: 2025-07-13 | End: 2025-07-14

## 2025-07-12 RX ORDER — HYDROMORPHONE HYDROCHLORIDE 1 MG/ML
0.4 INJECTION, SOLUTION INTRAMUSCULAR; INTRAVENOUS; SUBCUTANEOUS EVERY 2 HOUR PRN
Status: DISCONTINUED | OUTPATIENT
Start: 2025-07-12 | End: 2025-07-14

## 2025-07-12 RX ORDER — MONTELUKAST SODIUM 10 MG/1
10 TABLET ORAL NIGHTLY PRN
Status: DISCONTINUED | OUTPATIENT
Start: 2025-07-12 | End: 2025-07-14

## 2025-07-12 NOTE — PROCEDURES
[facility]   part of [facility]  Procedure Note    Hanna Barrett Patient Status:  Emergency    1970 MRN [id number]   Location [facility] INTERVENTIONAL SUITES Attending No att. providers found   Hosp Day # 0 PCP Guillermo Curry MD     Procedure: Right pleurx catheter placement    Pre-Procedure Diagnosis:  Recurrent pleural effusion    Post-Procedure Diagnosis: Same    Anesthesia:  Sedation    Findings:  Serous fluid    Specimens: See dictation    Blood Loss:  < 5 cc    Tourniquet Time: None  Complications:  None  Drains:  None    Secondary Diagnosis:  N/A    Sarahi Garcia MD  2025

## 2025-07-12 NOTE — PRE-SEDATION ASSESSMENT
[facility]   part of [facility] Pre-Procedure Sedation Assessment        Mallampati Score:  II (hard and soft palate, upper portion of tonsils anduvula visible)    ASA Classification:   3. Patient with severe systemic disease    Plan:   -IV moderate sedation    Sarahi Garcia MD

## 2025-07-12 NOTE — ED QUICK NOTES
Orders for admission, patient is aware of plan and ready to go upstairs. Any questions, please call ED RN hayden at extension A pod.     Patient Covid vaccination status: Fully vaccinated     COVID Test Ordered in ED: SARS-CoV-2/Flu A and B/RSV by PCR (GeneXpert)    COVID Suspicion at Admission: N/A    Running Infusions: see mar     Mental Status/LOC at time of transport: a/ox3    Other pertinent information:   CIWA score: N/A   NIH score:  N/A

## 2025-07-12 NOTE — CM/SW NOTE
Home Health (HH) Request: ANTON was consulted to arrange home health services for a patient who recently had a PleurX drain placed in Interventional Radiology (IR). The plan included HH follow-up at home for education and drain management.    Patient Interaction: ANTON met with the patient and her son at the bedside. The patient reported that she does not anticipate needing to drain fluid in the immediate next few days. She received a PleurX drainage bottle from IR.    Referral Status: ANTON intended to submit an Aidin referral for  services.    Change in Condition: Before the referral could be completed, the patient became hypoxic during ambulation and is now being admitted for further inpatient care.

## 2025-07-12 NOTE — ED INITIAL ASSESSMENT (HPI)
Patient to ER w/ complaints of SOB. States her spo2 this am was 85% on RA. Hx lung cancer & pleural effusion- scheduled to be drained on Wednesday.

## 2025-07-12 NOTE — ED PROVIDER NOTES
Patient Seen in: [facility] Emergency Department       The following individual(s) verbally consented to be recorded using ambient AI listening technology and understand that they can each withdraw their consent to this listening technology at any point by asking the clinician to turn off or pause the recording:    Patient name: Hanna Barrett  Additional names:        History  Chief Complaint   Patient presents with    Difficulty Breathing     Stated Complaint: shortness of breath, hx lung cancer with pleural effusion    Subjective:   HPI     Hanna Barrett is a 55 year old female with pleural effusion who presents with difficulty breathing. She is accompanied by her son.    She is experiencing significant difficulty breathing, describing it as 'scary' and noting that it has never been this bad before. She mentions having 'water in her nose again' and has had this drained multiple times in the past. Her oxygen levels at home have been low, around 85%. She has started coughing recently and has no chest pain but emphasizes her inability to breathe properly.    Her pleural effusion is typically on the right side. She recalls that previous chest x-rays did not show the fluid, and CT scans showed very little, but significant amounts have been drained in the past, approximately 1.5 to 1.7 liters.    She is currently undergoing immunotherapy for cancer and is not on chemotherapy at this time.        Objective:     Past Medical History:    Abnormal uterine bleeding    bleeds every 2 weeks    Anxiety state    Asthma (HCC)    Attention deficit hyperactivity disorder (ADHD)    BACK PAIN    Back problem    lumbar radiculopathy    Cancer (HCC)    adenocarcinoma of right lung    MARCIO II (cervical intraepithelial neoplasia II)    DDD (degenerative disc disease), lumbar    DDD (degenerative disc disease), thoracic    Depression    Disorder of thyroid    Dyspareunia    Exposure to medical diagnostic radiation    On hold  since 2022    Fall    Fibromyalgia    Fibromyalgia    Genital warts    High blood pressure    History of COVID-19    COVID + 2020 Headache - NO Hospitalization needed     History of lumbar fusion    Hx of motion sickness    IBS (irritable bowel syndrome)    Per patient - Just constipation    Infertility, female    Lumbar radiculopathy    Mild dysplasia of cervix    Pap smear for cervical cancer screening    pt states normal    Papanicolaou smear of cervix with high grade squamous intraepithelial lesion (HGSIL)    Personal history of antineoplastic chemotherapy    Last chemo 22 prior to lung bx 22    Post covid-19 condition, unspecified    symptoms: HA; s/s resolved-yes; not hospitalized    Problems with swallowing    with radiation    Sexually transmitted disease    HPV    Substance abuse (HCC)    cocaine    Urinary incontinence    Visual impairment    glasses/contacts bifocals              Past Surgical History:   Procedure Laterality Date    Appendectomy      Back surgery  2009    fusion L5-S1    Back surgery  2021    RIGHT LUMBAR 3/ LUMBAR 4, LUMBAR 4/ LUMBAR 5 HEMILAMINTOMIES, LEFT LUMBAR 2 / LUMBAR 3 MICRODISCECTOMY    Colonoscopy N/A 2023    Procedure: COLONOSCOPY;  Surgeon: Devante Kern MD;  Location:  ENDOSCOPY    Colposcopy,bx cervix/endocerv curr  11    MARCIO 1    Laparoscopy procedure unlisted      Ovarian cyst removed    Leep  2011    MARCIO 2          X2    Other surgical history      bunions bilateral    Other surgical history      nodule lymph nodes neck    Other surgical history  2016     Bladder sling    Other surgical history  2020    Cystoscopy, Dr Beach                Social History     Socioeconomic History    Marital status:    Tobacco Use    Smoking status: Former     Current packs/day: 0.00     Average packs/day: 0.8 packs/day for 19.0 years (14.5 ttl pk-yrs)     Types: Cigarettes     Start date:      Quit date:       Years since quitting: 15.5     Passive exposure: Past    Smokeless tobacco: Former     Quit date: 6/1/2024    Tobacco comments:     Has not vaped since 6/1/2024.    Vaping Use    Vaping status: Former    Substances: Nicotine, daily    Devices: Pre-filled pod   Substance and Sexual Activity    Alcohol use: Yes     Comment: 2-3 drinks month    Drug use: Yes     Types: Cocaine, Cannabis     Comment: USED COCAINE BETW 4020-5238.      cannabis gummy to sleep    Sexual activity: Yes     Partners: Male   Other Topics Concern    Caffeine Concern Yes     Comment: 3-4 daily of pop    Exercise No     Comment: not tolerated right now     Social Drivers of Health     Food Insecurity: Patient Declined (6/4/2025)    NCSS - Food Insecurity     Worried About Running Out of Food in the Last Year: Patient declined     Ran Out of Food in the Last Year: Patient declined   Transportation Needs: Patient Declined (6/4/2025)    NCSS - Transportation     Lack of Transportation: Patient declined   Housing Stability: Patient Declined (6/4/2025)    NCSS - Housing/Utilities     Has Housing: Patient declined     Worried About Losing Housing: Patient declined     Unable to Get Utilities: Patient declined                                Physical Exam    ED Triage Vitals [07/12/25 1018]   BP (!) 134/98   Pulse (!) 123   Resp 24   Temp 96.8 °F (36 °C)   Temp src Temporal   SpO2 93 %   O2 Device None (Room air)       Current Vitals:   Vital Signs  BP: 117/73  Pulse: 104  Resp: 17  Temp: 96.8 °F (36 °C)  Temp src: Temporal    Oxygen Therapy  SpO2: 93 %  O2 Device: Nasal cannula  O2 Flow Rate (L/min): 2 L/min            Physical Exam  General: Patient is resting comfortably in no acute distress  HEENT: Normal cephalic atraumatic.  Nonicteric sclera.  Moist mucous membranes.  No meningismus.  No adenopathy  Lungs: Decreased breath sound to the right midlung.  Cardiac:  tachycardia.  No murmurs.  Regular rate and rhythm.  Abdomen: Soft and nontender  throughout.  No rebound or guarding  Extremities: No clubbing/cyanosis/edema.  Skin: No rashes, no pallor  Neuro: Awake oriented ×3.  Nonfocal.  Good strength throughout        ED Course  Labs Reviewed   COMP METABOLIC PANEL (14) - Abnormal; Notable for the following components:       Result Value    Glucose 117 (*)     Calculated Osmolality 296 (*)     All other components within normal limits   CBC WITH DIFFERENTIAL WITH PLATELET - Abnormal; Notable for the following components:    HGB 11.9 (*)     HCT 34.5 (*)     Lymphocyte Absolute 0.81 (*)     All other components within normal limits   LACTIC ACID, PLASMA - Normal   SARS-COV-2/FLU A AND B/RSV BY PCR (GENEXPERT) - Normal    Narrative:     This test is intended for the qualitative detection and differentiation of SARS-CoV-2, influenza A, influenza B, and respiratory syncytial virus (RSV) viral RNA in nasopharyngeal or nares swabs from individuals suspected of respiratory viral infection consistent with COVID-19 by their healthcare provider. Signs and symptoms of respiratory viral infection due to SARS-CoV-2, influenza, and RSV can be similar.    Test performed using the Xpert Xpress SARS-CoV-2/FLU/RSV (real time RT-PCR)  assay on the [employer] instrument, [employer], [place] [address].   This test is being used under the Food and Drug Administration's Emergency Use Authorization.    The authorized Fact Sheet for Healthcare Providers for this assay is available upon request from the laboratory.   RAINBOW DRAW LAVENDER   RAINBOW DRAW LIGHT GREEN   RAINBOW DRAW BLUE   RAINBOW DRAW GOLD   BLOOD CULTURE   BLOOD CULTURE     EKG    Rate, intervals and axes as noted on EKG Report.  Rate: 106  Rhythm: Sinus Rhythm  Reading: Sinus tachycardia.  Ventricular rate of 106.  Nonspecific ST-T wave changes.  Ask/intervals are noted but otherwise, agree with EKG report              COVID flu negative.  Lactic 0.8.  Electrolytes noted and normal.  White count 6.7.        Chest  x-ray: I personally reviewed the films and my independent interpertaion showed large right-sided pleural effusion.  Official report reviewed.  Opacification of the right mid to lower lung zones representing combination effusion consolidation.  Mild diffuse opacities possible edema.  Minimal left basilar atelectasis.     CTA chest reviewed: Perihilar consolidation with air bronchograms.  Moderate right-sided pleural effusion with Pleurx catheter.     MDM     Patient presents with recurre CT without blood clot.  nt pleural effusions.  Said 8 thoracentesis per patient.  Was scheduled for thoracentesis Pleurx placement on Tuesday however feels too short of breath at home.  Discussed with pulmonology as well as interventional radiology.  Will send for Pleurx catheter placement from the ER.    Patient had successful placement with reportedly 1.5 L removed.  Says she does feel better however her oxygen saturations were in approximately 90% with persistent mild tachycardia.  Did walk the patient and sats dropped to the mid 80s with heart rates in the 125.  Was put back on oxygen.  This point will admit for further care and treatment.  Will check CTA chest to rule out PE.  May need further fluid taken off.    Admission disposition: 7/12/2025  4:19 PM       CT without blood clot.  Will start on antibiotics given the air bronchograms.  Will admit for further care and treatment.    Medical Decision Making      Disposition and Plan     Clinical Impression:  1. Malignant neoplasm of lung, unspecified laterality, unspecified part of lung (HCC)    2. Pleural effusion    3. Hypoxia    4. Community acquired pneumonia, unspecified laterality         Disposition:  Admit  7/12/2025  4:19 pm    Follow-up:  Sd Corrigan MD  [facility] [address] [id number] [phone]    Follow up in 2 day(s)            Medications Prescribed:  Current Discharge Medication List                Supplementary Documentation:          Hospital Problems       Present on Admission  Date Reviewed: 7/3/2025          ICD-10-CM Noted POA    * (Principal) Malignant neoplasm of lung, unspecified laterality, unspecified part of lung (HCC) C34.90 6/3/2025 Unknown    Cancer associated pain G89.3 6/4/2025 Yes    Primary hypertension I10 10/29/2014 Yes    Uncomplicated asthma (HCC) J45.909 7/12/2025 Unknown

## 2025-07-12 NOTE — H&P
[facility]     Hanna Barrett Patient Status:  Emergency    1970 MRN [id number]   Location [facility] EMERGENCY DEPARTMENT Attending No att. providers found   Hosp Day # 0 PCP Guillermo Curry MD     Chief Complaint: SOB    History of Present Illness: Hanna Barrett is a 55 year old female with PMHx of Recurrent R pleural effusion 2/2 R lung adenocarcinoma, Hypothyroidism, HTN, Asthma, anxiety/depression who presents for shortness of breath.     Patient with history of recurrent pleural effusion on the right, recent admission on 6/3/2025 to 2025 for recurrent pleural effusion, status post thoracentesis with cytology positive for metastatic carcinoma compatible with lung adenocarcinoma.  Patient has been progressively getting more short of breath throughout the month and was planned for Pleurx placement on Wednesday however could not tolerate her breathing today and thus presented  to the ER for further management.  She was able to be seen by IR and had Pleurx placement with 1.5 L removed however still was feeling short of breath and hypoxic in the ER.  Patient denies any associated fevers, chills, chest pain, abdominal pain, nausea, vomiting, congestion, cough.    Past Medical History:Past Medical History[1]     Past Surgical History: Past Surgical History[2]    Social History:  reports that she quit smoking about 15 years ago. Her smoking use included cigarettes. She started smoking about 39 years ago. She has a 14.5 pack-year smoking history. She has been exposed to tobacco smoke. She quit smokeless tobacco use about 13 months ago. She reports current alcohol use. She reports denies current drug use.    Family History: Family History[3]    Allergies: Allergies[4]    Medications:  Medications Ordered Prior to Encounter[5]    Review of Systems:   A comprehensive 14 point review of systems was completed.    Pertinent positives and negatives noted in the  HPI.    Physical Exam:    /81   Pulse 104   Temp 96.8 °F (36 °C) (Temporal)   Resp 16   Ht 5' 4\" (1.626 m)   Wt 185 lb (83.9 kg)   LMP 02/14/2019 (Exact Date)   SpO2 97%   BMI 31.76 kg/m²   General: No acute distress. Alert and oriented x 3.  HEENT: Normocephalic atraumatic. Moist mucous membranes. EOM-I. PERRLA. Anicteric.  Neck: No lymphadenopathy. No JVD. No carotid bruits.  Respiratory: Clear to auscultation bilaterally. No wheezes. No rhonchi.  Diminished lung sounds on the right lower lobe  Cardiovascular: S1, S2. Regular rate and rhythm. No murmurs, rubs or gallops. Equal pulses.   Chest and Back: No tenderness or deformity.  Abdomen: Soft, nontender, nondistended.  Positive bowel sounds. No rebound, guarding or organomegaly.  Neurologic: No focal neurological deficits. CNII-XII grossly intact.  Musculoskeletal: Moves all extremities.  Extremities: No edema or cyanosis.  Integument: No rashes or lesions.   Psychiatric: Appropriate mood and affect.    Diagnostic Data:      Labs:  Recent Labs   Lab 07/12/25  1115   WBC 6.7   HGB 11.9*   MCV 89.8   .0       Recent Labs   Lab 07/12/25  1115   *   BUN 16   CREATSERUM 0.93   CA 9.4   ALB 3.9      K 4.0      CO2 28.0   ALKPHO 64   AST 20   ALT 21   BILT 0.3   TP 6.2       Estimated Creatinine Clearance: 59.7 mL/min (based on SCr of 0.93 mg/dL).    No results for input(s): \"PTP\", \"INR\" in the last 168 hours.    No results for input(s): \"TROP\", \"CK\" in the last 168 hours.    Imaging: Imaging data reviewed in [facility].  XR CHEST AP PORTABLE  (CPT=71045)  Result Date: 7/12/2025  CONCLUSION: 1. Opacification of the right mid to lower lung zone representing combination of effusion and consolidation 2. Mild diffuse opacities likely representing edema. 3. Minimal left basilar atelectasis Electronically Verified and Signed by Attending Radiologist: Sarahi Garcia MD 7/12/2025 12:05 PM Workstation: [id number]        ASSESSMENT / PLAN:      # SOB   # Recurrent Right pleural effusion s/p Pleur-Ex placement   - prior to pleurex placement, CXR this AM with significant right sided effusion noted; s/p 1.5L fluid removed with placement   - wean O2 as tolerated, likely atelectatic lung   - repeat CXR in AM    - CTA chest pending    - IS, OOBAT     # Metastatic Right lung NSCLC adenocarcinoma   - Seen by Dr. Gordon as outpatient, continue outpatient f/u    # Asthma - continue home meds, Duonebs PRN   # Anxiety/depression - Cymbalta, abilify   # Hypothyroidism - continue home levothyroxine   # Cancer associated pain - continue Morphine ER and IR PRN  # HTN - holding home meds, resume in AM as BP allows       Code Status: Full Code    Plan of care discussed with patient, ED physician    Alverto Lopez MD  7/12/2025      Supplementary Documentation:      MDM : Patient's ER labs, imaging reviewed.  AM labs ordered.  ER management discussed with ED physician, decision made for patient to be admitted to the hospital for further medical management.                  [1]   Past Medical History:   Abnormal uterine bleeding    bleeds every 2 weeks    Anxiety state    Asthma (HCC)    Attention deficit hyperactivity disorder (ADHD)    BACK PAIN    Back problem    lumbar radiculopathy    Cancer (HCC)    adenocarcinoma of right lung    MARCIO II (cervical intraepithelial neoplasia II)    DDD (degenerative disc disease), lumbar    DDD (degenerative disc disease), thoracic    Depression    Disorder of thyroid    Dyspareunia    Exposure to medical diagnostic radiation    On hold since 4/2022    Fall    Fibromyalgia    Fibromyalgia    Genital warts    High blood pressure    History of COVID-19    COVID + 7/2020 Headache - NO Hospitalization needed     History of lumbar fusion    Hx of motion sickness    IBS (irritable bowel syndrome)    Per patient - Just constipation    Infertility, female    Lumbar radiculopathy    Mild dysplasia of cervix    Pap smear for cervical  cancer screening    pt states normal    Papanicolaou smear of cervix with high grade squamous intraepithelial lesion (HGSIL)    Personal history of antineoplastic chemotherapy    Last chemo 22 prior to lung bx 22    Post covid-19 condition, unspecified    symptoms: HA; s/s resolved-yes; not hospitalized    Problems with swallowing    with radiation    Sexually transmitted disease    HPV    Substance abuse (HCC)    cocaine    Urinary incontinence    Visual impairment    glasses/contacts bifocals   [2]   Past Surgical History:  Procedure Laterality Date    Appendectomy      Back surgery      fusion L5-S1    Back surgery  2021    RIGHT LUMBAR 3/ LUMBAR 4, LUMBAR 4/ LUMBAR 5 HEMILAMINTOMIES, LEFT LUMBAR 2 / LUMBAR 3 MICRODISCECTOMY    Colonoscopy N/A 2023    Procedure: COLONOSCOPY;  Surgeon: Devante Kern MD;  Location:  ENDOSCOPY    Colposcopy,bx cervix/endocerv curr  11    MARCIO 1    Laparoscopy procedure unlisted      Ovarian cyst removed    Leep  2011    MARCIO 2          X2    Other surgical history      bunions bilateral    Other surgical history      nodule lymph nodes neck    Other surgical history       Bladder sling    Other surgical history  2020    Cystoscopy, Dr Beach   [3]   Family History  Problem Relation Age of Onset    Other (lung CA) Self     Hypertension Mother     Diabetes Mother     Heart Disease Mother     Heart Disease Father     Other (lung cancer) Father 55        Lung Cancer    Other (pancreatic cancer) Sister 47        Pancreatic Cancer    Cancer Sister 51        lung CA    Other (Other) Sister         Back problems    Other (Other) Son         anxiety    Other (Other) Son         behavioral problems    Diabetes Maternal Grandmother     Breast Cancer Maternal Grandmother         dx late-60s    Stroke Maternal Grandfather 86    Dementia Paternal Grandmother     Heart Disorder Paternal Grandfather     Cancer Maternal Aunt 50         LUNG CA 51    Breast Cancer Maternal Aunt         dx 46-47y    Ovarian Cancer Maternal Cousin Female 33         of ovarian ca at 35y   [4]   Allergies  Allergen Reactions    Gabapentin SWELLING and UNKNOWN    Erythromycin UNKNOWN    Clindamycin RASH   [5]   Current Facility-Administered Medications on File Prior to Encounter   Medication Dose Route Frequency Provider Last Rate Last Admin    [COMPLETED] nivolumab (Opdivo) 360 mg in sodium chloride 0.9% 136 mL IVPB  360 mg Intravenous Once Shoshana Gordon MD   Stopped at 25 1559    [COMPLETED] ipilimumab (Yervoy) 75 mg in sodium chloride 0.9% 65 mL infusion (1 mg/kg in 50 mL)  1 mg/kg (Treatment Plan Recorded) Intravenous Once Shoshana Gordon MD   75 mg at 25 1602    [COMPLETED] nivolumab (Opdivo) 360 mg in sodium chloride 0.9% 136 mL IVPB  360 mg Intravenous Once Shoshana Gordon MD   Stopped at 25 1417    [COMPLETED] gadoterate meglumine (Dotarem) 7.5 MMOL/15ML injection 15 mL  15 mL Intravenous ONCE PRN Ama Sanches, DO   15 mL at 25 194    [COMPLETED] gadoterate meglumine (Dotarem) 7.5 MMOL/15ML injection 15 mL  15 mL Intravenous ONCE PRN Ama Sanches,    10 mL at 25 194    [COMPLETED] sodium chloride 0.9 % IV bolus 1,000 mL  1,000 mL Intravenous Once Sharon Alberto MD   Stopped at 25 1559    [COMPLETED] iopamidol 76% (ISOVUE-370) injection for power injector  100 mL Intravenous ONCE PRN Sharon Alberto MD   100 mL at 25 1629    [] HYDROmorphone (Dilaudid) 1 MG/ML injection 0.5 mg  0.5 mg Intravenous Q30 Min PRN Sharon Alberto MD        [] ondansetron (Zofran) 4 MG/2ML injection 4 mg  4 mg Intravenous Q4H PRN Sharon Alberto MD        [COMPLETED] nivolumab (Opdivo) 360 mg in sodium chloride 0.9% 136 mL IVPB  360 mg Intravenous Once Shoshana Gordon MD   Stopped at 25 1138    [COMPLETED] ipilimumab (Yervoy) 75 mg in sodium chloride 0.9% 65 mL infusion (1 mg/kg in 50 mL)  1  mg/kg (Treatment Plan Recorded) Intravenous Once Shoshana Gordon MD   75 mg at 05/16/25 1148    [COMPLETED] iopamidol 76% (ISOVUE-370) injection for power injector  155 mL Intravenous ONCE PRN Shoshana Gordon MD   155 mL at 05/07/25 1911    [COMPLETED] nivolumab (Opdivo) 360 mg in sodium chloride 0.9% 136 mL IVPB  360 mg Intravenous Once Shoshana Gordon MD   Stopped at 04/25/25 1236    [COMPLETED] fosaprepitant (Emend) 150 mg in sodium chloride 0.9% 150 mL IVPB  150 mg Intravenous Once Shoshana Gordon MD   Stopped at 04/25/25 1201    [COMPLETED] ondansetron (Zofran) 16 mg, dexAMETHasone (Decadron) 20 mg in sodium chloride 0.9% 110 mL IVPB   Intravenous Once Shoshana Gordon MD   Stopped at 04/25/25 1139    [COMPLETED] diphenhydrAMINE (Benadryl) 50 mg/mL  injection 50 mg  50 mg Intravenous Once Shoshana Gordon MD   50 mg at 04/25/25 1115    [COMPLETED] famotidine (Pepcid) 20 mg/2mL injection 20 mg  20 mg Intravenous Once Shoshana Gordon MD   20 mg at 04/25/25 1118    [COMPLETED] PACLitaxel (Taxol) 318 mg in sodium chloride 0.9% 553 mL infusion  175 mg/m2 (Treatment Plan Recorded) Intravenous Once Shoshana Gordon MD   Stopped at 04/25/25 1541    [COMPLETED] CARBOplatin (Paraplatin) 570 mg in sodium chloride 0.9% 307 mL infusion (by AUC)  570 mg Intravenous Once Shoshana Gordon MD   Stopped at 04/25/25 1613    [COMPLETED] iron sucrose (Venofer) 20 mg/mL injection 200 mg  200 mg Intravenous Once Shoshana Gordon MD   Stopped at 04/25/25 1618    [COMPLETED] iopamidol 76% (ISOVUE-370) injection for power injector  100 mL Intravenous ONCE PRN Domenico Estrada DO   100 mL at 04/19/25 1211     Current Outpatient Medications on File Prior to Encounter   Medication Sig Dispense Refill    morphINE ER 15 MG Oral Tab CR Take 1 tablet (15 mg total) by mouth every 12 (twelve) hours. 60 tablet 0    Naloxone HCl 4 MG/0.1ML Nasal Liquid 4 mg by Nasal route as needed. If patient remains unresponsive, repeat dose in  other nostril 2-5 minutes after first dose. 1 kit 0    morphINE 15 MG Oral Tab Take 1 tablet (15 mg total) by mouth every 8 (eight) hours as needed for Pain. 90 tablet 0    pregabalin 75 MG Oral Cap Take 1 capsule (75 mg total) by mouth 3 (three) times daily. 90 capsule 1    ibuprofen 400 MG Oral Tab Take 1 tablet (400 mg total) by mouth every 8 (eight) hours as needed for Pain. 90 tablet 0    memantine 5 MG Oral Tab Take 1 tablet (5 mg total) by mouth daily.      levothyroxine 75 MCG Oral Tab Take 1 tablet (75 mcg total) by mouth before breakfast. 90 tablet 0    lisinopril-hydroCHLOROthiazide 20-12.5 MG Oral Tab Take 0.5 tablets by mouth daily. 45 tablet 3    prochlorperazine (COMPAZINE) 10 mg tablet Take 1 tablet (10 mg total) by mouth every 6 (six) hours as needed for Nausea. 30 tablet 3    ondansetron (ZOFRAN) 8 MG tablet Take 1 tablet (8 mg total) by mouth every 8 (eight) hours as needed for Nausea. 30 tablet 3    dilTIAZem HCl 120 MG Oral Tab Take 1 tablet (120 mg total) by mouth in the morning.      montelukast 10 MG Oral Tab Take 1 tablet (10 mg total) by mouth nightly. (Patient taking differently: Take 1 tablet (10 mg total) by mouth daily as needed (shortness of breath).) 90 tablet 3    albuterol 108 (90 Base) MCG/ACT Inhalation Aero Soln Inhale 2 puffs into the lungs every 6 (six) hours as needed for Wheezing or Shortness of Breath. 3 each 3    amphetamine-dextroamphetamine 15 MG Oral Tab Take 1 tablet (15 mg total) by mouth daily as needed.      ARIPiprazole 2 MG Oral Tab Take 1 tablet (2 mg total) by mouth daily.      VYVANSE 70 MG Oral Cap Take 1 capsule (70 mg total) by mouth every morning.      cetirizine 10 MG Oral Tab Take 1 tablet (10 mg total) by mouth daily. (Patient taking differently: Take 1 tablet (10 mg total) by mouth daily as needed.) 90 tablet 1    ferrous sulfate 325 (65 FE) MG Oral Tab EC Take 1 tablet (325 mg total) by mouth daily with breakfast. (Patient not taking: Reported on  6/26/2025)      Multiple Vitamin Oral Tab Take 1 tablet by mouth in the morning.      cholecalciferol 25 MCG (1000 UT) Oral Cap Take 1 capsule (1,000 Units total) by mouth in the morning.  0    DULoxetine HCl 60 MG Oral Cap DR Particles Take 1 capsule (60 mg total) by mouth in the morning.  2

## 2025-07-12 NOTE — PROGRESS NOTES
NURSING ADMISSION NOTE      Patient admitted via Cart  Oriented to room.  Safety precautions initiated.  Bed in low position.  Call light in reach.    Pt received in the unit aproximately 1740. 2L NC. Dyspnea with exertion. ST to NSR on Tele. A/o x4. Port is accessed in the ER. Blood return noted. No skin breakdown noted. Right pleurx with dressing covered, noted a little blood drainage in the dressing upon arrival. Pain medication PRN given for pleurx site pain. SBA to the bathroom due to dyspnea. Fall precaution in place. Call light within pt's reach.

## 2025-07-13 ENCOUNTER — APPOINTMENT (OUTPATIENT)
Dept: GENERAL RADIOLOGY | Facility: HOSPITAL | Age: 55
End: 2025-07-13
Attending: STUDENT IN AN ORGANIZED HEALTH CARE EDUCATION/TRAINING PROGRAM

## 2025-07-13 LAB
ALBUMIN SERPL-MCNC: 3.9 G/DL (ref 3.2–4.8)
ALBUMIN/GLOB SERPL: 1.8 (ref 1–2)
ALP LIVER SERPL-CCNC: 66 U/L (ref 41–108)
ALT SERPL-CCNC: 19 U/L (ref 10–49)
ANION GAP SERPL CALC-SCNC: 6 MMOL/L (ref 0–18)
AST SERPL-CCNC: 18 U/L (ref ?–34)
BASOPHILS # BLD AUTO: 0.04 X10(3) UL (ref 0–0.2)
BASOPHILS NFR BLD AUTO: 0.7 %
BILIRUB SERPL-MCNC: 0.2 MG/DL (ref 0.3–1.2)
BUN BLD-MCNC: 13 MG/DL (ref 9–23)
CALCIUM BLD-MCNC: 9.4 MG/DL (ref 8.7–10.6)
CHLORIDE SERPL-SCNC: 106 MMOL/L (ref 98–112)
CO2 SERPL-SCNC: 32 MMOL/L (ref 21–32)
CREAT BLD-MCNC: 0.92 MG/DL (ref 0.55–1.02)
EGFRCR SERPLBLD CKD-EPI 2021: 74 ML/MIN/1.73M2 (ref 60–?)
EOSINOPHIL # BLD AUTO: 0.39 X10(3) UL (ref 0–0.7)
EOSINOPHIL NFR BLD AUTO: 7.3 %
ERYTHROCYTE [DISTWIDTH] IN BLOOD BY AUTOMATED COUNT: 12.6 %
GLOBULIN PLAS-MCNC: 2.2 G/DL (ref 2–3.5)
GLUCOSE BLD-MCNC: 120 MG/DL (ref 70–99)
HCT VFR BLD AUTO: 34 % (ref 35–48)
HGB BLD-MCNC: 11.7 G/DL (ref 12–16)
IMM GRANULOCYTES # BLD AUTO: 0.02 X10(3) UL (ref 0–1)
IMM GRANULOCYTES NFR BLD: 0.4 %
INR BLD: 0.92 (ref 0.8–1.2)
LYMPHOCYTES # BLD AUTO: 0.74 X10(3) UL (ref 1–4)
LYMPHOCYTES NFR BLD AUTO: 13.8 %
MAGNESIUM SERPL-MCNC: 1.6 MG/DL (ref 1.6–2.6)
MCH RBC QN AUTO: 31 PG (ref 26–34)
MCHC RBC AUTO-ENTMCNC: 34.4 G/DL (ref 31–37)
MCV RBC AUTO: 89.9 FL (ref 80–100)
MONOCYTES # BLD AUTO: 0.64 X10(3) UL (ref 0.1–1)
MONOCYTES NFR BLD AUTO: 12 %
NEUTROPHILS # BLD AUTO: 3.52 X10 (3) UL (ref 1.5–7.7)
NEUTROPHILS # BLD AUTO: 3.52 X10(3) UL (ref 1.5–7.7)
NEUTROPHILS NFR BLD AUTO: 65.8 %
OSMOLALITY SERPL CALC.SUM OF ELEC: 299 MOSM/KG (ref 275–295)
PHOSPHATE SERPL-MCNC: 4.2 MG/DL (ref 2.4–5.1)
PLATELET # BLD AUTO: 282 10(3)UL (ref 150–450)
POTASSIUM SERPL-SCNC: 4.2 MMOL/L (ref 3.5–5.1)
PROCALCITONIN SERPL-MCNC: 0.08 NG/ML (ref ?–0.05)
PROT SERPL-MCNC: 6.1 G/DL (ref 5.7–8.2)
PROTHROMBIN TIME: 12.4 SECONDS (ref 11.6–14.8)
RBC # BLD AUTO: 3.78 X10(6)UL (ref 3.8–5.3)
SODIUM SERPL-SCNC: 144 MMOL/L (ref 136–145)
WBC # BLD AUTO: 5.4 X10(3) UL (ref 4–11)

## 2025-07-13 PROCEDURE — 71045 X-RAY EXAM CHEST 1 VIEW: CPT | Performed by: STUDENT IN AN ORGANIZED HEALTH CARE EDUCATION/TRAINING PROGRAM

## 2025-07-13 PROCEDURE — 99232 SBSQ HOSP IP/OBS MODERATE 35: CPT | Performed by: STUDENT IN AN ORGANIZED HEALTH CARE EDUCATION/TRAINING PROGRAM

## 2025-07-13 PROCEDURE — 99223 1ST HOSP IP/OBS HIGH 75: CPT | Performed by: INTERNAL MEDICINE

## 2025-07-13 RX ORDER — MAGNESIUM OXIDE 400 MG/1
400 TABLET ORAL ONCE
Status: COMPLETED | OUTPATIENT
Start: 2025-07-13 | End: 2025-07-13

## 2025-07-13 NOTE — DISCHARGE INSTRUCTIONS
Sometimes managing your health at home requires assistance.  The [facility] team has recognized your preference to use [facility].  They can be reached by phone at [phone].  The fax number for your reference is [phone].. A representative from the home health agency will contact you or your family to schedule your first visit.          SDOH Resource (Transportation): [facility] Non-Emergency Transportation [phone], resources on AVS.  SDOH Resource (Food): SNAP [phone], Food Stamp Assistance (support@mSpot), Food Pantry [phone], [facility] [employer] Mobile Pantry Program [phone]  SDOH Resource (Utilities): LIHEAP [phone], [employer] [phone], TANF [phone], [employer] Help to Others Program [phone]       Pleurx Drainage instructions  Wait for home health nursing to come out to your home for drainage and teaching. You do not need to manipulate the drain or dressing before this time. Do not shower before the first visit. After the initial nursing visit, you may shower, but try to keep your dressing dry and out of water, and if wet, then change dressing. Do not bathe or swim while you have your PleurX. Please keep a record of the volume drained each time. The sutures will be removed at your follow up office visit with us.     Before you handle any dressing, wash your hands with soap and water for 20 seconds and dry.   Change the dressing daily or when saturated until the incision has healed.   Notify the doctor of any signs of infection including a temperature greater than 101, chills, redness, swelling, abnormal drainage/foul odor from the incision site     Drain daily (max drainage 1000 ml) until drainage <200ml, then drain every other day.  If <200 ml for 3 consecutive drainages to notify office.

## 2025-07-13 NOTE — CONSULTS
[facility] Pulmonary Consult Note       Hanna Barrett Patient Status:  Inpatient    1970 MRN [id number]   [facility] 4NW-A Attending Alverto Lopez, *   Hosp Day # 1 PCP Guillermo Curry MD     Reason for Consultation:  Pleural effusion    History of Present Illness:  Hanna Barrett is a a(n) 55 year old female With known metastatic lung cancer and recurrent right pleural effusion presenting for worsening shortness of breath found to have a large right pleural effusion.  She had a Pleurx placed yesterday by interventional radiology but was still significantly hypoxic requiring 3 L and pleural effusion was still noted.  Patient overall feels better but still requiring oxygen.  She denies any fevers, chills, nausea, vomiting.    History:  Past Medical History[1]  Past Surgical History[2]  Family History[3]   reports that she quit smoking about 15 years ago. Her smoking use included cigarettes. She started smoking about 39 years ago. She has a 14.5 pack-year smoking history. She has been exposed to tobacco smoke. She quit smokeless tobacco use about 13 months ago. She reports current alcohol use. She reports current drug use. Drugs: Cocaine and Cannabis.    Allergies:  Allergies[4]    Medications:  Current Hospital Medications[5]    Review of Systems:   All other review of systems are negative.    Vital signs in last 24 hours:  Patient Vitals for the past 24 hrs:   BP Temp Temp src Pulse Resp SpO2 Weight   25 1228 121/90 98.3 °F (36.8 °C) Oral 81 16 90 % --   25 0814 145/85 98.2 °F (36.8 °C) Oral 92 18 97 % --   25 0450 137/79 98.3 °F (36.8 °C) Oral 97 19 93 % --   25 0025 126/77 98.1 °F (36.7 °C) Oral 107 20 96 % --   25 129/86 97.8 °F (36.6 °C) Oral 115 18 96 % --   25 1740 -- -- -- -- -- -- 185 lb (83.9 kg)   25 1717 124/73 98.2 °F (36.8 °C) Oral 107 17 93 % 186 lb 11.2 oz (84.7 kg)   25 1650 121/85 -- -- 104 21 96 % --    07/12/25 1559 117/73 -- -- 104 17 93 % --       Intake/Output:    Intake/Output Summary (Last 24 hours) at 7/13/2025 1507  Last data filed at 7/13/2025 1216  Gross per 24 hour   Intake --   Output 1150 ml   Net -1150 ml       Physical Exam:   General: alert, cooperative, oriented.  No respiratory distress.   Head: Normocephalic, without obvious abnormality, atraumatic.   Eyes: Conjunctivae/corneas clear.  No scleral icterus.  No conjunctival     hemorrhage.   Nose: Nares normal.   Throat: Lips, mucosa, and tongue normal.  No thrush noted.   Neck: Soft, supple neck; trachea midline, no adenopathy, no thyromegaly.   Lungs: diminished breath sounds bilaterally   Chest wall: No tenderness or deformity.   Heart: Regular rate and rhythm, normal S1S2, no murmur.   Abdomen: soft, non-tender, non-distended, no masses, no guarding, no     rebound, positive BS.   Extremity: no edema, no cyanosis   Skin: No rashes or lesions.   Neurological: Alert, interactive, no focal deficits    Lab Data Review:  Recent Labs   Lab 07/12/25  1115 07/13/25  0501   WBC 6.7 5.4   HGB 11.9* 11.7*   HCT 34.5* 34.0*   .0 282.0       Recent Labs   Lab 07/12/25  1115 07/13/25  0501    144   K 4.0 4.2    106   CO2 28.0 32.0   BUN 16 13   ALT 21 19   AST 20 18       Recent Labs   Lab 07/13/25  0501   MG 1.6       Lab Results   Component Value Date    PHOS 4.2 07/13/2025        Recent Labs   Lab 07/13/25  0501   INR 0.92       No results for input(s): \"ABGPHT\", \"UOPNMK4Q\", \"PRQOG8B\", \"ABGHCO3\", \"ABGBE\", \"TEMP\", \"TORREY\", \"SITE\", \"DEV\", \"THGB\" in the last 168 hours.    Invalid input(s): \"HXR14GIZ\", \"CHOB\"    Invalid input(s): \"CKTOTAL\", \"TROPONINI\", \"TROPONINT\", \"CKMBINDEX\"      Cultures:   Hospital Encounter on 07/12/25   1. Blood Culture     Status: None (Preliminary result)    Collection Time: 07/12/25 11:15 AM    Specimen: Blood,peripheral   Result Value Ref Range    Blood Culture Result No Growth 1 Day N/A       Radiology:  XR  CHEST AP PORTABLE  (CPT=71045)  Narrative: PROCEDURE: XR CHEST AP PORTABLE  (CPT=71045)    INDICATIONS: SOB; monitoring R pleural effusion following thoracentesis    COMPARISON: Chest radiograph 7/12/2025, 6/4/2025    FINDINGS:    Small to moderate right pleural effusion, decreased in comparison to prior with improved aeration of the right lower lobe. There is a right basilar thoracostomy tube in place. The left lung remains clear. No measurable pneumothorax. Cardiomediastinal   silhouette is within normal limits. There is a right chest port catheter in place.  Impression: CONCLUSION:     See above     Electronically Verified and Signed by Attending Radiologist: Alma Lopez MD 7/13/2025 7:20 AM  Workstation: [id number]      Assessment:  Acute hypoxic respiratory failure secondary to malignant right pleural effusion  Right pleural effusion status post Pleurx catheter placement on 7/12  Metastatic non-small cell lung cancer  Asthma, controlled  Anxiety/depression  Hypothyroidism   Hypertension      Plan:  Close monitoring of oxygenation status, wean oxygen as able  Remove more fluid today up to 1.5 L  If comes off oxygen would be stable for discharge otherwise may need to be drained more tomorrow  All questions answered    Thank you for the consultation.  Will follow with you.    Vikram Mcgregor MD  [facility]  Office: [phone]         [1]   Past Medical History:   Abnormal uterine bleeding    bleeds every 2 weeks    Anxiety state    Asthma (HCC)    Attention deficit hyperactivity disorder (ADHD)    BACK PAIN    Back problem    lumbar radiculopathy    Cancer (HCC)    adenocarcinoma of right lung    MARCIO II (cervical intraepithelial neoplasia II)    DDD (degenerative disc disease), lumbar    DDD (degenerative disc disease), thoracic    Depression    Disorder of thyroid    Dyspareunia    Fall    Fibromyalgia    Fibromyalgia    Genital warts    High blood pressure    History of COVID-19     COVID + 2020 Headache - NO Hospitalization needed     History of lumbar fusion    Hx of motion sickness    IBS (irritable bowel syndrome)    Per patient - Just constipation    Infertility, female    Lumbar radiculopathy    Mild dysplasia of cervix    Neuropathy    Pap smear for cervical cancer screening    pt states normal    Papanicolaou smear of cervix with high grade squamous intraepithelial lesion (HGSIL)    Personal history of antineoplastic chemotherapy    Last chemo 22 prior to lung bx 22    Post covid-19 condition, unspecified    symptoms: HA; s/s resolved-yes; not hospitalized    Sexually transmitted disease    HPV    Substance abuse (HCC)    cocaine    Urinary incontinence    Visual impairment    glasses/contacts bifocals   [2]   Past Surgical History:  Procedure Laterality Date    Appendectomy      Back surgery  2009    fusion L5-S1    Back surgery  2021    RIGHT LUMBAR 3/ LUMBAR 4, LUMBAR 4/ LUMBAR 5 HEMILAMINTOMIES, LEFT LUMBAR 2 / LUMBAR 3 MICRODISCECTOMY    Colonoscopy N/A 2023    Procedure: COLONOSCOPY;  Surgeon: Devante Kern MD;  Location:  ENDOSCOPY    Colposcopy,bx cervix/endocerv curr  11    MARCIO 1    Laparoscopy procedure unlisted      Ovarian cyst removed    Leep  2011    MARCIO 2          X2    Other surgical history      bunions bilateral    Other surgical history      nodule lymph nodes neck    Other surgical history  2016     Bladder sling    Other surgical history  2020    Cystoscopy, Dr Beach   [3]   Family History  Problem Relation Age of Onset    Other (lung CA) Self     Hypertension Mother     Diabetes Mother     Heart Disease Mother     Heart Disease Father     Other (lung cancer) Father 55        Lung Cancer    Other (pancreatic cancer) Sister 47        Pancreatic Cancer    Cancer Sister 51        lung CA    Other (Other) Sister         Back problems    Other (Other) Son         anxiety    Other (Other) Son          behavioral problems    Diabetes Maternal Grandmother     Breast Cancer Maternal Grandmother         dx late-60s    Stroke Maternal Grandfather 86    Dementia Paternal Grandmother     Heart Disorder Paternal Grandfather     Cancer Maternal Aunt 50        LUNG CA 51    Breast Cancer Maternal Aunt         dx 46-47y    Ovarian Cancer Maternal Cousin Female 33         of ovarian ca at 35y   [4]   Allergies  Allergen Reactions    Gabapentin SWELLING and UNKNOWN    Erythromycin UNKNOWN    Clindamycin RASH   [5]   Current Facility-Administered Medications:     melatonin tab 3 mg, 3 mg, Oral, Nightly PRN    polyethylene glycol (PEG 3350) (Miralax) 17 g oral packet 17 g, 17 g, Oral, Daily PRN    sennosides (Senokot) tab 17.2 mg, 17.2 mg, Oral, Nightly PRN    bisacodyl (Dulcolax) 10 MG rectal suppository 10 mg, 10 mg, Rectal, Daily PRN    ondansetron (Zofran) 4 MG/2ML injection 4 mg, 4 mg, Intravenous, Q6H PRN    prochlorperazine (Compazine) 10 MG/2ML injection 5 mg, 5 mg, Intravenous, Q8H PRN    benzonatate (Tessalon) cap 200 mg, 200 mg, Oral, TID PRN    guaiFENesin (Robitussin) 100 MG/5 ML oral liquid 200 mg, 200 mg, Oral, Q4H PRN    glycerin-hypromellose- (Artificial Tears) 0.2-0.2-1 % ophthalmic solution 1 drop, 1 drop, Both Eyes, QID PRN    sodium chloride (Saline Mist) 0.65 % nasal solution 1 spray, 1 spray, Each Nare, Q3H PRN    enoxaparin (Lovenox) 40 MG/0.4ML SUBQ injection 40 mg, 40 mg, Subcutaneous, Daily    acetaminophen (Tylenol) tab 650 mg, 650 mg, Oral, Q4H PRN **OR** HYDROcodone-acetaminophen (Norco) 5-325 MG per tab 1 tablet, 1 tablet, Oral, Q4H PRN **OR** HYDROcodone-acetaminophen (Norco) 5-325 MG per tab 2 tablet, 2 tablet, Oral, Q4H PRN    HYDROmorphone (Dilaudid) 1 MG/ML injection 0.2 mg, 0.2 mg, Intravenous, Q2H PRN **OR** HYDROmorphone (Dilaudid) 1 MG/ML injection 0.4 mg, 0.4 mg, Intravenous, Q2H PRN **OR** HYDROmorphone (Dilaudid) 1 MG/ML injection 0.8 mg, 0.8 mg, Intravenous, Q2H PRN     ARIPiprazole (Abilify) tab 2 mg, 2 mg, Oral, Daily    dilTIAZem (cardIZEM) tab 120 mg, 120 mg, Oral, Daily    DULoxetine (Cymbalta) DR cap 60 mg, 60 mg, Oral, Daily    levothyroxine (Synthroid) tab 75 mcg, 75 mcg, Oral, Before breakfast    memantine (Namenda) tab 5 mg, 5 mg, Oral, Daily    montelukast (Singulair) tab 10 mg, 10 mg, Oral, Nightly PRN    morphINE immediate release tab 15 mg, 15 mg, Oral, Q8H PRN    morphINE ER (MS Contin) tab 15 mg, 15 mg, Oral, Q12H    pregabalin (Lyrica) cap 75 mg, 75 mg, Oral, TID    ipratropium-albuterol (Duoneb) 0.5-2.5 (3) MG/3ML inhalation solution 3 mL, 3 mL, Nebulization, Q4H PRN    [Held by provider] lisinopril (Prinivil; Zestril) tab 20 mg, 20 mg, Oral, Daily **AND** [Held by provider] hydroCHLOROthiazide tab 12.5 mg, 12.5 mg, Oral, Daily

## 2025-07-13 NOTE — PLAN OF CARE
Pt is a/o x4, up ad aguila. On 3L NC. Pt desat to 84% on RA. ST on Tele with exertion. Pleurx was drained today as ordered, removed 1150mL. PRN dilaudid and norco given for pain. Appetite is good. No signs of complications to the pleurx site. VSS. Afebrile. Call light within reach.     Problem: GASTROINTESTINAL - ADULT  Goal: Maintains or returns to baseline bowel function  Description: INTERVENTIONS:  - Assess bowel function  - Maintain adequate hydration with IV or PO as ordered and tolerated  - Evaluate effectiveness of GI medications  - Encourage mobilization and activity  - Obtain nutritional consult as needed  - Establish a toileting routine/schedule  - Consider collaborating with pharmacy to review patient's medication profile  Outcome: Progressing     Problem: SKIN/TISSUE INTEGRITY - ADULT  Goal: Skin integrity remains intact  Description: INTERVENTIONS  - Assess and document risk factors for pressure ulcer development  - Assess and document skin integrity  - Monitor for areas of redness and/or skin breakdown  - Initiate interventions, skin care algorithm/standards of care as needed  Outcome: Progressing     Problem: PAIN - ADULT  Goal: Verbalizes/displays adequate comfort level or patient's stated pain goal  Description: INTERVENTIONS:  - Encourage pt to monitor pain and request assistance  - Assess pain using appropriate pain scale  - Administer analgesics based on type and severity of pain and evaluate response  - Implement non-pharmacological measures as appropriate and evaluate response  - Consider cultural and social influences on pain and pain management  - Manage/alleviate anxiety  - Utilize distraction and/or relaxation techniques  - Monitor for opioid side effects  - Notify MD/LIP if interventions unsuccessful or patient reports new pain  - Anticipate increased pain with activity and pre-medicate as appropriate  Outcome: Progressing

## 2025-07-13 NOTE — PLAN OF CARE
Assumed care at 1930. Patient is AOx4, 3 L via NC, Meds per MAR, telemetry monitoring, Port-a-cath patent; blood return noted. SOB on exertion. Right Pleurx dressing with small amount of drainage noted. IV Dilaudid 0.8 mg PRN given for pain to incision site. Patient c/o constipation and requested enema. A 1 x  Fleet Enema was ordered per Dr Gibbs. Patient self administrated enema and had 1 BM after enema. Ambulating to bathroom. Fall precautions maintained, call light within reach.    0510: CXR complete. Results pending.    Problem: GASTROINTESTINAL - ADULT  Goal: Maintains or returns to baseline bowel function  Description: INTERVENTIONS:  - Assess bowel function  - Maintain adequate hydration with IV or PO as ordered and tolerated  - Evaluate effectiveness of GI medications  - Encourage mobilization and activity  - Obtain nutritional consult as needed  - Establish a toileting routine/schedule  - Consider collaborating with pharmacy to review patient's medication profile  Outcome: Progressing     Problem: PAIN - ADULT  Goal: Verbalizes/displays adequate comfort level or patient's stated pain goal  Description: INTERVENTIONS:  - Encourage pt to monitor pain and request assistance  - Assess pain using appropriate pain scale  - Administer analgesics based on type and severity of pain and evaluate response  - Implement non-pharmacological measures as appropriate and evaluate response  - Consider cultural and social influences on pain and pain management  - Manage/alleviate anxiety  - Utilize distraction and/or relaxation techniques  - Monitor for opioid side effects  - Notify MD/LIP if interventions unsuccessful or patient reports new pain  - Anticipate increased pain with activity and pre-medicate as appropriate  Outcome: Progressing

## 2025-07-13 NOTE — PROGRESS NOTES
[facility]   part of [facility]     Hospitalist Progress Note     Hanna Barrett Patient Status:  Inpatient    1970 MRN [id number]   Location [facility] 4NW-A Attending John, Alverto Davila, *   Hosp Day # 1 PCP Guillermo Curry MD     Chief Complaint: SOB    Subjective:      - Breathing feeling improved, though still requiring 3L NC this AM; denies f/c, cp, abd pain, n/v, cough/congestion, sputum production    Objective:    Review of Systems:   A comprehensive review of systems was completed; pertinent positive and negatives stated in subjective.    Vital signs:  Temp:  [96.8 °F (36 °C)-98.3 °F (36.8 °C)] 98.3 °F (36.8 °C)  Pulse:  [] 97  Resp:  [16-24] 19  BP: (109-137)/(73-98) 137/79  SpO2:  [87 %-97 %] 93 %    Physical Exam:    General: No acute distress  Respiratory: no wheezes,. Crackles in right lung base   Cardiovascular: S1, S2, regular rate and rhythm  Abdomen: Soft, Non-tender, non-distended, positive bowel sounds  Neuro: No new focal deficits.   Extremities: no edema    Diagnostic Data:    Labs:  Recent Labs   Lab 25  1115 25  0501   WBC 6.7 5.4   HGB 11.9* 11.7*   MCV 89.8 89.9   .0 282.0   INR  --  0.92       Recent Labs   Lab 25  1115 25  0501   * 120*   BUN 16 13   CREATSERUM 0.93 0.92   CA 9.4 9.4   ALB 3.9 3.9    144   K 4.0 4.2    106   CO2 28.0 32.0   ALKPHO 64 66   AST 20 18   ALT 21 19   BILT 0.3 0.2*   TP 6.2 6.1       Estimated Creatinine Clearance: 60.4 mL/min (based on SCr of 0.92 mg/dL).    No results for input(s): \"TROP\", \"TROPHS\", \"CK\" in the last 168 hours.    Recent Labs   Lab 25  0501   PTP 12.4   INR 0.92                  Microbiology    No results found for this visit on 25.      Imaging: Reviewed in Epic.    Medications: Scheduled Medications[1]    Assessment & Plan:      # SOB   # Recurrent Right pleural effusion s/p Pleur-Ex placement   - prior to pleurex placement, CXR this AM with  significant right sided effusion noted; s/p 1.5L fluid removed with placement   - wean O2 as tolerated, likely atelectatic lung   - repeat CXR in AM with residual small to moderate right pleural effusion and improved aeration of right lung   - will attempt right peural effusion drainage again today and wean O2    - clinically less c/w PNA, will hold off abx for now, get a Procal   - CTA chest pending    - IS, OOBAT      # Metastatic Right lung NSCLC adenocarcinoma   - Seen by Dr. Gordon as outpatient, continue outpatient f/u     # Asthma - continue home meds, Duonebs PRN   # Anxiety/depression - Cymbalta, abilify   # Hypothyroidism - continue home levothyroxine   # Cancer associated pain - continue Morphine ER and IR PRN  # HTN - holding home meds, resume in AM as BP allows     Dispo pending weaning to REANNA Lopez MD    Supplementary Documentation:     Quality:  DVT Mechanical Prophylaxis:   SCDs, Early ambuation  DVT Pharmacologic Prophylaxis   Medication    enoxaparin (Lovenox) 40 MG/0.4ML SUBQ injection 40 mg                Code Status: Full Code  Willis: No urinary catheter in place  Willis Duration (in days):   Central line:    SHERYL:     The 21st Century Cures Act makes medical notes like these available to patients in the interest of transparency. Please be advised this is a medical document. Medical documents are intended to carry relevant information, facts as evident, and the clinical opinion of the practitioner. The medical note is intended as peer to peer communication and may appear blunt or direct. It is written in medical language and may contain abbreviations or verbiage that are unfamiliar.                       [1]    enoxaparin  40 mg Subcutaneous Daily    ARIPiprazole  2 mg Oral Daily    dilTIAZem HCl  120 mg Oral Daily    DULoxetine  60 mg Oral Daily    levothyroxine  75 mcg Oral Before breakfast    memantine  5 mg Oral Daily    morphINE ER  15 mg Oral Q12H    pregabalin  75 mg  Oral TID    [Held by provider] lisinopril  20 mg Oral Daily    And    [Held by provider] hydrochlorothiazide  12.5 mg Oral Daily

## 2025-07-14 ENCOUNTER — APPOINTMENT (OUTPATIENT)
Dept: GENERAL RADIOLOGY | Facility: HOSPITAL | Age: 55
End: 2025-07-14
Attending: STUDENT IN AN ORGANIZED HEALTH CARE EDUCATION/TRAINING PROGRAM

## 2025-07-14 VITALS
WEIGHT: 185 LBS | OXYGEN SATURATION: 97 % | TEMPERATURE: 98 F | HEIGHT: 64 IN | RESPIRATION RATE: 18 BRPM | DIASTOLIC BLOOD PRESSURE: 89 MMHG | BODY MASS INDEX: 31.58 KG/M2 | HEART RATE: 105 BPM | SYSTOLIC BLOOD PRESSURE: 135 MMHG

## 2025-07-14 LAB
ATRIAL RATE: 106 BPM
MAGNESIUM SERPL-MCNC: 1.6 MG/DL (ref 1.6–2.6)
P AXIS: 79 DEGREES
P-R INTERVAL: 168 MS
Q-T INTERVAL: 344 MS
QRS DURATION: 86 MS
QTC CALCULATION (BEZET): 456 MS
R AXIS: 76 DEGREES
T AXIS: 30 DEGREES
VENTRICULAR RATE: 106 BPM

## 2025-07-14 PROCEDURE — 71045 X-RAY EXAM CHEST 1 VIEW: CPT | Performed by: STUDENT IN AN ORGANIZED HEALTH CARE EDUCATION/TRAINING PROGRAM

## 2025-07-14 PROCEDURE — 99239 HOSP IP/OBS DSCHRG MGMT >30: CPT | Performed by: STUDENT IN AN ORGANIZED HEALTH CARE EDUCATION/TRAINING PROGRAM

## 2025-07-14 PROCEDURE — 99231 SBSQ HOSP IP/OBS SF/LOW 25: CPT | Performed by: INTERNAL MEDICINE

## 2025-07-14 RX ORDER — MAGNESIUM OXIDE 400 MG/1
400 TABLET ORAL ONCE
Status: COMPLETED | OUTPATIENT
Start: 2025-07-14 | End: 2025-07-14

## 2025-07-14 RX ORDER — LISDEXAMFETAMINE DIMESYLATE 70 MG/1
70 CAPSULE ORAL EVERY MORNING
Refills: 0 | Status: DISCONTINUED | OUTPATIENT
Start: 2025-07-14 | End: 2025-07-14

## 2025-07-14 RX ORDER — ALBUTEROL SULFATE 90 UG/1
2 INHALANT RESPIRATORY (INHALATION) EVERY 6 HOURS PRN
Status: DISCONTINUED | OUTPATIENT
Start: 2025-07-14 | End: 2025-07-14

## 2025-07-14 NOTE — PROGRESS NOTES
[facility] Pulmonary Progress Note    Hanna Barrett Patient Status:  Inpatient    1970 MRN [id number]   Location [facility] 4NW-A Attending John, Alverto Davila, *   Hosp Day # 2 PCP Guillermo Curry MD     Subjective:    Overnight: No acute events overnight. Afebrile. On 3L NC. States her SOB is better, no cough. Upset she \"can't swim or wear a bra.\" Her pleurx dressing is bulky and I told her her nurse will re-dress it when they drain her in a way she can wear a bra and that this is temporary and she will be able to swim again one day.     Objective:  /87 (BP Location: Left arm)   Pulse 114   Temp 98 °F (36.7 °C) (Oral)   Resp 16   Ht 5' 4\" (1.626 m)   Wt 185 lb (83.9 kg)   LMP 2019 (Exact Date)   SpO2 98%   BMI 31.76 kg/m²     Temp (24hrs), Av.3 °F (36.8 °C), Min:98 °F (36.7 °C), Max:98.5 °F (36.9 °C)      Intake/Output:    Intake/Output Summary (Last 24 hours) at 2025 0937  Last data filed at 2025 0904  Gross per 24 hour   Intake 666 ml   Output 1150 ml   Net -484 ml       Physical Exam:   General: alert, cooperative, oriented.  No respiratory distress.   Head: Normocephalic, without obvious abnormality, atraumatic.   Throat: Lips, mucosa, and tongue normal.  No thrush noted.   Neck: trachea midline, no adenopathy, no thyromegaly. No JVD.   Lungs: clear to auscultation on left, diminished breath sounds L base   Chest wall: No tenderness or deformity.   Heart: regular rate and rhythm, S1, S2 normal, no murmur, click, rub or gallop   Abdomen: soft, non-distended, no masses, no guarding, no     Rebound.   Extremity: No edema or cyanosis   Skin: No rashes or lesions.   Neurological: Alert, interactive, no focal deficits    Lab Data Review:  Recent Labs     25  1115 25  0501   WBC 6.7 5.4   HGB 11.9* 11.7*   .0 282.0     Recent Labs     25  1115 25  0501    144   K 4.0 4.2    106   CO2 28.0 32.0   BUN 16 13   CREATSERUM 0.93 0.92      Recent Labs   Lab 07/13/25  0501   PTP 12.4   INR 0.92       Cultures:   Hospital Encounter on 07/12/25   1. Blood Culture     Status: None (Preliminary result)    Collection Time: 07/12/25 11:15 AM    Specimen: Blood,peripheral   Result Value Ref Range    Blood Culture Result No Growth 1 Day N/A       Radiology:  XR CHEST AP PORTABLE  (CPT=71045)  Narrative: PROCEDURE: XR CHEST AP PORTABLE  (CPT=71045)    INDICATIONS: SOB; monitoring R pleural effusion following thoracentesis    COMPARISON: Chest radiograph 7/12/2025, 6/4/2025    FINDINGS:    Small to moderate right pleural effusion, decreased in comparison to prior with improved aeration of the right lower lobe. There is a right basilar thoracostomy tube in place. The left lung remains clear. No measurable pneumothorax. Cardiomediastinal   silhouette is within normal limits. There is a right chest port catheter in place.  Impression: CONCLUSION:     See above     Electronically Verified and Signed by Attending Radiologist: Alma Lopez MD 7/13/2025 7:20 AM  Workstation: [id number]      Medications reviewed     Assessment:  Acute hypoxic respiratory failure secondary to malignant right pleural effusion, currently on 3L NC  Right pleural effusion status post Pleurx catheter placement by IR on 7/12  Metastatic non-small cell lung cancer- on chemoRT  Asthma, controlled  Anxiety/depression  Hypothyroidism   Hypertension     Plan:  Continue to monitor oxygenation, wean as tolerated. 3L NC  Encourage OOB with meals, increase activity as tolerated   Obtain home oxygen walk- will likely need home oxygen  Continue to drain pleurx catheter daily and record output- will continue to work with HH  Discharge planning- okay to discharge from pulmonary perspective if can arrange home oxygen today.    Discussed with patient, Darlene RAUSCH and Dr Morrison    Oxygen Walk results  SPO2% on Room Air at Rest: 86 (07/14/25 1000)  SPO2% on Oxygen at Rest: 92 (07/14/25 1000)  At rest oxygen  flow (liters per minute): 3 (07/14/25 1000)  SPO2% Ambulation on Room Air: 82 (07/14/25 1000)  SPO2% Ambulation on Oxygen: 90 (07/14/25 1000)  Ambulation oxygen flow (liters per minute): 2 (07/14/25 1000).    I had a face to face visit with Hanna and I reviewed the patient's walking O2 study completed by nursing on 7/14/25. The patient is mobile in their home and is in need of a portable oxygen tank. The home oxygen will improve the patients condition.        The PleurX catheter is a required medical intervention for symptom management. The catheter is inserted in the patient’s posterior chest wall to drain pleural fluid. Due to its location, the patient is unable to physically access and maintain the catheter.  Medically necessary home health discipline and care to be provided: until patient is comfortable managing on own  RN - provide skilled nursing care, instruction and knowledge to patient/family/caregiver in Pleurx catheter care management.   Activity restriction:  a) The location of the Pleurx catheter precludes safe and effective drainage by patient; b) Patient becomes dyspneic with minimal activity secondary to the pleural effusion.  I certify this patient is under my care, and I had a face-to-face encounter today.    Is this a shared or split note between Advanced Practice Provider and Physician? Yes   GABY Gastelum  [facility]  Office: [phone]

## 2025-07-14 NOTE — PROGRESS NOTES
Oxygen Walk results:      SPO2% on Room Air at Rest: 86 (07/14/25 1000)  SPO2% on Oxygen at Rest: 92 (07/14/25 1000)  At rest oxygen flow (liters per minute): 3 (07/14/25 1000)  SPO2% Ambulation on Room Air: 82 (07/14/25 1000)  SPO2% Ambulation on Oxygen: 90 (07/14/25 1000)  Ambulation oxygen flow (liters per minute): 2 (07/14/25 1000).

## 2025-07-14 NOTE — PAYOR COMM NOTE
--------------  ADMISSION REVIEW     Payor: [facility]  Subscriber #:  [id number]  Authorization Number: [id number]    Admit date: 7/12/25  Admit time:  5:12 PM     REVIEW DOCUMENTATION:  ED Provider Notes signed by Zain Werner MD at 7/12/2025  4:25 PM       Author: Zain Werner MD Service: Emergency Medicine Author Type: Physician    Filed: 7/12/2025  4:25 PM Date of Service: 7/12/2025  3:25 PM Status: Signed     Patient Seen in: [facility] Emergency Department    History  Chief Complaint   Patient presents with    Difficulty Breathing     Stated Complaint: shortness of breath, hx lung cancer with pleural effusion    HPI  55 year old female with pleural effusion who presents with difficulty breathing. She is accompanied by her son.    She is experiencing significant difficulty breathing, describing it as 'scary' and noting that it has never been this bad before. She mentions having 'water in her nose again' and has had this drained multiple times in the past. Her oxygen levels at home have been low, around 85%. She has started coughing recently and has no chest pain but emphasizes her inability to breathe properly.    Her pleural effusion is typically on the right side. She recalls that previous chest x-rays did not show the fluid, and CT scans showed very little, but significant amounts have been drained in the past, approximately 1.5 to 1.7 liters.    She is currently undergoing immunotherapy for cancer and is not on chemotherapy at this time.    Physical Exam  ED Triage Vitals [07/12/25 1018]   BP (!) 134/98   Pulse (!) 123   Resp 24   Temp 96.8 °F (36 °C)   Temp src Temporal   SpO2 93 %   O2 Device None (Room air)     Vital Signs  BP: 117/73  Pulse: 104  Resp: 17  Temp: 96.8 °F (36 °C)  Temp src: Temporal    Oxygen Therapy  SpO2: 93 %  O2 Device: Nasal cannula  O2 Flow Rate (L/min): 2 L/min    Physical Exam  General: Patient is resting comfortably in no acute  distress  HEENT: Normal cephalic atraumatic.  Nonicteric sclera.  Moist mucous membranes.  No meningismus.  No adenopathy  Lungs: Decreased breath sound to the right midlung.  Cardiac:  tachycardia.  No murmurs.  Regular rate and rhythm.  Abdomen: Soft and nontender throughout.  No rebound or guarding  Extremities: No clubbing/cyanosis/edema.  Skin: No rashes, no pallor  Neuro: Awake oriented ×3.  Nonfocal.  Good strength throughout    ED Course  Labs Reviewed   COMP METABOLIC PANEL (14) - Abnormal; Notable for the following components:       Result Value    Glucose 117 (*)     Calculated Osmolality 296 (*)     All other components within normal limits   CBC WITH DIFFERENTIAL WITH PLATELET - Abnormal; Notable for the following components:    HGB 11.9 (*)     HCT 34.5 (*)     Lymphocyte Absolute 0.81 (*)     All other components within normal limits   LACTIC ACID, PLASMA - Normal   SARS-COV-2/FLU A AND B/RSV BY PCR (GENEXPERT) - Normal   BLOOD CULTURE   BLOOD CULTURE     EKG    Rate, intervals and axes as noted on EKG Report.  Rate: 106  Rhythm: Sinus Rhythm  Reading: Sinus tachycardia.  Ventricular rate of 106.  Nonspecific ST-T wave changes.  Ask/intervals are noted but otherwise, agree with EKG report    COVID flu negative.  Lactic 0.8.  Electrolytes noted and normal.  White count 6.7.     Chest x-ray: I personally reviewed the films and my independent interpertaion showed large right-sided pleural effusion.  Official report reviewed.  Opacification of the right mid to lower lung zones representing combination effusion consolidation.  Mild diffuse opacities possible edema.  Minimal left basilar atelectasis.     CTA chest reviewed: Perihilar consolidation with air bronchograms.  Moderate right-sided pleural effusion with Pleurx catheter.    MDM  Patient presents with recurre CT without blood clot.  nt pleural effusions.  Said 8 thoracentesis per patient.  Was scheduled for thoracentesis Pleurx placement on Tuesday  however feels too short of breath at home.  Discussed with pulmonology as well as interventional radiology.  Will send for Pleurx catheter placement from the ER.    Patient had successful placement with reportedly 1.5 L removed.  Says she does feel better however her oxygen saturations were in approximately 90% with persistent mild tachycardia.  Did walk the patient and sats dropped to the mid 80s with heart rates in the 125.  Was put back on oxygen.  This point will admit for further care and treatment.  Will check CTA chest to rule out PE.  May need further fluid taken off.    Admission disposition: 7/12/2025  4:19 PM    CT without blood clot.  Will start on antibiotics given the air bronchograms.  Will admit for further care and treatment.    Medical Decision Making  Disposition and Plan     Clinical Impression:  1. Malignant neoplasm of lung, unspecified laterality, unspecified part of lung (HCC)    2. Pleural effusion    3. Hypoxia    4. Community acquired pneumonia, unspecified laterality       Disposition:  Admit  7/12/2025  4:19 pm    Zani Werner MD on 7/12/2025  4:25 PM      Procedure Note   Procedure: Right pleurx catheter placement     Pre-Procedure Diagnosis:  Recurrent pleural effusion     Post-Procedure Diagnosis: Same    Findings:  Serous fluid      History and Physical   Chief Complaint: SOB     History of Present Illness:   55 year old female with PMHx of Recurrent R pleural effusion 2/2 R lung adenocarcinoma, Hypothyroidism, HTN, Asthma, who presents for shortness of breath.      Patient with history of recurrent pleural effusion on the right, recent admission on 6/3/2025 to 6/4/2025 for recurrent pleural effusion, status post thoracentesis with cytology positive for metastatic carcinoma compatible with lung adenocarcinoma.  Patient has been progressively getting more short of breath throughout the month and was planned for Pleurx placement on Wednesday however could not tolerate her  breathing today and thus presented  to the ER for further management.  She was able to be seen by IR and had Pleurx placement with 1.5 L removed however still was feeling short of breath and hypoxic in the ER.  Patient denies any associated fevers, chills, chest pain, abdominal pain, nausea, vomiting, congestion, cough.      Lab 07/12/25  1115   WBC 6.7   HGB 11.9*   MCV 89.8   .0      *   BUN 16   CREATSERUM 0.93   CA 9.4   ALB 3.9      K 4.0      CO2 28.0   ALKPHO 64   AST 20   ALT 21   BILT 0.3   TP 6.2       ASSESSMENT / PLAN:      # SOB   # Recurrent Right pleural effusion s/p Pleur-Ex placement   - prior to pleurex placement, CXR this AM with significant right sided effusion noted; s/p 1.5L fluid removed with placement   - wean O2 as tolerated, likely atelectatic lung   - repeat CXR in AM    - CTA chest pending    - IS, OOBAT      # Metastatic Right lung NSCLC adenocarcinoma   - Seen by Dr. Gordon as outpatient, continue outpatient f/u     # Asthma - continue home meds, Duonebs PRN   # Hypothyroidism - continue home levothyroxine   # Cancer associated pain - continue Morphine ER and IR PRN  # HTN - holding home meds, resume in AM as BP allows     MDM : Patient's ER labs, imaging reviewed.  AM labs ordered.  ER management discussed with ED physician, decision made for patient to be admitted to the hospital for further medical management.      7/13/2025  Pulmonary Consult Note   Reason for Consultation:  Pleural effusion     History of Present Illness:  55 year old female With known metastatic lung cancer and recurrent right pleural effusion presenting for worsening shortness of breath found to have a large right pleural effusion.  She had a Pleurx placed yesterday by interventional radiology but was still significantly hypoxic requiring 3 L and pleural effusion was still noted.  Patient overall feels better but still requiring oxygen.  She denies any fevers, chills, nausea,  vomiting.    7/13/2025 1216      Gross per 24 hour   Intake --   Output 1150 ml   Net -1150 ml     Lab 07/12/25  1115 07/13/25  0501   WBC 6.7 5.4   HGB 11.9* 11.7*   HCT 34.5* 34.0*   .0 282.0       144   K 4.0 4.2    106   CO2 28.0 32.0   BUN 16 13   ALT 21 19   AST 20 18      Lab 07/13/25  0501   MG 1.6        PHOS 4.2 07/13/2025      INR 0.92     Assessment:  Acute hypoxic respiratory failure secondary to malignant right pleural effusion  Right pleural effusion status post Pleurx catheter placement on 7/12  Metastatic non-small cell lung cancer  Asthma, controlled  Hypothyroidism   Hypertension     Plan:  Close monitoring of oxygenation status, wean oxygen as able  Remove more fluid today up to 1.5 L  If comes off oxygen would be stable for discharge otherwise may need to be drained more tomorrow    7/14/2025  Hospitalist Progress Note   - s/p pleurex drainage of 1.15L yesterday    - stable on 3L NC this AM    - pt tearful d/t multiple changes in life with new tube in place; states she is being visited by Dr. Gordon today    - Otherwise denies new f/c, cp, abd pain, n/v   - Is having more cough with sputum production now       Assessment & Plan:  # SOB   # Recurrent Right pleural effusion s/p Pleur-Ex placement   - prior to pleurex placement, CXR this AM with significant right sided effusion noted; s/p 1.5L fluid removed with placement 7/12   - CTA chest in ER negative for PE    - wean O2 as tolerated, likely atelectatic lung   - 1.1L removed 7/13   - attempt fluid removal again today    - repeat CXR this AM pending   - clinically less c/w PNA, Procal low   - IS, OOBAT; add Acapella    - Pulm on      # Metastatic Right lung NSCLC adenocarcinoma   - Seen by Dr. Gordon as outpatient, continue outpatient f/u     # Asthma - continue home meds, Duonebs PRN   # Hypothyroidism - continue home levothyroxine   # Cancer associated pain - continue Morphine ER and IR PRN  # HTN - holding home meds,  resume in AM as BP allows     Dispo: pending weaning to RA; will have SW eval for possible home O2     DVT Mechanical Prophylaxis:   SCDs, Early ambuation   enoxaparin (Lovenox) 40 MG/0.4ML SUBQ injection 40 mg       Pulmonary Progress Note   No acute events overnight. Afebrile. On 3L NC. States her SOB is better, no cough.        7/14/2025 0904      Gross per 24 hour   Intake 666 ml   Output 1150 ml   Net -484 ml     Oxygen Walk results  SPO2% on Room Air at Rest: 86 (07/14/25 1000)  SPO2% on Oxygen at Rest: 92 (07/14/25 1000)  At rest oxygen flow (liters per minute): 3 (07/14/25 1000)  SPO2% Ambulation on Room Air: 82 (07/14/25 1000)  SPO2% Ambulation on Oxygen: 90 (07/14/25 1000)  Ambulation oxygen flow (liters per minute): 2 (07/14/25 1000).    Plan:  Continue to monitor oxygenation, wean as tolerated. 3L NC  Encourage OOB with meals, increase activity as tolerated   Obtain home oxygen walk- will likely need home oxygen  Continue to drain pleurx catheter daily and record output- will continue to work with HH  Discharge planning- okay to discharge from pulmonary perspective if can arrange home oxygen today.    MEDICATIONS ADMINISTERED:  dilTIAZem (cardIZEM) tab 120 mg       Date Action Dose Route User    7/14/2025 0855 Given 120 mg Oral Mellissa Yo RN          enoxaparin (Lovenox) 40 MG/0.4ML SUBQ injection 40 mg       Date Action Dose Route User    7/14/2025 0855 Given 40 mg Subcutaneous (Left Lower Abdomen) Mellissa Yo RN          HYDROcodone-acetaminophen (Norco) 5-325 MG per tab 1 tablet       Date Action Dose Route User    7/14/2025 0521 Given 1 tablet Oral Courtney Lowery RN          HYDROcodone-acetaminophen (Norco) 5-325 MG per tab 2 tablet       Date Action Dose Route User    7/14/2025 0931 Given 2 tablet Oral Mellissa Yo RN    7/13/2025 1338 Given 2 tablet Oral GeoionaCassandra RN          HYDROmorphone (Dilaudid) 1 MG/ML injection 0.8 mg       Date Action Dose Route User     7/14/2025 0102 Given 0.8 mg Intravenous Courtney Lowery RN    7/13/2025 2018 Given 0.8 mg Intravenous Courtney Lowery RN    7/13/2025 1213 Given 0.8 mg Intravenous MesionaCassandra RN          levothyroxine (Synthroid) tab 75 mcg       Date Action Dose Route User    7/14/2025 0521 Given 75 mcg Oral Courtney Lowery RN          magnesium oxide (Mag-Ox) tab 400 mg       Date Action Dose Route User    7/14/2025 0637 Given 400 mg Oral Courtney Lowery RN          memantine (Namenda) tab 5 mg       Date Action Dose Route User    7/14/2025 0855 Given 5 mg Oral Mellissa Yo RN          morphINE ER (MS Contin) tab 15 mg       Date Action Dose Route User    7/14/2025 0855 Given 15 mg Oral Mellissa Yo RN    7/13/2025 2136 Given 15 mg Oral oCurtney Lowery RN          pregabalin (Lyrica) cap 75 mg       Date Action Dose Route User    7/13/2025 2018 Given 75 mg Oral Courtney Lowery RN          Vitals       Date/Time Temp Pulse Resp BP SpO2 Weight O2 Device O2 Flow Rate (L/min) [facility]    07/14/25 1008 -- 99 -- -- 91 % -- Nasal cannula 2 L/min CS    07/14/25 0904 -- 114 -- -- 98 % -- -- -- CS    07/14/25 0816 98 °F (36.7 °C) 97 16 127/87 92 % -- Nasal cannula 3 L/min CS    07/14/25 0513 98.3 °F (36.8 °C) 107 16 126/82 95 % -- Nasal cannula 3 L/min VILLA    07/14/25 0027 98.3 °F (36.8 °C) 105 18 125/83 93 % -- Nasal cannula 3 L/min PW    07/13/25 2019 98.3 °F (36.8 °C) 95 18 113/65 90 % -- Nasal cannula 3 L/min PW    07/13/25 1552 98.5 °F (36.9 °C) 98 16 122/80 90 % -- Nasal cannula 3 L/min AL    07/13/25 1228 98.3 °F (36.8 °C) 81 16 121/90 90 % -- Nasal cannula 3 L/min AL    07/13/25 0814 98.2 °F (36.8 °C) 92 18 145/85 97 % -- Nasal cannula 3 L/min AL    07/13/25 0450 98.3 °F (36.8 °C) 97 19 137/79 93 % -- Nasal cannula 2 L/min     07/13/25 0025 98.1 °F (36.7 °C) 107 20 126/77 96 % -- Nasal cannula 2 L/min      07/12/25 2001 97.8 °F (36.6 °C) 115 18 129/86 96 % -- Nasal cannula 2 L/min    07/12/25 1740 -- --  -- -- -- 185 lb (83.9 kg) -- -- TM   07/12/25 1736 -- -- -- -- -- -- Nasal cannula 2 L/min    07/12/25 1717 98.2 °F (36.8 °C) 107 17 124/73 93 % 186 lb 11.2 oz (84.7 kg) None (Room air) -- AL   07/12/25 1650 -- 104 21 121/85 96 % -- Nasal cannula 2 L/min MM   07/12/25 1559 -- 104 17 117/73 93 % -- Nasal cannula -- MM   07/12/25 1451 -- 108 18 122/97 Abnormal  92 % -- Nasal cannula 2 L/min    07/12/25 1440 -- 126 Abnormal  20 -- 87 % Abnormal  -- None (Room air) --    07/12/25 1414 -- 104 16 109/81 97 % -- None (Room air) --    07/12/25 1225 -- 102 22 115/73 94 % -- Nasal cannula --    07/12/25 1021 -- 115 -- -- 93 % -- None (Room air) -- MG   07/12/25 1018 96.8 °F (36 °C) 123 Abnormal  24 134/98 Abnormal  93 % 185 lb (83.9 kg) None (Room air) --        FOR REVIEW/APPROVAL OF INPT ADMISSION

## 2025-07-14 NOTE — PLAN OF CARE
Received pt alert and orientated x4. VSS. 3L nc needed, sats in the 90s. Afebrile. C/o pain, PRNs given with good relief. All other meds given per MAR. Pleurx catheter drained. Pt tolerated well. Up and using the bathroom. Tolerating diet. Fall precautions in place, call light within reach.     1645: Pt cleared for DC. DC paperwork provided, pt verbalized understanding. All pt belongings gathered and sent with the pt.    NURSING DISCHARGE NOTE    Discharged Home via Wheelchair.  Accompanied by PCT  Belongings Taken by patient/family.    Problem: GASTROINTESTINAL - ADULT  Goal: Maintains or returns to baseline bowel function  Description: INTERVENTIONS:  - Assess bowel function  - Maintain adequate hydration with IV or PO as ordered and tolerated  - Evaluate effectiveness of GI medications  - Encourage mobilization and activity  - Obtain nutritional consult as needed  - Establish a toileting routine/schedule  - Consider collaborating with pharmacy to review patient's medication profile  Outcome: Progressing     Problem: SKIN/TISSUE INTEGRITY - ADULT  Goal: Skin integrity remains intact  Description: INTERVENTIONS  - Assess and document risk factors for pressure ulcer development  - Assess and document skin integrity  - Monitor for areas of redness and/or skin breakdown  - Initiate interventions, skin care algorithm/standards of care as needed  Outcome: Progressing     Problem: PAIN - ADULT  Goal: Verbalizes/displays adequate comfort level or patient's stated pain goal  Description: INTERVENTIONS:  - Encourage pt to monitor pain and request assistance  - Assess pain using appropriate pain scale  - Administer analgesics based on type and severity of pain and evaluate response  - Implement non-pharmacological measures as appropriate and evaluate response  - Consider cultural and social influences on pain and pain management  - Manage/alleviate anxiety  - Utilize distraction and/or relaxation techniques  - Monitor for  opioid side effects  - Notify MD/LIP if interventions unsuccessful or patient reports new pain  - Anticipate increased pain with activity and pre-medicate as appropriate  Outcome: Progressing

## 2025-07-14 NOTE — PLAN OF CARE
Assumed care at 1930. Patient is AOx4, on 3 L via NC. Telemetry monitoring, ST on exertion, ambulating to bathroom. Right Pleurx catheter noted. PRN Dilaudid given for pain. Call light within reach. Bed in lowest position.    0600: Magnesium 1.6 and replenished.      Problem: SKIN/TISSUE INTEGRITY - ADULT  Goal: Skin integrity remains intact  Description: INTERVENTIONS  - Assess and document risk factors for pressure ulcer development  - Assess and document skin integrity  - Monitor for areas of redness and/or skin breakdown  - Initiate interventions, skin care algorithm/standards of care as needed  Outcome: Progressing     Problem: PAIN - ADULT  Goal: Verbalizes/displays adequate comfort level or patient's stated pain goal  Description: INTERVENTIONS:  - Encourage pt to monitor pain and request assistance  - Assess pain using appropriate pain scale  - Administer analgesics based on type and severity of pain and evaluate response  - Implement non-pharmacological measures as appropriate and evaluate response  - Consider cultural and social influences on pain and pain management  - Manage/alleviate anxiety  - Utilize distraction and/or relaxation techniques  - Monitor for opioid side effects  - Notify MD/LIP if interventions unsuccessful or patient reports new pain  - Anticipate increased pain with activity and pre-medicate as appropriate  Outcome: Progressing

## 2025-07-14 NOTE — CM/SW NOTE
ARNOLD reviewed HH referral and patient was declined by most home health agencies due to Medicaid being primary insurnace. [facility] declined due to documented cocaine usage. SW met with patient to discuss and she reported that she has not used cocaine or recreational drugs since the birth of her son 25+ years ago. SW notified [facility] and they are now able to accept. ARNOLD reserved them in [facility] and requested they speak with patient and schedule SOC for 2 days from DC. Likely DC today if cleared by hospitalist. MD aware and patient agreeable.     ARNOLD also assisted patient is applying for [employer] Ple[employer] financial assistance program. Application faxed and copy provided to patient.  [facility] aware as well. Copy of application also sent to [facility] via [facility].     ARNOLD informed patient that the \"Pleurx starter kit\" that was sent up with them from surgery is meant to be taken home with them at [place]. This kit holds 4 drainage kits in it that will be used by the home health agency until more kits can be ordered from [facility]. Home Health will come out to assist with Pleurx education and provide supplies once patient returns home. ARNOLD discussed with Charge RN and patient was provided with 4 additional kits to use until [facility] is able to supply more OR financial aid application is approved for [facility] [place] program.  [facility] also reported having additional kits to donate to patient is need be.     Referral was accepted by E for Home O2. ARNOLD notified RT and home O2 tank will be delivered to patient's room prior to DC. Patient aware and agreeable and has already spoken with HME.     ARNOLD will continue to follow for plan of care changes and remain available for any additional DC needs or concerns.     Mary Magdaleno MSW, LSW  Discharge Planner   [id number]

## 2025-07-14 NOTE — PROGRESS NOTES
[facility] Pulmonary Progress Note    Hanna Barrett Patient Status:  Inpatient    1970 MRN [id number]   Location [facility] 4NW-A Attending John, Alverto Davila, *   Hosp Day # 2 PCP Guillermo Curry MD     Subjective:    Overnight: No acute events overnight. Afebrile. On 3L NC. States her SOB is better, no cough. Upset she \"can't swim or wear a bra.\" Her pleurx dressing is bulky and I told her her nurse will re-dress it when they drain her in a way she can wear a bra and that this is temporary and she will be able to swim again one day.     Objective:  /87 (BP Location: Left arm)   Pulse 114   Temp 98 °F (36.7 °C) (Oral)   Resp 16   Ht 5' 4\" (1.626 m)   Wt 185 lb (83.9 kg)   LMP 2019 (Exact Date)   SpO2 98%   BMI 31.76 kg/m²     Temp (24hrs), Av.3 °F (36.8 °C), Min:98 °F (36.7 °C), Max:98.5 °F (36.9 °C)      Intake/Output:    Intake/Output Summary (Last 24 hours) at 2025 0937  Last data filed at 2025 0904  Gross per 24 hour   Intake 666 ml   Output 1150 ml   Net -484 ml       Physical Exam:   General: alert, cooperative, oriented.  No respiratory distress.   Head: Normocephalic, without obvious abnormality, atraumatic.   Throat: Lips, mucosa, and tongue normal.  No thrush noted.   Neck: trachea midline, no adenopathy, no thyromegaly. No JVD.   Lungs: clear to auscultation on left, diminished breath sounds L base   Chest wall: No tenderness or deformity.   Heart: regular rate and rhythm, S1, S2 normal, no murmur, click, rub or gallop   Abdomen: soft, non-distended, no masses, no guarding, no     Rebound.   Extremity: No edema or cyanosis   Skin: No rashes or lesions.   Neurological: Alert, interactive, no focal deficits    Lab Data Review:  Recent Labs     25  1115 25  0501   WBC 6.7 5.4   HGB 11.9* 11.7*   .0 282.0     Recent Labs     25  1115 25  0501    144   K 4.0 4.2    106   CO2 28.0 32.0   BUN 16 13   CREATSERUM 0.93 0.92      Recent Labs   Lab 07/13/25  0501   PTP 12.4   INR 0.92       Cultures:   Hospital Encounter on 07/12/25   1. Blood Culture     Status: None (Preliminary result)    Collection Time: 07/12/25 11:15 AM    Specimen: Blood,peripheral   Result Value Ref Range    Blood Culture Result No Growth 1 Day N/A       Radiology:  XR CHEST AP PORTABLE  (CPT=71045)  Narrative: PROCEDURE: XR CHEST AP PORTABLE  (CPT=71045)    INDICATIONS: SOB; monitoring R pleural effusion following thoracentesis    COMPARISON: Chest radiograph 7/12/2025, 6/4/2025    FINDINGS:    Small to moderate right pleural effusion, decreased in comparison to prior with improved aeration of the right lower lobe. There is a right basilar thoracostomy tube in place. The left lung remains clear. No measurable pneumothorax. Cardiomediastinal   silhouette is within normal limits. There is a right chest port catheter in place.  Impression: CONCLUSION:     See above     Electronically Verified and Signed by Attending Radiologist: Alma Lopez MD 7/13/2025 7:20 AM  Workstation: [id number]      Medications reviewed     Assessment:  Acute hypoxic respiratory failure secondary to malignant right pleural effusion, currently on 3L NC  Right pleural effusion status post Pleurx catheter placement by IR on 7/12  Metastatic non-small cell lung cancer- on chemoRT  Asthma, controlled  Anxiety/depression  Hypothyroidism   Hypertension     Plan:  Continue to monitor oxygenation, wean as tolerated. 3L NC  Encourage OOB with meals, increase activity as tolerated   Obtain home oxygen walk- will likely need home oxygen  Continue to drain pleurx catheter daily and record output- will continue to work with HH  Discharge planning- okay to discharge from pulmonary perspective if can arrange home oxygen today.    Discussed with patient, Darlene RAUSCH and Dr Morrison    Oxygen Walk results  SPO2% on Room Air at Rest: 86 (07/14/25 1000)  SPO2% on Oxygen at Rest: 92 (07/14/25 1000)  At rest oxygen  flow (liters per minute): 3 (07/14/25 1000)  SPO2% Ambulation on Room Air: 82 (07/14/25 1000)  SPO2% Ambulation on Oxygen: 90 (07/14/25 1000)  Ambulation oxygen flow (liters per minute): 2 (07/14/25 1000).    I had a face to face visit with Hanna and I reviewed the patient's walking O2 study completed by nursing on 7/14/25. The patient is mobile in their home and is in need of a portable oxygen tank. The home oxygen will improve the patients condition.        The PleurX catheter is a required medical intervention for symptom management. The catheter is inserted in the patient’s posterior chest wall to drain pleural fluid. Due to its location, the patient is unable to physically access and maintain the catheter.  Medically necessary home health discipline and care to be provided: until patient is comfortable managing on own  RN - provide skilled nursing care, instruction and knowledge to patient/family/caregiver in Pleurx catheter care management.   Activity restriction:  a) The location of the Pleurx catheter precludes safe and effective drainage by patient; b) Patient becomes dyspneic with minimal activity secondary to the pleural effusion.  I certify this patient is under my care, and I had a face-to-face encounter today.    Is this a shared or split note between Advanced Practice Provider and Physician? Yes   GABY Gastelum  [facility] Pulmonary Medicine  Office: [phone]

## 2025-07-14 NOTE — HOME CARE LIAISON
Received referral via [facility] for Home Health services. Spoke w/ patient at the bedside and is agreeable for all home health care services. DME also provided for new o2 needs through HME. Demographics updated and confirmed. Added contact information to the AVS. Patient aware staff for home health does not come daily and knowledged understanding

## 2025-07-14 NOTE — PROGRESS NOTES
[facility]   part of [facility]     Hospitalist Progress Note     Hanna Barrett Patient Status:  Inpatient    1970 MRN [id number]   Location [facility] 4NW-A Attending John, Alverto Davila, *   Hosp Day # 2 PCP Guillermo Curry MD     Chief Complaint: SOB    Subjective:      - s/p pleurex drainage of 1.15L yesterday    - stable on 3L NC this AM    - pt tearful d/t multiple changes in life with new tube in place; states she is being visited by Dr. Gordon today    - Otherwise denies new f/c, cp, abd pain, n/v   - Is having more cough with sputum production now     Objective:    Review of Systems:   A comprehensive review of systems was completed; pertinent positive and negatives stated in subjective.    Vital signs:  Temp:  [98.2 °F (36.8 °C)-98.5 °F (36.9 °C)] 98.3 °F (36.8 °C)  Pulse:  [] 107  Resp:  [16-18] 16  BP: (113-145)/(65-90) 126/82  SpO2:  [90 %-97 %] 95 %    Physical Exam:    General: No acute distress  Respiratory: no wheezes,. No ronchi    Cardiovascular: S1, S2, regular rate and rhythm  Abdomen: Soft, Non-tender, non-distended, positive bowel sounds  Neuro: No new focal deficits.   Extremities: no edema    Diagnostic Data:    Labs:  Recent Labs   Lab 25  1115 25  0501   WBC 6.7 5.4   HGB 11.9* 11.7*   MCV 89.8 89.9   .0 282.0   INR  --  0.92       Recent Labs   Lab 25  1115 25  0501   * 120*   BUN 16 13   CREATSERUM 0.93 0.92   CA 9.4 9.4   ALB 3.9 3.9    144   K 4.0 4.2    106   CO2 28.0 32.0   ALKPHO 64 66   AST 20 18   ALT 21 19   BILT 0.3 0.2*   TP 6.2 6.1       Estimated Creatinine Clearance: 60.4 mL/min (based on SCr of 0.92 mg/dL).    No results for input(s): \"TROP\", \"TROPHS\", \"CK\" in the last 168 hours.    Recent Labs   Lab 25  0501   PTP 12.4   INR 0.92                  Microbiology    Hospital Encounter on 25   1. Blood Culture     Status: None (Preliminary result)    Collection Time: 25 11:15 AM     Specimen: Blood,peripheral   Result Value Ref Range    Blood Culture Result No Growth 1 Day N/A         Imaging: Reviewed in Epic.    Medications: Scheduled Medications[1]    Assessment & Plan:      # SOB   # Recurrent Right pleural effusion s/p Pleur-Ex placement   - prior to pleurex placement, CXR this AM with significant right sided effusion noted; s/p 1.5L fluid removed with placement 7/12   - CTA chest in ER negative for PE    - wean O2 as tolerated, likely atelectatic lung   - 1.1L removed 7/13   - attempt fluid removal again today    - repeat CXR this AM pending   - clinically less c/w PNA, Procal low   - IS, OOBAT; add Acapella    - Pulm on consult      # Metastatic Right lung NSCLC adenocarcinoma   - Seen by Dr. Gordon as outpatient, continue outpatient f/u     # Asthma - continue home meds, Duonebs PRN   # Anxiety/depression - Cymbalta, abilify   # Hypothyroidism - continue home levothyroxine   # Cancer associated pain - continue Morphine ER and IR PRN  # HTN - holding home meds, resume in AM as BP allows     Dispo pending weaning to RA; will have SW eval for possible home O2     Alverto Lopez MD    Supplementary Documentation:     Quality:  DVT Mechanical Prophylaxis:   SCDs, Early ambuation  DVT Pharmacologic Prophylaxis   Medication    enoxaparin (Lovenox) 40 MG/0.4ML SUBQ injection 40 mg                Code Status: Full Code  Willis: No urinary catheter in place  Willis Duration (in days):   Central line:    SHERYL:     The 21st Century Cures Act makes medical notes like these available to patients in the interest of transparency. Please be advised this is a medical document. Medical documents are intended to carry relevant information, facts as evident, and the clinical opinion of the practitioner. The medical note is intended as peer to peer communication and may appear blunt or direct. It is written in medical language and may contain abbreviations or verbiage that are unfamiliar.                          [1]    enoxaparin  40 mg Subcutaneous Daily    ARIPiprazole  2 mg Oral Daily    dilTIAZem HCl  120 mg Oral Daily    DULoxetine  60 mg Oral Daily    levothyroxine  75 mcg Oral Before breakfast    memantine  5 mg Oral Daily    morphINE ER  15 mg Oral Q12H    pregabalin  75 mg Oral TID    [Held by provider] lisinopril  20 mg Oral Daily    And    [Held by provider] hydrochlorothiazide  12.5 mg Oral Daily

## 2025-07-14 NOTE — CM/SW NOTE
07/14/25 0800   CM/SW Referral Data   Referral Source Social Work (self-referral)   Reason for Referral Discharge planning   Informant Clinical Staff Member;EMR   Patient Info   Patient's Home Environment House   Patient lives with Son   Patient Status Prior to Admission   Independent with ADLs and Mobility Yes   Discharge Needs   Anticipated D/C needs Home health care     SW noted that patient received new Pleurx drain on right side. SW sent referral for Home Health services and will meet with patient to discuss once choice list is available. Home O2 referral sent and [facility] form completed and attached to Home Health referral.     SW will continue to follow for plan of care changes and remain available for any additional DC needs or concerns.     Mary Magdaleno MSW, LSW  Discharge Planner   [id number]

## 2025-07-15 ENCOUNTER — PATIENT OUTREACH (OUTPATIENT)
Dept: CASE MANAGEMENT | Age: 55
End: 2025-07-15

## 2025-07-15 ENCOUNTER — PATIENT OUTREACH (OUTPATIENT)
Age: 55
End: 2025-07-15

## 2025-07-15 NOTE — PROGRESS NOTES
Transitional Care Management   Discharge Date: 25  Contact Date: 7/15/2025    Assessment:  TCM Initial Assessment    General:  Assessment completed with: Patient  Patient Subjective: Pt doing fairly well was able to rest last night.  Chief Complaint: Malignant neoplasm of lung, unspecified laterality, unspecified part of lung  Verify patient name and  with patient/ caregiver: Yes    Hospital Stay/Discharge:  Prior to leaving the hospital were your Discharge Instructions reviewed with you?: Yes  Did you receive a copy of your written Discharge Instructions?: Yes  Do you feel better or worse since you left the hospital or emergency department?: Better    Follow - Up Appointment:  Do you have a follow-up appointment?: No  Are there any barriers to getting to your follow-up appointment?: No    Home Health/DME:  Prior to leaving the hospital was Home Health (HH) arranged for you?: Yes  Has HH been out or set up a visit to see you?: No     Prior to leaving the hospital or emergency department was Durable Medical Equipment (DME), medical supplies, or infusions arranged for you?: No  Are DME/medical supply/infusions needs identified by staff during this assessment?: No     Medications/Diet:       Were you given a different diet per your Discharge Instructions?: No     Questions/Concerns:  Do you have any questions or concerns that have not been discussed?: No         Follow-up Appointments:  Your appointments       Date & Time Appointment Department (Center)    2025 11:30 AM CDT ULTRASOUND BIOPSY OF LYMPH NODE with  US  3 [facility] Ultrasound ([facility])    If you have had prior imaging outside of an [facility] or [facility] facility, you must provide a copy for this appointment. If we do not receive a copy of your images or you do not bring them to your appointment, you will be rescheduled.    You may be subject to a fee if you do not show up for your appointment or  you cancel within 24 hours of your appointment.    Prior to your arrival:     A nurse will be calling you to do a Health History Screen over the phone and give you any further instructions if needed.      If you suspect you may be pregnant, please consult with your physician prior to your exam.     If you are having a joint injection please make sure an adult is available to drive you home after the procedure.    Medication Instructions:     All herbal supplements, Multivitamins, Vitamin C and E should be held at the time of scheduling the procedure.    If you take insulin, please call the physician who manages this medication for instructions on adjusting your dosing for the day of the procedure.     Dietary Instructions:     Please have nothing to eat or drink for 4 hours prior to your procedure except for your usual medication with a small amount of water.     If you are having a Thoracentesis, please have nothing to eat or drink for 6 hours prior to your procedure except for your usual medication with a small amount of water.    Day of procedure:     Please arrive on time for your lab appointment (60 minutes prior to the scheduled procedure time).  If needing labs, you will be directed by the Radiology Nursing Department prior to your appointment.  All other patients please arrive 30 minutes prior to your scheduled appointment time.  After you register, you will be escorted directly to the lab and radiology department.     For Thoracentesis patients, the estimated duration for your visit could take up to 6 hours. This includes recovery time after the procedure.      Jul 16, 2025 11:30 AM CDT ULTRASOUND BIOPSY OF LYMPH NODE with  RADIOLOGIST SPEC CT US [facility] CT ([facility])    If you have had prior imaging outside of an [place] or [facility] facility, you must provide a copy for this appointment. If we do not receive a copy of your images or you do not bring them to  your appointment, you will be rescheduled.    You may be subject to a fee if you do not show up for your appointment or you cancel within 24 hours of your appointment.    Prior to your arrival:     A nurse will be calling you to do a Health History Screen over the phone and give you any further instructions if needed.      If you suspect you may be pregnant, please consult with your physician prior to your exam.     If you are having a joint injection please make sure an adult is available to drive you home after the procedure.    Medication Instructions:     All herbal supplements, Multivitamins, Vitamin C and E should be held at the time of scheduling the procedure.    If you take insulin, please call the physician who manages this medication for instructions on adjusting your dosing for the day of the procedure.     Dietary Instructions:     Please have nothing to eat or drink for 4 hours prior to your procedure except for your usual medication with a small amount of water.     If you are having a Thoracentesis, please have nothing to eat or drink for 6 hours prior to your procedure except for your usual medication with a small amount of water.    Day of procedure:     Please arrive on time for your lab appointment (60 minutes prior to the scheduled procedure time).  If needing labs, you will be directed by the Radiology Nursing Department prior to your appointment.  All other patients please arrive 30 minutes prior to your scheduled appointment time.  After you register, you will be escorted directly to the lab and radiology department.     For Thoracentesis patients, the estimated duration for your visit could take up to 6 hours. This includes recovery time after the procedure.      Jul 16, 2025 11:30 AM CDT ULTRASOUND BIOPSY OF LYMPH NODE with JOHN RN RADIOLOGY [facility] X-ray ([facility])    If you have had prior imaging outside of an [place] or [facility] facility, you  must provide a copy for this appointment. If we do not receive a copy of your images or you do not bring them to your appointment, you will be rescheduled.    You may be subject to a fee if you do not show up for your appointment or you cancel within 24 hours of your appointment.    Prior to your arrival:     A nurse will be calling you to do a Health History Screen over the phone and give you any further instructions if needed.      If you suspect you may be pregnant, please consult with your physician prior to your exam.     If you are having a joint injection please make sure an adult is available to drive you home after the procedure.    Medication Instructions:     All herbal supplements, Multivitamins, Vitamin C and E should be held at the time of scheduling the procedure.    If you take insulin, please call the physician who manages this medication for instructions on adjusting your dosing for the day of the procedure.     Dietary Instructions:     Please have nothing to eat or drink for 4 hours prior to your procedure except for your usual medication with a small amount of water.     If you are having a Thoracentesis, please have nothing to eat or drink for 6 hours prior to your procedure except for your usual medication with a small amount of water.    Day of procedure:     Please arrive on time for your lab appointment (60 minutes prior to the scheduled procedure time).  If needing labs, you will be directed by the Radiology Nursing Department prior to your appointment.  All other patients please arrive 30 minutes prior to your scheduled appointment time.  After you register, you will be escorted directly to the lab and radiology department.     For Thoracentesis patients, the estimated duration for your visit could take up to 6 hours. This includes recovery time after the procedure.      Jul 16, 2025 12:15 PM CDT ULTRASOUND BIOPSY OF LYMPH NODE with PERIOP RADIOLOGY [facility] Perioperative  Service ([facility])    If you have had prior imaging outside of an [place] or [facility], you must provide a copy for this appointment. If we do not receive a copy of your images or you do not bring them to your appointment, you will be rescheduled.    You may be subject to a fee if you do not show up for your appointment or you cancel within 24 hours of your appointment.    Prior to your arrival:     A nurse will be calling you to do a Health History Screen over the phone and give you any further instructions if needed.      If you suspect you may be pregnant, please consult with your physician prior to your exam.     If you are having a joint injection please make sure an adult is available to drive you home after the procedure.    Medication Instructions:     All herbal supplements, Multivitamins, Vitamin C and E should be held at the time of scheduling the procedure.    If you take insulin, please call the physician who manages this medication for instructions on adjusting your dosing for the day of the procedure.     Dietary Instructions:     Please have nothing to eat or drink for 4 hours prior to your procedure except for your usual medication with a small amount of water.     If you are having a Thoracentesis, please have nothing to eat or drink for 6 hours prior to your procedure except for your usual medication with a small amount of water.    Day of procedure:     Please arrive on time for your lab appointment (60 minutes prior to the scheduled procedure time).  If needing labs, you will be directed by the Radiology Nursing Department prior to your appointment.  All other patients please arrive 30 minutes prior to your scheduled appointment time.  After you register, you will be escorted directly to the lab and radiology department.     For Thoracentesis patients, the estimated duration for your visit could take up to 6 hours. This includes recovery time after  the procedure.      Jul 17, 2025 9:30 AM CDT CHEMO INFUSION CHEMOTHERAPY with NP [place] RN3 [facility] ([facility])        Jul 17, 2025 10:00 AM CDT Follow-Up Visit with Shoshana Gordon MD [facility] Hematology Oncology [facility])              [facility] CT  [facility]  [address] [address]  [phone] [facility] Perioperative Service  [facility]  [address] [address]  [phone] [facility] Ultrasound  [facility]  [address] [address]  [phone]    [facility] X-ray  [facility]  [address] [address]  [phone] [facility] Hematology Oncology [facility]  [address]  [facility] [id number] [phone] [facility]  [address]  [facility] [id number] [phone]            Transitional Care Clinic  Was TCC Ordered: No      Primary Care Provider (If no TCC appointment)  Does patient already have a PCP appointment scheduled? No  Care Manager Scheduled PCP office TCM appointment with patient      Specialist  Does the patient have any other follow-up appointment(s) that need to be scheduled? Yes   -If yes: Care Manager reviewed upcoming specialist appointments with patient: Yes   -Does the patient need assistance scheduling appointment(s): Yes, message sent to TST team      Book By Date: 7/28/25

## 2025-07-15 NOTE — PROGRESS NOTES
TCM request (discharged 07/14)    Dr Sd Corrigan  Pulmonary Diseases  [address]  [place] [address]  [phone]  Follow up 1 week  Oxygen walk test Thu 07/24 @12:30pm then after that patient will see GABY Gastelum for follow up appointment  Left Voicemail with appointment information; if still need assistance to call [phone]  Closing encounter

## 2025-07-15 NOTE — PROGRESS NOTES
Good Day TST :)     [facility] has contacted patient and patient needs additional appointments.  Please contact patient for assistance with scheduling a follow-up appointment for Pulmonary.      Thank you in advance!

## 2025-07-15 NOTE — DISCHARGE INSTRUCTIONS
Discharge/After Visit [facility] [place] Department of Radiology  Biopsy of Lymph Node - Cervical    Activity  Take it easy for the rest of the day after your biopsy. You may be sore at the site of the biopsy for the next 5 days. Do not do any strenuous exercises or lift over 5 lbs. for 24 hours after the procedure.     Biopsy Site  Keep the bandage on the biopsy site for the next hour. No shower/bathing restrictions.    Pain Management  You may use over-the-counter or prescribed pain relief medication if not contraindicated due to a medical condition.   You may use a covered ice pack to the procedure site for 20 min, as needed, for pain.   The site may be red or tender for a few days.  You may develop a sore throat after the procedure. If necessary, throat lozenges may be used to relieve the discomfort.     Medications  You may resume your usual home medications, including blood thinners (24 hours after the procedure) if you experience no bleeding or hematoma at the puncture site.     When to seek medical advice  Call the provider that ordered the biopsy with questions and to discuss test results. They may also be available on your [facility] My Chart. You may also call the Radiology nurse at [phone], M-F, 8-5 with any additional questions or concerns.    Dial [phone] and ask the  to page the on-call Radiologist right away if any of these occur:  There is a change in color or temperature of the area where the biopsy was performed.  You develop increasing pain, increased swelling in your neck, difficulty breathing or swallowing.    IF YOU FEEL YOU ARE HAVING AN EMERGENCY,   CALL 911 OR GO TO THE NEAREST EMERGENCY ROOM      4.2.24 MO/  Radiology

## 2025-07-16 ENCOUNTER — APPOINTMENT (OUTPATIENT)
Dept: ULTRASOUND IMAGING | Facility: HOSPITAL | Age: 55
End: 2025-07-16
Attending: INTERNAL MEDICINE
Payer: MEDICAID

## 2025-07-16 ENCOUNTER — HOSPITAL ENCOUNTER (OUTPATIENT)
Dept: ULTRASOUND IMAGING | Facility: HOSPITAL | Age: 55
Discharge: HOME | End: 2025-07-16
Attending: INTERNAL MEDICINE
Payer: MEDICAID

## 2025-07-16 DIAGNOSIS — C34.91 PRIMARY LUNG CANCER WITH METASTASIS FROM LUNG TO OTHER SITE, RIGHT (HCC): ICD-10-CM

## 2025-07-16 PROCEDURE — 88305 TISSUE EXAM BY PATHOLOGIST: CPT | Performed by: INTERNAL MEDICINE

## 2025-07-16 PROCEDURE — 88341 IMHCHEM/IMCYTCHM EA ADD ANTB: CPT | Performed by: INTERNAL MEDICINE

## 2025-07-16 PROCEDURE — 88342 IMHCHEM/IMCYTCHM 1ST ANTB: CPT | Performed by: INTERNAL MEDICINE

## 2025-07-16 PROCEDURE — 38505 NEEDLE BIOPSY LYMPH NODES: CPT | Performed by: INTERNAL MEDICINE

## 2025-07-16 PROCEDURE — 76942 ECHO GUIDE FOR BIOPSY: CPT | Performed by: INTERNAL MEDICINE

## 2025-07-16 NOTE — DISCHARGE SUMMARY
[facility]     Hanna Barrett Patient Status:  Inpatient    1970 MRN [id number]   Location [facility] 4NW-A Attending No att. providers found   Hosp Day # 2 PCP Guillermo Curry MD     Date of Admission: 2025  Date of Discharge: 2025  Discharge Disposition: Home or Self Care    Admitting Diagnosis:   Pleural effusion [J90]  Hypoxia [R09.02]  Malignant neoplasm of lung, unspecified laterality, unspecified part of lung (HCC) [C34.90]  Community acquired pneumonia, unspecified laterality [J18.9]    Hospital Discharge Diagnoses:   Hypoxia secondary to recurrent right pleural effusion  Metastatic right non-small cell lung adenocarcinoma  Asthma  Anxiety/depression  Hypothyroidism  Cancer associated pain  Hypertension    Lace+ Score: 76  59-90 High Risk  29-58 Medium Risk  0-28   Low Risk.    TCM Follow-Up Recommendation:  LACE > 58: High Risk of readmission after discharge from the hospital.        Discharge Diagnosis:   Hypoxia secondary to recurrent malignant right pleural effusion    History of Present Illness: Hanna Barrett is a 55 year old female with PMHx of Recurrent R pleural effusion 2/2 R lung adenocarcinoma, Hypothyroidism, HTN, Asthma, anxiety/depression who presents for shortness of breath.      Patient with history of recurrent pleural effusion on the right, recent admission on 6/3/2025 to 2025 for recurrent pleural effusion, status post thoracentesis with cytology positive for metastatic carcinoma compatible with lung adenocarcinoma.  Patient has been progressively getting more short of breath throughout the month and was planned for Pleurx placement on Wednesday however could not tolerate her breathing today and thus presented  to the ER for further management.  She was able to be seen by IR and had Pleurx placement with 1.5 L removed however still was feeling short of breath and hypoxic in the ER.  Patient denies any associated fevers, chills, chest  pain, abdominal pain, nausea, vomiting, congestion, cough.    Brief Synopsis: Patient presented with ongoing shortness of breath secondary to recurrent right pleural effusion secondary to underlying lung malignancy.  Interventional radiology consulted in the ER and patient did have Pleurx placed on the right, 1.5 L drained on admission however still hypoxic and thus admitted.  Pulmonology consulted, underwent CTA chest in the ER negative for PE, repeat fluid was removed and subsequent days and had improvement in hypoxia.  Patient breathing improved and patient was coordinated for oxygen at discharge with plans for outpatient follow-up with oncology for further management of underlying malignancy. All diagnoses discussed with patient, they demonstrated understanding and plan of care and risks reviewed and in agreement.     Procedures during hospitalization:   Right sided Pleurx placement    Incidental or significant findings and recommendations (brief descriptions):      Lab/Test results pending at Discharge:       Consultants:  Pulmonology    Discharge Medication List:     Discharge Medications        CONTINUE taking these medications        Instructions Prescription details   albuterol 108 (90 Base) MCG/ACT Aers  Commonly known as: Ventolin HFA      Inhale 2 puffs into the lungs every 6 (six) hours as needed for Wheezing or Shortness of Breath.   Quantity: 3 each  Refills: 3     amphetamine-dextroamphetamine 15 MG Tabs  Commonly known as: ADDERALL      Take 1 tablet (15 mg total) by mouth daily as needed.   Refills: 0     ARIPiprazole 2 MG Tabs  Commonly known as: Abilify      Take 1 tablet (2 mg total) by mouth daily.   Refills: 0     cetirizine 10 MG Tabs  Commonly known as: ZyrTEC      Take 1 tablet (10 mg total) by mouth daily.   Quantity: 90 tablet  Refills: 1     cholecalciferol 25 MCG (1000 UT) Caps  Commonly known as: DRISDOL      Take 1 capsule (1,000 Units total) by mouth in the morning.   Refills: 0      dilTIAZem HCl 120 MG Tabs  Commonly known as: CARDIZEM      Take 1 tablet (120 mg total) by mouth in the morning.   Refills: 0     DULoxetine 60 MG Cpep  Commonly known as: Cymbalta      Take 1 capsule (60 mg total) by mouth in the morning.   Refills: 2     ferrous sulfate 325 (65 FE) MG Tbec      Take 1 tablet (325 mg total) by mouth daily with breakfast.   Refills: 0     ibuprofen 400 MG Tabs  Commonly known as: Motrin      Take 1 tablet (400 mg total) by mouth every 8 (eight) hours as needed for Pain.   Quantity: 90 tablet  Refills: 0     levothyroxine 75 MCG Tabs  Commonly known as: Synthroid      Take 1 tablet (75 mcg total) by mouth before breakfast.   Quantity: 90 tablet  Refills: 0     lisinopril-hydroCHLOROthiazide 20-12.5 MG Tabs  Commonly known as: Zestoretic      Take 0.5 tablets by mouth daily.   Quantity: 45 tablet  Refills: 3     memantine 5 MG Tabs  Commonly known as: Namenda      Take 1 tablet (5 mg total) by mouth daily.   Refills: 0     montelukast 10 MG Tabs  Commonly known as: Singulair      Take 1 tablet (10 mg total) by mouth nightly.   Quantity: 90 tablet  Refills: 3     morphINE 15 MG Tabs      Take 1 tablet (15 mg total) by mouth every 8 (eight) hours as needed for Pain.   Quantity: 90 tablet  Refills: 0     morphINE ER 15 MG Tbcr  Commonly known as: MS Contin      Take 1 tablet (15 mg total) by mouth every 12 (twelve) hours.   Stop taking on: July 26, 2025  Quantity: 60 tablet  Refills: 0     Multiple Vitamin Tabs      Take 1 tablet by mouth in the morning.   Refills: 0     Naloxone HCl 4 MG/0.1ML Liqd      4 mg by Nasal route as needed. If patient remains unresponsive, repeat dose in other nostril 2-5 minutes after first dose.   Quantity: 1 kit  Refills: 0     ondansetron 8 MG tablet  Commonly known as: Zofran      Take 1 tablet (8 mg total) by mouth every 8 (eight) hours as needed for Nausea.   Quantity: 30 tablet  Refills: 3     pregabalin 75 MG Caps  Commonly known as: Lyrica       Take 1 capsule (75 mg total) by mouth 3 (three) times daily.   Quantity: 90 capsule  Refills: 1     prochlorperazine 10 mg tablet  Commonly known as: Compazine      Take 1 tablet (10 mg total) by mouth every 6 (six) hours as needed for Nausea.   Quantity: 30 tablet  Refills: 3     Vyvanse 70 MG Caps  Generic drug: Lisdexamfetamine Dimesylate      Take 1 capsule (70 mg total) by mouth every morning.   Refills: 0              [place]    Follow-up appointment:   Sd Corrigan MD  [address] [facility] [address]  [place] [address]  [phone]    Follow up in 1 week(s)  hospital follow up  AND make a oxygen walk test assessment with respiratory therapy on same day    Guillermo Curry MD  [address] [place] [address] [address]  [facility] [address] [phone]    Call in 1 week(s)  For follow-up after hospitilization    Shoshana Gordon MD  [address] [facility] [address]  [facility]  [place] [address]  [phone]    Call in 1 week(s)  For follow-up after hospitilization      Vital signs:       Physical Exam:  See exam from day of discharge note  -----------------------------------------------------------------------------------------------  PATIENT DISCHARGE INSTRUCTIONS: See electronic chart    Alverto Lopez MD 7/16/2025    Time spent:  35 minutes

## 2025-07-17 ENCOUNTER — OFFICE VISIT (OUTPATIENT)
Age: 55
End: 2025-07-17
Attending: INTERNAL MEDICINE
Payer: MEDICAID

## 2025-07-17 VITALS
WEIGHT: 185 LBS | RESPIRATION RATE: 20 BRPM | HEART RATE: 120 BPM | TEMPERATURE: 98 F | SYSTOLIC BLOOD PRESSURE: 136 MMHG | DIASTOLIC BLOOD PRESSURE: 79 MMHG | OXYGEN SATURATION: 98 % | HEIGHT: 63.74 IN | BODY MASS INDEX: 31.97 KG/M2

## 2025-07-17 DIAGNOSIS — C34.91 PRIMARY LUNG CANCER WITH METASTASIS FROM LUNG TO OTHER SITE, RIGHT (HCC): ICD-10-CM

## 2025-07-17 DIAGNOSIS — R53.83 FATIGUE DUE TO TREATMENT: ICD-10-CM

## 2025-07-17 DIAGNOSIS — C34.91 PRIMARY ADENOCARCINOMA OF RIGHT LUNG (HCC): Primary | ICD-10-CM

## 2025-07-17 DIAGNOSIS — C34.91 PRIMARY ADENOCARCINOMA OF RIGHT LUNG (HCC): ICD-10-CM

## 2025-07-17 DIAGNOSIS — R59.0 MEDIASTINAL ADENOPATHY: Primary | ICD-10-CM

## 2025-07-17 DIAGNOSIS — R59.0 SUPRACLAVICULAR ADENOPATHY: ICD-10-CM

## 2025-07-17 DIAGNOSIS — R59.0 MEDIASTINAL ADENOPATHY: ICD-10-CM

## 2025-07-17 DIAGNOSIS — G89.3 NEOPLASM RELATED PAIN: Primary | ICD-10-CM

## 2025-07-17 DIAGNOSIS — D64.9 ANEMIA, NORMOCYTIC NORMOCHROMIC: ICD-10-CM

## 2025-07-17 DIAGNOSIS — R06.09 OTHER FORM OF DYSPNEA: ICD-10-CM

## 2025-07-17 DIAGNOSIS — T45.1X5A CHEMOTHERAPY-INDUCED NEUROPATHY (HCC): ICD-10-CM

## 2025-07-17 DIAGNOSIS — J91.0 MALIGNANT PLEURAL EFFUSION (HCC): ICD-10-CM

## 2025-07-17 DIAGNOSIS — G62.0 CHEMOTHERAPY-INDUCED NEUROPATHY (HCC): ICD-10-CM

## 2025-07-17 DIAGNOSIS — K59.03 DRUG-INDUCED CONSTIPATION: ICD-10-CM

## 2025-07-17 DIAGNOSIS — Z51.5 PALLIATIVE CARE BY SPECIALIST: ICD-10-CM

## 2025-07-17 DIAGNOSIS — G89.3 NEOPLASM RELATED PAIN: ICD-10-CM

## 2025-07-17 LAB
ALBUMIN SERPL-MCNC: 4.1 G/DL (ref 3.2–4.8)
ALBUMIN/GLOB SERPL: 1.8 {RATIO} (ref 1–2)
ALP LIVER SERPL-CCNC: 85 U/L (ref 41–108)
ALT SERPL-CCNC: 36 U/L (ref 10–49)
ANION GAP SERPL CALC-SCNC: 5 MMOL/L (ref 0–18)
AST SERPL-CCNC: 42 U/L (ref ?–34)
BASOPHILS # BLD AUTO: 0.03 X10(3) UL (ref 0–0.2)
BASOPHILS NFR BLD AUTO: 0.4 %
BILIRUB SERPL-MCNC: 0.2 MG/DL (ref 0.3–1.2)
BUN BLD-MCNC: 13 MG/DL (ref 9–23)
CALCIUM BLD-MCNC: 9.2 MG/DL (ref 8.7–10.6)
CHLORIDE SERPL-SCNC: 104 MMOL/L (ref 98–112)
CO2 SERPL-SCNC: 31 MMOL/L (ref 21–32)
CREAT BLD-MCNC: 0.95 MG/DL (ref 0.55–1.02)
EGFRCR SERPLBLD CKD-EPI 2021: 71 ML/MIN/1.73M2 (ref 60–?)
EOSINOPHIL # BLD AUTO: 0.38 X10(3) UL (ref 0–0.7)
EOSINOPHIL NFR BLD AUTO: 5 %
ERYTHROCYTE [DISTWIDTH] IN BLOOD BY AUTOMATED COUNT: 12.3 %
GLOBULIN PLAS-MCNC: 2.3 G/DL (ref 2–3.5)
GLUCOSE BLD-MCNC: 123 MG/DL (ref 70–99)
HCT VFR BLD AUTO: 31.9 % (ref 35–48)
HGB BLD-MCNC: 10.9 G/DL (ref 12–16)
IMM GRANULOCYTES # BLD AUTO: 0.02 X10(3) UL (ref 0–1)
IMM GRANULOCYTES NFR BLD: 0.3 %
LYMPHOCYTES # BLD AUTO: 0.47 X10(3) UL (ref 1–4)
LYMPHOCYTES NFR BLD AUTO: 6.2 %
MCH RBC QN AUTO: 30.9 PG (ref 26–34)
MCHC RBC AUTO-ENTMCNC: 34.2 G/DL (ref 31–37)
MCV RBC AUTO: 90.4 FL (ref 80–100)
MONOCYTES # BLD AUTO: 0.84 X10(3) UL (ref 0.1–1)
MONOCYTES NFR BLD AUTO: 11.1 %
NEUTROPHILS # BLD AUTO: 5.82 X10 (3) UL (ref 1.5–7.7)
NEUTROPHILS # BLD AUTO: 5.82 X10(3) UL (ref 1.5–7.7)
NEUTROPHILS NFR BLD AUTO: 77 %
OSMOLALITY SERPL CALC.SUM OF ELEC: 291 MOSM/KG (ref 275–295)
PLATELET # BLD AUTO: 260 10(3)UL (ref 150–450)
POTASSIUM SERPL-SCNC: 3.9 MMOL/L (ref 3.5–5.1)
PROT SERPL-MCNC: 6.4 G/DL (ref 5.7–8.2)
RBC # BLD AUTO: 3.53 X10(6)UL (ref 3.8–5.3)
SODIUM SERPL-SCNC: 140 MMOL/L (ref 136–145)
T4 FREE SERPL-MCNC: 1.1 NG/DL (ref 0.8–1.7)
TSI SER-ACNC: 2.38 UIU/ML (ref 0.55–4.78)
WBC # BLD AUTO: 7.6 X10(3) UL (ref 4–11)

## 2025-07-17 RX ORDER — HYDROCODONE BITARTRATE AND ACETAMINOPHEN 10; 325 MG/1; MG/1
1 TABLET ORAL EVERY 6 HOURS PRN
Qty: 90 TABLET | Refills: 0 | Status: SHIPPED | OUTPATIENT
Start: 2025-07-17

## 2025-07-17 RX ORDER — PREGABALIN 25 MG/1
25 CAPSULE ORAL 3 TIMES DAILY
Qty: 90 CAPSULE | Refills: 0 | Status: SHIPPED | OUTPATIENT
Start: 2025-07-17

## 2025-07-17 RX ORDER — LACTULOSE 10 G/15ML
20 SOLUTION ORAL 2 TIMES DAILY
Qty: 900 ML | Refills: 0 | Status: SHIPPED | OUTPATIENT
Start: 2025-07-17

## 2025-07-17 NOTE — PROGRESS NOTES
Pt here for C3D22 Drug(s) Opdivo.  Arrives Ambulating independently, accompanied by Family member     Patient was evaluated today by MD and Treatment Nurse.    Oral medications included in this regimen:  no    Patient confirms comprehension of cancer treatment schedule:  yes    Pregnancy screening:  Not applicable    Modifications in dose or schedule:  No    Medications appearance and physical integrity checked by RN: yes.    Chemotherapy IV pump settings verified by 2 RNs:  No due to targeted therapy IV administration.  Frequency of blood return and site check throughout administration: Prior to administration and At completion of therapy     Infusion/treatment outcome:  patient tolerated treatment without incident    Education Record    Learner:  Patient and Family Member  Barriers / Limitations:  None  Method:  Discussion  Education / instructions given:  plan of care, next appts  Outcome:  Shows understanding    Discharged Home, Ambulating independently, accompanied by:Family member    Patient/family verbalized understanding of future appointments: by MyChart messaging

## 2025-07-17 NOTE — PROGRESS NOTES
Cancer Center Progress Note    Patient Name: Hanna Barrett   YOB: 1970   Medical Record Number: [id number]   CSN: [id number]   Attending Physician: Shoshana Gordon M.D.   Referring Physician: MD Dr. Flynn Geronimo    Date of Visit: 7/17/2025     Chief Complaint:  Chief Complaint   Patient presents with    Follow - Up     Lung cancer pt here for f/u and treatment. Biopsy done yesterday. Pluerex placed, 600cc out Monday, 300 out Wed. She c/o fatigue. Some MARTINS, using oxygen at night, prn during the day. Some nausea, constipation is worse, neuropathy in legs remains.         Hem/Onc History:  Metastatic (stage IV) NSCLC adenocarcinoma documented 4/2023.     - initially diagnosed with stage IIIC NSCLC adenocarcinoma of the right lung diagnosed 12/2021. Was followed by [facility] then.     Oncologic History:  5/2021: patient presented with abnormal preoperative CXR; left apical 8 mm nodule with irregular margins, and a right middle lobe soft tissue density with associated bronchiectasis medially and mild adjacent nonspecific ground glass density, overall stable since 2/2021.     11/2021: CT scan of the chest demonstrated a spiculated airspace density within the right middle lobe worrisome for primary lung malignancy along with mediastinal lymph nodes.  12/7/2021: PET/CT scan showed a hypermetabolic RML and RUL mass with enlargement of mediastinal lymph nodes.    12/13/2021: bronchoscopy and transbronchial needle aspiration to subcarinal lymph node was positive for metastatic adenocarcinoma. PD-L1 <1%, EGFR, ROS1, ALK, KRAS, RET, BRAF all negative.     1/17/2022: concurrent chemoradiation with cisplatin + pemetrexed at [facility] (Dr. Subramanian) completed in 4/2022 5/13/2022: started consolidative durvalumab q4 weeks    7/2022: PET concerning for progression of disease     8/12/2022: transferred care to Dr. Sanchez at [facility]    8/16/2022: bronchoscopy and biopsies were negative for malignancy.      9/14/2022: CT scan showed some improvement     11/14/2022: PET scan continues to show improvement with interval decrease in the right lung opacities with decreased uptake, may represent scarring and posttreatment change. Increased uptake distal esophagus.     1/17/2023: EGD with normal esophagus and negative biopsies.     2/17/2023: CT chest stable.      3/31/2023: completed 12 cycles of durvalumab.     - care transferred to Dr. Baker when Dr. Sanchez retired    4/7/2023: bronchoscopy for hemoptysis was unremarkable.   5/17/2023: CT chest stable.      - care transferred to me 11/2023 from Dr. Baker  who now practices primarily in [place]    11/2024:  Imaging done just prior showed stable right perihilar consolidation which compared to her previous PET showed no uptake in these areas and therefore suggestive of no significant residual or recurrent neoplasm. No new findings described in C/A/P with devrease in size of a liver lesion favored to be a hemangioma. C/o chronic fatigue and some SOB and cough. Had reported some visual changes and was seen at [facility]. Reports no evidence of malignancy seen. As felt relatively well with recent good scans she opted to have her port removed. This was removed by IR on 11/26/24.    Early 1/2025 she saw her PCP c/o nasal and sinus congestion with pressure, cough, PND, fullness in the ears. No adenopathy was noted on exam. She was placed on Augmentin for sinusitis.     She then reported feeling a growth in her nose causing a deformity with nasal obstructive symptoms. Saw ENT 1/29/25 (Laila). Sinus CT scans were ordered and was referred to plastics. Scans showed no nasal mass or abnormality of the soft tissues but did show postoperative changes from previous sinus surgery. Did see plastics and was felt to have a dermoid cyst.     The following month c/o left supraclavicular node which was increasing in size and tender. Saw PCP who ordered imaging. Thought to have  Virchow node. US of the neck and CT abdomen/pelvis were ordered. US showed nonspecific appearing supraclav node. CT of the abdomen and pelvis showed a moderate to large right pleural effusion that was not present 9/2024 CT. Dedicated CT chest was ordered. Testing results were reviewed. US guided biopsy of left supraclav node was ordered. CT chest confirmed new right pleural effusion and was referred to pulmonary for tap.     Biopsy of the left supraclav node on 2/27/25 showed metastatic adenocarcinoma c/w lung primary. US guided right thoracentesis drained 1500 mL of fluid. Cytology from the right pleural effusion showed metastatic carcinoma c/w lung adenocarcinoma.      - Guardant did not show any targetable mutations: CDK6, PIK3CA, TP53). PDL-1 came back at <1% NGS could not be run with initial sample sent by path as was insufficient and another specimen has been sent. She wanted to get going with treatment     - Started carboplatin/paclitaxel+ipi/nivo (9LA) 3/28/25. Had a very rough time she said with her first cycle of chemo+IO. During Taxol infusion had some mild flushing with VS stable per nurses which resolved when infusion was stopped. Infusion was then resumed with no recurrence of flushing and completed treatment as planned.  Around 2 days after chemo developed generalized pain- reported as muscle/nerve pain especially in groins, shoulder and legs. Took pain pills she had at home which did not help. Thought she may be having an allergic reaction to treatment as also felt very flushed and swollen and took benadryl the weekend.  Discomfort was severe enough that she considered going to the ED but decided not to. [place]ie to likely have TAPS from Taxol and discussed trial of Lyrica and steroids. Reports allergy to Gabapentin and AE (\"does not do well on steroids\") but reassured her that steroid dose was going to be low (and tolerated her steroid premeds). Also spoke to palliative care about her s/sx who discussed  trial of morphine as norco did not seem to help.    Was seen by APN and palliative care on 4/3. By then she said the pain had started to subside the day before.     - port placed left chest 4/11/25     - NGS showed TP53     - On 4/19/2025 presented to the ED with acute onset SOB. CTA chest done showed no evidence of PE but did show a large right pleural effusion. Right sided thoracentesis was arranged and 650 mL of yellow serous fluid was removed. She felt significantly better after tap and was discharged home.      - Presented to the ED 6/3/25 with acute worsening SOB with difficulty breathing and significant fatigue. CTA chest showed no PE but did show new moderate sized effusion. Also c/o dizziness, itching, head tingling and generalized pain. Underwent right sided thoracentesis by pulmonary and 1150 ml fluid removed. Felt much better after and was discharged home 6/4/25.        History of Present Illness:  Breathing got progressively worse while on vacation. O2 sats were low with recent cough. Arranged for her to have pulmonary tap fluid with possible pleurx placement but felt awful and presented to the ED. IR placed pleurx and 1.6mL fluid was drained. She felt better and sats improved but after walking a bit her sats dropped to the 80s and her HR increased to 125. CTA chest showed stable right perihilar consolidation with moderate right sided effusion.  She was placed on supplemental O2 and subsequently admitted. More fluid was removed with improvement and was discharged home 7/14/25. [facility] has been arranged to help/teach with drainage of pleurx.     She is fatigued. She cut back on her Lyrica and reports more energy and less drowsy. Generalized malaise and pain. Neuropathy which she says is bad and difficult for her to walk. Still struggles with constipation.  Itching is less. Denies change in urinary habits, headaches, dizziness. Rough week with passing of her mother in law and had to put dog down. Some nausea  but no vomiting. Had biopsy of left neck node by IR yesterday.       Current Medications:    Current Outpatient Medications:     morphINE ER 15 MG Oral Tab CR, Take 1 tablet (15 mg total) by mouth every 12 (twelve) hours., Disp: 60 tablet, Rfl: 0    Naloxone HCl 4 MG/0.1ML Nasal Liquid, 4 mg by Nasal route as needed. If patient remains unresponsive, repeat dose in other nostril 2-5 minutes after first dose., Disp: 1 kit, Rfl: 0    morphINE 15 MG Oral Tab, Take 1 tablet (15 mg total) by mouth every 8 (eight) hours as needed for Pain., Disp: 90 tablet, Rfl: 0    pregabalin 75 MG Oral Cap, Take 1 capsule (75 mg total) by mouth 3 (three) times daily., Disp: 90 capsule, Rfl: 1    ibuprofen 400 MG Oral Tab, Take 1 tablet (400 mg total) by mouth every 8 (eight) hours as needed for Pain., Disp: 90 tablet, Rfl: 0    memantine 5 MG Oral Tab, Take 1 tablet (5 mg total) by mouth daily., Disp: , Rfl:     levothyroxine 75 MCG Oral Tab, Take 1 tablet (75 mcg total) by mouth before breakfast., Disp: 90 tablet, Rfl: 0    lisinopril-hydroCHLOROthiazide 20-12.5 MG Oral Tab, Take 0.5 tablets by mouth daily., Disp: 45 tablet, Rfl: 3    prochlorperazine (COMPAZINE) 10 mg tablet, Take 1 tablet (10 mg total) by mouth every 6 (six) hours as needed for Nausea., Disp: 30 tablet, Rfl: 3    ondansetron (ZOFRAN) 8 MG tablet, Take 1 tablet (8 mg total) by mouth every 8 (eight) hours as needed for Nausea., Disp: 30 tablet, Rfl: 3    dilTIAZem HCl 120 MG Oral Tab, Take 1 tablet (120 mg total) by mouth in the morning., Disp: , Rfl:     montelukast 10 MG Oral Tab, Take 1 tablet (10 mg total) by mouth nightly. (Patient taking differently: Take 1 tablet (10 mg total) by mouth daily as needed (shortness of breath).), Disp: 90 tablet, Rfl: 3    albuterol 108 (90 Base) MCG/ACT Inhalation Aero Soln, Inhale 2 puffs into the lungs every 6 (six) hours as needed for Wheezing or Shortness of Breath., Disp: 3 each, Rfl: 3    amphetamine-dextroamphetamine 15 MG  Oral Tab, Take 1 tablet (15 mg total) by mouth daily as needed., Disp: , Rfl:     ARIPiprazole 2 MG Oral Tab, Take 1 tablet (2 mg total) by mouth daily., Disp: , Rfl:     VYVANSE 70 MG Oral Cap, Take 1 capsule (70 mg total) by mouth every morning., Disp: , Rfl:     cetirizine 10 MG Oral Tab, Take 1 tablet (10 mg total) by mouth daily. (Patient taking differently: Take 1 tablet (10 mg total) by mouth daily as needed.), Disp: 90 tablet, Rfl: 1    ferrous sulfate 325 (65 FE) MG Oral Tab EC, Take 1 tablet (325 mg total) by mouth daily with breakfast., Disp: , Rfl:     Multiple Vitamin Oral Tab, Take 1 tablet by mouth in the morning., Disp: , Rfl:     cholecalciferol 25 MCG (1000 UT) Oral Cap, Take 1 capsule (1,000 Units total) by mouth in the morning., Disp: , Rfl: 0    DULoxetine HCl 60 MG Oral Cap DR Particles, Take 1 capsule (60 mg total) by mouth in the morning., Disp: , Rfl: 2    Past Medical History:  Past Medical History:    Abnormal uterine bleeding    bleeds every 2 weeks    Anxiety state    Asthma (HCC)    Attention deficit hyperactivity disorder (ADHD)    BACK PAIN    Back problem    lumbar radiculopathy    Cancer (HCC)    adenocarcinoma of right lung    MARCIO II (cervical intraepithelial neoplasia II)    DDD (degenerative disc disease), lumbar    DDD (degenerative disc disease), thoracic    Depression    Disorder of thyroid    Dyspareunia    Fall    Fibromyalgia    Fibromyalgia    Genital warts    High blood pressure    History of COVID-19    COVID + 7/2020 Headache - NO Hospitalization needed     History of lumbar fusion    Hx of motion sickness    IBS (irritable bowel syndrome)    Per patient - Just constipation    Infertility, female    Lumbar radiculopathy    Mild dysplasia of cervix    Neuropathy    Pap smear for cervical cancer screening    pt states normal    Papanicolaou smear of cervix with high grade squamous intraepithelial lesion (HGSIL)    Personal history of antineoplastic chemotherapy    Last  chemo 22 prior to lung bx 22    Post covid-19 condition, unspecified    symptoms: HA; s/s resolved-yes; not hospitalized    Sexually transmitted disease    HPV    Substance abuse (HCC)    cocaine    Urinary incontinence    Visual impairment    glasses/contacts bifocals       Past Surgical History:  Past Surgical History:   Procedure Laterality Date    Appendectomy  1980    Back surgery  2009    fusion L5-S1    Back surgery  2021    RIGHT LUMBAR 3/ LUMBAR 4, LUMBAR 4/ LUMBAR 5 HEMILAMINTOMIES, LEFT LUMBAR 2 / LUMBAR 3 MICRODISCECTOMY    Colonoscopy N/A 2023    Procedure: COLONOSCOPY;  Surgeon: Devante Kern MD;  Location:  ENDOSCOPY    Colposcopy,bx cervix/endocerv curr  11    MARCIO 1    Laparoscopy procedure unlisted      Ovarian cyst removed    Leep  2011    MARCIO 2          X2    Other surgical history      bunions bilateral    Other surgical history      nodule lymph nodes neck    Other surgical history  2016     Bladder sling    Other surgical history  2020    Cystoscopy, Dr Beach       Family Medical History:  Family History   Problem Relation Age of Onset    Other (lung CA) Self     Hypertension Mother     Diabetes Mother     Heart Disease Mother     Heart Disease Father     Other (lung cancer) Father 55        Lung Cancer    Other (pancreatic cancer) Sister 47        Pancreatic Cancer    Cancer Sister 51        lung CA    Other (Other) Sister         Back problems    Other (Other) Son         anxiety    Other (Other) Son         behavioral problems    Diabetes Maternal Grandmother     Breast Cancer Maternal Grandmother         dx late-60s    Stroke Maternal Grandfather 86    Dementia Paternal Grandmother     Heart Disorder Paternal Grandfather     Cancer Maternal Aunt 50        LUNG CA 51    Breast Cancer Maternal Aunt         dx 46-47y    Ovarian Cancer Maternal Cousin Female 33         of ovarian ca at 35y       Gyne History:  OB History    Para  Term  AB Living   2 2 2 0 0 2   SAB IAB Ectopic Multiple Live Births   0 0 0 0 2       Psychosocial History:  Social History     Socioeconomic History    Marital status:      Spouse name: Not on file    Number of children: Not on file    Years of education: Not on file    Highest education level: Not on file   Occupational History    Not on file   Tobacco Use    Smoking status: Former     Current packs/day: 0.00     Average packs/day: 0.8 packs/day for 19.0 years (14.5 ttl pk-yrs)     Types: Cigarettes     Start date:      Quit date: 2010     Years since quitting: 15.5     Passive exposure: Past    Smokeless tobacco: Former     Quit date: 2024    Tobacco comments:     Has not vaped since 2024.    Vaping Use    Vaping status: Former    Substances: Nicotine, daily    Devices: Pre-filled pod   Substance and Sexual Activity    Alcohol use: Yes     Comment: 2-3 drinks month    Drug use: Yes     Types: Cocaine, Cannabis     Comment: USED COCAINE BETW [id number].      cannabis gummy to sleep    Sexual activity: Yes     Partners: Male   Other Topics Concern     Service Not Asked    Blood Transfusions Not Asked    Caffeine Concern Yes     Comment: 3-4 daily of pop    Occupational Exposure Not Asked    Hobby Hazards Not Asked    Sleep Concern Not Asked    Stress Concern Not Asked    Weight Concern Not Asked    Special Diet Not Asked    Back Care Not Asked    Exercise No     Comment: not tolerated right now    Bike Helmet Not Asked    Seat Belt Not Asked    Self-Exams Not Asked   Social History Narrative    Not on file     Social Drivers of Health     Food Insecurity: Food Insecurity Present (2025)    NCSS - Food Insecurity     Worried About Running Out of Food in the Last Year: Yes     Ran Out of Food in the Last Year: Yes   Transportation Needs: No Transportation Needs (2025)    NCSS - Transportation     Lack of Transportation: No   Housing Stability: At Risk (2025)    NCSS -  Housing/Utilities     Has Housing: Yes     Worried About Losing Housing: Yes     Unable to Get Utilities: Yes         Allergies:  Allergies   Allergen Reactions    Gabapentin SWELLING and UNKNOWN    Erythromycin UNKNOWN    Clindamycin RASH        Review of Systems:  A 14-point ROS was done with pertinent positives and negative per the HPI    Vital Signs:   Day 22,  Cycle 3  07/17/25   Height 1.619 m (5' 3.74\")   Weight 83.9 kg (185 lb)   BSA (Calculated - sq m) 1.89 sq meters   BMI (Calculated) 32.01 kg/m2   /79   Pulse 120   BP Location Right arm   Patient Position Sitting   Temp 98.1 °F (36.7 °C)       Physical Examination:  General: Patient is alert and oriented x 3, not in acute distress.   Psych:  Mood and affect appropriate  HEENT: EOMs intact. PERRL. Oropharynx with mucositis, no thrush  Neck: No JVD. Left palpable lymphadenopathy supraclav - 2 very small discrete nodes felt last visit smaller. Still with fullness. Tender to palp. Neck is supple.  Chest: Diminished BS. Pleurx in place right- area dressed, dry.   Heart: Regular  Abdomen: Soft, non-tender with good bowel sounds.  No hepatosplenomegaly.  No palpable mass.   Extremities: Pedal pulses are present. No BLE edema. Tender points BLE  Neurological: Grossly intact.       Laboratory:  Lab Results   Component Value Date    WBC 7.6 07/17/2025    RBC 3.53 (L) 07/17/2025    HGB 10.9 (L) 07/17/2025    HCT 31.9 (L) 07/17/2025    .0 07/17/2025    MPV 9.4 09/02/2012    MCV 90.4 07/17/2025    MCH 30.9 07/17/2025    MCHC 34.2 07/17/2025    RDW 12.3 07/17/2025    NEPRELIM 5.82 07/17/2025    NEUTABS 7.02 04/25/2025    LYMPHABS 1.40 04/25/2025    EOSABS 0.65 04/25/2025    BASABS 0.22 (H) 04/25/2025    NEUT 65 04/25/2025    LYMPH 13 04/25/2025    MON 13 04/25/2025    EOS 6 04/25/2025    BASO 2 04/25/2025    NEPERCENT 77.0 07/17/2025    LYPERCENT 6.2 07/17/2025    MOPERCENT 11.1 07/17/2025    EOPERCENT 5.0 07/17/2025    BAPERCENT 0.4 07/17/2025    NE 5.82  07/17/2025    LYMABS 0.47 (L) 07/17/2025    MOABSO 0.84 07/17/2025    EOABSO 0.38 07/17/2025    BAABSO 0.03 07/17/2025     Lab Results   Component Value Date     (H) 07/17/2025    BUN 13 07/17/2025    BUNCREA 18.0 03/21/2022    CREATSERUM 0.95 07/17/2025    ANIONGAP 5 07/17/2025     01/28/2018    GFRNAA 61 04/03/2022    GFRAA 70 04/03/2022    CA 9.2 07/17/2025    OSMOCALC 291 07/17/2025    ALKPHO 85 07/17/2025    AST 42 (H) 07/17/2025    ALT 36 07/17/2025    BILT 0.2 (L) 07/17/2025    TP 6.4 07/17/2025    ALB 4.1 07/17/2025    GLOBULIN 2.3 07/17/2025     07/17/2025    K 3.9 07/17/2025     07/17/2025    CO2 31.0 07/17/2025       Lab Component   Component Value Date/Time    CEA 1.0 05/12/2017 1425     Lab Component   Component Value Date/Time     03/12/2025 1102        Latest Reference Range & Units 11/07/24 10:09   -246 U/L U/L 168   FERRITIN 50 - 306 ng/mL 59   Vitamin B12 211 - 911 pg/mL >2,000 (H)   FOLATE (FOLIC ACID), SERUM >5.4 ng/mL 29.8   (H): Data is abnormally high      Radiology:  PROCEDURE: CTA CHEST (CPT=71275)    INDICATIONS: shortness of breath, hx lung cancer with pleural effusion, , sat 93% upon arrival    PATIENT STATED HISTORY: Patient with shortness of breath.    COMPARISON: 7/2/2025 and 6/3/2025    TECHNIQUE:  Axial CT images through the chest with coronal and sagittal reconstructions were obtained after the uneventful administration of IV contrast. CT dose reduction techniques were utilized during this examination. Dose information is transmitted to the ACR  (American College of Radiology) NRDR (National Radiology Data Registry) which includes the Dose Index Registry.    CONTRAST: IOPAMIDOL 76% IV SOLN FOR POWER INJECTOR:100 mL :      FINDINGS:      CHEST:  LUNGS: Dependent atelectasis. Stable right perihilar consolidation with air bronchograms again noted. This appears relatively stable since prior examination. Compressive atelectasis within the  right lower lobe. No pneumothorax. Mild interlobular septal  thickening noted .  MEDIASTINUM/MICHAEL:  esophageal wall thickening. Prominent left supraclavicular lymph node measuring 1.6 x 1.3 cm. This corresponds with finding on prior PET/CT  PLEURA: There is a moderate right-sided pleural effusion. There is a right-sided Pleurx catheter noted in appropriate positioning.  CARDIAC: Trace pericardial effusion.  CHEST WALL: Right-sided chest port with tip in the right atrial SVC junction.  LIMITED ABDOMEN: Normal    VASCULAR: Aorta is normal in caliber. Mild atheromatous calcifications of the aorta. Normal three-vessel arch. No acute pulmonary embolus is noted.    BONES: Degenerative changes of the thoracic spine.       Impression  CONCLUSION:  1. Stable right perihilar consolidation with air bronchograms  2. Moderate right-sided pleural effusion with interval placement of right-sided Pleurx catheter in appropriate position.  3. Left supraclavicular adenopathy which demonstrate increased FDG uptake on prior PET/CT and suspicious for metastatic disease  4. Esophageal wall thickening suggestive of esophagitis      Electronically Verified and Signed by Attending Radiologist: Sarahi Garcia MD 7/12/2025 3:53 PM  Workstation: [id number]    PROCEDURE: PET STANDARD BODY SCAN (ONCOLOGY) (CPT=78815)      INDICATIONS: DX-C34.81 Cancer of overlapping sites of right lung (HCC);DX-C34.91 Primary lung cancer with metastasis from lung to other site, right (HCC);DX-C34.91 Primary adenocarcinoma of right lung (HCC);DX-R59.0 Mediastinal adenopathy;        COMPARISON: 6/3/2025 CT, 5/7/2025 CT, 3/11/2025 PET scan    TECHNIQUE:  The patient fasted for at least 6 hours. F-18 FDG was injected IV, and whole-body images from vertex to mid-thigh were obtained with concurrent CT scan for both anatomic localization as well as attenuation correction.    ISOTOPE DOSING/ADMINISTRATION DETAILS:    RADIOPHARMACEUTICAL: 8.7 mCi F-18 FDG  FASTING  GLUCOSE LEVEL: 131 mg/dl  INJECTION TIME: 11:15 a.m.  SCAN TIME: 12:12 p.m.    FINDINGS:      FINDINGS:    MEAN SUV RIGHT UPPER LOBE OF LUN.7  MEAN SUV RIGHT HEPATIC LOBE: 2.9    Standard uptake values reported below are the maximum for the lesion unless otherwise specified.    HEAD AND NECK: Left lower cervical lymph nodes measuring 10 mm on 122 with peak SUV of 5.0, 15 x 11 mm on image 134 with peak SUV of 6.3, and 10 mm on image 145 with peak SUV of 5.1  CHEST: Moderate right pleural effusion with peak SUV of 13.3, this is smaller than on 6/3/2025. Right hilar soft tissue thickening and perihilar atelectatic changes demonstrate no abnormal FDG uptake.  ABDOMEN: There is physiologic uptake in kidneys, bowel and bladder.  PELVIS: There is physiologic uptake in bowel and bladder.  SPINE AND EXTREMITIES: There is physiologic uptake in skeletal structures.  CT FINDINGS: There are no significant incidental findings on the CT portion of the study.    Impression   CONCLUSION:  1.  New hypermetabolic left cervical lymphadenopathy consistent with metastatic disease.  2.  Moderate right pleural effusion with dependent hypermetabolism consistent with malignant pleural effusion.    Electronically Verified and Signed by Attending Radiologist: Javed Dye MD 2025 3:55 PM  Workstation: [id number]       PROCEDURE:  MRI BRAIN (W+WO) (CPT=70553)     COMPARISON:  None.     INDICATIONS:  metastatic lung cancer, dizzy     TECHNIQUE:  MRI of the brain was performed with multi-planar T1, T2-weighted images with FLAIR sequences and diffusion weighted images without and with infusion.     PATIENT STATED HISTORY:(As transcribed by Technologist)  Lung cancer, possible mets, aphasia, memory issues      CONTRAST USED:  25 mL of Dotarem     FINDINGS:    The ventricles and sulci are normal in size for the patient's age.  No mass-effect, midline shift, hydrocephalus, herniation, extra-axial fluid collections, or signs of acute  territorial infarct, or brain tumor.     No abnormality of midline structures. No sign of restricted diffusion. No pathologic gradient susceptibility pattern.     Flow voids are present within the internal carotid and basilar arteries. No sign of acute fluid in the paranasal sinuses. Postinflammatory changes are present within the paranasal sinuses, without findings indicating acute sinusitis.     With contrast infusion, there is no pathologic enhancement pattern identified.  Developmental venous anomaly present posterior right temporal lobe series 10, image 105.                   Impression   CONCLUSION:  Normal        LOCATION:  [facility]           Dictated by (CST): Dat Alvarez MD on 6/04/2025 at 7:51 PM      Finalized by (CST): Dat Alvarez MD on 6/04/2025 at 7:54 PM    PROCEDURE:  CT ANGIOGRAPHY, CHEST (CPT=71275)     COMPARISON:  [facility], CT STN/CHST/ABD/PEL W CONTRAST (CPT=70491/68994/40073), 5/07/2025, 6:32 PM.     INDICATIONS:  History of lung cancer increasing this dyspnea on exertion and difficulty breathing for 6 days rule out clot or worsening tumor burden.  Also noted to be signif     TECHNIQUE:  IV contrast-enhanced multislice CT angiography is performed through the pulmonary arterial anatomy. 3D volume renderings are generated.  Dose reduction techniques were used. Dose information is transmitted to the ACR (American College of  Radiology) NRDR (National Radiology Data Registry) which includes the Dose Index Registry.     PATIENT STATED HISTORY:(As transcribed by Technologist)  Patient with increasing dyspnea on exertion and difficulty breathing with exertion x6 days. Tachycardic. History of lung Cancer.      CONTRAST USED:  100cc of Isovue 370     FINDINGS:    VASCULATURE:  Smooth tapering.  No filling defect.  THORACIC AORTA:  Normal caliber.  No dissection.  LUNGS:  Right perihilar bandlike opacities do not appear significant changed consistent with any combination of treatment  changes, cancer and or treated cancer.    There is new mild passive atelectasis associated with moderate right pleural effusion.  MEDIASTINUM:  No adenopathy.  MICHAEL:  No adenopathy.  CARDIAC:  No pericardial effusion.  No significant coronary calcifications.  PLEURA:  New moderate-sized layering right pleural effusion.  CHEST WALL:  No axillary adenopathy.  Right chest port.  LIMITED ABDOMEN:  No abnormality.  BONES:  Moderate to severe degenerative changes.  Maintained vertebral body heights.  No subluxation.  OTHER:  None.                        Impression  CONCLUSION:    1. Negative for pulmonary embolism.  2. New moderate-sized layering right pleural effusion with mild associated passive atelectasis.  3. Details as above.  Continued clinical correlation recommended.             LOCATION:  [facility]        Dictated by (CST): Fabien Wadsworth MD on 6/03/2025 at 4:50 PM      Finalized by (CST): Fabien Wadsworth MD on 6/03/2025 at 4:55 PM      PROCEDURE:  CT STN/CHST/ABD/PEL W CONTRAST (CPT=70491/52845/94324)     COMPARISON:  [facility] , CT, CT CHEST+ABDOMEN+PELVIS(ALL CNTRST ONLY)(CPT=71260/18223), 3/27/2024, 12:27 PM.  [facility] , [place], PET STANDARD BODY SCAN (ONCOLOGY) (CPT=78815), 3/11/2025, 9:49 AM.  [facility] , CT, CT ANGIOGRAPHY, CHEST (CPT=71275), 4/19/2025, 12:04  PM.     INDICATIONS:  D64.9 Anemia, normocytic normochromic C34.91 Primary adenocarcinoma of right lung (HCC) R59.0 Mediastinal adenopathy     TECHNIQUE:  Multislice non-ionic contrast enhanced scanning through the neck, chest, abdomen and pelvis was performed.  Dose reduction techniques were used. Dose information is transmitted to the ACR (American College of Radiology) NRDR (National  Radiology Data Registry) which includes the Dose Index Registry.     PATIENT STATED HISTORY:(As transcribed by Technologist)  Patient has history of lung cancer and is no chemo and immunotherapy.  Disease surveillance.      CONTRAST USED:  155cc of Isovue 370     FINDINGS:        NECK:  NASOPHARYNX:  Fossae of Rosenmuller and torus tubarius are symmetric.    ORAL CAVITY:  No visible mass.    OROPHARYNX:  Faucial and lingual tonsils are symmetric.    HYPOPHARYNX:  No mass or other visible lesion.    LARYNX:  The vocal cords are symmetric and without mass.    SINUSES:  There is mucosal thickening involving maxillary sinuses and ethmoid air cells bilaterally.  NECK GLANDS:  The parotid, submandibular, and thyroid glands are unremarkable.    LYMPH NODES:  Is 1 lymph node anterior to left anterior scalene muscle series 10, image 65 which measures 0.9 x 0.7 cm.  This is not appear to be significantly changed as compared to the PET scan.  A left supraclavicular lymph node series 10, image 57  measures 0.7 x 0.6 cm.  Other hypermetabolic lymph nodes noted on the PET scan are not detected on this CT.  There are no pathologically enlarged lymph nodes in the neck.  SKULL BASE:  Foramina are symmetric without bony erosion.    VASCULATURE:  Limited views are unremarkable.       CHEST:    LUNGS:  There are reticular densities around the mediastinum which are presumably result of radiation therapy.  There is fibrosis and volume loss in the right perihilar lung involving perihilar right upper, middle and lower lobes.  Representative  measurement of confluent perihilar opacity as seen series 6, image 64 is 6.9 x 1.7 cm.  A similar measurement on chest CT from March 27, 2024 demonstrated dimensions of 7.5 x 3.2 cm.    MEDIASTINUM:  There are no pathologically enlarged lymph nodes.  MICHAEL:  Confluent opacity right perihilar lung includes the right hilus.  Areas of uptake on recent PET scan are not well identified on CT.  CARDIAC:  There is moderate to severe coronary artery atherosclerosis.  There is trace pericardial fluid.  PLEURA:  Trace right pleural effusion is noted.  CHEST WALL:  Right-sided chest port is noted with catheter tip in SVC.  VASCULATURE:  No visible pulmonary arterial thrombus or  attenuation.       ABDOMEN/PELVIS:  LIVER:  No enlargement, atrophy, abnormal density, or significant focal lesion.    BILIARY:  No visible dilatation or calcification.    PANCREAS:  No lesion, fluid collection, ductal dilatation, or atrophy.    SPLEEN:  No enlargement or focal lesion.    KIDNEYS:  Benign cyst left kidney is noted.  Kidneys are otherwise unremarkable.  ADRENALS:  No mass or enlargement.    AORTA/VASCULAR:  No aneurysm or dissection.    RETROPERITONEUM:  Representative right retrocaval lymph node which corresponds to area of intense uptake on PET scan is noted series 7, image 59 and measures 0.8 x 0.6 cm (previously 1.3 x 0.7 cm..  Representative left periaortic nodule series 7, image  60 measures 0.6 x 0.4 cm.  This also corresponds to an area uptake on the PET scan.    BOWEL/MESENTERY:  Gastrohepatic ligament nodule series 7, image 30 measures 0.9 x 0.4 cm.  There is moderate retained fecal debris in the colon.  ABDOMINAL WALL:  No mass or hernia.    URINARY BLADDER:  No visible focal wall thickening, lesion, or calculus.    PELVIC NODES:  Representative left pelvic lymph node posterior to the common iliac veins series 7, image 77 measures 0.7 x 0.7 cm.  Representative left external iliac lymph node series 7, image 91 measures 0.9 x 0.5 cm.  Representative left external  iliac lymph node series 7, image 89 measures 1.4 x 0.5 cm (previously 2.4 x 0.8 cm).  PELVIC ORGANS:  No visible mass.  Pelvic organs appropriate for patient age.    BONES:  No bony lesion or fracture.                     Impression   CONCLUSION:    1. Lymphadenopathy previously described on PET scan is much less conspicuous on CT.  Some of these do appear to have decreased in size since the PET scan, others appear not significantly changed although are very small and difficult to detect on CT.  2. Post treatment volume loss and fibrosis in the lungs is stable.  3. Hilar adenopathy noted on the PET scan is not well visualized on  CT.          LOCATION:  [facility]        Dictated by (CST): Damir Deleon MD on 5/08/2025 at 7:54 AM      Finalized by (CST): Damir Deleon MD on 5/08/2025 at 8:15 AM       PROCEDURE:  CT ANGIOGRAPHY, CHEST (CPT=71275)     COMPARISON:  [facility], CT, CT CHEST(CONTRAST ONLY) (CPT=71260), 2/28/2025, 10:29 AM.     INDICATIONS:  lung cancer, sob, tachypnic r/o PE     TECHNIQUE:  IV contrast-enhanced multislice CT angiography is performed through the pulmonary arterial anatomy. 3D volume renderings are generated.  Dose reduction techniques were used. Dose information is transmitted to the ACR (American College of  Radiology) NRDR (National Radiology Data Registry) which includes the Dose Index Registry.     PATIENT STATED HISTORY:(As transcribed by Technologist)  Patient is here with SOB and tachypnea      CONTRAST USED:  100cc of Isovue 370     FINDINGS:    VASCULATURE:  No visible pulmonary arterial thrombus or attenuation.    THORACIC AORTA:  No aneurysm or visible dissection.    LUNGS:  Marked atelectasis march portion of right lung is again identified.  Right perihilar masslike consolidation extending through the base of the right upper lobe adjacent to the horizontal fissure has increased since prior examination.  Right  perihilar consolidation extending to the right lower lobe is noted.  MEDIASTINUM:  No adenopathy or mass.    MICHAEL:  No mass or adenopathy.    CARDIAC:  No enlargement, pericardial thickening, or significant coronary artery calcification.  PLEURA:  No mass or effusion.    CHEST WALL:  No mass or axillary adenopathy.    LIMITED ABDOMEN:  Hyperenhancing right hepatic lobe lesion measuring 1.7 cm is stable.  BONES:  No bony lesion or fracture.    OTHER:  Negative.                     Impression   CONCLUSION:       1. No evidence of pulmonary embolus.     2. Large right pleural effusion with significant atelectasis is again noted.  Consolidation in the right upper lobe and right lower lobe adjacent to  the right major fissure and horizontal fissure is increased since prior examination.  This may partially  be due to scarring and post treatment change.             LOCATION:  [facility]        Dictated by (CST): Inder Kingston MD on 4/19/2025 at 12:18 PM      Finalized by (CST): Inder Kingston MD on 4/19/2025 at 12:21 PM         PROCEDURE:  PET/CT STANDARD BODY SCAN (ONCOLOGY) (CPT=78815)     COMPARISON:  [facility] , MR, MRI BRAIN (W+WO) (CPT=70553), 3/07/2025, 3:45 PM.  [facility] , CT, CT CHEST+ABDOMEN+PELVIS(ALL CNTRST ONLY)(CPT=71260/46592), 9/27/2024, 1:11 PM.  [facility] , US, US HEAD/NECK (CPT=76536), 2/12/2025, 8:41 AM.  [facility] , NM, PET  STANDARD BODY SCAN (ONCOLOGY) (CPT=78815), 4/13/2022, 12:14 PM.  [facility], NM, PET STANDARD BODY SCAN (ONCOLOGY) (CPT=78815), 12/07/2021, 12:36 PM.  [facility] , NM, PET STANDARD BODY SCAN (ONCOLOGY) (CPT=78815), 12/27/2023, 9:02 AM.     INDICATIONS:  C34.91 Recurrent malignant neoplasm of right lung of unknown cell type (HCC) C34.91 Primary adenocarcinoma of right lung (HCC)     TECHNIQUE:  The patient fasted for at least 6 hours. F-18 FDG was injected IV, and whole-body images from vertex to mid-thigh were obtained with concurrent CT scan for both anatomic localization as well as attenuation correction.     RADIOPHARMACEUTICAL:    9.6 mCi F-18 FDG  FASTING GLUCOSE LEVEL:  80 mg/dl  INJECTION TIME:         0850 hours  SCAN TIME:              0954 hours     FINDINGS:       Left supraclavicular lymph nodes with elevated FDG uptake as high as 4.5 consistent with metastatic disease.  There is a low left supraclavicular lymph node medially with elevated FDG uptake of 5.9.  Low left jugular digastric lymph node with elevated  FDG uptake of 4.2.  Asymmetric radiotracer uptake within the right masseter muscles likely related to physiologic changes as there is no underlying mass lesion.  Physiologic uptake noted throughout the tongue.       Left infrahilar lymph node has elevated FDG  uptake of 7.0.  Right hilar lymph node has elevated FDG uptake of 5.0  posterior mediastinal lymph node adjacent to the aorta with elevated FDG uptake of 4.2.  Right pleural fluid has elevated FDG uptake  concerning for involvement per of the pleural fluid by malignancy with maximum SUV uptake of 9.4.  It should be noted that there is been recent thoracentesis in this may be the reason behind this uptake rather than neoplastic disease.     Abdomen demonstrates a lymph node within the posterior gastrohepatic ligament with SUV of 5.4.     There is retroperitoneal adenopathy bilaterally posterior to the aorta and cava with the largest lymph node on the right with maximum SUV of 14.4 and the maximum SUV of the left retroperitoneal lymph node being 7.1.  Within the pelvis left external iliac   lymph node has maximum SUV of 8.6 and left internal iliac lymph node has maximum SUV of 8.2.  There are multiple left external iliac lymph nodes with maximum SUV of 16.5.  There is a left obturator lymph node with maximum SUV of 5.8.       Minimal uptake within left small inguinal lymph nodes with SUV less than 2 likely reactive.                        Impression   CONCLUSION:       1. Left supraclavicular and jugular digastric lymph node with elevated FDG uptake consistent metastatic disease.     2. Bilateral hilar and posterior retroperitoneal lymph node elevated FDG uptake consistent with metastatic disease.     3. Pleural uptake within the right posterior pleural space could be related to recent thoracentesis although metastatic disease to the pleura is also considered.     4. Multiple abnormal lymph nodes including gastrohepatic ligament, bilateral retroperitoneal, left external iliac, and left  lymph nodes consistent with metastatic disease.        LOCATION:  [facility]           Dictated by (CST): Cj García MD on 3/11/2025 at 11:04 AM      Finalized by (CST): Cj García MD on 3/11/2025 at 12:25 PM       Impression  and Plan:  1. Metastatic NSCLC   - H/o locally advanced NSCLC (IV low neck- cervical?)  - biopsy of left supraclav node and right pleural effusion showed metastatic adenocarcinoma c/w lung primary 2/2025  - PDL-1 negative (<1%), NGS had to be resent as initial sample sent by path insufficient. Guardant showed no targetable mutations. She wanted to get going with treatment and not wait for NGS results   - started carbo/taxol +ipi/nivo (9LA) 3/28/25  - NGS came back showing TP53 with no  mutations to target. She therefor continued on her current regimen.   - scans done after 2 cycles of chemo, 1 dose of ipi and 2 doses of nivo per protocol showed response.   - I had a long discussion with her regarding the 9LA regimen. Protocol only has 2 cycles of chemo + ipi/nivo. This regimen was compared to 4 cycles of chemo alone with the chemo+IO regimen showing superiority. As she had a good response with 2 cycles of chemo with her regimen she wanted to push to receive 2 more cycles despite all the SE/AEs she had on chemo. I was candid with them that we have no data whether adding 2 more cycles of chemo will add further benefit but can guarantee she will have more toxicity. I therefore favored she continued her regimen as outlined by the protocol.   - in the end she agreed to proceed with ipi+nivo alone per the protocol but with possible low threshold to get scans (next imaging I plan a PET) pending how she does clinically but also she admitted this would give her peace of mind  - scans just done during recent hospitalization showed stable findings except increase in right sided effusion. Not unusual for effusions to develop on IO and for  nodes to come and go  - PET just done showed uptake in left cervical adenopathy and dependent uptake along right pleura- known involvement in those areas but compared to PET prior to starting her regimen extent of disease much less.  - biopsy performed of left neck node but even if  malignant (biopsy proven before) too soon to give up on IO alone. But if negative even more information that uptake IO effect  - After a long discussion will continue Ipi+Nivo for now.   - had recent CT during last hospital stay. Will plan on eventual repeat CT. PET in 3 months    2. Neoplasm related pain, fibromyalgia. Chronic LBP, neck and left arm pain, TAPS  - followed by palliative care. On Ibuprofen, morphine, Norco PRN, also on topical lidocaine  - used to be followed at a pain clinic- not anymore  - has previous h/o neuropathy before she started recent regimen. Did get worse after chemotherapy but now off. On Lyrica    3. H/o asthma, bronchiectasis, former smoker, cough  - follows with pulmonary, continue inhalers   - flutter valve  - quit smoking in 2010    4. Anemia  - Hgb has dropped with chemo which is likely also contributing to fatigue  - Gave venofer at previous visit  - checked for other contributing causes- negative  - as off chemo hope to improve over time. Levels adequate    5. TSH elevated  - but T4 normal. This was baseline even before starting IO. Should still be able to proceed but will need close monitoring  - was on LT4 before but was stopped by PCP as levels had been good. Has been restarted by PCP.  - TFTs back to normal    6. Constipation   - bowel regimen   - could be culprit for abdominal pain she describes      7. Nausea  - anti-emetics.   - hopefully will be less/resolve off chemo    8. Itching  - likely irAE  - mainly at night and benadryl helps. Topical emollients and topical steroids as needed.  - less    9. Tachycardia  - h/o sinus tach. Followed by cards. Was placed on diltiazem. Recommended she discuss this further with cards. Regular today      Labs and imaging reviewed   Continue palliative care  Proceed with treatment today      RTC 3 weeks      Risk level high- metastatic lung cancer on chemo+IO with SE/AEs and recent hospitalization      Shoshana Gordon MD  [place]  [facility]

## 2025-07-17 NOTE — PROGRESS NOTES
Palliative Care Follow Up Note     Patient Name: Hanna Barrett   YOB: 1970   Medical Record Number: [id number]   [facility]: [id number]   Date of visit:  7/17/2025     Chief Complaint/Reason for Visit:  Follow up visit for pain     History of Present Illness:         Hanna Barrett is a 55 year old female with metastatic lung cancer receiving immunotherapy.   She was recently discharged from the hospital for dyspnea.  She now has a pleurx catheter which she is finding helpful.  She is finding the increase in lyrica very sedating.  She has noticed improvement in her energy since self reducing the use last week.   She is unclear what the dose is since she was opening capsule and taking part of dose.  She is sleeping most nights with cannabis use.  She continues to have neck pain and had the painful lymph node biopsied this wee.   She continues to struggle with neuropathy in her hands and feet.  She is finding the MS Contin helpful to reduce pain intensity.  She is using morphine IR 2-3X daily for BTP.  She found the norco given to her in the hospital was more beneficial.    She is using ibuprofen 400mg 2-3X daily with benefit.   She has been feeling better and feels her QOL has improved.  She has been using MOM with prune juice with only slight benefit.  She is daily small hard BMs.  She denies feeling constipated.  She is feeling more anxious about new nodules in neck. And recent death of MIL and dog.  She is working with her psychiatrist.      Problem List:  Patient Active Problem List   Diagnosis    Primary hypertension    Family history of breast cancer    Family history of pancreatic cancer    Family history of ovarian cancer    Stress incontinence    Hyperlipidemia    Vitamin D deficiency    Primary osteoarthritis of first carpometacarpal joint of right hand    Cervical radiculopathy    Chronic narcotic use    Cervical spondylosis with radiculopathy    DDD (degenerative disc disease), lumbar     Polyneuropathy, unspecified    Other intervertebral disc degeneration, lumbar region    Cervical myofascial pain syndrome    Neck and shoulder pain    Opioid use, unspecified with withdrawal (HCC)    Chronic bilateral low back pain without sciatica    History of tobacco use    Myalgia, other site    Fibromyalgia    Chronic bilateral thoracic back pain    Constipation    Canker sore    Other spondylosis with radiculopathy, cervical region    Abnormal mammogram    Primary adenocarcinoma of right lung (HCC)    Mediastinal adenopathy    Primary lung cancer with metastasis from lung to other site, right (HCC)    Personal history of nicotine dependence    At risk for dehydration    Dyspnea    Dyspnea, unspecified type    History of COVID-19    Hemoptysis    Disorder of thyroid, unspecified    Encounter for other orthopedic aftercare    History of falling    Generalized anxiety disorder    Major depressive disorder, single episode, unspecified    Other specified urinary incontinence    Unspecified visual loss    Memory changes    Failed back surgical syndrome    Exertional chest pain    Decreased exercise tolerance    Lung fibrosis (HCC)    Palliative care by specialist    Anemia, normocytic normochromic    Hyperglycemia    Pleural effusion    Malignant neoplasm of lung, unspecified laterality, unspecified part of lung (HCC)    Dyspnea on exertion    Tachycardia    Metastatic lung cancer (metastasis from lung to other site), right (HCC)    Cancer associated pain    Medication side effects    Profound fatigue    Uncomplicated asthma (HCC)    Hypoxia    Community acquired pneumonia, unspecified laterality      Medical History:  Past Medical History:    Abnormal uterine bleeding    bleeds every 2 weeks    Anxiety state    Asthma (HCC)    Attention deficit hyperactivity disorder (ADHD)    BACK PAIN    Back problem    lumbar radiculopathy    Cancer (HCC)    adenocarcinoma of right lung    MARCIO II (cervical intraepithelial  neoplasia II)    DDD (degenerative disc disease), lumbar    DDD (degenerative disc disease), thoracic    Depression    Disorder of thyroid    Dyspareunia    Fall    Fibromyalgia    Fibromyalgia    Genital warts    High blood pressure    History of COVID-19    COVID + 2020 Headache - NO Hospitalization needed     History of lumbar fusion    Hx of motion sickness    IBS (irritable bowel syndrome)    Per patient - Just constipation    Infertility, female    Lumbar radiculopathy    Mild dysplasia of cervix    Neuropathy    Pap smear for cervical cancer screening    pt states normal    Papanicolaou smear of cervix with high grade squamous intraepithelial lesion (HGSIL)    Personal history of antineoplastic chemotherapy    Last chemo 22 prior to lung bx 22    Post covid-19 condition, unspecified    symptoms: HA; s/s resolved-yes; not hospitalized    Sexually transmitted disease    HPV    Substance abuse (HCC)    cocaine    Urinary incontinence    Visual impairment    glasses/contacts bifocals     Surgical History:  Past Surgical History:   Procedure Laterality Date    Appendectomy      Back surgery  2009    fusion L5-S1    Back surgery  2021    RIGHT LUMBAR 3/ LUMBAR 4, LUMBAR 4/ LUMBAR 5 HEMILAMINTOMIES, LEFT LUMBAR 2 / LUMBAR 3 MICRODISCECTOMY    Colonoscopy N/A 2023    Procedure: COLONOSCOPY;  Surgeon: Devante Kern MD;  Location:  ENDOSCOPY    Colposcopy,bx cervix/endocerv curr  11    MARCIO 1    Laparoscopy procedure unlisted      Ovarian cyst removed    Leep  2011    MARCIO 2          X2    Other surgical history      bunions bilateral    Other surgical history      nodule lymph nodes neck    Other surgical history  2016     Bladder sling    Other surgical history  2020    Cystoscopy, Dr Beach       Allergies:  Allergies[1]    Palliative Care Social History:    Marital Status:  single  Children: son  Living Situation: independent .  Works full-time with Medicare  insurance      Medications:  Current Outpatient Medications   Medication Sig Dispense Refill    pregabalin 25 MG Oral Cap Take 1 capsule (25 mg total) by mouth 3 (three) times daily. 90 capsule 0    lactulose 20 GM/30ML Oral Solution Take 30 mL (20 g total) by mouth 2 (two) times daily. 900 mL 0    HYDROcodone-acetaminophen  MG Oral Tab Take 1 tablet by mouth every 6 (six) hours as needed for Pain. 90 tablet 0    morphINE ER 15 MG Oral Tab CR Take 1 tablet (15 mg total) by mouth every 12 (twelve) hours. 60 tablet 0    Naloxone HCl 4 MG/0.1ML Nasal Liquid 4 mg by Nasal route as needed. If patient remains unresponsive, repeat dose in other nostril 2-5 minutes after first dose. 1 kit 0    ibuprofen 400 MG Oral Tab Take 1 tablet (400 mg total) by mouth every 8 (eight) hours as needed for Pain. 90 tablet 0    memantine 5 MG Oral Tab Take 1 tablet (5 mg total) by mouth daily.      levothyroxine 75 MCG Oral Tab Take 1 tablet (75 mcg total) by mouth before breakfast. 90 tablet 0    lisinopril-hydroCHLOROthiazide 20-12.5 MG Oral Tab Take 0.5 tablets by mouth daily. 45 tablet 3    prochlorperazine (COMPAZINE) 10 mg tablet Take 1 tablet (10 mg total) by mouth every 6 (six) hours as needed for Nausea. 30 tablet 3    ondansetron (ZOFRAN) 8 MG tablet Take 1 tablet (8 mg total) by mouth every 8 (eight) hours as needed for Nausea. 30 tablet 3    dilTIAZem HCl 120 MG Oral Tab Take 1 tablet (120 mg total) by mouth in the morning. (Patient not taking: Reported on 7/17/2025)      montelukast 10 MG Oral Tab Take 1 tablet (10 mg total) by mouth nightly. (Patient taking differently: Take 1 tablet (10 mg total) by mouth daily as needed (shortness of breath).) 90 tablet 3    albuterol 108 (90 Base) MCG/ACT Inhalation Aero Soln Inhale 2 puffs into the lungs every 6 (six) hours as needed for Wheezing or Shortness of Breath. 3 each 3    amphetamine-dextroamphetamine 15 MG Oral Tab Take 1 tablet (15 mg total) by mouth daily as needed.       ARIPiprazole 2 MG Oral Tab Take 1 tablet (2 mg total) by mouth daily.      VYVANSE 70 MG Oral Cap Take 1 capsule (70 mg total) by mouth every morning.      cetirizine 10 MG Oral Tab Take 1 tablet (10 mg total) by mouth daily. (Patient taking differently: Take 1 tablet (10 mg total) by mouth daily as needed.) 90 tablet 1    ferrous sulfate 325 (65 FE) MG Oral Tab EC Take 1 tablet (325 mg total) by mouth daily with breakfast. (Patient not taking: Reported on 7/17/2025)      Multiple Vitamin Oral Tab Take 1 tablet by mouth in the morning. (Patient not taking: Reported on 7/17/2025)      cholecalciferol 25 MCG (1000 UT) Oral Cap Take 1 capsule (1,000 Units total) by mouth in the morning.  0    DULoxetine HCl 60 MG Oral Cap DR Particles Take 1 capsule (60 mg total) by mouth in the morning.  2       Review of Systems:  General:  Fatigue.  Feels well.    Respiratory:  Denies SOB, denies cough  Cardiac:  Denies chest pain, heart palpitations  Abdomen:  Denies constipation, diarrhea.  Denies pain.    Psych:  No complaints.  Sleeping well    Palliative Performance Scale:   80%    Physical Examination:  General: Patient is alert and oriented, not in acute distress.  Respiratory:   Normal excursions and effort  Chest: : R pleurx catheter   Abdomen: Soft, non tender   Musculoskeletal: Normal gait.   Psych:  Mood/Affect appropriate    Advanced Directives Discussed and Completed:     HCPOA/Health Surrogate:    There is no completed HCPOA documentation on file in Epic.    Patient/family was asked to bring in a copy of HCPOA document to be scanned in to Epic    I confirmed that patient's HCPOA is:  Her son, Dick BRISCOE Discussed and Completed:   focused on symptoms today.      Palliative Care:   She feels her pain is less intense with morphine ER.  Will continue current dosing.    Will stop morphine IR and switch to norco since she finds this more helpful  Will reduce lyrica and titrate slowly to help with MARCIO and minimize  SE.     The goal is to wean off opioids as pain improves with treatment  Reinforced not to self adjust medications   She continues to work with psychiatry to optimize her mood.    Support provided  Discussed opioid use and safety  Discussed constipation.   Will trial lactulose since her laxative regimen is no longer optimal    Impression/Plan:   Neoplasm related pain  Ibuprofen 400mg TID prn  Norco 10mg Q 6 prn  Morphine ER 15mg Q 12    Dyspnea  Pleurx  Morphine    Fatigue  Stay active  Sunlight  Limit daytime naps  Reduce lyrica    MARCIO  Pregabalin 25mg TID  Topical lidocaine    Constipation  Colace daily prn  Lactulose 30mL BID   Senna 2 tabs BID prn    Palliative care  Ongoing support    Metastatic R Lung cancer      Planned Follow up:  3 weeks      I spent a total of 30 minutes with the patient today, which included all of the following:direct face to face contact, history taking, physical examination, and >50% was spent counseling and coordinating care         Electronically Signed by:  GABY SANFORD   [facility] Outpatient Palliative Nurse Practitioner            [1]   Allergies  Allergen Reactions    Gabapentin SWELLING and UNKNOWN    Erythromycin UNKNOWN    Clindamycin RASH

## 2025-07-18 ENCOUNTER — MED REC SCAN ONLY (OUTPATIENT)
Facility: CLINIC | Age: 55
End: 2025-07-18

## 2025-07-21 NOTE — PAYOR COMM NOTE
--------------  DISCHARGE REVIEW    Payor: [facility]  Subscriber #:  [id number]  Authorization Number: [id number]    Admit date: 25  Admit time:   5:12 PM  Discharge Date: 2025  4:48 PM     Admitting Physician: Armani Pineda MD  Attending Physician:  No att. providers found  Primary Care Physician: Guillermo Curry MD          Discharge Summary Notes        Discharge Summary signed by Alverto Lopez MD at 2025  5:58 PM       Author: Alverto Lopez MD Specialty: HOSPITALIST Author Type: Physician    Filed: 2025  5:58 PM Date of Service: 2025  5:55 PM Status: Signed    : Alverto Lopez MD (Physician)           [facility]     Hanna Barrett Patient Status:  Inpatient    1970 MRN [id number]   Location [facility] 4NW-A Attending No att. providers found   Hosp Day # 2 PCP Guillermo Curry MD     Date of Admission: 2025  Date of Discharge: 2025  Discharge Disposition: Home or Self Care    Admitting Diagnosis:   Pleural effusion [J90]  Hypoxia [R09.02]  Malignant neoplasm of lung, unspecified laterality, unspecified part of lung (HCC) [C34.90]  Community acquired pneumonia, unspecified laterality [J18.9]    Hospital Discharge Diagnoses:   Hypoxia secondary to recurrent right pleural effusion  Metastatic right non-small cell lung adenocarcinoma  Asthma  Anxiety/depression  Hypothyroidism  Cancer associated pain  Hypertension    Lace+ Score: 76  59-90 High Risk  29-58 Medium Risk  0-28   Low Risk.    TCM Follow-Up Recommendation:  LACE > 58: High Risk of readmission after discharge from the hospital.        Discharge Diagnosis:   Hypoxia secondary to recurrent malignant right pleural effusion    History of Present Illness: Hanna Barrett is a 55 year old female with PMHx of Recurrent R pleural effusion 2/2 R lung adenocarcinoma, Hypothyroidism, HTN, Asthma, anxiety/depression who  presents for shortness of breath.      Patient with history of recurrent pleural effusion on the right, recent admission on 6/3/2025 to 6/4/2025 for recurrent pleural effusion, status post thoracentesis with cytology positive for metastatic carcinoma compatible with lung adenocarcinoma.  Patient has been progressively getting more short of breath throughout the month and was planned for Pleurx placement on Wednesday however could not tolerate her breathing today and thus presented  to the ER for further management.  She was able to be seen by IR and had Pleurx placement with 1.5 L removed however still was feeling short of breath and hypoxic in the ER.  Patient denies any associated fevers, chills, chest pain, abdominal pain, nausea, vomiting, congestion, cough.    Brief Synopsis: Patient presented with ongoing shortness of breath secondary to recurrent right pleural effusion secondary to underlying lung malignancy.  Interventional radiology consulted in the ER and patient did have Pleurx placed on the right, 1.5 L drained on admission however still hypoxic and thus admitted.  Pulmonology consulted, underwent CTA chest in the ER negative for PE, repeat fluid was removed and subsequent days and had improvement in hypoxia.  Patient breathing improved and patient was coordinated for oxygen at discharge with plans for outpatient follow-up with oncology for further management of underlying malignancy. All diagnoses discussed with patient, they demonstrated understanding and plan of care and risks reviewed and in agreement.     Procedures during hospitalization:   Right sided Pleurx placement    Incidental or significant findings and recommendations (brief descriptions):      Lab/Test results pending at Discharge:       Consultants:  Pulmonology    Discharge Medication List:     Discharge Medications        CONTINUE taking these medications        Instructions Prescription details   albuterol 108 (90 Base) MCG/ACT  Aers  Commonly known as: Ventolin HFA      Inhale 2 puffs into the lungs every 6 (six) hours as needed for Wheezing or Shortness of Breath.   Quantity: 3 each  Refills: 3     amphetamine-dextroamphetamine 15 MG Tabs  Commonly known as: ADDERALL      Take 1 tablet (15 mg total) by mouth daily as needed.   Refills: 0     ARIPiprazole 2 MG Tabs  Commonly known as: Abilify      Take 1 tablet (2 mg total) by mouth daily.   Refills: 0     cetirizine 10 MG Tabs  Commonly known as: ZyrTEC      Take 1 tablet (10 mg total) by mouth daily.   Quantity: 90 tablet  Refills: 1     cholecalciferol 25 MCG (1000 UT) Caps  Commonly known as: DRISDOL      Take 1 capsule (1,000 Units total) by mouth in the morning.   Refills: 0     dilTIAZem HCl 120 MG Tabs  Commonly known as: CARDIZEM      Take 1 tablet (120 mg total) by mouth in the morning.   Refills: 0     DULoxetine 60 MG Cpep  Commonly known as: Cymbalta      Take 1 capsule (60 mg total) by mouth in the morning.   Refills: 2     ferrous sulfate 325 (65 FE) MG Tbec      Take 1 tablet (325 mg total) by mouth daily with breakfast.   Refills: 0     ibuprofen 400 MG Tabs  Commonly known as: Motrin      Take 1 tablet (400 mg total) by mouth every 8 (eight) hours as needed for Pain.   Quantity: 90 tablet  Refills: 0     levothyroxine 75 MCG Tabs  Commonly known as: Synthroid      Take 1 tablet (75 mcg total) by mouth before breakfast.   Quantity: 90 tablet  Refills: 0     lisinopril-hydroCHLOROthiazide 20-12.5 MG Tabs  Commonly known as: Zestoretic      Take 0.5 tablets by mouth daily.   Quantity: 45 tablet  Refills: 3     memantine 5 MG Tabs  Commonly known as: Namenda      Take 1 tablet (5 mg total) by mouth daily.   Refills: 0     montelukast 10 MG Tabs  Commonly known as: Singulair      Take 1 tablet (10 mg total) by mouth nightly.   Quantity: 90 tablet  Refills: 3     morphINE 15 MG Tabs      Take 1 tablet (15 mg total) by mouth every 8 (eight) hours as needed for Pain.   Quantity:  90 tablet  Refills: 0     morphINE ER 15 MG Tbcr  Commonly known as: MS Contin      Take 1 tablet (15 mg total) by mouth every 12 (twelve) hours.   Stop taking on: July 26, 2025  Quantity: 60 tablet  Refills: 0     Multiple Vitamin Tabs      Take 1 tablet by mouth in the morning.   Refills: 0     Naloxone HCl 4 MG/0.1ML Liqd      4 mg by Nasal route as needed. If patient remains unresponsive, repeat dose in other nostril 2-5 minutes after first dose.   Quantity: 1 kit  Refills: 0     ondansetron 8 MG tablet  Commonly known as: Zofran      Take 1 tablet (8 mg total) by mouth every 8 (eight) hours as needed for Nausea.   Quantity: 30 tablet  Refills: 3     pregabalin 75 MG Caps  Commonly known as: Lyrica      Take 1 capsule (75 mg total) by mouth 3 (three) times daily.   Quantity: 90 capsule  Refills: 1     prochlorperazine 10 mg tablet  Commonly known as: Compazine      Take 1 tablet (10 mg total) by mouth every 6 (six) hours as needed for Nausea.   Quantity: 30 tablet  Refills: 3     Vyvanse 70 MG Caps  Generic drug: Lisdexamfetamine Dimesylate      Take 1 capsule (70 mg total) by mouth every morning.   Refills: 0              [place]    Follow-up appointment:   Sd Corrigan MD  [address]  [place] [address]  [phone]    Follow up in 1 week(s)  hospital follow up  AND make a oxygen walk test assessment with respiratory therapy on same day    Guillermo Curry MD  [address] [place] [address]  [address]  [facility] [address] [phone]    Call in 1 week(s)  For follow-up after hospitilization    Shoshana Gordon MD  [address]  [facility]  [place] [address]  [phone]    Call in 1 week(s)  For follow-up after hospitilization      Vital signs:       Physical Exam:  See exam from day of discharge note  -----------------------------------------------------------------------------------------------  PATIENT DISCHARGE INSTRUCTIONS: See electronic chart    Alverto  Lola Lopez MD 7/16/2025    Time spent:  35 minutes      Electronically signed by Alverto Lopez MD on 7/16/2025  5:58 PM         REVIEWER COMMENTS

## 2025-07-24 ENCOUNTER — NURSE ONLY (OUTPATIENT)
Facility: CLINIC | Age: 55
End: 2025-07-24
Payer: MEDICAID

## 2025-07-24 ENCOUNTER — OFFICE VISIT (OUTPATIENT)
Facility: CLINIC | Age: 55
End: 2025-07-24
Payer: MEDICAID

## 2025-07-24 VITALS
OXYGEN SATURATION: 98 % | HEIGHT: 63 IN | HEART RATE: 112 BPM | RESPIRATION RATE: 16 BRPM | BODY MASS INDEX: 32.78 KG/M2 | DIASTOLIC BLOOD PRESSURE: 70 MMHG | SYSTOLIC BLOOD PRESSURE: 110 MMHG | WEIGHT: 185 LBS

## 2025-07-24 VITALS — HEIGHT: 63.75 IN | WEIGHT: 185 LBS | BODY MASS INDEX: 31.97 KG/M2

## 2025-07-24 DIAGNOSIS — J47.9 BRONCHIECTASIS WITHOUT COMPLICATION (HCC): ICD-10-CM

## 2025-07-24 DIAGNOSIS — J45.909 UNCOMPLICATED ASTHMA, UNSPECIFIED ASTHMA SEVERITY, UNSPECIFIED WHETHER PERSISTENT (HCC): ICD-10-CM

## 2025-07-24 DIAGNOSIS — R06.09 DOE (DYSPNEA ON EXERTION): ICD-10-CM

## 2025-07-24 DIAGNOSIS — J90 PLEURAL EFFUSION: ICD-10-CM

## 2025-07-24 DIAGNOSIS — C34.91 PRIMARY LUNG CANCER WITH METASTASIS FROM LUNG TO OTHER SITE, RIGHT (HCC): ICD-10-CM

## 2025-07-24 DIAGNOSIS — R09.02 HYPOXEMIA: Primary | ICD-10-CM

## 2025-07-24 PROCEDURE — 94618 PULMONARY STRESS TESTING: CPT

## 2025-07-24 PROCEDURE — 99214 OFFICE O/P EST MOD 30 MIN: CPT

## 2025-07-24 RX ORDER — LACTULOSE 10 G/15ML
SOLUTION ORAL; RECTAL
COMMUNITY
Start: 2025-07-17

## 2025-07-24 NOTE — PATIENT INSTRUCTIONS
Pleurx Catheter  Continue draining every 4-5 days (max drainage 1000 ml) or sooner based on symptoms  Record output  Once <200ml x 3 consecutive drains call office     Pleurx Drainage instructions  you may shower, but try to keep your dressing dry and out of water, and if wet, then change dressing. Do not bathe or swim while you have your PleurX. Please keep a record of the volume drained each time.     Before you handle any dressing, wash your hands with soap and water for 20 seconds and dry.   Change the dressing daily or when saturated until the incision has healed.   Notify the doctor of any signs of infection including a temperature greater than 101, chills, redness, swelling, abnormal drainage/foul odor from the incision site

## 2025-07-24 NOTE — PROGRESS NOTES
[facility] General Pulmonary Progress Note    History of Present Illness:  Hanna Barrett is a 55 year old female female former smoker with significant PMH asthma, stage 3 RUL NSCLC s/p chemoRT who presents today for hospital follow up. Overall feels better now. Reports SOB at times, fatigue, cough at times. Denies acute concerns. Denies chest pain/pressure, wheezing, no fevers, chills, body aches, N/V/D.     Previously 7/2025 Dr Mcgregor inpatient  Hanna Barrett is a a(n) 55 year old female With known metastatic lung cancer and recurrent right pleural effusion presenting for worsening shortness of breath found to have a large right pleural effusion.  She had a Pleurx placed yesterday by interventional radiology but was still significantly hypoxic requiring 3 L and pleural effusion was still noted.  Patient overall feels better but still requiring oxygen.  She denies any fevers, chills, nausea, vomiting.     Previously 6/2025 Dr Corrigan inpatient  Hanna Barrett is a a(n) 55 year old female  former smoker (quit 2011, 20 pack years) with significant PMH of asthma, stage 3 RUL NSCLC s/p chemoRT who was admitted with worsening dyspnea. She had previously developed pleural effusion and dyspnea leading to thoracentesis in March and again in April with similar symptoms which resolved post thoracentesis. She thinks her dyspnea has been present for the past two weeks leading to her presentation today. No cough, phlegm, wheeze, pain. No fevers     Previously 3/2025  Hanna Barrett is a 55 year old female former smoker with significant PMH asthma, stage 3 RUL NSCLC s/p chemoRT who presents today for follow up of post thoracentesis.   Came back from [place] a few weeks ago and found a lump on her left neck/shoulder. She had a biopsy that was positive for malignancy. Then had a PET scan and found to have pleural effusion, had a thoracentesis and cytology was positive for malignancy. Seeing Dr Law today for plan.  Reports SOB  and coughing at times. Denies acute concerns. Denies  chest pain/pressure, wheezing, no fevers, chills, fatigue.   Has one more day of antibiotics for positive culture in her pleural fluid. Taking inhalers as prescribed. Using mucinex for cough/phlegm.      Previously 9/2024 Dr Shekhar Barrett is a 54 year old female female former smoker (quit 2011, 20 pack years) with significant PMH of asthma, stage 3 RUL NSCLC s/p chemoRT who presents today for follow up. Since last visit she continues to have MARTINS, especially with stairs or carrying a load. No cough, wheeze, pain. No fevers  +palpitations  Was hospitalized in July 2024 with negative cardiac stress  Has been using albuterol nebs 1-2/day without relief.  Remain son symbicort and incruse     June 2024 previously  Since last visit she reports she had been well until the past week with the hot weather she has had worsening dyspnea, cough and wheeze.  Has needed albuterol much more frequently. No pain. No fevers     Dec 2023 previously  Hanna Barrett is a 53 year old female former smoker (quit 2011, 20 pack years) with significant PMH of asthma, stage 3 RUL NSCLC s/p chemoRT who presents today for follow up. She denies acute concerns today.  Does continue to have nasal congestion/drainage leading to cough and throat clearing. No blood. Dyspnea on exertion overall better on symbicort BID and incruse daily. Has albuterol but not using often.     October 2023 previously BLAKE Barrett is a 53 year old female who presents for 2 month asthma followup. Breathing has been better with Incruse and Symbicort. Had Sinus infection about 2  weeks ago; blood mixed in phlegm about a quarter size. No further hemoptysis;  still coughing. Feels like a vice around her chest at times; mostly after coughing. Was on 2 courses of antibiotics; PCN and Augmentin. Reports overall feeling much better; still with cough with white-yellow tinged sputum. No f/c/ns. Worried  about cancer returning. Asthma score is 14      Previously 8/2023  a 53 year old female who presents for c/o worsening dyspnea with exertion. Notices this mostly with stairs and worsens with stairs when carrying anything. Unable to exercise due to back pain. No f/c/ns. Denies hx of COPD; quit smoking in 2000 and had a couple here and there; vaps here and there. Reports hx of Asthma and using Symbicort with minimal improvement.  PMH of stage III lung cancer s/p chemo RT completed 4/2022; recent  CT chest.      Previously 4/2023 Dr Corrigan  a 53 year old female former smoker (quit 2011, 20 pack years) with significant PMH of asthma, stage III adenocarcinoma s/p chemoRT  who presents today for evaluation of hemoptysis. The patient explains she has had increasing episodes hemoptysis since around late February 2023. Thinks hemoptysis began first then about a week later developed nasal congestion/drainage sinus pressure. She was seen at [facility] two weeks ago and treated for sinus infection and given augmentin.  Her sinus pressure and congestion improved, however her hemoptysis persists. Also c/o sore throat/pain which has been ongoing for the past month as well.  Describes hemoptysis as red blood, about pencil eraser in size, may have 2-3 episodes a day. None today, but did have two episodes yesterday.    No epistaxis. No GERD, abdominal pain. No fevers. No chest pain, pressure or pleurisy.   Feels overall her breathing has been worse since her radiation treatment since last year. Using advair BID and albuterol. Previously on trelegy but too pricey.      Works in insurance sales. No known exposures.     Past Medical History:   Past Medical History[1]     Past Surgical History: Past Surgical History[2]    Family Medical History: Family History[3]     Social History:   Social History     Socioeconomic History    Marital status:      Spouse name: Not on file    Number of children: Not on file    Years of  education: Not on file    Highest education level: Not on file   Occupational History    Not on file   Tobacco Use    Smoking status: Former     Current packs/day: 0.00     Average packs/day: 0.8 packs/day for 19.0 years (14.5 ttl pk-yrs)     Types: Cigarettes     Start date: 1986     Quit date: 2010     Years since quitting: 15.5     Passive exposure: Past    Smokeless tobacco: Former     Quit date: 6/1/2024    Tobacco comments:     Has not vaped since 6/1/2024.    Vaping Use    Vaping status: Former    Substances: Nicotine, daily    Devices: Pre-filled pod   Substance and Sexual Activity    Alcohol use: Yes     Comment: 2-3 drinks month    Drug use: Yes     Types: Cocaine, Cannabis     Comment: USED COCAINE BETW [id number].      cannabis gummy to sleep    Sexual activity: Yes     Partners: Male   Other Topics Concern     Service Not Asked    Blood Transfusions Not Asked    Caffeine Concern Yes     Comment: 3-4 daily of pop    Occupational Exposure Not Asked    Hobby Hazards Not Asked    Sleep Concern Not Asked    Stress Concern Not Asked    Weight Concern Not Asked    Special Diet Not Asked    Back Care Not Asked    Exercise No     Comment: not tolerated right now    Bike Helmet Not Asked    Seat Belt Not Asked    Self-Exams Not Asked   Social History Narrative    Not on file     Social Drivers of Health     Food Insecurity: Food Insecurity Present (7/12/2025)    NCSS - Food Insecurity     Worried About Running Out of Food in the Last Year: Yes     Ran Out of Food in the Last Year: Yes   Transportation Needs: No Transportation Needs (7/12/2025)    NCSS - Transportation     Lack of Transportation: No   Stress: Not on file   Housing Stability: At Risk (7/12/2025)    NCSS - Housing/Utilities     Has Housing: Yes     Worried About Losing Housing: Yes     Unable to Get Utilities: Yes        Medications: Current Medications[4]    Review of Systems: Review of Systems   Constitutional:  Positive for fatigue.    HENT: Negative.     Respiratory:  Positive for cough and shortness of breath. Negative for chest tightness and wheezing.    Cardiovascular: Negative.    Gastrointestinal: Negative.         Physical Exam:  Ht 5' 3\" (1.6 m)   Wt 185 lb (83.9 kg)   LMP 02/14/2019 (Exact Date)   BMI 32.77 kg/m²      Constitutional: alert, cooperative. No acute distress.  HEENT: Head NC/AT.   Cardio: Regular rate and rhythm. Normal S1 and S2. No murmurs.   Respiratory: Thorax symmetrical with no labored breathing. Diminished right, Clear to ausculation bilaterally with symmetrical breath sounds. No wheezing, rhonchi, rales, or crackles.   Right pleurx catheter site C/D/I  Extremities: No clubbing or cyanosis. No BLE edema.    Neurologic: A&Ox3. No gross motor deficits.  Skin: Warm, dry  Psych: Calm, cooperative. Pleasant affect.    Results:  Personally reviewed    WBC: 7.6, done on 7/17/2025.  HGB: 10.9, done on 7/17/2025.  PLT: 260, done on 7/17/2025.     Glucose: 123, done on 7/17/2025.  Cr: 0.95, done on 7/17/2025.  Last eGFR was 71 on 7/17/2025.  CA: 9.2, done on 7/17/2025.  Na: 140, done on 7/17/2025.  K: 3.9, done on 7/17/2025.  Cl: 104, done on 7/17/2025.  CO2: 31, done on 7/17/2025.  Last ALB was 4.1% done on 7/17/2025.     US BIOPSY CORE LYMPH NODE, LEFT (CPT=76942/43483)  Result Date: 7/16/2025  CONCLUSION: Ultrasound-guided core biopsy of the left jugulodigastric lymph node. Electronically Verified and Signed by Attending Radiologist: Cj García MD 7/16/2025 1:03 PM Workstation: [id number]    IR PLEUREX CATHETER  Result Date: 7/14/2025  CONCLUSION: Successful    right Pleurx catheter placement. 1.6 L of fluid was removed without complication. Electronically Verified and Signed by Attending Radiologist: Sarahi Garcia MD 7/14/2025 2:14 PM Workstation: [id number]    XR CHEST AP PORTABLE  (CPT=71045)  Result Date: 7/14/2025  CONCLUSION: Decreased consolidations and pleural effusion in the right lung. Electronically  Verified and Signed by Attending Radiologist: Inder Kingston MD 7/14/2025 11:29 AM Workstation: [id number]    XR CHEST AP PORTABLE  (CPT=71045)  Result Date: 7/13/2025  CONCLUSION: See above Electronically Verified and Signed by Attending Radiologist: Alma Lopez MD 7/13/2025 7:20 AM Workstation: [id number]    CTA CHEST (CPT=71275)  Result Date: 7/12/2025  CONCLUSION: 1. Stable right perihilar consolidation with air bronchograms 2. Moderate right-sided pleural effusion with interval placement of right-sided Pleurx catheter in appropriate position. 3. Left supraclavicular adenopathy which demonstrate increased FDG uptake on prior PET/CT and suspicious for metastatic disease 4. Esophageal wall thickening suggestive of esophagitis Electronically Verified and Signed by Attending Radiologist: Sarahi Garcia MD 7/12/2025 3:53 PM Workstation: [id number]    XR CHEST AP PORTABLE  (CPT=71045)  Result Date: 7/12/2025  CONCLUSION: 1. Opacification of the right mid to lower lung zone representing combination of effusion and consolidation 2. Mild diffuse opacities likely representing edema. 3. Minimal left basilar atelectasis Electronically Verified and Signed by Attending Radiologist: Sarahi Garcia MD 7/12/2025 12:05 PM Workstation: [id number]    PET STANDARD BODY SCAN (ONCOLOGY) (CPT=78815)  Result Date: 7/7/2025  CONCLUSION: 1.  New hypermetabolic left cervical lymphadenopathy consistent with metastatic disease. 2.  Moderate right pleural effusion with dependent hypermetabolism consistent with malignant pleural effusion. Electronically Verified and Signed by Attending Radiologist: Javed Dye MD 7/7/2025 3:55 PM Workstation: [id number]    MRI BRAIN (W+WO) (CPT=70553)  Result Date: 6/4/2025  CONCLUSION:  Normal   LOCATION:  [facility]    Dictated by (CST): Dat Alvarez MD on 6/04/2025 at 7:51 PM     Finalized by (CST): Dat Alvarez MD on 6/04/2025 at 7:54 PM       XR CHEST AP PORTABLE  (CPT=71045)  Result Date:  6/4/2025  CONCLUSION:  Stable heart size and pulmonary vascularity.  Stable linear scarring versus atelectasis in the right midlung.  Blunting of the right costophrenic angle has improved.  No measurable pneumothorax.  Port-A-Cath tip SVC.   LOCATION:  [id number]      Dictated by (CST): Yumiko William MD on 6/04/2025 at 5:17 PM     Finalized by (CST): Yumiko William MD on 6/04/2025 at 5:18 PM       CTA CHEST (CPT=71275)  Result Date: 6/3/2025  CONCLUSION:  1. Negative for pulmonary embolism. 2. New moderate-sized layering right pleural effusion with mild associated passive atelectasis. 3. Details as above.  Continued clinical correlation recommended.     LOCATION:  [facility]   Dictated by (CST): Fabien Wadsworth MD on 6/03/2025 at 4:50 PM     Finalized by (CST): Fabien Wadsworth MD on 6/03/2025 at 4:55 PM       XR CHEST AP PORTABLE  (CPT=71045)  Result Date: 6/3/2025  CONCLUSION:  New minimal blunting of the right CP angle.  This is consistent with new minimal fluid.   LOCATION:  [facility]      Dictated by (CST): Fabien Wadsworth MD on 6/03/2025 at 2:20 PM     Finalized by (CST): Fabien Wadsworth MD on 6/03/2025 at 2:22 PM       CT STN/CHST/ABD/PEL W CONTRAST (CPT=70491/66672/08827)  Result Date: 5/8/2025  CONCLUSION:  1. Lymphadenopathy previously described on PET scan is much less conspicuous on CT.  Some of these do appear to have decreased in size since the PET scan, others appear not significantly changed although are very small and difficult to detect on CT. 2. Post treatment volume loss and fibrosis in the lungs is stable. 3. Hilar adenopathy noted on the PET scan is not well visualized on CT.    LOCATION:  [facility]   Dictated by (CST): Damir Deleon MD on 5/08/2025 at 7:54 AM     Finalized by (CST): Damir Deleon MD on 5/08/2025 at 8:15 AM        7/2025 6MWT  FINDINGS  0 LPM required to maintain a saturation of 96 % at rest   0 LPM required to maintain a saturation of 91 % with exertion     LPM Pulse dose to maintain a saturation of   %  with exertion   Patient does not need supplemental oxygen at rest or for exertion.     Assessment/Plan:  #1. asthma  Reported hx of asthma but feels symptoms slowly worsening post radiation in 2022 8/2023 CPFT values from [facility] provider, essentially normal; hx of asthma  7/2024 CTA with no PE and slight increase in linear opacity/atelectasis on right mid lung - appears stable to my review  Continue symbicort + incruse      #2. bronchiectasis  Noted bronchiectasis on imaging in [facility], suggestive of post radiation changes. However I am not able to view her previous CTs prior to treatment to confirm this developed post radiation treatment in 2022 8/2023 CT with mild bronchiectasis and chronic cough; recommend to use flutter valve daily, inhalers and nebs as above  Continue guaifenesin BID     #3. Lung cancer  Dx with stage III NSCLC (adenoca) 12/2021  S/p chemoRT 4/2022, durvalumab x 1 yr completed 3/2023 and following with Dr. Sanchez and soon to be Dr. Baker  2/2023 CT chest with stable post treatment changes  8/2023 CT chest with mild bronchiectasis; no e/o recurrence  11/2023 CT chest with new findings.   12/2023 PET/CT with low uptake in RLL  3/2024 CT chest with decrease in right sided opacities  7/2024 CTA with no PE and slight increase in linear opacity/atelectasis on right mid lung - appears stable to my review  2/2025 Left supraclavicular lymph node biopsy Metastatic adenocarcinoma consistent with the patient's known lung primary   2/2025 CT chest large right pleural effusion, no e/o metastatic disease   2/2025 s/p Right thora with IR 1550ml out, fluid sent- see below   3/2025 PET multiple left supraclavicular lymph FDG >4.5, left infrahilar lymph node FDG 7, right hilar lymph node FDG 4.2, mediastinal lymph node FDG 4.2, right pleural fluid FDG >9  Continue f/u with Dr. Gordon, planning on IO, no chemo  Serial imaging based on Dr Gordon's treatment plan     #4 Pleural effusion- metastatic   Found during  follow up imaging CT chest and PET as above  S/p IR Right thora with IR 1550ml out, fluid sent  Right pleural fluid -POSITIVE for metastatic carcinoma, compatible with lung adenocarcinoma; body fluid culture + staphylococcus epidermidis- treated with amoxicillin   3/2025 CXR no pleural effusion noted  6/2025 dyspnea, CXR pleural effusion- s/p thoracentesis   7/2025 dyspnea, CXR pleural effusion, 7/12/2025 pleurx placed by IR; required oxygen and was hospitalized     #5. Right Pleurx Catheter  7/12/2025 placed by IR  Continue draining every 4-5 days or sooner based on symptoms  Record output  Once <200ml x 3 consecutive drains call office     #6. Acute on chronic hypoxia   Likely related to above  Was discharged on home oxygen  7/2025 6MWT- does not require oxygen therapy with exertion, continue oxygen at night  Reviewed with patient     Sharon Keller [facility]  Pulmonary Medicine   7/24/2025          [1]   Past Medical History:   Abnormal uterine bleeding    bleeds every 2 weeks    Anxiety state    Asthma (HCC)    Attention deficit hyperactivity disorder (ADHD)    BACK PAIN    Back problem    lumbar radiculopathy    Cancer (HCC)    adenocarcinoma of right lung    MARCIO II (cervical intraepithelial neoplasia II)    DDD (degenerative disc disease), lumbar    DDD (degenerative disc disease), thoracic    Depression    Disorder of thyroid    Dyspareunia    Fall    Fibromyalgia    Fibromyalgia    Genital warts    High blood pressure    History of COVID-19    COVID + 7/2020 Headache - NO Hospitalization needed     History of lumbar fusion    Hx of motion sickness    IBS (irritable bowel syndrome)    Per patient - Just constipation    Infertility, female    Lumbar radiculopathy    Mild dysplasia of cervix    Neuropathy    Pap smear for cervical cancer screening    pt states normal    Papanicolaou smear of cervix with high grade squamous intraepithelial lesion (HGSIL)    Personal history of antineoplastic chemotherapy    Last  chemo 22 prior to lung bx 22    Post covid-19 condition, unspecified    symptoms: HA; s/s resolved-yes; not hospitalized    Sexually transmitted disease    HPV    Substance abuse (HCC)    cocaine    Urinary incontinence    Visual impairment    glasses/contacts bifocals   [2]   Past Surgical History:  Procedure Laterality Date    Appendectomy  1980    Back surgery      fusion L5-S1    Back surgery  2021    RIGHT LUMBAR 3/ LUMBAR 4, LUMBAR 4/ LUMBAR 5 HEMILAMINTOMIES, LEFT LUMBAR 2 / LUMBAR 3 MICRODISCECTOMY    Colonoscopy N/A 2023    Procedure: COLONOSCOPY;  Surgeon: Devante Kenr MD;  Location:  ENDOSCOPY    Colposcopy,bx cervix/endocerv curr  12    MARCIO 1    Laparoscopy procedure unlisted      Ovarian cyst removed    Leep  2011    MARCIO 2          X2    Other surgical history      bunions bilateral    Other surgical history      nodule lymph nodes neck    Other surgical history       Bladder sling    Other surgical history  2020    Cystoscopy, Dr Beach   [3]   Family History  Problem Relation Age of Onset    Other (lung CA) Self     Hypertension Mother     Diabetes Mother     Heart Disease Mother     Heart Disease Father     Other (lung cancer) Father 55        Lung Cancer    Other (pancreatic cancer) Sister 47        Pancreatic Cancer    Cancer Sister 51        lung CA    Other (Other) Sister         Back problems    Other (Other) Son         anxiety    Other (Other) Son         behavioral problems    Diabetes Maternal Grandmother     Breast Cancer Maternal Grandmother         dx late-60s    Stroke Maternal Grandfather 86    Dementia Paternal Grandmother     Heart Disorder Paternal Grandfather     Cancer Maternal Aunt 50        LUNG CA 51    Breast Cancer Maternal Aunt         dx 46-47y    Ovarian Cancer Maternal Cousin Female 33         of ovarian ca at 35y   [4]   Current Outpatient Medications   Medication Sig Dispense Refill    pregabalin 25 MG  Oral Cap Take 1 capsule (25 mg total) by mouth 3 (three) times daily. 90 capsule 0    lactulose 20 GM/30ML Oral Solution Take 30 mL (20 g total) by mouth 2 (two) times daily. 900 mL 0    HYDROcodone-acetaminophen  MG Oral Tab Take 1 tablet by mouth every 6 (six) hours as needed for Pain. 90 tablet 0    morphINE ER 15 MG Oral Tab CR Take 1 tablet (15 mg total) by mouth every 12 (twelve) hours. 60 tablet 0    Naloxone HCl 4 MG/0.1ML Nasal Liquid 4 mg by Nasal route as needed. If patient remains unresponsive, repeat dose in other nostril 2-5 minutes after first dose. 1 kit 0    ibuprofen 400 MG Oral Tab Take 1 tablet (400 mg total) by mouth every 8 (eight) hours as needed for Pain. 90 tablet 0    memantine 5 MG Oral Tab Take 1 tablet (5 mg total) by mouth daily.      levothyroxine 75 MCG Oral Tab Take 1 tablet (75 mcg total) by mouth before breakfast. 90 tablet 0    lisinopril-hydroCHLOROthiazide 20-12.5 MG Oral Tab Take 0.5 tablets by mouth daily. 45 tablet 3    ondansetron (ZOFRAN) 8 MG tablet Take 1 tablet (8 mg total) by mouth every 8 (eight) hours as needed for Nausea. 30 tablet 3    dilTIAZem HCl 120 MG Oral Tab Take 1 tablet (120 mg total) by mouth in the morning.      montelukast 10 MG Oral Tab Take 1 tablet (10 mg total) by mouth nightly. 90 tablet 3    albuterol 108 (90 Base) MCG/ACT Inhalation Aero Soln Inhale 2 puffs into the lungs every 6 (six) hours as needed for Wheezing or Shortness of Breath. 3 each 3    amphetamine-dextroamphetamine 15 MG Oral Tab Take 1 tablet (15 mg total) by mouth daily as needed.      ARIPiprazole 2 MG Oral Tab Take 1 tablet (2 mg total) by mouth daily.      VYVANSE 70 MG Oral Cap Take 1 capsule (70 mg total) by mouth every morning.      cetirizine 10 MG Oral Tab Take 1 tablet (10 mg total) by mouth daily. 90 tablet 1    cholecalciferol 25 MCG (1000 UT) Oral Cap Take 1 capsule (1,000 Units total) by mouth in the morning.  0    DULoxetine HCl 60 MG Oral Cap DR Particles Take  1 capsule (60 mg total) by mouth in the morning.  2    prochlorperazine (COMPAZINE) 10 mg tablet Take 1 tablet (10 mg total) by mouth every 6 (six) hours as needed for Nausea. (Patient not taking: Reported on 7/24/2025) 30 tablet 3    ferrous sulfate 325 (65 FE) MG Oral Tab EC Take 1 tablet (325 mg total) by mouth daily with breakfast. (Patient not taking: Reported on 7/24/2025)      Multiple Vitamin Oral Tab Take 1 tablet by mouth in the morning. (Patient not taking: Reported on 7/24/2025)

## 2025-07-24 NOTE — PROGRESS NOTES
OXYGEN CLINIC ASSESSMENT    Date: 2025 Physician: Shraon LOZANO   Diagnosis: dyspnea Technician: RT Rosalie     Patient: Hanna Barrett MRN: [id number] : 1970     Height: 5' 3.75\" (1.619 m) Weight: 185 lb Age: 55 year old         BP HR SpO2 RR JOHN DIST  (FT) TIME Reason Stopped   RA         REST 130/70   106   96     16     0     N/A         N/A         N/A           RA       PEAK  EXERCISE   130 91   20   2   900   6 min   Study Ended                FINDINGS  0 LPM required to maintain a saturation of 96 % at rest   0 LPM required to maintain a saturation of 91 % with exertion     LPM Pulse dose to maintain a saturation of   % with exertion   Patient does not need supplemental oxygen at rest or for exertion.

## 2025-07-29 ENCOUNTER — OFFICE VISIT (OUTPATIENT)
Facility: CLINIC | Age: 55
End: 2025-07-29
Payer: MEDICAID

## 2025-07-29 VITALS
HEART RATE: 109 BPM | BODY MASS INDEX: 32.78 KG/M2 | OXYGEN SATURATION: 95 % | WEIGHT: 185 LBS | RESPIRATION RATE: 16 BRPM | HEIGHT: 63 IN

## 2025-07-29 DIAGNOSIS — J90 PLEURAL EFFUSION: Primary | ICD-10-CM

## 2025-07-29 DIAGNOSIS — L08.9 LOCAL SKIN INFECTION: ICD-10-CM

## 2025-07-29 DIAGNOSIS — R06.09 DOE (DYSPNEA ON EXERTION): ICD-10-CM

## 2025-07-29 PROCEDURE — 99024 POSTOP FOLLOW-UP VISIT: CPT

## 2025-07-29 PROCEDURE — 99214 OFFICE O/P EST MOD 30 MIN: CPT

## 2025-08-03 DIAGNOSIS — L08.9 LOCAL SKIN INFECTION: ICD-10-CM

## 2025-08-03 DIAGNOSIS — E03.9 HYPOTHYROIDISM, UNSPECIFIED TYPE: ICD-10-CM

## 2025-08-04 RX ORDER — LEVOTHYROXINE SODIUM 25 UG/1
25 TABLET ORAL
Qty: 90 TABLET | Refills: 0 | OUTPATIENT
Start: 2025-08-04

## 2025-08-04 RX ORDER — LEVOTHYROXINE SODIUM 75 UG/1
75 TABLET ORAL
Qty: 90 TABLET | Refills: 0 | Status: SHIPPED | OUTPATIENT
Start: 2025-08-04

## 2025-08-07 ENCOUNTER — OFFICE VISIT (OUTPATIENT)
Facility: LOCATION | Age: 55
End: 2025-08-07
Attending: INTERNAL MEDICINE

## 2025-08-07 VITALS
WEIGHT: 183.5 LBS | SYSTOLIC BLOOD PRESSURE: 100 MMHG | BODY MASS INDEX: 31.71 KG/M2 | RESPIRATION RATE: 18 BRPM | DIASTOLIC BLOOD PRESSURE: 68 MMHG | OXYGEN SATURATION: 98 % | HEIGHT: 63.74 IN | HEART RATE: 95 BPM | TEMPERATURE: 98 F

## 2025-08-07 DIAGNOSIS — D64.9 ANEMIA, NORMOCYTIC NORMOCHROMIC: ICD-10-CM

## 2025-08-07 DIAGNOSIS — G62.0 CHEMOTHERAPY-INDUCED NEUROPATHY (HCC): ICD-10-CM

## 2025-08-07 DIAGNOSIS — G89.3 NEOPLASM RELATED PAIN: ICD-10-CM

## 2025-08-07 DIAGNOSIS — R59.0 MEDIASTINAL ADENOPATHY: ICD-10-CM

## 2025-08-07 DIAGNOSIS — C34.91 PRIMARY ADENOCARCINOMA OF RIGHT LUNG (HCC): ICD-10-CM

## 2025-08-07 DIAGNOSIS — R59.0 SUPRACLAVICULAR ADENOPATHY: ICD-10-CM

## 2025-08-07 DIAGNOSIS — K59.03 DRUG-INDUCED CONSTIPATION: ICD-10-CM

## 2025-08-07 DIAGNOSIS — C34.91 PRIMARY ADENOCARCINOMA OF RIGHT LUNG (HCC): Primary | ICD-10-CM

## 2025-08-07 DIAGNOSIS — C34.91 PRIMARY LUNG CANCER WITH METASTASIS FROM LUNG TO OTHER SITE, RIGHT (HCC): ICD-10-CM

## 2025-08-07 DIAGNOSIS — E03.9 HYPOTHYROIDISM, UNSPECIFIED TYPE: ICD-10-CM

## 2025-08-07 DIAGNOSIS — T45.1X5A CHEMOTHERAPY-INDUCED NEUROPATHY (HCC): ICD-10-CM

## 2025-08-07 DIAGNOSIS — L08.9 LOCAL SKIN INFECTION: ICD-10-CM

## 2025-08-07 DIAGNOSIS — R53.0 NEOPLASTIC (MALIGNANT) RELATED FATIGUE: Primary | ICD-10-CM

## 2025-08-07 DIAGNOSIS — R59.0 MEDIASTINAL ADENOPATHY: Primary | ICD-10-CM

## 2025-08-07 DIAGNOSIS — Z51.5 PALLIATIVE CARE BY SPECIALIST: ICD-10-CM

## 2025-08-07 DIAGNOSIS — C34.81 CANCER OF OVERLAPPING SITES OF RIGHT LUNG (HCC): ICD-10-CM

## 2025-08-07 DIAGNOSIS — J91.0 MALIGNANT PLEURAL EFFUSION (HCC): ICD-10-CM

## 2025-08-07 LAB
ALBUMIN SERPL-MCNC: 4.4 G/DL (ref 3.2–4.8)
ALBUMIN/GLOB SERPL: 1.8 (ref 1–2)
ALP LIVER SERPL-CCNC: 83 U/L (ref 41–108)
ALT SERPL-CCNC: 14 U/L (ref 10–49)
ANION GAP SERPL CALC-SCNC: 10 MMOL/L (ref 0–18)
AST SERPL-CCNC: 21 U/L (ref ?–34)
BASOPHILS # BLD AUTO: 0.04 X10(3) UL (ref 0–0.2)
BASOPHILS NFR BLD AUTO: 0.7 %
BILIRUB SERPL-MCNC: 0.2 MG/DL (ref 0.3–1.2)
BUN BLD-MCNC: 19 MG/DL (ref 9–23)
CALCIUM BLD-MCNC: 9.5 MG/DL (ref 8.7–10.6)
CHLORIDE SERPL-SCNC: 103 MMOL/L (ref 98–112)
CO2 SERPL-SCNC: 27 MMOL/L (ref 21–32)
CREAT BLD-MCNC: 1.11 MG/DL (ref 0.55–1.02)
EGFRCR SERPLBLD CKD-EPI 2021: 59 ML/MIN/1.73M2 (ref 60–?)
EOSINOPHIL # BLD AUTO: 0.48 X10(3) UL (ref 0–0.7)
EOSINOPHIL NFR BLD AUTO: 8.8 %
ERYTHROCYTE [DISTWIDTH] IN BLOOD BY AUTOMATED COUNT: 12.5 %
FASTING STATUS PATIENT QL REPORTED: NO
GLOBULIN PLAS-MCNC: 2.4 G/DL (ref 2–3.5)
GLUCOSE BLD-MCNC: 155 MG/DL (ref 70–99)
HCT VFR BLD AUTO: 33.9 % (ref 35–48)
HGB BLD-MCNC: 11.5 G/DL (ref 12–16)
IMM GRANULOCYTES # BLD AUTO: 0.02 X10(3) UL (ref 0–1)
IMM GRANULOCYTES NFR BLD: 0.4 %
LYMPHOCYTES # BLD AUTO: 0.85 X10(3) UL (ref 1–4)
LYMPHOCYTES NFR BLD AUTO: 15.6 %
MCH RBC QN AUTO: 29.9 PG (ref 26–34)
MCHC RBC AUTO-ENTMCNC: 33.9 G/DL (ref 31–37)
MCV RBC AUTO: 88.1 FL (ref 80–100)
MONOCYTES # BLD AUTO: 0.58 X10(3) UL (ref 0.1–1)
MONOCYTES NFR BLD AUTO: 10.6 %
NEUTROPHILS # BLD AUTO: 3.48 X10 (3) UL (ref 1.5–7.7)
NEUTROPHILS # BLD AUTO: 3.48 X10(3) UL (ref 1.5–7.7)
NEUTROPHILS NFR BLD AUTO: 63.9 %
OSMOLALITY SERPL CALC.SUM OF ELEC: 295 MOSM/KG (ref 275–295)
PLATELET # BLD AUTO: 236 10(3)UL (ref 150–450)
POTASSIUM SERPL-SCNC: 3.8 MMOL/L (ref 3.5–5.1)
PROT SERPL-MCNC: 6.8 G/DL (ref 5.7–8.2)
RBC # BLD AUTO: 3.85 X10(6)UL (ref 3.8–5.3)
SODIUM SERPL-SCNC: 140 MMOL/L (ref 136–145)
T4 FREE SERPL-MCNC: 1.3 NG/DL (ref 0.8–1.7)
TSI SER-ACNC: 3.9 UIU/ML (ref 0.55–4.78)
WBC # BLD AUTO: 5.5 X10(3) UL (ref 4–11)

## 2025-08-07 RX ORDER — IBUPROFEN 400 MG/1
400 TABLET, FILM COATED ORAL 2 TIMES DAILY PRN
Qty: 60 TABLET | Refills: 1 | Status: SHIPPED | OUTPATIENT
Start: 2025-08-07

## 2025-08-07 RX ORDER — MODAFINIL 100 MG/1
100 TABLET ORAL EVERY MORNING
COMMUNITY
Start: 2025-07-31

## 2025-08-07 RX ORDER — MORPHINE SULFATE 15 MG/1
TABLET, FILM COATED, EXTENDED RELEASE ORAL
Qty: 90 TABLET | Refills: 0 | Status: SHIPPED | OUTPATIENT
Start: 2025-08-07 | End: 2025-09-06

## 2025-08-07 RX ORDER — LEVOTHYROXINE SODIUM 75 UG/1
75 TABLET ORAL
Qty: 90 TABLET | Refills: 0 | Status: SHIPPED | OUTPATIENT
Start: 2025-08-07

## 2025-08-07 RX ORDER — LEVOTHYROXINE SODIUM 25 UG/1
25 TABLET ORAL
Qty: 90 TABLET | Refills: 0 | OUTPATIENT
Start: 2025-08-07

## 2025-08-07 RX ORDER — HYDROCODONE BITARTRATE AND ACETAMINOPHEN 10; 325 MG/1; MG/1
1 TABLET ORAL EVERY 6 HOURS PRN
Qty: 90 TABLET | Refills: 0 | Status: SHIPPED | OUTPATIENT
Start: 2025-08-07

## 2025-08-07 NOTE — PROGRESS NOTES
Provider Clarification    Additional information on the patient's respiratory status    Acute hypoxic respiratory failure    This note is part of the patient's medical record.

## 2025-08-11 ENCOUNTER — TELEPHONE (OUTPATIENT)
Facility: CLINIC | Age: 55
End: 2025-08-11

## 2025-08-11 ENCOUNTER — PATIENT MESSAGE (OUTPATIENT)
Facility: CLINIC | Age: 55
End: 2025-08-11

## 2025-08-15 ENCOUNTER — HOSPITAL ENCOUNTER (OUTPATIENT)
Dept: NUCLEAR MEDICINE | Facility: HOSPITAL | Age: 55
Discharge: HOME | End: 2025-08-15
Attending: INTERNAL MEDICINE

## 2025-08-15 DIAGNOSIS — C34.91 PRIMARY LUNG CANCER WITH METASTASIS FROM LUNG TO OTHER SITE, RIGHT (HCC): ICD-10-CM

## 2025-08-15 DIAGNOSIS — R59.0 MEDIASTINAL ADENOPATHY: ICD-10-CM

## 2025-08-15 DIAGNOSIS — C34.91 PRIMARY ADENOCARCINOMA OF RIGHT LUNG (HCC): ICD-10-CM

## 2025-08-15 DIAGNOSIS — C34.81 CANCER OF OVERLAPPING SITES OF RIGHT LUNG (HCC): ICD-10-CM

## 2025-08-15 LAB — GLUCOSE BLD-MCNC: 130 MG/DL (ref 70–99)

## 2025-08-15 PROCEDURE — 78815 PET IMAGE W/CT SKULL-THIGH: CPT | Performed by: INTERNAL MEDICINE

## 2025-08-15 PROCEDURE — 82962 GLUCOSE BLOOD TEST: CPT

## 2025-08-25 ENCOUNTER — PATIENT MESSAGE (OUTPATIENT)
Facility: CLINIC | Age: 55
End: 2025-08-25

## 2025-08-25 DIAGNOSIS — J90 PLEURAL EFFUSION: Primary | ICD-10-CM

## 2025-08-28 ENCOUNTER — HOSPITAL ENCOUNTER (OUTPATIENT)
Dept: INTERVENTIONAL RADIOLOGY/VASCULAR | Facility: HOSPITAL | Age: 55
Discharge: HOME | End: 2025-08-28
Admitting: INTERNAL MEDICINE

## 2025-08-28 ENCOUNTER — OFFICE VISIT (OUTPATIENT)
Facility: LOCATION | Age: 55
End: 2025-08-28
Attending: INTERNAL MEDICINE

## 2025-08-28 VITALS
RESPIRATION RATE: 17 BRPM | DIASTOLIC BLOOD PRESSURE: 77 MMHG | SYSTOLIC BLOOD PRESSURE: 105 MMHG | TEMPERATURE: 98 F | HEART RATE: 96 BPM | OXYGEN SATURATION: 94 %

## 2025-08-28 VITALS
TEMPERATURE: 98 F | HEART RATE: 101 BPM | RESPIRATION RATE: 18 BRPM | HEIGHT: 65.28 IN | WEIGHT: 188.19 LBS | SYSTOLIC BLOOD PRESSURE: 110 MMHG | OXYGEN SATURATION: 90 % | BODY MASS INDEX: 30.98 KG/M2 | DIASTOLIC BLOOD PRESSURE: 70 MMHG

## 2025-08-28 DIAGNOSIS — C34.91 PRIMARY ADENOCARCINOMA OF RIGHT LUNG (HCC): ICD-10-CM

## 2025-08-28 DIAGNOSIS — R59.0 MEDIASTINAL ADENOPATHY: Primary | ICD-10-CM

## 2025-08-28 DIAGNOSIS — T45.1X5A CHEMOTHERAPY-INDUCED NEUROPATHY (HCC): Primary | ICD-10-CM

## 2025-08-28 DIAGNOSIS — G62.0 CHEMOTHERAPY-INDUCED NEUROPATHY (HCC): Primary | ICD-10-CM

## 2025-08-28 DIAGNOSIS — T88.7XXA MEDICATION SIDE EFFECT: ICD-10-CM

## 2025-08-28 DIAGNOSIS — G89.3 NEOPLASM RELATED PAIN: ICD-10-CM

## 2025-08-28 DIAGNOSIS — C34.91 PRIMARY LUNG CANCER WITH METASTASIS FROM LUNG TO OTHER SITE, RIGHT (HCC): ICD-10-CM

## 2025-08-28 DIAGNOSIS — J90 PLEURAL EFFUSION: ICD-10-CM

## 2025-08-28 DIAGNOSIS — K59.03 DRUG-INDUCED CONSTIPATION: ICD-10-CM

## 2025-08-28 DIAGNOSIS — Z51.5 PALLIATIVE CARE BY SPECIALIST: ICD-10-CM

## 2025-08-28 LAB
ALBUMIN SERPL-MCNC: 4.1 G/DL (ref 3.2–4.8)
ALBUMIN/GLOB SERPL: 1.7 (ref 1–2)
ALP LIVER SERPL-CCNC: 68 U/L (ref 41–108)
ALT SERPL-CCNC: 19 U/L (ref 10–49)
ANION GAP SERPL CALC-SCNC: 8 MMOL/L (ref 0–18)
AST SERPL-CCNC: 20 U/L (ref ?–34)
BASOPHILS # BLD AUTO: 0.02 X10(3) UL (ref 0–0.2)
BASOPHILS NFR BLD AUTO: 0.6 %
BILIRUB SERPL-MCNC: 0.2 MG/DL (ref 0.3–1.2)
BUN BLD-MCNC: 12 MG/DL (ref 9–23)
CALCIUM BLD-MCNC: 9.6 MG/DL (ref 8.7–10.6)
CHLORIDE SERPL-SCNC: 105 MMOL/L (ref 98–112)
CO2 SERPL-SCNC: 30 MMOL/L (ref 21–32)
CREAT BLD-MCNC: 0.95 MG/DL (ref 0.55–1.02)
CRP SERPL-MCNC: 1.6 MG/DL (ref ?–0.5)
EGFRCR SERPLBLD CKD-EPI 2021: 71 ML/MIN/1.73M2 (ref 60–?)
EOSINOPHIL # BLD AUTO: 0.29 X10(3) UL (ref 0–0.7)
EOSINOPHIL NFR BLD AUTO: 8.5 %
ERYTHROCYTE [DISTWIDTH] IN BLOOD BY AUTOMATED COUNT: 12.5 %
ERYTHROCYTE [SEDIMENTATION RATE] IN BLOOD: 11 MM/HR (ref 0–30)
FASTING STATUS PATIENT QL REPORTED: YES
GLOBULIN PLAS-MCNC: 2.4 G/DL (ref 2–3.5)
GLUCOSE BLD-MCNC: 114 MG/DL (ref 70–99)
HCT VFR BLD AUTO: 30.9 % (ref 35–48)
HGB BLD-MCNC: 10.7 G/DL (ref 12–16)
IMM GRANULOCYTES # BLD AUTO: 0.01 X10(3) UL (ref 0–1)
IMM GRANULOCYTES NFR BLD: 0.3 %
INR: 1 (ref 0.8–1.3)
LYMPHOCYTES # BLD AUTO: 0.69 X10(3) UL (ref 1–4)
LYMPHOCYTES NFR BLD AUTO: 20.2 %
MCH RBC QN AUTO: 29.1 PG (ref 26–34)
MCHC RBC AUTO-ENTMCNC: 34.6 G/DL (ref 31–37)
MCV RBC AUTO: 84 FL (ref 80–100)
MONOCYTES # BLD AUTO: 0.57 X10(3) UL (ref 0.1–1)
MONOCYTES NFR BLD AUTO: 16.7 %
NEUTROPHILS # BLD AUTO: 1.84 X10 (3) UL (ref 1.5–7.7)
NEUTROPHILS # BLD AUTO: 1.84 X10(3) UL (ref 1.5–7.7)
NEUTROPHILS NFR BLD AUTO: 53.7 %
OSMOLALITY SERPL CALC.SUM OF ELEC: 297 MOSM/KG (ref 275–295)
PLATELET # BLD AUTO: 227 10(3)UL (ref 150–450)
POTASSIUM SERPL-SCNC: 3.8 MMOL/L (ref 3.5–5.1)
PROT SERPL-MCNC: 6.5 G/DL (ref 5.7–8.2)
RBC # BLD AUTO: 3.68 X10(6)UL (ref 3.8–5.3)
SODIUM SERPL-SCNC: 143 MMOL/L (ref 136–145)
T4 FREE SERPL-MCNC: 1.2 NG/DL (ref 0.8–1.7)
TSI SER-ACNC: 3.19 UIU/ML (ref 0.55–4.78)
WBC # BLD AUTO: 3.4 X10(3) UL (ref 4–11)

## 2025-08-28 PROCEDURE — 84439 ASSAY OF FREE THYROXINE: CPT | Performed by: INTERNAL MEDICINE

## 2025-08-28 PROCEDURE — 80053 COMPREHEN METABOLIC PANEL: CPT | Performed by: INTERNAL MEDICINE

## 2025-08-28 PROCEDURE — 85652 RBC SED RATE AUTOMATED: CPT | Performed by: INTERNAL MEDICINE

## 2025-08-28 PROCEDURE — 84443 ASSAY THYROID STIM HORMONE: CPT | Performed by: INTERNAL MEDICINE

## 2025-08-28 PROCEDURE — 86140 C-REACTIVE PROTEIN: CPT | Performed by: INTERNAL MEDICINE

## 2025-08-28 PROCEDURE — 32552 REMOVE LUNG CATHETER: CPT | Performed by: RADIOLOGY

## 2025-08-28 PROCEDURE — 99152 MOD SED SAME PHYS/QHP 5/>YRS: CPT | Performed by: RADIOLOGY

## 2025-08-28 PROCEDURE — 85610 PROTHROMBIN TIME: CPT | Performed by: RADIOLOGY

## 2025-08-28 PROCEDURE — 85025 COMPLETE CBC W/AUTO DIFF WBC: CPT | Performed by: INTERNAL MEDICINE

## 2025-08-28 RX ORDER — NALOXONE HYDROCHLORIDE 0.4 MG/ML
0.08 INJECTION, SOLUTION INTRAMUSCULAR; INTRAVENOUS; SUBCUTANEOUS AS NEEDED
Status: DISCONTINUED | OUTPATIENT
Start: 2025-08-28 | End: 2025-08-28

## 2025-08-28 RX ORDER — ONDANSETRON 2 MG/ML
INJECTION INTRAMUSCULAR; INTRAVENOUS
Status: COMPLETED
Start: 2025-08-28 | End: 2025-08-28

## 2025-08-28 RX ORDER — DEXAMETHASONE SODIUM PHOSPHATE 10 MG/ML
20 INJECTION, SOLUTION INTRA-ARTICULAR; INTRALESIONAL; INTRAMUSCULAR; INTRAVENOUS; SOFT TISSUE ONCE
Status: CANCELLED
Start: 2026-04-16 | End: 2026-04-16

## 2025-08-28 RX ORDER — MORPHINE SULFATE 15 MG/1
TABLET, FILM COATED, EXTENDED RELEASE ORAL
Qty: 90 TABLET | Refills: 0 | Status: SHIPPED | OUTPATIENT
Start: 2025-09-04 | End: 2025-10-04

## 2025-08-28 RX ORDER — DEXAMETHASONE SODIUM PHOSPHATE 10 MG/ML
20 INJECTION, SOLUTION INTRA-ARTICULAR; INTRALESIONAL; INTRAMUSCULAR; INTRAVENOUS; SOFT TISSUE ONCE
Start: 2026-05-07 | End: 2026-05-07

## 2025-08-28 RX ORDER — LIDOCAINE HYDROCHLORIDE AND EPINEPHRINE BITARTRATE 20; .01 MG/ML; MG/ML
INJECTION, SOLUTION SUBCUTANEOUS
Status: COMPLETED
Start: 2025-08-28 | End: 2025-08-28

## 2025-08-28 RX ORDER — PREGABALIN 50 MG/1
50 CAPSULE ORAL 3 TIMES DAILY
Qty: 90 CAPSULE | Refills: 1 | Status: SHIPPED | OUTPATIENT
Start: 2025-08-28

## 2025-08-28 RX ORDER — LIDOCAINE HYDROCHLORIDE 10 MG/ML
INJECTION, SOLUTION INFILTRATION; PERINEURAL
Status: COMPLETED
Start: 2025-08-28 | End: 2025-08-28

## 2025-08-28 RX ORDER — DIPHENHYDRAMINE HYDROCHLORIDE 50 MG/ML
50 INJECTION, SOLUTION INTRAMUSCULAR; INTRAVENOUS ONCE AS NEEDED
Status: DISCONTINUED | OUTPATIENT
Start: 2025-08-28 | End: 2025-08-28

## 2025-08-28 RX ORDER — FOLIC ACID 1 MG/1
1 TABLET ORAL DAILY
Qty: 30 TABLET | Refills: 5 | Status: SHIPPED | OUTPATIENT
Start: 2025-08-28

## 2025-08-28 RX ORDER — DEXAMETHASONE 4 MG/1
8 TABLET ORAL 2 TIMES DAILY
Qty: 24 TABLET | Refills: 3 | Status: SHIPPED | OUTPATIENT
Start: 2025-08-28

## 2025-08-28 RX ORDER — ONDANSETRON 8 MG/1
8 TABLET, FILM COATED ORAL EVERY 8 HOURS PRN
Qty: 30 TABLET | Refills: 3 | Status: SHIPPED | OUTPATIENT
Start: 2025-08-28

## 2025-08-28 RX ORDER — ONDANSETRON 2 MG/ML
4 INJECTION INTRAMUSCULAR; INTRAVENOUS ONCE AS NEEDED
Status: DISCONTINUED | OUTPATIENT
Start: 2025-08-28 | End: 2025-08-28

## 2025-08-28 RX ORDER — SODIUM CHLORIDE 9 MG/ML
INJECTION, SOLUTION INTRAVENOUS CONTINUOUS
Status: DISCONTINUED | OUTPATIENT
Start: 2025-08-28 | End: 2025-08-28

## 2025-08-28 RX ORDER — CYANOCOBALAMIN 1000 UG/ML
1000 INJECTION, SOLUTION INTRAMUSCULAR; SUBCUTANEOUS ONCE
Status: COMPLETED | OUTPATIENT
Start: 2025-08-28 | End: 2025-08-28

## 2025-08-28 RX ORDER — HYDROCODONE BITARTRATE AND ACETAMINOPHEN 10; 325 MG/1; MG/1
1 TABLET ORAL EVERY 6 HOURS PRN
Qty: 90 TABLET | Refills: 0 | Status: SHIPPED | OUTPATIENT
Start: 2025-09-04

## 2025-08-28 RX ORDER — CEFAZOLIN SODIUM 1 G/3ML
INJECTION, POWDER, FOR SOLUTION INTRAMUSCULAR; INTRAVENOUS
Status: COMPLETED
Start: 2025-08-28 | End: 2025-08-28

## 2025-08-28 RX ORDER — DIPHENHYDRAMINE HYDROCHLORIDE 50 MG/ML
25 INJECTION, SOLUTION INTRAMUSCULAR; INTRAVENOUS ONCE
Status: COMPLETED | OUTPATIENT
Start: 2025-08-28 | End: 2025-08-28

## 2025-08-28 RX ORDER — PROCHLORPERAZINE MALEATE 10 MG
10 TABLET ORAL EVERY 6 HOURS PRN
Qty: 30 TABLET | Refills: 3 | Status: SHIPPED | OUTPATIENT
Start: 2025-08-28

## 2025-08-28 RX ORDER — MIDAZOLAM HYDROCHLORIDE 1 MG/ML
INJECTION INTRAMUSCULAR; INTRAVENOUS
Status: COMPLETED
Start: 2025-08-28 | End: 2025-08-28

## 2025-08-28 RX ORDER — DEXAMETHASONE SODIUM PHOSPHATE 10 MG/ML
20 INJECTION, SOLUTION INTRA-ARTICULAR; INTRALESIONAL; INTRAMUSCULAR; INTRAVENOUS; SOFT TISSUE ONCE
Status: DISCONTINUED | OUTPATIENT
Start: 2025-08-28 | End: 2025-08-28 | Stop reason: SDUPTHER

## 2025-08-28 RX ADMIN — CYANOCOBALAMIN 1000 MCG: 1000 INJECTION, SOLUTION INTRAMUSCULAR; SUBCUTANEOUS at 13:49:00

## 2025-08-28 RX ADMIN — DIPHENHYDRAMINE HYDROCHLORIDE 25 MG: 50 INJECTION, SOLUTION INTRAMUSCULAR; INTRAVENOUS at 13:48:00

## 2025-08-28 RX ADMIN — SODIUM CHLORIDE: 9 INJECTION, SOLUTION INTRAVENOUS at 09:15:00

## 2025-08-29 ENCOUNTER — TELEPHONE (OUTPATIENT)
Facility: LOCATION | Age: 55
End: 2025-08-29

## (undated) DIAGNOSIS — Z13.71 BRCA GENE MUTATION NEGATIVE IN FEMALE: Primary | ICD-10-CM

## (undated) DIAGNOSIS — C34.91 PRIMARY LUNG CANCER WITH METASTASIS FROM LUNG TO OTHER SITE, RIGHT (HCC): Primary | ICD-10-CM

## (undated) DEVICE — GLOVE SURG SENSICARE SZ 6-1/2

## (undated) DEVICE — SUTURE PROLENE 2-0 CT-2

## (undated) DEVICE — Device

## (undated) DEVICE — 3M™ RED DOT™ MONITORING ELECTRODE WITH FOAM TAPE AND STICKY GEL, 50/BAG, 20/CASE, 72/PLT 2570: Brand: RED DOT™

## (undated) DEVICE — GLOVE SURG SENSICARE SZ 7-1/2

## (undated) DEVICE — 60 ML SYRINGE REGULAR TIP: Brand: MONOJECT

## (undated) DEVICE — NEEDLE SPINAL 22X3-1/2 BLK

## (undated) DEVICE — MASK ISOLATION

## (undated) DEVICE — TRAP,MUCUS SPECIMEN, 80CC: Brand: MEDLINE

## (undated) DEVICE — MASK OXYGEN ADULT W/ C02 10FT

## (undated) DEVICE — ENDOSCOPY PACK - LOWER: Brand: MEDLINE INDUSTRIES, INC.

## (undated) DEVICE — SINGLE USE SUCTION VALVE MAJ-209: Brand: SINGLE USE SUCTION VALVE (STERILE)

## (undated) DEVICE — MARKER SKIN 2 TIP

## (undated) DEVICE — PAIN TRAY: Brand: MEDLINE INDUSTRIES, INC.

## (undated) DEVICE — BANDAID COVERLET 1X3

## (undated) DEVICE — SUTURE VICRYL 2-0 CT-2

## (undated) DEVICE — SUTURE SILK 2-0

## (undated) DEVICE — NEEDLE SPINAL 22X5 405148

## (undated) DEVICE — SOL  .9 1000ML BTL

## (undated) DEVICE — Device: Brand: PLUMEPEN

## (undated) DEVICE — MEDI-VAC SUCTION HANDLE REGULAR CAPACITY: Brand: CARDINAL HEALTH

## (undated) DEVICE — MARKER SKIN PREP RESIST STRL

## (undated) DEVICE — GOWN SURG AERO CHROME XXL

## (undated) DEVICE — SINGLE USE BIOPSY VALVE MAJ-210: Brand: SINGLE USE BIOPSY VALVE (STERILE)

## (undated) DEVICE — C-ARM: Brand: UNBRANDED

## (undated) DEVICE — DRILL SRG OIL CRTDG MAESTRO

## (undated) DEVICE — ENDOSCOPY PACK UPPER: Brand: MEDLINE INDUSTRIES, INC.

## (undated) DEVICE — REMOVER DURAPREP 3M

## (undated) DEVICE — BOWLS UTILITY 16OZ

## (undated) DEVICE — LIGHT HANDLE

## (undated) DEVICE — SUTURE VICRYL 0 CT-1

## (undated) DEVICE — SYRINGE 10ML SLIP TIP

## (undated) DEVICE — FILTERLINE NASAL ADULT O2/CO2

## (undated) DEVICE — 1200CC GUARDIAN II: Brand: GUARDIAN

## (undated) DEVICE — GLOVE SURG SENSICARE SZ 7

## (undated) DEVICE — Device: Brand: INTELLICART™

## (undated) DEVICE — GLOVE SURG SENSICARE SZ 8

## (undated) DEVICE — 3.0MM PRECISION NEURO (MATCH HEAD)

## (undated) DEVICE — CAP,BOUFFANT,SPUNBOND,BLUE,24": Brand: MEDLINE INDUSTRIES, INC.

## (undated) DEVICE — KENDALL SCD EXPRESS SLEEVES, KNEE LENGTH, MEDIUM: Brand: KENDALL SCD

## (undated) DEVICE — CATH IV 14G X 5-1/4 ANGIO

## (undated) DEVICE — SUTURE VICRYL 3-0 RB-1

## (undated) DEVICE — ALCOHOL 70% 4 OZ

## (undated) DEVICE — SUTURE VICRYL 3-0 SH

## (undated) DEVICE — LAMINECTOMY CDS: Brand: MEDLINE INDUSTRIES, INC.

## (undated) DEVICE — SCD SLEEVE KNEE HI BLEND

## (undated) DEVICE — BREAST-HERNIA-PORT CDS-LF: Brand: MEDLINE INDUSTRIES, INC.

## (undated) DEVICE — FORCEPS BX 100CM 1.8MM RJ STD

## (undated) DEVICE — DRAPE,LAPAROTOMY,PCH,STERILE: Brand: MEDLINE

## (undated) DEVICE — Device: Brand: DEFENDO AIR/WATER/SUCTION AND BIOPSY VALVE

## (undated) DEVICE — NEEDLE ASP VIZISHOT 19G 2MM

## (undated) DEVICE — AVANOS* TUOHY EPIDURAL NEEDLE: Brand: AVANOS

## (undated) DEVICE — GLOVE BIOGEL ORTHO SZ 8

## (undated) DEVICE — GAUZE SPONGES,12 PLY: Brand: CURITY

## (undated) DEVICE — FLUIDGARD® 160 ANTI-FOG SURGICAL MASK WITH ANTI-GLARE SHIELD: Brand: PRECEPT ®

## (undated) DEVICE — DRAPE MICROSCOPE NEURO PENTERO

## (undated) DEVICE — NEEDLE SPINAL 22X7 405149

## (undated) DEVICE — SINGLE USE ASPIRATION NEEDLE: Brand: SINGLE USE ASPIRATION NEEDLE

## (undated) DEVICE — AIRLIFE&#8482 MISTY MAX 10 NEBULIZER W 7 (2.1 M) CRUSH RESISTANT OXYGEN TUBING BAFFLED TEE ADAPTER, MOUTHPIECE: Brand: AIRLIFE

## (undated) DEVICE — STERILE SYNTHETIC POLYISOPRENE POWDER-FREE SURGICAL GLOVES WITH HYDROGEL COATING, SMOOTH FINISH, STRAIGHT FINGER: Brand: PROTEXIS

## (undated) DEVICE — SUTURE NUROLON 4-0 TF

## (undated) DEVICE — EXOFIN TISSUE ADHESIVE 1.0ML

## (undated) DEVICE — MEDI-VAC NON-CONDUCTIVE SUCTION TUBING: Brand: CARDINAL HEALTH

## (undated) DEVICE — SUTURE MONOCRYL 4-0 PS-2

## (undated) DEVICE — STERILE SYNTHETIC POLYISOPRENE POWDER-FREE SURGICAL GLOVES WITH HYDROGEL COATING: Brand: PROTEXIS

## (undated) DEVICE — FORCEP BIOPSY RJ4 LG CAP W/ND

## (undated) DEVICE — #11 STERILE BLADE: Brand: POLYMER COATED BLADES

## (undated) DEVICE — STERILE POLYISOPRENE POWDER-FREE SURGICAL GLOVES: Brand: PROTEXIS

## (undated) DEVICE — TRAP MUCUS 20ML

## (undated) NOTE — ED AVS SNAPSHOT
Marsha Alaniz   MRN: [id number]    Department:  [facility] Emergency Department   Date of Visit:  8/19/2018           Disclosure     Insurance plans vary and the physician(s) referred by the ER may not be covered by your plan.  Please contact you tell this physician (or your personal doctor if your instructions are to return to your personal doctor) about any new or lasting problems. The primary care or specialist physician will see patients referred from the [facility] Emergency Department.  Reji Brewer

## (undated) NOTE — LETTER
Patient Name: Aissatou Prince        : 1970       Medical Record #: [id number]    CONSENT FOR PROCEDURES/SEDATION    Date: 2019       Time: 9:13 AM        1.  I authorize the performance upon Aissatou Prince the following:  Bilateral cervical

## (undated) NOTE — LETTER
[facility] Testing Department  Phone: [phone]  Right Fax: [phone]    ADDRESSEE INFORMATION: SENDER INFORMATION:   To:   Ganesh Navarro MD From: Carla     Department: Pre-Admission Testing   Fax Number: [phone] Date: 2/15/

## (undated) NOTE — LETTER
Patient Name: Dylan Garcia  : 1970  MRN: [id number]  Patient Address: [address] [place] [address] [address] [place] [address] [id number]      Coronavirus Disease 2019 (COVID-19)     [facility] is committed to the safety and well-being of our patients, fritz 2. Monitor your symptoms carefully. If your symptoms get worse, call your healthcare provider immediately. 3. Get rest and stay hydrated.    4. If you have a medical appointment, call the healthcare provider ahead of time and tell them that you have or may ? At least 24 hours have passed since recovery defined as resolution of fever without the use of fever-reducing medications; and  · Improvement in respiratory symptoms (e.g., cough, shortness of breath); and  · At least 10 days have passed since symptoms f If you would be interested in donating your plasma to help treat others diagnosed with the virus, please contact [facility] directly on their website: [email or web address]    Who is eligible to donate convalescent plasma?

## (undated) NOTE — LETTER
[facility] CARE IN [place]  [address] [address]  Dept: [phone]  Dept Fax: [phone]      January 17, 2017    Patient: Mirian Adams   Date of Visit: 1/17/2017       To Whom It May Concern:    Jame Ash was seen and

## (undated) NOTE — LETTER
[facility] Testing Department  Phone: [phone]  OUTSIDE TESTING RESULT REQUEST    TO:   Dr. Sarah Garcia Date: 2/2/21    FAX #: [phone]  Patient has an appointment with you today   and will need testing done as listed below

## (undated) NOTE — LETTER
Patient Name: Sin Morgan  : 1970  MRN: [id number]  Patient Address: [address] [place] [address]   [address] [facility] [address]      Coronavirus Disease 2019 (COVID-19)     [facility] is committed to the safety and well-being of our patients, fritz carefully. If your symptoms get worse, call your healthcare provider immediately. 3. Get rest and stay hydrated.    4. If you have a medical appointment, call the healthcare provider ahead of time and tell them that you have or may have COVID-19.  5. For m of fever-reducing medications; and  · Improvement in respiratory symptoms (e.g., cough, shortness of breath); and  · At least 10 days have passed since symptoms first appeared OR if asymptomatic patient or date of symptom onset is unclear then use 10 days donors must:    · Have had a confirmed diagnosis of COVID-19  · Be symptom-free for at least 14 days*    *Some people will be required to have a repeat COVID-19 test in order to be eligible to donate.  If you’re instructed by [facility] that a repeat test is r

## (undated) NOTE — LETTER
Patient Name: Lois Borden        : 1970       Medical Record #: [id number]    CONSENT FOR PROCEDURES/SEDATION    Date: 2018       Time: 8:23 AM        1.  I authorize the performance upon Lois Borden the following:  Right cervical tri

## (undated) NOTE — LETTER
[facility] Testing Department  Phone: [phone]  Right Fax: [phone]  Neelima 20 Ana Jordan RN Date: 2/22/21    Patient Name: Natasha Socks  Surgery Date: 3/3/2021    CSN: [id number]  Medical Record: [id number]   DO

## (undated) NOTE — ED AVS SNAPSHOT
Marquise Betty   MRN: [id number]    Department:  [facility] Emergency Department   Date of Visit:  5/27/2018           Disclosure     Insurance plans vary and the physician(s) referred by the ER may not be covered by your plan.  Please contact you tell this physician (or your personal doctor if your instructions are to return to your personal doctor) about any new or lasting problems. The primary care or specialist physician will see patients referred from the [facility] Emergency Department.  Wing Palomino

## (undated) NOTE — ED AVS SNAPSHOT
[facility] in [address] [place] [address] Drive,[address]    [address] [place] [address]    Phone:  [phone]    Fax:  [address]   MRN: [id number]    Department:  [facility]   Date of Visit:  1/17/2017 Splint the rib area with a pillow if you have to cough, move or sneeze. Incentive spirometer will be helpful, or blowing bubbles. Use the Vicoprofen as needed for breakthrough pain.     Discharge References/Attachments     RIB CONTUSION (ENGLISH)      Dis care or specialist physician will see patients referred from the [facility]. Follow-up care is at the discretion of that Physician.     IF THERE IS ANY CHANGE OR WORSENING OF YOUR CONDITION, CALL YOUR PRIMARY CARE PHYSICIAN AT ONCE OR GO TO THE harming yourself, contact [facility] and Referral Center at [phone]. - If you don’t have insurance, [facility] has partnered with [facility] [address] [facility] [address] to help you get signed up for insurance coverage.   Patient [place] office, you can view your past visit information in Se[employer]eper by going to Visits < Visit Summaries. Seakeeper questions? Call [phone] for help. Seakeeper is NOT to be used for urgent needs. For medical emergencies, dial 911.

## (undated) NOTE — LETTER
To: Dr. Estefania Tineo   Date:  8/1/2022        Patient Name: Aracelis Covert / Sex: 2/1/1970-A: 47 y  female        CSN: [id number]        Medical Records: [id number]    URGENT Request for History & Physical for Radiology Procedure at [facility]    The above patient is scheduled to have a procedure performed in Radiology. In order for the procedure to be performed safely, a comprehensive History & Physical, to include the Review of Systems, is required within 30 days of the scheduled appointment. Procedure:   CT Biopsy Lung or Mediastinum percutaneously w/imaging    Date Scheduled: Thursday, 8/4/2022    Ordered by (Ordering Physician):  Dr. Ced Brooks    Please FAX the completed H&P to [facility] at [phone]. For Questions: Call [facility] [phone]    If you cannot provide us with a comprehensive History & Physical prior to this appointment, you will need to cancel and reschedule the appointment by calling Central Scheduling [phone]. Thank you.

## (undated) NOTE — LETTER
ASTHMA ACTION PLAN for Hanna Barrett     : 1970     Date: 24  Doctor:  Guillermo Curry MD  Phone for doctor or clinic: [facility], [address] [place] [id number] [phone]      ACT Score: 19    ACT Goal: 20 or greater    Call your provider if you require your rescue/quick reliever medication more than 2-3 times in a 24 hour period.    If you require your rescue inhaler/medication more than 2-3 times weekly, your asthma may not be under proper control and you should seek medical attention.    *Quick Relievers are Xopenex and Albuterol*    You can use the colors of a traffic light to help learn about your asthma medicines.  Year Round       1. Green - Go! % of Personal Best Peak Flow   Use controller medicine.   Breathing is good  No cough or wheeze  Can work and play Medicine How much to take When to take it    Medications       Leukotriene Modulators Instructions     montelukast 10 MG Oral Tab Take 1 tablet (10 mg total) by mouth nightly.       Sympathomimetics Instructions     albuterol 108 (90 Base) MCG/ACT Inhalation Aero Soln Inhale 2 puffs into the lungs every 4 to 6 hours as needed for Wheezing.                    2. Yellow - Caution. 50-79% Personal Best Peak Flow  Use reliever medicine to keep an asthma attack from getting bad.   Cough  Quick Relievers  Wheezing  Tight Chest  Wake up at night Medicine How much to take When to take it    If symptoms are not improving in 24-48 hrs, call office for further instructions  Medications       Leukotriene Modulators Instructions     montelukast 10 MG Oral Tab Take 1 tablet (10 mg total) by mouth nightly.       Sympathomimetics Instructions     albuterol 108 (90 Base) MCG/ACT Inhalation Aero Soln Inhale 2 puffs into the lungs every 4 to 6 hours as needed for Wheezing.                    3. Red - Stop! Danger! <50% Personal Best Peak Flow  Continue Controller Medications But ADD:    Medicine not helping  Breathing is hard and fast  Nose opens wide  Can't walk  Ribs show  Can't talk well Medicine How much to take When to take it    If your symptoms do not improve in ONE hour -  go to the emergency room or call 911 immediately! If symptoms improve, call office for appointment immediately.    Albuterol inhaler 2 puffs every 20 minutes for three treatments       Don't forget:  Rinse mouth after using inhaler  Use spacer for inhaler  Remember to get your Flu vaccine every fall!    [x] Asthma Action Plan reviewed with the caregiver and patient, and a copy of the plan was given to the patient/caregiver.   [] Asthma Action Plan reviewed with the caregiver and patient on the phone, and copy mailed to patient/caregiver or sent via [facility].     Signatures:   Provider  Guillermo Curry MD Patient  Hanna Ng Arnold Caretaker

## (undated) NOTE — LETTER
12/30/19    Patient: Patrick French  YOB: 1970   Member ID: [id number]  Case Reference Number: [id number]    To Whom It May Concern:    I am writing to provide additional information to support my claim of treatment of Patrick French wi

## (undated) NOTE — LETTER
21    RE: Rick Smalls     : 1970    Dear Dr. Angelica Harris,    This letter is to inform you that your patient is being scheduled for surgery with Dr. Darian Nj on 2/15/21 at [facility]. We have asked the patient to contact your office to sched

## (undated) NOTE — ED AVS SNAPSHOT
Francisca Bal   MRN: [id number]    Department:  [facility] Emergency Department   Date of Visit:  8/18/2019           Disclosure     Insurance plans vary and the physician(s) referred by the ER may not be covered by your plan.  Please contact you tell this physician (or your personal doctor if your instructions are to return to your personal doctor) about any new or lasting problems. The primary care or specialist physician will see patients referred from the [facility] Emergency Department.  Farrukh Grimes

## (undated) NOTE — ED AVS SNAPSHOT
Gideon Gosselin   MRN: [id number]    Department:  [facility] Emergency Department   Date of Visit:  1/11/2018           Disclosure     Insurance plans vary and the physician(s) referred by the ER may not be covered by your plan.  Please contact you tell this physician (or your personal doctor if your instructions are to return to your personal doctor) about any new or lasting problems. The primary care or specialist physician will see patients referred from the [facility] Emergency Department.  Severo Pastures

## (undated) NOTE — LETTER
OUTSIDE TESTING RESULT REQUEST     IMPORTANT: FOR YOUR IMMEDIATE ATTENTION  Please FAX all test results listed below to: [phone]     Testing already done on or about:      * * * * If testing is NOT complete, arrange with patient A.S.A.P. * * * *

## (undated) NOTE — Clinical Note
Hi Dr. Curry, pt feeling better since discharge.  Has scheduled visit with you.  Thank you, Ebony

## (undated) NOTE — Clinical Note
Hayden Anna,  I saw Hanna just now. Please let me know if you need anything and when you need us to follow up again. I did place a standing order if she becomes SOB to obtain and would drain again if needed.  Christiana

## (undated) NOTE — LETTER
[facility] Testing Department  Phone: [phone]  Right Fax: [phone]  EVALUATION REQUEST PREOP    Sent By:  Jillian Mac    Date: 2/15/21    Patient Name: Evangelista Appiah  Surgery Date: 3/3/2021    CSN: [id number]  Medical Record: [id number]

## (undated) NOTE — LETTER
Patient Name: Gali Barrientos        : 1970       Medical Record #: [id number]    CONSENT FOR PROCEDURES/SEDATION    Date: 2019       Time: 1:01 PM        1.  I authorize the performance upon Gali Barrientos the following:  Right scapular trig

## (undated) NOTE — LETTER
[address]  [address]  Consent for Procedure/Sedation  Date: 7/12/25         Time: 1300    I hereby authorize Dr Garcia, my physician and his/her assistants (if applicable), which may include medical students, residents, and/or fellows, to perform the following surgical operation/ procedure and administer such anesthesia as may be determined necessary by my physician: Right Pleurx Catheter Insertion on Hanna Barrett  2.   I recognize that during the surgical operation/procedure, unforeseen conditions may necessitate additional or different procedures than those listed above.  I, therefore, further authorize and request that the above-named surgeon, assistants, or designees perform such procedures as are, in their judgment, necessary and desirable.    3.   My surgeon/physician has discussed prior to my surgery the potential benefits, risks and side effects of this procedure; the likelihood of achieving goals; and potential problems that might occur during recuperation.  They also discussed reasonable alternatives to the procedure, including risks, benefits, and side effects related to the alternatives and risks related to not receiving this procedure.  I have had all my questions answered and I acknowledge that no guarantee has been made as to the result that may be obtained.    4.   Should the need arise during my operation/procedure, which includes change of level of care prior to discharge, I also consent to the administration of blood and/or blood products.  Further, I understand that despite careful testing and screening of blood or blood products by collecting agencies, I may still be subject to ill effects as a result of receiving a blood transfusion and/or blood products.  The following are some, but not all, of the potential risks that can occur: fever and allergic reactions, hemolytic reactions, transmission of diseases such as Hepatitis, AIDS and Cytomegalovirus  (CMV) and fluid overload.  In the event that I wish to have an autologous transfusion of my own blood, or a directed donor transfusion, I will discuss this with my physician.   Check only if Refusing Blood or Blood Products  I understand refusal of blood or blood products as deemed necessary by my physician may have serious consequences to my condition to include possible death. I hereby assume responsibility for my refusal and release the hospital, its personnel, and my physicians from any responsibility for the consequences of my refusal.         o  Refuse         5.   I authorize the use of any specimen, organs, tissues, body parts or foreign objects that may be removed from my body during the operation/procedure for diagnosis, research or teaching purposes and their subsequent disposal by hospital authorities.  I also authorize the release of specimen test results and/or written reports to my treating physician on the hospital medical staff or other referring or consulting physicians involved in my care, at the discretion of the Pathologist or my treating physician.    6.   I consent to the photographing or videotaping of the operations or procedures to be performed, including appropriate portions of my body for medical, scientific, or educational purposes, provided my identity is not revealed by the pictures or by descriptive texts accompanying them.  If the procedure has been photographed/videotaped, the surgeon will obtain the original picture, image, videotape or CD.  The hospital will not be responsible for storage, release or maintenance of the picture, image, tape or CD.    7.   I consent to the presence of a  or observers in the operating room as deemed necessary by my physician or their designees.    8.   I recognize that in the event my procedure results in extended X-Ray/fluoroscopy time, I may develop a skin reaction.    9. If I have a Do Not Attempt Resuscitation (DNAR) order in  place, that status will be suspended while in the operating room, procedural suite, and during the recovery period unless otherwise explicitly stated by me (or a person authorized to consent on my behalf). The surgeon or my attending physician will determine when the applicable recovery period ends for purposes of reinstating the DNAR order.  10. Patients having a sterilization procedure: I understand that if the procedure is successful the results will be permanent and it will therefore be impossible for me to inseminate, conceive, or bear children.  I also understand that the procedure is intended to result in sterility, although the result has not been guaranteed.   11. I acknowledge that my physician has explained sedation/analgesia administration to me including the risk and benefits I consent to the administration of sedation/analgesia as may be necessary or desirable in the judgment of my physician.    I CERTIFY THAT I HAVE READ AND FULLY UNDERSTAND THE ABOVE CONSENT TO OPERATION and/or OTHER PROCEDURE.        ____________________________________       _________________________________      ______________________________  Signature of Patient         Signature of Responsible Person        Printed Name of Responsible Person        ____________________________________      _________________________________      ______________________________       Signature of Witness          Relationship to Patient                       Date                                       Time  Patient Name: Hanna Ng Arnold  : 1970    Reviewed: 2024   Printed: 2025  Medical Record #: [id number] Page 1 of

## (undated) NOTE — LETTER
[facility] Testing Department  Phone: [phone]  Right Fax: [phone]  [facility] [address] By:  Erendira Leonard RN Date: 2/12/21    Patient Name: Natasha Socks  Surgery Date: 2/15/2021    CSN: [id number]  Medical Record: [id number]

## (undated) NOTE — LETTER
Patient Name: Jonatan Martinez        : 1970       Medical Record #: [id number]    CONSENT FOR PROCEDURES/SEDATION    Date: 3/27/2018       Time: 9:42 AM        1.  I authorize the performance upon Jonatan Martinez the following:  Right wrist injecti

## (undated) NOTE — ED AVS SNAPSHOT
Nellie Francesca   MRN: [id number]    Department:  [facility] Emergency Department   Date of Visit:  1/28/2018           Disclosure     Insurance plans vary and the physician(s) referred by the ER may not be covered by your plan.  Please contact you tell this physician (or your personal doctor if your instructions are to return to your personal doctor) about any new or lasting problems. The primary care or specialist physician will see patients referred from the [facility] Emergency Department.  Wing Palomino

## (undated) NOTE — LETTER
[address]  [address]  Authorization for Surgical Operation and Procedure     Date:___________                                                                                                         Time:__________  I hereby authorize Sd Corrigan MD my physician and his/her assistants (if applicable), which may include medical students, residents, and/or fellows, to perform the following surgical operation/ procedure and administer such anesthesia as may be determined necessary by my physician:  Operation/Procedure name (s) right thoracenthesis on Hanna Barrett   2.   I recognize that during the surgical operation/procedure, unforeseen conditions may necessitate additional or different procedures than those listed above.  I, therefore, further authorize and request that the above-named surgeon, assistants, or designees perform such procedures as are, in their judgment, necessary and desirable.    3.   My surgeon/physician has discussed prior to my surgery the potential benefits, risks and side effects of this procedure; the likelihood of achieving goals; and potential problems that might occur during recuperation.  They also discussed reasonable alternatives to the procedure, including risks, benefits, and side effects related to the alternatives and risks related to not receiving this procedure.  I have had all my questions answered and I acknowledge that no guarantee has been made as to the result that may be obtained.    4.   Should the need arise during my operation/procedure, which includes change of level of care prior to discharge, I also consent to the administration of blood and/or blood products.  Further, I understand that despite careful testing and screening of blood or blood products by collecting agencies, I may still be subject to ill effects as a result of receiving a blood transfusion and/or blood products.  The following are some, but not all, of  the potential risks that can occur: fever and allergic reactions, hemolytic reactions, transmission of diseases such as Hepatitis, AIDS and Cytomegalovirus (CMV) and fluid overload.  In the event that I wish to have an autologous transfusion of my own blood, or a directed donor transfusion, I will discuss this with my physician.  Check only if Refusing Blood or Blood Products  I understand refusal of blood or blood products as deemed necessary by my physician may have serious consequences to my condition to include possible death. I hereby assume responsibility for my refusal and release the hospital, its personnel, and my physicians from any responsibility for the consequences of my refusal.          o  Refuse      5.   I authorize the use of any specimen, organs, tissues, body parts or foreign objects that may be removed from my body during the operation/procedure for diagnosis, research or teaching purposes and their subsequent disposal by hospital authorities.  I also authorize the release of specimen test results and/or written reports to my treating physician on the hospital medical staff or other referring or consulting physicians involved in my care, at the discretion of the Pathologist or my treating physician.    6.   I consent to the photographing or videotaping of the operations or procedures to be performed, including appropriate portions of my body for medical, scientific, or educational purposes, provided my identity is not revealed by the pictures or by descriptive texts accompanying them.  If the procedure has been photographed/videotaped, the surgeon will obtain the original picture, image, videotape or CD.  The hospital will not be responsible for storage, release or maintenance of the picture, image, tape or CD.    7.   I consent to the presence of a  or observers in the operating room as deemed necessary by my physician or their designees.    8.   I recognize that in the event  my procedure results in extended X-Ray/fluoroscopy time, I may develop a skin reaction.    9. If I have a Do Not Attempt Resuscitation (DNAR) order in place, that status will be suspended while in the operating room, procedural suite, and during the recovery period unless otherwise explicitly stated by me (or a person authorized to consent on my behalf). The surgeon or my attending physician will determine when the applicable recovery period ends for purposes of reinstating the DNAR order.  10. Patients having a sterilization procedure: I understand that if the procedure is successful the results will be permanent and it will therefore be impossible for me to inseminate, conceive, or bear children.  I also understand that the procedure is intended to result in sterility, although the result has not been guaranteed.   11. I acknowledge that my physician has explained sedation/analgesia administration to me including the risk and benefits I consent to the administration of sedation/analgesia as may be necessary or desirable in the judgment of my physician.    I CERTIFY THAT I HAVE READ AND FULLY UNDERSTAND THE ABOVE CONSENT TO OPERATION and/or OTHER PROCEDURE.    _________________________________________  __________________________________  Signature of Patient     Signature of Responsible Person         ___________________________________         Printed Name of Responsible Person           _________________________________                 Relationship to Patient  _________________________________________  ______________________________  Signature of Witness          Date  Time      Patient Name: Hanna Barrett     : 1970                 Printed: 2025     Medical Record #: [id number]                     Page 1 [address]  [address]    Consent for Anesthesia    I, Hanna Barrett agree to be cared for by an  anesthesiologist, who is specially trained to monitor me and give me medicine to put me to sleep or keep me comfortable during my procedure    I understand that my anesthesiologist is not an employee or agent of [facility] or [employer]. He or she works for [employer].    As the patient asking for anesthesia services, I agree to:  Allow the anesthesiologist (anesthesia doctor) to give me medicine and do additional procedures as necessary. Some examples are: Starting or using an “IV” to give me medicine, fluids or blood during my procedure, and having a breathing tube placed to help me breathe when I’m asleep (intubation). In the event that my heart stops working properly, I understand that my anesthesiologist will make every effort to sustain my life, unless otherwise directed by [facility] Do Not Resuscitate documents.  Tell my anesthesia doctor before my procedure:  If I am pregnant.  The last time that I ate or drank.  All of the medicines I take (including prescriptions, herbal supplements, and pills I can buy without a prescription (including street drugs/illegal medications). Failure to inform my anesthesiologist about these medicines may increase my risk of anesthetic complications.  If I am allergic to anything or have had a reaction to anesthesia before.  I understand how the anesthesia medicine will help me (benefits).  I understand that with any type of anesthesia medicine there are risks:  The most common risks are: nausea, vomiting, sore throat, muscle soreness, damage to my eyes, mouth, or teeth (from breathing tube placement).  Rare risks include: remembering what happened during my procedure, allergic reactions to medications, injury to my airway, heart, lungs, vision, nerves, or muscles and in extremely rare instances death.  My doctor has explained to me other choices available to me for my care (alternatives).  Pregnant Patients (“epidural”):  I understand  that the risks of having an epidural (medicine given into my back to help control pain during labor), include itching, low blood pressure, difficulty urinating, headache or slowing of the baby’s heart. Very rare risks include infection, bleeding, seizure, irregular heart rhythms and nerve injury.  Regional Anesthesia (“spinal”, “epidural”, & “nerve blocks”):  I understand that rare but potential complications include headache, bleeding, infection, seizure, irregular heart rhythms, and nerve injury.    I can change my mind about having anesthesia services at any time before I get the medicine.    _____________________________________________________________________________  Patient (or Representative) Signature/Relationship to Patient  Date   Time    _____________________________________________________________________________   Name (if used)    Language/Organization   Time    _____________________________________________________________________________  Anesthesiologist Signature     Date   Time  I have discussed the procedure and information above with the patient (or patient’s representative) and answered their questions. The patient or their representative has agreed to have anesthesia services.    _____________________________________________________________________________  Witness        Date   Time  I have verified that the signature is that of the patient or patient’s representative, and that it was signed before the procedure  Patient Name: Hanna Barrett     : 1970                 Printed: 2025     Medical Record #: [id number]                     Page 2 of 2

## (undated) NOTE — LETTER
19        RE: Chava Munroe     : 1970    Dear Dr. Maximiliano Morris,    This letter is to inform you that your patient is being scheduled for surgery with Dr. Radha Vela on 3/20/2019 at [facility]. We have asked the patient to contact your office

## (undated) NOTE — LETTER
[facility] Department  Phone: [phone]  Right Fax: [phone]    [place] INFORMATION: SENDER INFORMATION:   To:   Dr. Constantino Canas From: Yadi Anguiano RN     Department: Pre-Admission Testing   Fax Number:  Date: 2022     Phone Number:  Phone Number: [phone]   Re: Patient Name: Breanne Mitchell  CSN: [id number]  Medical Record: [id number]   : 1970 - A: 47 y    Sex: female Fax Number: [phone]     Number of Pages (Including Cover Sheet)      Request for History & Physical for Radiology Procedure at [facility]      The above patient is scheduled to have a procedure performed in Radiology. In order for the procedure to be performed safely, a comprehensive History & Physical, to include the Review of Systems, is required within 30 days of the scheduled appointment. Procedure:  CT Biopsy Lung or Mediastinum w/Imaging     Date Scheduled: 2022    Ordered by (Ordering Physician):  Dr. Chris Hernandez    Please FAX the completed H&P to THE [facility] at [phone]. For Questions: Call THE [facility] [phone]    If you cannot provide us with a comprehensive History & Physical prior to this appointment, you will need to cancel and reschedule the appointment by calling Central Scheduling [phone]. Thank you. This message is intended only for the use of the individual or entity to which it is addressed. It may have been disclosed to you from records whose confidentiality is protected by [employer] and applicable state law. Federal Regulation, [address] part 164, prohibits you from making any further disclosure without specific authorization of the person to whom it pertains, or as otherwise permitted by such regulations. If the reader of this message is not the intended recipient, you are hereby notified that reading, disseminating, distributing or copying this communication is strictly prohibited.  If you have received this communication in error, please immediately notify us by telephone and return the original message to us at [address] via the [facility].

## (undated) NOTE — LETTER
ASTHMA ACTION PLAN for Hanna Barrett     : 1970     Date: 25  Doctor:  Guillermo Curry MD  Phone for doctor or clinic: [facility], [address] [place] [id number] [phone]      ACT Score: 15    ACT Goal: 20 or greater    Call your provider if you require your rescue/quick reliever medication more than 2-3 times in a 24 hour period.    If you require your rescue inhaler/medication more than 2-3 times weekly, your asthma may not be under proper control and you should seek medical attention.    *Quick Relievers are Xopenex and Albuterol*    You can use the colors of a traffic light to help learn about your asthma medicines.  Year Round       1. Green - Go! % of Personal Best Peak Flow   Use controller medicine.   Breathing is good  No cough or wheeze  Can work and play Medicine How much to take When to take it    Medications       Leukotriene Modulators Instructions     montelukast 10 MG Oral Tab Take 1 tablet (10 mg total) by mouth nightly.     Patient not taking: Reported on 3/28/2025       Sympathomimetics Instructions     albuterol 108 (90 Base) MCG/ACT Inhalation Aero Soln Inhale 2 puffs into the lungs every 6 (six) hours as needed for Wheezing or Shortness of Breath.     SYMBICORT 160-4.5 MCG/ACT Inhalation Aerosol Inhale 2 puffs into the lungs 2 (two) times daily.                    2. Yellow - Caution. 50-79% Personal Best Peak Flow  Use reliever medicine to keep an asthma attack from getting bad.   Cough  Quick Relievers  Wheezing  Tight Chest  Wake up at night Medicine How much to take When to take it    If symptoms are not improving in 24-48 hrs, call office for further instructions  Medications       Leukotriene Modulators Instructions     montelukast 10 MG Oral Tab Take 1 tablet (10 mg total) by mouth nightly.     Patient not taking: Reported on 3/28/2025       Sympathomimetics Instructions     albuterol 108 (90 Base)  MCG/ACT Inhalation Aero Soln Inhale 2 puffs into the lungs every 6 (six) hours as needed for Wheezing or Shortness of Breath.     SYMBICORT 160-4.5 MCG/ACT Inhalation Aerosol Inhale 2 puffs into the lungs 2 (two) times daily.                    3. Red - Stop! Danger! <50% Personal Best Peak Flow  Continue Controller Medications But ADD:   Medicine not helping  Breathing is hard and fast  Nose opens wide  Can't walk  Ribs show  Can't talk well Medicine How much to take When to take it    If your symptoms do not improve in ONE hour -  go to the emergency room or call 911 immediately! If symptoms improve, call office for appointment immediately.    Albuterol inhaler 2 puffs every 20 minutes for three treatments       Don't forget:  Rinse mouth after using inhaler  Use spacer for inhaler  Remember to get your Flu vaccine every fall!    [x] Asthma Action Plan reviewed with the caregiver and patient, and a copy of the plan was given to the patient/caregiver.   [] Asthma Action Plan reviewed with the caregiver and patient on the phone, and copy mailed to patient/caregiver or sent via [facility].     Signatures:   Provider  Guillermo Curry MD Patient  Hanna Ng Arnold Caretaker